# Patient Record
Sex: MALE | Race: WHITE | NOT HISPANIC OR LATINO | Employment: OTHER | URBAN - METROPOLITAN AREA
[De-identification: names, ages, dates, MRNs, and addresses within clinical notes are randomized per-mention and may not be internally consistent; named-entity substitution may affect disease eponyms.]

---

## 2017-01-03 ENCOUNTER — TELEPHONE (OUTPATIENT)
Dept: RHEUMATOLOGY | Facility: CLINIC | Age: 77
End: 2017-01-03

## 2017-01-03 RX ORDER — COLCHICINE 0.6 MG/1
0.6 TABLET ORAL DAILY
Qty: 90 TABLET | Refills: 1 | Status: SHIPPED | OUTPATIENT
Start: 2017-01-03 | End: 2017-12-26 | Stop reason: SDUPTHER

## 2017-01-03 RX ORDER — COLCHICINE 0.6 MG/1
TABLET ORAL
Qty: 180 TABLET | Refills: 1 | Status: SHIPPED | OUTPATIENT
Start: 2017-01-03 | End: 2017-01-03 | Stop reason: SDUPTHER

## 2017-01-03 NOTE — TELEPHONE ENCOUNTER
Please tell pt that since his uric acid is controlled, and no recent gout attacks, can reduce colchicine to 0.6mg once daily instead of twice. KAREN

## 2017-01-10 ENCOUNTER — LAB VISIT (OUTPATIENT)
Dept: LAB | Facility: HOSPITAL | Age: 77
End: 2017-01-10
Attending: INTERNAL MEDICINE
Payer: MEDICARE

## 2017-01-10 ENCOUNTER — OFFICE VISIT (OUTPATIENT)
Dept: RHEUMATOLOGY | Facility: CLINIC | Age: 77
End: 2017-01-10
Payer: MEDICARE

## 2017-01-10 ENCOUNTER — PATIENT MESSAGE (OUTPATIENT)
Dept: RHEUMATOLOGY | Facility: CLINIC | Age: 77
End: 2017-01-10

## 2017-01-10 VITALS
HEART RATE: 71 BPM | DIASTOLIC BLOOD PRESSURE: 62 MMHG | SYSTOLIC BLOOD PRESSURE: 128 MMHG | WEIGHT: 217 LBS | BODY MASS INDEX: 34.06 KG/M2 | HEIGHT: 67 IN

## 2017-01-10 DIAGNOSIS — G89.29 CHRONIC RIGHT SHOULDER PAIN: ICD-10-CM

## 2017-01-10 DIAGNOSIS — N18.30 CKD (CHRONIC KIDNEY DISEASE) STAGE 3, GFR 30-59 ML/MIN: Chronic | ICD-10-CM

## 2017-01-10 DIAGNOSIS — M54.9 BACK PAIN, UNSPECIFIED BACK LOCATION, UNSPECIFIED BACK PAIN LATERALITY, UNSPECIFIED CHRONICITY: Chronic | ICD-10-CM

## 2017-01-10 DIAGNOSIS — M75.41 IMPINGEMENT SYNDROME OF RIGHT SHOULDER: ICD-10-CM

## 2017-01-10 DIAGNOSIS — E78.5 HYPERLIPIDEMIA, UNSPECIFIED HYPERLIPIDEMIA TYPE: ICD-10-CM

## 2017-01-10 DIAGNOSIS — M25.511 CHRONIC RIGHT SHOULDER PAIN: ICD-10-CM

## 2017-01-10 DIAGNOSIS — M1A.9XX1 CHRONIC TOPHACEOUS GOUT: ICD-10-CM

## 2017-01-10 DIAGNOSIS — N18.30 CKD (CHRONIC KIDNEY DISEASE), STAGE III: ICD-10-CM

## 2017-01-10 DIAGNOSIS — K76.0 NAFLD (NONALCOHOLIC FATTY LIVER DISEASE): Primary | ICD-10-CM

## 2017-01-10 DIAGNOSIS — R74.01 HIGH TRANSAMINASE LEVELS: ICD-10-CM

## 2017-01-10 LAB
25(OH)D3+25(OH)D2 SERPL-MCNC: 35 NG/ML
ALBUMIN SERPL BCP-MCNC: 3.9 G/DL
ANION GAP SERPL CALC-SCNC: 7 MMOL/L
BASOPHILS # BLD AUTO: 0.06 K/UL
BASOPHILS NFR BLD: 1.1 %
BUN SERPL-MCNC: 24 MG/DL
CALCIUM SERPL-MCNC: 9.7 MG/DL
CHLORIDE SERPL-SCNC: 100 MMOL/L
CO2 SERPL-SCNC: 26 MMOL/L
CREAT SERPL-MCNC: 1.9 MG/DL
DIFFERENTIAL METHOD: ABNORMAL
EOSINOPHIL # BLD AUTO: 0.3 K/UL
EOSINOPHIL NFR BLD: 5.7 %
ERYTHROCYTE [DISTWIDTH] IN BLOOD BY AUTOMATED COUNT: 13.4 %
EST. GFR  (AFRICAN AMERICAN): 38.7 ML/MIN/1.73 M^2
EST. GFR  (NON AFRICAN AMERICAN): 33.5 ML/MIN/1.73 M^2
GLUCOSE SERPL-MCNC: 96 MG/DL
HCT VFR BLD AUTO: 37.8 %
HGB BLD-MCNC: 12.8 G/DL
LYMPHOCYTES # BLD AUTO: 1.8 K/UL
LYMPHOCYTES NFR BLD: 30.9 %
MCH RBC QN AUTO: 31.7 PG
MCHC RBC AUTO-ENTMCNC: 33.9 %
MCV RBC AUTO: 94 FL
MONOCYTES # BLD AUTO: 0.7 K/UL
MONOCYTES NFR BLD: 12 %
NEUTROPHILS # BLD AUTO: 2.8 K/UL
NEUTROPHILS NFR BLD: 49.9 %
PHOSPHATE SERPL-MCNC: 3.4 MG/DL
PLATELET # BLD AUTO: 230 K/UL
PMV BLD AUTO: 10.1 FL
POTASSIUM SERPL-SCNC: 4.5 MMOL/L
PTH-INTACT SERPL-MCNC: 92 PG/ML
RBC # BLD AUTO: 4.04 M/UL
SODIUM SERPL-SCNC: 133 MMOL/L
WBC # BLD AUTO: 5.66 K/UL

## 2017-01-10 PROCEDURE — 99999 PR PBB SHADOW E&M-EST. PATIENT-LVL III: CPT | Mod: PBBFAC,,, | Performed by: INTERNAL MEDICINE

## 2017-01-10 PROCEDURE — 82306 VITAMIN D 25 HYDROXY: CPT

## 2017-01-10 PROCEDURE — 83970 ASSAY OF PARATHORMONE: CPT

## 2017-01-10 PROCEDURE — 99213 OFFICE O/P EST LOW 20 MIN: CPT | Mod: S$PBB,,, | Performed by: INTERNAL MEDICINE

## 2017-01-10 PROCEDURE — 85025 COMPLETE CBC W/AUTO DIFF WBC: CPT

## 2017-01-10 PROCEDURE — 80069 RENAL FUNCTION PANEL: CPT

## 2017-01-10 PROCEDURE — 36415 COLL VENOUS BLD VENIPUNCTURE: CPT

## 2017-01-10 RX ORDER — FEBUXOSTAT 40 MG/1
40 TABLET, FILM COATED ORAL DAILY
Qty: 90 TABLET | Refills: 3 | Status: SHIPPED | OUTPATIENT
Start: 2017-01-10 | End: 2017-12-26 | Stop reason: SDUPTHER

## 2017-01-10 NOTE — MR AVS SNAPSHOT
[facility] - Rheumatology  [address]  [facility] [address]  Phone: [phone]  Fax: [phone]                  Adelfo Goldberg   1/10/2017 9:00 AM   Office Visit    Description:  Male : 1940   Provider:  Edis Nunez MD   Department:  [facility] - Rheumatology           Diagnoses this Visit        Comments    NAFLD (nonalcoholic fatty liver disease)    -  Primary     High transaminase levels         Chronic tophaceous gout         Hyperlipidemia, unspecified hyperlipidemia type         Chronic right shoulder pain         Back pain, unspecified back location, unspecified back pain laterality, unspecified chronicity         CKD (chronic kidney disease) stage 3, GFR 30-59 ml/min         Impingement syndrome of right shoulder                To Do List           Future Appointments        Provider Department Dept Phone    1/10/2017 9:45 AM SPECIMEN, [facility] [phone]    1/10/2017 10:00 AM LAB, APPOINTMENT [place] [facility] [phone]    2017 9:15 AM LAB, [facility] [phone]    2017 9:00 AM LAB, [facility] [phone]    2017 1:00 PM Lisa Graf MD [facility] Nephrology [phone]      Goals (5 Years of Data)     None      Follow-Up and Disposition     Return in about 3 months (around 4/10/2017).       These Medications        Disp Refills Start End    febuxostat (ULORIC) 40 mg Tab 90 tablet 3 1/10/2017     Take 1 tablet (40 mg total) by mouth once daily. - Oral    Pharmacy: [facility] - [place], [place] - [address] [place] [address] Ph #: [phone]         [facility]     [facility] Care [facility] -  Assistance  Registered nurses in the [facility] provide clinical advisement, health education, appointment booking, and other advisory services.  Call for this free service at  [phone].             Medications           Message regarding Medications     Verify the changes and/or additions to your medication regime listed below are the same as discussed with your clinician today.  If any of these changes or additions are incorrect, please notify your healthcare provider.        CHANGE how you are taking these medications     Start Taking Instead of    febuxostat (ULORIC) 40 mg Tab ULORIC 40 mg Tab    Dosage:  Take 1 tablet (40 mg total) by mouth once daily. Dosage:  take 1 tablet by mouth once daily    Reason for Change:  Reorder       STOP taking these medications     clindamycin (CLEOCIN) 300 MG capsule take 1 capsule by mouth three times a day for 7 days           Verify that the below list of medications is an accurate representation of the medications you are currently taking.  If none reported, the list may be blank. If incorrect, please contact your healthcare provider. Carry this list with you in case of emergency.           Current Medications     ADVAIR DISKUS 250-50 mcg/dose diskus inhaler inhale 1 dose by mouth twice a day Rinse mouth after use    albuterol (PROAIR HFA) 90 mcg/actuation inhaler Inhale 2 puffs into the lungs every 4 (four) hours as needed for Wheezing.    aspirin (ECOTRIN) 81 MG EC tablet Take 81 mg by mouth once daily.      atorvastatin (LIPITOR) 20 MG tablet Take 1 tablet (20 mg total) by mouth once daily. For cholesterol control.    colchicine 0.6 mg tablet Take 1 tablet (0.6 mg total) by mouth once daily.    febuxostat (ULORIC) 40 mg Tab Take 1 tablet (40 mg total) by mouth once daily.    felodipine (PLENDIL) 10 MG 24 hr tablet take 1 tablet by mouth once daily    fish oil-omega-3 fatty acids 300-1,000 mg capsule Take 2 g by mouth once daily.      fluticasone (FLONASE) 50 mcg/actuation nasal spray instill 1 spray into each nostril once daily    glucosamine & chondroit sul.Na 750-600 mg Tab Take 750 mg by mouth.    loratadine (CLARITIN) 10 mg tablet  "Take 10 mg by mouth daily as needed.      valsartan-hydrochlorothiazide (DIOVAN-HCT) 320-12.5 mg per tablet take 1 tablet by mouth once daily           Clinical Reference Information           Vital Signs - Last Recorded  Most recent update: 1/10/2017  8:59 AM by Oralia Navarro MA    BP Pulse Ht Wt BMI    128/62 71 5' 7.2" (1.707 m) 98.4 kg (217 lb) 33.79 kg/m2      Blood Pressure          Most Recent Value    BP  128/62      Allergies as of 1/10/2017     Amoxicillin    Allopurinol Analogues      Immunizations Administered on Date of Encounter - 1/10/2017     None      "

## 2017-01-10 NOTE — PROGRESS NOTES
"Subjective:       Patient ID: Adelfo Goldberg is a 76 y.o. male.    Chief Complaint: Chronic tophaceous gout  No acute attacks. Right shoulder impingement much improved after OT/PT Vets. Better rom and much less pain. Occ cough with "mucus plugs" related to asthma. Recently started back on statin, atorvastatin 20 mg daily  HPI  Review of Systems   Constitutional: Negative for appetite change, chills, fatigue, fever and unexpected weight change.   HENT: Negative for mouth sores.    Eyes: Negative for pain, redness and visual disturbance.   Respiratory: Positive for cough. Negative for shortness of breath and wheezing.    Cardiovascular: Negative for chest pain, palpitations and leg swelling.   Gastrointestinal: Negative for abdominal pain, blood in stool, constipation, diarrhea and nausea.   Genitourinary: Negative for difficulty urinating, dysuria and hematuria.   Musculoskeletal: Negative for arthralgias, back pain, gait problem, joint swelling, myalgias, neck pain and neck stiffness.   Skin: Negative for color change, pallor and rash.   Neurological: Negative for weakness and headaches.   Psychiatric/Behavioral: Negative for dysphoric mood and sleep disturbance.         Objective:     Visit Vitals    /62    Pulse 71    Ht 5' 7.2" (1.707 m)    Wt 98.4 kg (217 lb)    BMI 33.79 kg/m2        Physical Exam   Constitutional: He is oriented to person, place, and time and well-developed, well-nourished, and in no distress.   HENT:   Head: Normocephalic and atraumatic.   Mouth/Throat: Oropharynx is clear and moist.   Eyes: Conjunctivae and EOM are normal. Pupils are equal, round, and reactive to light.   Neck: Normal range of motion. Neck supple. No thyromegaly present.   No cervical bruits   Cardiovascular: Normal rate, regular rhythm, normal heart sounds and intact distal pulses.  Exam reveals no gallop and no friction rub.    No murmur heard.  Pulmonary/Chest: Breath sounds normal. He has no wheezes. He " has no rales. He exhibits no tenderness.   Abdominal: Soft. He exhibits no distension and no mass. There is no tenderness.       Right Side Rheumatological Exam     Examination finds the shoulder, elbow, wrist, knee, 1st PIP, 1st MCP, 2nd PIP, 2nd MCP, 3rd PIP, 3rd MCP, 4th PIP, 4th MCP, 5th PIP and 5th MCP normal.    Left Side Rheumatological Exam     Examination finds the shoulder, elbow, wrist, knee, 1st PIP, 1st MCP, 2nd PIP, 2nd MCP, 3rd PIP, 3rd MCP, 4th PIP, 4th MCP, 5th PIP and 5th MCP normal.      Lymphadenopathy:     He has no cervical adenopathy.   Neurological: He is alert and oriented to person, place, and time. He displays normal reflexes. He exhibits normal muscle tone. Gait normal.   Motor strength nl. UE and LE bilateral   Skin: Skin is warm and dry. No rash noted. No erythema. No pallor.     Psychiatric: Mood, memory, affect and judgment normal.       Results for MCKINLEY BISHOP (MRN [id number]) as of 1/10/2017 09:05   Ref. Range 12/27/2016 09:11 12/27/2016 09:11 12/27/2016 09:11   Sodium Latest Ref Range: 136 - 145 mmol/L  133 (L)    Potassium Latest Ref Range: 3.5 - 5.1 mmol/L  4.5    Chloride Latest Ref Range: 95 - 110 mmol/L  101    CO2 Latest Ref Range: 23 - 29 mmol/L  24    Anion Gap Latest Ref Range: 8 - 16 mmol/L  8    BUN, Bld Latest Ref Range: 8 - 23 mg/dL  29 (H)    Creatinine Latest Ref Range: 0.5 - 1.4 mg/dL  1.8 (H)    eGFR if non African American Latest Ref Range: >60 mL/min/1.73 m^2  35.8 (A)    eGFR if African American Latest Ref Range: >60 mL/min/1.73 m^2  41.3 (A)    Glucose Latest Ref Range: 70 - 110 mg/dL  90    Calcium Latest Ref Range: 8.7 - 10.5 mg/dL  9.2    Alkaline Phosphatase Latest Ref Range: 55 - 135 U/L 120 120 120   Total Protein Latest Ref Range: 6.0 - 8.4 g/dL 6.4 6.4 6.4   Albumin Latest Ref Range: 3.5 - 5.2 g/dL 3.5 3.5 3.5   Uric Acid Latest Ref Range: 3.4 - 7.0 mg/dL 5.0     Total Bilirubin Latest Ref Range: 0.1 - 1.0 mg/dL 0.4 0.4 0.4   Bilirubin, Direct  Latest Ref Range: 0.1 - 0.3 mg/dL 0.2  0.2   AST Latest Ref Range: 10 - 40 U/L 40 40 40   ALT Latest Ref Range: 10 - 44 U/L 68 (H) 68 (H) 68 (H)     Assessment:           1. Chronic tophaceous gout  Intercritical, SUA at goal   2. Essential hypertension controlled   3. NAFLD (nonalcoholic fatty liver disease) stable-improved   4. CKD (chronic kidney disease) stage 3, GFR 30-59 ml/min stable   5. Obesity (BMI 30-39.9)    6. Hyperlipidemia, now on atorvastatin 20mg daily without effect on ALT    Plan:     Cont colchicine 0.6mg daily  Cont febuxostat 40mg daily  CMP, uric acid q 3 months    Cont weight reduction  RTC 3 months

## 2017-01-16 ENCOUNTER — LAB VISIT (OUTPATIENT)
Dept: LAB | Facility: HOSPITAL | Age: 77
End: 2017-01-16
Attending: INTERNAL MEDICINE
Payer: MEDICARE

## 2017-01-16 DIAGNOSIS — E78.49 OTHER HYPERLIPIDEMIA: ICD-10-CM

## 2017-01-16 LAB
ALBUMIN SERPL BCP-MCNC: 3.6 G/DL
ALP SERPL-CCNC: 94 U/L
ALT SERPL W/O P-5'-P-CCNC: 45 U/L
AST SERPL-CCNC: 29 U/L
BILIRUB DIRECT SERPL-MCNC: 0.3 MG/DL
BILIRUB SERPL-MCNC: 0.7 MG/DL
PROT SERPL-MCNC: 6.5 G/DL

## 2017-01-16 PROCEDURE — 36415 COLL VENOUS BLD VENIPUNCTURE: CPT | Mod: PO

## 2017-01-16 PROCEDURE — 80076 HEPATIC FUNCTION PANEL: CPT

## 2017-01-19 RX ORDER — FLUTICASONE PROPIONATE AND SALMETEROL 50; 250 UG/1; UG/1
POWDER RESPIRATORY (INHALATION)
Qty: 60 EACH | Refills: 3 | Status: SHIPPED | OUTPATIENT
Start: 2017-01-19 | End: 2017-06-12 | Stop reason: SDUPTHER

## 2017-01-30 ENCOUNTER — LAB VISIT (OUTPATIENT)
Dept: LAB | Facility: HOSPITAL | Age: 77
End: 2017-01-30
Attending: INTERNAL MEDICINE
Payer: MEDICARE

## 2017-01-30 DIAGNOSIS — E78.49 OTHER HYPERLIPIDEMIA: ICD-10-CM

## 2017-01-30 LAB
ALBUMIN SERPL BCP-MCNC: 3.7 G/DL
ALP SERPL-CCNC: 87 U/L
ALT SERPL W/O P-5'-P-CCNC: 39 U/L
AST SERPL-CCNC: 28 U/L
BILIRUB DIRECT SERPL-MCNC: 0.3 MG/DL
BILIRUB SERPL-MCNC: 0.7 MG/DL
PROT SERPL-MCNC: 6.6 G/DL

## 2017-01-30 PROCEDURE — 36415 COLL VENOUS BLD VENIPUNCTURE: CPT | Mod: PO

## 2017-01-30 PROCEDURE — 80076 HEPATIC FUNCTION PANEL: CPT

## 2017-02-01 ENCOUNTER — OFFICE VISIT (OUTPATIENT)
Dept: NEPHROLOGY | Facility: CLINIC | Age: 77
End: 2017-02-01
Payer: MEDICARE

## 2017-02-01 VITALS
SYSTOLIC BLOOD PRESSURE: 120 MMHG | DIASTOLIC BLOOD PRESSURE: 70 MMHG | WEIGHT: 216.94 LBS | HEIGHT: 67 IN | OXYGEN SATURATION: 97 % | HEART RATE: 70 BPM | BODY MASS INDEX: 34.05 KG/M2

## 2017-02-01 DIAGNOSIS — N25.81 SECONDARY HYPERPARATHYROIDISM: ICD-10-CM

## 2017-02-01 DIAGNOSIS — D64.9 CHRONIC ANEMIA: ICD-10-CM

## 2017-02-01 DIAGNOSIS — I12.9 HYPERTENSIVE KIDNEY DISEASE, STAGE 1-4 OR UNSPECIFIED CHRONIC KIDNEY DISEASE: ICD-10-CM

## 2017-02-01 DIAGNOSIS — N18.30 CKD (CHRONIC KIDNEY DISEASE), STAGE III: Primary | ICD-10-CM

## 2017-02-01 PROCEDURE — 99214 OFFICE O/P EST MOD 30 MIN: CPT | Mod: S$PBB,,, | Performed by: INTERNAL MEDICINE

## 2017-02-01 PROCEDURE — 99999 PR PBB SHADOW E&M-EST. PATIENT-LVL III: CPT | Mod: PBBFAC,,, | Performed by: INTERNAL MEDICINE

## 2017-02-01 PROCEDURE — 99213 OFFICE O/P EST LOW 20 MIN: CPT | Mod: PBBFAC | Performed by: INTERNAL MEDICINE

## 2017-02-01 NOTE — PROGRESS NOTES
Subjective:       Patient ID: Adelfo Goldberg is a 76 y.o. White male who presents for follow up of Chronic Kidney Disease    HPI     Mr. Goldberg was seen in nephrology clinic for follow up of CKD. He was seen on 10/12/16 in new patient evaluation for CKD. Pt has longstanding hypertension, gout, hyperlipidemia, elevated liver enzymes, non alcoholic fatty liver, ANNABELLE, obesity, remote h/o kidney stone, CKD III and other medical problems.     Onset of his CKD is somewhere in [id number]. Pt reports he was diagnosed with hypertension prior to that but was not really taking any medicines for BP control. 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 He denies any ongoing use.     He is overall doing fine and has no recent acute illness. Pt does not have any dysuria/ decreased urine/ difficulty urinating/ flank pain/ leg swelling/ hematuria/ shortness of breath/ chest pain. Serial labs noted, creatinine appears to be at 1.7 to 1.9 at baseline. Labs from 9/20/16 noted for Na 135, K 4.6, bicarbonate 24, BUN 31, creatinine 1.9, eGFR 33.5, Ca 10.2, albumin 3.9, Hb 12.8. Prior labs noted for negative hep C/B serology. Urinalysis does not show proteinuria. Remote abdominal imaging shows preserved kidney size.     Most recent labs were reviewed from 1/10/17, Hb 12.8 which is his baseline, Na 133, of note he appears to have chronic mild hyponatremia for 2 years or more. He has been on HCTZ for a long time. He admits he drinks upto 90 oz of water every day. K 4.5, bicarbonate 26, BUN 24, creatinine 1.9, eGFR 33.5, Ca 9.7, phos 3.4, vit D 35, PTH 92, urinalysis was unremarkable and urine PC ratio was normal.     Pt has been on Valsartan based regimen. His gout is managed per Dr. Nunez and pt has been on Uloric. Previously Allopurinol was stopped due to rise in liver enzymes.     Review of Systems   Constitutional: Negative for activity change, appetite change, chills, fatigue and fever.   HENT:  Negative for hearing loss, nosebleeds, rhinorrhea, sneezing and sore throat.    Eyes: Negative for pain and discharge.   Respiratory: Negative for cough, shortness of breath and wheezing.    Cardiovascular: Negative for chest pain and leg swelling.   Gastrointestinal: Negative for abdominal pain, diarrhea, nausea and vomiting.   Endocrine: Negative for polydipsia and polyuria.   Genitourinary: Negative for decreased urine volume, dysuria, flank pain, frequency, hematuria and urgency.   Musculoskeletal: Positive for arthralgias. Negative for myalgias.   Skin: Negative for pallor and rash.   Allergic/Immunologic: Negative for environmental allergies.   Neurological: Negative for dizziness, light-headedness and headaches.   Psychiatric/Behavioral: Negative for behavioral problems. The patient is not nervous/anxious.        Objective:      Physical Exam   Constitutional: He is oriented to person, place, and time. He appears well-developed and well-nourished.   HENT:   Head: Normocephalic and atraumatic.   Mouth/Throat: Oropharynx is clear and moist.   Eyes: Right eye exhibits no discharge. Left eye exhibits no discharge.   Neck: Neck supple.   Large neck and fat pad around neck   Cardiovascular: Normal rate, regular rhythm and normal heart sounds.    Pulmonary/Chest: Effort normal and breath sounds normal. No respiratory distress. He has no wheezes. He has no rales.   Abdominal: Soft.   Abdominal obesity   Musculoskeletal: He exhibits no edema or tenderness.   Neurological: He is alert and oriented to person, place, and time.   Skin: Skin is warm and dry.   Psychiatric: He has a normal mood and affect. His behavior is normal.   Vitals reviewed.      Assessment:       1. CKD (chronic kidney disease), stage III    2. Hypertensive kidney disease, stage 1-4 or unspecified chronic kidney disease    3. Secondary hyperparathyroidism    4. Chronic anemia    5.      Hyponatremia    Plan:     Mr. Rodriguez has CKD III which is  likely due to longstanding hypertension, occasional NSAID use, obesity. He has very slow progression of CKD and does not seem to have any rapid decline. He is on Valsartan based regimen. He has gout and non alcoholic fatty liver. I discussed all the available serial labs with patient and explained CKD staging, potential risk of progression. I stressed importance of weight loss by calorie control/ portion size, low salt diet, keeping well hydrated, avoiding any NSAID use ever and continuing ARB based regimen to slow progression of CKD. He will need periodic lab monitoring of eGFR and electrolytes. I have advised him to stop vitamin D, multivitamin as it will have Ca supplement and as such he has borderline high Ca levels. Also I have advised him to stop vitamin C unless he has clinical signs of deficiency. He gives remote h/o kidney stone and vitamin C intake can precipitate stone formation. He remains at high risk for contrast induced nephropathy and also as such would avoid gadolinium use unless benefit outweighs risk. He appears to have chronic mild hyponatremia which could be from a combination of HCTZ, excessive intake of dilute fluids. He will limit his water intake to 64 oz from current around 90 oz.     All available labs and other renal related imaging d/w him in detail.    Plan  - periodically monitor renal panel for electrolytes, acid base status, eGFR  - risk of CKD progression d/w patient  - low salt diet  - follow PTH levels, vit D, Ca, phos levels  - periodically trend Hb, consider DAPHNIE when Hb is less than 10   - follow urine studies for proteinuria  - avoid NSAID/ bactrim/ IV contrast/ gadolinium/ aminoglycoside/ Fleet enema where possible  - continue ARB containing regimen for RAAS blockade  - management of gout per Dr. Nunez  - management of hyperlipidemia per Dr. Vogel  - weight loss by controlling calories and portion size is highly advisable especially with other comorbid conditions like ANNABELLE,  non alcoholic fatty liver     Plan, labs, recommendations were discussed with patient, his questions were answered to his satisfaction.   RTC 4 months

## 2017-02-06 ENCOUNTER — OFFICE VISIT (OUTPATIENT)
Dept: OPTOMETRY | Facility: CLINIC | Age: 77
End: 2017-02-06
Payer: MEDICARE

## 2017-02-06 DIAGNOSIS — H25.13 NUCLEAR SCLEROSIS, BILATERAL: Primary | ICD-10-CM

## 2017-02-06 DIAGNOSIS — I10 ESSENTIAL HYPERTENSION: ICD-10-CM

## 2017-02-06 DIAGNOSIS — H52.4 MYOPIA WITH ASTIGMATISM AND PRESBYOPIA, BILATERAL: ICD-10-CM

## 2017-02-06 DIAGNOSIS — H52.203 MYOPIA WITH ASTIGMATISM AND PRESBYOPIA, BILATERAL: ICD-10-CM

## 2017-02-06 DIAGNOSIS — H52.13 MYOPIA WITH ASTIGMATISM AND PRESBYOPIA, BILATERAL: ICD-10-CM

## 2017-02-06 PROCEDURE — 92015 DETERMINE REFRACTIVE STATE: CPT | Mod: ,,, | Performed by: OPTOMETRIST

## 2017-02-06 PROCEDURE — 99999 PR PBB SHADOW E&M-EST. PATIENT-LVL III: CPT | Mod: PBBFAC,,, | Performed by: OPTOMETRIST

## 2017-02-06 PROCEDURE — 99213 OFFICE O/P EST LOW 20 MIN: CPT | Mod: PBBFAC | Performed by: OPTOMETRIST

## 2017-02-06 PROCEDURE — 92004 COMPRE OPH EXAM NEW PT 1/>: CPT | Mod: S$PBB,,, | Performed by: OPTOMETRIST

## 2017-02-06 NOTE — PROGRESS NOTES
HPI     76 yr old here for a full eye exam 2/2 HTN.  Mr. Rodriguez states that   his HTN is medication controled.  Pt states that his last eye exam was   less than five years ago outside of [facility], distance vision is stable.    Going without correction for distance.  Depends on reading glasses and   feels that he is depending on the readers more now than before, vision   could be sharper.  Uses the same glasses for computer and reading vision.      No headache  No diplopia  No diplopia  No flashes or floaters   +itch tear and burn in the evening at time. Does not use AFT's washes his   face with water to help  No pain  No Injury or surgery.         Last edited by Violet Altamirano on 2/6/2017  1:39 PM.     ROS     Negative for: Constitutional, Gastrointestinal, Neurological, Skin,   Genitourinary, Musculoskeletal, HENT, Endocrine, Cardiovascular, Eyes,   Respiratory, Psychiatric, Allergic/Imm, Heme/Lymph    Last edited by Darleen Galan, OD on 2/6/2017  2:42 PM. (History)        Assessment /Plan     For exam results, see Encounter Report.    Nuclear sclerosis, bilateral    Essential hypertension    Myopia with astigmatism and presbyopia, bilateral            1.  Educated on cataracts and affects on vision.  Early-monitor.  2.  No retinopathy--monitor yearly.  BP control.    3.  Bifocal rx given          RTC 1 year for routine exam.

## 2017-02-13 ENCOUNTER — LAB VISIT (OUTPATIENT)
Dept: LAB | Facility: HOSPITAL | Age: 77
End: 2017-02-13
Attending: INTERNAL MEDICINE
Payer: MEDICARE

## 2017-02-13 DIAGNOSIS — E78.49 OTHER HYPERLIPIDEMIA: ICD-10-CM

## 2017-02-13 LAB
ALBUMIN SERPL BCP-MCNC: 3.6 G/DL
ALP SERPL-CCNC: 87 U/L
ALT SERPL W/O P-5'-P-CCNC: 34 U/L
AST SERPL-CCNC: 27 U/L
BILIRUB DIRECT SERPL-MCNC: 0.3 MG/DL
BILIRUB SERPL-MCNC: 0.7 MG/DL
PROT SERPL-MCNC: 6.5 G/DL

## 2017-02-13 PROCEDURE — 36415 COLL VENOUS BLD VENIPUNCTURE: CPT | Mod: PO

## 2017-02-13 PROCEDURE — 80076 HEPATIC FUNCTION PANEL: CPT

## 2017-03-09 RX ORDER — FELODIPINE 10 MG/1
TABLET, EXTENDED RELEASE ORAL
Qty: 30 TABLET | Refills: 5 | Status: SHIPPED | OUTPATIENT
Start: 2017-03-09 | End: 2017-08-23 | Stop reason: SDUPTHER

## 2017-03-21 ENCOUNTER — PATIENT MESSAGE (OUTPATIENT)
Dept: INTERNAL MEDICINE | Facility: CLINIC | Age: 77
End: 2017-03-21

## 2017-03-22 ENCOUNTER — OFFICE VISIT (OUTPATIENT)
Dept: INTERNAL MEDICINE | Facility: CLINIC | Age: 77
End: 2017-03-22
Payer: MEDICARE

## 2017-03-22 VITALS
HEART RATE: 65 BPM | SYSTOLIC BLOOD PRESSURE: 134 MMHG | RESPIRATION RATE: 20 BRPM | WEIGHT: 220 LBS | TEMPERATURE: 98 F | BODY MASS INDEX: 31.5 KG/M2 | HEIGHT: 70 IN | DIASTOLIC BLOOD PRESSURE: 63 MMHG

## 2017-03-22 DIAGNOSIS — J45.20 ASTHMA, INTERMITTENT, UNCOMPLICATED: ICD-10-CM

## 2017-03-22 DIAGNOSIS — J01.10 ACUTE FRONTAL SINUSITIS, RECURRENCE NOT SPECIFIED: Primary | ICD-10-CM

## 2017-03-22 PROCEDURE — 99213 OFFICE O/P EST LOW 20 MIN: CPT | Mod: PBBFAC,PO,25 | Performed by: INTERNAL MEDICINE

## 2017-03-22 PROCEDURE — 99999 PR PBB SHADOW E&M-EST. PATIENT-LVL III: CPT | Mod: PBBFAC,,, | Performed by: INTERNAL MEDICINE

## 2017-03-22 PROCEDURE — 96372 THER/PROPH/DIAG INJ SC/IM: CPT | Mod: PBBFAC,PO

## 2017-03-22 PROCEDURE — 99213 OFFICE O/P EST LOW 20 MIN: CPT | Mod: S$PBB,,, | Performed by: INTERNAL MEDICINE

## 2017-03-22 RX ORDER — TRIAMCINOLONE ACETONIDE 0.25 MG/G
CREAM TOPICAL
Refills: 0 | COMMUNITY
Start: 2017-01-31 | End: 2017-10-17

## 2017-03-22 RX ORDER — KETOCONAZOLE 20 MG/ML
SHAMPOO, SUSPENSION TOPICAL
Refills: 0 | COMMUNITY
Start: 2017-01-31 | End: 2017-12-26 | Stop reason: SDUPTHER

## 2017-03-22 RX ORDER — AZITHROMYCIN 250 MG/1
250 TABLET, FILM COATED ORAL DAILY
Qty: 6 TABLET | Refills: 0 | Status: SHIPPED | OUTPATIENT
Start: 2017-03-22 | End: 2017-03-27

## 2017-03-22 RX ORDER — TRIAMCINOLONE ACETONIDE 40 MG/ML
40 INJECTION, SUSPENSION INTRA-ARTICULAR; INTRAMUSCULAR
Status: COMPLETED | OUTPATIENT
Start: 2017-03-22 | End: 2017-03-22

## 2017-03-22 RX ORDER — KETOCONAZOLE 20 MG/G
CREAM TOPICAL
Refills: 0 | COMMUNITY
Start: 2017-01-31 | End: 2018-04-17

## 2017-03-22 RX ADMIN — TRIAMCINOLONE ACETONIDE 40 MG: 40 INJECTION, SUSPENSION INTRA-ARTICULAR; INTRAMUSCULAR at 01:03

## 2017-03-22 NOTE — PROGRESS NOTES
Subjective:       Patient ID: Adelfo Goldberg is a 76 y.o. male.    Chief Complaint: Cough; Nasal Congestion; Chest Congestion; and Fatigue    HPI Comments: Patient presents for urgent visit.  His present illness started 2 weeks ago with some head congestion, sneezing, clear nasal drainage.  He thought his allergies were acting up.  Started loratidine and mucinex.  Some improvement until the past few days the head congestion has gotten worse and has developed a bit of a cough with yellow tinged phlegm.  No fever/chills.    He has allergic asthma and has been monitoring his peak flows and they have been good.  He is not aware of any wheezing.    Of note, he is leaving on a business trip tomorrow involving a plane flight.  He is concerned.     Just feels fatigued and a bit headachy and off balance.    Cough   Associated symptoms include postnasal drip, rhinorrhea and a sore throat. Pertinent negatives include no chills, fever, headaches, shortness of breath or wheezing.   Fatigue   Associated symptoms include congestion, coughing, fatigue and a sore throat. Pertinent negatives include no chills, fever or headaches.     Review of Systems   Constitutional: Positive for fatigue. Negative for chills and fever.   HENT: Positive for congestion, postnasal drip, rhinorrhea, sinus pressure, sneezing and sore throat.    Respiratory: Positive for cough. Negative for chest tightness, shortness of breath and wheezing.    Neurological: Positive for dizziness. Negative for light-headedness and headaches.       Objective:      Physical Exam   Constitutional: He appears well-developed and well-nourished. No distress.   HENT:   Head: Normocephalic.   Right Ear: External ear normal.   Left Ear: External ear normal.   Mouth/Throat: Oropharynx is clear and moist. No oropharyngeal exudate.   Canals/TMs clear bilaterally.  Boggy turbinates.   Cardiovascular: Normal rate, regular rhythm and normal heart sounds.    Pulmonary/Chest: Effort  normal. No respiratory distress. He has no wheezes.   Few upper coarse sounds that clear with deep breath and cough.   Lymphadenopathy:     He has no cervical adenopathy.   Vitals reviewed.      Assessment:       1. Acute frontal sinusitis, recurrence not specified    2. Asthma, intermittent, uncomplicated        Plan:   1. Kenalog 40 mgm IM in the office.  2. Z-trang for the infection.  3. Continue the Mucinex and loratadine as needed.  4. High oral fluid intake.  5. Tomorrow morning if the ears feel blocked get Afrin nasal spray to use before boarding the plane.  6. Continue all regular meds.

## 2017-03-22 NOTE — MR AVS SNAPSHOT
[facility]   [facility]  [place] [address]  Phone: [phone]  Fax: [phone]                  Adelfo Goldberg   3/22/2017 1:20 PM   Office Visit    Description:  Male : 1940   Provider:  Niko Vega MD   Department:  [facility]           Reason for Visit     Cough     Nasal Congestion     Chest Congestion     Fatigue           Diagnoses this Visit        Comments    Acute frontal sinusitis, recurrence not specified    -  Primary     Asthma, intermittent, uncomplicated                To Do List           Future Appointments        Provider Department Dept Phone    2017 9:30 AM LAB, [facility] [phone]    2017 8:30 AM Edis Nunez MD [facility] - Rheumatology [phone]    2017 10:30 AM [facility] [phone]    2017 2:00 PM Romero Vogel MD [facility] Internal Medicine [phone]    2017 10:00 AM SPECIMEN, [place] [facility] [phone]      Goals (5 Years of Data)     None      Follow-Up and Disposition     Return if symptoms worsen or fail to improve.       These Medications        Disp Refills Start End    azithromycin (Z-NICOLASA) 250 MG tablet 6 tablet 0 3/22/2017 3/27/2017    Take 1 tablet (250 mg total) by mouth once daily. 2 tablets initially on first day, then 1 tablet daily until finished. - Oral    [facility] [address] [facility] [address] #: [phone]         [facility]     [facility] -  Assistance  Registered nurses in the [facility] provide clinical advisement, health education, appointment booking, and other advisory services.  Call for this free service at [phone].             Medications           Message regarding Medications     Verify the changes and/or additions to your medication regime listed  below are the same as discussed with your clinician today.  If any of these changes or additions are incorrect, please notify your healthcare provider.        START taking these NEW medications        Refills    azithromycin (Z-NICOLASA) 250 MG tablet 0    Sig: Take 1 tablet (250 mg total) by mouth once daily. 2 tablets initially on first day, then 1 tablet daily until finished.    Class: Normal    Route: Oral      These medications were administered today        Dose Freq    triamcinolone acetonide injection 40 mg 40 mg [facility] 1 time    Sig: Inject 1 mL (40 mg total) into the muscle one time.    Class: Normal    Route: Intramuscular           Verify that the below list of medications is an accurate representation of the medications you are currently taking.  If none reported, the list may be blank. If incorrect, please contact your healthcare provider. Carry this list with you in case of emergency.           Current Medications     ADVAIR DISKUS 250-50 mcg/dose diskus inhaler inhalation 1 dose by mouth twice a day Rinse mouth after use    albuterol (PROAIR HFA) 90 mcg/actuation inhaler Inhale 2 puffs into the lungs every 4 (four) hours as needed for Wheezing.    aspirin (ECOTRIN) 81 MG EC tablet Take 81 mg by mouth once daily.      atorvastatin (LIPITOR) 20 MG tablet Take 1 tablet (20 mg total) by mouth once daily. For cholesterol control.    azithromycin (Z-NICOLSAA) 250 MG tablet Take 1 tablet (250 mg total) by mouth once daily. 2 tablets initially on first day, then 1 tablet daily until finished.    colchicine 0.6 mg tablet Take 1 tablet (0.6 mg total) by mouth once daily.    febuxostat (ULORIC) 40 mg Tab Take 1 tablet (40 mg total) by mouth once daily.    felodipine (PLENDIL) 10 MG 24 hr tablet take 1 tablet by mouth once daily    fish oil-omega-3 fatty acids 300-1,000 mg capsule Take 2 g by mouth once daily.      fluticasone (FLONASE) 50 mcg/actuation nasal spray instill 1 spray into each nostril once daily     "glucosamine & chondroit sul.Na 750-600 mg Tab Take 750 mg by mouth.    ketoconazole (NIZORAL) 2 % cream apply to FACE twice a day as directed    ketoconazole (NIZORAL) 2 % shampoo WASH affected area every other day    loratadine (CLARITIN) 10 mg tablet Take 10 mg by mouth daily as needed.      triamcinolone acetonide 0.025% (KENALOG) 0.025 % cream apply to RASH ON FACE twice a day if needed    valsartan-hydrochlorothiazide (DIOVAN-HCT) 320-12.5 mg per tablet take 1 tablet by mouth once daily           Clinical Reference Information           Your Vitals Were     BP Pulse Temp Resp Height Weight    134/63 (BP Location: Left arm, Patient Position: Sitting, BP Method: Automatic) 65 98 °F (36.7 °C) (Oral) 20 5' 10" (1.778 m) 99.8 kg (220 lb 0.3 oz)    BMI                31.57 kg/m2          Blood Pressure          Most Recent Value    BP  134/63      Allergies as of 3/22/2017     Amoxicillin    Allopurinol Analogues      Immunizations Administered on Date of Encounter - 3/22/2017     None      Instructions    1. Z-trang for the infection.  2. Kenalog injection in the office.  3. High oral fluid intake.  4. Continue all regular meds.  5. Continue the mucinex and loratidine as needed.  6. If ears feel blocked at all tomorrow morning, then get a bottle of Afrin (generic) to use prior to boarding plane.       Language Assistance Services     ATTENTION: Language assistance services are available, free of charge. Please call [phone].      ATENCIÓN: Si rm tian, tiene a guzmán disposición servicios gratuitos de asistencia lingüística. Llame al [phone].     LINETTE Ý: N?u b?n nói Ti?ng Vi?t, có các d?ch v? h? tr? ngôn ng? mi?n phí dành cho b?n. G?i s? [phone].         [facility] complies with applicable Federal civil rights laws and does not discriminate on the basis of race, color, national origin, age, disability, or sex.        "

## 2017-03-22 NOTE — PATIENT INSTRUCTIONS
1. Z-trang for the infection.  2. Kenalog injection in the office.  3. High oral fluid intake.  4. Continue all regular meds.  5. Continue the mucinex and loratidine as needed.  6. If ears feel blocked at all tomorrow morning, then get a bottle of Afrin (generic) to use prior to boarding plane.

## 2017-03-29 ENCOUNTER — PATIENT MESSAGE (OUTPATIENT)
Dept: INTERNAL MEDICINE | Facility: CLINIC | Age: 77
End: 2017-03-29

## 2017-03-29 DIAGNOSIS — E78.5 HYPERLIPIDEMIA, UNSPECIFIED HYPERLIPIDEMIA TYPE: Primary | ICD-10-CM

## 2017-04-12 ENCOUNTER — LAB VISIT (OUTPATIENT)
Dept: LAB | Facility: HOSPITAL | Age: 77
End: 2017-04-12
Attending: INTERNAL MEDICINE
Payer: MEDICARE

## 2017-04-12 DIAGNOSIS — R74.01 HIGH TRANSAMINASE LEVELS: ICD-10-CM

## 2017-04-12 DIAGNOSIS — M1A.9XX0 CHRONIC GOUT WITHOUT TOPHUS, UNSPECIFIED CAUSE, UNSPECIFIED SITE: ICD-10-CM

## 2017-04-12 DIAGNOSIS — E78.5 HYPERLIPIDEMIA, UNSPECIFIED HYPERLIPIDEMIA TYPE: ICD-10-CM

## 2017-04-12 LAB
ALBUMIN SERPL BCP-MCNC: 3.8 G/DL
ALP SERPL-CCNC: 93 U/L
ALT SERPL W/O P-5'-P-CCNC: 27 U/L
ANION GAP SERPL CALC-SCNC: 6 MMOL/L
AST SERPL-CCNC: 23 U/L
BILIRUB SERPL-MCNC: 0.5 MG/DL
BUN SERPL-MCNC: 29 MG/DL
CALCIUM SERPL-MCNC: 9.6 MG/DL
CHLORIDE SERPL-SCNC: 105 MMOL/L
CHOLEST/HDLC SERPL: 2 {RATIO}
CO2 SERPL-SCNC: 26 MMOL/L
CREAT SERPL-MCNC: 1.8 MG/DL
EST. GFR  (AFRICAN AMERICAN): 41.3 ML/MIN/1.73 M^2
EST. GFR  (NON AFRICAN AMERICAN): 35.8 ML/MIN/1.73 M^2
GLUCOSE SERPL-MCNC: 93 MG/DL
HDL/CHOLESTEROL RATIO: 50 %
HDLC SERPL-MCNC: 182 MG/DL
HDLC SERPL-MCNC: 91 MG/DL
LDLC SERPL CALC-MCNC: 79.6 MG/DL
NONHDLC SERPL-MCNC: 91 MG/DL
POTASSIUM SERPL-SCNC: 4.4 MMOL/L
PROT SERPL-MCNC: 6.8 G/DL
SODIUM SERPL-SCNC: 137 MMOL/L
TRIGL SERPL-MCNC: 57 MG/DL
URATE SERPL-MCNC: 5.4 MG/DL

## 2017-04-12 PROCEDURE — 80053 COMPREHEN METABOLIC PANEL: CPT

## 2017-04-12 PROCEDURE — 80061 LIPID PANEL: CPT

## 2017-04-12 PROCEDURE — 84550 ASSAY OF BLOOD/URIC ACID: CPT

## 2017-04-12 PROCEDURE — 36415 COLL VENOUS BLD VENIPUNCTURE: CPT | Mod: PO

## 2017-04-17 ENCOUNTER — HOSPITAL ENCOUNTER (OUTPATIENT)
Dept: RADIOLOGY | Facility: HOSPITAL | Age: 77
Discharge: HOME | End: 2017-04-17
Attending: NURSE PRACTITIONER
Payer: MEDICARE

## 2017-04-17 ENCOUNTER — OFFICE VISIT (OUTPATIENT)
Dept: RHEUMATOLOGY | Facility: CLINIC | Age: 77
End: 2017-04-17
Payer: MEDICARE

## 2017-04-17 ENCOUNTER — PATIENT MESSAGE (OUTPATIENT)
Dept: INTERNAL MEDICINE | Facility: CLINIC | Age: 77
End: 2017-04-17

## 2017-04-17 VITALS
HEART RATE: 57 BPM | HEIGHT: 67 IN | BODY MASS INDEX: 34.09 KG/M2 | DIASTOLIC BLOOD PRESSURE: 64 MMHG | SYSTOLIC BLOOD PRESSURE: 133 MMHG | WEIGHT: 217.19 LBS

## 2017-04-17 DIAGNOSIS — D64.9 CHRONIC ANEMIA: ICD-10-CM

## 2017-04-17 DIAGNOSIS — M75.41 IMPINGEMENT SYNDROME OF RIGHT SHOULDER: ICD-10-CM

## 2017-04-17 DIAGNOSIS — M1A.9XX1 CHRONIC TOPHACEOUS GOUT: Primary | ICD-10-CM

## 2017-04-17 DIAGNOSIS — K62.89 RECTAL PAIN: Primary | ICD-10-CM

## 2017-04-17 DIAGNOSIS — K76.0 NAFLD (NONALCOHOLIC FATTY LIVER DISEASE): ICD-10-CM

## 2017-04-17 DIAGNOSIS — K76.0 FATTY LIVER: ICD-10-CM

## 2017-04-17 DIAGNOSIS — N18.30 CKD (CHRONIC KIDNEY DISEASE) STAGE 3, GFR 30-59 ML/MIN: Chronic | ICD-10-CM

## 2017-04-17 DIAGNOSIS — K86.2 PANCREATIC CYST: ICD-10-CM

## 2017-04-17 DIAGNOSIS — E78.5 HYPERLIPIDEMIA, UNSPECIFIED HYPERLIPIDEMIA TYPE: ICD-10-CM

## 2017-04-17 DIAGNOSIS — E66.9 OBESITY (BMI 30-39.9): ICD-10-CM

## 2017-04-17 PROCEDURE — 74181 MRI ABDOMEN W/O CONTRAST: CPT | Mod: TC

## 2017-04-17 PROCEDURE — 99213 OFFICE O/P EST LOW 20 MIN: CPT | Mod: S$PBB,,, | Performed by: INTERNAL MEDICINE

## 2017-04-17 PROCEDURE — 99999 PR PBB SHADOW E&M-EST. PATIENT-LVL III: CPT | Mod: PBBFAC,,, | Performed by: INTERNAL MEDICINE

## 2017-04-17 PROCEDURE — 74181 MRI ABDOMEN W/O CONTRAST: CPT | Mod: 26,GC,, | Performed by: RADIOLOGY

## 2017-04-17 PROCEDURE — 76376 3D RENDER W/INTRP POSTPROCES: CPT | Mod: 26,GC,, | Performed by: RADIOLOGY

## 2017-04-17 ASSESSMENT — ROUTINE ASSESSMENT OF PATIENT INDEX DATA (RAPID3)
FATIGUE SCORE: 0
PATIENT GLOBAL ASSESSMENT SCORE: 0
PSYCHOLOGICAL DISTRESS SCORE: 0
TOTAL RAPID3 SCORE: 0
PAIN SCORE: 0
AM STIFFNESS SCORE: 0, NO
MDHAQ FUNCTION SCORE: 0

## 2017-04-17 NOTE — PROGRESS NOTES
"Subjective:       Patient ID: Adelfo Goldberg is a 76 y.o. male.    Chief Complaint: Chronic tophaceous gout    HPI 76yM with chronic tophaceous gout last seen on 1/10/17, presents for follow-up. At the last visit, he was doing very well and was continued on colchicine and febuxostat for gout attack prevention.    Today, patient is doing very well. Reports no new gouty attacks in the interim. Taking Uloric and colchicine regularly with no issues. Only complaint today is hemorrhoids this is being monitored his PCP.    Last colonoscopy 6/14/2013. Immunizations up to date. Seen by nephrology for CKD. Seen by Dr. Vogel for hyperlipidemia.    Review of Systems   Constitutional: Negative for fever and unexpected weight change.   HENT: Negative for mouth sores and trouble swallowing.    Eyes: Negative for redness.   Respiratory: Negative for cough and shortness of breath.    Cardiovascular: Negative for chest pain.   Gastrointestinal: Negative for constipation and diarrhea.   Genitourinary: Negative for dysuria and genital sores.   Skin: Negative for color change and rash.   Neurological: Negative for headaches.   Hematological: Negative for adenopathy. Bruises/bleeds easily.   Psychiatric/Behavioral: Negative for agitation.         Objective:   /64  Pulse (!) 57  Ht 5' 7.2" (1.707 m)  Wt 98.5 kg (217 lb 3.2 oz)  BMI 33.82 kg/m2     Physical Exam   Constitutional: He is oriented to person, place, and time and well-developed, well-nourished, and in no distress.   HENT:   Head: Normocephalic and atraumatic.   Mouth/Throat: No oropharyngeal exudate.   Eyes: EOM are normal.   Neck: Normal range of motion.   Cardiovascular: Normal rate, regular rhythm, normal heart sounds and intact distal pulses.    Pulmonary/Chest: Effort normal and breath sounds normal.       Right Side Rheumatological Exam     Examination finds the shoulder, elbow, wrist, knee, 1st PIP, 1st MCP, 2nd PIP, 2nd MCP, 3rd PIP, 3rd MCP, 4th PIP, 4th " MCP, 5th PIP and 5th MCP normal.    Left Side Rheumatological Exam     Examination finds the shoulder, elbow, wrist, knee, 1st PIP, 1st MCP, 2nd PIP, 2nd MCP, 3rd PIP, 3rd MCP, 4th PIP, 4th MCP, 5th PIP and 5th MCP normal.      Neurological: He is alert and oriented to person, place, and time. No cranial nerve deficit.   Skin: Skin is warm and dry.     Psychiatric: Affect normal.   Musculoskeletal: Normal range of motion.           Results for MCKINLEY BISHOP (MRN [id number]) as of 4/17/2017 08:23   Ref. Range 4/12/2017 09:21   Sodium Latest Ref Range: 136 - 145 mmol/L 137   Potassium Latest Ref Range: 3.5 - 5.1 mmol/L 4.4   Chloride Latest Ref Range: 95 - 110 mmol/L 105   CO2 Latest Ref Range: 23 - 29 mmol/L 26   Anion Gap Latest Ref Range: 8 - 16 mmol/L 6 (L)   BUN, Bld Latest Ref Range: 8 - 23 mg/dL 29 (H)   Creatinine Latest Ref Range: 0.5 - 1.4 mg/dL 1.8 (H)   eGFR if non African American Latest Ref Range: >60 mL/min/1.73 m^2 35.8 (A)   eGFR if African American Latest Ref Range: >60 mL/min/1.73 m^2 41.3 (A)   Glucose Latest Ref Range: 70 - 110 mg/dL 93   Calcium Latest Ref Range: 8.7 - 10.5 mg/dL 9.6   Alkaline Phosphatase Latest Ref Range: 55 - 135 U/L 93   Total Protein Latest Ref Range: 6.0 - 8.4 g/dL 6.8   Albumin Latest Ref Range: 3.5 - 5.2 g/dL 3.8   Uric Acid Latest Ref Range: 3.4 - 7.0 mg/dL 5.4   Total Bilirubin Latest Ref Range: 0.1 - 1.0 mg/dL 0.5   AST Latest Ref Range: 10 - 40 U/L 23   ALT Latest Ref Range: 10 - 44 U/L 27   Triglycerides Latest Ref Range: 30 - 150 mg/dL 57   Cholesterol Latest Ref Range: 120 - 199 mg/dL 182   HDL Latest Ref Range: 40 - 75 mg/dL 91 (H)   LDL Cholesterol Latest Ref Range: 63.0 - 159.0 mg/dL 79.6   Total Cholesterol/HDL Ratio Latest Ref Range: 2.0 - 5.0  2.0   HDL/Chol Ratio Latest Ref Range: 20.0 - 50.0 % 50.0   Non-HDL Cholesterol Latest Units: mg/dL 91     Assessment:       1. Chronic tophaceous gout Intercritical, SUA at 5.4, Goal <5   2. Essential hypertension  controlled   3. NAFLD (nonalcoholic fatty liver disease) stable-improved   4. CKD (chronic kidney disease) stage 3, GFR 30-59 ml/min stable   5. Obesity (BMI 30-39.9)    6. Hyperlipidemia, now on atorvastatin 20mg daily without effect on ALT    Plan:       Cont colchicine 0.6mg daily  Cont febuxostat 40mg daily  CMP, uric acid q 3 months, will monitor for SUA < 5  Cont weight reduction, encouraged and gave a handout  RTC 3 months

## 2017-04-17 NOTE — PROGRESS NOTES
I have personally taken the history and examined the patient and agree with the resident,s note as stated above           Results for MCKINLEY BISHOP (MRN [id number]) as of 4/17/2017 08:32   Ref. Range 1/10/2017 09:35 1/16/2017 09:29 1/30/2017 09:17 2/13/2017 09:07 4/12/2017 09:21   WBC Latest Ref Range: 3.90 - 12.70 K/uL 5.66       RBC Latest Ref Range: 4.60 - 6.20 M/uL 4.04 (L)       Hemoglobin Latest Ref Range: 14.0 - 18.0 g/dL 12.8 (L)       Hematocrit Latest Ref Range: 40.0 - 54.0 % 37.8 (L)       MCV Latest Ref Range: 82 - 98 fL 94       MCH Latest Ref Range: 27.0 - 31.0 pg 31.7 (H)       MCHC Latest Ref Range: 32.0 - 36.0 % 33.9       RDW Latest Ref Range: 11.5 - 14.5 % 13.4       Platelets Latest Ref Range: 150 - 350 K/uL 230       MPV Latest Ref Range: 9.2 - 12.9 fL 10.1       Gran% Latest Ref Range: 38.0 - 73.0 % 49.9       Gran # Latest Ref Range: 1.8 - 7.7 K/uL 2.8       Lymph% Latest Ref Range: 18.0 - 48.0 % 30.9       Lymph # Latest Ref Range: 1.0 - 4.8 K/uL 1.8       Mono% Latest Ref Range: 4.0 - 15.0 % 12.0       Mono # Latest Ref Range: 0.3 - 1.0 K/uL 0.7       Eosinophil% Latest Ref Range: 0.0 - 8.0 % 5.7       Eos # Latest Ref Range: 0.0 - 0.5 K/uL 0.3       Basophil% Latest Ref Range: 0.0 - 1.9 % 1.1       Baso # Latest Ref Range: 0.00 - 0.20 K/uL 0.06       Sodium Latest Ref Range: 136 - 145 mmol/L 133 (L)    137   Potassium Latest Ref Range: 3.5 - 5.1 mmol/L 4.5    4.4   Chloride Latest Ref Range: 95 - 110 mmol/L 100    105   CO2 Latest Ref Range: 23 - 29 mmol/L 26    26   Anion Gap Latest Ref Range: 8 - 16 mmol/L 7 (L)    6 (L)   BUN, Bld Latest Ref Range: 8 - 23 mg/dL 24 (H)    29 (H)   Creatinine Latest Ref Range: 0.5 - 1.4 mg/dL 1.9 (H)    1.8 (H)   eGFR if non African American Latest Ref Range: >60 mL/min/1.73 m^2 33.5 (A)    35.8 (A)   eGFR if African American Latest Ref Range: >60 mL/min/1.73 m^2 38.7 (A)    41.3 (A)   Glucose Latest Ref Range: 70 - 110 mg/dL 96    93   Calcium Latest  Ref Range: 8.7 - 10.5 mg/dL 9.7    9.6   Phosphorus Latest Ref Range: 2.7 - 4.5 mg/dL 3.4       Alkaline Phosphatase Latest Ref Range: 55 - 135 U/L  94 87 87 93   Total Protein Latest Ref Range: 6.0 - 8.4 g/dL  6.5 6.6 6.5 6.8   Albumin Latest Ref Range: 3.5 - 5.2 g/dL 3.9 3.6 3.7 3.6 3.8   Uric Acid Latest Ref Range: 3.4 - 7.0 mg/dL     5.4   Total Bilirubin Latest Ref Range: 0.1 - 1.0 mg/dL  0.7 0.7 0.7 0.5   Bilirubin, Direct Latest Ref Range: 0.1 - 0.3 mg/dL  0.3 0.3 0.3    AST Latest Ref Range: 10 - 40 U/L  29 28 27 23   ALT Latest Ref Range: 10 - 44 U/L  45 (H) 39 34 27   Triglycerides Latest Ref Range: 30 - 150 mg/dL     57   Cholesterol Latest Ref Range: 120 - 199 mg/dL     182   HDL Latest Ref Range: 40 - 75 mg/dL     91 (H)   LDL Cholesterol Latest Ref Range: 63.0 - 159.0 mg/dL     79.6   Total Cholesterol/HDL Ratio Latest Ref Range: 2.0 - 5.0      2.0   Vit D, 25-Hydroxy Latest Ref Range: 30 - 96 ng/mL 35       PTH Latest Ref Range: 9.0 - 77.0 pg/mL 92.0 (H)       Differential Method Unknown Automated       HDL/Chol Ratio Latest Ref Range: 20.0 - 50.0 %     50.0   Non-HDL Cholesterol Latest Units: mg/dL     91     Results for MCKINLEY BISHOP (MRN [id number]) as of 4/17/2017 08:32   Ref. Range 7/1/2016 10:10 8/2/2016 10:00 9/2/2016 10:04 9/20/2016 09:23 11/11/2016 10:16 12/12/2016 10:04 12/27/2016 09:11 4/12/2017 09:21   Uric Acid Latest Ref Range: 3.4 - 7.0 mg/dL 8.8 (H) 5.7 5.3 5.2 5.9 5.2 5.0 5.4     Results for MCKINLEY BISHOP (MRN [id number]) as of 4/17/2017 08:32   Ref. Range 9/2/2016 10:04 9/20/2016 09:23 9/20/2016 09:23 11/11/2016 10:16 12/12/2016 10:04 12/27/2016 09:11 1/10/2017 09:35 4/12/2017 09:21   eGFR if non African American Latest Ref Range: >60 mL/min/1.73 m^2 33.7 (A) 33.5 (A) 33.5 (A) 38.3 (A) 33.5 (A) 35.8 (A) 33.5 (A) 35.8 (A)         1. Chronic tophaceous gout Intercritical, SUA was at goal now slightly higher   2. Essential hypertension controlled   3. NAFLD (nonalcoholic fatty liver  disease) stable-improved   4. CKD (chronic kidney disease) stage 3, GFR 30-59 ml/min stable   5. Obesity (BMI 30-39.9) weight unchanged   6. Hyperlipidemia, now on atorvastatin 20mg daily without effect on ALT and improved lipid panel LDL 79.6          Cont colchicine 0.6mg daily  Cont febuxostat 40mg daily  CMP, uric acid q 3 months if still uric acid > 5, will increase febuxostat to 80mg daily  Resume weight reduction  Increase aerobic exercise  RTC 6 months

## 2017-04-24 ENCOUNTER — OFFICE VISIT (OUTPATIENT)
Dept: SURGERY | Facility: CLINIC | Age: 77
End: 2017-04-24
Payer: MEDICARE

## 2017-04-24 VITALS
DIASTOLIC BLOOD PRESSURE: 72 MMHG | WEIGHT: 216.06 LBS | HEART RATE: 70 BPM | SYSTOLIC BLOOD PRESSURE: 158 MMHG | HEIGHT: 67 IN | BODY MASS INDEX: 33.91 KG/M2

## 2017-04-24 DIAGNOSIS — K64.8 HEMORRHOIDS, INTERNAL, WITH BLEEDING: Primary | ICD-10-CM

## 2017-04-24 DIAGNOSIS — K64.4 EXTERNAL HEMORRHOIDS: ICD-10-CM

## 2017-04-24 DIAGNOSIS — Z86.010 HISTORY OF COLON POLYPS: ICD-10-CM

## 2017-04-24 PROCEDURE — 99203 OFFICE O/P NEW LOW 30 MIN: CPT | Mod: 25,S$PBB,, | Performed by: COLON & RECTAL SURGERY

## 2017-04-24 PROCEDURE — 46600 DIAGNOSTIC ANOSCOPY SPX: CPT | Mod: PBBFAC | Performed by: COLON & RECTAL SURGERY

## 2017-04-24 PROCEDURE — 99213 OFFICE O/P EST LOW 20 MIN: CPT | Mod: PBBFAC,25 | Performed by: COLON & RECTAL SURGERY

## 2017-04-24 PROCEDURE — 99999 PR PBB SHADOW E&M-EST. PATIENT-LVL III: CPT | Mod: PBBFAC,,, | Performed by: COLON & RECTAL SURGERY

## 2017-04-24 PROCEDURE — 46600 DIAGNOSTIC ANOSCOPY SPX: CPT | Mod: S$PBB,,, | Performed by: COLON & RECTAL SURGERY

## 2017-04-24 RX ORDER — HYDROCORTISONE ACETATE PRAMOXINE HCL 2.5; 1 G/100G; G/100G
CREAM TOPICAL 2 TIMES DAILY
Qty: 30 G | Refills: 5 | Status: SHIPPED | OUTPATIENT
Start: 2017-04-24 | End: 2017-05-04

## 2017-04-24 NOTE — PROGRESS NOTES
Subjective:       Patient ID: Adelfo Goldberg is a 76 y.o. male.    Chief Complaint: Consult    HPI   77 yo M with 15-20 yr hx of intermittently symptomatic external hemorrhoids.  In late March he began to notice some painless BRB when wiping after BM's - tried Prep H without improvement.  The bleeding gradually subsided and he hasn't seen any for several weeks now.  Prior to onset of bleeding, he had taken a course of abx for sinusitis and says that his bowels were fairly irregular for some time.  No abdominal pain, unexplained weight loss, anorexia, or other constitutional symptoms.     Last colonoscopy - 6/2013, sigmoid diverticulosis, two 5-mm adenomas removed    No family hx of CRC or IBD.      Review of patient's allergies indicates:   Allergen Reactions    Amoxicillin Hives    Allopurinol analogues Other (See Comments)     Abnormal transaminases       Past Medical History:   Diagnosis Date    ALLERGIC RHINITIS     Asthma     Hyperlipidemia     Hypertension     NAFLD (nonalcoholic fatty liver disease)     ANNABELLE (obstructive sleep apnea)     on CPAP       Past Surgical History:   Procedure Laterality Date    APPENDECTOMY      FOOT SURGERY      testicular biopsy         Current Outpatient Prescriptions   Medication Sig Dispense Refill    ADVAIR DISKUS 250-50 mcg/dose diskus inhaler inhalation 1 dose by mouth twice a day Rinse mouth after use 60 each 3    albuterol (PROAIR HFA) 90 mcg/actuation inhaler Inhale 2 puffs into the lungs every 4 (four) hours as needed for Wheezing. 1 Inhaler 6    aspirin (ECOTRIN) 81 MG EC tablet Take 81 mg by mouth once daily.        atorvastatin (LIPITOR) 20 MG tablet Take 1 tablet (20 mg total) by mouth once daily. For cholesterol control. 90 tablet 3    colchicine 0.6 mg tablet Take 1 tablet (0.6 mg total) by mouth once daily. 90 tablet 1    febuxostat (ULORIC) 40 mg Tab Take 1 tablet (40 mg total) by mouth once daily. 90 tablet 3    felodipine (PLENDIL) 10 MG 24  hr tablet take 1 tablet by mouth once daily 30 tablet 5    fish oil-omega-3 fatty acids 300-1,000 mg capsule Take 2 g by mouth once daily.        fluticasone (FLONASE) 50 mcg/actuation nasal spray instill 1 spray into each nostril once daily 16 g 6    glucosamine & chondroit sul.Na 750-600 mg Tab Take 750 mg by mouth.      ketoconazole (NIZORAL) 2 % cream apply to FACE twice a day as directed  0    ketoconazole (NIZORAL) 2 % shampoo WASH affected area every other day  0    loratadine (CLARITIN) 10 mg tablet Take 10 mg by mouth daily as needed.        triamcinolone acetonide 0.025% (KENALOG) 0.025 % cream apply to RASH ON FACE twice a day if needed  0    valsartan-hydrochlorothiazide (DIOVAN-HCT) 320-12.5 mg per tablet take 1 tablet by mouth once daily 30 tablet 10     No current facility-administered medications for this visit.        Family History   Problem Relation Age of Onset    Cancer Mother      breast    Glaucoma Mother     Retinal detachment Mother     Heart disease Father      CHF    COPD Father     Retinal detachment Father     Heart disease Brother     Arthritis Brother     Blindness Maternal Grandmother     Cataracts Maternal Grandmother     Cirrhosis Neg Hx     Strabismus Neg Hx        Social History     Social History    Marital status:      Spouse name: N/A    Number of children: N/A    Years of education: N/A     Social History Main Topics    Smoking status: Former Smoker     Packs/day: 1.00     Years: 10.00    Smokeless tobacco: Former User      Comment: 1968 last smoke    Alcohol use Yes      Comment: 12 drinks/ very seldom     Drug use: No    Sexual activity: Yes     Other Topics Concern    None     Social History Narrative       Review of Systems   Constitutional: Negative for chills and fever.   HENT: Negative for congestion and sinus pressure.    Eyes: Negative for visual disturbance.   Respiratory: Negative for cough and shortness of breath.     Cardiovascular: Negative for chest pain and palpitations.   Gastrointestinal: Positive for anal bleeding. Negative for abdominal distention, abdominal pain, blood in stool, constipation, diarrhea, nausea, rectal pain and vomiting.   Endocrine: Negative for cold intolerance and heat intolerance.   Genitourinary: Negative for dysuria and frequency.   Musculoskeletal: Negative for arthralgias and back pain.   Skin: Negative for rash.   Allergic/Immunologic: Negative for immunocompromised state.   Neurological: Negative for dizziness, light-headedness and headaches.   Psychiatric/Behavioral: Negative for confusion. The patient is not nervous/anxious.        Objective:      Physical Exam   Constitutional: He is oriented to person, place, and time. He appears well-developed and well-nourished.   HENT:   Head: Normocephalic and atraumatic.   Eyes: Conjunctivae are normal.   Pulmonary/Chest: Effort normal. No respiratory distress.   Abdominal: Soft. Bowel sounds are normal. He exhibits no distension and no mass. There is no tenderness. There is no rebound and no guarding.   Genitourinary:   Genitourinary Comments: Perineum - normal perianal skin, no mass, no fissure, moderate-sized B/L external hemorrhoids without inflammation or thrombosis  JAIMEE - good tone, no mass  Anoscopy - Grade 2 internal hemorrhoids, moderately inflamed   Neurological: He is alert and oriented to person, place, and time.   Skin: Skin is warm and dry. No erythema.           Assessment:       1. Hemorrhoids, internal, with bleeding    2. External hemorrhoids    3. History of colon polyps        Plan:   Discussed pathophysiology of hemorrhoidal disease.  Will start with conservative measures:   -Increased fiber intake (20-25 grams/day) and fluid intake (8-10 glasses water/day)   -Daily fiber supplement   -Soaks/sitz baths   -Avoid excessive trauma/straining if possible   -Avoid sitting on toilet for long periods   -Analpram 2.5% bid.    -RTO 6 weeks -  if still bleeding, will discuss EBL.    Eb Ordonez MD, FACS, FASCRS

## 2017-04-24 NOTE — MR AVS SNAPSHOT
[facility]  [address]  [facility] [address]  Phone: [phone]                  Adelfo Goldberg   2017 3:00 PM   Office Visit    Description:  Male : 1940   Provider:  Eb Ordonez MD   Department:  [facility]           Reason for Visit     Consult           Diagnoses this Visit        Comments    Hemorrhoids, internal, with bleeding    -  Primary     External hemorrhoids         History of colon polyps                To Do List           Future Appointments        Provider Department Dept Phone    2017 1:40 PM Meli Castro, MARY ANN [facility] - Hepatology [phone]    2017 2:00 PM Romero Vogel MD [facility] Internal Medicine [phone]    2017 10:00 AM SPECIMEN, [place] [facility] [phone]    2017 10:15 AM LAB, [place] [facility] [phone]    2017 2:30 PM MD [facility] [phone]      Goals (5 Years of Data)     None       These Medications        Disp Refills Start End    hydrocortisone-pramoxine (ANALPRAM-HC) 2.5-1 % Crea 30 g 5 2017    Place rectally 2 (two) times daily. - Rectal    Pharmacy: [facility] - [address] #: [phone]         [facility]     [facility] Nurse Care Line -  Assistance  Unless otherwise directed by your provider, please contact [facility], our nurse care line that is available for  assistance.     Registered nurses in the [facility] provide: appointment scheduling, clinical advisement, health education, and other advisory services.  Call: [phone] (toll free)               Medications           Message regarding Medications     Verify the changes and/or additions to your medication regime listed below are the same as discussed with your clinician today.  If any of these changes or  additions are incorrect, please notify your healthcare provider.        START taking these NEW medications        Refills    hydrocortisone-pramoxine (ANALPRAM-HC) 2.5-1 % Crea 5    Sig: Place rectally 2 (two) times daily.    Class: Normal    Route: Rectal           Verify that the below list of medications is an accurate representation of the medications you are currently taking.  If none reported, the list may be blank. If incorrect, please contact your healthcare provider. Carry this list with you in case of emergency.           Current Medications     ADVAIR DISKUS 250-50 mcg/dose diskus inhaler inhalation 1 dose by mouth twice a day Rinse mouth after use    albuterol (PROAIR HFA) 90 mcg/actuation inhaler Inhale 2 puffs into the lungs every 4 (four) hours as needed for Wheezing.    aspirin (ECOTRIN) 81 MG EC tablet Take 81 mg by mouth once daily.      atorvastatin (LIPITOR) 20 MG tablet Take 1 tablet (20 mg total) by mouth once daily. For cholesterol control.    colchicine 0.6 mg tablet Take 1 tablet (0.6 mg total) by mouth once daily.    febuxostat (ULORIC) 40 mg Tab Take 1 tablet (40 mg total) by mouth once daily.    felodipine (PLENDIL) 10 MG 24 hr tablet take 1 tablet by mouth once daily    fish oil-omega-3 fatty acids 300-1,000 mg capsule Take 2 g by mouth once daily.      fluticasone (FLONASE) 50 mcg/actuation nasal spray instill 1 spray into each nostril once daily    glucosamine & chondroit sul.Na 750-600 mg Tab Take 750 mg by mouth.    ketoconazole (NIZORAL) 2 % cream apply to FACE twice a day as directed    ketoconazole (NIZORAL) 2 % shampoo WASH affected area every other day    loratadine (CLARITIN) 10 mg tablet Take 10 mg by mouth daily as needed.      triamcinolone acetonide 0.025% (KENALOG) 0.025 % cream apply to RASH ON FACE twice a day if needed    valsartan-hydrochlorothiazide (DIOVAN-HCT) 320-12.5 mg per tablet take 1 tablet by mouth once daily    hydrocortisone-pramoxine (ANALPRAM-HC) 2.5-1 %  "Crea Place rectally 2 (two) times daily.           Clinical Reference Information           Your Vitals Were     BP Pulse Height Weight BMI    158/72 70 5' 7.2" (1.707 m) 98 kg (216 lb 0.8 oz) 33.64 kg/m2      Blood Pressure          Most Recent Value    BP  (!)  158/72      Allergies as of 4/24/2017     Amoxicillin    Allopurinol Analogues      Immunizations Administered on Date of Encounter - 4/24/2017     None      Instructions     -Increased fiber intake (20-25 grams/day) and fluid intake (8-10 glasses water/day)   -Daily fiber supplement   -Soaks/sitz baths   -Avoid excessive trauma/straining if possible   -Avoid sitting on toilet for long periods   -Analpram 2.5% 2-3x/day           Language Assistance Services     ATTENTION: Language assistance services are available, free of charge. Please call [phone].      ATENCIÓN: Si habla tian, tiene a guzmán disposición servicios gratuitos de asistencia lingüística. Llame al [phone].     LINETTE Ý: N?u b?n nói Ti?ng Vi?t, có các d?ch v? h? tr? ngôn ng? mi?n phí dành cho b?n. G?i s? [phone].         [facility] complies with applicable Federal civil rights laws and does not discriminate on the basis of race, color, national origin, age, disability, or sex.        "

## 2017-04-24 NOTE — LETTER
April 27, 2017      Romero Vogel MD  [address] [facility] [address]  [place] [address]           [facility]  [address]  [facility] [address]  Phone: [phone]          Patient: Adelfo Goldberg   MR Number: [id number]   YOB: 1940   Date of Visit: 4/24/2017       Dear Dr. Romero Vogel:    Thank you for referring Adelfo Goldberg to me for evaluation. Attached you will find relevant portions of my assessment and plan of care.    If you have questions, please do not hesitate to call me. I look forward to following Adelfo Goldberg along with you.    Sincerely,    Eb Ordonez MD    Enclosure  CC:  No Recipients    If you would like to receive this communication electronically, please contact [email or web address] or [phone] to request more information on [employer] access.    For providers and/or their staff who would like to refer a patient to [facility], please contact us through our one-stop-shop provider referral line, [facility], at [phone].    If you feel you have received this communication in error or would no longer like to receive these types of communications, please e-mail [email or web address]

## 2017-04-24 NOTE — PATIENT INSTRUCTIONS
-Increased fiber intake (20-25 grams/day) and fluid intake (8-10 glasses water/day)   -Daily fiber supplement   -Soaks/sitz baths   -Avoid excessive trauma/straining if possible   -Avoid sitting on toilet for long periods   -Analpram 2.5% 2-3x/day

## 2017-04-25 ENCOUNTER — OFFICE VISIT (OUTPATIENT)
Dept: HEPATOLOGY | Facility: CLINIC | Age: 77
End: 2017-04-25
Payer: MEDICARE

## 2017-04-25 ENCOUNTER — PATIENT MESSAGE (OUTPATIENT)
Dept: SURGERY | Facility: CLINIC | Age: 77
End: 2017-04-25

## 2017-04-25 VITALS
RESPIRATION RATE: 18 BRPM | TEMPERATURE: 98 F | HEIGHT: 70 IN | SYSTOLIC BLOOD PRESSURE: 138 MMHG | WEIGHT: 218.25 LBS | BODY MASS INDEX: 31.25 KG/M2 | HEART RATE: 68 BPM | OXYGEN SATURATION: 99 % | DIASTOLIC BLOOD PRESSURE: 63 MMHG

## 2017-04-25 DIAGNOSIS — E78.5 HYPERLIPIDEMIA, UNSPECIFIED HYPERLIPIDEMIA TYPE: ICD-10-CM

## 2017-04-25 DIAGNOSIS — M1A.9XX1 CHRONIC TOPHACEOUS GOUT: ICD-10-CM

## 2017-04-25 DIAGNOSIS — E66.9 OBESITY (BMI 30-39.9): ICD-10-CM

## 2017-04-25 DIAGNOSIS — I10 ESSENTIAL HYPERTENSION: ICD-10-CM

## 2017-04-25 DIAGNOSIS — R74.8 ELEVATED LIVER ENZYMES: Primary | ICD-10-CM

## 2017-04-25 DIAGNOSIS — K76.0 NAFLD (NONALCOHOLIC FATTY LIVER DISEASE): ICD-10-CM

## 2017-04-25 DIAGNOSIS — K86.2 PANCREATIC CYST: ICD-10-CM

## 2017-04-25 PROCEDURE — 99214 OFFICE O/P EST MOD 30 MIN: CPT | Mod: PBBFAC | Performed by: NURSE PRACTITIONER

## 2017-04-25 PROCEDURE — 99999 PR PBB SHADOW E&M-EST. PATIENT-LVL IV: CPT | Mod: PBBFAC,,, | Performed by: NURSE PRACTITIONER

## 2017-04-25 PROCEDURE — 99213 OFFICE O/P EST LOW 20 MIN: CPT | Mod: S$PBB,,, | Performed by: NURSE PRACTITIONER

## 2017-04-25 NOTE — PROGRESS NOTES
[facility] Established Patient Visit    Reason for Visit:  F/u elevated liver enzymes, fatty liver    PCP: Romero Vogel    HPI:  This is a 76 y.o. male here for f/u of elevated liver enzymes due to fatty liver. He was started on allopurinol for his gout on 12/9/15 at his initial rheumatology appt but then labs on 1/8/16 showed elevated AST And ALT. This was d/c'd after enzymes increased. His serological workup was negative for Patrick's, alpha-1 antitrypsin deficiency, hemochromatosis, autoimmune etiology, and viral hepatitis. Abd u/s showed fatty liver. His enzymes have decreased since stopping the allopurinol. He was also drinking 2-3 alcoholic drinks a day prior to enzymes increasing. He has since minimized this and drinks rarely now. His transaminases have been mildly elevated and stable for the most part now and were back to normal on most recent labs.     He has no findings to suggest advanced liver disease on workup. Fibroscan = F0-F1, no fibrosis. He denies any symptoms of advanced chronic liver disease including jaundice, dark urine, hematemesis, melena, slowed mentation, abdominal distention.      His cholesterol increased more since stopping statin. He was restarted on Lipitor after last visit and his cholesterol is well-controlled again. Transaminases have been improving and were completely normal on last labs.     He also had an incidentally found pancreatic cyst seen on u/s. MRCP 4/2016 showed 9 mm panc cyst and yearly MRCP was advised by Dr. Gregory for surveillance. He had this recently and cyst was stable.        ROS:   GENERAL: Denies fever, chills, weight loss/gain, fatigue  HEENT: Denies headaches, dizziness, vision/hearing changes  CARDIOVASCULAR: Denies chest pain, palpitations, or edema  RESPIRATORY: Denies dyspnea, cough  GI: Denies abdominal pain, rectal bleeding, nausea, vomiting. No change in bowel pattern or color  : Denies dysuria, hematuria   SKIN: Denies rash, itching    NEURO: Denies confusion, memory loss, or mood changes  PSYCH: Denies depression or anxiety  HEME/LYMPH: Denies easy bruising or bleeding      PMHX:  has a past medical history of ALLERGIC RHINITIS; Asthma; Hyperlipidemia; Hypertension; NAFLD (nonalcoholic fatty liver disease); and ANNABELLE (obstructive sleep apnea).    PSHX:  has a past surgical history that includes Appendectomy; testicular biopsy; and Foot surgery.    The patient's social and family histories were reviewed by me and updated in the appropriate section of the electronic medical record.    Review of patient's allergies indicates:   Allergen Reactions    Amoxicillin Hives    Allopurinol analogues Other (See Comments)     Abnormal transaminases       Current Outpatient Rx   Name  Route  Sig  Dispense  Refill    albuterol (PROAIR HFA) 90 mcg/actuation inhaler    Inhalation    Inhale 2 puffs into the lungs every 4 (four) hours as needed for Wheezing.    1 Inhaler    6      amcinonide (CYCLOCORT) 0.1 % cream    Topical    Apply topically as needed.                ascorbic acid (VITAMIN C) 100 MG tablet    Oral    Take 100 mg by mouth once daily.                aspirin (ECOTRIN) 81 MG EC tablet    Oral    Take 81 mg by mouth once daily.                colchicine 0.6 mg tablet        take 1 tablet by mouth twice a day    60 tablet    1      felodipine (PLENDIL) 10 MG 24 hr tablet    Oral    Take 1 tablet (10 mg total) by mouth once daily.    30 tablet    11       New dosage as of 9/21/15      fish oil-omega-3 fatty acids 300-1,000 mg capsule    Oral    Take 2 g by mouth once daily.                fluticasone (FLONASE) 50 mcg/actuation nasal spray    Each Nare    2 sprays by Each Nare route once daily.    16 g    2      fluticasone-salmeterol 250-50 mcg/dose (ADVAIR DISKUS) 250-50 mcg/dose diskus inhaler        inhale 1 dose by mouth twice a day RINSE MOUTH AFTER USE    60 each    3      glucosamine & chondroit sul.Na 750-600 mg Tab    Oral    Take  "750 mg by mouth.              loratadine (CLARITIN) 10 mg tablet    Oral    Take 10 mg by mouth daily as needed.                multivitamin (ONE DAILY MULTIVITAMIN) per tablet    Oral    Take 1 tablet by mouth once daily.              valsartan-hydrochlorothiazide (DIOVAN-HCT) 320-12.5 mg per tablet        take 1 tablet by mouth once daily    30 tablet    10      vitamin D 185 MG Tab    Oral    Take 185 mg by mouth once daily.                febuxostat (ULORIC) 40 mg Tab    Oral    Take 1 tablet (40 mg total) by mouth once daily.    30 tablet    2      DISCONTD: ASENAPINE MALEATE (SAPHRIS, BLACK CHERRY, SL)    Sublingual    Place under the tongue continuous prn.              DISCONTD: colchicine 0.6 mg tablet    Oral    Take 1 tablet (0.6 mg total) by mouth 2 (two) times daily.    60 tablet    3           Objective Findings:    PHYSICAL EXAM:   Friendly White male, in no acute distress; alert and oriented to person, place and time  VITALS:   /63 (BP Location: Left arm, Patient Position: Sitting)  Pulse 68  Temp 97.8 °F (36.6 °C) (Oral)   Resp 18  Ht 5' 10" (1.778 m)  Wt 99 kg (218 lb 4.1 oz)  SpO2 99%  BMI 31.32 kg/m2  HENT: Normocephalic, without obvious abnormality.   EYES: Sclerae anicteric.   NECK: Supple.   RESPIRATORY: Normal respiratory effort.  GI: Non-distended.  EXTREMITIES:  No clubbing, cyanosis or edema.  SKIN: Warm and dry. No jaundice. No rashes noted to exposed skin. No telangectasias noted. No palmar erythema.  NEURO:  Normal gate.   PSYCH:  Memory intact. Thought and speech pattern appropriate. Behavior normal. No depression or anxiety noted.      Labs:  Lab Results   Component Value Date    WBC 5.66 01/10/2017    HGB 12.8 (L) 01/10/2017    HCT 37.8 (L) 01/10/2017     01/10/2017    CHOL 182 04/12/2017    TRIG 57 04/12/2017    HDL 91 (H) 04/12/2017     04/12/2017    K 4.4 04/12/2017    CREATININE 1.8 (H) 04/12/2017    ALT 27 04/12/2017    AST 23 04/12/2017    " ALKPHOS 93 04/12/2017    BILITOT 0.5 04/12/2017    ALBUMIN 3.8 04/12/2017    INR 0.9 02/22/2016       ASSESSMENT:  76 y.o. White male with:  1.  Elevated liver enzymes, due to fatty liver, now normalized  -- transaminases were elevated but improved with cessation of alcohol and allopurinol  -- serological workup was negative for Patrick's, alpha-1 antitrypsin deficiency, hemochromatosis, autoimmune etiology, and viral hepatitis.  -- fatty liver noted on u/s but enzymes were previously normal prior to starting allopurinol  2. Fatty liver  -- US showed fatty liver  -- risk factors for fatty liver include obesity, HTN, HLD, ANNABELLE, and his alcohol use has likely contributed  -- s/p hep A and B vaccines   -- Fibroscan = F0-F1, no fibrosis  3. Pancreatic cyst  -- continue to monitor with yearly MRCP, stable on recent scan  4. Gout, following with rheumatology   -- on uloric and colchicine  5. HLD, now controlled after Lipitor restarted        EDUCATION:   We discussed the manifestations of NAFLD. At this time there is no FDA approved therapy for NAFLD.   The patient and I discussed the importance of diet, exercise, and weight loss for management of NAFLD. We discussed a low fat, low carb/low sugar, high fiber diet, and a goal of 30 minutes of exercise 5 times per week.   Pt is aware that fatty liver can progress to steatohepatitis and possibly to cirrhosis, putting one at increased risk for liver cancer, liver failure, and death.       PLAN:  1. Reassuring liver enzymes back to normal. May have had improvement since lipids now better controlled and allopurinol drug reaction finally resolved  2. Ok to continue all same meds since enzymes have improved  3. Continue yearly MRCP for panc cyst surveillance, if stable over next 2-3 yrs, likely does not need to continue to have scans. Defer to PCP for management  4. Continue to minimize alcohol intake, ok to drink 1-2 drinks if he goes out to dinner  5. Continue efforts at weight  loss   6. I see no need for further f/u in hepatology clinic with me since his enzymes are now back to normal and we have found no evidence for any chronic liver disease. PCP and rheumatology can continue to monitor and send him back to me if felt necessary      Thank you for allowing me to participate in the care of Adelfo VICENTE Castro, NP-C

## 2017-04-27 ENCOUNTER — OFFICE VISIT (OUTPATIENT)
Dept: INTERNAL MEDICINE | Facility: CLINIC | Age: 77
End: 2017-04-27
Payer: MEDICARE

## 2017-04-27 VITALS
DIASTOLIC BLOOD PRESSURE: 62 MMHG | SYSTOLIC BLOOD PRESSURE: 114 MMHG | HEART RATE: 72 BPM | HEIGHT: 70 IN | BODY MASS INDEX: 30.9 KG/M2 | TEMPERATURE: 99 F | WEIGHT: 215.81 LBS

## 2017-04-27 DIAGNOSIS — N18.30 CKD (CHRONIC KIDNEY DISEASE) STAGE 3, GFR 30-59 ML/MIN: Chronic | ICD-10-CM

## 2017-04-27 DIAGNOSIS — J45.20 ASTHMA, INTERMITTENT, UNCOMPLICATED: ICD-10-CM

## 2017-04-27 DIAGNOSIS — K63.5 POLYP OF COLON, UNSPECIFIED PART OF COLON, UNSPECIFIED TYPE: Chronic | ICD-10-CM

## 2017-04-27 DIAGNOSIS — M54.9 BACK PAIN, UNSPECIFIED BACK LOCATION, UNSPECIFIED BACK PAIN LATERALITY, UNSPECIFIED CHRONICITY: Chronic | ICD-10-CM

## 2017-04-27 DIAGNOSIS — I10 HYPERTENSION, ESSENTIAL: Primary | ICD-10-CM

## 2017-04-27 DIAGNOSIS — R35.1 NOCTURIA: ICD-10-CM

## 2017-04-27 DIAGNOSIS — E78.5 HYPERLIPIDEMIA, UNSPECIFIED HYPERLIPIDEMIA TYPE: ICD-10-CM

## 2017-04-27 PROCEDURE — 99999 PR PBB SHADOW E&M-EST. PATIENT-LVL III: CPT | Mod: PBBFAC,,, | Performed by: INTERNAL MEDICINE

## 2017-04-27 PROCEDURE — 99213 OFFICE O/P EST LOW 20 MIN: CPT | Mod: PBBFAC,PO | Performed by: INTERNAL MEDICINE

## 2017-04-27 PROCEDURE — 99214 OFFICE O/P EST MOD 30 MIN: CPT | Mod: S$PBB,,, | Performed by: INTERNAL MEDICINE

## 2017-05-02 NOTE — PROGRESS NOTES
Subjective:       Patient ID: Adelfo Goldberg is a 76 y.o. male.    Chief Complaint: Follow-up    HPI   The patient presents for follow-up of hypertension and other medical conditions.  A recent episode of sinusitis resolved after antibiotic therapy with Zithromax.  The patient has hypertension and is tolerating his blood pressure medication well.  He has asthma and peak flows have been good ranging from 450-550.  It has remained stable on Advair.  Chronic lower back pain and chronic neck pain symptoms have been stable.  He receives massage therapy every 3 weeks and it appears to be  helping to control pain with minimal amount of medication.  His right shoulder symptoms have improved after physical therapy.    Abnormal liver function tests have normalized.  This was felt to be a reaction to allopurinol for treatment of hyperuricemia.  Review of Systems   Constitutional: Negative for activity change, appetite change, fatigue and unexpected weight change.   HENT: Negative for sinus pressure and sore throat.    Eyes: Negative for visual disturbance.   Respiratory: Negative for cough, chest tightness, shortness of breath and wheezing.    Cardiovascular: Negative for chest pain, palpitations and leg swelling.   Gastrointestinal: Negative for abdominal pain, blood in stool, nausea and vomiting.   Genitourinary: Negative for dysuria, hematuria and urgency.        1-2 episodes of nocturia noted daily.   Musculoskeletal: Positive for arthralgias, back pain and neck pain. Negative for gait problem, joint swelling, myalgias and neck stiffness.   Skin: Negative for color change and rash.   Neurological: Negative for dizziness, syncope, weakness, light-headedness, numbness and headaches.   Psychiatric/Behavioral: Negative for sleep disturbance.       Objective:      Physical Exam   Constitutional: He is oriented to person, place, and time. He appears well-developed and well-nourished. No distress.   HENT:   Head:  Normocephalic and atraumatic.   Eyes: Conjunctivae and EOM are normal. No scleral icterus.   Neck: Normal range of motion. Neck supple. No JVD present. No thyromegaly present.   Cardiovascular: Normal rate, regular rhythm, normal heart sounds and intact distal pulses.  Exam reveals no gallop and no friction rub.    No murmur heard.  Pulmonary/Chest: Effort normal and breath sounds normal. No respiratory distress. He has no wheezes. He has no rales.   Abdominal: Soft. Bowel sounds are normal. He exhibits no mass. There is no tenderness.   Musculoskeletal: Normal range of motion. He exhibits no tenderness.   Lymphadenopathy:     He has no cervical adenopathy.   Neurological: He is alert and oriented to person, place, and time.   Gait is normal.   Skin: Skin is warm and dry. No rash noted.   Nursing note and vitals reviewed.      Assessment:       1. Hypertension, essential    2. Asthma, intermittent, uncomplicated    3. Hyperlipidemia, unspecified hyperlipidemia type    4. Polyp of colon, unspecified part of colon, unspecified type    5. Nocturia    6. CKD (chronic kidney disease) stage 3, GFR 30-59 ml/min    7. Back pain, unspecified back location, unspecified back pain laterality, unspecified chronicity        Plan:     Adelfo was seen today for follow-up.  The patient will follow-up with the colonoscopy in 2018.  Current medications will be continued.  Blood tests will be obtained in 9/17 along with a follow-up for physical examination.    Diagnoses and all orders for this visit:    Hypertension, essential  -     CBC auto differential; Future  -     TSH; Future    Asthma, intermittent, uncomplicated    Hyperlipidemia, unspecified hyperlipidemia type  -     Comprehensive metabolic panel; Future  -     Lipid panel; Future    Polyp of colon, unspecified part of colon, unspecified type    Nocturia  -     Prostate Specific Antigen, Diagnostic; Future    CKD (chronic kidney disease) stage 3, GFR 30-59 ml/min    Back  pain, unspecified back location, unspecified back pain laterality, unspecified chronicity

## 2017-06-01 ENCOUNTER — PATIENT MESSAGE (OUTPATIENT)
Dept: INTERNAL MEDICINE | Facility: CLINIC | Age: 77
End: 2017-06-01

## 2017-06-02 ENCOUNTER — LAB VISIT (OUTPATIENT)
Dept: LAB | Facility: HOSPITAL | Age: 77
End: 2017-06-02
Attending: INTERNAL MEDICINE
Payer: MEDICARE

## 2017-06-02 DIAGNOSIS — N18.30 CKD (CHRONIC KIDNEY DISEASE), STAGE III: ICD-10-CM

## 2017-06-02 LAB
BILIRUB UR QL STRIP: NEGATIVE
CLARITY UR REFRACT.AUTO: CLEAR
COLOR UR AUTO: YELLOW
CREAT UR-MCNC: 62 MG/DL
GLUCOSE UR QL STRIP: NEGATIVE
HGB UR QL STRIP: NEGATIVE
KETONES UR QL STRIP: NEGATIVE
LEUKOCYTE ESTERASE UR QL STRIP: NEGATIVE
NITRITE UR QL STRIP: NEGATIVE
PH UR STRIP: 5 [PH] (ref 5–8)
PROT UR QL STRIP: NEGATIVE
PROT UR-MCNC: <7 MG/DL
PROT/CREAT RATIO, UR: NORMAL
SP GR UR STRIP: 1.01 (ref 1–1.03)
URN SPEC COLLECT METH UR: NORMAL
UROBILINOGEN UR STRIP-ACNC: NEGATIVE EU/DL

## 2017-06-02 PROCEDURE — 82570 ASSAY OF URINE CREATININE: CPT

## 2017-06-02 PROCEDURE — 81003 URINALYSIS AUTO W/O SCOPE: CPT

## 2017-06-08 RX ORDER — FLUTICASONE PROPIONATE AND SALMETEROL 50; 250 UG/1; UG/1
POWDER RESPIRATORY (INHALATION)
Refills: 3 | Status: CANCELLED | OUTPATIENT
Start: 2017-06-08

## 2017-06-08 RX ORDER — FLUTICASONE PROPIONATE 50 MCG
SPRAY, SUSPENSION (ML) NASAL
Refills: 6 | Status: CANCELLED | OUTPATIENT
Start: 2017-06-08

## 2017-06-12 ENCOUNTER — OFFICE VISIT (OUTPATIENT)
Dept: NEPHROLOGY | Facility: CLINIC | Age: 77
End: 2017-06-12
Payer: MEDICARE

## 2017-06-12 VITALS
HEIGHT: 70 IN | SYSTOLIC BLOOD PRESSURE: 136 MMHG | HEART RATE: 69 BPM | OXYGEN SATURATION: 98 % | DIASTOLIC BLOOD PRESSURE: 60 MMHG | WEIGHT: 215.38 LBS | BODY MASS INDEX: 30.83 KG/M2

## 2017-06-12 DIAGNOSIS — D64.9 CHRONIC ANEMIA: ICD-10-CM

## 2017-06-12 DIAGNOSIS — N18.30 CKD (CHRONIC KIDNEY DISEASE) STAGE 3, GFR 30-59 ML/MIN: Primary | Chronic | ICD-10-CM

## 2017-06-12 DIAGNOSIS — I12.9 HYPERTENSIVE KIDNEY DISEASE, STAGE 1-4 OR UNSPECIFIED CHRONIC KIDNEY DISEASE: ICD-10-CM

## 2017-06-12 DIAGNOSIS — N25.81 SECONDARY HYPERPARATHYROIDISM: ICD-10-CM

## 2017-06-12 PROCEDURE — 99213 OFFICE O/P EST LOW 20 MIN: CPT | Mod: PBBFAC | Performed by: INTERNAL MEDICINE

## 2017-06-12 PROCEDURE — 99214 OFFICE O/P EST MOD 30 MIN: CPT | Mod: S$PBB,,, | Performed by: INTERNAL MEDICINE

## 2017-06-12 PROCEDURE — 1159F MED LIST DOCD IN RCRD: CPT | Mod: ,,, | Performed by: INTERNAL MEDICINE

## 2017-06-12 PROCEDURE — 1126F AMNT PAIN NOTED NONE PRSNT: CPT | Mod: ,,, | Performed by: INTERNAL MEDICINE

## 2017-06-12 PROCEDURE — 99999 PR PBB SHADOW E&M-EST. PATIENT-LVL III: CPT | Mod: PBBFAC,,, | Performed by: INTERNAL MEDICINE

## 2017-06-12 RX ORDER — FLUTICASONE PROPIONATE 50 MCG
1 SPRAY, SUSPENSION (ML) NASAL DAILY
Qty: 16 G | Refills: 6 | Status: SHIPPED | OUTPATIENT
Start: 2017-06-12 | End: 2018-03-28 | Stop reason: SDUPTHER

## 2017-06-12 RX ORDER — FLUTICASONE PROPIONATE AND SALMETEROL 250; 50 UG/1; UG/1
POWDER RESPIRATORY (INHALATION)
Qty: 60 EACH | Refills: 3 | Status: SHIPPED | OUTPATIENT
Start: 2017-06-12 | End: 2017-08-23 | Stop reason: SDUPTHER

## 2017-06-12 NOTE — PROGRESS NOTES
Subjective:       Patient ID: Adelfo Goldberg is a 76 y.o. White male who presents for follow up of Chronic Kidney Disease    HPI     Mr. Goldberg was seen in nephrology clinic for follow up of CKD. He was seen on 10/12/16 in new patient evaluation for CKD and then followed on 2/1/17. Pt has longstanding hypertension, gout, hyperlipidemia, elevated liver enzymes, non alcoholic fatty liver, ANNABELLE, obesity, remote h/o kidney stone, CKD III and other medical problems.     Onset of his CKD is somewhere in [id number]. Pt reports he was diagnosed with hypertension prior to that but was not really taking any medicines for BP control. 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 He denies any ongoing use.     He is overall doing fine and has no recent acute illness. Pt does not have any dysuria/ decreased urine/ difficulty urinating/ flank pain/ leg swelling/ hematuria/ shortness of breath/ chest pain. Serial labs noted, creatinine appears to be at 1.7 to 1.9 at baseline. Prior labs noted for negative hep C/B serology. Urinalysis does not show proteinuria. Remote abdominal imaging shows preserved kidney size.     Most recent labs were reviewed from 6/2/17 Na 135, of note he appears to have chronic mild hyponatremia for 2 years or more. He has been on HCTZ for a long time. He admits he drinks upto 90 oz of water every day, he says he is trying to reduce the water intake. K 5.0, bicarbonate 25, BUN 27, creatinine 1.7, eGFR 38.3, Ca 9.6, phos 3.1, albumin 3.7, vit D 35, PTH 84, urinalysis was unremarkable and urine PC ratio was normal.     Pt has been on Valsartan based regimen. His gout is managed per Dr. Nunez and pt has been on Uloric. Previously Allopurinol was stopped due to rise in liver enzymes.     Review of Systems   Constitutional: Negative for activity change, appetite change, fatigue and fever.   HENT: Negative for sneezing and sore throat.    Respiratory: Negative for cough,  shortness of breath and wheezing.    Cardiovascular: Negative for chest pain and leg swelling.   Gastrointestinal: Negative for abdominal pain, diarrhea, nausea and vomiting.   Endocrine: Negative for polyuria.   Genitourinary: Negative for decreased urine volume, dysuria, flank pain, frequency, hematuria and urgency.   Musculoskeletal: Negative for arthralgias and myalgias.   Skin: Negative for pallor and rash.   Neurological: Negative for light-headedness and headaches.       Objective:      Physical Exam   Constitutional: He is oriented to person, place, and time. He appears well-developed and well-nourished.   HENT:   Mouth/Throat: Oropharynx is clear and moist.   Eyes: Right eye exhibits no discharge. Left eye exhibits no discharge.   Cardiovascular: Normal rate.    Pulmonary/Chest: Breath sounds normal.   Abdominal: Soft.   Abdominal obesity   Musculoskeletal: He exhibits no edema.   Neurological: He is alert and oriented to person, place, and time.   Skin: Skin is warm and dry.   Psychiatric: He has a normal mood and affect. His behavior is normal.   Vitals reviewed.      Assessment:       1. CKD (chronic kidney disease) stage 3, GFR 30-59 ml/min    2. Hypertensive kidney disease, stage 1-4 or unspecified chronic kidney disease    3. Secondary hyperparathyroidism    4. Chronic anemia    5.      Hyponatremia    Plan:     Mr. Rodriguez has CKD III which is likely due to longstanding hypertension, occasional NSAID use, obesity. He has very slow progression of CKD and does not seem to have any rapid decline. He is on Valsartan based regimen. He has gout and non alcoholic fatty liver. I discussed all the available serial labs with patient and explained CKD staging, potential risk of progression. I stressed importance of weight loss by calorie control/ portion size, low salt diet, keeping well hydrated, avoiding any NSAID use ever and continuing ARB based regimen to slow progression of CKD. He will need periodic  lab monitoring of eGFR and electrolytes. I have advised him to stop vitamin D, multivitamin as it will have Ca supplement and as such he has borderline high Ca levels. Also I have advised him to stop vitamin C unless he has clinical signs of deficiency. He gives remote h/o kidney stone and vitamin C intake can precipitate stone formation. He remains at high risk for contrast induced nephropathy and also as such would avoid gadolinium use unless benefit outweighs risk. He appears to have chronic mild hyponatremia which could be from a combination of HCTZ, excessive intake of dilute fluids. He will limit his water intake to 64 oz.    All available labs and other renal related imaging d/w him in detail. He expressed given stability of his eGFR he would like to keep more frequent follow up with his PCP than our clinic. I offered him to have periodic lab surveillance and also cautioned about his K being at 5.0. Also discussed to avoid Valsartan based regimen in the setting of any acute dehydration from nausea/ vomiting/ diarrhea/ high fever and to have labs checked on such occasions. He expressed understanding and is willing to have lab surveillance periodically and may be keep follow up with our clinic once in a year unless rapid progression of eGFR/ CKD. He expressed understanding of natural progression of CKD despite all the above detailed efforts.     Plan  - periodically monitor renal panel for electrolytes, acid base status, eGFR  - risk of CKD progression d/w patient  - low salt diet  - follow PTH levels, vit D, Ca, phos levels  - periodically trend Hb, consider DAPHNIE when Hb is less than 10   - follow urine studies for proteinuria  - avoid NSAID/ bactrim/ IV contrast/ gadolinium/ aminoglycoside/ Fleet enema where possible  - continue ARB containing regimen for RAAS blockade  - management of gout per Dr. Nunez  - management of hyperlipidemia per Dr. Vogel  - weight loss by controlling calories and portion size is  highly advisable especially with other comorbid conditions like ANNABELLE, non alcoholic fatty liver     Plan, labs, recommendations were discussed with patient, his questions were answered to his satisfaction.

## 2017-06-16 DIAGNOSIS — M1A.9XX1 CHRONIC TOPHACEOUS GOUT: Primary | ICD-10-CM

## 2017-06-19 ENCOUNTER — PATIENT MESSAGE (OUTPATIENT)
Dept: RHEUMATOLOGY | Facility: CLINIC | Age: 77
End: 2017-06-19

## 2017-07-18 ENCOUNTER — PATIENT MESSAGE (OUTPATIENT)
Dept: RHEUMATOLOGY | Facility: CLINIC | Age: 77
End: 2017-07-18

## 2017-08-23 NOTE — TELEPHONE ENCOUNTER
----- Message from Kathryn Erickson sent at 8/23/2017 12:40 PM CDT -----  Contact: pharmacy/suzanna/[phone]  pharmacy called in regards to pt is qualified for a 90 day Rx for felodipine (PLENDIL) 10 MG 24 hr tablet/ADVAIR DISKUS 250-50 mcg/dose diskus inhaler/atorvastatin (LIPITOR) 20 MG tablet/valsartan-hydrochlorothiazide (DIOVAN-HCT) 320-12.5 mg per tablet      [facility]  Please advise

## 2017-08-23 NOTE — TELEPHONE ENCOUNTER
Pharmacy calling for refills.  Has appt in September for annual.    Please approve.  Thanks madhav

## 2017-08-24 ENCOUNTER — PATIENT MESSAGE (OUTPATIENT)
Dept: INTERNAL MEDICINE | Facility: CLINIC | Age: 77
End: 2017-08-24

## 2017-08-24 RX ORDER — FLUTICASONE PROPIONATE AND SALMETEROL 250; 50 UG/1; UG/1
POWDER RESPIRATORY (INHALATION)
Qty: 60 EACH | Refills: 5 | Status: SHIPPED | OUTPATIENT
Start: 2017-08-24 | End: 2018-05-16 | Stop reason: SDUPTHER

## 2017-08-24 RX ORDER — ATORVASTATIN CALCIUM 20 MG/1
20 TABLET, FILM COATED ORAL DAILY
Qty: 90 TABLET | Refills: 3 | Status: SHIPPED | OUTPATIENT
Start: 2017-08-24 | End: 2018-10-22

## 2017-08-24 RX ORDER — VALSARTAN AND HYDROCHLOROTHIAZIDE 320; 12.5 MG/1; MG/1
1 TABLET, FILM COATED ORAL DAILY
Qty: 90 TABLET | Refills: 0 | Status: SHIPPED | OUTPATIENT
Start: 2017-08-24 | End: 2017-12-27 | Stop reason: SDUPTHER

## 2017-08-24 RX ORDER — FELODIPINE 10 MG/1
10 TABLET, EXTENDED RELEASE ORAL DAILY
Qty: 90 TABLET | Refills: 3 | Status: SHIPPED | OUTPATIENT
Start: 2017-08-24 | End: 2018-10-01

## 2017-08-25 ENCOUNTER — PATIENT MESSAGE (OUTPATIENT)
Dept: RHEUMATOLOGY | Facility: CLINIC | Age: 77
End: 2017-08-25

## 2017-08-25 RX ORDER — AZITHROMYCIN 250 MG/1
TABLET, FILM COATED ORAL
Qty: 6 TABLET | Refills: 0 | Status: SHIPPED | OUTPATIENT
Start: 2017-08-25 | End: 2017-08-30 | Stop reason: ALTCHOICE

## 2017-08-25 NOTE — TELEPHONE ENCOUNTER
Spoke with patient , he needs his appt. Changed because he will be out of town , so, his appt. Was changed . Patient also c/o having some whitish mucus ,he requested a z-trang be called in.

## 2017-08-25 NOTE — TELEPHONE ENCOUNTER
----- Message from Eleno Magaña sent at 8/25/2017 10:54 AM CDT -----  Contact: Self [phone]  Pt would like to speak with Dereje regarding his appt on 10/02 he will not be able to come in on the next available on 10/19 and does not want to wait for his physical and asked to speak with dereje,Please call

## 2017-08-30 ENCOUNTER — PATIENT MESSAGE (OUTPATIENT)
Dept: INTERNAL MEDICINE | Facility: CLINIC | Age: 77
End: 2017-08-30

## 2017-08-30 ENCOUNTER — HOSPITAL ENCOUNTER (OUTPATIENT)
Dept: RADIOLOGY | Facility: HOSPITAL | Age: 77
Discharge: HOME | End: 2017-08-30
Attending: INTERNAL MEDICINE
Payer: MEDICARE

## 2017-08-30 ENCOUNTER — OFFICE VISIT (OUTPATIENT)
Dept: INTERNAL MEDICINE | Facility: CLINIC | Age: 77
End: 2017-08-30
Payer: MEDICARE

## 2017-08-30 VITALS
TEMPERATURE: 98 F | DIASTOLIC BLOOD PRESSURE: 60 MMHG | HEART RATE: 71 BPM | HEIGHT: 70 IN | SYSTOLIC BLOOD PRESSURE: 117 MMHG | RESPIRATION RATE: 16 BRPM | BODY MASS INDEX: 31.34 KG/M2 | WEIGHT: 218.94 LBS

## 2017-08-30 DIAGNOSIS — J45.20 ASTHMA, INTERMITTENT, UNCOMPLICATED: ICD-10-CM

## 2017-08-30 DIAGNOSIS — R05.3 PERSISTENT COUGH: ICD-10-CM

## 2017-08-30 DIAGNOSIS — J20.9 BRONCHITIS, ACUTE, WITH BRONCHOSPASM: Primary | ICD-10-CM

## 2017-08-30 DIAGNOSIS — J20.9 BRONCHITIS, ACUTE, WITH BRONCHOSPASM: ICD-10-CM

## 2017-08-30 DIAGNOSIS — J01.40 ACUTE PANSINUSITIS, RECURRENCE NOT SPECIFIED: ICD-10-CM

## 2017-08-30 PROCEDURE — 99999 PR PBB SHADOW E&M-EST. PATIENT-LVL V: CPT | Mod: PBBFAC,,, | Performed by: INTERNAL MEDICINE

## 2017-08-30 PROCEDURE — 3074F SYST BP LT 130 MM HG: CPT | Mod: S$GLB,,, | Performed by: INTERNAL MEDICINE

## 2017-08-30 PROCEDURE — 3078F DIAST BP <80 MM HG: CPT | Mod: S$GLB,,, | Performed by: INTERNAL MEDICINE

## 2017-08-30 PROCEDURE — 1159F MED LIST DOCD IN RCRD: CPT | Mod: S$GLB,,, | Performed by: INTERNAL MEDICINE

## 2017-08-30 PROCEDURE — 99215 OFFICE O/P EST HI 40 MIN: CPT | Mod: PBBFAC,25,PO | Performed by: INTERNAL MEDICINE

## 2017-08-30 PROCEDURE — 71020 XR CHEST PA AND LATERAL: CPT | Mod: 26,GC,, | Performed by: RADIOLOGY

## 2017-08-30 PROCEDURE — 71020 XR CHEST PA AND LATERAL: CPT | Mod: TC,PO

## 2017-08-30 PROCEDURE — 1125F AMNT PAIN NOTED PAIN PRSNT: CPT | Mod: S$GLB,,, | Performed by: INTERNAL MEDICINE

## 2017-08-30 PROCEDURE — 96372 THER/PROPH/DIAG INJ SC/IM: CPT | Mod: PBBFAC,PO

## 2017-08-30 PROCEDURE — 99213 OFFICE O/P EST LOW 20 MIN: CPT | Mod: S$GLB,,, | Performed by: INTERNAL MEDICINE

## 2017-08-30 RX ORDER — DOXYCYCLINE 100 MG/1
100 CAPSULE ORAL 2 TIMES DAILY
Qty: 20 CAPSULE | Refills: 0 | Status: SHIPPED | OUTPATIENT
Start: 2017-08-30 | End: 2017-09-09

## 2017-08-30 RX ORDER — TRIAMCINOLONE ACETONIDE 40 MG/ML
40 INJECTION, SUSPENSION INTRA-ARTICULAR; INTRAMUSCULAR
Status: COMPLETED | OUTPATIENT
Start: 2017-08-30 | End: 2017-08-30

## 2017-08-30 RX ADMIN — TRIAMCINOLONE ACETONIDE 40 MG: 40 INJECTION, SUSPENSION INTRA-ARTICULAR; INTRAMUSCULAR at 03:08

## 2017-08-30 NOTE — PATIENT INSTRUCTIONS
1. Doxycycline 100 mgm twice a day for 7-10 days.  2. High oral fluid intake.  3. Inhaler as needed.  4. Kenalog 40 mgm injection in the office.  5. Continue all regular meds.    After phone call to patient, will plan recheck in 2-3 weeks.  May need repeat CXR.

## 2017-08-30 NOTE — PROGRESS NOTES
Subjective:       Patient ID: Adelfo Rodriguez is a 76 y.o. male.    Chief Complaint: Cough; Nasal Congestion; Headache; and Sore Throat    Patient presents for urgent visit complaining of persistent cough which has been productive of yellow sputum.  Just returned from a trip to [place].  He was placed on a Z-trang which he finished yesterday.  Still with some yellow sinus drainage, but the sputum is clear.  Has been using his inhaler and checking his peak flow which has been normal.    No fever/chills.  Some post nasal drip.  The present illness started on or about August 14.  Cough is not harsh.      Cough   This is a recurrent problem. The current episode started 1 to 4 weeks ago. The problem has been waxing and waning. The problem occurs every few minutes. The cough is non-productive. Associated symptoms include headaches, heartburn, myalgias, nasal congestion, postnasal drip, rhinorrhea, a sore throat, shortness of breath and wheezing. Pertinent negatives include no chest pain, chills, ear congestion, ear pain, fever, hemoptysis, rash, sweats or weight loss. The symptoms are aggravated by lying down and other. Risk factors for lung disease include animal exposure. He has tried a beta-agonist inhaler, body position changes, rest and steroid inhaler for the symptoms. The treatment provided moderate relief. His past medical history is significant for asthma, bronchitis, environmental allergies and pneumonia. There is no history of bronchiectasis, COPD or emphysema.   Headache    Associated symptoms include coughing, rhinorrhea and a sore throat. Pertinent negatives include no dizziness, ear pain, fever or weight loss.   Sore Throat    Associated symptoms include coughing, headaches and shortness of breath. Pertinent negatives include no ear pain.     Review of Systems   Constitutional: Negative for chills, fever and weight loss.   HENT: Positive for postnasal drip, rhinorrhea and sore throat. Negative for ear  pain.    Respiratory: Positive for cough, shortness of breath and wheezing. Negative for hemoptysis and chest tightness.    Cardiovascular: Negative for chest pain.   Gastrointestinal: Positive for heartburn.   Musculoskeletal: Positive for myalgias.   Skin: Negative for rash.   Allergic/Immunologic: Positive for environmental allergies.   Neurological: Positive for headaches. Negative for dizziness and light-headedness.       Objective:      Physical Exam   Constitutional: He appears well-developed and well-nourished. No distress.   HENT:   Head: Normocephalic.   Right Ear: External ear normal.   Left Ear: External ear normal.   Mouth/Throat: Oropharynx is clear and moist. No oropharyngeal exudate.   Canals/TMs clear.  Normal turbinates.   Cardiovascular: Normal rate, regular rhythm and normal heart sounds.    Pulmonary/Chest: Effort normal. No respiratory distress.   Scattered rhonchi, right greater than left.    No wheezing heard.   Lymphadenopathy:     He has no cervical adenopathy.   Vitals reviewed.      Assessment:       1. Bronchitis, acute, with bronchospasm    2. Acute pansinusitis, recurrence not specified    3. Persistent cough    4. Asthma, intermittent, uncomplicated        Plan:   CXR done and reviewed in office with patient.  Increased markings on right and around hilar area on lateral.  Radiologist reads this as bronchitis or pneumonitis.  Discussed with patient.  Will treat with doxycycline 100 mgm BID for 10 days.  High oral fluid intake.  Use inhaler as needed.  Mucinex BID for the thick phlegm.  Followup with me in 2-3 weeks.  May need to repeat the CXR.

## 2017-08-31 ENCOUNTER — PATIENT MESSAGE (OUTPATIENT)
Dept: INTERNAL MEDICINE | Facility: CLINIC | Age: 77
End: 2017-08-31

## 2017-09-07 ENCOUNTER — PATIENT MESSAGE (OUTPATIENT)
Dept: INTERNAL MEDICINE | Facility: CLINIC | Age: 77
End: 2017-09-07

## 2017-09-13 ENCOUNTER — OFFICE VISIT (OUTPATIENT)
Dept: INTERNAL MEDICINE | Facility: CLINIC | Age: 77
End: 2017-09-13
Payer: MEDICARE

## 2017-09-13 VITALS
BODY MASS INDEX: 30.83 KG/M2 | DIASTOLIC BLOOD PRESSURE: 64 MMHG | SYSTOLIC BLOOD PRESSURE: 129 MMHG | TEMPERATURE: 98 F | WEIGHT: 215.38 LBS | RESPIRATION RATE: 18 BRPM | HEIGHT: 70 IN | HEART RATE: 67 BPM

## 2017-09-13 DIAGNOSIS — R93.89 ABNORMAL CXR: ICD-10-CM

## 2017-09-13 DIAGNOSIS — J20.9 ACUTE BRONCHITIS, UNSPECIFIED ORGANISM: Primary | ICD-10-CM

## 2017-09-13 PROCEDURE — 99214 OFFICE O/P EST MOD 30 MIN: CPT | Mod: PBBFAC,PO | Performed by: INTERNAL MEDICINE

## 2017-09-13 PROCEDURE — 1126F AMNT PAIN NOTED NONE PRSNT: CPT | Mod: S$GLB,,, | Performed by: INTERNAL MEDICINE

## 2017-09-13 PROCEDURE — 1159F MED LIST DOCD IN RCRD: CPT | Mod: S$GLB,,, | Performed by: INTERNAL MEDICINE

## 2017-09-13 PROCEDURE — 99212 OFFICE O/P EST SF 10 MIN: CPT | Mod: S$GLB,,, | Performed by: INTERNAL MEDICINE

## 2017-09-13 PROCEDURE — 99999 PR PBB SHADOW E&M-EST. PATIENT-LVL IV: CPT | Mod: PBBFAC,,, | Performed by: INTERNAL MEDICINE

## 2017-09-13 PROCEDURE — 3074F SYST BP LT 130 MM HG: CPT | Mod: S$GLB,,, | Performed by: INTERNAL MEDICINE

## 2017-09-13 PROCEDURE — 3078F DIAST BP <80 MM HG: CPT | Mod: S$GLB,,, | Performed by: INTERNAL MEDICINE

## 2017-09-13 RX ORDER — GUAIFENESIN 600 MG/1
1200 TABLET, EXTENDED RELEASE ORAL 2 TIMES DAILY
COMMUNITY
End: 2018-10-24

## 2017-09-13 NOTE — PROGRESS NOTES
Subjective:       Patient ID: Adelfo Rodriguez is a 77 y.o. male.    Chief Complaint: Follow-up (sinus and lungs)    Patient presents for 2 week followup on respiratory illness diagnosed as bronchitis with bronchospasm.  See notes of 8/30/17.  Also has abnormal CXR that needs followup.  He reports that the respiratory symptoms are much improved.  Still has a bit of a dry cough.  But no productive cough, no fever/chills.  He finished 10 days of doxycycline on 9/9/17.    He uses a CPAP machine but states that he cleans the apparatus regularly.  Currently has new mask and tubing.    Discussed the abnormality on the CXR at length.  He has a copy and understand the need for followup CXR in 2 more weeks which would be 4 weeks out from the recent xrays.  Will schedule this out in time for him.      Cough   This is a recurrent problem. The current episode started 1 to 4 weeks ago. The problem has been gradually improving. The problem occurs every few minutes. The cough is productive of sputum. Associated symptoms include headaches, heartburn, myalgias, postnasal drip and a sore throat. Pertinent negatives include no chest pain, chills, ear congestion, ear pain, fever, hemoptysis, nasal congestion, rash, rhinorrhea, shortness of breath, sweats, weight loss or wheezing. The symptoms are aggravated by lying down. Risk factors for lung disease include animal exposure. He has tried a beta-agonist inhaler, body position changes, oral steroids and steroid inhaler for the symptoms. The treatment provided mild relief. His past medical history is significant for asthma, bronchitis, environmental allergies and pneumonia.     Review of Systems   Constitutional: Negative for chills, fever and weight loss.   HENT: Positive for postnasal drip and sore throat. Negative for ear pain and rhinorrhea.    Respiratory: Positive for cough. Negative for hemoptysis, shortness of breath and wheezing.         Dry cough.   Cardiovascular: Negative  for chest pain.   Gastrointestinal: Positive for heartburn.   Musculoskeletal: Positive for myalgias.   Skin: Negative for rash.   Allergic/Immunologic: Positive for environmental allergies.   Neurological: Positive for headaches.       Objective:      Physical Exam   Constitutional: He appears well-developed and well-nourished. No distress.   HENT:   Head: Normocephalic.   Right Ear: External ear normal.   Left Ear: External ear normal.   Mouth/Throat: Oropharynx is clear and moist.   Cardiovascular: Normal rate, regular rhythm and normal heart sounds.    Pulmonary/Chest: Effort normal and breath sounds normal. No respiratory distress. He has no wheezes.   Lymphadenopathy:     He has no cervical adenopathy.   Vitals reviewed.      Assessment:       1. Acute bronchitis, unspecified organism    2. Abnormal CXR        Plan:   1. Continue all regular meds.  2. Schedule repeat CXR in 2 weeks and will notify you of the results via email.  3. See regular PCP as planned.

## 2017-09-22 ENCOUNTER — TELEPHONE (OUTPATIENT)
Dept: RHEUMATOLOGY | Facility: HOSPITAL | Age: 77
End: 2017-09-22

## 2017-09-22 ENCOUNTER — LAB VISIT (OUTPATIENT)
Dept: LAB | Facility: HOSPITAL | Age: 77
End: 2017-09-22
Attending: INTERNAL MEDICINE
Payer: MEDICARE

## 2017-09-22 ENCOUNTER — PATIENT MESSAGE (OUTPATIENT)
Dept: NEPHROLOGY | Facility: CLINIC | Age: 77
End: 2017-09-22

## 2017-09-22 DIAGNOSIS — E78.5 HYPERLIPIDEMIA, UNSPECIFIED HYPERLIPIDEMIA TYPE: ICD-10-CM

## 2017-09-22 DIAGNOSIS — I10 HYPERTENSION, ESSENTIAL: ICD-10-CM

## 2017-09-22 DIAGNOSIS — N18.30 CKD (CHRONIC KIDNEY DISEASE) STAGE 3, GFR 30-59 ML/MIN: Chronic | ICD-10-CM

## 2017-09-22 DIAGNOSIS — R35.1 NOCTURIA: ICD-10-CM

## 2017-09-22 DIAGNOSIS — D64.9 ANEMIA, UNSPECIFIED TYPE: Primary | ICD-10-CM

## 2017-09-22 DIAGNOSIS — M1A.9XX1 CHRONIC TOPHACEOUS GOUT: ICD-10-CM

## 2017-09-22 DIAGNOSIS — Z79.899 OTHER LONG TERM (CURRENT) DRUG THERAPY: ICD-10-CM

## 2017-09-22 LAB
ALBUMIN SERPL BCP-MCNC: 3.7 G/DL
ALP SERPL-CCNC: 88 U/L
ALP SERPL-CCNC: 88 U/L
ALT SERPL W/O P-5'-P-CCNC: 27 U/L
ALT SERPL W/O P-5'-P-CCNC: 27 U/L
ANION GAP SERPL CALC-SCNC: 7 MMOL/L
AST SERPL-CCNC: 24 U/L
AST SERPL-CCNC: 24 U/L
BASOPHILS # BLD AUTO: 0.04 K/UL
BASOPHILS NFR BLD: 0.7 %
BILIRUB SERPL-MCNC: 0.7 MG/DL
BILIRUB SERPL-MCNC: 0.7 MG/DL
BUN SERPL-MCNC: 31 MG/DL
CALCIUM SERPL-MCNC: 9.5 MG/DL
CHLORIDE SERPL-SCNC: 103 MMOL/L
CHOLEST SERPL-MCNC: 178 MG/DL
CHOLEST/HDLC SERPL: 2 {RATIO}
CO2 SERPL-SCNC: 24 MMOL/L
COMPLEXED PSA SERPL-MCNC: 2.4 NG/ML
CREAT SERPL-MCNC: 2 MG/DL
DIFFERENTIAL METHOD: ABNORMAL
EOSINOPHIL # BLD AUTO: 0.3 K/UL
EOSINOPHIL NFR BLD: 4.7 %
ERYTHROCYTE [DISTWIDTH] IN BLOOD BY AUTOMATED COUNT: 12.5 %
EST. GFR  (AFRICAN AMERICAN): 36.1 ML/MIN/1.73 M^2
EST. GFR  (NON AFRICAN AMERICAN): 31.3 ML/MIN/1.73 M^2
GLUCOSE SERPL-MCNC: 96 MG/DL
HCT VFR BLD AUTO: 33.7 %
HDLC SERPL-MCNC: 90 MG/DL
HDLC SERPL: 50.6 %
HGB BLD-MCNC: 11.8 G/DL
LDLC SERPL CALC-MCNC: 78 MG/DL
LYMPHOCYTES # BLD AUTO: 1.6 K/UL
LYMPHOCYTES NFR BLD: 26.6 %
MCH RBC QN AUTO: 31.6 PG
MCHC RBC AUTO-ENTMCNC: 35 G/DL
MCV RBC AUTO: 90 FL
MONOCYTES # BLD AUTO: 0.5 K/UL
MONOCYTES NFR BLD: 9.1 %
NEUTROPHILS # BLD AUTO: 3.5 K/UL
NEUTROPHILS NFR BLD: 58.6 %
NONHDLC SERPL-MCNC: 88 MG/DL
PHOSPHATE SERPL-MCNC: 3.9 MG/DL
PLATELET # BLD AUTO: 217 K/UL
PMV BLD AUTO: 10.5 FL
POTASSIUM SERPL-SCNC: 4.7 MMOL/L
PROT SERPL-MCNC: 6.7 G/DL
PROT SERPL-MCNC: 6.7 G/DL
PTH-INTACT SERPL-MCNC: 83 PG/ML
RBC # BLD AUTO: 3.73 M/UL
SODIUM SERPL-SCNC: 134 MMOL/L
TRIGL SERPL-MCNC: 50 MG/DL
TSH SERPL DL<=0.005 MIU/L-ACNC: 1.78 UIU/ML
URATE SERPL-MCNC: 4.5 MG/DL
WBC # BLD AUTO: 5.95 K/UL

## 2017-09-22 PROCEDURE — 80053 COMPREHEN METABOLIC PANEL: CPT

## 2017-09-22 PROCEDURE — 83970 ASSAY OF PARATHORMONE: CPT

## 2017-09-22 PROCEDURE — 84550 ASSAY OF BLOOD/URIC ACID: CPT

## 2017-09-22 PROCEDURE — 80061 LIPID PANEL: CPT

## 2017-09-22 PROCEDURE — 84100 ASSAY OF PHOSPHORUS: CPT

## 2017-09-22 PROCEDURE — 84443 ASSAY THYROID STIM HORMONE: CPT

## 2017-09-22 PROCEDURE — 84153 ASSAY OF PSA TOTAL: CPT

## 2017-09-22 PROCEDURE — 36415 COLL VENOUS BLD VENIPUNCTURE: CPT | Mod: PO

## 2017-09-22 PROCEDURE — 85025 COMPLETE CBC W/AUTO DIFF WBC: CPT

## 2017-09-22 NOTE — TELEPHONE ENCOUNTER
Luciana Mr. Rodriguez,    I received your lab results today. I just released it to your patient portal. There is slight progression of kidney problem, it could be transient especially if you had any recent infection/ inflammation/ new medicine/ dehydration etc. Only way to be certain is to repeat labs in about a month.     Thanks,  Lisa Graf

## 2017-09-23 DIAGNOSIS — N18.30 CKD (CHRONIC KIDNEY DISEASE) STAGE 3, GFR 30-59 ML/MIN: Primary | Chronic | ICD-10-CM

## 2017-09-25 ENCOUNTER — OFFICE VISIT (OUTPATIENT)
Dept: INTERNAL MEDICINE | Facility: CLINIC | Age: 77
End: 2017-09-25
Payer: MEDICARE

## 2017-09-25 ENCOUNTER — TELEPHONE (OUTPATIENT)
Dept: RHEUMATOLOGY | Facility: CLINIC | Age: 77
End: 2017-09-25

## 2017-09-25 ENCOUNTER — IMMUNIZATION (OUTPATIENT)
Dept: INTERNAL MEDICINE | Facility: CLINIC | Age: 77
End: 2017-09-25
Payer: MEDICARE

## 2017-09-25 VITALS
HEART RATE: 76 BPM | DIASTOLIC BLOOD PRESSURE: 62 MMHG | SYSTOLIC BLOOD PRESSURE: 110 MMHG | WEIGHT: 215.81 LBS | HEIGHT: 70 IN | BODY MASS INDEX: 30.9 KG/M2 | TEMPERATURE: 98 F

## 2017-09-25 DIAGNOSIS — G47.33 OSA (OBSTRUCTIVE SLEEP APNEA): ICD-10-CM

## 2017-09-25 DIAGNOSIS — R05.9 COUGH: ICD-10-CM

## 2017-09-25 DIAGNOSIS — I10 ESSENTIAL HYPERTENSION: Chronic | ICD-10-CM

## 2017-09-25 DIAGNOSIS — J45.20 ASTHMA, INTERMITTENT, UNCOMPLICATED: Primary | ICD-10-CM

## 2017-09-25 DIAGNOSIS — E78.5 HYPERLIPIDEMIA, UNSPECIFIED HYPERLIPIDEMIA TYPE: ICD-10-CM

## 2017-09-25 PROCEDURE — G0008 ADMIN INFLUENZA VIRUS VAC: HCPCS | Mod: PBBFAC,PO

## 2017-09-25 PROCEDURE — 99214 OFFICE O/P EST MOD 30 MIN: CPT | Mod: S$GLB,,, | Performed by: INTERNAL MEDICINE

## 2017-09-25 PROCEDURE — 99999 PR PBB SHADOW E&M-EST. PATIENT-LVL III: CPT | Mod: PBBFAC,,, | Performed by: INTERNAL MEDICINE

## 2017-09-25 RX ORDER — AZELASTINE 1 MG/ML
2 SPRAY, METERED NASAL 2 TIMES DAILY
Qty: 30 ML | Refills: 3 | Status: SHIPPED | OUTPATIENT
Start: 2017-09-25 | End: 2018-10-24

## 2017-09-26 ENCOUNTER — PATIENT MESSAGE (OUTPATIENT)
Dept: RHEUMATOLOGY | Facility: CLINIC | Age: 77
End: 2017-09-26

## 2017-09-27 ENCOUNTER — HOSPITAL ENCOUNTER (OUTPATIENT)
Dept: RADIOLOGY | Facility: HOSPITAL | Age: 77
Discharge: HOME | End: 2017-09-27
Attending: INTERNAL MEDICINE
Payer: MEDICARE

## 2017-09-27 ENCOUNTER — PATIENT MESSAGE (OUTPATIENT)
Dept: INTERNAL MEDICINE | Facility: CLINIC | Age: 77
End: 2017-09-27

## 2017-09-27 DIAGNOSIS — R93.89 ABNORMAL CXR: ICD-10-CM

## 2017-09-27 DIAGNOSIS — J20.9 ACUTE BRONCHITIS, UNSPECIFIED ORGANISM: ICD-10-CM

## 2017-09-27 PROCEDURE — 71020 XR CHEST PA AND LATERAL: CPT | Mod: TC,PO

## 2017-09-27 PROCEDURE — 71020 XR CHEST PA AND LATERAL: CPT | Mod: 26,,, | Performed by: RADIOLOGY

## 2017-10-02 ENCOUNTER — PATIENT MESSAGE (OUTPATIENT)
Dept: RHEUMATOLOGY | Facility: CLINIC | Age: 77
End: 2017-10-02

## 2017-10-03 ENCOUNTER — LAB VISIT (OUTPATIENT)
Dept: LAB | Facility: HOSPITAL | Age: 77
End: 2017-10-03
Attending: INTERNAL MEDICINE
Payer: MEDICARE

## 2017-10-03 DIAGNOSIS — D64.9 ANEMIA, UNSPECIFIED TYPE: ICD-10-CM

## 2017-10-03 DIAGNOSIS — Z79.899 OTHER LONG TERM (CURRENT) DRUG THERAPY: ICD-10-CM

## 2017-10-03 LAB
FERRITIN SERPL-MCNC: 89 NG/ML
FOLATE SERPL-MCNC: 7.7 NG/ML
IRON SERPL-MCNC: 70 UG/DL
SATURATED IRON: 17 %
TOTAL IRON BINDING CAPACITY: 417 UG/DL
TRANSFERRIN SERPL-MCNC: 282 MG/DL
VIT B12 SERPL-MCNC: 502 PG/ML

## 2017-10-03 PROCEDURE — 36415 COLL VENOUS BLD VENIPUNCTURE: CPT | Mod: PO

## 2017-10-03 PROCEDURE — 82607 VITAMIN B-12: CPT

## 2017-10-03 PROCEDURE — 82728 ASSAY OF FERRITIN: CPT

## 2017-10-03 PROCEDURE — 83540 ASSAY OF IRON: CPT

## 2017-10-03 PROCEDURE — 82746 ASSAY OF FOLIC ACID SERUM: CPT

## 2017-10-05 ENCOUNTER — PATIENT MESSAGE (OUTPATIENT)
Dept: INTERNAL MEDICINE | Facility: CLINIC | Age: 77
End: 2017-10-05

## 2017-10-06 ENCOUNTER — PATIENT MESSAGE (OUTPATIENT)
Dept: INTERNAL MEDICINE | Facility: CLINIC | Age: 77
End: 2017-10-06

## 2017-10-07 NOTE — PROGRESS NOTES
Subjective:       Patient ID: Adelfo Rodriguez is a 77 y.o. male.    Chief Complaint: Annual Exam    HPI   The patient presents for follow-up of asthma and other conditions.  He continues to report congestion involving the head and chest.  He has not experienced very much wheezing.  He has noted bandlike headaches with pain noted around the head and eyes.  He has been noting postnasal drainage.  Nasal secretions are clear.  He has been experiencing fatigue but no chills or fever.  No gastrointestinal symptoms of diarrhea or nausea noted.  He is scheduled for a follow-up chest x-ray later this week regarding a right lung infiltrate.  He has completed antibiotic therapy.  Peak expiratory flow readings have been good.    The patient has chronic back and neck pain.  He receives massage therapy 2-3 times a month with good results noted.  He has hypertension and blood pressure readings have been good on self monitoring.    Review of Systems   Constitutional: Positive for activity change and fatigue. Negative for chills, fever and unexpected weight change.   HENT: Positive for congestion, postnasal drip and rhinorrhea. Negative for hearing loss and trouble swallowing.    Eyes: Negative for discharge and visual disturbance.   Respiratory: Positive for cough, chest tightness, shortness of breath and wheezing.    Cardiovascular: Negative for chest pain and palpitations.   Gastrointestinal: Negative for blood in stool, constipation, diarrhea and vomiting.   Endocrine: Negative for polydipsia and polyuria.   Genitourinary: Negative for difficulty urinating, hematuria and urgency.   Musculoskeletal: Negative for arthralgias, joint swelling and neck pain.   Neurological: Positive for headaches. Negative for weakness.   Psychiatric/Behavioral: Negative for confusion and dysphoric mood.       Objective:      Physical Exam   Constitutional: He is oriented to person, place, and time. He appears well-developed and well-nourished. No  distress.   HENT:   Head: Normocephalic and atraumatic.   Mouth/Throat: Oropharynx is clear and moist.   Sinuses are nontender to palpation.   Eyes: Conjunctivae and EOM are normal. No scleral icterus.   Neck: Normal range of motion. Neck supple. No JVD present. No thyromegaly present.   Cardiovascular: Normal rate, regular rhythm, normal heart sounds and intact distal pulses.  Exam reveals no gallop and no friction rub.    No murmur heard.  Pulmonary/Chest: Effort normal and breath sounds normal. No respiratory distress. He has no wheezes. He has no rales.   Abdominal: Soft. Bowel sounds are normal. He exhibits no mass. There is no tenderness.   Musculoskeletal: Normal range of motion. He exhibits no tenderness.   Lymphadenopathy:     He has no cervical adenopathy.   Neurological: He is alert and oriented to person, place, and time.   Gait is normal.   Skin: Skin is warm and dry. No rash noted.   Nursing note and vitals reviewed.      Results for orders placed or performed in visit on 09/22/17   CBC auto differential   Result Value Ref Range    WBC 5.95 3.90 - 12.70 K/uL    RBC 3.73 (L) 4.60 - 6.20 M/uL    Hemoglobin 11.8 (L) 14.0 - 18.0 g/dL    Hematocrit 33.7 (L) 40.0 - 54.0 %    MCV 90 82 - 98 fL    MCH 31.6 (H) 27.0 - 31.0 pg    MCHC 35.0 32.0 - 36.0 g/dL    RDW 12.5 11.5 - 14.5 %    Platelets 217 150 - 350 K/uL    MPV 10.5 9.2 - 12.9 fL    Gran # 3.5 1.8 - 7.7 K/uL    Lymph # 1.6 1.0 - 4.8 K/uL    Mono # 0.5 0.3 - 1.0 K/uL    Eos # 0.3 0.0 - 0.5 K/uL    Baso # 0.04 0.00 - 0.20 K/uL    Gran% 58.6 38.0 - 73.0 %    Lymph% 26.6 18.0 - 48.0 %    Mono% 9.1 4.0 - 15.0 %    Eosinophil% 4.7 0.0 - 8.0 %    Basophil% 0.7 0.0 - 1.9 %    Differential Method Automated    Comprehensive metabolic panel   Result Value Ref Range    Sodium 134 (L) 136 - 145 mmol/L    Potassium 4.7 3.5 - 5.1 mmol/L    Chloride 103 95 - 110 mmol/L    CO2 24 23 - 29 mmol/L    Glucose 96 70 - 110 mg/dL    BUN, Bld 31 (H) 8 - 23 mg/dL    Creatinine  2.0 (H) 0.5 - 1.4 mg/dL    Calcium 9.5 8.7 - 10.5 mg/dL    Total Protein 6.7 6.0 - 8.4 g/dL    Albumin 3.7 3.5 - 5.2 g/dL    Total Bilirubin 0.7 0.1 - 1.0 mg/dL    Alkaline Phosphatase 88 55 - 135 U/L    AST 24 10 - 40 U/L    ALT 27 10 - 44 U/L    Anion Gap 7 (L) 8 - 16 mmol/L    eGFR if African American 36.1 (A) >60 mL/min/1.73 m^2    eGFR if non  31.3 (A) >60 mL/min/1.73 m^2   Lipid panel   Result Value Ref Range    Cholesterol 178 120 - 199 mg/dL    Triglycerides 50 30 - 150 mg/dL    HDL 90 (H) 40 - 75 mg/dL    LDL Cholesterol 78.0 63.0 - 159.0 mg/dL    HDL/Chol Ratio 50.6 (H) 20.0 - 50.0 %    Total Cholesterol/HDL Ratio 2.0 2.0 - 5.0    Non-HDL Cholesterol 88 mg/dL   TSH   Result Value Ref Range    TSH 1.778 0.400 - 4.000 uIU/mL   Prostate Specific Antigen, Diagnostic   Result Value Ref Range    PSA DIAGNOSTIC 2.4 0.00 - 4.00 ng/mL       Assessment:       1. Asthma, intermittent, uncomplicated    2. Hyperlipidemia, unspecified hyperlipidemia type    3. Essential hypertension    4. ANNABELLE (obstructive sleep apnea)    5. Cough        Plan:       Adelfo was seen today for annual exam.  Astelin and Flonase will be utilized.  A follow-up chest x-ray is scheduled for later this week.  Influenza vaccine will be administered today.    Diagnoses and all orders for this visit:    Asthma, intermittent, uncomplicated    Hyperlipidemia, unspecified hyperlipidemia type    Essential hypertension    ANNABELLE (obstructive sleep apnea)    Cough    Other orders  -     azelastine (ASTELIN) 137 mcg (0.1 %) nasal spray; 2 sprays (274 mcg total) by Nasal route 2 (two) times daily.

## 2017-10-17 ENCOUNTER — OFFICE VISIT (OUTPATIENT)
Dept: RHEUMATOLOGY | Facility: CLINIC | Age: 77
End: 2017-10-17
Payer: MEDICARE

## 2017-10-17 VITALS
SYSTOLIC BLOOD PRESSURE: 135 MMHG | WEIGHT: 218.81 LBS | DIASTOLIC BLOOD PRESSURE: 66 MMHG | BODY MASS INDEX: 34.34 KG/M2 | HEART RATE: 60 BPM | HEIGHT: 67 IN

## 2017-10-17 DIAGNOSIS — M1A.9XX1 CHRONIC TOPHACEOUS GOUT: Primary | ICD-10-CM

## 2017-10-17 DIAGNOSIS — K76.0 NAFLD (NONALCOHOLIC FATTY LIVER DISEASE): ICD-10-CM

## 2017-10-17 DIAGNOSIS — I10 ESSENTIAL HYPERTENSION: Chronic | ICD-10-CM

## 2017-10-17 DIAGNOSIS — E78.5 HYPERLIPIDEMIA, UNSPECIFIED HYPERLIPIDEMIA TYPE: ICD-10-CM

## 2017-10-17 DIAGNOSIS — N18.30 CKD (CHRONIC KIDNEY DISEASE) STAGE 3, GFR 30-59 ML/MIN: Chronic | ICD-10-CM

## 2017-10-17 PROCEDURE — 99999 PR PBB SHADOW E&M-EST. PATIENT-LVL IV: CPT | Mod: PBBFAC,,, | Performed by: INTERNAL MEDICINE

## 2017-10-17 PROCEDURE — 99213 OFFICE O/P EST LOW 20 MIN: CPT | Mod: S$GLB,,, | Performed by: INTERNAL MEDICINE

## 2017-10-17 NOTE — PROGRESS NOTES
I have personally taken the history and examined the patient and agree with the resident,s note as stated above

## 2017-10-17 NOTE — PROGRESS NOTES
[facility]  CLINIC NOTE    Patient Name: Adelfo Rodriguez  YOB: 1940    PRESENTING HISTORY       History of Present Illness:  Mr. Adelfo Rodriguez is a 77 y.o. 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 At the last visit, he was doing very well and was continued on colchicine and febuxostat for gout attack prevention.    Today he is doing well, no gout flares, tolerating medicine well, HE is on febuxostat 40 mg and colchicein daily.   he had acute bronchitis s/p Abx treatment but still c/o cough. He is following with his PCP regarding the cough.    Review of Systems:  Constitutional: no fever or chills  Eyes: no visual changes  ENT: no nasal congestion or sore throat  Respiratory: no cough or shortness of breath  Cardiovascular: no chest pain or palpitations  Gastrointestinal: no nausea or vomiting, no abdominal pain or change in bowel habits  Genitourinary: no hematuria or dysuria  Musculoskeletal: no arthralgias or myalgias  Neurological: no seizures or tremors    PAST HISTORY:     Past Medical History:   Diagnosis Date    ALLERGIC RHINITIS     Asthma     Hyperlipidemia     Hypertension     NAFLD (nonalcoholic fatty liver disease)     ANNABELLE (obstructive sleep apnea)     on CPAP       Past Surgical History:   Procedure Laterality Date    APPENDECTOMY      FOOT SURGERY      testicular biopsy         Family History   Problem Relation Age of Onset    Cancer Mother      breast    Glaucoma Mother     Retinal detachment Mother     Heart disease Father      CHF    COPD Father     Retinal detachment Father     Heart disease Brother     Arthritis Brother     Blindness Maternal Grandmother     Cataracts Maternal Grandmother     Cirrhosis Neg Hx     Strabismus Neg Hx        Social History     Social History    Marital status:      Spouse name: N/A    Number of children: N/A    Years of education:  N/A     Social History Main Topics    Smoking status: Former Smoker     Packs/day: 1.00     Years: 10.00    Smokeless tobacco: Former User      Comment: 1968 last smoke    Alcohol use Yes      Comment: 12 drinks/ very seldom     Drug use: No    Sexual activity: Yes     Other Topics Concern    None     Social History Narrative    None       MEDICATIONS & ALLERGIES:     Current Outpatient Prescriptions on File Prior to Visit   Medication Sig    albuterol (PROAIR HFA) 90 mcg/actuation inhaler Inhale 2 puffs into the lungs every 4 (four) hours as needed for Wheezing.    aspirin (ECOTRIN) 81 MG EC tablet Take 81 mg by mouth once daily.      atorvastatin (LIPITOR) 20 MG tablet Take 1 tablet (20 mg total) by mouth once daily. For cholesterol control.    azelastine (ASTELIN) 137 mcg (0.1 %) nasal spray 2 sprays (274 mcg total) by Nasal route 2 (two) times daily.    colchicine 0.6 mg tablet Take 1 tablet (0.6 mg total) by mouth once daily.    febuxostat (ULORIC) 40 mg Tab Take 1 tablet (40 mg total) by mouth once daily.    felodipine (PLENDIL) 10 MG 24 hr tablet Take 1 tablet (10 mg total) by mouth once daily.    fish oil-omega-3 fatty acids 300-1,000 mg capsule Take 2 g by mouth once daily.      fluticasone (FLONASE) 50 mcg/actuation nasal spray 1 spray by Each Nare route once daily.    fluticasone-salmeterol 250-50 mcg/dose (ADVAIR DISKUS) 250-50 mcg/dose diskus inhaler inhalation 1 dose by mouth twice a day Rinse mouth after use    glucosamine & chondroit sul.Na 750-600 mg Tab Take 750 mg by mouth.    guaifenesin (MUCINEX) 600 mg 12 hr tablet Take 1,200 mg by mouth 2 (two) times daily.    ketoconazole (NIZORAL) 2 % cream apply to FACE twice a day as directed    ketoconazole (NIZORAL) 2 % shampoo WASH affected area every other day    loratadine (CLARITIN) 10 mg tablet Take 10 mg by mouth daily as needed.      valsartan-hydrochlorothiazide (DIOVAN-HCT) 320-12.5 mg per tablet Take 1 tablet by mouth  "once daily.    [DISCONTINUED] triamcinolone acetonide 0.025% (KENALOG) 0.025 % cream apply to RASH ON FACE twice a day if needed     No current facility-administered medications on file prior to visit.        Review of patient's allergies indicates:   Allergen Reactions    Amoxicillin Hives    Allopurinol analogues Other (See Comments)     Abnormal transaminases       OBJECTIVE:   Vital Signs:  Vitals:    10/17/17 0839   BP: 135/66   Pulse: 60   Weight: 99.2 kg (218 lb 12.8 oz)   Height: 5' 7.2" (1.707 m)       No results found for this or any previous visit (from the past 24 hour(s)).      Physical Exam:   General:  Well developed, well nourished, no acute distress   HEENT:  Both ears has hard tophi on the pina, Normocephalic, atraumatic, PERRL, EOMI, clear sclera, ears normal, throat clear without erythema or exudates  CVS:  RRR, S1 and S2 normal, no murmurs, rubs, gallops  Resp:  Lungs clear to auscultation, no wheezes, rales, rhonchi, cough  GI:  Abdomen soft, non-tender, non-distended, normoactive bowel sounds, no masses  MSK:  No muscle atrophy, cyanosis, peripheral edema, full range of motion  Skin:  No rashes, ulcers, erythema  Neuro:  CNII-XII grossly intact  Psych:  Alert and oriented to person, place, and time    Results for MCKINLEY BISHOP (MRN [id number]) as of 10/17/2017 08:40   Ref. Range 10/3/2017 10:00   Iron Latest Ref Range: 45 - 160 ug/dL 70   TIBC Latest Ref Range: 250 - 450 ug/dL 417   Saturated Iron Latest Ref Range: 20 - 50 % 17 (L)   Transferrin Latest Ref Range: 200 - 375 mg/dL 282   Ferritin Latest Ref Range: 20.0 - 300.0 ng/mL 89   Folate Latest Ref Range: 4.0 - 24.0 ng/mL 7.7   Vitamin B-12 Latest Ref Range: 210 - 950 pg/mL 502   Results for MCKINLEY BISHOP (MRN [id number]) as of 10/17/2017 08:40   Ref. Range 9/27/2017 09:33   Sodium Latest Ref Range: 136 - 145 mmol/L 131 (L)   Potassium Latest Ref Range: 3.5 - 5.1 mmol/L 4.8   Chloride Latest Ref Range: 95 - 110 mmol/L 99   CO2 " Latest Ref Range: 23 - 29 mmol/L 24   Anion Gap Latest Ref Range: 8 - 16 mmol/L 8   BUN, Bld Latest Ref Range: 8 - 23 mg/dL 24 (H)   Creatinine Latest Ref Range: 0.5 - 1.4 mg/dL 1.8 (H)   eGFR if non African American Latest Ref Range: >60 mL/min/1.73 m^2 35.5 (A)   eGFR if African American Latest Ref Range: >60 mL/min/1.73 m^2 41.1 (A)   Glucose Latest Ref Range: 70 - 110 mg/dL 97   Calcium Latest Ref Range: 8.7 - 10.5 mg/dL 9.3   Alkaline Phosphatase Latest Ref Range: 55 - 135 U/L 97   Total Protein Latest Ref Range: 6.0 - 8.4 g/dL 6.7   Albumin Latest Ref Range: 3.5 - 5.2 g/dL 3.6   Uric Acid Latest Ref Range: 3.4 - 7.0 mg/dL 4.5   Total Bilirubin Latest Ref Range: 0.1 - 1.0 mg/dL 0.5   AST Latest Ref Range: 10 - 40 U/L 20   ALT Latest Ref Range: 10 - 44 U/L 27       ASSESSMENT & PLAN:     Diagnoses and all orders for this visit:    Chronic tophaceous gout    - Continue same medication    - Goal UA <5    RTC 6 months with labs    Sharyn Dias  PGY-1      Answers for HPI/ROS submitted by the patient on 10/10/2017   fever: No  eye redness: No  swollen glands: No  headaches: Yes  shortness of breath: Yes  chest pain: No  trouble swallowing: No  heartburn: No  diarrhea: No  constipation: No  unexpected weight change: No  genital sore: No  During the last 3 days, have you had a skin rash?: No  tingling: No  Bruises or bleeds easily: Yes  cough: Yes

## 2017-11-03 ENCOUNTER — PATIENT MESSAGE (OUTPATIENT)
Dept: RHEUMATOLOGY | Facility: CLINIC | Age: 77
End: 2017-11-03

## 2017-11-06 ENCOUNTER — HOSPITAL ENCOUNTER (OUTPATIENT)
Dept: RADIOLOGY | Facility: HOSPITAL | Age: 77
Discharge: HOME | End: 2017-11-06
Attending: ORTHOPAEDIC SURGERY
Payer: MEDICARE

## 2017-11-06 ENCOUNTER — OFFICE VISIT (OUTPATIENT)
Dept: ORTHOPEDICS | Facility: CLINIC | Age: 77
End: 2017-11-06
Payer: MEDICARE

## 2017-11-06 ENCOUNTER — TELEPHONE (OUTPATIENT)
Dept: ORTHOPEDICS | Facility: CLINIC | Age: 77
End: 2017-11-06

## 2017-11-06 DIAGNOSIS — S92.351A CLOSED DISPLACED FRACTURE OF FIFTH METATARSAL BONE OF RIGHT FOOT, INITIAL ENCOUNTER: ICD-10-CM

## 2017-11-06 DIAGNOSIS — M79.671 RIGHT FOOT PAIN: ICD-10-CM

## 2017-11-06 DIAGNOSIS — M79.671 RIGHT FOOT PAIN: Primary | ICD-10-CM

## 2017-11-06 PROCEDURE — 28470 CLTX METATARSAL FX WO MNP EA: CPT | Mod: S$GLB,,, | Performed by: ORTHOPAEDIC SURGERY

## 2017-11-06 PROCEDURE — 99999 PR PBB SHADOW E&M-EST. PATIENT-LVL I: CPT | Mod: PBBFAC,,, | Performed by: ORTHOPAEDIC SURGERY

## 2017-11-06 PROCEDURE — 99202 OFFICE O/P NEW SF 15 MIN: CPT | Mod: 57,S$GLB,, | Performed by: ORTHOPAEDIC SURGERY

## 2017-11-06 PROCEDURE — 73630 X-RAY EXAM OF FOOT: CPT | Mod: 26,RT,, | Performed by: RADIOLOGY

## 2017-11-06 PROCEDURE — 28470 CLTX METATARSAL FX WO MNP EA: CPT | Mod: PBBFAC | Performed by: ORTHOPAEDIC SURGERY

## 2017-11-06 PROCEDURE — 99211 OFF/OP EST MAY X REQ PHY/QHP: CPT | Mod: PBBFAC,25 | Performed by: ORTHOPAEDIC SURGERY

## 2017-11-06 PROCEDURE — 73630 X-RAY EXAM OF FOOT: CPT | Mod: TC,RT

## 2017-11-06 NOTE — PROGRESS NOTES
Subjective:      Patient ID: Adelfo Rodriguez is a 77 y.o. male.    Chief Complaint: No chief complaint on file.    HPI 4 days ago had a misstep off of a palate injuring his right foot.  X-rays done by a podiatrist on Friday revealed a fracture of his right fifth metatarsal.  He comes into see me for evaluation and treatment.    Past Medical History:   Diagnosis Date    ALLERGIC RHINITIS     Asthma     Hyperlipidemia     Hypertension     NAFLD (nonalcoholic fatty liver disease)     ANNABELLE (obstructive sleep apnea)     on CPAP       Review of Systems   Constitution: Negative for fever and weight loss.   Cardiovascular: Negative for chest pain.   Respiratory: Negative for shortness of breath.          Objective:                General Musculoskeletal Exam   Gait: abnormal     Right Ankle/Foot Exam     Inspection   Bruising: Foot - present    Pain   The patient exhibits pain of the metatarsals.    Range of Motion   The patient has normal right ankle ROM.    Alignment   Forefoot Alignment: normal    Muscle Strength   The patient has normal right ankle strength.    Other   Sensation: normal    Comments:  Tender over right distal fifth metatarsal    Left Ankle/Foot Exam   Left ankle exam is normal.    Inspection  Scars: present      Vascular Exam     Right Pulses  Dorsalis Pedis:      2+          Edema  Right Lower Leg: present      X-ray right foot reveals displaced fracture of the right fifth metatarsal neck and shaft.        Assessment:       Encounter Diagnoses   Name Primary?    Right foot pain Yes    Closed displaced fracture of fifth metatarsal bone of right foot, initial encounter           Plan:       Diagnoses and all orders for this visit:    Right foot pain  -     X-Ray Foot Complete Right; Future    Closed displaced fracture of fifth metatarsal bone of right foot, initial encounter     I recommend close treatment of this right fifth metatarsal fracture.  Return to clinic in 4 weeks for repeat x-ray  right foot.

## 2017-12-04 ENCOUNTER — HOSPITAL ENCOUNTER (OUTPATIENT)
Dept: RADIOLOGY | Facility: HOSPITAL | Age: 77
Discharge: HOME | End: 2017-12-04
Attending: ORTHOPAEDIC SURGERY
Payer: MEDICARE

## 2017-12-04 ENCOUNTER — OFFICE VISIT (OUTPATIENT)
Dept: ORTHOPEDICS | Facility: CLINIC | Age: 77
End: 2017-12-04
Payer: MEDICARE

## 2017-12-04 DIAGNOSIS — S92.351A CLOSED DISPLACED FRACTURE OF FIFTH METATARSAL BONE OF RIGHT FOOT, INITIAL ENCOUNTER: Primary | ICD-10-CM

## 2017-12-04 DIAGNOSIS — S92.351A CLOSED DISPLACED FRACTURE OF FIFTH METATARSAL BONE OF RIGHT FOOT, INITIAL ENCOUNTER: ICD-10-CM

## 2017-12-04 PROCEDURE — 99024 POSTOP FOLLOW-UP VISIT: CPT | Mod: S$GLB,,, | Performed by: ORTHOPAEDIC SURGERY

## 2017-12-04 PROCEDURE — 99999 PR PBB SHADOW E&M-EST. PATIENT-LVL I: CPT | Mod: PBBFAC,,, | Performed by: ORTHOPAEDIC SURGERY

## 2017-12-04 PROCEDURE — 73630 X-RAY EXAM OF FOOT: CPT | Mod: TC,RT

## 2017-12-04 PROCEDURE — 73630 X-RAY EXAM OF FOOT: CPT | Mod: 26,RT,, | Performed by: RADIOLOGY

## 2017-12-04 NOTE — PROGRESS NOTES
Mr. Rodriguez returns today for followup of his right fifth metatarsal distal   shaft and neck fracture.  I recommended close treatment of this fracture despite   some moderate displacement.  He reports no pain at this time.  Examination   reveals continued mild swelling, but still some tenderness at the fracture site.    I ordered and reviewed an x-ray today and there has been no change in the   alignment of the fracture fragments and there is some new bone formation.  At   this point, he can continue with his current level of activity.  I am going to   have him return to see me in six weeks for followup.  He should have repeat   x-ray of his right foot at that time.      KASSANDRA/RAPHAEL  dd: 12/04/2017 12:49:22 (CST)  td: 12/05/2017 09:43:19 (CST)  Doc ID   #[id number]  Job ID #[id number]    CC:     This office note has been dictated.

## 2017-12-26 ENCOUNTER — TELEPHONE (OUTPATIENT)
Dept: INTERNAL MEDICINE | Facility: CLINIC | Age: 77
End: 2017-12-26

## 2017-12-26 ENCOUNTER — PATIENT MESSAGE (OUTPATIENT)
Dept: INTERNAL MEDICINE | Facility: CLINIC | Age: 77
End: 2017-12-26

## 2017-12-26 ENCOUNTER — PATIENT MESSAGE (OUTPATIENT)
Dept: RHEUMATOLOGY | Facility: CLINIC | Age: 77
End: 2017-12-26

## 2017-12-26 DIAGNOSIS — M1A.9XX1 CHRONIC TOPHACEOUS GOUT: ICD-10-CM

## 2017-12-26 RX ORDER — COLCHICINE 0.6 MG/1
0.6 TABLET ORAL DAILY
Qty: 90 TABLET | Refills: 1 | Status: SHIPPED | OUTPATIENT
Start: 2017-12-26 | End: 2018-08-07 | Stop reason: SDUPTHER

## 2017-12-26 RX ORDER — FEBUXOSTAT 40 MG/1
40 TABLET, FILM COATED ORAL DAILY
Qty: 90 TABLET | Refills: 3 | Status: SHIPPED | OUTPATIENT
Start: 2017-12-26 | End: 2018-05-15

## 2017-12-26 RX ORDER — KETOCONAZOLE 20 MG/ML
SHAMPOO, SUSPENSION TOPICAL
Qty: 120 ML | Refills: 0 | Status: SHIPPED | OUTPATIENT
Start: 2017-12-26 | End: 2020-10-22 | Stop reason: SDUPTHER

## 2017-12-28 RX ORDER — VALSARTAN AND HYDROCHLOROTHIAZIDE 320; 12.5 MG/1; MG/1
1 TABLET, FILM COATED ORAL DAILY
Qty: 90 TABLET | Refills: 3 | Status: SHIPPED | OUTPATIENT
Start: 2017-12-28 | End: 2018-06-15

## 2018-01-11 ENCOUNTER — OFFICE VISIT (OUTPATIENT)
Dept: ORTHOPEDICS | Facility: CLINIC | Age: 78
End: 2018-01-11
Payer: MEDICARE

## 2018-01-11 ENCOUNTER — HOSPITAL ENCOUNTER (OUTPATIENT)
Dept: RADIOLOGY | Facility: HOSPITAL | Age: 78
Discharge: HOME | End: 2018-01-11
Attending: ORTHOPAEDIC SURGERY
Payer: MEDICARE

## 2018-01-11 VITALS — WEIGHT: 220 LBS | BODY MASS INDEX: 34.53 KG/M2 | HEIGHT: 67 IN

## 2018-01-11 DIAGNOSIS — S92.351D CLOSED DISPLACED FRACTURE OF FIFTH METATARSAL BONE OF RIGHT FOOT WITH ROUTINE HEALING, SUBSEQUENT ENCOUNTER: ICD-10-CM

## 2018-01-11 DIAGNOSIS — S92.351D CLOSED DISPLACED FRACTURE OF FIFTH METATARSAL BONE OF RIGHT FOOT WITH ROUTINE HEALING, SUBSEQUENT ENCOUNTER: Primary | ICD-10-CM

## 2018-01-11 PROCEDURE — 99024 POSTOP FOLLOW-UP VISIT: CPT | Mod: S$GLB,,, | Performed by: ORTHOPAEDIC SURGERY

## 2018-01-11 PROCEDURE — 73630 X-RAY EXAM OF FOOT: CPT | Mod: TC,RT

## 2018-01-11 PROCEDURE — 73630 X-RAY EXAM OF FOOT: CPT | Mod: 26,RT,, | Performed by: RADIOLOGY

## 2018-01-11 PROCEDURE — 99999 PR PBB SHADOW E&M-EST. PATIENT-LVL III: CPT | Mod: PBBFAC,,, | Performed by: ORTHOPAEDIC SURGERY

## 2018-01-12 NOTE — PROGRESS NOTES
Mr. Rodriguez returns today for followup of his right fifth metatarsal shaft and   neck fracture.  We have been treating it closed.  He reports over the last   month or so he has had significant improvement in pain.  He is walking at this   time in a shoe.  He still has some soreness at the fracture site.  The x-ray   reveals further evidence of healing, but continued radiolucency.  He should   continue to avoid any high-impact activity and let him progress his low-impact   activity within limits of pain.  I am going to have him make a return   appointment in six weeks if necessary.      KASSANDRA/RAPHAEL  dd: 01/11/2018 14:14:39 (CST)  td: 01/12/2018 05:40:03 (CST)  Doc ID   #[id number]  Job ID #[id number]    CC:

## 2018-02-22 ENCOUNTER — HOSPITAL ENCOUNTER (OUTPATIENT)
Dept: RADIOLOGY | Facility: HOSPITAL | Age: 78
Discharge: HOME | End: 2018-02-22
Attending: ORTHOPAEDIC SURGERY
Payer: MEDICARE

## 2018-02-22 ENCOUNTER — OFFICE VISIT (OUTPATIENT)
Dept: ORTHOPEDICS | Facility: CLINIC | Age: 78
End: 2018-02-22
Payer: MEDICARE

## 2018-02-22 VITALS — HEIGHT: 70 IN | BODY MASS INDEX: 31.64 KG/M2 | WEIGHT: 221 LBS

## 2018-02-22 DIAGNOSIS — M79.671 RIGHT FOOT PAIN: Primary | ICD-10-CM

## 2018-02-22 DIAGNOSIS — M79.671 RIGHT FOOT PAIN: ICD-10-CM

## 2018-02-22 DIAGNOSIS — S92.351D CLOSED DISPLACED FRACTURE OF FIFTH METATARSAL BONE OF RIGHT FOOT WITH ROUTINE HEALING, SUBSEQUENT ENCOUNTER: ICD-10-CM

## 2018-02-22 PROCEDURE — 73630 X-RAY EXAM OF FOOT: CPT | Mod: TC,RT

## 2018-02-22 PROCEDURE — 99024 POSTOP FOLLOW-UP VISIT: CPT | Mod: S$GLB,,, | Performed by: ORTHOPAEDIC SURGERY

## 2018-02-22 PROCEDURE — 73630 X-RAY EXAM OF FOOT: CPT | Mod: 26,RT,, | Performed by: RADIOLOGY

## 2018-02-22 PROCEDURE — 99999 PR PBB SHADOW E&M-EST. PATIENT-LVL II: CPT | Mod: PBBFAC,,, | Performed by: ORTHOPAEDIC SURGERY

## 2018-02-23 NOTE — PROGRESS NOTES
Mr. Rodriguez returns today for followup.  He is now three months post-injury.    He sustained a displaced right fifth metatarsal shaft and neck fracture, which   we have treated closed.  Since his last visit, he reports no pain.    On examination, there is no tenderness over the fifth metatarsal.    I ordered and reviewed a new x-ray today, and there is further bony healing that   has occurred.  At this point, I would let him continue to progress his   low-impact activity as tolerated.  I am going to have him return to see me as   needed.      KASSANDRA/RAPHAEL  dd: 02/22/2018 14:21:31 (CST)  td: 02/23/2018 02:47:08 (CST)  Doc ID   #[id number]  Job ID #[id number]    CC:

## 2018-03-12 ENCOUNTER — PATIENT MESSAGE (OUTPATIENT)
Dept: INTERNAL MEDICINE | Facility: CLINIC | Age: 78
End: 2018-03-12

## 2018-03-16 ENCOUNTER — OFFICE VISIT (OUTPATIENT)
Dept: INTERNAL MEDICINE | Facility: CLINIC | Age: 78
End: 2018-03-16
Payer: MEDICARE

## 2018-03-16 ENCOUNTER — LAB VISIT (OUTPATIENT)
Dept: LAB | Facility: HOSPITAL | Age: 78
End: 2018-03-16
Attending: INTERNAL MEDICINE
Payer: MEDICARE

## 2018-03-16 VITALS
HEART RATE: 65 BPM | WEIGHT: 227.94 LBS | SYSTOLIC BLOOD PRESSURE: 137 MMHG | BODY MASS INDEX: 32.63 KG/M2 | TEMPERATURE: 98 F | RESPIRATION RATE: 16 BRPM | DIASTOLIC BLOOD PRESSURE: 59 MMHG | HEIGHT: 70 IN

## 2018-03-16 DIAGNOSIS — I87.2 STASIS DERMATITIS OF BOTH LEGS: ICD-10-CM

## 2018-03-16 DIAGNOSIS — J31.0 CHRONIC RHINITIS, UNSPECIFIED TYPE: Chronic | ICD-10-CM

## 2018-03-16 DIAGNOSIS — N18.30 CKD (CHRONIC KIDNEY DISEASE) STAGE 3, GFR 30-59 ML/MIN: Chronic | ICD-10-CM

## 2018-03-16 DIAGNOSIS — J45.20 INTERMITTENT ASTHMA WITHOUT COMPLICATION, UNSPECIFIED ASTHMA SEVERITY: ICD-10-CM

## 2018-03-16 DIAGNOSIS — R60.9 EDEMA, UNSPECIFIED TYPE: ICD-10-CM

## 2018-03-16 DIAGNOSIS — R60.9 EDEMA, UNSPECIFIED TYPE: Primary | ICD-10-CM

## 2018-03-16 DIAGNOSIS — I10 ESSENTIAL HYPERTENSION: Chronic | ICD-10-CM

## 2018-03-16 DIAGNOSIS — E78.5 HYPERLIPIDEMIA, UNSPECIFIED HYPERLIPIDEMIA TYPE: ICD-10-CM

## 2018-03-16 LAB
ALBUMIN SERPL BCP-MCNC: 4 G/DL
ALP SERPL-CCNC: 89 U/L
ALT SERPL W/O P-5'-P-CCNC: 21 U/L
ANION GAP SERPL CALC-SCNC: 10 MMOL/L
AST SERPL-CCNC: 21 U/L
BASOPHILS # BLD AUTO: 0.07 K/UL
BASOPHILS NFR BLD: 1.2 %
BILIRUB SERPL-MCNC: 0.6 MG/DL
BNP SERPL-MCNC: 14 PG/ML
BUN SERPL-MCNC: 28 MG/DL
CALCIUM SERPL-MCNC: 9.6 MG/DL
CHLORIDE SERPL-SCNC: 103 MMOL/L
CO2 SERPL-SCNC: 22 MMOL/L
CREAT SERPL-MCNC: 1.8 MG/DL
DIFFERENTIAL METHOD: ABNORMAL
EOSINOPHIL # BLD AUTO: 0.3 K/UL
EOSINOPHIL NFR BLD: 5.4 %
ERYTHROCYTE [DISTWIDTH] IN BLOOD BY AUTOMATED COUNT: 12.4 %
ERYTHROCYTE [SEDIMENTATION RATE] IN BLOOD BY WESTERGREN METHOD: 16 MM/HR
EST. GFR  (AFRICAN AMERICAN): 41.1 ML/MIN/1.73 M^2
EST. GFR  (NON AFRICAN AMERICAN): 35.5 ML/MIN/1.73 M^2
GLUCOSE SERPL-MCNC: 96 MG/DL
HCT VFR BLD AUTO: 36.3 %
HGB BLD-MCNC: 11.9 G/DL
IMM GRANULOCYTES # BLD AUTO: 0.02 K/UL
IMM GRANULOCYTES NFR BLD AUTO: 0.3 %
LYMPHOCYTES # BLD AUTO: 1.4 K/UL
LYMPHOCYTES NFR BLD: 23.1 %
MCH RBC QN AUTO: 31.8 PG
MCHC RBC AUTO-ENTMCNC: 32.8 G/DL
MCV RBC AUTO: 97 FL
MONOCYTES # BLD AUTO: 0.9 K/UL
MONOCYTES NFR BLD: 15.2 %
NEUTROPHILS # BLD AUTO: 3.3 K/UL
NEUTROPHILS NFR BLD: 54.8 %
NRBC BLD-RTO: 0 /100 WBC
PLATELET # BLD AUTO: 205 K/UL
PMV BLD AUTO: 12.1 FL
POTASSIUM SERPL-SCNC: 4.9 MMOL/L
PROT SERPL-MCNC: 6.9 G/DL
RBC # BLD AUTO: 3.74 M/UL
SODIUM SERPL-SCNC: 135 MMOL/L
TSH SERPL DL<=0.005 MIU/L-ACNC: 2.24 UIU/ML
WBC # BLD AUTO: 5.97 K/UL

## 2018-03-16 PROCEDURE — 84443 ASSAY THYROID STIM HORMONE: CPT

## 2018-03-16 PROCEDURE — 3078F DIAST BP <80 MM HG: CPT | Mod: CPTII,S$GLB,, | Performed by: INTERNAL MEDICINE

## 2018-03-16 PROCEDURE — 3075F SYST BP GE 130 - 139MM HG: CPT | Mod: CPTII,S$GLB,, | Performed by: INTERNAL MEDICINE

## 2018-03-16 PROCEDURE — 36415 COLL VENOUS BLD VENIPUNCTURE: CPT | Mod: PO

## 2018-03-16 PROCEDURE — 99214 OFFICE O/P EST MOD 30 MIN: CPT | Mod: S$GLB,,, | Performed by: INTERNAL MEDICINE

## 2018-03-16 PROCEDURE — 83880 ASSAY OF NATRIURETIC PEPTIDE: CPT

## 2018-03-16 PROCEDURE — 99999 PR PBB SHADOW E&M-EST. PATIENT-LVL III: CPT | Mod: PBBFAC,,, | Performed by: INTERNAL MEDICINE

## 2018-03-16 PROCEDURE — 85025 COMPLETE CBC W/AUTO DIFF WBC: CPT

## 2018-03-16 PROCEDURE — 85651 RBC SED RATE NONAUTOMATED: CPT

## 2018-03-16 PROCEDURE — 80053 COMPREHEN METABOLIC PANEL: CPT

## 2018-03-16 RX ORDER — FUROSEMIDE 40 MG/1
40 TABLET ORAL 2 TIMES DAILY
Qty: 60 TABLET | Refills: 0 | Status: SHIPPED | OUTPATIENT
Start: 2018-03-16 | End: 2018-04-23 | Stop reason: SDUPTHER

## 2018-03-16 RX ORDER — TRIAMCINOLONE ACETONIDE 1 MG/G
CREAM TOPICAL 2 TIMES DAILY
Qty: 80 G | Refills: 2 | Status: SHIPPED | OUTPATIENT
Start: 2018-03-16 | End: 2024-03-23

## 2018-03-16 RX ORDER — AMMONIUM LACTATE 12 G/100G
CREAM TOPICAL
Qty: 385 G | Refills: 0 | Status: SHIPPED | OUTPATIENT
Start: 2018-03-16 | End: 2018-04-17

## 2018-03-20 ENCOUNTER — CLINICAL SUPPORT (OUTPATIENT)
Dept: CARDIOLOGY | Facility: CLINIC | Age: 78
End: 2018-03-20
Attending: INTERNAL MEDICINE
Payer: MEDICARE

## 2018-03-20 DIAGNOSIS — N18.30 CKD (CHRONIC KIDNEY DISEASE) STAGE 3, GFR 30-59 ML/MIN: Chronic | ICD-10-CM

## 2018-03-20 DIAGNOSIS — R60.9 EDEMA, UNSPECIFIED TYPE: ICD-10-CM

## 2018-03-20 PROCEDURE — 93970 EXTREMITY STUDY: CPT | Mod: S$GLB,,, | Performed by: INTERNAL MEDICINE

## 2018-03-22 NOTE — PROGRESS NOTES
Subjective:       Patient ID: Adelfo Rodriguez is a 77 y.o. male.    Chief Complaint: leg swelling    HPI   The patient has been noting lower extremity swelling for the past 6 weeks.  He also complains of itching and dryness of the skin on both legs.  Swelling improves with elevation of the lower extremities.  No skin ulcerations are present.  No foot swelling is noted.  He denies having any chills or fever.    He is status post right foot fracture involving the fifth metatarsal in 9/17.  His foot injury has limited his level of physical exercise.  He has been discharged from orthopedic care.  His asthma has been stable.  Peak expiratory flows are within normal limits on self monitoring.    He averages 6-8 hours of sleep at night.  He has obstructive sleep apnea and is using his CPAP machine as prescribed.    Review of Systems   Constitutional: Negative for activity change, appetite change, fatigue and unexpected weight change.   HENT: Negative for sinus pressure and sore throat.    Eyes: Negative for visual disturbance.   Respiratory: Negative for cough, chest tightness, shortness of breath and wheezing.    Cardiovascular: Positive for leg swelling. Negative for chest pain and palpitations.   Gastrointestinal: Negative for abdominal pain, blood in stool, nausea and vomiting.   Genitourinary: Negative for dysuria, hematuria and urgency.   Musculoskeletal: Negative for arthralgias, back pain, gait problem, joint swelling, myalgias and neck stiffness.   Skin: Positive for color change (Involving both legs). Negative for rash and wound.   Neurological: Negative for dizziness, syncope, weakness, light-headedness, numbness and headaches.   Psychiatric/Behavioral: Negative for sleep disturbance.       Objective:      Physical Exam   Constitutional: He is oriented to person, place, and time. He appears well-developed and well-nourished. No distress.   The patient has gained 12 pounds since 9/25/17.   HENT:   Head:  Normocephalic and atraumatic.   Eyes: Conjunctivae and EOM are normal. No scleral icterus.   Neck: Normal range of motion. Neck supple. No JVD present. No thyromegaly present.   Cardiovascular: Normal rate, regular rhythm, normal heart sounds and intact distal pulses.  Exam reveals no gallop and no friction rub.    No murmur heard.  2+ pitting edema is noted of both pretibial areas.  No pedal edema is noted.  No skin ulcerations are present.   Pulmonary/Chest: Effort normal and breath sounds normal. No respiratory distress. He has no wheezes. He has no rales.   Abdominal: Soft. Bowel sounds are normal. He exhibits no mass. There is no tenderness.   Musculoskeletal: Normal range of motion. He exhibits no tenderness.   Lymphadenopathy:     He has no cervical adenopathy.   Neurological: He is alert and oriented to person, place, and time.   Gait is normal.   Skin: Skin is warm and dry. No rash noted.   Scattered ecchymoses noted on the dorsal aspect of both forearms.  Dryness is present involving both legs and forearms.   Nursing note and vitals reviewed.      Assessment:       1. Edema, unspecified type    2. Essential hypertension    3. Stasis dermatitis of both legs    4. CKD (chronic kidney disease) stage 3, GFR 30-59 ml/min    5. Chronic rhinitis, unspecified type    6. Intermittent asthma without complication, unspecified asthma severity    7. Hyperlipidemia, unspecified hyperlipidemia type        Plan:     Adelfo was seen today for leg swelling.  The patient has evidence of stasis dermatitis of both legs.  A venous ultrasound will be obtained.  Laboratory studies will be obtained.  Venous compression stockings are recommended.  Topical Lac-Hydrin cream and triamcinolone cream will be used for leg symptoms.  The patient should take Lasix 40 mg twice a day for 3 days then decrease the dose to once daily.  A follow-up visit in one month is recommended.    Diagnoses and all orders for this visit:    Edema,  unspecified type  -     Cardiology Lab US Lower Extremity Veins Bilateral; Future  -     Brain natriuretic peptide; Future  -     Comprehensive metabolic panel; Future  -     CBC auto differential; Future  -     Sedimentation rate, manual; Future  -     TSH; Future  -     COMPRESSION STOCKINGS    Essential hypertension    Stasis dermatitis of both legs    CKD (chronic kidney disease) stage 3, GFR 30-59 ml/min  -     Cardiology Lab US Lower Extremity Veins Bilateral; Future  -     Brain natriuretic peptide; Future  -     Comprehensive metabolic panel; Future  -     CBC auto differential; Future  -     Sedimentation rate, manual; Future  -     TSH; Future    Chronic rhinitis, unspecified type    Intermittent asthma without complication, unspecified asthma severity    Hyperlipidemia, unspecified hyperlipidemia type    Other orders  -     triamcinolone acetonide 0.1% (KENALOG) 0.1 % cream; Apply topically 2 (two) times daily.  -     ammonium lactate 12 % Crea; Apply tid to dry skin areas (legs)  -     furosemide (LASIX) 40 MG tablet; Take 1 tablet (40 mg total) by mouth 2 (two) times daily.

## 2018-03-25 ENCOUNTER — PATIENT MESSAGE (OUTPATIENT)
Dept: INTERNAL MEDICINE | Facility: CLINIC | Age: 78
End: 2018-03-25

## 2018-03-28 RX ORDER — FLUTICASONE PROPIONATE 50 MCG
SPRAY, SUSPENSION (ML) NASAL
Qty: 16 G | Refills: 6 | Status: SHIPPED | OUTPATIENT
Start: 2018-03-28 | End: 2018-10-01

## 2018-03-31 ENCOUNTER — OFFICE VISIT (OUTPATIENT)
Dept: URGENT CARE | Facility: CLINIC | Age: 78
End: 2018-03-31
Payer: MEDICARE

## 2018-03-31 VITALS
TEMPERATURE: 99 F | HEART RATE: 73 BPM | WEIGHT: 227 LBS | OXYGEN SATURATION: 98 % | BODY MASS INDEX: 32.5 KG/M2 | DIASTOLIC BLOOD PRESSURE: 76 MMHG | SYSTOLIC BLOOD PRESSURE: 158 MMHG | HEIGHT: 70 IN | RESPIRATION RATE: 18 BRPM

## 2018-03-31 DIAGNOSIS — R60.0 BILATERAL LOWER EXTREMITY EDEMA: ICD-10-CM

## 2018-03-31 DIAGNOSIS — T78.40XA ALLERGIC REACTION, INITIAL ENCOUNTER: Primary | ICD-10-CM

## 2018-03-31 DIAGNOSIS — L25.9 CONTACT DERMATITIS, UNSPECIFIED CONTACT DERMATITIS TYPE, UNSPECIFIED TRIGGER: ICD-10-CM

## 2018-03-31 PROCEDURE — 3078F DIAST BP <80 MM HG: CPT | Mod: CPTII,S$GLB,, | Performed by: EMERGENCY MEDICINE

## 2018-03-31 PROCEDURE — 99214 OFFICE O/P EST MOD 30 MIN: CPT | Mod: 25,S$GLB,, | Performed by: EMERGENCY MEDICINE

## 2018-03-31 PROCEDURE — 96372 THER/PROPH/DIAG INJ SC/IM: CPT | Mod: S$GLB,,, | Performed by: EMERGENCY MEDICINE

## 2018-03-31 PROCEDURE — 3077F SYST BP >= 140 MM HG: CPT | Mod: CPTII,S$GLB,, | Performed by: EMERGENCY MEDICINE

## 2018-03-31 RX ORDER — PREDNISONE 20 MG/1
TABLET ORAL
Qty: 6 TABLET | Refills: 0 | Status: SHIPPED | OUTPATIENT
Start: 2018-03-31 | End: 2018-04-17 | Stop reason: ALTCHOICE

## 2018-03-31 RX ORDER — CLINDAMYCIN HYDROCHLORIDE 300 MG/1
300 CAPSULE ORAL EVERY 8 HOURS
Qty: 30 CAPSULE | Refills: 0 | Status: SHIPPED | OUTPATIENT
Start: 2018-03-31 | End: 2018-04-10

## 2018-03-31 RX ORDER — BETAMETHASONE SODIUM PHOSPHATE AND BETAMETHASONE ACETATE 3; 3 MG/ML; MG/ML
6 INJECTION, SUSPENSION INTRA-ARTICULAR; INTRALESIONAL; INTRAMUSCULAR; SOFT TISSUE
Status: COMPLETED | OUTPATIENT
Start: 2018-03-31 | End: 2018-03-31

## 2018-03-31 RX ADMIN — BETAMETHASONE SODIUM PHOSPHATE AND BETAMETHASONE ACETATE 6 MG: 3; 3 INJECTION, SUSPENSION INTRA-ARTICULAR; INTRALESIONAL; INTRAMUSCULAR; SOFT TISSUE at 11:03

## 2018-03-31 NOTE — PATIENT INSTRUCTIONS
"UNTIL THIS CLEARS AND FIGURE OUT EXACT CAUSE BY OTHER MEANS OF TESTING, PLEASE HOLD AMMONIUM LACTATE AND COMPRESSION STOCKINGS FOR NOW.    ELEVATE LEGS WHENEVER POSSIBLE    1 CC CELESTONE GIVEN IN CLINIC  PREDNISONE RX-START TOMORROW  OK TO CONTINUE TRIAMCINOLONE CREAM RX  LORATADINE DURING THE DAY AND BENADRYL AT NIGHT    CLINDAMYCIN RX-AS DISCUSSED, ONLY FILL THIS RX IF NOT MUCH IMPROVED IN 4-5 DAYS, IF HAVING FEVERS, SPREADING REDNESS, WARMTH, ANY OTHER SIGNS OF INFECTION AS DISCUSSED.      Contact Dermatitis  Contact dermatitis is a skin rash caused by something that touches the skin and makes it irritated and inflamed. Your skin may be red, swollen, dry, and may be cracked. Blisters may form and ooze. The rash will itch.  Contact dermatitis can form on the face and neck, backs of hands, forearms, genitals, and lower legs.  People can get contact dermatitis from lots of sources. These include:  · Plants such as poison ivy, oak, or sumac  · Chemicals in hair dyes and rinses, soaps, solvents, waxes, fingernail polish, and deodorants   · Jewelry or watchbands made of nickel  Contact dermatitis is not passed from person to person.  Talk with your healthcare provider about what may have caused the rash. A type of allergy testing called "patch testing" may be used to discover what you are allergic to. You will need to avoid the source of your rash in the future to prevent it from coming back.  Treatment is done to relieve itching and prevent the rash from coming back. The rash should go away in a few days to a few weeks.  Home care  Your healthcare provider may prescribe medicine to relieve swelling and itching. Follow all instructions when using these medicines.  General care:  · Avoid anything that heats up your skin, such as hot showers or baths, or direct sunlight. This can make itching worse.  · Apply cold compresses to soothe your sores to help relieve your symptoms. Do this for 30 minutes 3 to 4 times a day. " You can make a cold compress by soaking a cloth in cold water. Squeeze out excess water. You can add colloidal oatmeal to the water to help reduce itching. For severe itching in a small area, apply an ice pack wrapped in a thin towel. Do this for 20 minutes 3 to 4 times a day.  · You can also try wet dressings. One way to do this is to wear a wet piece of clothing under a dry one. Wear a damp shirt under a dry shirt if your upper body is affected. This can relieve itching and prevent you from scratching the affected area.  · You can also help relieve large areas of itching by taking a lukewarm bath with colloidal oatmeal added to the water.  · Use hydrocortisone cream for redness and irritation, unless another medicine was prescribed. You can also use benzocaine anesthetic cream or spray. Calamine lotion can also relieve mild symptoms.  · Use oral diphenhydramine to help reduce itching. You can buy this antihistamine at drug and grocery stores. It can make you sleepy, so use lower doses during the daytime. Or you can use loratadine. This is an antihistamine that will not make you sleepy. Do not use diphenhydramine if you have glaucoma or have trouble urinating due to an enlarged prostate.  · If a plant causes your rash, make sure to wash your skin and the clothes you were wearing when you came into contact with the plant. This is to wash away the plant oils that gave you the rash and prevent more or worse symptoms.  · Stay away from the substance or object that causes your symptoms. If you cant avoid it, wear gloves or some other type of protection.  Follow-up care  Follow up with your healthcare provider, or as advised.  When to seek medical advice  Call your healthcare provider right away if any of these occur:  · Spreading of the rash to other parts of your body  · Severe swelling of your face, eyelids, mouth, throat or tongue  · Trouble urinating due to swelling in the genital area  · Fever of 100.4°F (38°C) or  higher  · Redness or swelling that gets worse  · Pain that gets worse  · Foul-smelling fluid leaking from the skin  · Yellow-brown crusts on the open blisters  Date Last Reviewed: 9/1/2016 © 2000-2017 [employer]. [address] [place] [address]. All rights reserved. This information is not intended as a substitute for professional medical care. Always follow your healthcare professional's instructions.        Leg Swelling in Both Legs    Swelling of the feet, ankles, and legs is called edema. It is caused by excess fluid that has collected in the tissues. Extra fluid in the body settles in the lowest part because of gravity. This is why the legs and feet are most affected.  Some of the causes for edema include:  · Disease of the heart like congestive heart failure  · Standing or sitting for long periods of time  · Infection of the feet or legs  · Blood pooling in the veins of your legs (venous insufficiency)  · Dilated veins in your lower leg (varicose veins)  · Garters or other clothing that is tight on your legs. This will cause blood to pool in your legs because the clothing limits blood flow.  · Some medicines such as hormones like birth control pills, some blood pressure medicines like calcium channel blockers (amlodipine) and steroids, some antidepressants like MAO inhibitors and tricyclics  · Menstrual periods that cause you to retain fluids  · Many types of renal disease  · Liver failure or cirrhosis  · Pregnancy, some swelling is normal, but a sudden increase in leg swelling or weight gain can be a sign of a dangerous complication of pregnancy  · Poor nutrition  · Thyroid disease  Medical treatment will depend on what is causing the swelling in your legs. Your healthcare provider may prescribe water pills (diuretics) to get rid of the extra fluid.  Home care  Follow these guidelines when caring for yourself at home:  · Don't wear clothing like garters that is tight on your  legs.  · Keep your legs up while lying or sitting.  · If infection, injury, or recent surgery is causing the swelling, stay off your legs as much as possible until symptoms get better.  · If your healthcare provider says that your leg swelling is caused by venous insufficiency or varicose veins, don't sit or  one place for long periods of time. Take breaks and walk about every few hours. Brisk walking is a good exercise. It helps circulate the blood that has collected in your leg. Talk with your provider about using support stockings to stop daytime leg swelling.  · If your provider says that heart disease is causing your leg swelling, follow a low-salt diet to stop extra fluid from staying in your body. You may also need medicine.  Follow-up care  Follow up with your healthcare provider, or as advised.  When to seek medical advice  Call your healthcare provider right away if any of these occur:  · New shortness of breath or chest pain  · Shortness of breath or chest pain that gets worse  · Swelling in both legs or ankles that gets worse  · Swelling of the abdomen  · Redness, warmth, or swelling in one leg  · Fever of 100.4ºF (38ºC) or higher, or as directed by your healthcare provider  · Yellow color to your skin or eyes  · Rapid, unexplained weight gain  · Having to sleep upright or use an increased number of pillows  Date Last Reviewed: 3/31/2016  © 2509-7155 [employer]. [address] [place] [address]. All rights reserved. This information is not intended as a substitute for professional medical care. Always follow your healthcare professional's instructions.

## 2018-03-31 NOTE — PROGRESS NOTES
Subjective:       Patient ID: Adelfo Rodriguez is a 77 y.o. male.    Vitals:    03/31/18 1059   BP: (!) 158/76   Pulse: 73   Resp: 18   Temp: 99 °F (37.2 °C)       Chief Complaint: Rash (both LOWER LEGS 2 DAYS NOW )    PATIENT REPORTS HAVE BEEN TREATED FOR LOWER EXTREMITY EDEMA WITH LASIX AND THEN CERTAIN CREAM SUCH AS TRIAMCINOLONE AND THEN THE 2 NEW RECENT ADDITIONS OVER THE LAST FEW DAYS WERE AMMONIUM LACTATE CREAM AND LOWER EXTREMITY COMPRESSION STOCKINGS. HE REPORTS THAT EVER SINCE WEARING THE STOCKING AS WELL AS THE OTHER CREAM HE HAS HAD INTENSE ITCHING, REDNESS, AND TOUGHENING OF THE SKIN. HE IS CONCERNED FOR HIVES HOWEVER IT DOESN'T LOOK HIVELIKE, BUT DOES LOOK LIKE A CONTACT DERMATITIS. TO NOTE THE ACTUAL EDEMA OF THE LEGS PER PATIENT AND OBJECTIVELY IS MUCH IMPROVED AND NO APPRECIABLE EDEMA IS SEEN ON TODAY'S EXAM. IT IS CLEAR HE HAS BEEN SCRATCHING. NO FEVER, NO STREAKING ERYTHEMA, NO BREAKS IN THE SKKIN, NO WEAPING. ALSO IMPORTANT TO NOTE IS THAT LABS AND WORKUP INCLUDING BILATERAL DOPPLER ULTRASOUND OF THE LOWER EXTREMITIES WHICH WAS NEGATIVE WITHIN THE LAST 2 WEEKS WHEN HE WAS HAVING SIMILAR SYMPTOMS.      Rash   This is a new problem. The current episode started yesterday. The affected locations include the left lower leg and right lower leg. The rash is characterized by itchiness, redness, swelling and pain. Pertinent negatives include no fever, joint pain, shortness of breath or sore throat. Past treatments include topical steroids. The treatment provided no relief. There is no history of asthma or eczema.     Review of Systems   Constitution: Negative for chills and fever.   HENT: Negative for sore throat.    Respiratory: Negative for shortness of breath.    Skin: Positive for dry skin, itching and rash.   Musculoskeletal: Negative for joint pain.       Objective:      Physical Exam   Constitutional: He is oriented to person, place, and time. He appears well-developed and well-nourished. He is  cooperative.  Non-toxic appearance. He does not appear ill. No distress.   HENT:   Head: Normocephalic and atraumatic.   Right Ear: Hearing, tympanic membrane, external ear and ear canal normal.   Left Ear: Hearing, tympanic membrane, external ear and ear canal normal.   Nose: Nose normal. No mucosal edema, rhinorrhea or nasal deformity. No epistaxis. Right sinus exhibits no maxillary sinus tenderness and no frontal sinus tenderness. Left sinus exhibits no maxillary sinus tenderness and no frontal sinus tenderness.   Mouth/Throat: Uvula is midline, oropharynx is clear and moist and mucous membranes are normal. No trismus in the jaw. Normal dentition. No uvula swelling. No posterior oropharyngeal erythema.   Eyes: Conjunctivae and lids are normal. No scleral icterus.   Sclera clear bilat   Neck: Trachea normal, full passive range of motion without pain and phonation normal. Neck supple.   Cardiovascular: Normal rate, regular rhythm, normal heart sounds, intact distal pulses and normal pulses.    Pulmonary/Chest: Effort normal and breath sounds normal. No respiratory distress.   Abdominal: Soft. Normal appearance and bowel sounds are normal. There is no tenderness.   Musculoskeletal: Normal range of motion. He exhibits no edema or deformity.   Neurological: He is alert and oriented to person, place, and time. He exhibits normal muscle tone.   Skin: Skin is warm, dry and intact. Rash noted. He is not diaphoretic. There is erythema. No pallor.   ERYTHEMATOUS RASH, RIGHT WORSE THAN LEFT, SLIGHT RAISED ON THE RIGHT, SPLOITCHY ERYTHEMA WITHOUT TTP, NO STREAKING CELLULITIS, NO EXUDATE, NO ABRASIONS, NO VESICLES. SMALL TINY AREAS OF SCAB FORMATION VERY TINY AND SUPERFICIAL WHERE HE HAS SCRATCHED AGGRESSIVELY. NO CALF PAIN, NO POPLITEAL PAIN.   Psychiatric: He has a normal mood and affect. His speech is normal and behavior is normal. Cognition and memory are normal.   Nursing note and vitals reviewed.      Assessment:        1. Allergic reaction, initial encounter    2. Contact dermatitis, unspecified contact dermatitis type, unspecified trigger    3. Bilateral lower extremity edema        Plan:       Adelfo was seen today for rash.    Diagnoses and all orders for this visit:    Allergic reaction, initial encounter    Contact dermatitis, unspecified contact dermatitis type, unspecified trigger  Comments:  MEDS VS STOCKINGS    Bilateral lower extremity edema    Other orders  -     betamethasone acetate-betamethasone sodium phosphate injection 6 mg; Inject 1 mL (6 mg total) into the muscle one time.  -     predniSONE (DELTASONE) 20 MG tablet; Take 2 tablets daily for 3 days  -     clindamycin (CLEOCIN) 300 MG capsule; Take 1 capsule (300 mg total) by mouth every 8 (eight) hours.          Patient Instructions   UNTIL THIS CLEARS AND FIGURE OUT EXACT CAUSE BY OTHER MEANS OF TESTING, PLEASE HOLD AMMONIUM LACTATE AND COMPRESSION STOCKINGS FOR NOW.    ELEVATE LEGS WHENEVER POSSIBLE    1 CC CELESTONE GIVEN IN CLINIC  PREDNISONE RX-START TOMORROW  OK TO CONTINUE TRIAMCINOLONE CREAM RX  LORATADINE DURING THE DAY AND BENADRYL AT NIGHT    CLINDAMYCIN RX-AS DISCUSSED, ONLY FILL THIS RX IF NOT MUCH IMPROVED IN 4-5 DAYS, IF HAVING FEVERS, SPREADING REDNESS, WARMTH, ANY OTHER SIGNS OF INFECTION AS DISCUSSED.      Contact Dermatitis  Contact dermatitis is a skin rash caused by something that touches the skin and makes it irritated and inflamed. Your skin may be red, swollen, dry, and may be cracked. Blisters may form and ooze. The rash will itch.  Contact dermatitis can form on the face and neck, backs of hands, forearms, genitals, and lower legs.  People can get contact dermatitis from lots of sources. These include:  · Plants such as poison ivy, oak, or sumac  · Chemicals in hair dyes and rinses, soaps, solvents, waxes, fingernail polish, and deodorants   · Jewelry or watchbands made of nickel  Contact dermatitis is not passed from person to  "person.  Talk with your healthcare provider about what may have caused the rash. A type of allergy testing called "patch testing" may be used to discover what you are allergic to. You will need to avoid the source of your rash in the future to prevent it from coming back.  Treatment is done to relieve itching and prevent the rash from coming back. The rash should go away in a few days to a few weeks.  Home care  Your healthcare provider may prescribe medicine to relieve swelling and itching. Follow all instructions when using these medicines.  General care:  · Avoid anything that heats up your skin, such as hot showers or baths, or direct sunlight. This can make itching worse.  · Apply cold compresses to soothe your sores to help relieve your symptoms. Do this for 30 minutes 3 to 4 times a day. You can make a cold compress by soaking a cloth in cold water. Squeeze out excess water. You can add colloidal oatmeal to the water to help reduce itching. For severe itching in a small area, apply an ice pack wrapped in a thin towel. Do this for 20 minutes 3 to 4 times a day.  · You can also try wet dressings. One way to do this is to wear a wet piece of clothing under a dry one. Wear a damp shirt under a dry shirt if your upper body is affected. This can relieve itching and prevent you from scratching the affected area.  · You can also help relieve large areas of itching by taking a lukewarm bath with colloidal oatmeal added to the water.  · Use hydrocortisone cream for redness and irritation, unless another medicine was prescribed. You can also use benzocaine anesthetic cream or spray. Calamine lotion can also relieve mild symptoms.  · Use oral diphenhydramine to help reduce itching. You can buy this antihistamine at drug and grocery stores. It can make you sleepy, so use lower doses during the daytime. Or you can use loratadine. This is an antihistamine that will not make you sleepy. Do not use diphenhydramine if you have " glaucoma or have trouble urinating due to an enlarged prostate.  · If a plant causes your rash, make sure to wash your skin and the clothes you were wearing when you came into contact with the plant. This is to wash away the plant oils that gave you the rash and prevent more or worse symptoms.  · Stay away from the substance or object that causes your symptoms. If you cant avoid it, wear gloves or some other type of protection.  Follow-up care  Follow up with your healthcare provider, or as advised.  When to seek medical advice  Call your healthcare provider right away if any of these occur:  · Spreading of the rash to other parts of your body  · Severe swelling of your face, eyelids, mouth, throat or tongue  · Trouble urinating due to swelling in the genital area  · Fever of 100.4°F (38°C) or higher  · Redness or swelling that gets worse  · Pain that gets worse  · Foul-smelling fluid leaking from the skin  · Yellow-brown crusts on the open blisters  Date Last Reviewed: 9/1/2016 © 2000-2017 [employer]. [address] [place] [address]. All rights reserved. This information is not intended as a substitute for professional medical care. Always follow your healthcare professional's instructions.        Leg Swelling in Both Legs    Swelling of the feet, ankles, and legs is called edema. It is caused by excess fluid that has collected in the tissues. Extra fluid in the body settles in the lowest part because of gravity. This is why the legs and feet are most affected.  Some of the causes for edema include:  · Disease of the heart like congestive heart failure  · Standing or sitting for long periods of time  · Infection of the feet or legs  · Blood pooling in the veins of your legs (venous insufficiency)  · Dilated veins in your lower leg (varicose veins)  · Garters or other clothing that is tight on your legs. This will cause blood to pool in your legs because the clothing limits blood  flow.  · Some medicines such as hormones like birth control pills, some blood pressure medicines like calcium channel blockers (amlodipine) and steroids, some antidepressants like MAO inhibitors and tricyclics  · Menstrual periods that cause you to retain fluids  · Many types of renal disease  · Liver failure or cirrhosis  · Pregnancy, some swelling is normal, but a sudden increase in leg swelling or weight gain can be a sign of a dangerous complication of pregnancy  · Poor nutrition  · Thyroid disease  Medical treatment will depend on what is causing the swelling in your legs. Your healthcare provider may prescribe water pills (diuretics) to get rid of the extra fluid.  Home care  Follow these guidelines when caring for yourself at home:  · Don't wear clothing like garters that is tight on your legs.  · Keep your legs up while lying or sitting.  · If infection, injury, or recent surgery is causing the swelling, stay off your legs as much as possible until symptoms get better.  · If your healthcare provider says that your leg swelling is caused by venous insufficiency or varicose veins, don't sit or  one place for long periods of time. Take breaks and walk about every few hours. Brisk walking is a good exercise. It helps circulate the blood that has collected in your leg. Talk with your provider about using support stockings to stop daytime leg swelling.  · If your provider says that heart disease is causing your leg swelling, follow a low-salt diet to stop extra fluid from staying in your body. You may also need medicine.  Follow-up care  Follow up with your healthcare provider, or as advised.  When to seek medical advice  Call your healthcare provider right away if any of these occur:  · New shortness of breath or chest pain  · Shortness of breath or chest pain that gets worse  · Swelling in both legs or ankles that gets worse  · Swelling of the abdomen  · Redness, warmth, or swelling in one leg  · Fever  of 100.4ºF (38ºC) or higher, or as directed by your healthcare provider  · Yellow color to your skin or eyes  · Rapid, unexplained weight gain  · Having to sleep upright or use an increased number of pillows  Date Last Reviewed: 3/31/2016  © 9592-2025 [email or web address]. [address] [place] [address], [place] [address]. All rights reserved. This information is not intended as a substitute for professional medical care. Always follow your healthcare professional's instructions.

## 2018-04-04 ENCOUNTER — TELEPHONE (OUTPATIENT)
Dept: URGENT CARE | Facility: CLINIC | Age: 78
End: 2018-04-04

## 2018-04-13 ENCOUNTER — LAB VISIT (OUTPATIENT)
Dept: LAB | Facility: HOSPITAL | Age: 78
End: 2018-04-13
Attending: INTERNAL MEDICINE
Payer: MEDICARE

## 2018-04-13 DIAGNOSIS — M1A.9XX1 CHRONIC TOPHACEOUS GOUT: ICD-10-CM

## 2018-04-13 LAB
ALBUMIN SERPL BCP-MCNC: 3.8 G/DL
ALP SERPL-CCNC: 93 U/L
ALT SERPL W/O P-5'-P-CCNC: 30 U/L
ANION GAP SERPL CALC-SCNC: 8 MMOL/L
AST SERPL-CCNC: 22 U/L
BASOPHILS # BLD AUTO: 0.08 K/UL
BASOPHILS NFR BLD: 1.2 %
BILIRUB SERPL-MCNC: 0.5 MG/DL
BUN SERPL-MCNC: 44 MG/DL
CALCIUM SERPL-MCNC: 9.6 MG/DL
CHLORIDE SERPL-SCNC: 102 MMOL/L
CO2 SERPL-SCNC: 24 MMOL/L
CREAT SERPL-MCNC: 2.1 MG/DL
DIFFERENTIAL METHOD: ABNORMAL
EOSINOPHIL # BLD AUTO: 0.4 K/UL
EOSINOPHIL NFR BLD: 6.7 %
ERYTHROCYTE [DISTWIDTH] IN BLOOD BY AUTOMATED COUNT: 12.5 %
EST. GFR  (AFRICAN AMERICAN): 34.1 ML/MIN/1.73 M^2
EST. GFR  (NON AFRICAN AMERICAN): 29.5 ML/MIN/1.73 M^2
GLUCOSE SERPL-MCNC: 100 MG/DL
HCT VFR BLD AUTO: 36.1 %
HGB BLD-MCNC: 11.6 G/DL
IMM GRANULOCYTES # BLD AUTO: 0.04 K/UL
IMM GRANULOCYTES NFR BLD AUTO: 0.6 %
LYMPHOCYTES # BLD AUTO: 1.9 K/UL
LYMPHOCYTES NFR BLD: 29.6 %
MCH RBC QN AUTO: 31 PG
MCHC RBC AUTO-ENTMCNC: 32.1 G/DL
MCV RBC AUTO: 97 FL
MONOCYTES # BLD AUTO: 0.9 K/UL
MONOCYTES NFR BLD: 13.6 %
NEUTROPHILS # BLD AUTO: 3.1 K/UL
NEUTROPHILS NFR BLD: 48.3 %
NRBC BLD-RTO: 0 /100 WBC
PLATELET # BLD AUTO: 214 K/UL
PMV BLD AUTO: 10.7 FL
POTASSIUM SERPL-SCNC: 4.5 MMOL/L
PROT SERPL-MCNC: 6.7 G/DL
RBC # BLD AUTO: 3.74 M/UL
SODIUM SERPL-SCNC: 134 MMOL/L
URATE SERPL-MCNC: 5.6 MG/DL
WBC # BLD AUTO: 6.42 K/UL

## 2018-04-13 PROCEDURE — 80053 COMPREHEN METABOLIC PANEL: CPT

## 2018-04-13 PROCEDURE — 85025 COMPLETE CBC W/AUTO DIFF WBC: CPT

## 2018-04-13 PROCEDURE — 84550 ASSAY OF BLOOD/URIC ACID: CPT

## 2018-04-13 PROCEDURE — 36415 COLL VENOUS BLD VENIPUNCTURE: CPT | Mod: PO

## 2018-04-16 ENCOUNTER — PATIENT MESSAGE (OUTPATIENT)
Dept: INTERNAL MEDICINE | Facility: CLINIC | Age: 78
End: 2018-04-16

## 2018-04-17 ENCOUNTER — OFFICE VISIT (OUTPATIENT)
Dept: RHEUMATOLOGY | Facility: CLINIC | Age: 78
End: 2018-04-17
Payer: MEDICARE

## 2018-04-17 ENCOUNTER — OFFICE VISIT (OUTPATIENT)
Dept: OPTOMETRY | Facility: CLINIC | Age: 78
End: 2018-04-17
Payer: MEDICARE

## 2018-04-17 VITALS
HEART RATE: 64 BPM | HEIGHT: 67 IN | SYSTOLIC BLOOD PRESSURE: 130 MMHG | DIASTOLIC BLOOD PRESSURE: 58 MMHG | BODY MASS INDEX: 35 KG/M2 | WEIGHT: 223 LBS

## 2018-04-17 DIAGNOSIS — H25.13 NUCLEAR SCLEROSIS, BILATERAL: Primary | ICD-10-CM

## 2018-04-17 DIAGNOSIS — I10 ESSENTIAL HYPERTENSION: ICD-10-CM

## 2018-04-17 DIAGNOSIS — M1A.9XX1 CHRONIC TOPHACEOUS GOUT: Primary | ICD-10-CM

## 2018-04-17 DIAGNOSIS — H52.13 MYOPIA WITH ASTIGMATISM AND PRESBYOPIA, BILATERAL: ICD-10-CM

## 2018-04-17 DIAGNOSIS — I10 ESSENTIAL HYPERTENSION: Chronic | ICD-10-CM

## 2018-04-17 DIAGNOSIS — N18.30 CKD (CHRONIC KIDNEY DISEASE) STAGE 3, GFR 30-59 ML/MIN: Chronic | ICD-10-CM

## 2018-04-17 DIAGNOSIS — H52.4 MYOPIA WITH ASTIGMATISM AND PRESBYOPIA, BILATERAL: ICD-10-CM

## 2018-04-17 DIAGNOSIS — H52.203 MYOPIA WITH ASTIGMATISM AND PRESBYOPIA, BILATERAL: ICD-10-CM

## 2018-04-17 DIAGNOSIS — E66.9 OBESITY (BMI 30-39.9): ICD-10-CM

## 2018-04-17 PROCEDURE — 99999 PR PBB SHADOW E&M-EST. PATIENT-LVL II: CPT | Mod: PBBFAC,,, | Performed by: OPTOMETRIST

## 2018-04-17 PROCEDURE — 3075F SYST BP GE 130 - 139MM HG: CPT | Mod: CPTII,S$GLB,, | Performed by: INTERNAL MEDICINE

## 2018-04-17 PROCEDURE — 99999 PR PBB SHADOW E&M-EST. PATIENT-LVL III: CPT | Mod: PBBFAC,,, | Performed by: INTERNAL MEDICINE

## 2018-04-17 PROCEDURE — 99213 OFFICE O/P EST LOW 20 MIN: CPT | Mod: S$GLB,,, | Performed by: INTERNAL MEDICINE

## 2018-04-17 PROCEDURE — 3078F DIAST BP <80 MM HG: CPT | Mod: CPTII,S$GLB,, | Performed by: INTERNAL MEDICINE

## 2018-04-17 PROCEDURE — 92014 COMPRE OPH EXAM EST PT 1/>: CPT | Mod: S$GLB,,, | Performed by: OPTOMETRIST

## 2018-04-17 RX ORDER — DESOXIMETASONE 2.5 MG/G
CREAM TOPICAL 2 TIMES DAILY
COMMUNITY
End: 2018-10-24

## 2018-04-17 ASSESSMENT — ROUTINE ASSESSMENT OF PATIENT INDEX DATA (RAPID3)
MDHAQ FUNCTION SCORE: 0
TOTAL RAPID3 SCORE: .67
AM STIFFNESS SCORE: 0, NO
PSYCHOLOGICAL DISTRESS SCORE: 0
PATIENT GLOBAL ASSESSMENT SCORE: 2
PAIN SCORE: 0
FATIGUE SCORE: 0

## 2018-04-17 NOTE — PROGRESS NOTES
HPI     Last eye exam was 2/6/17 with Dr. Galan.  Patient states tries not wearing glasses as much as possible. Unsure if rx   has changed-didn't update after last exam.  Patient denies diplopia, headaches, flashes/floaters, and pain.      Last edited by Linda Potter on 4/17/2018 10:28 AM. (History)            Assessment /Plan     For exam results, see Encounter Report.    Nuclear sclerosis, bilateral    Essential hypertension    Myopia with astigmatism and presbyopia, bilateral            1.  Educated on cataracts and affects on vision.  Monitor.  2.  No retinopathy--monitor yearly.  BP control.  3.  Continue w/ current rx            RTC 1 year for routine exam.

## 2018-04-17 NOTE — PROGRESS NOTES
"Subjective:       Patient ID: Adelfo Rodriguez is a 77 y.o. male.    Chief Complaint: No chief complaint on file.    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  Patient on colchicine 0.6 mg and febuxostat 40 mg   Lat uric acid 5.6   Stable, no flares, no joint swelling/pain  Exercises 4-6/week for 20 min at least  Blood pressure controled with valsartan-HCTZ  Started on Lasix for lower extremities edema   Cr mildly elevated from baseline.  Developed contact dermatitis from compression socks.      Review of Systems   Constitutional: Negative for activity change, fatigue and fever.   HENT: Negative for congestion and sore throat.    Eyes: Negative for pain, discharge and itching.   Respiratory: Negative for chest tightness and shortness of breath.    Cardiovascular: Positive for leg swelling. Negative for chest pain and palpitations.   Gastrointestinal: Negative for abdominal pain, diarrhea and nausea.   Endocrine: Negative for cold intolerance and heat intolerance.   Genitourinary: Negative for dysuria and flank pain.   Musculoskeletal: Negative for arthralgias and gait problem.   Skin: Positive for rash (contact dermatitis, LE improving ). Negative for color change.   Neurological: Negative for dizziness and headaches.   Hematological: Negative for adenopathy. Does not bruise/bleed easily.   Psychiatric/Behavioral: Negative for agitation and confusion.         Objective:   BP (!) 130/58   Pulse 64   Ht 5' 7.2" (1.707 m)   Wt 101.2 kg (223 lb)   BMI 34.72 kg/m²      Physical Exam   Vitals reviewed.  Constitutional: He is oriented to person, place, and time. No distress.   obese    HENT:   Head: Normocephalic and atraumatic.   Eyes: Conjunctivae are normal. No scleral icterus.   Cardiovascular: Normal rate and regular rhythm.    No murmur heard.  Pulmonary/Chest: Effort normal and breath sounds normal. No respiratory distress.   Abdominal: Soft. Bowel sounds are " normal. He exhibits distension. There is no tenderness.       Right Side Rheumatological Exam     Examination finds the shoulder, elbow, wrist, knee, 1st PIP, 1st MCP, 2nd PIP, 2nd MCP, 3rd PIP, 3rd MCP, 4th PIP, 4th MCP, 5th PIP and 5th MCP normal.    Muscle Strength (0-5 scale):  Deltoid:  5  Biceps: 5/5   Triceps:  5  : 5/5     Left Side Rheumatological Exam     Examination finds the shoulder, elbow, wrist, knee, 1st PIP, 1st MCP, 2nd PIP, 2nd MCP, 3rd PIP, 3rd MCP, 4th PIP, 4th MCP, 5th PIP and 5th MCP normal.    Muscle Strength (0-5 scale):  Deltoid:  5  Biceps: 5/5   Triceps:  5  :  5/5       Neurological: He is alert and oriented to person, place, and time.   Skin: Rash (contact dermatitis) noted. He is not diaphoretic.     Psychiatric: Mood, memory, affect and judgment normal.   Musculoskeletal: He exhibits edema (LE +2). He exhibits no tenderness or deformity.           Assessment:       1. Chronic tophaceous gout    2. CKD (chronic kidney disease) stage 3, GFR 30-59 ml/min    3. Obesity (BMI 30-39.9)    4. Essential hypertension            Plan:     - Continue current treatment for gout.    - Continue exercising and weight loss.    - Labs : Uric acid and CMP in 4 weeks.    - RTC 3 months.

## 2018-04-23 ENCOUNTER — OFFICE VISIT (OUTPATIENT)
Dept: INTERNAL MEDICINE | Facility: CLINIC | Age: 78
End: 2018-04-23
Payer: MEDICARE

## 2018-04-23 VITALS
BODY MASS INDEX: 35.22 KG/M2 | WEIGHT: 224.44 LBS | SYSTOLIC BLOOD PRESSURE: 135 MMHG | HEART RATE: 78 BPM | DIASTOLIC BLOOD PRESSURE: 53 MMHG | TEMPERATURE: 99 F | OXYGEN SATURATION: 98 % | HEIGHT: 67 IN

## 2018-04-23 DIAGNOSIS — E78.5 HYPERLIPIDEMIA, UNSPECIFIED HYPERLIPIDEMIA TYPE: ICD-10-CM

## 2018-04-23 DIAGNOSIS — N18.30 CKD (CHRONIC KIDNEY DISEASE) STAGE 3, GFR 30-59 ML/MIN: Chronic | ICD-10-CM

## 2018-04-23 DIAGNOSIS — K63.5 POLYP OF COLON, UNSPECIFIED PART OF COLON, UNSPECIFIED TYPE: ICD-10-CM

## 2018-04-23 DIAGNOSIS — R35.1 NOCTURIA: ICD-10-CM

## 2018-04-23 DIAGNOSIS — R60.9 EDEMA, UNSPECIFIED TYPE: Primary | ICD-10-CM

## 2018-04-23 DIAGNOSIS — Z12.11 ENCOUNTER FOR SCREENING COLONOSCOPY: ICD-10-CM

## 2018-04-23 DIAGNOSIS — J45.20 INTERMITTENT ASTHMA WITHOUT COMPLICATION, UNSPECIFIED ASTHMA SEVERITY: ICD-10-CM

## 2018-04-23 DIAGNOSIS — G47.33 OSA (OBSTRUCTIVE SLEEP APNEA): ICD-10-CM

## 2018-04-23 DIAGNOSIS — I10 ESSENTIAL HYPERTENSION: Chronic | ICD-10-CM

## 2018-04-23 PROCEDURE — 99999 PR PBB SHADOW E&M-EST. PATIENT-LVL III: CPT | Mod: PBBFAC,,, | Performed by: INTERNAL MEDICINE

## 2018-04-23 PROCEDURE — 3078F DIAST BP <80 MM HG: CPT | Mod: CPTII,S$GLB,, | Performed by: INTERNAL MEDICINE

## 2018-04-23 PROCEDURE — 99213 OFFICE O/P EST LOW 20 MIN: CPT | Mod: S$GLB,,, | Performed by: INTERNAL MEDICINE

## 2018-04-23 PROCEDURE — 3075F SYST BP GE 130 - 139MM HG: CPT | Mod: CPTII,S$GLB,, | Performed by: INTERNAL MEDICINE

## 2018-04-23 RX ORDER — FUROSEMIDE 40 MG/1
40 TABLET ORAL DAILY
Qty: 90 TABLET | Refills: 3 | Status: SHIPPED | OUTPATIENT
Start: 2018-04-23 | End: 2018-06-15 | Stop reason: SDUPTHER

## 2018-04-25 DIAGNOSIS — Z12.11 SPECIAL SCREENING FOR MALIGNANT NEOPLASMS, COLON: Primary | ICD-10-CM

## 2018-04-25 RX ORDER — POLYETHYLENE GLYCOL 3350, SODIUM SULFATE ANHYDROUS, SODIUM BICARBONATE, SODIUM CHLORIDE, POTASSIUM CHLORIDE 236; 22.74; 6.74; 5.86; 2.97 G/4L; G/4L; G/4L; G/4L; G/4L
4 POWDER, FOR SOLUTION ORAL ONCE
Qty: 4000 ML | Refills: 0 | Status: SHIPPED | OUTPATIENT
Start: 2018-04-25 | End: 2018-04-25

## 2018-04-30 NOTE — PROGRESS NOTES
Subjective:       Patient ID: Adelfo Rodriguez is a 77 y.o. male.    Chief Complaint: Follow-up (1 month)    HPI   The patient presents for follow-up of lower extremity edema and recent cellulitis treatment.  The patient complaining intermittent antibiotic therapy and has noted improvement in the appearance of both legs.  Leg edema has also diminished.  He is currently using his desoximetasone in place of triamcinolone for skin irritation.  The patient decreased his use of Lasix due to complaints of vertigo.  He is now taking one 40 mg tablet daily since 4/11/18.  He has been walking regularly for exercise.  He has not expressed any wheezing or severe cough.  A recent blood pressure reading was 105/51.  He has been intolerant of compression stockings as these appear to produce a contact dermatitis.  He was evaluated by another dermatologist who prescribed a different steroid cream as noted above.    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    The patient needs a follow-up colonoscopy for surveillance of colon polyps.  Blood testing for follow-up of renal function is needed for follow-up of chronic kidney disease.    Review of Systems   Constitutional: Negative for activity change, appetite change, fatigue and unexpected weight change.   HENT: Negative for sinus pressure and sore throat.    Eyes: Negative for visual disturbance.   Respiratory: Negative for cough, chest tightness, shortness of breath and wheezing.    Cardiovascular: Positive for leg swelling. Negative for chest pain and palpitations.   Gastrointestinal: Negative for abdominal pain, blood in stool, nausea and vomiting.   Genitourinary: Negative for dysuria, hematuria and urgency.   Musculoskeletal: Negative for arthralgias, back pain, gait problem, joint swelling, myalgias and neck stiffness.   Skin: Positive for rash. Negative for color change.   Neurological: Negative for dizziness, syncope, weakness,  light-headedness, numbness and headaches.   Psychiatric/Behavioral: Negative for sleep disturbance.       Objective:      Physical Exam   Constitutional: He is oriented to person, place, and time. He appears well-developed and well-nourished. No distress.   HENT:   Head: Normocephalic and atraumatic.   Eyes: Conjunctivae and EOM are normal. No scleral icterus.   Neck: Normal range of motion. Neck supple. No JVD present. No thyromegaly present.   Cardiovascular: Normal rate, regular rhythm, normal heart sounds and intact distal pulses.  Exam reveals no gallop and no friction rub.    No murmur heard.  No pitting edema is present.   Pulmonary/Chest: Effort normal and breath sounds normal. No respiratory distress. He has no wheezes. He has no rales.   Abdominal: Soft. Bowel sounds are normal. He exhibits no mass. There is no tenderness.   Musculoskeletal: Normal range of motion. He exhibits no tenderness.   Lymphadenopathy:     He has no cervical adenopathy.   Neurological: He is alert and oriented to person, place, and time.   Gait is normal.   Skin: Skin is warm and dry. No rash noted.   No severe erythema or scaling of the pretibial areas is noted at this time.   Nursing note and vitals reviewed.      Assessment:       1. Edema, unspecified type    2. Essential hypertension    3. Intermittent asthma without complication, unspecified asthma severity    4. CKD (chronic kidney disease) stage 3, GFR 30-59 ml/min    5. ANNABELLE (obstructive sleep apnea)    6. Polyp of colon, unspecified part of colon, unspecified type    7. Encounter for screening colonoscopy    8. Hyperlipidemia, unspecified hyperlipidemia type    9. Nocturia        Plan:       Adelfo was seen today for follow-up.  The patient will continue cerebellitis lotion.  Screening colonoscopy will be ordered.  Lasix prescription will be renewed.  The patient will follow-up with his nephrologist as advised.  Fasting blood tests and a follow-up visit in 4 months will be  obtained.    Diagnoses and all orders for this visit:    Edema, unspecified type    Essential hypertension  -     CBC auto differential; Future  -     TSH; Future  -     Urinalysis; Future    Intermittent asthma without complication, unspecified asthma severity    CKD (chronic kidney disease) stage 3, GFR 30-59 ml/min    ANNABELLE (obstructive sleep apnea)    Polyp of colon, unspecified part of colon, unspecified type  -     Case request GI: COLONOSCOPY    Encounter for screening colonoscopy  -     Case request GI: COLONOSCOPY    Hyperlipidemia, unspecified hyperlipidemia type  -     Comprehensive metabolic panel; Future  -     Lipid panel; Future    Nocturia  -     Prostate Specific Antigen, Diagnostic; Future    Other orders  -     furosemide (LASIX) 40 MG tablet; Take 1 tablet (40 mg total) by mouth once daily.

## 2018-05-15 ENCOUNTER — LAB VISIT (OUTPATIENT)
Dept: LAB | Facility: HOSPITAL | Age: 78
End: 2018-05-15
Attending: INTERNAL MEDICINE
Payer: MEDICARE

## 2018-05-15 ENCOUNTER — TELEPHONE (OUTPATIENT)
Dept: RHEUMATOLOGY | Facility: CLINIC | Age: 78
End: 2018-05-15

## 2018-05-15 DIAGNOSIS — M1A.9XX1 CHRONIC TOPHACEOUS GOUT: ICD-10-CM

## 2018-05-15 LAB
ALBUMIN SERPL BCP-MCNC: 3.9 G/DL
ALP SERPL-CCNC: 83 U/L
ALT SERPL W/O P-5'-P-CCNC: 22 U/L
ANION GAP SERPL CALC-SCNC: 9 MMOL/L
AST SERPL-CCNC: 22 U/L
BILIRUB SERPL-MCNC: 0.6 MG/DL
BUN SERPL-MCNC: 47 MG/DL
CALCIUM SERPL-MCNC: 9.6 MG/DL
CHLORIDE SERPL-SCNC: 96 MMOL/L
CO2 SERPL-SCNC: 26 MMOL/L
CREAT SERPL-MCNC: 2.2 MG/DL
EST. GFR  (AFRICAN AMERICAN): 32.2 ML/MIN/1.73 M^2
EST. GFR  (NON AFRICAN AMERICAN): 27.9 ML/MIN/1.73 M^2
GLUCOSE SERPL-MCNC: 101 MG/DL
POTASSIUM SERPL-SCNC: 4.2 MMOL/L
PROT SERPL-MCNC: 6.9 G/DL
SODIUM SERPL-SCNC: 131 MMOL/L
URATE SERPL-MCNC: 6.5 MG/DL

## 2018-05-15 PROCEDURE — 80053 COMPREHEN METABOLIC PANEL: CPT

## 2018-05-15 PROCEDURE — 36415 COLL VENOUS BLD VENIPUNCTURE: CPT | Mod: PO

## 2018-05-15 PROCEDURE — 84550 ASSAY OF BLOOD/URIC ACID: CPT

## 2018-05-15 RX ORDER — FEBUXOSTAT 80 MG/1
80 TABLET, FILM COATED ORAL DAILY
Qty: 90 TABLET | Refills: 1 | Status: SHIPPED | OUTPATIENT
Start: 2018-05-15 | End: 2018-10-01 | Stop reason: SDUPTHER

## 2018-05-15 NOTE — TELEPHONE ENCOUNTER
Please tell pt the uric acid is 6.5 which is still above goal. Suggest increasing febuxostat to 80mg daily(he can take two 40mg tabs until gone, and I will sent new Rx to [facility] for the 80mg tab to be taken once daily. Please schedule repeat uric acid and cmp in 4 wks. Thanks KAREN

## 2018-05-16 RX ORDER — FLUTICASONE PROPIONATE AND SALMETEROL 50; 250 UG/1; UG/1
POWDER RESPIRATORY (INHALATION)
Qty: 60 EACH | Refills: 5 | Status: SHIPPED | OUTPATIENT
Start: 2018-05-16 | End: 2018-11-03 | Stop reason: SDUPTHER

## 2018-05-17 ENCOUNTER — PATIENT MESSAGE (OUTPATIENT)
Dept: RHEUMATOLOGY | Facility: CLINIC | Age: 78
End: 2018-05-17

## 2018-05-17 ENCOUNTER — TELEPHONE (OUTPATIENT)
Dept: RHEUMATOLOGY | Facility: CLINIC | Age: 78
End: 2018-05-17

## 2018-06-12 ENCOUNTER — PATIENT MESSAGE (OUTPATIENT)
Dept: RHEUMATOLOGY | Facility: CLINIC | Age: 78
End: 2018-06-12

## 2018-06-12 ENCOUNTER — TELEPHONE (OUTPATIENT)
Dept: RHEUMATOLOGY | Facility: CLINIC | Age: 78
End: 2018-06-12

## 2018-06-12 ENCOUNTER — LAB VISIT (OUTPATIENT)
Dept: LAB | Facility: HOSPITAL | Age: 78
End: 2018-06-12
Attending: INTERNAL MEDICINE
Payer: MEDICARE

## 2018-06-12 DIAGNOSIS — N18.30 CKD (CHRONIC KIDNEY DISEASE) STAGE 3, GFR 30-59 ML/MIN: Chronic | ICD-10-CM

## 2018-06-12 DIAGNOSIS — D50.9 IRON DEFICIENCY ANEMIA, UNSPECIFIED IRON DEFICIENCY ANEMIA TYPE: Primary | ICD-10-CM

## 2018-06-12 DIAGNOSIS — M1A.9XX1 CHRONIC TOPHACEOUS GOUT: ICD-10-CM

## 2018-06-12 LAB
ALBUMIN SERPL BCP-MCNC: 3.9 G/DL
ANION GAP SERPL CALC-SCNC: 9 MMOL/L
BASOPHILS # BLD AUTO: 0.06 K/UL
BASOPHILS NFR BLD: 1 %
BUN SERPL-MCNC: 50 MG/DL
CALCIUM SERPL-MCNC: 9.6 MG/DL
CHLORIDE SERPL-SCNC: 94 MMOL/L
CO2 SERPL-SCNC: 26 MMOL/L
CREAT SERPL-MCNC: 2.3 MG/DL
DIFFERENTIAL METHOD: ABNORMAL
EOSINOPHIL # BLD AUTO: 0.4 K/UL
EOSINOPHIL NFR BLD: 6.5 %
ERYTHROCYTE [DISTWIDTH] IN BLOOD BY AUTOMATED COUNT: 12.4 %
EST. GFR  (AFRICAN AMERICAN): 30.5 ML/MIN/1.73 M^2
EST. GFR  (NON AFRICAN AMERICAN): 26.4 ML/MIN/1.73 M^2
GLUCOSE SERPL-MCNC: 92 MG/DL
HCT VFR BLD AUTO: 33.1 %
HGB BLD-MCNC: 11.2 G/DL
IMM GRANULOCYTES # BLD AUTO: 0.03 K/UL
IMM GRANULOCYTES NFR BLD AUTO: 0.5 %
LYMPHOCYTES # BLD AUTO: 1 K/UL
LYMPHOCYTES NFR BLD: 16.9 %
MCH RBC QN AUTO: 32.1 PG
MCHC RBC AUTO-ENTMCNC: 33.8 G/DL
MCV RBC AUTO: 95 FL
MONOCYTES # BLD AUTO: 1 K/UL
MONOCYTES NFR BLD: 16.4 %
NEUTROPHILS # BLD AUTO: 3.5 K/UL
NEUTROPHILS NFR BLD: 58.7 %
NRBC BLD-RTO: 0 /100 WBC
PHOSPHATE SERPL-MCNC: 3.9 MG/DL
PLATELET # BLD AUTO: 210 K/UL
PMV BLD AUTO: 11.6 FL
POTASSIUM SERPL-SCNC: 4.6 MMOL/L
RBC # BLD AUTO: 3.49 M/UL
SODIUM SERPL-SCNC: 129 MMOL/L
URATE SERPL-MCNC: 4.5 MG/DL
WBC # BLD AUTO: 6.02 K/UL

## 2018-06-12 PROCEDURE — 80069 RENAL FUNCTION PANEL: CPT

## 2018-06-12 PROCEDURE — 84550 ASSAY OF BLOOD/URIC ACID: CPT

## 2018-06-12 PROCEDURE — 85025 COMPLETE CBC W/AUTO DIFF WBC: CPT

## 2018-06-12 PROCEDURE — 36415 COLL VENOUS BLD VENIPUNCTURE: CPT | Mod: PO

## 2018-06-13 ENCOUNTER — TELEPHONE (OUTPATIENT)
Dept: RHEUMATOLOGY | Facility: CLINIC | Age: 78
End: 2018-06-13

## 2018-06-13 ENCOUNTER — TELEPHONE (OUTPATIENT)
Dept: NEPHROLOGY | Facility: CLINIC | Age: 78
End: 2018-06-13

## 2018-06-13 DIAGNOSIS — N18.30 CKD (CHRONIC KIDNEY DISEASE) STAGE 3, GFR 30-59 ML/MIN: Primary | Chronic | ICD-10-CM

## 2018-06-15 ENCOUNTER — TELEPHONE (OUTPATIENT)
Dept: INTERNAL MEDICINE | Facility: CLINIC | Age: 78
End: 2018-06-15

## 2018-06-15 RX ORDER — FUROSEMIDE 40 MG/1
TABLET ORAL
Qty: 90 TABLET | Refills: 3
Start: 2018-06-15 | End: 2019-05-28 | Stop reason: SDUPTHER

## 2018-06-15 NOTE — TELEPHONE ENCOUNTER
The patient is also using furosemide 40 mg once daily.  He has been using this medication for management of leg edema.  However, he has been exercising vigorously 4 days a week.  His blood pressures have ranged from 102-120 systolic.  At this juncture, we will discontinue valsartan/ HCTZ.  He will continue to monitor his blood pressures.  Valsartan 320 mg can be ordered if blood pressures increase.  The patient was also encouraged to limit use of furosemide to twice a week.   He has already been advised to increase his oral fluid intake.

## 2018-06-15 NOTE — TELEPHONE ENCOUNTER
----- Message from Lisa Graf MD sent at 6/12/2018  7:05 PM CDT -----  Dr. Jasso, several months ago when I saw him, he had expressed he wanted to keep following with you given CKD level staying around the same. I was not aware he was coming back for follow up with me again. Labs obtained recently do show worsening renal function along with hyponatremia, hypochloremia and pre renal azotemia. My suggestion would be to hold HCTZ and increase fluid intake and repeat labs in 2 weeks. I just wanted to discuss this with you first. Thanks, Lisa Graf

## 2018-06-18 ENCOUNTER — ANESTHESIA EVENT (OUTPATIENT)
Dept: ENDOSCOPY | Facility: HOSPITAL | Age: 78
End: 2018-06-18
Payer: MEDICARE

## 2018-06-18 NOTE — ANESTHESIA PREPROCEDURE EVALUATION
Past Medical History:   Diagnosis Date    ALLERGIC RHINITIS     Asthma     Hyperlipidemia     Hypertension     NAFLD (nonalcoholic fatty liver disease)     ANNABELLE (obstructive sleep apnea)     on CPAP     Past Surgical History:   Procedure Laterality Date    APPENDECTOMY      FOOT SURGERY      testicular biopsy       Patient Active Problem List   Diagnosis    Asthma, intermittent    Essential hypertension    Hyperlipidemia    ANNABELLE (obstructive sleep apnea)    Chronic rhinitis    Back pain    Chronic tophaceous gout    Obesity (BMI 30-39.9)    NAFLD (nonalcoholic fatty liver disease)    Pancreatic cyst    Impingement syndrome of right shoulder    CKD (chronic kidney disease) stage 3, GFR 30-59 ml/min    Hypertensive kidney disease    Chronic right shoulder pain    Secondary hyperparathyroidism    Chronic anemia    Colon polyps     2D Echo:  No results found for this or any previous visit.    Please See ROS/PMH and Active Problem List above                                                                                                               06/18/2018  Adelfo Rodriguez is a 77 y.o., male.    Anesthesia Evaluation         Review of Systems  Cardiovascular:   Hypertension   Pulmonary:   Asthma moderate Sleep Apnea    Renal/:   Chronic Renal Disease, CRI    Hepatic/GI:   Liver Disease,           Anesthesia Plan  Type of Anesthesia, risks & benefits discussed:  Anesthesia Type:  general  Patient's Preference: GENERAL  Intra-op Monitoring Plan: standard ASA monitors  Intra-op Monitoring Plan Comments:   Post Op Pain Control Plan:   Post Op Pain Control Plan Comments:   Induction:   IV  Beta Blocker:         Informed Consent: Patient understands risks and agrees with Anesthesia plan.  Questions answered. Anesthesia consent signed with patient.  ASA Score: 3     Day of Surgery Review of History & Physical: I have interviewed and examined the patient. I have reviewed the patient's H&P dated:   There are no significant changes.  H&P update referred to the surgeon.         Ready For Surgery From Anesthesia Perspective.

## 2018-06-19 ENCOUNTER — ANESTHESIA (OUTPATIENT)
Dept: ENDOSCOPY | Facility: HOSPITAL | Age: 78
End: 2018-06-19
Payer: MEDICARE

## 2018-06-19 ENCOUNTER — HOSPITAL ENCOUNTER (OUTPATIENT)
Facility: HOSPITAL | Age: 78
Discharge: HOME | End: 2018-06-19
Attending: COLON & RECTAL SURGERY | Admitting: COLON & RECTAL SURGERY
Payer: MEDICARE

## 2018-06-19 ENCOUNTER — SURGERY (OUTPATIENT)
Age: 78
End: 2018-06-19

## 2018-06-19 VITALS
HEIGHT: 70 IN | TEMPERATURE: 98 F | SYSTOLIC BLOOD PRESSURE: 148 MMHG | BODY MASS INDEX: 31.5 KG/M2 | OXYGEN SATURATION: 98 % | WEIGHT: 220 LBS | RESPIRATION RATE: 12 BRPM | HEART RATE: 76 BPM | DIASTOLIC BLOOD PRESSURE: 70 MMHG

## 2018-06-19 DIAGNOSIS — Z12.11 SCREENING FOR COLON CANCER: ICD-10-CM

## 2018-06-19 DIAGNOSIS — Z86.010 HISTORY OF COLON POLYPS: Primary | ICD-10-CM

## 2018-06-19 PROCEDURE — 37000009 HC ANESTHESIA EA ADD 15 MINS: Performed by: COLON & RECTAL SURGERY

## 2018-06-19 PROCEDURE — 63600175 PHARM REV CODE 636 W HCPCS: Performed by: NURSE ANESTHETIST, CERTIFIED REGISTERED

## 2018-06-19 PROCEDURE — 25000003 PHARM REV CODE 250: Performed by: NURSE PRACTITIONER

## 2018-06-19 PROCEDURE — 37000008 HC ANESTHESIA 1ST 15 MINUTES: Performed by: COLON & RECTAL SURGERY

## 2018-06-19 PROCEDURE — G0105 COLORECTAL SCRN; HI RISK IND: HCPCS | Mod: ,,, | Performed by: COLON & RECTAL SURGERY

## 2018-06-19 PROCEDURE — G0105 COLORECTAL SCRN; HI RISK IND: HCPCS | Performed by: COLON & RECTAL SURGERY

## 2018-06-19 PROCEDURE — E9220 PRA ENDO ANESTHESIA: HCPCS | Mod: ,,, | Performed by: NURSE ANESTHETIST, CERTIFIED REGISTERED

## 2018-06-19 RX ORDER — PROPOFOL 10 MG/ML
VIAL (ML) INTRAVENOUS
Status: DISCONTINUED | OUTPATIENT
Start: 2018-06-19 | End: 2018-06-19

## 2018-06-19 RX ORDER — LIDOCAINE HCL/PF 100 MG/5ML
SYRINGE (ML) INTRAVENOUS
Status: DISCONTINUED | OUTPATIENT
Start: 2018-06-19 | End: 2018-06-19

## 2018-06-19 RX ORDER — PROPOFOL 10 MG/ML
VIAL (ML) INTRAVENOUS CONTINUOUS PRN
Status: DISCONTINUED | OUTPATIENT
Start: 2018-06-19 | End: 2018-06-19

## 2018-06-19 RX ORDER — SODIUM CHLORIDE 9 MG/ML
INJECTION, SOLUTION INTRAVENOUS CONTINUOUS
Status: DISCONTINUED | OUTPATIENT
Start: 2018-06-19 | End: 2018-06-19 | Stop reason: HOSPADM

## 2018-06-19 RX ADMIN — LIDOCAINE HYDROCHLORIDE 50 MG: 20 INJECTION, SOLUTION INTRAVENOUS at 10:06

## 2018-06-19 RX ADMIN — SODIUM CHLORIDE: 0.9 INJECTION, SOLUTION INTRAVENOUS at 10:06

## 2018-06-19 RX ADMIN — PROPOFOL 100 MG: 10 INJECTION, EMULSION INTRAVENOUS at 10:06

## 2018-06-19 RX ADMIN — PROPOFOL 150 MCG/KG/MIN: 10 INJECTION, EMULSION INTRAVENOUS at 10:06

## 2018-06-19 NOTE — TRANSFER OF CARE
"Anesthesia Transfer of Care Note    Patient: Adelfo Rodriguez    Procedure(s) Performed: Procedure(s) (LRB):  COLONOSCOPY (N/A)    Patient location: PACU    Anesthesia Type: general    Transport from OR: Transported from OR on 100% O2 by closed face mask with adequate spontaneous ventilation    Post pain: adequate analgesia    Post assessment: no apparent anesthetic complications    Post vital signs: stable    Level of consciousness: awake    Nausea/Vomiting: no nausea/vomiting    Complications: none    Transfer of care protocol was followed      Last vitals:   Visit Vitals  BP (!) 156/69 (BP Location: Left arm, Patient Position: Lying)   Pulse 77   Temp 36.5 °C (97.7 °F) (Oral)   Resp 16   Ht 5' 10" (1.778 m)   Wt 99.8 kg (220 lb)   SpO2 99%   BMI 31.57 kg/m²     "

## 2018-06-19 NOTE — [employer] PATIENT INSTRUCTIONS
Discharge Summary/Instructions after an Endoscopic Procedure  Patient Name: Adelfo Goldberg  Patient MRN: [id number]  Patient YOB: 1940 Tuesday, June 19, 2018  Wade De La Garza MD  RESTRICTIONS:  During your procedure today, you received medications for sedation.  These   medications may affect your judgment, balance and coordination.  Therefore,   for 24 hours, you have the following restrictions:   - DO NOT drive a car, operate machinery, make legal/financial decisions,   sign important papers or drink alcohol.    ACTIVITY:  Today: no heavy lifting, straining or running due to procedural   sedation/anesthesia.  The following day: return to full activity including work.  DIET:  Eat and drink normally unless instructed otherwise.     TREATMENT FOR COMMON SIDE EFFECTS:  - Mild abdominal pain, nausea, belching, bloating or excessive gas:  rest,   eat lightly and use a heating pad.  - Sore Throat: treat with throat lozenges and/or gargle with warm salt   water.  - Because air was used during the procedure, expelling large amounts of air   from your rectum or belching is normal.  - If a bowel prep was taken, you may not have a bowel movement for 1-3 days.    This is normal.  SYMPTOMS TO WATCH FOR AND REPORT TO YOUR PHYSICIAN:  1. Abdominal pain or bloating, other than gas cramps.  2. Chest pain.  3. Back pain.  4. Signs of infection such as: chills or fever occurring within 24 hours   after the procedure.  5. Rectal bleeding, which would show as bright red, maroon, or black stools.   (A tablespoon of blood from the rectum is not serious, especially if   hemorrhoids are present.)  6. Vomiting.  7. Weakness or dizziness.  GO DIRECTLY TO THE NEAREST EMERGENCY ROOM IF YOU HAVE ANY OF THE FOLLOWING:      Difficulty breathing              Chills and/or fever over 101 F   Persistent vomiting and/or vomiting blood   Severe abdominal pain   Severe chest pain   Black, tarry stools   Bleeding- more than one tablespoon   Any  other symptom or condition that you feel may need urgent attention  Your doctor recommends these additional instructions:  If any biopsies were taken, your doctors clinic will contact you in 1 to 2   weeks with any results.  - Discharge patient to home (ambulatory).   - High fiber diet daily.   - Repeat colonoscopy in 5 years for surveillance.  For questions, problems or results please call your physician - Wade De La Garza MD at Work:  [phone].  [facility] [place] [facility], EMERGENCY ROOM PHONE NUMBER: [phone]  IF A COMPLICATION OR EMERGENCY SITUATION ARISES AND YOU ARE UNABLE TO REACH   YOUR PHYSICIAN - GO DIRECTLY TO THE EMERGENCY ROOM.  Wade De La Garza MD  6/19/2018 10:50:33 AM  This report has been verified and signed electronically.  [employer]

## 2018-06-19 NOTE — H&P
Endoscopy H&P    Procedure : Colonoscopy      personal history of colon polyps      Past Medical History:   Diagnosis Date    ALLERGIC RHINITIS     Asthma     Hyperlipidemia     Hypertension     NAFLD (nonalcoholic fatty liver disease)     ANNABELLE (obstructive sleep apnea)     on CPAP             Review of Systems -ROS:  GENERAL: No fever, chills, fatigability or weight loss.  CHEST: Denies DIEGO, cyanosis, wheezing, cough and sputum production.  CARDIOVASCULAR: Denies chest pain, PND, orthopnea or reduced exercise tolerance.   Musculoskeletal ROS: negative for - gait disturbance or joint pain  Neurological ROS: negative for - confusion or memory loss        Physical Exam:  General: well developed, well nourished, no distress  Head: normocephalic  Neck: supple, symmetrical, trachea midline  Lungs:  clear to auscultation bilaterally and normal respiratory effort  Heart: regular rate and rhythm, S1, S2 normal, no murmur, rub or gallop and regular rate and rhythm  Abdomen: soft, non-tender non-distented; bowel sounds normal; no masses,  no organomegaly  Extremities: no cyanosis or edema, or clubbing       Moderate Sedation (choice): Mallampati Score 1    ASA : II    IMP: personal history of colon polyps    Plan: Colonoscopy with Moderate sedation.  I have explained the procedure including indications, alternatives, expected outcomes and potential complications. The patient appears to understand and gives informed consent. The patient is medically ready for surgery.

## 2018-06-19 NOTE — ANESTHESIA POSTPROCEDURE EVALUATION
"Anesthesia Post Evaluation    Patient: Adelfo Rodriguez    Procedure(s) Performed: Procedure(s) (LRB):  COLONOSCOPY (N/A)    Final Anesthesia Type: general  Patient location during evaluation: PACU  Patient participation: Yes- Able to Participate  Level of consciousness: awake and alert  Post-procedure vital signs: reviewed and stable  Pain management: adequate  Airway patency: patent  PONV status at discharge: No PONV  Anesthetic complications: no      Cardiovascular status: blood pressure returned to baseline  Respiratory status: unassisted  Hydration status: euvolemic  Follow-up not needed.        Visit Vitals  BP (!) 148/70 (BP Location: Left arm, Patient Position: Lying)   Pulse 76   Temp 36.5 °C (97.7 °F) (Oral)   Resp 12   Ht 5' 10" (1.778 m)   Wt 99.8 kg (220 lb)   SpO2 98%   BMI 31.57 kg/m²       Pain/Ana Laura Score: Pain Assessment Performed: Yes (6/19/2018 11:25 AM)  Presence of Pain: denies (6/19/2018 11:25 AM)  Pain Rating Prior to Med Admin: 0 (6/19/2018 11:25 AM)  Ana Laura Score: 10 (6/19/2018 11:25 AM)      "

## 2018-06-21 ENCOUNTER — LAB VISIT (OUTPATIENT)
Dept: LAB | Facility: HOSPITAL | Age: 78
End: 2018-06-21
Attending: INTERNAL MEDICINE
Payer: MEDICARE

## 2018-06-21 ENCOUNTER — PATIENT MESSAGE (OUTPATIENT)
Dept: NEPHROLOGY | Facility: CLINIC | Age: 78
End: 2018-06-21

## 2018-06-21 DIAGNOSIS — N18.30 CKD (CHRONIC KIDNEY DISEASE) STAGE 3, GFR 30-59 ML/MIN: Chronic | ICD-10-CM

## 2018-06-21 DIAGNOSIS — E87.1 HYPONATREMIA: Primary | ICD-10-CM

## 2018-06-21 LAB
ALBUMIN SERPL BCP-MCNC: 3.4 G/DL
ANION GAP SERPL CALC-SCNC: 6 MMOL/L
BUN SERPL-MCNC: 21 MG/DL
CALCIUM SERPL-MCNC: 9.6 MG/DL
CHLORIDE SERPL-SCNC: 95 MMOL/L
CO2 SERPL-SCNC: 27 MMOL/L
CREAT SERPL-MCNC: 1.5 MG/DL
EST. GFR  (AFRICAN AMERICAN): 51.2 ML/MIN/1.73 M^2
EST. GFR  (NON AFRICAN AMERICAN): 44.3 ML/MIN/1.73 M^2
GLUCOSE SERPL-MCNC: 101 MG/DL
PHOSPHATE SERPL-MCNC: 2.4 MG/DL
POTASSIUM SERPL-SCNC: 4.2 MMOL/L
SODIUM SERPL-SCNC: 128 MMOL/L

## 2018-06-21 PROCEDURE — 36415 COLL VENOUS BLD VENIPUNCTURE: CPT | Mod: PO

## 2018-06-21 PROCEDURE — 80069 RENAL FUNCTION PANEL: CPT

## 2018-06-22 ENCOUNTER — TELEPHONE (OUTPATIENT)
Dept: SURGERY | Facility: CLINIC | Age: 78
End: 2018-06-22

## 2018-06-22 NOTE — TELEPHONE ENCOUNTER
P says he is still feeling like he has gas, bloated, overall not feeling good. He had the colonoscopy 2 days ago. The exam was normal. No fever or nausea. Told pt he could lay in a tub of warm water then walk. That may help push the gas out. He will go to the after hour clinic if he doesn't feel better.

## 2018-06-22 NOTE — TELEPHONE ENCOUNTER
----- Message from Deanna Boo sent at 6/22/2018  3:03 PM CDT -----  Contact: [phone]  Needs Advice    Reason for call:   Pt has questions about side effects from colonoscopy   Communication Preference:callback  Additional Information:

## 2018-06-25 ENCOUNTER — PATIENT MESSAGE (OUTPATIENT)
Dept: INTERNAL MEDICINE | Facility: CLINIC | Age: 78
End: 2018-06-25

## 2018-06-26 ENCOUNTER — TELEPHONE (OUTPATIENT)
Dept: ENDOSCOPY | Facility: HOSPITAL | Age: 78
End: 2018-06-26

## 2018-06-26 ENCOUNTER — PATIENT MESSAGE (OUTPATIENT)
Dept: INTERNAL MEDICINE | Facility: CLINIC | Age: 78
End: 2018-06-26

## 2018-06-26 NOTE — TELEPHONE ENCOUNTER
Furosemide 40 mg every other day is recommended to help control edema.   Valsartan in a dose of 160 mg daily (1/2 tablet)  can be started if the systolic blood pressures are staying above 130.

## 2018-07-05 ENCOUNTER — OFFICE VISIT (OUTPATIENT)
Dept: NEPHROLOGY | Facility: CLINIC | Age: 78
End: 2018-07-05
Payer: MEDICARE

## 2018-07-05 ENCOUNTER — LAB VISIT (OUTPATIENT)
Dept: LAB | Facility: HOSPITAL | Age: 78
End: 2018-07-05
Attending: INTERNAL MEDICINE
Payer: MEDICARE

## 2018-07-05 VITALS
DIASTOLIC BLOOD PRESSURE: 60 MMHG | WEIGHT: 224.19 LBS | SYSTOLIC BLOOD PRESSURE: 132 MMHG | HEIGHT: 67 IN | BODY MASS INDEX: 35.19 KG/M2 | OXYGEN SATURATION: 99 % | HEART RATE: 72 BPM

## 2018-07-05 DIAGNOSIS — N18.30 CKD (CHRONIC KIDNEY DISEASE) STAGE 3, GFR 30-59 ML/MIN: Chronic | ICD-10-CM

## 2018-07-05 DIAGNOSIS — I12.9 HYPERTENSIVE KIDNEY DISEASE: ICD-10-CM

## 2018-07-05 DIAGNOSIS — E87.1 HYPONATREMIA: ICD-10-CM

## 2018-07-05 DIAGNOSIS — N18.30 CKD (CHRONIC KIDNEY DISEASE) STAGE 3, GFR 30-59 ML/MIN: Primary | Chronic | ICD-10-CM

## 2018-07-05 PROBLEM — N25.81 SECONDARY HYPERPARATHYROIDISM: Status: RESOLVED | Noted: 2017-02-01 | Resolved: 2018-07-05

## 2018-07-05 LAB
ALBUMIN SERPL BCP-MCNC: 3.6 G/DL
ANION GAP SERPL CALC-SCNC: 9 MMOL/L
BUN SERPL-MCNC: 30 MG/DL
CALCIUM SERPL-MCNC: 9.4 MG/DL
CHLORIDE SERPL-SCNC: 108 MMOL/L
CO2 SERPL-SCNC: 26 MMOL/L
CREAT SERPL-MCNC: 1.9 MG/DL
EST. GFR  (AFRICAN AMERICAN): 38.5 ML/MIN/1.73 M^2
EST. GFR  (NON AFRICAN AMERICAN): 33.3 ML/MIN/1.73 M^2
GLUCOSE SERPL-MCNC: 96 MG/DL
PHOSPHATE SERPL-MCNC: 2.9 MG/DL
POTASSIUM SERPL-SCNC: 3.8 MMOL/L
SODIUM SERPL-SCNC: 143 MMOL/L

## 2018-07-05 PROCEDURE — 3075F SYST BP GE 130 - 139MM HG: CPT | Mod: CPTII,S$GLB,, | Performed by: INTERNAL MEDICINE

## 2018-07-05 PROCEDURE — 99999 PR PBB SHADOW E&M-EST. PATIENT-LVL III: CPT | Mod: PBBFAC,,, | Performed by: INTERNAL MEDICINE

## 2018-07-05 PROCEDURE — 36415 COLL VENOUS BLD VENIPUNCTURE: CPT

## 2018-07-05 PROCEDURE — 80069 RENAL FUNCTION PANEL: CPT

## 2018-07-05 PROCEDURE — 99215 OFFICE O/P EST HI 40 MIN: CPT | Mod: S$GLB,,, | Performed by: INTERNAL MEDICINE

## 2018-07-05 PROCEDURE — 3078F DIAST BP <80 MM HG: CPT | Mod: CPTII,S$GLB,, | Performed by: INTERNAL MEDICINE

## 2018-07-05 NOTE — PROGRESS NOTES
Subjective:       Patient ID: Adelfo Goldberg is a 77 y.o. White male who presents for follow up of Chronic Kidney Disease    HPI     Mr. Goldberg was seen in nephrology clinic for follow up of CKD. He was seen on 10/12/16 in new patient evaluation for CKD and then last followed on 6/12/17. Pt has longstanding hypertension, gout, hyperlipidemia, elevated liver enzymes, non alcoholic fatty liver, ANNABELLE, obesity, remote h/o kidney stone, CKD III and other medical problems.     Interim, he was noted to have NEFTALI on CKD III with prerenal azotemia. He was advised to increase fluid intake. In discussion with his PCP he was advised to stop HCTZ.     Today, pt reports in march this year he was started on lasix for leg edema. This was in addition to his valsartan and HCTZ use along with Felodipine. He states he continues taking it for a few days but started to develop dizziness and headaches. He went to an urgent care for these symptoms. He self discontinued lasix in late march but was placed back again on it in April. His BUN was 44 and creatinine 1.8 in mid march, repeat labs in April did show worsening of renal function with BUN rising to 44 and creatinine to 2.1 subsequent labs did show worsening creatinine to 2.3 and BUN 50 per labs on 6/12/18. After he got off HCTZ and improved fluid intake repeat labs from 6/21/18 showed BUN 21, creatinine 1.5 but overall he has progressively worsening hyponatremia per serial labs. In march 2018 serum Na was 135 which has gradually decreased to 128 in late June 2018. He did get Golytely preparation for colonoscopy in mid June. As such he has chronic hyponatremia since around 2015 which has worsened recently.    He is going for repeat labs today. He denies any dizziness for now but he still has lot of edema of LE. He has only mild hypoalbuminemia on most recent labs. He has no proteinuria. In 2016 he had an US abdomen which did show fatty liver. He does not have any recent cardiac  work up. He does exercise 20 minutes twice per day x 4 days but occasionally has DIEGO per him.    At present he is off Diovan since around a months of so. He brought all his BP readings which show actually stable BP with most SBP in 120 range, occasionally up to 130's. He states he is having to take more lasix as his edema of legs is not coming under control. He used compression socks which gave him skin rash.     Onset of his CKD is somewhere in [id number]. Pt reports he was diagnosed with hypertension prior to that but was not really taking any medicines for BP control. 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 He denies any ongoing use.     He is overall doing fine and has no recent acute illness. Pt does not have any dysuria/ decreased urine/ difficulty urinating/ flank pain/ hematuria/ shortness of breath/ chest pain. Prior serial labs noted, creatinine appears to be at 1.7 to 1.9 at baseline. Prior labs noted for negative hep C/B serology. Urinalysis does not show proteinuria. Remote abdominal imaging shows preserved kidney size.     His gout is managed per Dr. Nunez and pt has been on Uloric. Previously Allopurinol was stopped due to rise in liver enzymes.     Renal Function:  Lab Results   Component Value Date     06/21/2018    GLU 92 06/12/2018     (L) 06/21/2018     (L) 06/12/2018    K 4.2 06/21/2018    K 4.6 06/12/2018    CL 95 06/21/2018    CL 94 (L) 06/12/2018    CO2 27 06/21/2018    CO2 26 06/12/2018    BUN 21 06/21/2018    BUN 50 (H) 06/12/2018    CALCIUM 9.6 06/21/2018    CALCIUM 9.6 06/12/2018    CREATININE 1.5 (H) 06/21/2018    CREATININE 2.3 (H) 06/12/2018    ALBUMIN 3.4 (L) 06/21/2018    ALBUMIN 3.9 06/12/2018    PHOS 2.4 (L) 06/21/2018    PHOS 3.9 06/12/2018    ESTGFRAFRICA 51.2 (A) 06/21/2018    ESTGFRAFRICA 30.5 (A) 06/12/2018    EGFRNONAA 44.3 (A) 06/21/2018    EGFRNONAA 26.4 (A) 06/12/2018       Urinalysis:  Lab Results   Component Value Date     APPEARANCEUA Clear 06/12/2018    PHUR 6.0 06/12/2018    SPECGRAV 1.010 06/12/2018    PROTEINUA Negative 06/12/2018    GLUCUA Negative 06/12/2018    OCCULTUA Negative 06/12/2018    NITRITE Negative 06/12/2018    LEUKOCYTESUR Negative 06/12/2018       Protein/Creatinine Ratio:  Lab Results   Component Value Date    PROTEINURINE <7 06/12/2018    CREATRANDUR 85.0 06/12/2018    UTPCR Unable to calculate 06/12/2018       CBC:  Lab Results   Component Value Date    WBC 6.02 06/12/2018    HGB 11.2 (L) 06/12/2018    HCT 33.1 (L) 06/12/2018     Last PTH 83, vit D 35      Review of Systems   Constitutional: Negative for activity change, appetite change, fatigue and fever.   HENT: Negative for sneezing and sore throat.    Respiratory: Negative for cough, shortness of breath and wheezing.    Cardiovascular: Positive for leg swelling. Negative for chest pain.   Gastrointestinal: Negative for abdominal pain, diarrhea, nausea and vomiting.   Endocrine: Negative for polyuria.   Genitourinary: Negative for decreased urine volume, dysuria, flank pain, frequency, hematuria and urgency.   Musculoskeletal: Negative for arthralgias and myalgias.   Skin: Negative for pallor and rash.   Neurological: Negative for dizziness, light-headedness and headaches.   Does not have any balance issues/ lethargy/ nausea/ diarrhea/ somnolence/ headache    Objective:      Physical Exam   Constitutional: He is oriented to person, place, and time. He appears well-developed and well-nourished.   HENT:   Mouth/Throat: Oropharynx is clear and moist.   Eyes: Right eye exhibits no discharge. Left eye exhibits no discharge.   Cardiovascular: Normal rate.    Pulmonary/Chest: Breath sounds normal.   Abdominal: Soft.   Abdominal obesity   Musculoskeletal: He exhibits edema.   Neurological: He is alert and oriented to person, place, and time.   Skin: Skin is warm and dry.   Psychiatric: He has a normal mood and affect. His behavior is normal.   Vitals reviewed.       Assessment:       1. CKD (chronic kidney disease) stage 3, GFR 30-59 ml/min    2. Hypertensive kidney disease    3. Hyponatremia        Plan:     Mr. Rodriguez has CKD III which is likely due to longstanding hypertension, occasional NSAID use, obesity. He has very slow progression of CKD but had NEFTALI starting in April and then worsening which was due to over diuresis. Worsening of chronic hyponatremia was due to diuretic use as well. He has been asymptomatic. Rarely preparations used for colonoscopy can cause or contribute to the development of hyponatremia.     Due to NEFTALI, he was taken off Valsartan. We discussed about long term benefits of low dose ARB as per his tighter BP control depending on post clinic labs.     He has gout and non alcoholic fatty liver. I discussed all the available serial labs with patient and explained CKD staging, potential risk of progression. I stressed importance of weight loss by calorie control/ portion size, low salt diet, keeping well hydrated, avoiding any NSAID use ever and continuing ARB based regimen to slow progression of CKD. He will need periodic lab monitoring of eGFR and electrolytes. I have advised him to stop vitamin D, multivitamin as it will have Ca supplement and as such he has borderline high Ca levels. Also I have advised him to stop vitamin C unless he has clinical signs of deficiency. He gives remote h/o kidney stone and vitamin C intake can precipitate stone formation. He remains at high risk for contrast induced nephropathy and also as such would avoid gadolinium use unless benefit outweighs risk. He appears to have chronic mild hyponatremia which could be from a combination of HCTZ, excessive intake of dilute fluids. He will limit his water intake to 64 oz.    All available labs and other renal related imaging d/w him in detail. In the past he had expressed given stability of his eGFR he would like to keep more frequent follow up with his PCP than our clinic.  Also discussed to avoid Valsartan based regimen in the setting of any acute dehydration from nausea/ vomiting/ diarrhea/ high fever and to have labs checked on such occasions. He expressed understanding of natural progression of CKD despite all the above detailed efforts.     Plan  - periodically monitor renal panel for electrolytes, acid base status, eGFR  - risk of CKD progression d/w patient  - low salt diet  - follow PTH levels, vit D, Ca, phos levels  - periodically trend Hb, consider DAPHNIE when Hb is less than 10   - follow urine studies for proteinuria  - avoid NSAID/ bactrim/ IV contrast/ gadolinium/ aminoglycoside/ Fleet enema where possible  - restart ARB containing regimen for RAAS blockade once NEFTALI improves   - management of gout per Dr. Nunez  - management of hyperlipidemia per Dr. Vogel  - weight loss by controlling calories and portion size is highly advisable especially with other comorbid conditions like ANNABELLE, non alcoholic fatty liver   - renal labs today to follow on serum sodium levels closely. He brought his fluid intake diary, noted he consumes around 60 to 70 ounces of water daily in addition to other fluids like a cup of juice or so. D/w him to reduce the water intake to around 50 ounces and keep total fluid intake to around 60 ounces per day  - restart Valsartan at a lower dose of 80 mg per day after review of labs from today. This is for RAAS blockade effect on kidneys. During the episode of NEFTALI superimposed on CKD III it was reasonable to hold ARB. Discussed in detail with him that will finalize the plan after review of labs from today.   - defer to PCP about ruling out any hepatic or cardiac causes of leg edema, as such poor venous circulation or lymphedema may be other conditions causing such edematous legs. He has severe obesity and is known to have fatty liver per prior work up. He has no abnormal proteinuria so leg edema is unlikely due to renal causes, will copy note to PCP, Dr. Vogel.      Plan, labs, recommendations were discussed with patient, his questions were answered to his satisfaction.   RTC 3-4 months

## 2018-07-05 NOTE — Clinical Note
His leg edema does not appear to be due to CKD, he does not have proteinuria. He does have fatty liver seen on prior imaging. I was wondering if he should have any further work up on that, other possibility could be lymphedema. I was planning on starting him back on lower dose of valsartan like 80 mg per day for RAAS blockade for CKD. But I was waiting on post clinic labs. Thanks.

## 2018-07-06 ENCOUNTER — TELEPHONE (OUTPATIENT)
Dept: NEPHROLOGY | Facility: CLINIC | Age: 78
End: 2018-07-06

## 2018-07-06 ENCOUNTER — LAB VISIT (OUTPATIENT)
Dept: LAB | Facility: HOSPITAL | Age: 78
End: 2018-07-06
Attending: INTERNAL MEDICINE
Payer: MEDICARE

## 2018-07-06 DIAGNOSIS — N18.30 CKD (CHRONIC KIDNEY DISEASE), STAGE III: ICD-10-CM

## 2018-07-06 LAB
ALBUMIN SERPL BCP-MCNC: 3.6 G/DL
ANION GAP SERPL CALC-SCNC: 9 MMOL/L
BUN SERPL-MCNC: 30 MG/DL
CALCIUM SERPL-MCNC: 9.2 MG/DL
CHLORIDE SERPL-SCNC: 107 MMOL/L
CO2 SERPL-SCNC: 26 MMOL/L
CREAT SERPL-MCNC: 1.8 MG/DL
EST. GFR  (AFRICAN AMERICAN): 41.1 ML/MIN/1.73 M^2
EST. GFR  (NON AFRICAN AMERICAN): 35.5 ML/MIN/1.73 M^2
GLUCOSE SERPL-MCNC: 109 MG/DL
PHOSPHATE SERPL-MCNC: 2.9 MG/DL
POTASSIUM SERPL-SCNC: 3.9 MMOL/L
SODIUM SERPL-SCNC: 142 MMOL/L

## 2018-07-06 PROCEDURE — 36415 COLL VENOUS BLD VENIPUNCTURE: CPT | Mod: PO

## 2018-07-06 PROCEDURE — 80069 RENAL FUNCTION PANEL: CPT

## 2018-07-09 ENCOUNTER — PATIENT MESSAGE (OUTPATIENT)
Dept: INTERNAL MEDICINE | Facility: CLINIC | Age: 78
End: 2018-07-09

## 2018-07-09 ENCOUNTER — TELEPHONE (OUTPATIENT)
Dept: NEPHROLOGY | Facility: CLINIC | Age: 78
End: 2018-07-09

## 2018-07-10 ENCOUNTER — PATIENT MESSAGE (OUTPATIENT)
Dept: INTERNAL MEDICINE | Facility: CLINIC | Age: 78
End: 2018-07-10

## 2018-07-10 DIAGNOSIS — N18.30 CKD (CHRONIC KIDNEY DISEASE), STAGE III: Primary | ICD-10-CM

## 2018-07-10 RX ORDER — VALSARTAN 80 MG/1
80 TABLET ORAL DAILY
Qty: 90 TABLET | Refills: 3 | Status: SHIPPED | OUTPATIENT
Start: 2018-07-10 | End: 2018-07-24 | Stop reason: RX

## 2018-07-10 RX ORDER — VALSARTAN 80 MG/1
80 TABLET ORAL DAILY
Qty: 90 TABLET | Refills: 3 | Status: SHIPPED | OUTPATIENT
Start: 2018-07-10 | End: 2018-07-10 | Stop reason: SDUPTHER

## 2018-07-24 ENCOUNTER — TELEPHONE (OUTPATIENT)
Dept: INTERNAL MEDICINE | Facility: CLINIC | Age: 78
End: 2018-07-24

## 2018-07-24 RX ORDER — IRBESARTAN 150 MG/1
150 TABLET ORAL DAILY
Qty: 30 TABLET | Refills: 5 | Status: SHIPPED | OUTPATIENT
Start: 2018-07-24 | End: 2018-12-29 | Stop reason: SDUPTHER

## 2018-07-24 NOTE — TELEPHONE ENCOUNTER
----- Message from Johanna S Nathaniel sent at 7/24/2018 11:33 AM CDT -----  Contact: Erin @ [facility] [phone]  She's calling in regards to wanting to have a different script written for the patient because he was taking valsartan 80 mg and it has a recall it. She would like for you to write a different script for the patient. Patient's pharmacy [facility] [address] - [place] [address] [address], [facility] phone number [phone], Fax # [phone].

## 2018-08-02 ENCOUNTER — LAB VISIT (OUTPATIENT)
Dept: LAB | Facility: HOSPITAL | Age: 78
End: 2018-08-02
Attending: INTERNAL MEDICINE
Payer: MEDICARE

## 2018-08-02 DIAGNOSIS — N18.30 CKD (CHRONIC KIDNEY DISEASE), STAGE III: ICD-10-CM

## 2018-08-02 LAB
ALBUMIN SERPL BCP-MCNC: 3.8 G/DL
ANION GAP SERPL CALC-SCNC: 9 MMOL/L
BUN SERPL-MCNC: 34 MG/DL
CALCIUM SERPL-MCNC: 9.2 MG/DL
CHLORIDE SERPL-SCNC: 108 MMOL/L
CO2 SERPL-SCNC: 25 MMOL/L
CREAT SERPL-MCNC: 2 MG/DL
EST. GFR  (AFRICAN AMERICAN): 36.1 ML/MIN/1.73 M^2
EST. GFR  (NON AFRICAN AMERICAN): 31.3 ML/MIN/1.73 M^2
GLUCOSE SERPL-MCNC: 100 MG/DL
PHOSPHATE SERPL-MCNC: 3.2 MG/DL
POTASSIUM SERPL-SCNC: 4.1 MMOL/L
SODIUM SERPL-SCNC: 142 MMOL/L

## 2018-08-02 PROCEDURE — 80069 RENAL FUNCTION PANEL: CPT

## 2018-08-02 PROCEDURE — 36415 COLL VENOUS BLD VENIPUNCTURE: CPT | Mod: PO

## 2018-08-08 RX ORDER — COLCHICINE 0.6 MG/1
0.6 TABLET ORAL DAILY
Qty: 90 TABLET | Refills: 1 | Status: SHIPPED | OUTPATIENT
Start: 2018-08-08 | End: 2018-10-01

## 2018-09-21 ENCOUNTER — LAB VISIT (OUTPATIENT)
Dept: LAB | Facility: HOSPITAL | Age: 78
End: 2018-09-21
Attending: INTERNAL MEDICINE
Payer: MEDICARE

## 2018-09-21 DIAGNOSIS — I10 ESSENTIAL HYPERTENSION: Chronic | ICD-10-CM

## 2018-09-21 DIAGNOSIS — E78.5 HYPERLIPIDEMIA, UNSPECIFIED HYPERLIPIDEMIA TYPE: ICD-10-CM

## 2018-09-21 DIAGNOSIS — M1A.9XX1 CHRONIC TOPHACEOUS GOUT: ICD-10-CM

## 2018-09-21 DIAGNOSIS — R35.1 NOCTURIA: ICD-10-CM

## 2018-09-21 LAB
ALBUMIN SERPL BCP-MCNC: 3.9 G/DL
ALBUMIN SERPL BCP-MCNC: 3.9 G/DL
ALP SERPL-CCNC: 80 U/L
ALP SERPL-CCNC: 80 U/L
ALT SERPL W/O P-5'-P-CCNC: 23 U/L
ALT SERPL W/O P-5'-P-CCNC: 23 U/L
ANION GAP SERPL CALC-SCNC: 8 MMOL/L
ANION GAP SERPL CALC-SCNC: 8 MMOL/L
AST SERPL-CCNC: 25 U/L
AST SERPL-CCNC: 25 U/L
BASOPHILS # BLD AUTO: 0.07 K/UL
BASOPHILS # BLD AUTO: 0.07 K/UL
BASOPHILS NFR BLD: 1.3 %
BASOPHILS NFR BLD: 1.3 %
BILIRUB SERPL-MCNC: 0.6 MG/DL
BILIRUB SERPL-MCNC: 0.6 MG/DL
BUN SERPL-MCNC: 32 MG/DL
BUN SERPL-MCNC: 32 MG/DL
CALCIUM SERPL-MCNC: 9.6 MG/DL
CALCIUM SERPL-MCNC: 9.6 MG/DL
CHLORIDE SERPL-SCNC: 108 MMOL/L
CHLORIDE SERPL-SCNC: 108 MMOL/L
CHOLEST SERPL-MCNC: 170 MG/DL
CHOLEST/HDLC SERPL: 2.3 {RATIO}
CO2 SERPL-SCNC: 24 MMOL/L
CO2 SERPL-SCNC: 24 MMOL/L
COMPLEXED PSA SERPL-MCNC: 2.9 NG/ML
CREAT SERPL-MCNC: 2 MG/DL
CREAT SERPL-MCNC: 2 MG/DL
DIFFERENTIAL METHOD: ABNORMAL
DIFFERENTIAL METHOD: ABNORMAL
EOSINOPHIL # BLD AUTO: 0.4 K/UL
EOSINOPHIL # BLD AUTO: 0.4 K/UL
EOSINOPHIL NFR BLD: 6.5 %
EOSINOPHIL NFR BLD: 6.5 %
ERYTHROCYTE [DISTWIDTH] IN BLOOD BY AUTOMATED COUNT: 13.2 %
ERYTHROCYTE [DISTWIDTH] IN BLOOD BY AUTOMATED COUNT: 13.2 %
EST. GFR  (AFRICAN AMERICAN): 35.9 ML/MIN/1.73 M^2
EST. GFR  (AFRICAN AMERICAN): 35.9 ML/MIN/1.73 M^2
EST. GFR  (NON AFRICAN AMERICAN): 31 ML/MIN/1.73 M^2
EST. GFR  (NON AFRICAN AMERICAN): 31 ML/MIN/1.73 M^2
GLUCOSE SERPL-MCNC: 87 MG/DL
GLUCOSE SERPL-MCNC: 87 MG/DL
HCT VFR BLD AUTO: 36.4 %
HCT VFR BLD AUTO: 36.4 %
HDLC SERPL-MCNC: 73 MG/DL
HDLC SERPL: 42.9 %
HGB BLD-MCNC: 11.5 G/DL
HGB BLD-MCNC: 11.5 G/DL
IMM GRANULOCYTES # BLD AUTO: 0.01 K/UL
IMM GRANULOCYTES # BLD AUTO: 0.01 K/UL
IMM GRANULOCYTES NFR BLD AUTO: 0.2 %
IMM GRANULOCYTES NFR BLD AUTO: 0.2 %
LDLC SERPL CALC-MCNC: 83.6 MG/DL
LYMPHOCYTES # BLD AUTO: 1.5 K/UL
LYMPHOCYTES # BLD AUTO: 1.5 K/UL
LYMPHOCYTES NFR BLD: 27.5 %
LYMPHOCYTES NFR BLD: 27.5 %
MCH RBC QN AUTO: 31.4 PG
MCH RBC QN AUTO: 31.4 PG
MCHC RBC AUTO-ENTMCNC: 31.6 G/DL
MCHC RBC AUTO-ENTMCNC: 31.6 G/DL
MCV RBC AUTO: 100 FL
MCV RBC AUTO: 100 FL
MONOCYTES # BLD AUTO: 0.8 K/UL
MONOCYTES # BLD AUTO: 0.8 K/UL
MONOCYTES NFR BLD: 15 %
MONOCYTES NFR BLD: 15 %
NEUTROPHILS # BLD AUTO: 2.7 K/UL
NEUTROPHILS # BLD AUTO: 2.7 K/UL
NEUTROPHILS NFR BLD: 49.5 %
NEUTROPHILS NFR BLD: 49.5 %
NONHDLC SERPL-MCNC: 97 MG/DL
NRBC BLD-RTO: 0 /100 WBC
NRBC BLD-RTO: 0 /100 WBC
PLATELET # BLD AUTO: 191 K/UL
PLATELET # BLD AUTO: 191 K/UL
PMV BLD AUTO: 12.7 FL
PMV BLD AUTO: 12.7 FL
POTASSIUM SERPL-SCNC: 4.4 MMOL/L
POTASSIUM SERPL-SCNC: 4.4 MMOL/L
PROT SERPL-MCNC: 6.8 G/DL
PROT SERPL-MCNC: 6.8 G/DL
RBC # BLD AUTO: 3.66 M/UL
RBC # BLD AUTO: 3.66 M/UL
SODIUM SERPL-SCNC: 140 MMOL/L
SODIUM SERPL-SCNC: 140 MMOL/L
TRIGL SERPL-MCNC: 67 MG/DL
TSH SERPL DL<=0.005 MIU/L-ACNC: 3.21 UIU/ML
URATE SERPL-MCNC: 4 MG/DL
WBC # BLD AUTO: 5.53 K/UL
WBC # BLD AUTO: 5.53 K/UL

## 2018-09-21 PROCEDURE — 84443 ASSAY THYROID STIM HORMONE: CPT

## 2018-09-21 PROCEDURE — 80053 COMPREHEN METABOLIC PANEL: CPT

## 2018-09-21 PROCEDURE — 85025 COMPLETE CBC W/AUTO DIFF WBC: CPT

## 2018-09-21 PROCEDURE — 36415 COLL VENOUS BLD VENIPUNCTURE: CPT | Mod: PO

## 2018-09-21 PROCEDURE — 80061 LIPID PANEL: CPT

## 2018-09-21 PROCEDURE — 84550 ASSAY OF BLOOD/URIC ACID: CPT

## 2018-09-21 PROCEDURE — 84153 ASSAY OF PSA TOTAL: CPT

## 2018-09-24 ENCOUNTER — OFFICE VISIT (OUTPATIENT)
Dept: GASTROENTEROLOGY | Facility: CLINIC | Age: 78
End: 2018-09-24
Payer: MEDICARE

## 2018-09-24 ENCOUNTER — TELEPHONE (OUTPATIENT)
Dept: ENDOSCOPY | Facility: HOSPITAL | Age: 78
End: 2018-09-24

## 2018-09-24 ENCOUNTER — LAB VISIT (OUTPATIENT)
Dept: LAB | Facility: HOSPITAL | Age: 78
End: 2018-09-24
Payer: MEDICARE

## 2018-09-24 VITALS
HEART RATE: 70 BPM | WEIGHT: 220.44 LBS | BODY MASS INDEX: 31.56 KG/M2 | SYSTOLIC BLOOD PRESSURE: 129 MMHG | HEIGHT: 70 IN | DIASTOLIC BLOOD PRESSURE: 63 MMHG

## 2018-09-24 DIAGNOSIS — D50.9 IRON DEFICIENCY ANEMIA, UNSPECIFIED IRON DEFICIENCY ANEMIA TYPE: Primary | ICD-10-CM

## 2018-09-24 DIAGNOSIS — I10 ESSENTIAL HYPERTENSION: Chronic | ICD-10-CM

## 2018-09-24 DIAGNOSIS — N18.30 CKD (CHRONIC KIDNEY DISEASE), STAGE III: ICD-10-CM

## 2018-09-24 DIAGNOSIS — D50.9 IRON DEFICIENCY ANEMIA, UNSPECIFIED IRON DEFICIENCY ANEMIA TYPE: ICD-10-CM

## 2018-09-24 DIAGNOSIS — G47.33 OSA (OBSTRUCTIVE SLEEP APNEA): ICD-10-CM

## 2018-09-24 LAB
FERRITIN SERPL-MCNC: 45 NG/ML
IRON SERPL-MCNC: 104 UG/DL
SATURATED IRON: 23 %
TOTAL IRON BINDING CAPACITY: 444 UG/DL
TRANSFERRIN SERPL-MCNC: 300 MG/DL

## 2018-09-24 PROCEDURE — 83540 ASSAY OF IRON: CPT

## 2018-09-24 PROCEDURE — 1101F PT FALLS ASSESS-DOCD LE1/YR: CPT | Mod: CPTII,,, | Performed by: PHYSICIAN ASSISTANT

## 2018-09-24 PROCEDURE — 99999 PR PBB SHADOW E&M-EST. PATIENT-LVL IV: CPT | Mod: PBBFAC,,, | Performed by: PHYSICIAN ASSISTANT

## 2018-09-24 PROCEDURE — 36415 COLL VENOUS BLD VENIPUNCTURE: CPT

## 2018-09-24 PROCEDURE — 3074F SYST BP LT 130 MM HG: CPT | Mod: CPTII,,, | Performed by: PHYSICIAN ASSISTANT

## 2018-09-24 PROCEDURE — 3078F DIAST BP <80 MM HG: CPT | Mod: CPTII,,, | Performed by: PHYSICIAN ASSISTANT

## 2018-09-24 PROCEDURE — 82728 ASSAY OF FERRITIN: CPT

## 2018-09-24 PROCEDURE — 99204 OFFICE O/P NEW MOD 45 MIN: CPT | Mod: S$PBB,,, | Performed by: PHYSICIAN ASSISTANT

## 2018-09-24 PROCEDURE — 99214 OFFICE O/P EST MOD 30 MIN: CPT | Mod: PBBFAC | Performed by: PHYSICIAN ASSISTANT

## 2018-09-24 NOTE — PROGRESS NOTES
[facility] Consultation Note    Reason for Consult:  The primary encounter diagnosis was Iron deficiency anemia, unspecified iron deficiency anemia type. Diagnoses of ANNABELLE (obstructive sleep apnea), CKD (chronic kidney disease), stage III, and Essential hypertension were also pertinent to this visit.    PCP:   Romero Vogel   [address] [facility] [place] [address]    Referring MD:  Edis Nunez Md  [address] [place] [address]    HPI:  This is a 78 y.o. male referred by Dr Nunez for iron deficiency anemia.   9/2018 Pertinent labs:   Hgb- 11.5  MCV- 100 elevated  10/2017 Pertinent labs:   Iron- 70  Iron Sat-17  TIBC- wnl  Ferritin- 89     Blood in the stool-no  Abdominal pain-no  N/V- no  GERD-no  Iron Supplement- no     NSAIDs/ ASA - late evening, on empty  Anticoagulants - no  Antacids - non     EGD - none  Colonoscopy- 6/2018 - tics, fair prep, f/u in 5yrs    Wolf screening- Family Hx of:  Colon cancer-no    ROS:  Constitutional: No fevers, chills, No weight loss  ENT: No allergies  CV: No chest pain  Pulm: No cough, No shortness of breath  Ophtho: No vision changes  GI: see HPI  Derm: No rash  Heme: No lymphadenopathy, No bruising  MSK: No arthritis  : No dysuria, No hematuria  Endo: No hot or cold intolerance  Neuro: No syncope, No seizure  Psych: No anxiety, No depression    Medical History:  has a past medical history of ALLERGIC RHINITIS, Anemia, Asthma, Hyperlipidemia, Hypertension, NAFLD (nonalcoholic fatty liver disease), and ANNABELLE (obstructive sleep apnea).    Surgical History:  has a past surgical history that includes Appendectomy; testicular biopsy; Foot surgery; COLONOSCOPY (N/A, 6/19/2018); and COLONOSCOPY (N/A, 6/14/2013).    Family History: family history includes Arthritis in his brother; Blindness in his maternal grandmother; COPD in his father; Cancer in his mother; Cataracts in his maternal grandmother; Glaucoma in his mother; Heart disease in his  brother and father; Retinal detachment in his father and mother..     Social History:  reports that he has quit smoking. He has a 10.00 pack-year smoking history. He has quit using smokeless tobacco. He reports that he drinks alcohol. He reports that he does not use drugs.    Review of patient's allergies indicates:   Allergen Reactions    Amoxicillin Hives    Allopurinol analogues Other (See Comments)     Abnormal transaminases       Current Outpatient Medications on File Prior to Visit   Medication Sig Dispense Refill    ADVAIR DISKUS 250-50 mcg/dose diskus inhaler inhale 1 dose by mouth twice a day Rinse mouth after use 60 each 5    albuterol (PROAIR HFA) 90 mcg/actuation inhaler Inhale 2 puffs into the lungs every 4 (four) hours as needed for Wheezing. 1 Inhaler 6    aspirin (ECOTRIN) 81 MG EC tablet Take 81 mg by mouth once daily.        atorvastatin (LIPITOR) 20 MG tablet Take 1 tablet (20 mg total) by mouth once daily. For cholesterol control. 90 tablet 3    colchicine 0.6 mg tablet Take 1 tablet (0.6 mg total) by mouth once daily. 90 tablet 1    febuxostat 80 mg Tab Take 1 tablet (80 mg total) by mouth once daily. 90 tablet 1    felodipine (PLENDIL) 10 MG 24 hr tablet Take 1 tablet (10 mg total) by mouth once daily. 90 tablet 3    fish oil-omega-3 fatty acids 300-1,000 mg capsule Take 2 g by mouth once daily.        fluticasone (FLONASE) 50 mcg/actuation nasal spray instill 1 spray into each nostril once daily 16 g 6    furosemide (LASIX) 40 MG tablet Take 1 tablet p.o. twice a week as needed for fluid retention. 90 tablet 3    glucosamine & chondroit sul.Na 750-600 mg Tab Take 750 mg by mouth.      guaifenesin (MUCINEX) 600 mg 12 hr tablet Take 1,200 mg by mouth 2 (two) times daily.      irbesartan (AVAPRO) 150 MG tablet Take 1 tablet (150 mg total) by mouth once daily. For blood pressure control. 30 tablet 5    ketoconazole (NIZORAL) 2 % shampoo WASH affected area every other day 120 mL 0  "   loratadine (CLARITIN) 10 mg tablet Take 10 mg by mouth daily as needed.        triamcinolone acetonide 0.1% (KENALOG) 0.1 % cream Apply topically 2 (two) times daily. 80 g 2    azelastine (ASTELIN) 137 mcg (0.1 %) nasal spray 2 sprays (274 mcg total) by Nasal route 2 (two) times daily. 30 mL 3    desoximetasone (TOPICORT) 0.25 % cream Apply topically 2 (two) times daily.       No current facility-administered medications on file prior to visit.          Objective Findings:    Vital Signs:  /63   Pulse 70   Ht 5' 10" (1.778 m)   Wt 100 kg (220 lb 7.4 oz)   BMI 31.63 kg/m²   Body mass index is 31.63 kg/m².    Physical Exam:  General Appearance: Well appearing in no acute distress  Head:   Normocephalic, without obvious abnormality  Eyes:    No scleral icterus  ENT: Neck supple, Lips, mucosa, and tongue normal  Lungs: CTA bilaterally in anterior and posterior fields, no wheezes, no crackles.  Heart:  Regular rate and rhythm, S1, S2 normal, no murmurs heard  Abdomen: Soft, non tender, non distended with positive bowel sounds in all four quadrants. No hepatosplenomegaly, ascites, or mass  Extremities: 2+ pulses, no clubbing, cyanosis or edema  Skin: No rash  Neurologic: AAO x 3      Labs:  Lab Results   Component Value Date    WBC 5.53 09/21/2018    WBC 5.53 09/21/2018    HGB 11.5 (L) 09/21/2018    HGB 11.5 (L) 09/21/2018    HCT 36.4 (L) 09/21/2018    HCT 36.4 (L) 09/21/2018     09/21/2018     09/21/2018    CHOL 170 09/21/2018    TRIG 67 09/21/2018    HDL 73 09/21/2018    ALT 23 09/21/2018    ALT 23 09/21/2018    AST 25 09/21/2018    AST 25 09/21/2018     09/21/2018     09/21/2018    K 4.4 09/21/2018    K 4.4 09/21/2018     09/21/2018     09/21/2018    CREATININE 2.0 (H) 09/21/2018    CREATININE 2.0 (H) 09/21/2018    BUN 32 (H) 09/21/2018    BUN 32 (H) 09/21/2018    CO2 24 09/21/2018    CO2 24 09/21/2018    TSH 3.207 09/21/2018    PSA 2.63 08/15/2013    INR 0.9 " 02/22/2016       Imaging:    Endoscopy:    EGD - none  Colonoscopy- 6/2018 - tics, fair prep, f/u in 5yrs    Assessment:  1. Iron deficiency anemia, unspecified iron deficiency anemia type    2. ANNABELLE (obstructive sleep apnea)    3. CKD (chronic kidney disease), stage III    4. Essential hypertension       77 yo M referred for low iron sat seen on lab 1 yr ago. He is also has anemia that has been stable for years. Anemia is likely related to chronic disease. He does take ASA 81mg on empty stomach so is at risk for stomach ulcers. He also drinks alcohol frequently    Recommendations:  1. Labs to rule out iron deficiency  2. Schedule EGD to rule out sources of bleeding    Consider VCE for LUIS.    No Follow-up on file.      Order summary:  Orders Placed This Encounter    Iron and TIBC    Ferritin    Case request GI: EGD (ESOPHAGOGASTRODUODENOSCOPY)         Thank you so much for allowing me to participate in the care of MD Elisa Spence PA-C

## 2018-09-24 NOTE — LETTER
September 24, 2018      Edis Nunez MD  [address]  [facility] [address]           [facility]  [address]  [facility] [address]  Phone: [phone]  Fax: [phone]          Patient: Adelfo ZHANG MD Jennifer   MR Number: [id number]   YOB: 1940   Date of Visit: 9/24/2018       Dear Dr. Edis Nunez:    Thank you for referring Adelfo Rodriguez to me for evaluation. Attached you will find relevant portions of my assessment and plan of care.    If you have questions, please do not hesitate to call me. I look forward to following Adelfo Rodriguez along with you.    Sincerely,    Elisa Almeida PA-C    Enclosure  CC:  No Recipients    If you would like to receive this communication electronically, please contact [email or web address] or [phone] to request more information on [employer] Link access.    For providers and/or their staff who would like to refer a patient to [facility], please contact us through our one-stop-shop provider referral line, [facility], at [phone].    If you feel you have received this communication in error or would no longer like to receive these types of communications, please e-mail [email or web address]

## 2018-09-24 NOTE — H&P (VIEW-ONLY)
[facility] Consultation Note    Reason for Consult:  The primary encounter diagnosis was Iron deficiency anemia, unspecified iron deficiency anemia type. Diagnoses of ANNABELLE (obstructive sleep apnea), CKD (chronic kidney disease), stage III, and Essential hypertension were also pertinent to this visit.    PCP:   Romero Vogel   [address] [facility] / [place] [address]    Referring MD:  Edis Nunez Md  [address] [place] [address]    HPI:  This is a 78 y.o. male referred by Dr Nunez for iron deficiency anemia.   9/2018 Pertinent labs:   Hgb- 11.5  MCV- 100 elevated  10/2017 Pertinent labs:   Iron- 70  Iron Sat-17  TIBC- wnl  Ferritin- 89     Blood in the stool-no  Abdominal pain-no  N/V- no  GERD-no  Iron Supplement- no     NSAIDs/ ASA - late evening, on empty  Anticoagulants - no  Antacids - non     EGD - none  Colonoscopy- 6/2018 - tics, fair prep, f/u in 5yrs    Wolf screening- Family Hx of:  Colon cancer-no    ROS:  Constitutional: No fevers, chills, No weight loss  ENT: No allergies  CV: No chest pain  Pulm: No cough, No shortness of breath  Ophtho: No vision changes  GI: see HPI  Derm: No rash  Heme: No lymphadenopathy, No bruising  MSK: No arthritis  : No dysuria, No hematuria  Endo: No hot or cold intolerance  Neuro: No syncope, No seizure  Psych: No anxiety, No depression    Medical History:  has a past medical history of ALLERGIC RHINITIS, Anemia, Asthma, Hyperlipidemia, Hypertension, NAFLD (nonalcoholic fatty liver disease), and ANNABELLE (obstructive sleep apnea).    Surgical History:  has a past surgical history that includes Appendectomy; testicular biopsy; Foot surgery; COLONOSCOPY (N/A, 6/19/2018); and COLONOSCOPY (N/A, 6/14/2013).    Family History: family history includes Arthritis in his brother; Blindness in his maternal grandmother; COPD in his father; Cancer in his mother; Cataracts in his maternal grandmother; Glaucoma in his mother; Heart disease in his  brother and father; Retinal detachment in his father and mother..     Social History:  reports that he has quit smoking. He has a 10.00 pack-year smoking history. He has quit using smokeless tobacco. He reports that he drinks alcohol. He reports that he does not use drugs.    Review of patient's allergies indicates:   Allergen Reactions    Amoxicillin Hives    Allopurinol analogues Other (See Comments)     Abnormal transaminases       Current Outpatient Medications on File Prior to Visit   Medication Sig Dispense Refill    ADVAIR DISKUS 250-50 mcg/dose diskus inhaler inhale 1 dose by mouth twice a day Rinse mouth after use 60 each 5    albuterol (PROAIR HFA) 90 mcg/actuation inhaler Inhale 2 puffs into the lungs every 4 (four) hours as needed for Wheezing. 1 Inhaler 6    aspirin (ECOTRIN) 81 MG EC tablet Take 81 mg by mouth once daily.        atorvastatin (LIPITOR) 20 MG tablet Take 1 tablet (20 mg total) by mouth once daily. For cholesterol control. 90 tablet 3    colchicine 0.6 mg tablet Take 1 tablet (0.6 mg total) by mouth once daily. 90 tablet 1    febuxostat 80 mg Tab Take 1 tablet (80 mg total) by mouth once daily. 90 tablet 1    felodipine (PLENDIL) 10 MG 24 hr tablet Take 1 tablet (10 mg total) by mouth once daily. 90 tablet 3    fish oil-omega-3 fatty acids 300-1,000 mg capsule Take 2 g by mouth once daily.        fluticasone (FLONASE) 50 mcg/actuation nasal spray instill 1 spray into each nostril once daily 16 g 6    furosemide (LASIX) 40 MG tablet Take 1 tablet p.o. twice a week as needed for fluid retention. 90 tablet 3    glucosamine & chondroit sul.Na 750-600 mg Tab Take 750 mg by mouth.      guaifenesin (MUCINEX) 600 mg 12 hr tablet Take 1,200 mg by mouth 2 (two) times daily.      irbesartan (AVAPRO) 150 MG tablet Take 1 tablet (150 mg total) by mouth once daily. For blood pressure control. 30 tablet 5    ketoconazole (NIZORAL) 2 % shampoo WASH affected area every other day 120 mL 0  "   loratadine (CLARITIN) 10 mg tablet Take 10 mg by mouth daily as needed.        triamcinolone acetonide 0.1% (KENALOG) 0.1 % cream Apply topically 2 (two) times daily. 80 g 2    azelastine (ASTELIN) 137 mcg (0.1 %) nasal spray 2 sprays (274 mcg total) by Nasal route 2 (two) times daily. 30 mL 3    desoximetasone (TOPICORT) 0.25 % cream Apply topically 2 (two) times daily.       No current facility-administered medications on file prior to visit.          Objective Findings:    Vital Signs:  /63   Pulse 70   Ht 5' 10" (1.778 m)   Wt 100 kg (220 lb 7.4 oz)   BMI 31.63 kg/m²   Body mass index is 31.63 kg/m².    Physical Exam:  General Appearance: Well appearing in no acute distress  Head:   Normocephalic, without obvious abnormality  Eyes:    No scleral icterus  ENT: Neck supple, Lips, mucosa, and tongue normal  Lungs: CTA bilaterally in anterior and posterior fields, no wheezes, no crackles.  Heart:  Regular rate and rhythm, S1, S2 normal, no murmurs heard  Abdomen: Soft, non tender, non distended with positive bowel sounds in all four quadrants. No hepatosplenomegaly, ascites, or mass  Extremities: 2+ pulses, no clubbing, cyanosis or edema  Skin: No rash  Neurologic: AAO x 3      Labs:  Lab Results   Component Value Date    WBC 5.53 09/21/2018    WBC 5.53 09/21/2018    HGB 11.5 (L) 09/21/2018    HGB 11.5 (L) 09/21/2018    HCT 36.4 (L) 09/21/2018    HCT 36.4 (L) 09/21/2018     09/21/2018     09/21/2018    CHOL 170 09/21/2018    TRIG 67 09/21/2018    HDL 73 09/21/2018    ALT 23 09/21/2018    ALT 23 09/21/2018    AST 25 09/21/2018    AST 25 09/21/2018     09/21/2018     09/21/2018    K 4.4 09/21/2018    K 4.4 09/21/2018     09/21/2018     09/21/2018    CREATININE 2.0 (H) 09/21/2018    CREATININE 2.0 (H) 09/21/2018    BUN 32 (H) 09/21/2018    BUN 32 (H) 09/21/2018    CO2 24 09/21/2018    CO2 24 09/21/2018    TSH 3.207 09/21/2018    PSA 2.63 08/15/2013    INR 0.9 " 02/22/2016       Imaging:    Endoscopy:    EGD - none  Colonoscopy- 6/2018 - tics, fair prep, f/u in 5yrs    Assessment:  1. Iron deficiency anemia, unspecified iron deficiency anemia type    2. ANNABELLE (obstructive sleep apnea)    3. CKD (chronic kidney disease), stage III    4. Essential hypertension       79 yo M referred for low iron sat seen on lab 1 yr ago. He is also has anemia that has been stable for years. Anemia is likely related to chronic disease. He does take ASA 81mg on empty stomach so is at risk for stomach ulcers. He also drinks alcohol frequently    Recommendations:  1. Labs to rule out iron deficiency  2. Schedule EGD to rule out sources of bleeding    Consider VCE for LUIS.    No Follow-up on file.      Order summary:  Orders Placed This Encounter    Iron and TIBC    Ferritin    Case request GI: EGD (ESOPHAGOGASTRODUODENOSCOPY)         Thank you so much for allowing me to participate in the care of MD Elisa Spence PA-C

## 2018-10-01 ENCOUNTER — OFFICE VISIT (OUTPATIENT)
Dept: RHEUMATOLOGY | Facility: CLINIC | Age: 78
End: 2018-10-01
Payer: MEDICARE

## 2018-10-01 ENCOUNTER — OFFICE VISIT (OUTPATIENT)
Dept: INTERNAL MEDICINE | Facility: CLINIC | Age: 78
End: 2018-10-01
Payer: MEDICARE

## 2018-10-01 VITALS
HEART RATE: 70 BPM | DIASTOLIC BLOOD PRESSURE: 64 MMHG | WEIGHT: 220.19 LBS | SYSTOLIC BLOOD PRESSURE: 129 MMHG | BODY MASS INDEX: 34.56 KG/M2 | HEIGHT: 67 IN

## 2018-10-01 VITALS
DIASTOLIC BLOOD PRESSURE: 54 MMHG | RESPIRATION RATE: 16 BRPM | TEMPERATURE: 98 F | HEIGHT: 67 IN | BODY MASS INDEX: 34.33 KG/M2 | WEIGHT: 218.69 LBS | HEART RATE: 60 BPM | SYSTOLIC BLOOD PRESSURE: 122 MMHG

## 2018-10-01 DIAGNOSIS — G47.33 OSA (OBSTRUCTIVE SLEEP APNEA): ICD-10-CM

## 2018-10-01 DIAGNOSIS — K76.0 NAFLD (NONALCOHOLIC FATTY LIVER DISEASE): ICD-10-CM

## 2018-10-01 DIAGNOSIS — E78.5 HYPERLIPIDEMIA, UNSPECIFIED HYPERLIPIDEMIA TYPE: ICD-10-CM

## 2018-10-01 DIAGNOSIS — I10 ESSENTIAL HYPERTENSION: Chronic | ICD-10-CM

## 2018-10-01 DIAGNOSIS — M1A.9XX1 CHRONIC TOPHACEOUS GOUT: ICD-10-CM

## 2018-10-01 DIAGNOSIS — J45.20 INTERMITTENT ASTHMA WITHOUT COMPLICATION, UNSPECIFIED ASTHMA SEVERITY: ICD-10-CM

## 2018-10-01 DIAGNOSIS — N18.30 CKD (CHRONIC KIDNEY DISEASE), STAGE III: ICD-10-CM

## 2018-10-01 DIAGNOSIS — R00.2 PALPITATIONS: ICD-10-CM

## 2018-10-01 DIAGNOSIS — J31.0 CHRONIC RHINITIS: Chronic | ICD-10-CM

## 2018-10-01 DIAGNOSIS — D64.9 CHRONIC ANEMIA: ICD-10-CM

## 2018-10-01 DIAGNOSIS — Z00.00 WELL ADULT EXAM: Primary | ICD-10-CM

## 2018-10-01 PROCEDURE — 3074F SYST BP LT 130 MM HG: CPT | Mod: CPTII,S$GLB,, | Performed by: INTERNAL MEDICINE

## 2018-10-01 PROCEDURE — 99397 PER PM REEVAL EST PAT 65+ YR: CPT | Mod: 25,S$GLB,, | Performed by: INTERNAL MEDICINE

## 2018-10-01 PROCEDURE — 99999 PR PBB SHADOW E&M-EST. PATIENT-LVL III: CPT | Mod: PBBFAC,,, | Performed by: INTERNAL MEDICINE

## 2018-10-01 PROCEDURE — 90662 IIV NO PRSV INCREASED AG IM: CPT | Mod: S$GLB,,, | Performed by: INTERNAL MEDICINE

## 2018-10-01 PROCEDURE — 93010 ELECTROCARDIOGRAM REPORT: CPT | Mod: ,,, | Performed by: INTERNAL MEDICINE

## 2018-10-01 PROCEDURE — 93005 ELECTROCARDIOGRAM TRACING: CPT | Mod: PBBFAC,PO | Performed by: INTERNAL MEDICINE

## 2018-10-01 PROCEDURE — G0009 ADMIN PNEUMOCOCCAL VACCINE: HCPCS | Mod: S$GLB,,, | Performed by: INTERNAL MEDICINE

## 2018-10-01 PROCEDURE — 99999 PR PBB SHADOW E&M-EST. PATIENT-LVL IV: CPT | Mod: PBBFAC,,, | Performed by: INTERNAL MEDICINE

## 2018-10-01 PROCEDURE — 3078F DIAST BP <80 MM HG: CPT | Mod: CPTII,S$GLB,, | Performed by: INTERNAL MEDICINE

## 2018-10-01 PROCEDURE — 99213 OFFICE O/P EST LOW 20 MIN: CPT | Mod: S$PBB,,, | Performed by: INTERNAL MEDICINE

## 2018-10-01 PROCEDURE — 3074F SYST BP LT 130 MM HG: CPT | Mod: CPTII,,, | Performed by: INTERNAL MEDICINE

## 2018-10-01 PROCEDURE — 3078F DIAST BP <80 MM HG: CPT | Mod: CPTII,,, | Performed by: INTERNAL MEDICINE

## 2018-10-01 PROCEDURE — 99214 OFFICE O/P EST MOD 30 MIN: CPT | Mod: PBBFAC,25 | Performed by: INTERNAL MEDICINE

## 2018-10-01 PROCEDURE — G0008 ADMIN INFLUENZA VIRUS VAC: HCPCS | Mod: S$GLB,,, | Performed by: INTERNAL MEDICINE

## 2018-10-01 PROCEDURE — 90732 PPSV23 VACC 2 YRS+ SUBQ/IM: CPT | Mod: S$GLB,,, | Performed by: INTERNAL MEDICINE

## 2018-10-01 PROCEDURE — 1101F PT FALLS ASSESS-DOCD LE1/YR: CPT | Mod: CPTII,,, | Performed by: INTERNAL MEDICINE

## 2018-10-01 RX ORDER — FELODIPINE 5 MG/1
5 TABLET, EXTENDED RELEASE ORAL DAILY
Qty: 90 TABLET | Refills: 3 | Status: SHIPPED | OUTPATIENT
Start: 2018-10-01 | End: 2019-11-11 | Stop reason: SDUPTHER

## 2018-10-01 RX ORDER — FEBUXOSTAT 80 MG/1
80 TABLET, FILM COATED ORAL DAILY
Qty: 90 TABLET | Refills: 1 | Status: SHIPPED | OUTPATIENT
Start: 2018-10-01 | End: 2019-04-30

## 2018-10-01 RX ORDER — COLCHICINE 0.6 MG/1
TABLET ORAL
Qty: 45 TABLET | Refills: 1 | Status: SHIPPED | OUTPATIENT
Start: 2018-10-01 | End: 2019-10-16 | Stop reason: SDUPTHER

## 2018-10-01 RX ORDER — FLUTICASONE PROPIONATE 50 MCG
2 SPRAY, SUSPENSION (ML) NASAL DAILY
Qty: 48 G | Refills: 3 | Status: SHIPPED | OUTPATIENT
Start: 2018-10-01 | End: 2019-10-16 | Stop reason: SDUPTHER

## 2018-10-01 ASSESSMENT — ROUTINE ASSESSMENT OF PATIENT INDEX DATA (RAPID3)
FATIGUE SCORE: 0
PSYCHOLOGICAL DISTRESS SCORE: 0
PAIN SCORE: 0
MDHAQ FUNCTION SCORE: 0
WHEN YOU AWAKENED IN THE MORNING OVER THE LAST WEEK, PLEASE INDICATE THE AMOUNT OF TIME IT TAKES UNTIL YOU ARE AS LIMBER AS YOU WILL BE FOR THE DAY: 5 MINS
PATIENT GLOBAL ASSESSMENT SCORE: 0
TOTAL RAPID3 SCORE: 0
AM STIFFNESS SCORE: 1, YES

## 2018-10-01 NOTE — ASSESSMENT & PLAN NOTE
SUA 4    Cont febuxostat 80mg daily  Decrease colchicine to 0.3mg daily(1/2 0.6mg daily) if eGFR continues to decline < 30 will plan to stop colchicine  Standing cmp and uric acid in 3 and 6 months  RTC 6 months

## 2018-10-01 NOTE — PROGRESS NOTES
Subjective:       Patient ID: Adelfo VICENTE Rodriguez MD is a 78 y.o. male.    Chief Complaint: Annual Exam    HPI   The patient presents for annual physical examination.  Active medical conditions include hypertension, asthma, chronic kidney disease, hyperlipidemia, obstructive sleep apnea on CPAP therapy, and hyperuricemia with gout.  He has not experienced any recent exacerbations of gout.  He is tolerating his blood pressure medication well without side effects.  His asthma has been stable with good peak flow rates noted at home.  He monitors himself regularly.  He exercises several days a week.  For 27 weeks he has been exercising more regularly and has been changes in his diet.  However, recently he has been noting spikes in his pulse while exercising along with chest tightness.  Symptoms resolve with rest.  He has not had a recent stress test.    Ankle edema is managed with use of Lasix every other day.  He does note mild aching in his leg muscles at night for several months.  No severe muscle spasms are noted.  He is averaging 7-8 hours of sleep at night.  Review of Systems   Constitutional: Negative for activity change, appetite change, chills, fatigue, fever and unexpected weight change.   HENT: Negative for congestion, ear pain, nosebleeds and postnasal drip.    Eyes: Negative for pain, redness, itching and visual disturbance.   Respiratory: Positive for chest tightness. Negative for cough, shortness of breath and wheezing.    Cardiovascular: Positive for palpitations. Negative for chest pain and leg swelling.   Gastrointestinal: Negative for abdominal pain, blood in stool, constipation, nausea and vomiting.   Genitourinary: Negative for difficulty urinating, dysuria, frequency, hematuria and urgency.   Musculoskeletal: Positive for arthralgias and back pain. Negative for gait problem, joint swelling, myalgias, neck pain and neck stiffness.   Skin: Negative for color change and rash.   Neurological: Negative  for dizziness, seizures, syncope, weakness, light-headedness, numbness and headaches.   Hematological: Does not bruise/bleed easily.   Psychiatric/Behavioral: Negative for agitation, confusion, hallucinations and sleep disturbance. The patient is not nervous/anxious.        Objective:      Physical Exam   Constitutional: He is oriented to person, place, and time. He appears well-developed and well-nourished. No distress.   HENT:   Head: Normocephalic and atraumatic.   Right Ear: External ear normal.   Left Ear: External ear normal.   Mouth/Throat: Oropharynx is clear and moist. No oropharyngeal exudate.   Eyes: Conjunctivae and EOM are normal. Pupils are equal, round, and reactive to light. No scleral icterus.   Neck: Normal range of motion. Neck supple. No JVD present. No thyromegaly present.   Cardiovascular: Normal rate, regular rhythm, normal heart sounds and intact distal pulses. Exam reveals no gallop and no friction rub.   No murmur heard.  Pulmonary/Chest: Effort normal and breath sounds normal. No respiratory distress. He has no wheezes. He has no rales.   Abdominal: Soft. Bowel sounds are normal. He exhibits no mass. There is no tenderness.   Genitourinary: Penis normal.   Genitourinary Comments:   No inguinal hernia.  No testicular masses.   Musculoskeletal: Normal range of motion. He exhibits no edema or tenderness.   Lymphadenopathy:     He has no cervical adenopathy.   Neurological: He is alert and oriented to person, place, and time. No cranial nerve deficit. He exhibits normal muscle tone.   Skin: Skin is warm and dry. No rash noted.   Psychiatric: He has a normal mood and affect. His behavior is normal.       EKG today shows a normal sinus rhythm with ventricular rate of 61. No acute ST-T wave changes.  Normal EKG.    Results for orders placed or performed in visit on 09/24/18   Iron and TIBC   Result Value Ref Range    Iron 104 45 - 160 ug/dL    Transferrin 300 200 - 375 mg/dL    TIBC 444 250 - 450  ug/dL    Saturated Iron 23 20 - 50 %   Ferritin   Result Value Ref Range    Ferritin 45 20.0 - 300.0 ng/mL       Other labs were reviewed with the patient.   Assessment:       1. Well adult exam    2. Essential hypertension    3. Chronic rhinitis    4. Intermittent asthma without complication, unspecified asthma severity    5. CKD (chronic kidney disease), stage III    6. Chronic anemia    7. NAFLD (nonalcoholic fatty liver disease)    8. Chronic tophaceous gout    9. ANNABELLE (obstructive sleep apnea)    10. Hyperlipidemia, unspecified hyperlipidemia type    11. Palpitations        Plan:       Adelfo was seen today for annual exam. A stress echocardiogram will be obtained in view recent cardiac symptoms.   He continues on low-dose aspirin. The influenza vaccine and Pneumovax will be administered today.  A prescription for the shingles vaccine was given to the patient to take to his pharmacist.  Flonase will be renewed for rhinitis symptoms.  Routine medications will be continued.  The patient plans to see his dermatologist later this week for general skin check.  He recently had a skin cancer removed from his left hand.    For follow-up visit in 6 months is recommended.    Diagnoses and all orders for this visit:    Well adult exam    Essential hypertension    Chronic rhinitis    Intermittent asthma without complication, unspecified asthma severity    CKD (chronic kidney disease), stage III    Chronic anemia    NAFLD (nonalcoholic fatty liver disease)    Chronic tophaceous gout    ANNABELLE (obstructive sleep apnea)    Hyperlipidemia, unspecified hyperlipidemia type    Palpitations  -     EKG 12-lead; Future  -     Exercise stress echo; Future    Other orders  -     fluticasone (FLONASE) 50 mcg/actuation nasal spray; 2 sprays (100 mcg total) by Each Nare route once daily.  -     felodipine (PLENDIL) 5 MG 24 hr tablet; Take 1 tablet (5 mg total) by mouth once daily.  -     varicella-zoster gE-AS01B, PF, (SHINGRIX, PF,) 50  mcg/0.5 mL injection; Inject 0.5 mLs into the muscle once. for 1 dose  -     (In Office Administered) Pneumococcal Polysaccharide Vaccine (23 Valent) (SQ/IM)  -     Influenza - High Dose (65+) (PF) (IM)

## 2018-10-01 NOTE — PROGRESS NOTES
"Subjective:       Patient ID: Adelfo ZHANG MD Jennifer is a 78 y.o. male.    Chief Complaint: Tophaceous gout  HPI no acute gout. Some numbness left great toe dates to surgery by Dr. Trent with removal of bone spur left dorsal foot. Some low back right>left, no current left lumbar radiculopathy. Started exercise 4-5 times/wk 30-45 min. Since last visit 3# decrease in weight.   Review of Systems   Constitutional: Negative for appetite change, chills, fatigue, fever and unexpected weight change.   HENT: Negative for mouth sores and trouble swallowing.    Eyes: Negative for pain, redness and visual disturbance.   Respiratory: Negative for cough, shortness of breath and wheezing.    Cardiovascular: Negative for chest pain, palpitations and leg swelling.   Gastrointestinal: Negative for abdominal pain, blood in stool, constipation, diarrhea and nausea.   Genitourinary: Negative for difficulty urinating, dysuria, genital sores and hematuria.   Musculoskeletal: Negative for arthralgias, back pain, gait problem, joint swelling, myalgias, neck pain and neck stiffness.   Skin: Negative for color change, pallor and rash.   Neurological: Negative for weakness and headaches.   Hematological: Bruises/bleeds easily.   Psychiatric/Behavioral: Negative for dysphoric mood and sleep disturbance.         Objective:   /64   Pulse 70   Ht 5' 7.2" (1.707 m)   Wt 99.9 kg (220 lb 3.2 oz)   BMI 34.28 kg/m²      Physical Exam   Constitutional: He is oriented to person, place, and time and well-developed, well-nourished, and in no distress.   HENT:   Head: Normocephalic and atraumatic.   Mouth/Throat: Oropharynx is clear and moist.   Eyes: Conjunctivae and EOM are normal. Pupils are equal, round, and reactive to light.   Neck: Normal range of motion. Neck supple. No thyromegaly present.   No cervical bruits   Cardiovascular: Normal rate, regular rhythm, normal heart sounds and intact distal pulses.  Exam reveals no gallop and no " friction rub.    No murmur heard.  Pulmonary/Chest: Breath sounds normal. He has no wheezes. He has no rales. He exhibits no tenderness.   Abdominal: Soft. He exhibits no distension and no mass. There is no tenderness.       Right Side Rheumatological Exam     Examination finds the shoulder, elbow, wrist, knee, 1st PIP, 1st MCP, 2nd PIP, 2nd MCP, 3rd PIP, 3rd MCP, 4th PIP, 4th MCP, 5th PIP and 5th MCP normal.    The patient has an enlarged 1st MTP    Left Side Rheumatological Exam     Examination finds the shoulder, elbow, wrist, knee, 1st PIP, 1st MCP, 2nd PIP, 2nd MCP, 3rd PIP, 3rd MCP, 4th PIP, 4th MCP, 5th PIP and 5th MCP normal.    The patient has an enlarged 1st MTP.      Lymphadenopathy:     He has no cervical adenopathy.   Neurological: He is alert and oriented to person, place, and time. He displays normal reflexes. He exhibits normal muscle tone. Gait normal.   Motor strength nl. UE and LE bilateral   Skin: Skin is warm and dry. No rash noted. No erythema. No pallor.     Psychiatric: Mood, memory, affect and judgment normal.       Results for MCKINLEY BISHOP MD (MRN [id number]) as of 10/1/2018 11:11   Ref. Range 9/21/2018 08:21 9/21/2018 09:08 9/24/2018 14:25   WBC Latest Ref Range: 3.90 - 12.70 K/uL 5.53     RBC Latest Ref Range: 4.60 - 6.20 M/uL 3.66 (L)     Hemoglobin Latest Ref Range: 14.0 - 18.0 g/dL 11.5 (L)     Hematocrit Latest Ref Range: 40.0 - 54.0 % 36.4 (L)     MCV Latest Ref Range: 82 - 98 fL 100 (H)     MCH Latest Ref Range: 27.0 - 31.0 pg 31.4 (H)     MCHC Latest Ref Range: 32.0 - 36.0 g/dL 31.6 (L)     RDW Latest Ref Range: 11.5 - 14.5 % 13.2     Platelets Latest Ref Range: 150 - 350 K/uL 191     MPV Latest Ref Range: 9.2 - 12.9 fL 12.7     Gran% Latest Ref Range: 38.0 - 73.0 % 49.5     Gran # (ANC) Latest Ref Range: 1.8 - 7.7 K/uL 2.7     Immature Granulocytes Latest Ref Range: 0.0 - 0.5 % 0.2     Immature Grans (Abs) Latest Ref Range: 0.00 - 0.04 K/uL 0.01     Lymph% Latest Ref Range:  18.0 - 48.0 % 27.5     Lymph # Latest Ref Range: 1.0 - 4.8 K/uL 1.5     Mono% Latest Ref Range: 4.0 - 15.0 % 15.0     Mono # Latest Ref Range: 0.3 - 1.0 K/uL 0.8     Eosinophil% Latest Ref Range: 0.0 - 8.0 % 6.5     Eos # Latest Ref Range: 0.0 - 0.5 K/uL 0.4     Basophil% Latest Ref Range: 0.0 - 1.9 % 1.3     Baso # Latest Ref Range: 0.00 - 0.20 K/uL 0.07     nRBC Latest Ref Range: 0 /100 WBC 0     Iron Latest Ref Range: 45 - 160 ug/dL   104   TIBC Latest Ref Range: 250 - 450 ug/dL   444   Saturated Iron Latest Ref Range: 20 - 50 %   23   Transferrin Latest Ref Range: 200 - 375 mg/dL   300   Ferritin Latest Ref Range: 20.0 - 300.0 ng/mL   45   Sodium Latest Ref Range: 136 - 145 mmol/L 140     Potassium Latest Ref Range: 3.5 - 5.1 mmol/L 4.4     Chloride Latest Ref Range: 95 - 110 mmol/L 108     CO2 Latest Ref Range: 23 - 29 mmol/L 24     Anion Gap Latest Ref Range: 8 - 16 mmol/L 8     BUN, Bld Latest Ref Range: 8 - 23 mg/dL 32 (H)     Creatinine Latest Ref Range: 0.5 - 1.4 mg/dL 2.0 (H)     eGFR if non African American Latest Ref Range: >60 mL/min/1.73 m^2 31.0 (A)     eGFR if African American Latest Ref Range: >60 mL/min/1.73 m^2 35.9 (A)     Glucose Latest Ref Range: 70 - 110 mg/dL 87     Calcium Latest Ref Range: 8.7 - 10.5 mg/dL 9.6     Alkaline Phosphatase Latest Ref Range: 55 - 135 U/L 80     Total Protein Latest Ref Range: 6.0 - 8.4 g/dL 6.8     Albumin Latest Ref Range: 3.5 - 5.2 g/dL 3.9     Total Bilirubin Latest Ref Range: 0.1 - 1.0 mg/dL 0.6     AST Latest Ref Range: 10 - 40 U/L 25     ALT Latest Ref Range: 10 - 44 U/L 23     Specimen UA Unknown  Urine, Clean Catch    Color, UA Latest Ref Range: Yellow, Straw, Madelaine   Yellow    pH, UA Latest Ref Range: 5.0 - 8.0   5.0    Specific Gravity, UA Latest Ref Range: 1.005 - 1.030   1.010    Appearance, UA Latest Ref Range: Clear   Clear    Protein, UA Latest Ref Range: Negative   Negative    Glucose, UA Latest Ref Range: Negative   Negative    Ketones, UA  Latest Ref Range: Negative   Negative    Occult Blood UA Latest Ref Range: Negative   Negative    Nitrite, UA Latest Ref Range: Negative   Negative    Urobilinogen, UA Latest Ref Range: <2.0 EU/dL  Negative    Bilirubin (UA) Latest Ref Range: Negative   Negative    Leukocytes, UA Latest Ref Range: Negative   Negative    Differential Method Unknown Automated       Assessment/Plan         Problem List Items Addressed This Visit     Chronic tophaceous gout    Overview     Results for MCKINLEY BISHOP MD (MRN [id number]) as of 10/1/2018 11:11   Ref. Range 8/2/2018 09:46 9/21/2018 08:21 9/21/2018 08:21 9/21/2018 09:08   eGFR if non African American Latest Ref Range: >60 mL/min/1.73 m^2 31.3 (A) 31.0 (A) 31.0 (A)             Current Assessment & Plan     SUA 4    Cont febuxostat 80mg daily  Decrease colchicine to 0.3mg daily(1/2 0.6mg daily) if eGFR continues to decline < 30 will plan to stop colchicine  Standing cmp and uric acid in 3 and 6 months  RTC 6 months         Essential hypertension (Chronic)    Current Assessment & Plan     129/64    On felodipine 10mg daily  Furosemide 40mg daily  Irbesartan 150mg daily  F/u Dr. Vogel as scheduled this pm           Hyperlipidemia    Overview     Results for MCKINLEY BISHOP MD (MRN [id number]) as of 10/1/2018 11:29   Ref. Range 9/21/2018 08:21   Cholesterol Latest Ref Range: 120 - 199 mg/dL 170   HDL Latest Ref Range: 40 - 75 mg/dL 73   LDL Cholesterol Latest Ref Range: 63.0 - 159.0 mg/dL 83.6   Total Cholesterol/HDL Ratio Latest Ref Range: 2.0 - 5.0  2.3   Triglycerides Latest Ref Range: 30 - 150 mg/dL 67            Current Assessment & Plan     Cont atorvastatin 20mg daily         NAFLD (nonalcoholic fatty liver disease)    Current Assessment & Plan     Results for MCKINLEY BISHOP MD (MRN [id number]) as of 10/1/2018 11:29   Ref. Range 9/21/2018 08:21   AST Latest Ref Range: 10 - 40 U/L 25   ALT Latest Ref Range: 10 - 44 U/L 23       Cont exercise program and weight  reduction

## 2018-10-01 NOTE — ASSESSMENT & PLAN NOTE
129/64    On felodipine 10mg daily  Furosemide 40mg daily  Irbesartan 150mg daily  F/u Dr. Vogel as scheduled this pm

## 2018-10-01 NOTE — ASSESSMENT & PLAN NOTE
Results for MCKINLEY BISHOP MD (MRN [id number]) as of 10/1/2018 11:29   Ref. Range 9/21/2018 08:21   AST Latest Ref Range: 10 - 40 U/L 25   ALT Latest Ref Range: 10 - 44 U/L 23       Cont exercise program and weight reduction

## 2018-10-02 ENCOUNTER — TELEPHONE (OUTPATIENT)
Dept: INTERNAL MEDICINE | Facility: CLINIC | Age: 78
End: 2018-10-02

## 2018-10-02 ENCOUNTER — PATIENT MESSAGE (OUTPATIENT)
Dept: INTERNAL MEDICINE | Facility: CLINIC | Age: 78
End: 2018-10-02

## 2018-10-02 NOTE — TELEPHONE ENCOUNTER
----- Message from Liset Espinal sent at 10/2/2018 12:23 PM CDT -----  Contact: Dr.Melvin Cartagena [phone] Option 4  Doctor states that he is calling for a scheduled pier to pier call with . He states that this is in regards to a stress echo on the pt. Please call back and reschedule.      Thanks

## 2018-10-02 NOTE — TELEPHONE ENCOUNTER
Authorization for the patient's exercise stress echocardiogram was obtained and is valid from 10/02/2018 through 11/16/2018.      Authorization number:     [id number]

## 2018-10-03 ENCOUNTER — CLINICAL SUPPORT (OUTPATIENT)
Dept: CARDIOLOGY | Facility: CLINIC | Age: 78
End: 2018-10-03
Attending: INTERNAL MEDICINE
Payer: MEDICARE

## 2018-10-03 DIAGNOSIS — R00.2 PALPITATIONS: ICD-10-CM

## 2018-10-03 LAB
DIASTOLIC DYSFUNCTION: NO
RETIRED EF AND QEF - SEE NOTES: 65 (ref 55–65)

## 2018-10-03 PROCEDURE — 93321 DOPPLER ECHO F-UP/LMTD STD: CPT | Mod: 26,S$PBB,, | Performed by: INTERNAL MEDICINE

## 2018-10-03 PROCEDURE — 93351 STRESS TTE COMPLETE: CPT | Mod: PBBFAC,PO | Performed by: INTERNAL MEDICINE

## 2018-10-04 ENCOUNTER — TELEPHONE (OUTPATIENT)
Dept: INTERNAL MEDICINE | Facility: CLINIC | Age: 78
End: 2018-10-04

## 2018-10-04 ENCOUNTER — PATIENT MESSAGE (OUTPATIENT)
Dept: INTERNAL MEDICINE | Facility: CLINIC | Age: 78
End: 2018-10-04

## 2018-10-04 DIAGNOSIS — I47.10 SVT (SUPRAVENTRICULAR TACHYCARDIA): ICD-10-CM

## 2018-10-04 DIAGNOSIS — R94.39 ABNORMAL STRESS ECHO: Primary | ICD-10-CM

## 2018-10-04 NOTE — TELEPHONE ENCOUNTER
I did leave a voicemail message for the patient regarding the abnormal stress echocardiogram results.  Follow-up consultation with Cardiology will be ordered.

## 2018-10-22 ENCOUNTER — OFFICE VISIT (OUTPATIENT)
Dept: CARDIOLOGY | Facility: CLINIC | Age: 78
End: 2018-10-22
Payer: MEDICARE

## 2018-10-22 VITALS
HEIGHT: 70 IN | BODY MASS INDEX: 31.72 KG/M2 | DIASTOLIC BLOOD PRESSURE: 64 MMHG | WEIGHT: 221.56 LBS | SYSTOLIC BLOOD PRESSURE: 138 MMHG | HEART RATE: 80 BPM

## 2018-10-22 DIAGNOSIS — I25.118 CORONARY ARTERY DISEASE OF NATIVE ARTERY OF NATIVE HEART WITH STABLE ANGINA PECTORIS: Primary | ICD-10-CM

## 2018-10-22 DIAGNOSIS — E66.9 OBESITY (BMI 30-39.9): ICD-10-CM

## 2018-10-22 DIAGNOSIS — I10 ESSENTIAL HYPERTENSION: Chronic | ICD-10-CM

## 2018-10-22 DIAGNOSIS — E78.5 HYPERLIPIDEMIA, UNSPECIFIED HYPERLIPIDEMIA TYPE: ICD-10-CM

## 2018-10-22 DIAGNOSIS — N18.30 CKD (CHRONIC KIDNEY DISEASE), STAGE III: ICD-10-CM

## 2018-10-22 DIAGNOSIS — I12.9 HYPERTENSIVE KIDNEY DISEASE: ICD-10-CM

## 2018-10-22 PROCEDURE — 3078F DIAST BP <80 MM HG: CPT | Mod: CPTII,,, | Performed by: INTERNAL MEDICINE

## 2018-10-22 PROCEDURE — 99215 OFFICE O/P EST HI 40 MIN: CPT | Mod: PBBFAC | Performed by: INTERNAL MEDICINE

## 2018-10-22 PROCEDURE — 99214 OFFICE O/P EST MOD 30 MIN: CPT | Mod: S$PBB,,, | Performed by: INTERNAL MEDICINE

## 2018-10-22 PROCEDURE — 3075F SYST BP GE 130 - 139MM HG: CPT | Mod: CPTII,,, | Performed by: INTERNAL MEDICINE

## 2018-10-22 PROCEDURE — 1101F PT FALLS ASSESS-DOCD LE1/YR: CPT | Mod: CPTII,,, | Performed by: INTERNAL MEDICINE

## 2018-10-22 PROCEDURE — 99999 PR PBB SHADOW E&M-EST. PATIENT-LVL V: CPT | Mod: PBBFAC,,, | Performed by: INTERNAL MEDICINE

## 2018-10-22 RX ORDER — ATORVASTATIN CALCIUM 40 MG/1
40 TABLET, FILM COATED ORAL DAILY
Qty: 90 TABLET | Refills: 3 | Status: SHIPPED | OUTPATIENT
Start: 2018-10-22 | End: 2019-02-20

## 2018-10-22 RX ORDER — NITROGLYCERIN 0.4 MG/1
0.4 TABLET SUBLINGUAL EVERY 5 MIN PRN
Qty: 60 TABLET | Refills: 3 | Status: SHIPPED | OUTPATIENT
Start: 2018-10-22 | End: 2019-02-20

## 2018-10-22 NOTE — PROGRESS NOTES
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 On an annual physical 10/1/18 he reported chest tightness with exercise. A stress echo was ordered and was reported as abnormal [see below]. He has noted this tightness assoicated with an increase in his heart rate since  July. He is exercising 4 days a week but his that discomfort  about 4 times a month. He is retired [employer] at [employer]. He has a LHC at  [facility] [ [facility]]mid to late 90s that was negative. He has been a non smoker of decades and no family history of CAD.            CONCLUSIONS     1 - Normal left ventricular systolic function (EF 60-65%).     2 - No wall motion abnormalities.     3 - Biatrial enlargement.     4 - Mildly enlarged ascending aorta.     5 - Normal left ventricular diastolic function.     6 - Normal right ventricular systolic function .     Positive stress echocardiographic study demonstrating an ischemic response involving the apical inferior wall.     2mm ST depression with exercise    This document has been electronically    SIGNED BY: Elda Leyva MD On: 10/03/2018 16:41]  Lab Results   Component Value Date     09/21/2018     09/21/2018    K 4.4 09/21/2018    K 4.4 09/21/2018     09/21/2018     09/21/2018    CO2 24 09/21/2018    CO2 24 09/21/2018    BUN 32 (H) 09/21/2018    BUN 32 (H) 09/21/2018    CREATININE 2.0 (H) 09/21/2018    CREATININE 2.0 (H) 09/21/2018    GLU 87 09/21/2018    GLU 87 09/21/2018    AST 25 09/21/2018    AST 25 09/21/2018    ALT 23 09/21/2018    ALT 23 09/21/2018    ALBUMIN 3.9 09/21/2018    ALBUMIN 3.9 09/21/2018    PROT 6.8 09/21/2018    PROT 6.8 09/21/2018    BILITOT 0.6 09/21/2018    BILITOT 0.6 09/21/2018    WBC 5.53 09/21/2018    WBC 5.53 09/21/2018     HGB 11.5 (L) 09/21/2018    HGB 11.5 (L) 09/21/2018    HCT 36.4 (L) 09/21/2018    HCT 36.4 (L) 09/21/2018     (H) 09/21/2018     (H) 09/21/2018     09/21/2018     09/21/2018    INR 0.9 02/22/2016    PSA 2.63 08/15/2013    TSH 3.207 09/21/2018         Lab Results   Component Value Date    CHOL 170 09/21/2018    HDL 73 09/21/2018    TRIG 67 09/21/2018       Lab Results   Component Value Date    LDLCALC 83.6 09/21/2018       Past Medical History:   Diagnosis Date    ALLERGIC RHINITIS     Anemia     Asthma     Hyperlipidemia     Hypertension     NAFLD (nonalcoholic fatty liver disease)     ANNABELLE (obstructive sleep apnea)     on CPAP       Current Outpatient Medications:     ADVAIR DISKUS 250-50 mcg/dose diskus inhaler, inhale 1 dose by mouth twice a day Rinse mouth after use, Disp: 60 each, Rfl: 5    albuterol (PROAIR HFA) 90 mcg/actuation inhaler, Inhale 2 puffs into the lungs every 4 (four) hours as needed for Wheezing., Disp: 1 Inhaler, Rfl: 6    aspirin (ECOTRIN) 81 MG EC tablet, Take 81 mg by mouth once daily.  , Disp: , Rfl:     colchicine (COLCRYS) 0.6 mg tablet, 0.3mg( 1/2 0.6mg tablet) daily, Disp: 45 tablet, Rfl: 1    febuxostat (ULORIC) 80 mg Tab, Take 1 tablet (80 mg total) by mouth once daily., Disp: 90 tablet, Rfl: 1    felodipine (PLENDIL) 5 MG 24 hr tablet, Take 1 tablet (5 mg total) by mouth once daily., Disp: 90 tablet, Rfl: 3    fish oil-omega-3 fatty acids 300-1,000 mg capsule, Take 2 g by mouth once daily.  , Disp: , Rfl:     fluticasone (FLONASE) 50 mcg/actuation nasal spray, 2 sprays (100 mcg total) by Each Nare route once daily., Disp: 48 g, Rfl: 3    furosemide (LASIX) 40 MG tablet, Take 1 tablet p.o. twice a week as needed for fluid retention., Disp: 90 tablet, Rfl: 3    glucosamine & chondroit sul.Na 750-600 mg Tab, Take 750 mg by mouth., Disp: , Rfl:     irbesartan (AVAPRO) 150 MG tablet, Take 1 tablet (150 mg total) by mouth once daily. For blood  pressure control., Disp: 30 tablet, Rfl: 5    loratadine (CLARITIN) 10 mg tablet, Take 10 mg by mouth daily as needed.  , Disp: , Rfl:     atorvastatin (LIPITOR) 40 MG tablet, Take 1 tablet (40 mg total) by mouth once daily. For cholesterol control., Disp: 90 tablet, Rfl: 3    azelastine (ASTELIN) 137 mcg (0.1 %) nasal spray, 2 sprays (274 mcg total) by Nasal route 2 (two) times daily., Disp: 30 mL, Rfl: 3    desoximetasone (TOPICORT) 0.25 % cream, Apply topically 2 (two) times daily., Disp: , Rfl:     guaifenesin (MUCINEX) 600 mg 12 hr tablet, Take 1,200 mg by mouth 2 (two) times daily., Disp: , Rfl:     ketoconazole (NIZORAL) 2 % shampoo, WASH affected area every other day, Disp: 120 mL, Rfl: 0    nitroGLYCERIN (NITROSTAT) 0.4 MG SL tablet, Place 1 tablet (0.4 mg total) under the tongue every 5 (five) minutes as needed for Chest pain., Disp: 60 tablet, Rfl: 3    triamcinolone acetonide 0.1% (KENALOG) 0.1 % cream, Apply topically 2 (two) times daily., Disp: 80 g, Rfl: 2          Review of Systems   Constitution: Negative for decreased appetite, diaphoresis, fever, weakness, malaise/fatigue, weight gain and weight loss.   HENT: Negative for congestion, ear discharge, ear pain and nosebleeds.    Eyes: Negative for blurred vision, double vision and visual disturbance.   Cardiovascular: Positive for chest pain and palpitations. Negative for claudication, cyanosis, dyspnea on exertion, irregular heartbeat, leg swelling, near-syncope, orthopnea, paroxysmal nocturnal dyspnea and syncope.   Respiratory: Negative for cough, hemoptysis, shortness of breath, sleep disturbances due to breathing, snoring, sputum production and wheezing.    Endocrine: Negative for polydipsia, polyphagia and polyuria.   Hematologic/Lymphatic: Negative for adenopathy and bleeding problem. Does not bruise/bleed easily.   Skin: Negative for color change, nail changes, poor wound healing and rash.   Musculoskeletal: Negative for muscle cramps  "and muscle weakness.   Gastrointestinal: Positive for change in bowel habit. Negative for abdominal pain, anorexia, hematochezia, nausea and vomiting.   Genitourinary: Negative for dysuria, frequency and hematuria.   Neurological: Negative for brief paralysis, difficulty with concentration, excessive daytime sleepiness, dizziness, focal weakness, headaches, light-headedness, seizures and vertigo.   Psychiatric/Behavioral: Negative for altered mental status and depression.   Allergic/Immunologic: Negative for persistent infections.        Objective:/64   Pulse 80   Ht 5' 10" (1.778 m)   Wt 100.5 kg (221 lb 9 oz)   BMI 31.79 kg/m²             Physical Exam   Constitutional: He is oriented to person, place, and time. He appears well-developed and well-nourished.   obese   HENT:   Head: Normocephalic.   Right Ear: External ear normal.   Left Ear: External ear normal.   Nose: Nose normal.   Inspection of lips, teeth and gums normal   Eyes: EOM are normal. Pupils are equal, round, and reactive to light. No scleral icterus.   Neck: Normal range of motion. Neck supple. No JVD present. No tracheal deviation present. No thyromegaly present.   Cardiovascular: Normal rate, regular rhythm and intact distal pulses. Exam reveals no gallop and no friction rub.   No murmur heard.  Pulses:       Carotid pulses are 2+ on the right side, and 2+ on the left side.       Dorsalis pedis pulses are 2+ on the right side, and 2+ on the left side.        Posterior tibial pulses are 2+ on the right side, and 2+ on the left side.   Pulmonary/Chest: Effort normal and breath sounds normal.   Abdominal: Bowel sounds are normal. He exhibits no distension. There is no hepatosplenomegaly. There is no tenderness. There is no guarding.   Musculoskeletal: Normal range of motion. He exhibits no edema or tenderness.   Lymphadenopathy:   Palpation of neck and groin lymph nodes normal   Neurological: He is alert and oriented to person, place, and " time. No cranial nerve deficit. He exhibits normal muscle tone. Coordination normal.   Skin: Skin is dry.   Palpation of skin normal   Psychiatric: His behavior is normal. Judgment and thought content normal.         Assessment:       1. Coronary artery disease of native artery of native heart with stable angina pectoris    2. Essential hypertension    3. Hyperlipidemia, unspecified hyperlipidemia type    4. Hypertensive kidney disease    5. CKD (chronic kidney disease), stage III    6. Obesity (BMI 30-39.9)         Plan:       Adelfo was seen today for hypertension and hyperlipidemia.    Diagnoses and all orders for this visit:    Coronary artery disease of native artery of native heart with stable angina pectoris  -     Ambulatory Referral to Interventional Cardiology  -     atorvastatin (LIPITOR) 40 MG tablet; Take 1 tablet (40 mg total) by mouth once daily. For cholesterol control.    Essential hypertension    Hyperlipidemia, unspecified hyperlipidemia type  -     atorvastatin (LIPITOR) 40 MG tablet; Take 1 tablet (40 mg total) by mouth once daily. For cholesterol control.    Hypertensive kidney disease    CKD (chronic kidney disease), stage III    Obesity (BMI 30-39.9)    Other orders  -     nitroGLYCERIN (NITROSTAT) 0.4 MG SL tablet; Place 1 tablet (0.4 mg total) under the tongue every 5 (five) minutes as needed for Chest pain.

## 2018-10-22 NOTE — LETTER
October 22, 2018      Romero Vogel MD  [address] [place] [address]           [facility]  [address]  [place] [address]  Phone: [phone]          Patient: Adelfo Rodriguez MD   MR Number: [id number]   YOB: 1940   Date of Visit: 10/22/2018       Dear Dr. Romero Vogel:    Thank you for referring Adelfo Rodriguez to me for evaluation. Attached you will find relevant portions of my assessment and plan of care.    If you have questions, please do not hesitate to call me. I look forward to following Adelfo Roxidomingo along with you.    Sincerely,    Isac Morales MD    Enclosure  CC:  No Recipients    If you would like to receive this communication electronically, please contact [email or web address] or [phone] to request more information on [employer] Link access.    For providers and/or their staff who would like to refer a patient to [facility], please contact us through our one-stop-shop provider referral line, [facility], at [phone].    If you feel you have received this communication in error or would no longer like to receive these types of communications, please e-mail [email or web address]

## 2018-10-23 ENCOUNTER — HOSPITAL ENCOUNTER (OUTPATIENT)
Facility: HOSPITAL | Age: 78
Discharge: HOME | End: 2018-10-23
Attending: INTERNAL MEDICINE | Admitting: INTERNAL MEDICINE
Payer: MEDICARE

## 2018-10-23 ENCOUNTER — ANESTHESIA (OUTPATIENT)
Dept: ENDOSCOPY | Facility: HOSPITAL | Age: 78
End: 2018-10-23
Payer: MEDICARE

## 2018-10-23 ENCOUNTER — ANESTHESIA EVENT (OUTPATIENT)
Dept: ENDOSCOPY | Facility: HOSPITAL | Age: 78
End: 2018-10-23
Payer: MEDICARE

## 2018-10-23 VITALS
HEART RATE: 60 BPM | WEIGHT: 217 LBS | BODY MASS INDEX: 31.07 KG/M2 | SYSTOLIC BLOOD PRESSURE: 130 MMHG | OXYGEN SATURATION: 99 % | DIASTOLIC BLOOD PRESSURE: 70 MMHG | HEIGHT: 70 IN | TEMPERATURE: 98 F | RESPIRATION RATE: 20 BRPM

## 2018-10-23 DIAGNOSIS — E66.9 OBESITY (BMI 30-39.9): Primary | ICD-10-CM

## 2018-10-23 DIAGNOSIS — K21.9 GERD (GASTROESOPHAGEAL REFLUX DISEASE): ICD-10-CM

## 2018-10-23 PROCEDURE — 63600175 PHARM REV CODE 636 W HCPCS: Performed by: NURSE ANESTHETIST, CERTIFIED REGISTERED

## 2018-10-23 PROCEDURE — 37000008 HC ANESTHESIA 1ST 15 MINUTES: Performed by: INTERNAL MEDICINE

## 2018-10-23 PROCEDURE — 88305 TISSUE EXAM BY PATHOLOGIST: CPT | Mod: 26,,, | Performed by: PATHOLOGY

## 2018-10-23 PROCEDURE — 43239 EGD BIOPSY SINGLE/MULTIPLE: CPT | Mod: ,,, | Performed by: INTERNAL MEDICINE

## 2018-10-23 PROCEDURE — 37000009 HC ANESTHESIA EA ADD 15 MINS: Performed by: INTERNAL MEDICINE

## 2018-10-23 PROCEDURE — 27201012 HC FORCEPS, HOT/COLD, DISP: Performed by: INTERNAL MEDICINE

## 2018-10-23 PROCEDURE — 25000003 PHARM REV CODE 250: Performed by: INTERNAL MEDICINE

## 2018-10-23 PROCEDURE — E9220 PRA ENDO ANESTHESIA: HCPCS | Mod: ,,, | Performed by: NURSE ANESTHETIST, CERTIFIED REGISTERED

## 2018-10-23 PROCEDURE — 43239 EGD BIOPSY SINGLE/MULTIPLE: CPT | Performed by: INTERNAL MEDICINE

## 2018-10-23 PROCEDURE — 88305 TISSUE EXAM BY PATHOLOGIST: CPT | Performed by: PATHOLOGY

## 2018-10-23 RX ORDER — OMEPRAZOLE 40 MG/1
40 CAPSULE, DELAYED RELEASE ORAL EVERY MORNING
Qty: 30 CAPSULE | Refills: 3 | Status: SHIPPED | OUTPATIENT
Start: 2018-10-23 | End: 2019-02-11 | Stop reason: SDUPTHER

## 2018-10-23 RX ORDER — PROPOFOL 10 MG/ML
VIAL (ML) INTRAVENOUS
Status: DISCONTINUED | OUTPATIENT
Start: 2018-10-23 | End: 2018-10-23

## 2018-10-23 RX ORDER — SODIUM CHLORIDE 9 MG/ML
INJECTION, SOLUTION INTRAVENOUS CONTINUOUS
Status: DISCONTINUED | OUTPATIENT
Start: 2018-10-23 | End: 2018-10-23 | Stop reason: HOSPADM

## 2018-10-23 RX ORDER — LIDOCAINE HCL/PF 100 MG/5ML
SYRINGE (ML) INTRAVENOUS
Status: DISCONTINUED | OUTPATIENT
Start: 2018-10-23 | End: 2018-10-23

## 2018-10-23 RX ORDER — SODIUM CHLORIDE 0.9 % (FLUSH) 0.9 %
3 SYRINGE (ML) INJECTION
Status: DISCONTINUED | OUTPATIENT
Start: 2018-10-23 | End: 2018-10-23 | Stop reason: HOSPADM

## 2018-10-23 RX ADMIN — PROPOFOL 80 MG: 10 INJECTION, EMULSION INTRAVENOUS at 01:10

## 2018-10-23 RX ADMIN — PROPOFOL 20 MG: 10 INJECTION, EMULSION INTRAVENOUS at 01:10

## 2018-10-23 RX ADMIN — LIDOCAINE HYDROCHLORIDE 100 MG: 20 INJECTION, SOLUTION INTRAVENOUS at 01:10

## 2018-10-23 RX ADMIN — SODIUM CHLORIDE: 0.9 INJECTION, SOLUTION INTRAVENOUS at 01:10

## 2018-10-23 NOTE — INTERVAL H&P NOTE
The patient has been examined and the H&P has been reviewed:    I concur with the findings and no changes have occurred since H&P was written.   History and Exam unchanged from visit.    Procedure - EGD  Neck - supple  Plan of anesthesia - General  ASA - per anesthesia  Mallampati - per anesthesia  Anesthesia problems - no  Family history of anesthesia problems - no      Anesthesia/Surgery risks, benefits and alternative options discussed and understood by patient/family.          Active Hospital Problems    Diagnosis  POA    GERD (gastroesophageal reflux disease) [K21.9]  Yes      Resolved Hospital Problems   No resolved problems to display.

## 2018-10-23 NOTE — TRANSFER OF CARE
"Anesthesia Transfer of Care Note    Patient: Adelfo Rodriguez MD    Procedure(s) Performed: Procedure(s) (LRB):  EGD (ESOPHAGOGASTRODUODENOSCOPY) (N/A)    Patient location: PACU    Anesthesia Type: general    Transport from OR: Transported from OR on room air with adequate spontaneous ventilation    Post pain: adequate analgesia    Post assessment: no apparent anesthetic complications and tolerated procedure well    Post vital signs: stable    Level of consciousness: awake, alert and oriented    Nausea/Vomiting: no nausea/vomiting    Complications: none    Transfer of care protocol was followed      Last vitals:   Visit Vitals  /63 (BP Location: Left arm, Patient Position: Lying)   Pulse 63   Temp 36.6 °C (97.9 °F) (Oral)   Resp 18   Ht 5' 10" (1.778 m)   Wt 98.4 kg (217 lb)   SpO2 98%   BMI 31.14 kg/m²     "

## 2018-10-23 NOTE — ANESTHESIA POSTPROCEDURE EVALUATION
"Anesthesia Post Evaluation    Patient: Adelfo Rodriguez MD    Procedure(s) Performed: Procedure(s) (LRB):  EGD (ESOPHAGOGASTRODUODENOSCOPY) (N/A)    Final Anesthesia Type: general  Patient location during evaluation: PACU  Patient participation: Yes- Able to Participate  Level of consciousness: awake and alert  Post-procedure vital signs: reviewed and stable  Pain management: adequate  Airway patency: patent  PONV status at discharge: No PONV  Anesthetic complications: no      Cardiovascular status: blood pressure returned to baseline  Respiratory status: spontaneous ventilation and room air  Hydration status: euvolemic  Follow-up not needed.        Visit Vitals  /70 (BP Location: Left arm, Patient Position: Lying)   Pulse 60   Temp 36.6 °C (97.9 °F) (Oral)   Resp 20   Ht 5' 10" (1.778 m)   Wt 98.4 kg (217 lb)   SpO2 99%   BMI 31.14 kg/m²       Pain/Ana Laura Score: Pain Assessment Performed: Yes (10/23/2018  2:05 PM)  Presence of Pain: non-verbal indicators absent (10/23/2018  2:05 PM)  Pain Rating Prior to Med Admin: 0 (10/23/2018  1:32 PM)  Ana Laura Score: 10 (10/23/2018  2:05 PM)        "

## 2018-10-23 NOTE — PROVATION PATIENT INSTRUCTIONS
Discharge Summary/Instructions after an Endoscopic Procedure  Patient Name: Adelfo Goldberg  Patient MRN: [id number]  Patient YOB: 1940 Tuesday, October 23, 2018  Bart Hernandez MD  RESTRICTIONS:  During your procedure today, you received medications for sedation.  These   medications may affect your judgment, balance and coordination.  Therefore,   for 24 hours, you have the following restrictions:   - DO NOT drive a car, operate machinery, make legal/financial decisions,   sign important papers or drink alcohol.    ACTIVITY:  Today: no heavy lifting, straining or running due to procedural   sedation/anesthesia.  The following day: return to full activity including work.  DIET:  Eat and drink normally unless instructed otherwise.     TREATMENT FOR COMMON SIDE EFFECTS:  - Mild abdominal pain, nausea, belching, bloating or excessive gas:  rest,   eat lightly and use a heating pad.  - Sore Throat: treat with throat lozenges and/or gargle with warm salt   water.  - Because air was used during the procedure, expelling large amounts of air   from your rectum or belching is normal.  - If a bowel prep was taken, you may not have a bowel movement for 1-3 days.    This is normal.  SYMPTOMS TO WATCH FOR AND REPORT TO YOUR PHYSICIAN:  1. Abdominal pain or bloating, other than gas cramps.  2. Chest pain.  3. Back pain.  4. Signs of infection such as: chills or fever occurring within 24 hours   after the procedure.  5. Rectal bleeding, which would show as bright red, maroon, or black stools.   (A tablespoon of blood from the rectum is not serious, especially if   hemorrhoids are present.)  6. Vomiting.  7. Weakness or dizziness.  GO DIRECTLY TO THE NEAREST EMERGENCY ROOM IF YOU HAVE ANY OF THE FOLLOWING:      Difficulty breathing              Chills and/or fever over 101 F   Persistent vomiting and/or vomiting blood   Severe abdominal pain   Severe chest pain   Black, tarry stools   Bleeding- more than one  tablespoon   Any other symptom or condition that you feel may need urgent attention  Your doctor recommends these additional instructions:  If any biopsies were taken, your doctors clinic will contact you in 1 to 2   weeks with any results.  - Patient has a contact number available for emergencies.  The signs and   symptoms of potential delayed complications were discussed with the   patient.  Return to normal activities tomorrow.  Written discharge   instructions were provided to the patient.   - Discharge patient to home.   - Await pathology results.   - Return to physician assistant as previously scheduled.   - The findings and recommendations were discussed with the designated   responsible adult.   - Use a proton pump inhibitor PO daily for 8 weeks; longer if   anticoagulation is added for cardiac reasons.   For questions, problems or results please call your physician - Bart Hernandez MD at Work:  [phone].  [facility], EMERGENCY ROOM PHONE NUMBER: [phone]  IF A COMPLICATION OR EMERGENCY SITUATION ARISES AND YOU ARE UNABLE TO REACH   YOUR PHYSICIAN - GO DIRECTLY TO THE EMERGENCY ROOM.  Bart Hernandez MD  10/23/2018 1:59:11 PM  This report has been verified and signed electronically.  [employer]OVATION

## 2018-10-23 NOTE — ANESTHESIA PREPROCEDURE EVALUATION
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., male     Patient Active Problem List   Diagnosis    Asthma, intermittent    Essential hypertension    Hyperlipidemia    ANNABELLE (obstructive sleep apnea)    Chronic rhinitis    Back pain    Chronic tophaceous gout    Obesity (BMI 30-39.9)    NAFLD (nonalcoholic fatty liver disease)    Pancreatic cyst    Impingement syndrome of right shoulder    Hypertensive kidney disease    Chronic right shoulder pain    Chronic anemia    Colon polyps    Screening for colon cancer    Hyponatremia    CKD (chronic kidney disease), stage III    Coronary artery disease of native artery of native heart with stable angina pectoris     Past Surgical History:   Procedure Laterality Date    APPENDECTOMY      COLONOSCOPY N/A 6/19/2018    Procedure: COLONOSCOPY;  Surgeon: Wade De La Garza MD;  Location: [facility] ([address] [place] [address]);  Service: Endoscopy;  Laterality: N/A;    COLONOSCOPY N/A 6/19/2018    Performed by Wade De La Garza MD at [facility] ([facility])    COLONOSCOPY N/A 6/14/2013    [facility] De La Gar[facility]    FOOT SURGERY      testicular biopsy           Anesthesia Evaluation    I have reviewed the Patient Summary Reports.    I have reviewed the Nursing Notes.   I have reviewed the Medications.     Review of Systems  Hematology/Oncology:  Hematology Normal   Oncology Normal     EENT/Dental:EENT/Dental Normal   Cardiovascular:   Hypertension CAD   Angina Stable angina pectoris   Pulmonary:   Asthma Sleep Apnea    Renal/:   Chronic Renal Disease    Hepatic/GI:   GERD Liver Disease,    Musculoskeletal:  Musculoskeletal Normal    Neurological:  Neurology Normal    Endocrine:  Endocrine Normal    Dermatological:  Skin Normal    Psych:  Psychiatric Normal           Physical Exam  General:  Morbid Obesity    Airway/Jaw/Neck:  Airway Findings: Mouth  Opening: Normal Tongue: Normal  General Airway Assessment: Adult  Mallampati: III  Improves to II with phonation.  TM Distance: Normal, at least 6 cm        Eyes/Ears/Nose:  EYES/EARS/NOSE FINDINGS: Normal   Dental:  Dental Findings: In tact   Chest/Lungs:  Chest/Lungs Findings: Clear to auscultation, Normal Respiratory Rate     Heart/Vascular:  Heart Findings: Rate: Normal  Rhythm: Regular Rhythm  Sounds: Normal  Heart murmur: negative Vascular Findings: Normal    Abdomen:  Abdomen Findings: Normal    Musculoskeletal:  Musculoskeletal Findings: Normal   Skin:  Skin Findings: Normal    Mental Status:  Mental Status Findings: Normal        Anesthesia Plan  Type of Anesthesia, risks & benefits discussed:  Anesthesia Type:  general  Patient's Preference:   Intra-op Monitoring Plan: standard ASA monitors  Intra-op Monitoring Plan Comments:   Post Op Pain Control Plan:   Post Op Pain Control Plan Comments:   Induction:   IV  Beta Blocker:  Patient is not currently on a Beta-Blocker (No further documentation required).       Informed Consent: Patient understands risks and agrees with Anesthesia plan.  Questions answered. Anesthesia consent signed with patient.  ASA Score: 3     Day of Surgery Review of History & Physical:    H&P update referred to the provider.     Anesthesia Plan Notes: NL EF  CAD  Positive stress test with no wall motion abnormalities  Stable angina pectoris  Patient indicates that cardiology says ok to proceed since stable. Patient has an appointment with interventional in near future  Anesthetic will include Beta Blockers and nitrates as indicated        Ready For Surgery From Anesthesia Perspective.

## 2018-10-24 ENCOUNTER — INITIAL CONSULT (OUTPATIENT)
Dept: CARDIOLOGY | Facility: CLINIC | Age: 78
End: 2018-10-24
Payer: MEDICARE

## 2018-10-24 ENCOUNTER — EDUCATION (OUTPATIENT)
Dept: CARDIOLOGY | Facility: CLINIC | Age: 78
End: 2018-10-24

## 2018-10-24 VITALS
WEIGHT: 220.88 LBS | OXYGEN SATURATION: 99 % | DIASTOLIC BLOOD PRESSURE: 66 MMHG | BODY MASS INDEX: 34.67 KG/M2 | SYSTOLIC BLOOD PRESSURE: 147 MMHG | HEIGHT: 67 IN | HEART RATE: 83 BPM

## 2018-10-24 DIAGNOSIS — I25.118 CORONARY ARTERY DISEASE OF NATIVE ARTERY OF NATIVE HEART WITH STABLE ANGINA PECTORIS: ICD-10-CM

## 2018-10-24 DIAGNOSIS — R07.9 CHEST PAIN, UNSPECIFIED TYPE: ICD-10-CM

## 2018-10-24 DIAGNOSIS — I10 ESSENTIAL HYPERTENSION: Chronic | ICD-10-CM

## 2018-10-24 DIAGNOSIS — R94.39 ABNORMAL CARDIOVASCULAR STRESS TEST: Primary | ICD-10-CM

## 2018-10-24 DIAGNOSIS — N18.30 CKD (CHRONIC KIDNEY DISEASE), STAGE III: ICD-10-CM

## 2018-10-24 DIAGNOSIS — R94.39 ABNORMAL CARDIOVASCULAR STRESS TEST: ICD-10-CM

## 2018-10-24 DIAGNOSIS — E78.5 HYPERLIPIDEMIA, UNSPECIFIED HYPERLIPIDEMIA TYPE: Primary | ICD-10-CM

## 2018-10-24 PROCEDURE — 99205 OFFICE O/P NEW HI 60 MIN: CPT | Mod: S$PBB,,, | Performed by: INTERNAL MEDICINE

## 2018-10-24 PROCEDURE — 99214 OFFICE O/P EST MOD 30 MIN: CPT | Mod: PBBFAC | Performed by: INTERNAL MEDICINE

## 2018-10-24 PROCEDURE — 3078F DIAST BP <80 MM HG: CPT | Mod: CPTII,,, | Performed by: INTERNAL MEDICINE

## 2018-10-24 PROCEDURE — 1101F PT FALLS ASSESS-DOCD LE1/YR: CPT | Mod: CPTII,,, | Performed by: INTERNAL MEDICINE

## 2018-10-24 PROCEDURE — 99999 PR PBB SHADOW E&M-EST. PATIENT-LVL IV: CPT | Mod: PBBFAC,,, | Performed by: INTERNAL MEDICINE

## 2018-10-24 PROCEDURE — 3077F SYST BP >= 140 MM HG: CPT | Mod: CPTII,,, | Performed by: INTERNAL MEDICINE

## 2018-10-24 RX ORDER — SODIUM CHLORIDE 9 MG/ML
3 INJECTION, SOLUTION INTRAVENOUS CONTINUOUS
Status: CANCELLED | OUTPATIENT
Start: 2018-10-24 | End: 2018-10-24

## 2018-10-24 RX ORDER — CLOPIDOGREL BISULFATE 75 MG/1
75 TABLET ORAL DAILY
Qty: 30 TABLET | Refills: 11 | Status: ON HOLD | OUTPATIENT
Start: 2018-10-24 | End: 2018-10-31 | Stop reason: HOSPADM

## 2018-10-24 RX ORDER — DIPHENHYDRAMINE HCL 25 MG
50 CAPSULE ORAL ONCE
Status: CANCELLED | OUTPATIENT
Start: 2018-10-24 | End: 2018-10-24

## 2018-10-24 RX ORDER — ALBUTEROL SULFATE 90 UG/1
AEROSOL, METERED RESPIRATORY (INHALATION)
COMMUNITY
End: 2018-10-24

## 2018-10-24 NOTE — ASSESSMENT & PLAN NOTE
-Instructed to drink plenty of fluid before the procedure  -Continue to monitor Cr before the procedure   -Optimal blood pressure control  -Low contrast technique

## 2018-10-24 NOTE — PROGRESS NOTES
Subjective:    Patient ID:  Adelfo ZHANG MD Jennifer is a 78 y.o. male who presents for evaluation of No chief complaint on file.    Referring Physician: Dr Morales    HPI    I had the pleasure to meet Mr Rodriguez today in the clinic who came in for evaluation of abnormal stress test.     He is a pleasant 77 yo followed by Dr Vogel with hypertension, hyperlipidemia, gout, CKD, ANNABELLE on CPAP.  He recently started to develop angina described as chest tightness with exercise. Symptoms improve upon rest. Since symptoms started in June it has progressively worsen. A stress echo was ordered and was reported as abnormal in the apical inferior wall. He is retired [employer] at [employer].   Previous cardiac work up includes LHC at  [facility] in the mid to late 90s and it was negative. He has been a non smoker of decades and no family history of CAD.  BP today in the office is well controlled.   Last lipid panel showed LDL 83.         Review of Systems   Constitution: Negative.   HENT: Negative.    Eyes: Negative.    Cardiovascular: Positive for chest pain and dyspnea on exertion. Negative for claudication, cyanosis, irregular heartbeat, leg swelling, near-syncope, orthopnea, palpitations, paroxysmal nocturnal dyspnea and syncope.   Respiratory: Positive for shortness of breath. Negative for cough, hemoptysis, sleep disturbances due to breathing, snoring, sputum production and wheezing.    Endocrine: Negative.    Genitourinary: Negative.         Objective:    Physical Exam   Constitutional: He is oriented to person, place, and time.   HENT:   Head: Normocephalic and atraumatic.   Eyes: Conjunctivae are normal.   Neck: Neck supple. No JVD present. No thyromegaly present.   Cardiovascular: Normal rate, regular rhythm, normal heart sounds and intact distal pulses.   Pulmonary/Chest: Effort normal and breath sounds normal. No respiratory distress.   Abdominal: Soft. Bowel sounds are normal. He exhibits no  distension. There is no tenderness. There is no rebound.   Musculoskeletal: Normal range of motion.   Neurological: He is alert and oriented to person, place, and time.   Skin: Skin is warm.   Nursing note and vitals reviewed.        Assessment:       1. Hyperlipidemia, unspecified hyperlipidemia type    2. Essential hypertension    3. Coronary artery disease of native artery of native heart with stable angina pectoris    4. Abnormal cardiovascular stress test    5. CKD (chronic kidney disease), stage III         Plan:       Hyperlipidemia  -Lipid panel reviewed   -Continue high potency statin      Essential hypertension  -Controlled today in the office  -Should continue current medical therapy  -Instructed to keep BP log at home and bring it for the next appointment  -Discussed about healthy life style modification including daily exercise 30 min/5 x week, diet (MESH and Mediterranean)improvement according to AHA guidelines. Questions and concerns were properly answered.         Coronary artery disease of native artery of native heart with stable angina pectoris  -continue optimal blood pressure control  -continue with high potency statin     Abnormal cardiovascular stress test  -LHC and coronary angiogram via R radial access  -IVF at 3 ml/hour  -Instructed to drink plenty of water before the procedure  -Low contrast technique  -Plavix load   -Patient is a VITALY candidate  -The risks, benefits & alternatives of the procedure were explained to the patient.    -The risks of coronary angiography include but are not limited to:  Bleeding, infection, heart rhythm abnormalities, allergic reactions, kidney injury, stroke and death.    -Should stenting be indicated, the patient has agreed to dual anti-platelet therapy for 1-consecutive year with a drug-eluting stent and a minimum of 1-month with the use of a bare metal stent.    -The risks of moderate sedation include hypotension, respiratory depression, arrhythmias,  bronchospasm, & death.    -Informed consent was obtained & the patient is agreeable to proceed with the procedure.          CKD (chronic kidney disease), stage III  -Instructed to drink plenty of fluid before the procedure  -Continue to monitor Cr before the procedure   -Optimal blood pressure control  -Low contrast technique               Artemio Waldrop MD [facility]  Interventional Cardiology  Structural/Valvular heart disease  [phone]

## 2018-10-24 NOTE — ASSESSMENT & PLAN NOTE
-LHC and coronary angiogram via R radial access  -IVF at 3 ml/hour  -Instructed to drink plenty of water before the procedure  -Low contrast technique  -Plavix load   -Patient is a VITALY candidate  -The risks, benefits & alternatives of the procedure were explained to the patient.    -The risks of coronary angiography include but are not limited to:  Bleeding, infection, heart rhythm abnormalities, allergic reactions, kidney injury, stroke and death.    -Should stenting be indicated, the patient has agreed to dual anti-platelet therapy for 1-consecutive year with a drug-eluting stent and a minimum of 1-month with the use of a bare metal stent.    -The risks of moderate sedation include hypotension, respiratory depression, arrhythmias, bronchospasm, & death.    -Informed consent was obtained & the patient is agreeable to proceed with the procedure.

## 2018-10-24 NOTE — PROGRESS NOTES
OUTPATIENT CATHETERIZATION INSTRUCTIONS    You have been scheduled for a procedure in the catheterization lab on Wednesday, October 31, 2018.     Please report to the Cardiology Waiting Area on the Third floor of the hospital and check in at 9:30 AM.   You will then be taken to the SSCU (Short Stay Cardiac Unit) and prepared for your procedure. Please be aware that this is not the time of your procedure but the time you are to arrive. The procedures are scheduled on an hourly basis; however, emergency cases take precedence over all other cases.       You may not have anything to eat or drink after midnight the night before your test. You may take your regular morning medications with water. If there are any medications that you should not take you will be instructed to hold them that morning. If you are diabetic and on Metformin (Glucophage) do not take it the day before, the day of, and for 2 days after your procedure.      The procedure will take 1-2 hours to perform. After the procedure, you will return to SSCU on the third floor of the hospital. You will need to lie still (or keep your arm still) for the next 4 to 6 hours to minimize bleeding from the puncture site. Your family may remain in the room with you during this time.       You may be able to be discharged home that same afternoon if there is someone to drive you home and there were no complications. If you have one of the balloon, stent, or device procedures you may spend the night in the hospital. Your doctor will determine, based on your progress, the date and time of your discharge. The results of your procedure will be discussed with you before you are discharged. Any further testing or procedures will be scheduled for you either before you leave or you will be called with these appointments.       If you should have any questions, concerns, or need to change the date of your procedure, please call TERRI Ogden @ [phone]    Special  Instructions:  Plavix 4 tablets night before then 1 tablet morning of procedure  Drink 16 ounces of water before checking in         THE ABOVE INSTRUCTIONS WERE GIVEN TO THE PATIENT VERBALLY AND THEY VERBALIZED UNDERSTANDING.  THEY DO NOT REQUIRE ANY SPECIAL NEEDS AND DO NOT HAVE ANY LEARNING BARRIERS.          Directions for Reporting to Cardiology Waiting Area in the Hospital  If you park in the Parking Garage:  Take elevators to the1st floor of the parking garage.  Continue past the gift shop, coffee shop, and piano.  Take a right and go to the gold elevators. (Elevator B)  Take the elevator to the 3rd floor.  Follow the arrow on the sign on the wall that says Cath Lab Registration/EP Lab Registration.  Follow the long hallway all the way around until you come to a big open area.  This is the registration area.  Check in at Reception Desk.    OR    If family is dropping you off:  Have them drop you off at the front of the Hospital under the green overhang.  Enter through the doors and take a right.  Take the E elevators to the 3rd floor Cardiology Waiting Area.  Check in at the Reception Desk in the waiting room.

## 2018-10-24 NOTE — H&P (VIEW-ONLY)
Subjective:    Patient ID:  Adelfo ZHANG MD Jennifer is a 78 y.o. male who presents for evaluation of No chief complaint on file.    Referring Physician: Dr Morales    HPI    I had the pleasure to meet Mr Rodriguez today in the clinic who came in for evaluation of abnormal stress test.     He is a pleasant 79 yo followed by Dr Vogel with hypertension, hyperlipidemia, gout, CKD, ANNABELLE on CPAP.  He recently started to develop angina described as chest tightness with exercise. Symptoms improve upon rest. Since symptoms started in June it has progressively worsen. A stress echo was ordered and was reported as abnormal in the apical inferior wall. He is retired [employer] at [employer].   Previous cardiac work up includes LHC at  [facility] in the mid to late 90s and it was negative. He has been a non smoker of decades and no family history of CAD.  BP today in the office is well controlled.   Last lipid panel showed LDL 83.         Review of Systems   Constitution: Negative.   HENT: Negative.    Eyes: Negative.    Cardiovascular: Positive for chest pain and dyspnea on exertion. Negative for claudication, cyanosis, irregular heartbeat, leg swelling, near-syncope, orthopnea, palpitations, paroxysmal nocturnal dyspnea and syncope.   Respiratory: Positive for shortness of breath. Negative for cough, hemoptysis, sleep disturbances due to breathing, snoring, sputum production and wheezing.    Endocrine: Negative.    Genitourinary: Negative.         Objective:    Physical Exam   Constitutional: He is oriented to person, place, and time.   HENT:   Head: Normocephalic and atraumatic.   Eyes: Conjunctivae are normal.   Neck: Neck supple. No JVD present. No thyromegaly present.   Cardiovascular: Normal rate, regular rhythm, normal heart sounds and intact distal pulses.   Pulmonary/Chest: Effort normal and breath sounds normal. No respiratory distress.   Abdominal: Soft. Bowel sounds are normal. He exhibits no  distension. There is no tenderness. There is no rebound.   Musculoskeletal: Normal range of motion.   Neurological: He is alert and oriented to person, place, and time.   Skin: Skin is warm.   Nursing note and vitals reviewed.        Assessment:       1. Hyperlipidemia, unspecified hyperlipidemia type    2. Essential hypertension    3. Coronary artery disease of native artery of native heart with stable angina pectoris    4. Abnormal cardiovascular stress test    5. CKD (chronic kidney disease), stage III         Plan:       Hyperlipidemia  -Lipid panel reviewed   -Continue high potency statin      Essential hypertension  -Controlled today in the office  -Should continue current medical therapy  -Instructed to keep BP log at home and bring it for the next appointment  -Discussed about healthy life style modification including daily exercise 30 min/5 x week, diet (MESH and Mediterranean)improvement according to AHA guidelines. Questions and concerns were properly answered.         Coronary artery disease of native artery of native heart with stable angina pectoris  -continue optimal blood pressure control  -continue with high potency statin     Abnormal cardiovascular stress test  -LHC and coronary angiogram via R radial access  -IVF at 3 ml/hour  -Instructed to drink plenty of water before the procedure  -Low contrast technique  -Plavix load   -Patient is a VITALY candidate  -The risks, benefits & alternatives of the procedure were explained to the patient.    -The risks of coronary angiography include but are not limited to:  Bleeding, infection, heart rhythm abnormalities, allergic reactions, kidney injury, stroke and death.    -Should stenting be indicated, the patient has agreed to dual anti-platelet therapy for 1-consecutive year with a drug-eluting stent and a minimum of 1-month with the use of a bare metal stent.    -The risks of moderate sedation include hypotension, respiratory depression, arrhythmias,  bronchospasm, & death.    -Informed consent was obtained & the patient is agreeable to proceed with the procedure.          CKD (chronic kidney disease), stage III  -Instructed to drink plenty of fluid before the procedure  -Continue to monitor Cr before the procedure   -Optimal blood pressure control  -Low contrast technique               Artemio Waldrop MD [facility]  Interventional Cardiology  Structural/Valvular heart disease  [phone]

## 2018-10-24 NOTE — LETTER
October 24, 2018      Isac Morales MD  [address]  [facility] [address]           [facility]  [address]  [facility] [address]  Phone: [phone]          Patient: Adelfo Rodriguez MD   MR Number: [id number]   YOB: 1940   Date of Visit: 10/24/2018       Dear Dr. Isac Morales:    Thank you for referring Adelfo Rodriguez to me for evaluation. Attached you will find relevant portions of my assessment and plan of care.    If you have questions, please do not hesitate to call me. I look forward to following Adelfo Roxidomingo along with you.    Sincerely,    Artemio Baron MD    Enclosure  CC:  No Recipients    If you would like to receive this communication electronically, please contact [email or web address] or [phone] to request more information on Track Link access.    For providers and/or their staff who would like to refer a patient to [facility], please contact us through our one-stop-shop provider referral line, [facility], at [phone].    If you feel you have received this communication in error or would no longer like to receive these types of communications, please e-mail [email or web address]

## 2018-10-25 ENCOUNTER — PATIENT MESSAGE (OUTPATIENT)
Dept: GASTROENTEROLOGY | Facility: CLINIC | Age: 78
End: 2018-10-25

## 2018-10-25 NOTE — TELEPHONE ENCOUNTER
[facility]

## 2018-10-28 ENCOUNTER — PATIENT MESSAGE (OUTPATIENT)
Dept: GASTROENTEROLOGY | Facility: CLINIC | Age: 78
End: 2018-10-28

## 2018-10-30 ENCOUNTER — TELEPHONE (OUTPATIENT)
Dept: ENDOSCOPY | Facility: HOSPITAL | Age: 78
End: 2018-10-30

## 2018-10-30 NOTE — TELEPHONE ENCOUNTER
Spoke with patient in regards to follow up appointment with LAURA Molina. He is having a Cardiac procedure and will call back to schedule the office follow up at a later time.

## 2018-10-31 ENCOUNTER — HOSPITAL ENCOUNTER (OUTPATIENT)
Facility: HOSPITAL | Age: 78
Discharge: HOME | End: 2018-10-31
Attending: INTERNAL MEDICINE | Admitting: INTERNAL MEDICINE
Payer: MEDICARE

## 2018-10-31 VITALS
DIASTOLIC BLOOD PRESSURE: 68 MMHG | WEIGHT: 219 LBS | OXYGEN SATURATION: 98 % | BODY MASS INDEX: 31.35 KG/M2 | HEIGHT: 70 IN | SYSTOLIC BLOOD PRESSURE: 132 MMHG | HEART RATE: 55 BPM | RESPIRATION RATE: 18 BRPM | TEMPERATURE: 98 F

## 2018-10-31 DIAGNOSIS — R07.9 CHEST PAIN, UNSPECIFIED TYPE: ICD-10-CM

## 2018-10-31 DIAGNOSIS — R94.39 ABNORMAL CARDIOVASCULAR STRESS TEST: ICD-10-CM

## 2018-10-31 DIAGNOSIS — J45.20 MILD INTERMITTENT ASTHMA, UNCOMPLICATED: ICD-10-CM

## 2018-10-31 LAB
ABO + RH BLD: NORMAL
ANION GAP SERPL CALC-SCNC: 7 MMOL/L
APTT BLDCRRT: 21.6 SEC
BLD GP AB SCN CELLS X3 SERPL QL: NORMAL
BUN SERPL-MCNC: 38 MG/DL
CALCIUM SERPL-MCNC: 9.6 MG/DL
CHLORIDE SERPL-SCNC: 106 MMOL/L
CO2 SERPL-SCNC: 25 MMOL/L
CREAT SERPL-MCNC: 1.8 MG/DL
ERYTHROCYTE [DISTWIDTH] IN BLOOD BY AUTOMATED COUNT: 12.8 %
EST. GFR  (AFRICAN AMERICAN): 40.8 ML/MIN/1.73 M^2
EST. GFR  (NON AFRICAN AMERICAN): 35.3 ML/MIN/1.73 M^2
GLUCOSE SERPL-MCNC: 99 MG/DL
HCT VFR BLD AUTO: 34.2 %
HGB BLD-MCNC: 11.2 G/DL
INR PPP: 0.9
MCH RBC QN AUTO: 31.3 PG
MCHC RBC AUTO-ENTMCNC: 32.7 G/DL
MCV RBC AUTO: 96 FL
PLATELET # BLD AUTO: 157 K/UL
PLATELET RESPONSE PLAVIX: 326 PRU
PMV BLD AUTO: 11.2 FL
POTASSIUM SERPL-SCNC: 4.1 MMOL/L
PROTHROMBIN TIME: 9.6 SEC
RBC # BLD AUTO: 3.58 M/UL
SODIUM SERPL-SCNC: 138 MMOL/L
WBC # BLD AUTO: 4.51 K/UL

## 2018-10-31 PROCEDURE — 85027 COMPLETE CBC AUTOMATED: CPT

## 2018-10-31 PROCEDURE — 99152 MOD SED SAME PHYS/QHP 5/>YRS: CPT

## 2018-10-31 PROCEDURE — 80048 BASIC METABOLIC PNL TOTAL CA: CPT

## 2018-10-31 PROCEDURE — 93010 ELECTROCARDIOGRAM REPORT: CPT | Mod: ,,, | Performed by: INTERNAL MEDICINE

## 2018-10-31 PROCEDURE — 85730 THROMBOPLASTIN TIME PARTIAL: CPT

## 2018-10-31 PROCEDURE — 25000003 PHARM REV CODE 250

## 2018-10-31 PROCEDURE — 85610 PROTHROMBIN TIME: CPT

## 2018-10-31 PROCEDURE — 63600175 PHARM REV CODE 636 W HCPCS

## 2018-10-31 PROCEDURE — 93458 L HRT ARTERY/VENTRICLE ANGIO: CPT | Mod: 26,,, | Performed by: INTERNAL MEDICINE

## 2018-10-31 PROCEDURE — 99152 MOD SED SAME PHYS/QHP 5/>YRS: CPT | Mod: ,,, | Performed by: INTERNAL MEDICINE

## 2018-10-31 PROCEDURE — 85576 BLOOD PLATELET AGGREGATION: CPT

## 2018-10-31 PROCEDURE — 86901 BLOOD TYPING SEROLOGIC RH(D): CPT

## 2018-10-31 PROCEDURE — 25000003 PHARM REV CODE 250: Performed by: INTERNAL MEDICINE

## 2018-10-31 RX ORDER — SODIUM CHLORIDE 9 MG/ML
3 INJECTION, SOLUTION INTRAVENOUS CONTINUOUS
Status: ACTIVE | OUTPATIENT
Start: 2018-10-31 | End: 2018-10-31

## 2018-10-31 RX ORDER — DIPHENHYDRAMINE HCL 50 MG
50 CAPSULE ORAL ONCE
Status: COMPLETED | OUTPATIENT
Start: 2018-10-31 | End: 2018-10-31

## 2018-10-31 RX ORDER — ACETAMINOPHEN 325 MG/1
650 TABLET ORAL EVERY 4 HOURS PRN
Status: DISCONTINUED | OUTPATIENT
Start: 2018-10-31 | End: 2018-11-01 | Stop reason: HOSPADM

## 2018-10-31 RX ADMIN — DIPHENHYDRAMINE HYDROCHLORIDE 50 MG: 50 CAPSULE ORAL at 09:10

## 2018-10-31 RX ADMIN — SODIUM CHLORIDE 3 ML/KG/HR: 0.9 INJECTION, SOLUTION INTRAVENOUS at 09:10

## 2018-10-31 NOTE — DISCHARGE SUMMARY
Discharge Summary  Interventional Cardiology      Admit Date: 10/31/2018    Discharge Date:  10/31/2018    Attending Physician: Artemio Baron MD    Discharge Physician: Antoine Pace MD    Principal Diagnoses: Abnormal cardiovascular stress test  Indication for Admission: Left heart cath (N/A)    Discharged Condition: Good    Hospital Course:   Patient presented for outpatient LHC which went without complication. LHC revealed normal coronaries. See full cath report in Epic for details. Hemostasis of patient's R radial access site was achieved with VascBand. Patient was monitored per post-cath protocol, and his access sites was c/d/i with no hematoma. He was feeling well and was anticipating discharge home today.    Outpatient Plan:  - Patient may stop Plavix at this time  - Follow up with outpatient cardiologist (Dr. Morales)    Diet: Cardiac diet    Activity: Ad humera, wound care instructions provided    Disposition: Home or Self Care    Discharge Medications:      Medication List      CHANGE how you take these medications    atorvastatin 40 MG tablet  Commonly known as:  LIPITOR  Take 1 tablet (40 mg total) by mouth once daily. For cholesterol control.  What changed:    · how much to take  · when to take this  · additional instructions        CONTINUE taking these medications    ADVAIR DISKUS 250-50 mcg/dose diskus inhaler  Generic drug:  fluticasone-salmeterol 250-50 mcg/dose  inhale 1 dose by mouth twice a day Rinse mouth after use     albuterol 90 mcg/actuation inhaler  Commonly known as:  PROAIR HFA  Inhale 2 puffs into the lungs every 4 (four) hours as needed for Wheezing.     aspirin 81 MG EC tablet  Commonly known as:  ECOTRIN     CLARITIN 10 mg tablet  Generic drug:  loratadine     colchicine 0.6 mg tablet  Commonly known as:  COLCRYS  0.3mg( 1/2 0.6mg tablet) daily     febuxostat 80 mg Tab  Commonly known as:  ULORIC  Take 1 tablet (80 mg total) by mouth once daily.     felodipine 5 MG 24 hr  tablet  Commonly known as:  PLENDIL  Take 1 tablet (5 mg total) by mouth once daily.     fish oil-omega-3 fatty acids 300-1,000 mg capsule     fluticasone 50 mcg/actuation nasal spray  Commonly known as:  FLONASE  2 sprays (100 mcg total) by Each Nare route once daily.     furosemide 40 MG tablet  Commonly known as:  LASIX  Take 1 tablet p.o. twice a week as needed for fluid retention.     glucosamine-chondroitn sulf.Na 750-600 mg Tab     irbesartan 150 MG tablet  Commonly known as:  AVAPRO  Take 1 tablet (150 mg total) by mouth once daily. For blood pressure control.     ketoconazole 2 % shampoo  Commonly known as:  NIZORAL  WASH affected area every other day     nitroGLYCERIN 0.4 MG SL tablet  Commonly known as:  NITROSTAT  Place 1 tablet (0.4 mg total) under the tongue every 5 (five) minutes as needed for Chest pain.     omeprazole 40 MG capsule  Commonly known as:  PRILOSEC  Take 1 capsule (40 mg total) by mouth every morning.     triamcinolone acetonide 0.1% 0.1 % cream  Commonly known as:  KENALOG  Apply topically 2 (two) times daily.        STOP taking these medications    clopidogrel 75 mg tablet  Commonly known as:  PLAVIX          Follow Up:  Follow-up Information     Isac Morales MD.    Specialty:  Cardiology  Contact information:  [address] [facility] [address] [phone]

## 2018-10-31 NOTE — PLAN OF CARE
Problem: Patient Care Overview  Goal: Plan of Care Review  Outcome: Ongoing (interventions implemented as appropriate)  Pt transferred from cath lab via stretcher.  Vss.  Oriented pt and spouse to  and unit.  r radial site remains meagan.  vascband intact to site.  Post op orders and assessment initiated.  Pt aao, tolerating po.  Pt in no acute distress and verbalizes no complaints. Will continue to monitor.

## 2018-10-31 NOTE — BRIEF OP NOTE
"    Post Cath Note  Referring Physician: Artemio Baron MD  Procedure: Left heart cath (N/A)       Access: Right radial    Normal coronaries.    LVEDP 15 mmHg    See full report for further details    Intervention:     Closure device: Radial band    Post Cath Exam:   BP (!) 143/74 (BP Location: Right arm, Patient Position: Lying)   Pulse 64   Temp 98 °F (36.7 °C) (Oral)   Resp 16   Ht 5' 10" (1.778 m)   Wt 99.3 kg (219 lb)   SpO2 98%   BMI 31.42 kg/m²   No unusual pain, hematoma, thrill or bruit at vascular access site.  Distal pulse present without signs of ischemia.    Recommendations:   - Routine post-cath care  - IVF at 3 cc/kg/hr for 4 hrs  - Continue medical management  - Risk factor reduction  - Follow-up with outpatient cardiologist (Dr. Morales)    Signed:  Antoine Pace MD  Cardiology Fellow, PGY-6  10/31/2018 1:47 PM  "

## 2018-10-31 NOTE — INTERVAL H&P NOTE
The patient has been examined and the H&P has been reviewed:    I concur with the findings and no changes have occurred since H&P was written.   Patient here for LHC +/- PCI, patient is a VITALY candidate.  R radial access.  NPO since midnight.  Took Plavix 300 mg last night, and Plavix 75mg + ASA 81 mg this AM.  Cr 1.7 on 10/22/2018, pending this AM.  Will check PRU, other labs already ordered.  Consents in chart, signed in clinic.    Anesthesia/Surgery risks, benefits and alternative options discussed and understood by patient/family.    Active Hospital Problems    Diagnosis  POA    Abnormal cardiovascular stress test [R94.39]  Yes      Resolved Hospital Problems   No resolved problems to display.

## 2018-11-06 RX ORDER — FLUTICASONE PROPIONATE AND SALMETEROL 50; 250 UG/1; UG/1
POWDER RESPIRATORY (INHALATION)
Qty: 60 EACH | Refills: 5 | Status: SHIPPED | OUTPATIENT
Start: 2018-11-06 | End: 2019-04-15 | Stop reason: SDUPTHER

## 2018-11-07 RX ORDER — ATORVASTATIN CALCIUM 20 MG/1
TABLET, FILM COATED ORAL
Qty: 90 TABLET | Refills: 0 | Status: SHIPPED | OUTPATIENT
Start: 2018-11-07 | End: 2018-11-19

## 2018-11-19 ENCOUNTER — TELEPHONE (OUTPATIENT)
Dept: CARDIOLOGY | Facility: CLINIC | Age: 78
End: 2018-11-19

## 2018-11-19 ENCOUNTER — OFFICE VISIT (OUTPATIENT)
Dept: CARDIOLOGY | Facility: CLINIC | Age: 78
End: 2018-11-19
Payer: MEDICARE

## 2018-11-19 VITALS
HEART RATE: 68 BPM | DIASTOLIC BLOOD PRESSURE: 62 MMHG | WEIGHT: 223.75 LBS | SYSTOLIC BLOOD PRESSURE: 131 MMHG | HEIGHT: 70 IN | BODY MASS INDEX: 32.03 KG/M2

## 2018-11-19 DIAGNOSIS — N18.30 CKD (CHRONIC KIDNEY DISEASE), STAGE III: ICD-10-CM

## 2018-11-19 DIAGNOSIS — R00.2 PALPITATIONS: Primary | ICD-10-CM

## 2018-11-19 DIAGNOSIS — E66.9 OBESITY (BMI 30-39.9): ICD-10-CM

## 2018-11-19 DIAGNOSIS — I47.19 PAT (PAROXYSMAL ATRIAL TACHYCARDIA): ICD-10-CM

## 2018-11-19 DIAGNOSIS — E78.01 FAMILIAL HYPERCHOLESTEROLEMIA: ICD-10-CM

## 2018-11-19 DIAGNOSIS — I10 ESSENTIAL HYPERTENSION: Primary | Chronic | ICD-10-CM

## 2018-11-19 PROCEDURE — 99999 PR PBB SHADOW E&M-EST. PATIENT-LVL IV: CPT | Mod: PBBFAC,,, | Performed by: INTERNAL MEDICINE

## 2018-11-19 PROCEDURE — 3078F DIAST BP <80 MM HG: CPT | Mod: CPTII,S$GLB,, | Performed by: INTERNAL MEDICINE

## 2018-11-19 PROCEDURE — 99214 OFFICE O/P EST MOD 30 MIN: CPT | Mod: S$GLB,,, | Performed by: INTERNAL MEDICINE

## 2018-11-19 PROCEDURE — 1101F PT FALLS ASSESS-DOCD LE1/YR: CPT | Mod: CPTII,S$GLB,, | Performed by: INTERNAL MEDICINE

## 2018-11-19 PROCEDURE — 3075F SYST BP GE 130 - 139MM HG: CPT | Mod: CPTII,S$GLB,, | Performed by: INTERNAL MEDICINE

## 2018-11-19 NOTE — PROGRESS NOTES
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 male who presents for follow-up of Left heart cath (hospital discharge 10/31/18) and Abnormal cardiovascular stress test      HPI   The patient is a 78 year old male followed by Dr Vogel with hypertension, hyperlipidemia, gout, CKD, ANNABELLE on CPAP. On an annual physical 10/1/18 he reported chest tightness with exercise. A stress echo was ordered and was reported as abnormal [. He has noted this tightness assoicated with an increase in his heart rate since  July. He was referred to Interventional  And a LHC was normal. He states that when he has the episodes chest discomfort he has noted his heart rate in the 150s for a few minutes. His sister has PAT and brother AF. He is noting leg cramps since on atorvastatin.      E. Angiographic Results 10/31/17     Diagnostic:          Patient has a right dominant coronary artery.        The coronary vessels are all normal.        - Left Main Coronary Artery:             The LM is normal. There is ROBERT 3 flow.     - Left Anterior Descending Artery:             The LAD is normal. There is ROBERT 3 flow.     - Left Circumflex Artery:             The LCX is normal. There is ROBERT 3 flow.     - Right Coronary Artery:             The RCA is normal. There is ROBERT 3 flow.    Lab Results   Component Value Date     05/10/2019     05/10/2019    K 4.5 05/10/2019    K 4.5 05/10/2019     05/10/2019     05/10/2019    CO2 25 05/10/2019    CO2 25 05/10/2019    BUN 30 (H) 05/10/2019    BUN 30 (H) 05/10/2019    CREATININE 1.9 (H) 05/10/2019    CREATININE 1.9 (H) 05/10/2019    GLU 96 05/10/2019    GLU 96 05/10/2019    AST 20 05/10/2019    AST 20 05/10/2019    ALT 20 05/10/2019    ALT 20 05/10/2019    ALBUMIN 3.6 05/10/2019    ALBUMIN 3.6 05/10/2019    PROT 6.6 05/10/2019    PROT 6.6 05/10/2019    BILITOT 0.4 05/10/2019    BILITOT 0.4 05/10/2019    WBC 5.05 05/10/2019    HGB 11.3 (L) 05/10/2019    HCT 35.6 (L)  05/10/2019     (H) 05/10/2019     05/10/2019    INR 0.9 10/31/2018    PSA 2.63 08/15/2013    TSH 3.207 09/21/2018         Lab Results   Component Value Date    CHOL 230 (H) 05/10/2019    HDL 70 05/10/2019    TRIG 59 05/10/2019       Lab Results   Component Value Date    LDLCALC 148.2 05/10/2019       Past Medical History:   Diagnosis Date    ALLERGIC RHINITIS     Anemia     Asthma     GERD (gastroesophageal reflux disease)     Hyperlipidemia     Hypertension     NAFLD (nonalcoholic fatty liver disease)     ANNABELLE (obstructive sleep apnea)     on CPAP    Squamous cell cancer of skin of hand        Current Outpatient Medications:     albuterol (PROAIR HFA) 90 mcg/actuation inhaler, Inhale 2 puffs into the lungs every 4 (four) hours as needed for Wheezing., Disp: 1 Inhaler, Rfl: 6    aspirin (ECOTRIN) 81 MG EC tablet, Take 81 mg by mouth every evening. , Disp: , Rfl:     colchicine (COLCRYS) 0.6 mg tablet, 0.3mg( 1/2 0.6mg tablet) daily, Disp: 45 tablet, Rfl: 1    felodipine (PLENDIL) 5 MG 24 hr tablet, Take 1 tablet (5 mg total) by mouth once daily., Disp: 90 tablet, Rfl: 3    fish oil-omega-3 fatty acids 300-1,000 mg capsule, Take 2 g by mouth once daily.  , Disp: , Rfl:     fluticasone (FLONASE) 50 mcg/actuation nasal spray, 2 sprays (100 mcg total) by Each Nare route once daily., Disp: 48 g, Rfl: 3    glucosamine & chondroit sul.Na 750-600 mg Tab, Take 750 mg by mouth., Disp: , Rfl:     ketoconazole (NIZORAL) 2 % shampoo, WASH affected area every other day, Disp: 120 mL, Rfl: 0    loratadine (CLARITIN) 10 mg tablet, Take 10 mg by mouth daily as needed.  , Disp: , Rfl:     ADVAIR DISKUS 250-50 mcg/dose diskus inhaler, INHALE 1 PUFF BY MOUTH TWICE DAILY. RINSE MOUTH AFTER USE, Disp: 60 each, Rfl: 5    apixaban (ELIQUIS) 5 mg Tab, Take 1 tablet (5 mg total) by mouth 2 (two) times daily., Disp: 180 tablet, Rfl: 3    azelastine (ASTELIN) 137 mcg (0.1 %) nasal spray, 2 sprays (274 mcg  total) by Nasal route 2 (two) times daily., Disp: 30 mL, Rfl: 5    febuxostat (ULORIC) 40 mg Tab, 1/2 tab(20mg) daily, Disp: 45 tablet, Rfl: 1    furosemide (LASIX) 40 MG tablet, TAKE 1 TABLET BY MOUTH EVERY DAY, Disp: 90 tablet, Rfl: 0    irbesartan (AVAPRO) 150 MG tablet, TAKE 1 TABLET BY MOUTH ONCE DAILY FOR BLOOD PRESSURE, Disp: 30 tablet, Rfl: 10    omeprazole (PRILOSEC) 40 MG capsule, Take 1 capsule (40 mg total) by mouth every morning., Disp: 30 capsule, Rfl: 3    triamcinolone acetonide 0.1% (KENALOG) 0.1 % cream, Apply topically 2 (two) times daily., Disp: 80 g, Rfl: 2          Review of Systems   Constitution: Negative for decreased appetite, diaphoresis, fever, weakness, malaise/fatigue, weight gain and weight loss.   HENT: Negative for congestion, ear discharge, ear pain and nosebleeds.    Eyes: Negative for blurred vision, double vision and visual disturbance.   Cardiovascular: Positive for chest pain. Negative for claudication, cyanosis, dyspnea on exertion, irregular heartbeat, leg swelling, near-syncope, orthopnea, palpitations, paroxysmal nocturnal dyspnea and syncope.   Respiratory: Negative for cough, hemoptysis, shortness of breath, sleep disturbances due to breathing, snoring, sputum production and wheezing.    Endocrine: Negative for polydipsia, polyphagia and polyuria.   Hematologic/Lymphatic: Negative for adenopathy and bleeding problem. Does not bruise/bleed easily.   Skin: Negative for color change, nail changes, poor wound healing and rash.   Musculoskeletal: Negative for muscle cramps and muscle weakness.   Gastrointestinal: Negative for abdominal pain, anorexia, change in bowel habit, hematochezia, nausea and vomiting.   Genitourinary: Negative for dysuria, frequency and hematuria.   Neurological: Negative for brief paralysis, difficulty with concentration, excessive daytime sleepiness, dizziness, focal weakness, headaches, light-headedness, seizures and vertigo.  "  Psychiatric/Behavioral: Negative for altered mental status and depression.   Allergic/Immunologic: Negative for persistent infections.        Objective:/62 (BP Location: Left arm, Patient Position: Sitting, BP Method: X-Large (Automatic))   Pulse 68   Ht 5' 10" (1.778 m)   Wt 101.5 kg (223 lb 12.3 oz)   BMI 32.11 kg/m²             Physical Exam   Constitutional: He is oriented to person, place, and time. He appears well-developed and well-nourished.   obese   HENT:   Head: Normocephalic.   Right Ear: External ear normal.   Left Ear: External ear normal.   Nose: Nose normal.   Inspection of lips, teeth and gums normal   Eyes: EOM are normal. Pupils are equal, round, and reactive to light. No scleral icterus.   Neck: Normal range of motion. Neck supple. No JVD present. No tracheal deviation present. No thyromegaly present.   Cardiovascular: Normal rate, regular rhythm and intact distal pulses. Exam reveals no gallop and no friction rub.   No murmur heard.  Pulses:       Dorsalis pedis pulses are 2+ on the right side, and 2+ on the left side.        Posterior tibial pulses are 2+ on the right side, and 2+ on the left side.   Pulmonary/Chest: Effort normal and breath sounds normal.   Abdominal: Bowel sounds are normal. He exhibits no distension. There is no hepatosplenomegaly. There is no tenderness. There is no guarding.   Musculoskeletal: Normal range of motion. He exhibits no edema or tenderness.   Lymphadenopathy:   Palpation of neck and groin lymph nodes normal   Neurological: He is alert and oriented to person, place, and time. No cranial nerve deficit. He exhibits normal muscle tone. Coordination normal.   Skin: Skin is dry.   Palpation of skin normal   Psychiatric: His behavior is normal. Judgment and thought content normal.         Assessment:       1. Essential hypertension    2. PAT (paroxysmal atrial tachycardia)    3. Familial hypercholesterolemia    4. CKD (chronic kidney disease), stage III  "   5. Obesity (BMI 30-39.9)         Plan:       Adelfo was seen today for left heart cath and abnormal cardiovascular stress test.    Diagnoses and all orders for this visit:    Essential hypertension    PAT (paroxysmal atrial tachycardia)  -     Cancel: Cardiac event monitor (30 day); Future; Expected date: 12/19/2018    Familial hypercholesterolemia    CKD (chronic kidney disease), stage III    Obesity (BMI 30-39.9)

## 2018-12-12 ENCOUNTER — CLINICAL SUPPORT (OUTPATIENT)
Dept: CARDIOLOGY | Facility: HOSPITAL | Age: 78
End: 2018-12-12
Attending: INTERNAL MEDICINE
Payer: MEDICARE

## 2018-12-12 DIAGNOSIS — R00.2 PALPITATIONS: ICD-10-CM

## 2018-12-12 PROCEDURE — 93272 ECG/REVIEW INTERPRET ONLY: CPT | Mod: ,,, | Performed by: INTERNAL MEDICINE

## 2018-12-12 PROCEDURE — 93271 ECG/MONITORING AND ANALYSIS: CPT

## 2018-12-14 ENCOUNTER — DOCUMENTATION ONLY (OUTPATIENT)
Dept: CARDIOLOGY | Facility: HOSPITAL | Age: 78
End: 2018-12-14

## 2018-12-14 NOTE — PROGRESS NOTES
The above patient is currently wearing a 30 day cardiac event monitor for palpitations. Patients activated alert his cardiac event monitor while exercising with symptoms of Shortness of breath.  Tele rhythmics  noted present rhythm as Atrial Flutter with RVR and Aberrancy with rates of 157-169. Mr. Rodriguez was contacted by [employer] monitoring company for symptoms and activity. Patient stated he's exercising. Follow up was requested. Reports will be placed under this encounter for further review.

## 2018-12-19 ENCOUNTER — TELEPHONE (OUTPATIENT)
Dept: CARDIOLOGY | Facility: CLINIC | Age: 78
End: 2018-12-19

## 2018-12-19 ENCOUNTER — PATIENT MESSAGE (OUTPATIENT)
Dept: CARDIOLOGY | Facility: CLINIC | Age: 78
End: 2018-12-19

## 2018-12-20 ENCOUNTER — TELEPHONE (OUTPATIENT)
Dept: CARDIOLOGY | Facility: CLINIC | Age: 78
End: 2018-12-20

## 2018-12-20 ENCOUNTER — PATIENT MESSAGE (OUTPATIENT)
Dept: CARDIOLOGY | Facility: CLINIC | Age: 78
End: 2018-12-20

## 2018-12-20 DIAGNOSIS — I48.3 TYPICAL ATRIAL FLUTTER: Primary | ICD-10-CM

## 2018-12-20 RX ORDER — METOPROLOL SUCCINATE 50 MG/1
50 TABLET, EXTENDED RELEASE ORAL DAILY
Qty: 90 TABLET | Refills: 3 | Status: SHIPPED | OUTPATIENT
Start: 2018-12-20 | End: 2019-02-20

## 2018-12-31 RX ORDER — IRBESARTAN 150 MG/1
TABLET ORAL
Qty: 30 TABLET | Refills: 10 | Status: SHIPPED | OUTPATIENT
Start: 2018-12-31 | End: 2019-10-19 | Stop reason: SDUPTHER

## 2019-01-03 ENCOUNTER — LAB VISIT (OUTPATIENT)
Dept: LAB | Facility: HOSPITAL | Age: 79
End: 2019-01-03
Attending: INTERNAL MEDICINE
Payer: MEDICARE

## 2019-01-03 DIAGNOSIS — M1A.9XX1 CHRONIC TOPHACEOUS GOUT: ICD-10-CM

## 2019-01-03 LAB
ALBUMIN SERPL BCP-MCNC: 3.5 G/DL
ALP SERPL-CCNC: 87 U/L
ALT SERPL W/O P-5'-P-CCNC: 20 U/L
ANION GAP SERPL CALC-SCNC: 6 MMOL/L
AST SERPL-CCNC: 16 U/L
BASOPHILS # BLD AUTO: 0.02 K/UL
BASOPHILS NFR BLD: 0.3 %
BILIRUB SERPL-MCNC: 0.7 MG/DL
BUN SERPL-MCNC: 32 MG/DL
CALCIUM SERPL-MCNC: 9.2 MG/DL
CHLORIDE SERPL-SCNC: 107 MMOL/L
CO2 SERPL-SCNC: 25 MMOL/L
CREAT SERPL-MCNC: 2 MG/DL
DIFFERENTIAL METHOD: ABNORMAL
EOSINOPHIL # BLD AUTO: 0.3 K/UL
EOSINOPHIL NFR BLD: 5.5 %
ERYTHROCYTE [DISTWIDTH] IN BLOOD BY AUTOMATED COUNT: 13.3 %
EST. GFR  (AFRICAN AMERICAN): 35.9 ML/MIN/1.73 M^2
EST. GFR  (NON AFRICAN AMERICAN): 31 ML/MIN/1.73 M^2
GLUCOSE SERPL-MCNC: 105 MG/DL
HCT VFR BLD AUTO: 35 %
HGB BLD-MCNC: 11.3 G/DL
IMM GRANULOCYTES # BLD AUTO: 0.02 K/UL
IMM GRANULOCYTES NFR BLD AUTO: 0.3 %
LYMPHOCYTES # BLD AUTO: 1 K/UL
LYMPHOCYTES NFR BLD: 16.8 %
MCH RBC QN AUTO: 31.7 PG
MCHC RBC AUTO-ENTMCNC: 32.3 G/DL
MCV RBC AUTO: 98 FL
MONOCYTES # BLD AUTO: 1.1 K/UL
MONOCYTES NFR BLD: 19.1 %
NEUTROPHILS # BLD AUTO: 3.4 K/UL
NEUTROPHILS NFR BLD: 58 %
NRBC BLD-RTO: 0 /100 WBC
PLATELET # BLD AUTO: 179 K/UL
PMV BLD AUTO: 12.2 FL
POTASSIUM SERPL-SCNC: 4.1 MMOL/L
PROT SERPL-MCNC: 6.7 G/DL
RBC # BLD AUTO: 3.57 M/UL
SODIUM SERPL-SCNC: 138 MMOL/L
WBC # BLD AUTO: 5.82 K/UL

## 2019-01-03 PROCEDURE — 84550 ASSAY OF BLOOD/URIC ACID: CPT

## 2019-01-03 PROCEDURE — 85025 COMPLETE CBC W/AUTO DIFF WBC: CPT

## 2019-01-03 PROCEDURE — 36415 COLL VENOUS BLD VENIPUNCTURE: CPT | Mod: PO

## 2019-01-03 PROCEDURE — 80053 COMPREHEN METABOLIC PANEL: CPT

## 2019-01-04 ENCOUNTER — TELEPHONE (OUTPATIENT)
Dept: RHEUMATOLOGY | Facility: CLINIC | Age: 79
End: 2019-01-04

## 2019-01-04 LAB — URATE SERPL-MCNC: 3.8 MG/DL

## 2019-01-06 ENCOUNTER — PATIENT MESSAGE (OUTPATIENT)
Dept: CARDIOLOGY | Facility: CLINIC | Age: 79
End: 2019-01-06

## 2019-01-27 ENCOUNTER — OFFICE VISIT (OUTPATIENT)
Dept: URGENT CARE | Facility: CLINIC | Age: 79
End: 2019-01-27
Payer: MEDICARE

## 2019-01-27 VITALS
WEIGHT: 223 LBS | DIASTOLIC BLOOD PRESSURE: 70 MMHG | HEART RATE: 72 BPM | RESPIRATION RATE: 16 BRPM | BODY MASS INDEX: 31.92 KG/M2 | OXYGEN SATURATION: 96 % | TEMPERATURE: 97 F | HEIGHT: 70 IN | SYSTOLIC BLOOD PRESSURE: 146 MMHG

## 2019-01-27 DIAGNOSIS — B96.89 ACUTE BACTERIAL BRONCHITIS: Primary | ICD-10-CM

## 2019-01-27 DIAGNOSIS — Z87.09 HX OF INTRINSIC ASTHMA: ICD-10-CM

## 2019-01-27 DIAGNOSIS — R09.81 SINUS CONGESTION: ICD-10-CM

## 2019-01-27 DIAGNOSIS — N18.30 CKD (CHRONIC KIDNEY DISEASE) STAGE 3, GFR 30-59 ML/MIN: ICD-10-CM

## 2019-01-27 DIAGNOSIS — J20.8 ACUTE BACTERIAL BRONCHITIS: Primary | ICD-10-CM

## 2019-01-27 PROCEDURE — 96372 THER/PROPH/DIAG INJ SC/IM: CPT | Mod: S$GLB,,, | Performed by: EMERGENCY MEDICINE

## 2019-01-27 PROCEDURE — 99214 OFFICE O/P EST MOD 30 MIN: CPT | Mod: 25,S$GLB,, | Performed by: EMERGENCY MEDICINE

## 2019-01-27 PROCEDURE — 3078F DIAST BP <80 MM HG: CPT | Mod: CPTII,S$GLB,, | Performed by: EMERGENCY MEDICINE

## 2019-01-27 PROCEDURE — 1101F PR PT FALLS ASSESS DOC 0-1 FALLS W/OUT INJ PAST YR: ICD-10-PCS | Mod: CPTII,S$GLB,, | Performed by: EMERGENCY MEDICINE

## 2019-01-27 PROCEDURE — 96372 PR INJECTION,THERAP/PROPH/DIAG2ST, IM OR SUBCUT: ICD-10-PCS | Mod: S$GLB,,, | Performed by: EMERGENCY MEDICINE

## 2019-01-27 PROCEDURE — 1101F PT FALLS ASSESS-DOCD LE1/YR: CPT | Mod: CPTII,S$GLB,, | Performed by: EMERGENCY MEDICINE

## 2019-01-27 PROCEDURE — 99214 PR OFFICE/OUTPT VISIT, EST, LEVL IV, 30-39 MIN: ICD-10-PCS | Mod: 25,S$GLB,, | Performed by: EMERGENCY MEDICINE

## 2019-01-27 PROCEDURE — 3077F PR MOST RECENT SYSTOLIC BLOOD PRESSURE >= 140 MM HG: ICD-10-PCS | Mod: CPTII,S$GLB,, | Performed by: EMERGENCY MEDICINE

## 2019-01-27 PROCEDURE — 3077F SYST BP >= 140 MM HG: CPT | Mod: CPTII,S$GLB,, | Performed by: EMERGENCY MEDICINE

## 2019-01-27 PROCEDURE — 3078F PR MOST RECENT DIASTOLIC BLOOD PRESSURE < 80 MM HG: ICD-10-PCS | Mod: CPTII,S$GLB,, | Performed by: EMERGENCY MEDICINE

## 2019-01-27 RX ORDER — AZITHROMYCIN 250 MG/1
TABLET, FILM COATED ORAL
Qty: 6 TABLET | Refills: 0 | Status: SHIPPED | OUTPATIENT
Start: 2019-01-27 | End: 2019-02-12 | Stop reason: ALTCHOICE

## 2019-01-27 RX ORDER — BETAMETHASONE SODIUM PHOSPHATE AND BETAMETHASONE ACETATE 3; 3 MG/ML; MG/ML
9 INJECTION, SUSPENSION INTRA-ARTICULAR; INTRALESIONAL; INTRAMUSCULAR; SOFT TISSUE
Status: COMPLETED | OUTPATIENT
Start: 2019-01-27 | End: 2019-01-27

## 2019-01-27 RX ADMIN — BETAMETHASONE SODIUM PHOSPHATE AND BETAMETHASONE ACETATE 9 MG: 3; 3 INJECTION, SUSPENSION INTRA-ARTICULAR; INTRALESIONAL; INTRAMUSCULAR; SOFT TISSUE at 12:01

## 2019-01-27 NOTE — PATIENT INSTRUCTIONS
REST AND HYDRATE  1.5 CC CELESTONE GIVEN IN CLINIC  ZPACK RX  MUCINEX DM TWICE DAILY FOR COUGH  ADVAIR AND ALBUTEROL FOR WHEEZING/COUGH/TIGHTNESS IN CHEST  TYLENOL OR IBUPROFEN FOR LOW GRADE TEMP  GO TO THE ER IF WORSE  FOLLOW UP WITH YOUR PCP    SEE BRONCHITIS SHEET  SEE SINUSITIS SHEET  Bronchitis, Antibiotic Treatment (Adult)    Bronchitis is an infection of the air passages (bronchial tubes) in your lungs. It often occurs when you have a cold. This illness is contagious during the first few days and is spread through the air by coughing and sneezing, or by direct contact (touching the sick person and then touching your own eyes, nose, or mouth).  Symptoms of bronchitis include cough with mucus (phlegm) and low-grade fever. Bronchitis usually lasts 7 to 14 days. Mild cases can be treated with simple home remedies. More severe infection is treated with an antibiotic.  Home care  Follow these guidelines when caring for yourself at home:  · If your symptoms are severe, rest at home for the first 2 to 3 days. When you go back to your usual activities, don't let yourself get too tired.  · Do not smoke. Also avoid being exposed to secondhand smoke.  · You may use over-the-counter medicines to control fever or pain, unless another medicine was prescribed. (Note: If you have chronic liver or kidney disease or have ever had a stomach ulcer or gastrointestinal bleeding, talk with your healthcare provider before using these medicines. Also talk to your provider if you are taking medicine to prevent blood clots.) Aspirin should never be given to anyone younger than 18 years of age who is ill with a viral infection or fever. It may cause severe liver or brain damage.  · Your appetite may be poor, so a light diet is fine. Avoid dehydration by drinking 6 to 8 glasses of fluids per day (such as water, soft drinks, sports drinks, juices, tea, or soup). Extra fluids will help loosen secretions in the nose and  lungs.  · Over-the-counter cough, cold, and sore-throat medicines will not shorten the length of the illness, but they may be helpful to reduce symptoms. (Note: Do not use decongestants if you have high blood pressure.)  · Finish all antibiotic medicine. Do this even if you are feeling better after only a few days.  Follow-up care  Follow up with your healthcare provider, or as advised. If you had an X-ray or ECG (electrocardiogram), a specialist will review it. You will be notified of any new findings that may affect your care.  Note: If you are age 65 or older, or if you have a chronic lung disease or condition that affects your immune system, or you smoke, talk to your healthcare provider about having pneumococcal vaccinations and a yearly influenza vaccination (flu shot).  When to seek medical advice  Call your healthcare provider right away if any of these occur:  · Fever of 100.4°F (38°C) or higher  · Coughing up increased amounts of colored sputum  · Weakness, drowsiness, headache, facial pain, ear pain, or a stiff neck  Call 911, or get immediate medical care  Contact emergency services right away if any of these occur.  · Coughing up blood  · Worsening weakness, drowsiness, headache, or stiff neck  · Trouble breathing, wheezing, or pain with breathing  Date Last Reviewed: 9/13/2015  © 9475-0980 [employer]. [address] [place] [address]. All rights reserved. This information is not intended as a substitute for professional medical care. Always follow your healthcare professional's instructions.        Sinusitis (Antibiotic Treatment)    The sinuses are air-filled spaces within the bones of the face. They connect to the inside of the nose. Sinusitis is an inflammation of the tissue lining the sinus cavity. Sinus inflammation can occur during a cold. It can also be due to allergies to pollens and other particles in the air. Sinusitis can cause symptoms of sinus congestion and  fullness. A sinus infection causes fever, headache and facial pain. There is often green or yellow drainage from the nose or into the back of the throat (post-nasal drip). You have been given antibiotics to treat this condition.  Home care:  · Take the full course of antibiotics as instructed. Do not stop taking them, even if you feel better.  · Drink plenty of water, hot tea, and other liquids. This may help thin mucus. It also may promote sinus drainage.  · Heat may help soothe painful areas of the face. Use a towel soaked in hot water. Or,  the shower and direct the hot spray onto your face. Using a vaporizer along with a menthol rub at night may also help.   · An expectorant containing guaifenesin may help thin the mucus and promote drainage from the sinuses.  · Over-the-counter decongestants may be used unless a similar medicine was prescribed. Nasal sprays work the fastest. Use one that contains phenylephrine or oxymetazoline. First blow the nose gently. Then use the spray. Do not use these medicines more often than directed on the label or symptoms may get worse. You may also use tablets containing pseudoephedrine. Avoid products that combine ingredients, because side effects may be increased. Read labels. You can also ask the pharmacist for help. (NOTE: Persons with high blood pressure should not use decongestants. They can raise blood pressure.)  · Over-the-counter antihistamines may help if allergies contributed to your sinusitis.    · Do not use nasal rinses or irrigation during an acute sinus infection, unless told to by your health care provider. Rinsing may spread the infection to other sinuses.  · Use acetaminophen or ibuprofen to control pain, unless another pain medicine was prescribed. (If you have chronic liver or kidney disease or ever had a stomach ulcer, talk with your doctor before using these medicines. Aspirin should never be used in anyone under 18 years of age who is ill with a  fever. It may cause severe liver damage.)  · Don't smoke. This can worsen symptoms.  Follow-up care  Follow up with your healthcare provider or our staff if you are not improving within the next week.  When to seek medical advice  Call your healthcare provider if any of these occur:  · Facial pain or headache becoming more severe  · Stiff neck  · Unusual drowsiness or confusion  · Swelling of the forehead or eyelids  · Vision problems, including blurred or double vision  · Fever of 100.4ºF (38ºC) or higher, or as directed by your healthcare provider  · Seizure  · Breathing problems  · Symptoms not resolving within 10 days  Date Last Reviewed: 4/13/2015  © 6002-9817 [employer]. [address] [place] [address], [place] [address]. All rights reserved. This information is not intended as a substitute for professional medical care. Always follow your healthcare professional's instructions.

## 2019-01-27 NOTE — PROGRESS NOTES
"Subjective:       Patient ID: Adelfo ZHANG MD Jennifer is a 78 y.o. male.    Vitals:    01/27/19 1138   BP: (!) 146/70   Pulse: 72   Resp: 16   Temp: 97.3 °F (36.3 °C)   SpO2: 96%   Weight: 101.2 kg (223 lb)   Height: 5' 10" (1.778 m)       Chief Complaint: Cough and Sinus Problem    Pt states sinus and chest congestion with cough x 1 week. NO FEVERS, NO CHILLS, NO SOB AS IMPROVED WITH INHALERS OF ADVAIR AND RARE USE OF PROAIR. HAS HX OF PAROXYSMAL AFLUTTER AND ASTHMA AND CKD III AND HIS NEIGHBOR AND FRIEND DR MCCABE HAS THIS UNDER CONTROL PER PATIENT. HE FEELS BETTER AFTER MUCINEX AND ALBUTEROL TODAY      Cough   This is a new problem. The current episode started in the past 7 days. The problem has been unchanged. The cough is productive of sputum. Associated symptoms include ear congestion, nasal congestion and a sore throat. Pertinent negatives include no chest pain, chills, fever, headaches, rash or shortness of breath. Nothing aggravates the symptoms. Treatments tried: mucinex. The treatment provided mild relief. His past medical history is significant for asthma, bronchitis and pneumonia.   Sinus Problem   Associated symptoms include congestion, coughing and a sore throat. Pertinent negatives include no chills, headaches or shortness of breath.     Review of Systems   Constitution: Negative for chills and fever.   HENT: Positive for congestion and sore throat.    Eyes: Negative for blurred vision.   Cardiovascular: Negative for chest pain.   Respiratory: Positive for cough and sputum production. Negative for shortness of breath.    Skin: Negative for rash.   Musculoskeletal: Negative for back pain and joint pain.   Gastrointestinal: Negative for abdominal pain, diarrhea, nausea and vomiting.   Neurological: Negative for headaches.   Psychiatric/Behavioral: The patient is not nervous/anxious.        Objective:      Physical Exam   Constitutional: He is oriented to person, place, and time. He appears " well-developed and well-nourished. He is cooperative.  Non-toxic appearance. He does not appear ill. No distress.   HENT:   Head: Normocephalic and atraumatic.   Right Ear: Hearing, tympanic membrane, external ear and ear canal normal.   Left Ear: Hearing, tympanic membrane, external ear and ear canal normal.   Nose: No mucosal edema, rhinorrhea or nasal deformity. No epistaxis. Right sinus exhibits no maxillary sinus tenderness and no frontal sinus tenderness. Left sinus exhibits no maxillary sinus tenderness and no frontal sinus tenderness.   Mouth/Throat: Uvula is midline, oropharynx is clear and moist and mucous membranes are normal. No trismus in the jaw. Normal dentition. No uvula swelling. No posterior oropharyngeal erythema.   NASAL CONGESTION  MAXILLARY SINUS TENDERNESS TO PALPATION   Eyes: Conjunctivae and lids are normal. No scleral icterus.   Sclera clear bilat   Neck: Trachea normal, full passive range of motion without pain and phonation normal. Neck supple.   Cardiovascular: Normal rate, regular rhythm, normal heart sounds, intact distal pulses and normal pulses.   Pulmonary/Chest: Effort normal and breath sounds normal. No respiratory distress.   VERY FAINT END EXPIRATORY WHEEZE CLEARED WITH COUGH   Abdominal: Soft. Normal appearance and bowel sounds are normal. There is no tenderness.   Musculoskeletal: Normal range of motion. He exhibits no edema or deformity.   Neurological: He is alert and oriented to person, place, and time. He exhibits normal muscle tone. Coordination normal.   Skin: Skin is warm, dry and intact. He is not diaphoretic. No pallor.   Psychiatric: He has a normal mood and affect. His speech is normal and behavior is normal. Cognition and memory are normal.   Nursing note and vitals reviewed.      Assessment:       1. Acute bacterial bronchitis    2. Sinus congestion    3. Hx of intrinsic asthma    4. CKD (chronic kidney disease) stage 3, GFR 30-59 ml/min        Plan:       Adelfo  was seen today for cough and sinus problem.    Diagnoses and all orders for this visit:    Acute bacterial bronchitis    Sinus congestion    Hx of intrinsic asthma    CKD (chronic kidney disease) stage 3, GFR 30-59 ml/min    Other orders  -     betamethasone acetate-betamethasone sodium phosphate injection 9 mg  -     azithromycin (Z-NICOLASA) 250 MG tablet; Take 2 tablets by mouth on day 1; Take 1 tablet by mouth on days 2-5      Patient Instructions   REST AND HYDRATE  1.5 CC CELESTONE GIVEN IN CLINIC  ZPACK RX  MUCINEX DM TWICE DAILY FOR COUGH  ADVAIR AND ALBUTEROL FOR WHEEZING/COUGH/TIGHTNESS IN CHEST  TYLENOL OR IBUPROFEN FOR LOW GRADE TEMP  GO TO THE ER IF WORSE  FOLLOW UP WITH YOUR PCP    SEE BRONCHITIS SHEET  SEE SINUSITIS SHEET  Bronchitis, Antibiotic Treatment (Adult)    Bronchitis is an infection of the air passages (bronchial tubes) in your lungs. It often occurs when you have a cold. This illness is contagious during the first few days and is spread through the air by coughing and sneezing, or by direct contact (touching the sick person and then touching your own eyes, nose, or mouth).  Symptoms of bronchitis include cough with mucus (phlegm) and low-grade fever. Bronchitis usually lasts 7 to 14 days. Mild cases can be treated with simple home remedies. More severe infection is treated with an antibiotic.  Home care  Follow these guidelines when caring for yourself at home:  · If your symptoms are severe, rest at home for the first 2 to 3 days. When you go back to your usual activities, don't let yourself get too tired.  · Do not smoke. Also avoid being exposed to secondhand smoke.  · You may use over-the-counter medicines to control fever or pain, unless another medicine was prescribed. (Note: If you have chronic liver or kidney disease or have ever had a stomach ulcer or gastrointestinal bleeding, talk with your healthcare provider before using these medicines. Also talk to your provider if you are taking  medicine to prevent blood clots.) Aspirin should never be given to anyone younger than 18 years of age who is ill with a viral infection or fever. It may cause severe liver or brain damage.  · Your appetite may be poor, so a light diet is fine. Avoid dehydration by drinking 6 to 8 glasses of fluids per day (such as water, soft drinks, sports drinks, juices, tea, or soup). Extra fluids will help loosen secretions in the nose and lungs.  · Over-the-counter cough, cold, and sore-throat medicines will not shorten the length of the illness, but they may be helpful to reduce symptoms. (Note: Do not use decongestants if you have high blood pressure.)  · Finish all antibiotic medicine. Do this even if you are feeling better after only a few days.  Follow-up care  Follow up with your healthcare provider, or as advised. If you had an X-ray or ECG (electrocardiogram), a specialist will review it. You will be notified of any new findings that may affect your care.  Note: If you are age 65 or older, or if you have a chronic lung disease or condition that affects your immune system, or you smoke, talk to your healthcare provider about having pneumococcal vaccinations and a yearly influenza vaccination (flu shot).  When to seek medical advice  Call your healthcare provider right away if any of these occur:  · Fever of 100.4°F (38°C) or higher  · Coughing up increased amounts of colored sputum  · Weakness, drowsiness, headache, facial pain, ear pain, or a stiff neck  Call 911, or get immediate medical care  Contact emergency services right away if any of these occur.  · Coughing up blood  · Worsening weakness, drowsiness, headache, or stiff neck  · Trouble breathing, wheezing, or pain with breathing  Date Last Reviewed: 9/13/2015 © 2000-2017 [employer]. [address] [place] [address], [place] [address]. All rights reserved. This information is not intended as a substitute for professional medical care. Always follow your  healthcare professional's instructions.        Sinusitis (Antibiotic Treatment)    The sinuses are air-filled spaces within the bones of the face. They connect to the inside of the nose. Sinusitis is an inflammation of the tissue lining the sinus cavity. Sinus inflammation can occur during a cold. It can also be due to allergies to pollens and other particles in the air. Sinusitis can cause symptoms of sinus congestion and fullness. A sinus infection causes fever, headache and facial pain. There is often green or yellow drainage from the nose or into the back of the throat (post-nasal drip). You have been given antibiotics to treat this condition.  Home care:  · Take the full course of antibiotics as instructed. Do not stop taking them, even if you feel better.  · Drink plenty of water, hot tea, and other liquids. This may help thin mucus. It also may promote sinus drainage.  · Heat may help soothe painful areas of the face. Use a towel soaked in hot water. Or,  the shower and direct the hot spray onto your face. Using a vaporizer along with a menthol rub at night may also help.   · An expectorant containing guaifenesin may help thin the mucus and promote drainage from the sinuses.  · Over-the-counter decongestants may be used unless a similar medicine was prescribed. Nasal sprays work the fastest. Use one that contains phenylephrine or oxymetazoline. First blow the nose gently. Then use the spray. Do not use these medicines more often than directed on the label or symptoms may get worse. You may also use tablets containing pseudoephedrine. Avoid products that combine ingredients, because side effects may be increased. Read labels. You can also ask the pharmacist for help. (NOTE: Persons with high blood pressure should not use decongestants. They can raise blood pressure.)  · Over-the-counter antihistamines may help if allergies contributed to your sinusitis.    · Do not use nasal rinses or irrigation during  an acute sinus infection, unless told to by your health care provider. Rinsing may spread the infection to other sinuses.  · Use acetaminophen or ibuprofen to control pain, unless another pain medicine was prescribed. (If you have chronic liver or kidney disease or ever had a stomach ulcer, talk with your doctor before using these medicines. Aspirin should never be used in anyone under 18 years of age who is ill with a fever. It may cause severe liver damage.)  · Don't smoke. This can worsen symptoms.  Follow-up care  Follow up with your healthcare provider or our staff if you are not improving within the next week.  When to seek medical advice  Call your healthcare provider if any of these occur:  · Facial pain or headache becoming more severe  · Stiff neck  · Unusual drowsiness or confusion  · Swelling of the forehead or eyelids  · Vision problems, including blurred or double vision  · Fever of 100.4ºF (38ºC) or higher, or as directed by your healthcare provider  · Seizure  · Breathing problems  · Symptoms not resolving within 10 days  Date Last Reviewed: 4/13/2015  © 5082-7131 [employer]. [address] [place] [address], [place] [address]. All rights reserved. This information is not intended as a substitute for professional medical care. Always follow your healthcare professional's instructions.

## 2019-02-05 ENCOUNTER — PATIENT MESSAGE (OUTPATIENT)
Dept: INTERNAL MEDICINE | Facility: CLINIC | Age: 79
End: 2019-02-05

## 2019-02-06 RX ORDER — PROMETHAZINE HYDROCHLORIDE AND CODEINE PHOSPHATE 6.25; 1 MG/5ML; MG/5ML
5 SOLUTION ORAL EVERY 4 HOURS PRN
Qty: 200 ML | Refills: 0 | Status: SHIPPED | OUTPATIENT
Start: 2019-02-06 | End: 2019-02-16

## 2019-02-06 RX ORDER — BENZONATATE 100 MG/1
CAPSULE ORAL
Qty: 40 CAPSULE | Refills: 1 | Status: SHIPPED | OUTPATIENT
Start: 2019-02-06 | End: 2019-02-12 | Stop reason: ALTCHOICE

## 2019-02-06 RX ORDER — PREDNISONE 20 MG/1
TABLET ORAL
Qty: 20 TABLET | Refills: 0 | Status: SHIPPED | OUTPATIENT
Start: 2019-02-06 | End: 2019-02-12 | Stop reason: ALTCHOICE

## 2019-02-06 NOTE — TELEPHONE ENCOUNTER
Prescriptions for prednisone, Tessalon Perles, and promethazine with codeine have been sent to the patient's pharmacy. An e-mail has been sent to the patient.

## 2019-02-09 ENCOUNTER — PATIENT MESSAGE (OUTPATIENT)
Dept: INTERNAL MEDICINE | Facility: CLINIC | Age: 79
End: 2019-02-09

## 2019-02-12 ENCOUNTER — OFFICE VISIT (OUTPATIENT)
Dept: INTERNAL MEDICINE | Facility: CLINIC | Age: 79
End: 2019-02-12
Payer: MEDICARE

## 2019-02-12 VITALS
WEIGHT: 226 LBS | BODY MASS INDEX: 32.35 KG/M2 | HEIGHT: 70 IN | DIASTOLIC BLOOD PRESSURE: 61 MMHG | TEMPERATURE: 99 F | SYSTOLIC BLOOD PRESSURE: 130 MMHG | HEART RATE: 64 BPM | OXYGEN SATURATION: 98 %

## 2019-02-12 DIAGNOSIS — G47.33 OSA (OBSTRUCTIVE SLEEP APNEA): ICD-10-CM

## 2019-02-12 DIAGNOSIS — K76.0 NAFLD (NONALCOHOLIC FATTY LIVER DISEASE): ICD-10-CM

## 2019-02-12 DIAGNOSIS — N18.30 CKD (CHRONIC KIDNEY DISEASE), STAGE III: ICD-10-CM

## 2019-02-12 DIAGNOSIS — I10 ESSENTIAL HYPERTENSION: Primary | Chronic | ICD-10-CM

## 2019-02-12 DIAGNOSIS — J45.20 INTERMITTENT ASTHMA WITHOUT COMPLICATION, UNSPECIFIED ASTHMA SEVERITY: ICD-10-CM

## 2019-02-12 DIAGNOSIS — I48.92 ATRIAL FLUTTER, UNSPECIFIED TYPE: Chronic | ICD-10-CM

## 2019-02-12 PROCEDURE — 3075F PR MOST RECENT SYSTOLIC BLOOD PRESS GE 130-139MM HG: ICD-10-PCS | Mod: CPTII,S$GLB,, | Performed by: INTERNAL MEDICINE

## 2019-02-12 PROCEDURE — 99999 PR PBB SHADOW E&M-EST. PATIENT-LVL III: CPT | Mod: PBBFAC,,, | Performed by: INTERNAL MEDICINE

## 2019-02-12 PROCEDURE — 3078F DIAST BP <80 MM HG: CPT | Mod: CPTII,S$GLB,, | Performed by: INTERNAL MEDICINE

## 2019-02-12 PROCEDURE — 99999 PR PBB SHADOW E&M-EST. PATIENT-LVL III: ICD-10-PCS | Mod: PBBFAC,,, | Performed by: INTERNAL MEDICINE

## 2019-02-12 PROCEDURE — 99214 PR OFFICE/OUTPT VISIT, EST, LEVL IV, 30-39 MIN: ICD-10-PCS | Mod: S$GLB,,, | Performed by: INTERNAL MEDICINE

## 2019-02-12 PROCEDURE — 1101F PT FALLS ASSESS-DOCD LE1/YR: CPT | Mod: CPTII,S$GLB,, | Performed by: INTERNAL MEDICINE

## 2019-02-12 PROCEDURE — 1101F PR PT FALLS ASSESS DOC 0-1 FALLS W/OUT INJ PAST YR: ICD-10-PCS | Mod: CPTII,S$GLB,, | Performed by: INTERNAL MEDICINE

## 2019-02-12 PROCEDURE — 3078F PR MOST RECENT DIASTOLIC BLOOD PRESSURE < 80 MM HG: ICD-10-PCS | Mod: CPTII,S$GLB,, | Performed by: INTERNAL MEDICINE

## 2019-02-12 PROCEDURE — 3075F SYST BP GE 130 - 139MM HG: CPT | Mod: CPTII,S$GLB,, | Performed by: INTERNAL MEDICINE

## 2019-02-12 PROCEDURE — 99214 OFFICE O/P EST MOD 30 MIN: CPT | Mod: S$GLB,,, | Performed by: INTERNAL MEDICINE

## 2019-02-12 RX ORDER — OMEPRAZOLE 40 MG/1
40 CAPSULE, DELAYED RELEASE ORAL EVERY MORNING
Qty: 30 CAPSULE | Refills: 3 | Status: SHIPPED | OUTPATIENT
Start: 2019-02-12 | End: 2019-05-31 | Stop reason: SDUPTHER

## 2019-02-19 ENCOUNTER — PATIENT MESSAGE (OUTPATIENT)
Dept: SLEEP MEDICINE | Facility: CLINIC | Age: 79
End: 2019-02-19

## 2019-02-20 ENCOUNTER — OFFICE VISIT (OUTPATIENT)
Dept: SLEEP MEDICINE | Facility: CLINIC | Age: 79
End: 2019-02-20
Payer: MEDICARE

## 2019-02-20 VITALS
HEART RATE: 76 BPM | DIASTOLIC BLOOD PRESSURE: 70 MMHG | SYSTOLIC BLOOD PRESSURE: 148 MMHG | WEIGHT: 226 LBS | BODY MASS INDEX: 32.35 KG/M2 | HEIGHT: 70 IN

## 2019-02-20 DIAGNOSIS — I10 ESSENTIAL HYPERTENSION: Chronic | ICD-10-CM

## 2019-02-20 DIAGNOSIS — I48.92 ATRIAL FLUTTER, UNSPECIFIED TYPE: Chronic | ICD-10-CM

## 2019-02-20 DIAGNOSIS — E66.9 OBESITY (BMI 30-39.9): ICD-10-CM

## 2019-02-20 DIAGNOSIS — G47.33 OBSTRUCTIVE SLEEP APNEA: Primary | ICD-10-CM

## 2019-02-20 PROCEDURE — 99214 PR OFFICE/OUTPT VISIT, EST, LEVL IV, 30-39 MIN: ICD-10-PCS | Mod: S$GLB,,, | Performed by: NURSE PRACTITIONER

## 2019-02-20 PROCEDURE — 3078F DIAST BP <80 MM HG: CPT | Mod: CPTII,S$GLB,, | Performed by: NURSE PRACTITIONER

## 2019-02-20 PROCEDURE — 99999 PR PBB SHADOW E&M-EST. PATIENT-LVL IV: CPT | Mod: PBBFAC,,, | Performed by: NURSE PRACTITIONER

## 2019-02-20 PROCEDURE — 99999 PR PBB SHADOW E&M-EST. PATIENT-LVL IV: ICD-10-PCS | Mod: PBBFAC,,, | Performed by: NURSE PRACTITIONER

## 2019-02-20 PROCEDURE — 1101F PR PT FALLS ASSESS DOC 0-1 FALLS W/OUT INJ PAST YR: ICD-10-PCS | Mod: CPTII,S$GLB,, | Performed by: NURSE PRACTITIONER

## 2019-02-20 PROCEDURE — 1101F PT FALLS ASSESS-DOCD LE1/YR: CPT | Mod: CPTII,S$GLB,, | Performed by: NURSE PRACTITIONER

## 2019-02-20 PROCEDURE — 99214 OFFICE O/P EST MOD 30 MIN: CPT | Mod: S$GLB,,, | Performed by: NURSE PRACTITIONER

## 2019-02-20 PROCEDURE — 3077F SYST BP >= 140 MM HG: CPT | Mod: CPTII,S$GLB,, | Performed by: NURSE PRACTITIONER

## 2019-02-20 PROCEDURE — 3077F PR MOST RECENT SYSTOLIC BLOOD PRESSURE >= 140 MM HG: ICD-10-PCS | Mod: CPTII,S$GLB,, | Performed by: NURSE PRACTITIONER

## 2019-02-20 PROCEDURE — 3078F PR MOST RECENT DIASTOLIC BLOOD PRESSURE < 80 MM HG: ICD-10-PCS | Mod: CPTII,S$GLB,, | Performed by: NURSE PRACTITIONER

## 2019-02-20 NOTE — PROGRESS NOTES
"This 78 y.o. male patient returns for the management of obstructive sleep apnea.    He has been using CPAP successfully since diagnosis of ANNABELLE in 2008. (actually began 2008, dx'd 1998~)   He continues to report benefit from using CPAP, feels more rested and snoring/apneic pauses resolved.   Using memory foam F20, not happy with [employer] (hose ripped while out of town recently and dme wouldn't provide one)    ESS=6  BASELINE PSG 11/25/08: +ANNABELLE, AHI 52.4, low sat 53%, titrated to 14 cm; wt 235 lbs    Interrogation- airsense machine good condition,avg 7.7h/n. 14l/min leak (much better than in past), 90% tile 13.9cm. AHI 0.8-1.0, JAMISON 0.1    Interim- regularly exercising (kind of competition with brother who might have PAH), Aflutter(seeing Dr. Morales/neighbor, dose of metoprolol caused diarrhea)      REVIEW OF SYSTEMS:  Sleep related symptoms as per HPI; 5# gain . Otherwise a balance review of 10-systems is negative.     PHYSICAL EXAM:  BP (!) 148/70   Pulse 76   Ht 5' 10" (1.778 m)   Wt 102.5 kg (225 lb 15.5 oz)   BMI 32.42 kg/m²   GENERAL: Well groomed; Normally developed; Overweight      ASSESSMENT:    1. ANNABELLE, severe. Remains adherent with PAP, AHI<5, continuing to benefit from therapy. Wants alternative DME    He has medical co-morbidities of hypertension, atrial flutter and obesity.          PLAN:    Treatment:  1. Continue CPAP 8 to 14 cm  2. DME - change to [facility]  3. rtc annually, sooner if needed. See cards as advised re: med mgt atrial flutter and HTN and encouraged continued weight loss efforts for potential improvement of ANNABELLE and overall health benefits    "

## 2019-02-21 NOTE — PROGRESS NOTES
Subjective:       Patient ID: Adelfo VICENTE Rodriguez MD is a 78 y.o. male.    Chief Complaint: Follow-up    HPI   The patient presents for follow-up of medical conditions which include hypertension, chronic kidney disease, asthma, atrial flutter, long-term anticoagulation, hyperuricemia, obstructive sleep apnea on CPAP therapy, and hyperlipidemia.  The patient notes persistent cough.  He is on tapering dose of prednisone and Tessalon Perles.  Symptoms followed bronchitis episode.  His peak expiratory flows were normal.  Minimal sputum production is reported.    Since his last office visit he was evaluated for an abnormal stress test.  A left heart catheterization was negative for coronary artery disease. His statin was discontinued due to myalgias.  He is currently on Eliquis therapy after atrial flutter was noted on Holter monitoring.  Metoprolol was prescribed but the patient developed diarrhea on the medication.  He uses Lasix twice a week for management lower extremity edema. He does admit to weight gain with recent steroid use.  He exercises 4 days a week.  He has not experienced any symptoms of gout; he remains on low-dose colchicine daily.  He reports his blood pressures are usually stable.  Low blood pressure was noted with use of metoprolol however.    Review of Systems   Constitutional: Positive for activity change. Negative for appetite change, fatigue and unexpected weight change.   HENT: Positive for rhinorrhea. Negative for hearing loss, sinus pressure, sore throat and trouble swallowing.    Eyes: Negative for discharge and visual disturbance.   Respiratory: Positive for cough and wheezing. Negative for chest tightness and shortness of breath.    Cardiovascular: Positive for leg swelling. Negative for chest pain and palpitations.   Gastrointestinal: Negative for abdominal pain, blood in stool, constipation, diarrhea, nausea and vomiting.   Endocrine: Negative for polydipsia and polyuria.   Genitourinary:  Negative for difficulty urinating, dysuria, hematuria and urgency.   Musculoskeletal: Negative for arthralgias, back pain, gait problem, joint swelling, myalgias, neck pain and neck stiffness.   Skin: Negative for color change and rash.   Neurological: Positive for headaches. Negative for dizziness, syncope, weakness, light-headedness and numbness.   Psychiatric/Behavioral: Negative for confusion, dysphoric mood and sleep disturbance.       Objective:      Physical Exam   Constitutional: He is oriented to person, place, and time. He appears well-developed and well-nourished. No distress.   The patient has gained 7 lbs. since 10/01/2018.   HENT:   Head: Normocephalic and atraumatic.   Eyes: Conjunctivae and EOM are normal. No scleral icterus.   Neck: Normal range of motion. Neck supple. No JVD present. No thyromegaly present.   Cardiovascular: Normal rate, regular rhythm, normal heart sounds and intact distal pulses. Exam reveals no gallop and no friction rub.   No murmur heard.  Trace ankle edema is present bilaterally.   Pulmonary/Chest: Effort normal and breath sounds normal. No respiratory distress.   Occasional rhonchi are noted bilaterally.   Abdominal: Soft. Bowel sounds are normal. He exhibits no mass. There is no tenderness.   Musculoskeletal: Normal range of motion. He exhibits no tenderness.   Lymphadenopathy:     He has no cervical adenopathy.   Neurological: He is alert and oriented to person, place, and time.   Gait is normal.   Skin: Skin is warm and dry. No rash noted.   Nursing note and vitals reviewed.      Assessment:       1. Essential hypertension    2. Intermittent asthma without complication, unspecified asthma severity    3. ANNABELLE (obstructive sleep apnea)    4. CKD (chronic kidney disease), stage III    5. NAFLD (nonalcoholic fatty liver disease)    6. Atrial flutter, unspecified type        Plan:       Adelfo was seen today for follow-up.  Patient should continue current therapy.  Prednisone  should be tapered as started.  Blood tests and a follow-up visit in 3 months will be obtained.    Diagnoses and all orders for this visit:    Essential hypertension  -     CBC auto differential; Future  -     Comprehensive metabolic panel; Future  -     Lipid panel; Future    Intermittent asthma without complication, unspecified asthma severity    ANNABELLE (obstructive sleep apnea)    CKD (chronic kidney disease), stage III    NAFLD (nonalcoholic fatty liver disease)    Atrial flutter, unspecified type

## 2019-03-03 ENCOUNTER — OFFICE VISIT (OUTPATIENT)
Dept: URGENT CARE | Facility: CLINIC | Age: 79
End: 2019-03-03
Payer: MEDICARE

## 2019-03-03 VITALS
RESPIRATION RATE: 16 BRPM | WEIGHT: 216 LBS | OXYGEN SATURATION: 96 % | DIASTOLIC BLOOD PRESSURE: 67 MMHG | HEART RATE: 80 BPM | HEIGHT: 70 IN | TEMPERATURE: 98 F | SYSTOLIC BLOOD PRESSURE: 122 MMHG | BODY MASS INDEX: 30.92 KG/M2

## 2019-03-03 DIAGNOSIS — S61.214A LACERATION OF RIGHT RING FINGER WITHOUT FOREIGN BODY WITHOUT DAMAGE TO NAIL, INITIAL ENCOUNTER: Primary | ICD-10-CM

## 2019-03-03 PROCEDURE — 3074F PR MOST RECENT SYSTOLIC BLOOD PRESSURE < 130 MM HG: ICD-10-PCS | Mod: CPTII,S$GLB,, | Performed by: EMERGENCY MEDICINE

## 2019-03-03 PROCEDURE — 12002 RPR S/N/AX/GEN/TRNK2.6-7.5CM: CPT | Mod: S$GLB,,, | Performed by: EMERGENCY MEDICINE

## 2019-03-03 PROCEDURE — 99214 PR OFFICE/OUTPT VISIT, EST, LEVL IV, 30-39 MIN: ICD-10-PCS | Mod: 25,S$GLB,, | Performed by: EMERGENCY MEDICINE

## 2019-03-03 PROCEDURE — 1101F PT FALLS ASSESS-DOCD LE1/YR: CPT | Mod: CPTII,S$GLB,, | Performed by: EMERGENCY MEDICINE

## 2019-03-03 PROCEDURE — 3074F SYST BP LT 130 MM HG: CPT | Mod: CPTII,S$GLB,, | Performed by: EMERGENCY MEDICINE

## 2019-03-03 PROCEDURE — 1101F PR PT FALLS ASSESS DOC 0-1 FALLS W/OUT INJ PAST YR: ICD-10-PCS | Mod: CPTII,S$GLB,, | Performed by: EMERGENCY MEDICINE

## 2019-03-03 PROCEDURE — 3078F DIAST BP <80 MM HG: CPT | Mod: CPTII,S$GLB,, | Performed by: EMERGENCY MEDICINE

## 2019-03-03 PROCEDURE — 3078F PR MOST RECENT DIASTOLIC BLOOD PRESSURE < 80 MM HG: ICD-10-PCS | Mod: CPTII,S$GLB,, | Performed by: EMERGENCY MEDICINE

## 2019-03-03 PROCEDURE — 99214 OFFICE O/P EST MOD 30 MIN: CPT | Mod: 25,S$GLB,, | Performed by: EMERGENCY MEDICINE

## 2019-03-03 PROCEDURE — 12002 LACERATION REPAIR: ICD-10-PCS | Mod: S$GLB,,, | Performed by: EMERGENCY MEDICINE

## 2019-03-03 RX ORDER — SULFAMETHOXAZOLE AND TRIMETHOPRIM 800; 160 MG/1; MG/1
1 TABLET ORAL 2 TIMES DAILY
Qty: 14 TABLET | Refills: 0 | Status: SHIPPED | OUTPATIENT
Start: 2019-03-03 | End: 2019-03-10

## 2019-03-03 RX ORDER — MUPIROCIN 20 MG/G
OINTMENT TOPICAL
Qty: 22 G | Refills: 1 | Status: SHIPPED | OUTPATIENT
Start: 2019-03-03 | End: 2019-04-01 | Stop reason: ALTCHOICE

## 2019-03-03 NOTE — PROGRESS NOTES
"Subjective:       Patient ID: Adelfo ZHANG MD Jennifer is a 78 y.o. male.    Vitals:    03/03/19 1532   BP: 122/67   Pulse: 80   Resp: 16   Temp: 97.6 °F (36.4 °C)   SpO2: 96%   Weight: 98 kg (216 lb)   Height: 5' 10" (1.778 m)       Chief Complaint: Laceration (Right ring finger)    Patient reports that he was cooking and cut his right ring finger 20 minutes ago.Last tetanus shot 9/21/2015.  Sustained a large 2-3 cm deep flap laceration of the distal volar and lateral aspect of the right 4th digit.  Tetanus shot is up-to-date.  It is bleeding briskly secondary to Eliquis usage.      Laceration    The incident occurred less than 1 hour ago. Pain location: right ring finger. The laceration is 2 cm in size. The laceration mechanism was a dirty knife. The pain is at a severity of 1/10. The pain has been constant since onset. It is unknown if a foreign body is present. His tetanus status is UTD.     Review of Systems   Constitution: Negative for weakness and malaise/fatigue.   HENT: Negative for nosebleeds.    Cardiovascular: Negative for chest pain and syncope.   Respiratory: Negative for shortness of breath.    Musculoskeletal: Negative for back pain, joint pain and neck pain.   Gastrointestinal: Negative for abdominal pain.   Genitourinary: Negative for hematuria.   Neurological: Negative for dizziness and numbness.       Objective:      Physical Exam   Constitutional: He is oriented to person, place, and time. He appears well-developed and well-nourished.   HENT:   Head: Normocephalic and atraumatic. Head is without abrasion, without contusion and without laceration.   Eyes: Conjunctivae, EOM and lids are normal. Pupils are equal, round, and reactive to light.   Neck: Trachea normal, full passive range of motion without pain and phonation normal. Neck supple.   Cardiovascular: Normal rate, regular rhythm and normal heart sounds.   Pulmonary/Chest: Effort normal and breath sounds normal. No stridor. No respiratory " "distress.   Musculoskeletal: Normal range of motion. He exhibits edema and tenderness.   Neurological: He is alert and oriented to person, place, and time.   Skin: Skin is warm, dry and intact. Capillary refill takes less than 2 seconds. No abrasion, no bruising, no burn, no ecchymosis, no laceration, no lesion and no rash noted. No erythema.   Deep a right 4th digit volar and lateral aspect of the finger on the right hand there is a 3 cm laceration with brisk bleeding.  No bone exposed no tenderness structures exposed.  Able to stop the bleeding with suture repair.  See procedure note   Psychiatric: He has a normal mood and affect. His speech is normal and behavior is normal. Judgment and thought content normal. Cognition and memory are normal.   Nursing note and vitals reviewed.        Laceration Repair  Date/Time: 3/3/2019 5:03 PM  Performed by: Eb Amor MD  Authorized by: Eb Amor MD   Consent Done: Yes  Consent: Verbal consent obtained. Written consent not obtained.  Risks and benefits: risks, benefits and alternatives were discussed  Consent given by: patient  Patient understanding: patient states understanding of the procedure being performed  Imaging studies: imaging studies available  Required items: required blood products, implants, devices, and special equipment available  Patient identity confirmed: name and verbally with patient  Time out: Immediately prior to procedure a "time out" was called to verify the correct patient, procedure, equipment, support staff and site/side marked as required.  Body area: upper extremity  Location details: right ring finger  Laceration length: 3 cm  Foreign bodies: no foreign bodies  Tendon involvement: none  Nerve involvement: none  Vascular damage: yes (Capillary or arterial are bleeding, brisk)  Anesthesia: digital block    Anesthesia:  Local Anesthetic: lidocaine 2% without epinephrine  Anesthetic total: 10 mL  Patient sedated: no  Preparation: " Patient was prepped and draped in the usual sterile fashion.  Irrigation solution: saline  Irrigation method: syringe  Amount of cleaning: standard  Debridement: minimal  Degree of undermining: none  Skin closure: 4-0 nylon  Number of sutures: 11  Technique: simple  Approximation: close  Approximation difficulty: simple  Dressing: 4x4 sterile gauze and antibiotic ointment  Patient tolerance: Patient tolerated the procedure well with no immediate complications  Comments: GOOD COSMESIS  GOOD HEMOSTASIS  NO COMPLICATIONS  GIVEN INSTRUCTIONS OF WHEN TO RETURN FOR SUTURE REMOVAL  Eleven sutures placed total, good hemostasis, adequate cosmesis, covered with Bactroban.        Assessment:       1. Laceration of right ring finger without foreign body without damage to nail, initial encounter        Plan:       Adelfo was seen today for laceration.    Diagnoses and all orders for this visit:    Laceration of right ring finger without foreign body without damage to nail, initial encounter  -     Laceration Repair    Other orders  -     sulfamethoxazole-trimethoprim 800-160mg (BACTRIM DS) 800-160 mg Tab; Take 1 tablet by mouth 2 (two) times daily. for 7 days  -     mupirocin (BACTROBAN) 2 % ointment; Apply to affected area 3 times daily          Patient Instructions   Keep wound clean with antibacterial soap and water like dial.  Use Bactroban ointment prescribed to you for 3 days, and keep clean and covered with ointment for 3 days.  After the 3rd day, allow the wound to get completely dry.  After that keep covered and dry.  Return in 12-14 days for suture removal and wound check.  Return sooner if having drainage of pus, worse pain, any other concerns or wound dehiscence or coming apart.  Bactrim double strength prescription secondary to penicillin allergy and location of the deep wound placed on antibiotics.  Review laceration sheet  Return in 12-14 days for suture removal        Laceration: All Closures  A laceration is a  cut through the skin. This will usually require stitches (sutures) or staples if it is deep. Minor cuts may be treated with a surgical tape closure or skin glue.    Home care  · Your healthcare provider may prescribe an antibiotic. This is to help prevent infection. Follow all instructions for taking this medicine. Take the medicine every day until it is gone or you are told to stop. You should not have any left over.  · The healthcare provider may prescribe medicines for pain. Follow instructions for taking them.  · Follow the healthcare providers instructions on how to care for the cut.  · Keep the wound clean and dry. Do not get the wound wet until you are told it is okay to do so. If the area gets wet, gently pat it dry with a clean cloth. Replace the wet bandage with a dry one.  · If a bandage was applied and it becomes wet or dirty, replace it. Otherwise, leave it in place for the first 24 hours.  · Caring for sutures or staples: Once you no longer need to keep them dry, clean the wound daily. First, remove the bandage. Then wash the area gently with soap and warm water, or as directed by the health care provider. Use a wet cotton swab to loosen and remove any blood or crust that forms. After cleaning, apply a thin layer of antibiotic ointment if advised. Then put on a new bandage unless you are told not to.  · Caring for skin glue: Dont put apply liquid, ointment, or cream on the wound while the glue is in place. Avoid activities that cause heavy sweating. Protect the wound from sunlight. Do not scratch, rub, or pick at the adhesive film. Do not place tape directly over the film. The glue should peel off within 5 to 10 days.   · Caring for surgical tape: Keep the area dry. If it gets wet, blot it dry with a clean towel. Surgical tape usually falls off within 7 to 10 days. If it has not fallen off after 10 days, you can take it off yourself. Put mineral oil or petroleum jelly on a cotton ball and gently rub  the tape until it is removed.  · Once you can get the wound wet, you may shower as usual but do not soak the wound in water (no tub baths or swimming)  · Even with proper treatment, a wound infection may sometimes occur. Check the wound daily for signs of infection listed below.  Scalp wounds  During the first two days, you may carefully rinse your hair in the shower to remove blood, glass or dirt particles. After two days, you may shower and shampoo your hair normally. Do not soak your scalp in the tub or go swimming until the stitches or staples have been removed. Talk with your healthcare provider before applying any antibiotic ointment to the wound.  Mouth wounds  Eat soft foods to reduce pain. If the cut is inside of your mouth, clean by rinsing after each meal and at bedtime with a mixture of equal parts water and hydrogen peroxide (do not swallow!). Or, you can use a cotton swab to directly apply hydrogen peroxide onto the cut. Mouth wounds can be painful when eating. You may use an over-the-counter local numbing solution for pain relief. If this is not available, you may use any numbing solution intended for teething babies. You may apply this directly to the sores with a cotton-tip swab or with your finger.  Follow-up care  Follow up with your healthcare provider as advised. Ask your healthcare provider how long sutures should be left in place. Be sure to return for suture removal as directed. If dissolving stitches were used in the mouth, these should fall out or dissolve without the need for removal. If tape closures were used, remove them yourself when your provider recommends if they have not fallen off on their own. If skin glue was used, the film will wear off by itself.  When to seek medical advice  Call your healthcare provider right away if any of these occur:  · Wound bleeding not controlled by direct pressure  · Signs of infection, including increasing pain in the wound, increasing wound redness  or swelling, or pus or bad odor coming from the wound  · Fever of 100.4°F (38.ºC) or higher or as directed by your healthcare provider  · Stitches or staples come apart or fall out or surgical tape falls off before 7 days  · Wound edges re-open  · Wound changes colors  · Numbness around the wound   · Decreased movement around the injured area  Date Last Reviewed: 6/14/2015  © 7826-8956 [employer]. [address] [place] [address], [place] [address]. All rights reserved. This information is not intended as a substitute for professional medical care. Always follow your healthcare professional's instructions.        Hand Laceration: All Closures  A laceration is a cut through the skin. You have a cut on the hand. Deep cuts usually require stitches (sutures) or staples. Minor cuts may be closed with surgical tape or skin adhesive.   X-rays may be done if something may have entered the skin through the cut. Your may also be given a tetanus shot. This may be given if you are not updated on this vaccination and the object that cut you may carry tetanus.    Home care  · Your healthcare provider may prescribe an antibiotic. This is to help prevent infection. Follow all instructions for taking this medicine. Take the medicine every day until it is gone or you are told to stop. You should not have any left over.  · The healthcare provider may prescribe medicines for pain. Follow instructions for taking them.  · Follow the healthcare providers instructions on how to care for the cut.  · Keep the wound clean and dry. Do not get the wound wet until you are told it is okay to do so. If the bandage gets wet, remove it. Gently pat the wound dry with a clean cloth. Then put on a clean, dry bandage..  · To help prevent infection, wash your hands with soap and water before and after caring for the wound.   · Caring for sutures or staples: Once you no longer need to keep them dry, clean the wound daily. First, remove the bandage.  Then wash the area gently with soap and warm water, or as directed by the health care provider. Use a wet cotton swab to loosen and remove any blood or crust that forms. After cleaning, apply a thin layer of antibiotic ointment if advised. Then put on a new bandage unless you are told not to.  · Caring for skin glue: Dont put apply liquid, ointment, or cream on the wound while the glue is in place. Avoid activities that cause heavy sweating. Protect the wound from sunlight. Do not scratch, rub, or pick at the adhesive film. Do not place tape directly over the film. The glue should peel off within 5 to 10 days.   · Caring for surgical tape: Keep the area dry. If it gets wet, blot it dry with a clean towel. Surgical tape usually falls off within 7 to 10 days. If it has not fallen off after 10 days, you can take it off yourself. Put mineral oil or petroleum jelly on a cotton ball and gently rub the tape until it is removed.  · Once you can get the wound wet, you may shower as usual but do not soak the wound in water (no tub baths or swimming)  · Even with proper treatment, a wound infection may sometimes occur. Check the wound daily for signs of infection listed below.  Follow-up care  Follow up with your healthcare provider as advised. If you have stitches or staples, be sure to return as directed to have them removed.  When to seek medical advice  Call your healthcare provider right away if any of these occur:  · Wound bleeding not controlled by direct pressure  · Signs of infection, including increasing pain in the wound, increasing wound redness or swelling, or pus or bad odor coming from the wound  · Fever of 100.4°F (38.ºC) or as directed by your health care provider  · Stitches or staples come apart or fall out or surgical tape falls off before 7 days  · Wound edges re-open  · Wound changes colors  · Numbness or weakness in the affected hand   · Decreased movement of the hand  Date Last Reviewed: 6/10/2015  ©  9239-2607 [employer]. [address] [place] [address], [place] [address]. All rights reserved. This information is not intended as a substitute for professional medical care. Always follow your healthcare professional's instructions.

## 2019-03-03 NOTE — PATIENT INSTRUCTIONS
Keep wound clean with antibacterial soap and water like dial.  Use Bactroban ointment prescribed to you for 3 days, and keep clean and covered with ointment for 3 days.  After the 3rd day, allow the wound to get completely dry.  After that keep covered and dry.  Return in 12-14 days for suture removal and wound check.  Return sooner if having drainage of pus, worse pain, any other concerns or wound dehiscence or coming apart.  Bactrim double strength prescription secondary to penicillin allergy and location of the deep wound placed on antibiotics.  Review laceration sheet  Return in 12-14 days for suture removal        Laceration: All Closures  A laceration is a cut through the skin. This will usually require stitches (sutures) or staples if it is deep. Minor cuts may be treated with a surgical tape closure or skin glue.    Home care  · Your healthcare provider may prescribe an antibiotic. This is to help prevent infection. Follow all instructions for taking this medicine. Take the medicine every day until it is gone or you are told to stop. You should not have any left over.  · The healthcare provider may prescribe medicines for pain. Follow instructions for taking them.  · Follow the healthcare providers instructions on how to care for the cut.  · Keep the wound clean and dry. Do not get the wound wet until you are told it is okay to do so. If the area gets wet, gently pat it dry with a clean cloth. Replace the wet bandage with a dry one.  · If a bandage was applied and it becomes wet or dirty, replace it. Otherwise, leave it in place for the first 24 hours.  · Caring for sutures or staples: Once you no longer need to keep them dry, clean the wound daily. First, remove the bandage. Then wash the area gently with soap and warm water, or as directed by the health care provider. Use a wet cotton swab to loosen and remove any blood or crust that forms. After cleaning, apply a thin layer of antibiotic ointment if  advised. Then put on a new bandage unless you are told not to.  · Caring for skin glue: Dont put apply liquid, ointment, or cream on the wound while the glue is in place. Avoid activities that cause heavy sweating. Protect the wound from sunlight. Do not scratch, rub, or pick at the adhesive film. Do not place tape directly over the film. The glue should peel off within 5 to 10 days.   · Caring for surgical tape: Keep the area dry. If it gets wet, blot it dry with a clean towel. Surgical tape usually falls off within 7 to 10 days. If it has not fallen off after 10 days, you can take it off yourself. Put mineral oil or petroleum jelly on a cotton ball and gently rub the tape until it is removed.  · Once you can get the wound wet, you may shower as usual but do not soak the wound in water (no tub baths or swimming)  · Even with proper treatment, a wound infection may sometimes occur. Check the wound daily for signs of infection listed below.  Scalp wounds  During the first two days, you may carefully rinse your hair in the shower to remove blood, glass or dirt particles. After two days, you may shower and shampoo your hair normally. Do not soak your scalp in the tub or go swimming until the stitches or staples have been removed. Talk with your healthcare provider before applying any antibiotic ointment to the wound.  Mouth wounds  Eat soft foods to reduce pain. If the cut is inside of your mouth, clean by rinsing after each meal and at bedtime with a mixture of equal parts water and hydrogen peroxide (do not swallow!). Or, you can use a cotton swab to directly apply hydrogen peroxide onto the cut. Mouth wounds can be painful when eating. You may use an over-the-counter local numbing solution for pain relief. If this is not available, you may use any numbing solution intended for teething babies. You may apply this directly to the sores with a cotton-tip swab or with your finger.  Follow-up care  Follow up with your  healthcare provider as advised. Ask your healthcare provider how long sutures should be left in place. Be sure to return for suture removal as directed. If dissolving stitches were used in the mouth, these should fall out or dissolve without the need for removal. If tape closures were used, remove them yourself when your provider recommends if they have not fallen off on their own. If skin glue was used, the film will wear off by itself.  When to seek medical advice  Call your healthcare provider right away if any of these occur:  · Wound bleeding not controlled by direct pressure  · Signs of infection, including increasing pain in the wound, increasing wound redness or swelling, or pus or bad odor coming from the wound  · Fever of 100.4°F (38.ºC) or higher or as directed by your healthcare provider  · Stitches or staples come apart or fall out or surgical tape falls off before 7 days  · Wound edges re-open  · Wound changes colors  · Numbness around the wound   · Decreased movement around the injured area  Date Last Reviewed: 6/14/2015 © 2000-2017 [employer]. [address] [place] [address]. All rights reserved. This information is not intended as a substitute for professional medical care. Always follow your healthcare professional's instructions.        Hand Laceration: All Closures  A laceration is a cut through the skin. You have a cut on the hand. Deep cuts usually require stitches (sutures) or staples. Minor cuts may be closed with surgical tape or skin adhesive.   X-rays may be done if something may have entered the skin through the cut. Your may also be given a tetanus shot. This may be given if you are not updated on this vaccination and the object that cut you may carry tetanus.    Home care  · Your healthcare provider may prescribe an antibiotic. This is to help prevent infection. Follow all instructions for taking this medicine. Take the medicine every day until it is gone or  you are told to stop. You should not have any left over.  · The healthcare provider may prescribe medicines for pain. Follow instructions for taking them.  · Follow the healthcare providers instructions on how to care for the cut.  · Keep the wound clean and dry. Do not get the wound wet until you are told it is okay to do so. If the bandage gets wet, remove it. Gently pat the wound dry with a clean cloth. Then put on a clean, dry bandage..  · To help prevent infection, wash your hands with soap and water before and after caring for the wound.   · Caring for sutures or staples: Once you no longer need to keep them dry, clean the wound daily. First, remove the bandage. Then wash the area gently with soap and warm water, or as directed by the health care provider. Use a wet cotton swab to loosen and remove any blood or crust that forms. After cleaning, apply a thin layer of antibiotic ointment if advised. Then put on a new bandage unless you are told not to.  · Caring for skin glue: Dont put apply liquid, ointment, or cream on the wound while the glue is in place. Avoid activities that cause heavy sweating. Protect the wound from sunlight. Do not scratch, rub, or pick at the adhesive film. Do not place tape directly over the film. The glue should peel off within 5 to 10 days.   · Caring for surgical tape: Keep the area dry. If it gets wet, blot it dry with a clean towel. Surgical tape usually falls off within 7 to 10 days. If it has not fallen off after 10 days, you can take it off yourself. Put mineral oil or petroleum jelly on a cotton ball and gently rub the tape until it is removed.  · Once you can get the wound wet, you may shower as usual but do not soak the wound in water (no tub baths or swimming)  · Even with proper treatment, a wound infection may sometimes occur. Check the wound daily for signs of infection listed below.  Follow-up care  Follow up with your healthcare provider as advised. If you have  stitches or staples, be sure to return as directed to have them removed.  When to seek medical advice  Call your healthcare provider right away if any of these occur:  · Wound bleeding not controlled by direct pressure  · Signs of infection, including increasing pain in the wound, increasing wound redness or swelling, or pus or bad odor coming from the wound  · Fever of 100.4°F (38.ºC) or as directed by your health care provider  · Stitches or staples come apart or fall out or surgical tape falls off before 7 days  · Wound edges re-open  · Wound changes colors  · Numbness or weakness in the affected hand   · Decreased movement of the hand  Date Last Reviewed: 6/10/2015  © 5004-6623 [employer]. [address] [place] [address], [place] [address]. All rights reserved. This information is not intended as a substitute for professional medical care. Always follow your healthcare professional's instructions.

## 2019-03-06 ENCOUNTER — TELEPHONE (OUTPATIENT)
Dept: INTERNAL MEDICINE | Facility: CLINIC | Age: 79
End: 2019-03-06

## 2019-03-06 RX ORDER — AZELASTINE 1 MG/ML
2 SPRAY, METERED NASAL 2 TIMES DAILY
Qty: 30 ML | Refills: 5 | Status: SHIPPED | OUTPATIENT
Start: 2019-03-06 | End: 2020-04-23

## 2019-03-15 ENCOUNTER — CLINICAL SUPPORT (OUTPATIENT)
Dept: URGENT CARE | Facility: CLINIC | Age: 79
End: 2019-03-15
Payer: MEDICARE

## 2019-03-15 VITALS
SYSTOLIC BLOOD PRESSURE: 130 MMHG | OXYGEN SATURATION: 98 % | HEART RATE: 66 BPM | TEMPERATURE: 98 F | RESPIRATION RATE: 18 BRPM | DIASTOLIC BLOOD PRESSURE: 68 MMHG | WEIGHT: 216 LBS | HEIGHT: 70 IN | BODY MASS INDEX: 30.92 KG/M2

## 2019-03-15 DIAGNOSIS — Z48.02 ENCOUNTER FOR REMOVAL OF SUTURES: Primary | ICD-10-CM

## 2019-03-15 PROCEDURE — 99024 POSTOP FOLLOW-UP VISIT: CPT | Mod: S$GLB,,, | Performed by: NURSE PRACTITIONER

## 2019-03-15 PROCEDURE — 99024 SUTURE REMOVAL: ICD-10-PCS | Mod: S$GLB,,, | Performed by: NURSE PRACTITIONER

## 2019-03-15 NOTE — PROCEDURES
Suture Removal  Date/Time: 3/15/2019 3:50 PM  Location procedure was performed: [facility]  Performed by: Ethel Kirkpatrick NP  Authorized by: Ethel Kirkpatrick NP   Body area: upper extremity  Location details: right ring finger  Description of findings: well healed   Wound Appearance: clean, nonpurulent, normal color, nontender and no drainage  Sutures Removed: 11  Post-removal: bandaid applied  Facility: sutures placed in this facility  Complications: No  Estimated blood loss (mL): 0  Specimens: No  Implants: No  Patient tolerance: Patient tolerated the procedure well with no immediate complications

## 2019-03-15 NOTE — PATIENT INSTRUCTIONS
"  Suture or Staple Removal  You were seen today for a suture or staple removal. Your wound is healing as expected. The wound has healed well enough that the sutures or staples can be removed. The wound will continue to heal for the next few months.  At this time there is no sign of infection.   Home care  · If you have pain, take pain medicine as advised by your healthcare provider.   · Keep your wound clean and protected by covering it with a bandage for the next week or so.   · Wash your hands with soap and warm water before and after caring for your wound. This helps prevent infection.  · Clean the wound gently with soap and warm water daily or as directed by your childs health care provider. Do not use iodine, alcohol, or other cleansers on the wound.  Gently pat it dry. Put on a new bandage, if needed. Do not reuse bandages.  · If the area gets wet, gently pat it dry with a clean cloth. Replace the wet bandage with a dry one.  · Check the wound daily for signs of infection. (These are listed under "When to seek medical advice" below.)  · You may shower and bathe as usual. Swimming is now permitted.  Follow-up care  Follow up with your healthcare provider as advised.  When to seek medical advice   Call your healthcare provider if any of the following occur:  · Wound reopens or bleeds  · Signs of an infection, such as:  ¨ Increasing redness or swelling around the wound  ¨ Increased warmth from the wound  ¨ Worsening pain  ¨ Red streaking lines away from the wound  ¨ Fluid draining from the wound  · Fever of 100.4°F (38°C) or higher, or as directed by your child's healthcare provider  Date Last Reviewed: 9/27/2015  © 0932-4623 [employer]. [address] [place] [address], [place] [address]. All rights reserved. This information is not intended as a substitute for professional medical care. Always follow your healthcare professional's instructions.        "

## 2019-03-15 NOTE — PROGRESS NOTES
"Subjective:       Patient ID: Adelfo ZHANG MD Jennifer is a 78 y.o. male.    Vitals:  height is 5' 10" (1.778 m) and weight is 98 kg (216 lb). His temperature is 97.5 °F (36.4 °C). His blood pressure is 130/68 and his pulse is 66. His respiration is 18 and oxygen saturation is 98%.     Chief Complaint: Suture / Staple Removal    Patient was seen on 3/3 and was given 11 sutures in rt finger. Patient still experiencing some pain but it is relieved with Tylenol.  Completed antibiotics.      Suture / Staple Removal   The sutures were placed 11 to 14 days ago. He tried antibiotic ointment use since the wound repair. The treatment provided significant relief. There has been no drainage from the wound. There is no redness present. There is no swelling present. The pain has improved. There is difficulty moving the extremity or digit due to pain.       Constitution: Negative for activity change, chills and fever.   Skin: Positive for laceration. Negative for color change, wound, skin thickening/induration, puncture wound and erythema.       Objective:      Physical Exam   Constitutional: He is oriented to person, place, and time. He appears well-developed and well-nourished.   HENT:   Head: Normocephalic and atraumatic. Head is without abrasion, without contusion and without laceration.   Right Ear: External ear normal.   Left Ear: External ear normal.   Nose: Nose normal.   Mouth/Throat: Oropharynx is clear and moist.   Eyes: Conjunctivae, EOM and lids are normal. Pupils are equal, round, and reactive to light.   Neck: Trachea normal, full passive range of motion without pain and phonation normal. Neck supple.   Cardiovascular: Normal rate, regular rhythm, normal heart sounds and intact distal pulses.   Pulmonary/Chest: Effort normal and breath sounds normal. No stridor. No respiratory distress.   Musculoskeletal: Normal range of motion.        Right hand: He exhibits normal range of motion, no tenderness, no bony tenderness, " "normal two-point discrimination, normal capillary refill, no deformity, no laceration (well healed laceration to right 4th digit) and no swelling. Normal sensation noted. Normal strength noted.   Neurological: He is alert and oriented to person, place, and time.   Skin: Skin is warm, dry and intact. Capillary refill takes less than 2 seconds. No abrasion, no bruising, no burn, no ecchymosis, no laceration, no lesion and no rash noted. No erythema.   Psychiatric: He has a normal mood and affect. His speech is normal and behavior is normal. Judgment and thought content normal. Cognition and memory are normal.   Nursing note and vitals reviewed.      Assessment:       1. Encounter for removal of sutures        Plan:         Encounter for removal of sutures  -     Suture Removal      Patient Instructions     Suture or Staple Removal  You were seen today for a suture or staple removal. Your wound is healing as expected. The wound has healed well enough that the sutures or staples can be removed. The wound will continue to heal for the next few months.  At this time there is no sign of infection.   Home care  · If you have pain, take pain medicine as advised by your healthcare provider.   · Keep your wound clean and protected by covering it with a bandage for the next week or so.   · Wash your hands with soap and warm water before and after caring for your wound. This helps prevent infection.  · Clean the wound gently with soap and warm water daily or as directed by your childs health care provider. Do not use iodine, alcohol, or other cleansers on the wound.  Gently pat it dry. Put on a new bandage, if needed. Do not reuse bandages.  · If the area gets wet, gently pat it dry with a clean cloth. Replace the wet bandage with a dry one.  · Check the wound daily for signs of infection. (These are listed under "When to seek medical advice" below.)  · You may shower and bathe as usual. Swimming is now permitted.  Follow-up " care  Follow up with your healthcare provider as advised.  When to seek medical advice   Call your healthcare provider if any of the following occur:  · Wound reopens or bleeds  · Signs of an infection, such as:  ¨ Increasing redness or swelling around the wound  ¨ Increased warmth from the wound  ¨ Worsening pain  ¨ Red streaking lines away from the wound  ¨ Fluid draining from the wound  · Fever of 100.4°F (38°C) or higher, or as directed by your child's healthcare provider  Date Last Reviewed: 9/27/2015 © 2000-2017 [email or web address]. [address] [place] [address]. All rights reserved. This information is not intended as a substitute for professional medical care. Always follow your healthcare professional's instructions.

## 2019-03-18 ENCOUNTER — OFFICE VISIT (OUTPATIENT)
Dept: URGENT CARE | Facility: CLINIC | Age: 79
End: 2019-03-18
Payer: MEDICARE

## 2019-03-18 VITALS
DIASTOLIC BLOOD PRESSURE: 66 MMHG | SYSTOLIC BLOOD PRESSURE: 138 MMHG | BODY MASS INDEX: 30.92 KG/M2 | TEMPERATURE: 97 F | RESPIRATION RATE: 16 BRPM | OXYGEN SATURATION: 98 % | HEART RATE: 68 BPM | HEIGHT: 70 IN | WEIGHT: 216 LBS

## 2019-03-18 DIAGNOSIS — T81.30XA WOUND DEHISCENCE: Primary | ICD-10-CM

## 2019-03-18 PROCEDURE — 99499 NO LOS: ICD-10-PCS | Mod: S$GLB,,, | Performed by: EMERGENCY MEDICINE

## 2019-03-18 PROCEDURE — 99499 UNLISTED E&M SERVICE: CPT | Mod: S$GLB,,, | Performed by: EMERGENCY MEDICINE

## 2019-03-18 RX ORDER — SULFAMETHOXAZOLE AND TRIMETHOPRIM 800; 160 MG/1; MG/1
1 TABLET ORAL 2 TIMES DAILY
Qty: 14 TABLET | Refills: 0 | Status: SHIPPED | OUTPATIENT
Start: 2019-03-18 | End: 2019-03-25

## 2019-03-18 NOTE — PATIENT INSTRUCTIONS
Bactrim double strength prescription sent to her pharmacy for 1 week.  Keep the tube gauze dressing on until Wednesday.  After that use mupirocin ointment 2 times daily and keep covered for a total of 1 week.  After the 1 week., transition to a Band-Aid.  As it has dehisced or , part, at this point we will allow the wound to close from the inside out, by secondary intention.  Return sooner for any other concerns.    Wound Check, Infection/opening/Dehiscence  You have a wound that has become infected. The wound will not heal properly unless the infection is cleared. Infection in a wound may also spread if it is not treated. In most cases, antibiotic medicines are prescribed to treat a wound infection.   Symptoms of a wound infection include:  · Redness or swelling around the wound  · Warmth coming from the wound  · New or worsening pain  · Red streaks around the wound  · Draining pus  · Fever  Home care  Follow all directions you are given to treat the infection.  Medicines  Take all medicines as prescribed.   · If you were given antibiotics, take them until they are gone or your healthcare provider tells you to stop. It is vital to finish the antibiotics even if you feel better. If you do not finish them, the infection may come back and be harder to treat.  · If your infection is not responding to the medicines you are taking, you may be prescribed new medicines.  · Take medicine for pain as directed by your healthcare provider.  Wound care  Care for your wound as directed by your healthcare provider.  · Apply a warm compress (clean cloth soaked in hot water) to the infected area for about 5 to 10 minutes at a time. Be very careful not to burn yourself. Test the cloth on a non-infected area to make sure it is not too hot.  · Continue to change the dressing daily. If it becomes wet, stained with wound fluid, or dirty, change it sooner. To change it:  ¨ Wash your hands with soap and water before changing the  dressing.  ¨ Carefully remove the dressing and tape. If it sticks to the wound, you may need to wet it a little to remove it. (Do not do this if your healthcare provider has told you not to.)  ¨ Gently clean the wound with clean water (or saline) using gauze, a clean washcloth, or cotton swab.  ¨ Do not use soap, alcohol, peroxide or other cleansers.  ¨ If you were told to dry the wound before putting on a new dressing, gently pat. Do not rub.  ¨ Throw out the old dressing.  ¨ Wash your hands again before opening the new, clean dressing.  ¨ Wash your hands again when you are done.  Follow-up care  Follow up with your healthcare provider as advised. If a culture was done, you will be notified if your treatment needs to change. Call as directed for the results.  When to seek medical advice  Call your health care provider right away if any of these occur:  · Symptoms of infection don't start to improve within 2 days of starting antibiotics  · Symptoms of infection get worse  · New symptoms, such as red streaks around the wound  · Fever of 100.4°F (38.0°C) or higher for more than 2 days after starting the antibiotics  Date Last Reviewed: 8/10/2015  © 7890-8254 [employer]. [address] [place] [address], [place] [address]. All rights reserved. This information is not intended as a substitute for professional medical care. Always follow your healthcare professional's instructions.

## 2019-03-18 NOTE — PROGRESS NOTES
"Subjective:       Patient ID: Adelfo ZHANG MD Jennifer is a 78 y.o. male.    Vitals:    03/18/19 1020   BP: 138/66   Pulse: 68   Resp: 16   Temp: 97 °F (36.1 °C)   SpO2: 98%   Weight: 98 kg (216 lb)   Height: 5' 10" (1.778 m)       Chief Complaint: Wound Check    Pt states laceration of right ring finger 3/3/19. Pt had sutures placed on 3/3/19 and suture removal on 3/15/19. Pt states area has some drainage and has opened a bit since suture removal 4 days ago.  He states that they were removed on Friday and that on Saturday he did the elliptical machine and it since has open up even further.  There is drainage of serous fluid, not pus, however there is some erythema and tenderness surrounding the wound.  It is consistent with wound dehiscence and very minor infection.  I have explained to the patient that from here on out we will allow the wound to close by secondary intention or from the inside out.      Wound Check   He was originally treated more than 14 days ago. Previous treatment included laceration repair. There has been clear discharge from the wound. There is new swelling present. The pain has not changed.     Review of Systems   Constitution: Negative for chills and fever.   HENT: Negative for sore throat.    Eyes: Negative for blurred vision.   Cardiovascular: Negative for chest pain.   Respiratory: Negative for shortness of breath.    Skin: Positive for poor wound healing. Negative for rash.   Musculoskeletal: Negative for back pain and joint pain.   Gastrointestinal: Negative for abdominal pain, diarrhea, nausea and vomiting.   Neurological: Negative for headaches.   Psychiatric/Behavioral: The patient is not nervous/anxious.        Objective:      Physical Exam   Constitutional: He is oriented to person, place, and time. He appears well-developed and well-nourished.   HENT:   Head: Normocephalic and atraumatic. Head is without abrasion, without contusion and without laceration.   Eyes: Conjunctivae, EOM " and lids are normal. Pupils are equal, round, and reactive to light.   Neck: Trachea normal, full passive range of motion without pain and phonation normal. Neck supple.   Cardiovascular: Normal rate, regular rhythm and normal heart sounds.   Pulmonary/Chest: Effort normal and breath sounds normal. No stridor. No respiratory distress.   Musculoskeletal: Normal range of motion. He exhibits tenderness.   At site of wound   Neurological: He is alert and oriented to person, place, and time. No sensory deficit.   Skin: Skin is warm, dry and intact. Capillary refill takes less than 2 seconds. No abrasion, no bruising, no burn, no ecchymosis, no laceration, no lesion and no rash noted. There is erythema.   Examination of the right 4th digit volar aspect at the site of the previously repaired wound has dehisced and slightly raised, serous drainage without pus draining, very minimal erythema and minimal tenderness to palpation consistent with wound dehiscence and minor cellulitis.  Cleaned thoroughly and covered with Bactroban and tube gauze dressing and will allow to heal by secondary intention from the inside out.   Psychiatric: He has a normal mood and affect. His speech is normal and behavior is normal. Cognition and memory are normal.   Nursing note and vitals reviewed.      Assessment:       1. Wound dehiscence        Plan:       Adelfo was seen today for wound check.    Diagnoses and all orders for this visit:    Wound dehiscence    Other orders  -     sulfamethoxazole-trimethoprim 800-160mg (BACTRIM DS) 800-160 mg Tab; Take 1 tablet by mouth 2 (two) times daily. for 7 days       Patient Instructions   Bactrim double strength prescription sent to her pharmacy for 1 week.  Keep the tube gauze dressing on until Wednesday.  After that use mupirocin ointment 2 times daily and keep covered for a total of 1 week.  After the 1 week., transition to a Band-Aid.  As it has dehisced or , part, at this point we will allow the  wound to close from the inside out, by secondary intention.  Return sooner for any other concerns.    Wound Check, Infection/opening/Dehiscence  You have a wound that has become infected. The wound will not heal properly unless the infection is cleared. Infection in a wound may also spread if it is not treated. In most cases, antibiotic medicines are prescribed to treat a wound infection.   Symptoms of a wound infection include:  · Redness or swelling around the wound  · Warmth coming from the wound  · New or worsening pain  · Red streaks around the wound  · Draining pus  · Fever  Home care  Follow all directions you are given to treat the infection.  Medicines  Take all medicines as prescribed.   · If you were given antibiotics, take them until they are gone or your healthcare provider tells you to stop. It is vital to finish the antibiotics even if you feel better. If you do not finish them, the infection may come back and be harder to treat.  · If your infection is not responding to the medicines you are taking, you may be prescribed new medicines.  · Take medicine for pain as directed by your healthcare provider.  Wound care  Care for your wound as directed by your healthcare provider.  · Apply a warm compress (clean cloth soaked in hot water) to the infected area for about 5 to 10 minutes at a time. Be very careful not to burn yourself. Test the cloth on a non-infected area to make sure it is not too hot.  · Continue to change the dressing daily. If it becomes wet, stained with wound fluid, or dirty, change it sooner. To change it:  ¨ Wash your hands with soap and water before changing the dressing.  ¨ Carefully remove the dressing and tape. If it sticks to the wound, you may need to wet it a little to remove it. (Do not do this if your healthcare provider has told you not to.)  ¨ Gently clean the wound with clean water (or saline) using gauze, a clean washcloth, or cotton swab.  ¨ Do not use soap, alcohol,  peroxide or other cleansers.  ¨ If you were told to dry the wound before putting on a new dressing, gently pat. Do not rub.  ¨ Throw out the old dressing.  ¨ Wash your hands again before opening the new, clean dressing.  ¨ Wash your hands again when you are done.  Follow-up care  Follow up with your healthcare provider as advised. If a culture was done, you will be notified if your treatment needs to change. Call as directed for the results.  When to seek medical advice  Call your health care provider right away if any of these occur:  · Symptoms of infection don't start to improve within 2 days of starting antibiotics  · Symptoms of infection get worse  · New symptoms, such as red streaks around the wound  · Fever of 100.4°F (38.0°C) or higher for more than 2 days after starting the antibiotics  Date Last Reviewed: 8/10/2015  © 5126-0050 [employer]. [address] [place] [address], [place] [address]. All rights reserved. This information is not intended as a substitute for professional medical care. Always follow your healthcare professional's instructions.

## 2019-03-31 ENCOUNTER — PATIENT MESSAGE (OUTPATIENT)
Dept: INTERNAL MEDICINE | Facility: CLINIC | Age: 79
End: 2019-03-31

## 2019-04-01 ENCOUNTER — OFFICE VISIT (OUTPATIENT)
Dept: INTERNAL MEDICINE | Facility: CLINIC | Age: 79
End: 2019-04-01
Payer: MEDICARE

## 2019-04-01 VITALS
RESPIRATION RATE: 20 BRPM | HEIGHT: 70 IN | BODY MASS INDEX: 31.91 KG/M2 | SYSTOLIC BLOOD PRESSURE: 112 MMHG | WEIGHT: 222.88 LBS | TEMPERATURE: 99 F | DIASTOLIC BLOOD PRESSURE: 50 MMHG

## 2019-04-01 DIAGNOSIS — S61.209A OPEN WOUND OF FINGER, INITIAL ENCOUNTER: Primary | ICD-10-CM

## 2019-04-01 DIAGNOSIS — I25.118 CORONARY ARTERY DISEASE OF NATIVE ARTERY OF NATIVE HEART WITH STABLE ANGINA PECTORIS: ICD-10-CM

## 2019-04-01 PROCEDURE — 99999 PR PBB SHADOW E&M-EST. PATIENT-LVL III: ICD-10-PCS | Mod: PBBFAC,,, | Performed by: FAMILY MEDICINE

## 2019-04-01 PROCEDURE — 3074F PR MOST RECENT SYSTOLIC BLOOD PRESSURE < 130 MM HG: ICD-10-PCS | Mod: CPTII,S$GLB,, | Performed by: FAMILY MEDICINE

## 2019-04-01 PROCEDURE — 1101F PT FALLS ASSESS-DOCD LE1/YR: CPT | Mod: CPTII,S$GLB,, | Performed by: FAMILY MEDICINE

## 2019-04-01 PROCEDURE — 99213 PR OFFICE/OUTPT VISIT, EST, LEVL III, 20-29 MIN: ICD-10-PCS | Mod: S$GLB,,, | Performed by: FAMILY MEDICINE

## 2019-04-01 PROCEDURE — 3078F DIAST BP <80 MM HG: CPT | Mod: CPTII,S$GLB,, | Performed by: FAMILY MEDICINE

## 2019-04-01 PROCEDURE — 99213 OFFICE O/P EST LOW 20 MIN: CPT | Mod: S$GLB,,, | Performed by: FAMILY MEDICINE

## 2019-04-01 PROCEDURE — 1101F PR PT FALLS ASSESS DOC 0-1 FALLS W/OUT INJ PAST YR: ICD-10-PCS | Mod: CPTII,S$GLB,, | Performed by: FAMILY MEDICINE

## 2019-04-01 PROCEDURE — 3074F SYST BP LT 130 MM HG: CPT | Mod: CPTII,S$GLB,, | Performed by: FAMILY MEDICINE

## 2019-04-01 PROCEDURE — 3078F PR MOST RECENT DIASTOLIC BLOOD PRESSURE < 80 MM HG: ICD-10-PCS | Mod: CPTII,S$GLB,, | Performed by: FAMILY MEDICINE

## 2019-04-01 PROCEDURE — 99999 PR PBB SHADOW E&M-EST. PATIENT-LVL III: CPT | Mod: PBBFAC,,, | Performed by: FAMILY MEDICINE

## 2019-04-02 NOTE — PROGRESS NOTES
Subjective:   Patient ID: Adelfo VICENTE Rodriguez MD is a 78 y.o. male.    Chief Complaint: Wound Infection (right hand, ring finger)      HPI  77 yo sp 11 stitches In finger then wound dehiscence here to see if wound now healing well. Minimal to no pain. No fevers or chills.    Patient queried and denies any further complaints.      ALLERGIES AND MEDICATIONS: updated and reviewed.  Review of patient's allergies indicates:   Allergen Reactions    Amoxicillin Hives    Allopurinol analogues Other (See Comments)     Abnormal transaminases       Current Outpatient Medications:     ADVAIR DISKUS 250-50 mcg/dose diskus inhaler, INHALE 1 DOSE BY MOUTH TWICE A DAY-- RINSE MOUTH AFTER USE, Disp: 60 each, Rfl: 5    albuterol (PROAIR HFA) 90 mcg/actuation inhaler, Inhale 2 puffs into the lungs every 4 (four) hours as needed for Wheezing., Disp: 1 Inhaler, Rfl: 6    apixaban (ELIQUIS) 5 mg Tab, Take 1 tablet (5 mg total) by mouth 2 (two) times daily., Disp: 180 tablet, Rfl: 3    aspirin (ECOTRIN) 81 MG EC tablet, Take 81 mg by mouth every evening. , Disp: , Rfl:     azelastine (ASTELIN) 137 mcg (0.1 %) nasal spray, 2 sprays (274 mcg total) by Nasal route 2 (two) times daily., Disp: 30 mL, Rfl: 5    colchicine (COLCRYS) 0.6 mg tablet, 0.3mg( 1/2 0.6mg tablet) daily, Disp: 45 tablet, Rfl: 1    febuxostat (ULORIC) 80 mg Tab, Take 1 tablet (80 mg total) by mouth once daily., Disp: 90 tablet, Rfl: 1    felodipine (PLENDIL) 5 MG 24 hr tablet, Take 1 tablet (5 mg total) by mouth once daily., Disp: 90 tablet, Rfl: 3    fish oil-omega-3 fatty acids 300-1,000 mg capsule, Take 2 g by mouth once daily.  , Disp: , Rfl:     fluticasone (FLONASE) 50 mcg/actuation nasal spray, 2 sprays (100 mcg total) by Each Nare route once daily., Disp: 48 g, Rfl: 3    furosemide (LASIX) 40 MG tablet, Take 1 tablet p.o. twice a week as needed for fluid retention. (Patient taking differently: every other day. ), Disp: 90 tablet, Rfl: 3     "glucosamine & chondroit sul.Na 750-600 mg Tab, Take 750 mg by mouth., Disp: , Rfl:     irbesartan (AVAPRO) 150 MG tablet, TAKE 1 TABLET BY MOUTH ONCE DAILY FOR BLOOD PRESSURE, Disp: 30 tablet, Rfl: 10    ketoconazole (NIZORAL) 2 % shampoo, WASH affected area every other day, Disp: 120 mL, Rfl: 0    loratadine (CLARITIN) 10 mg tablet, Take 10 mg by mouth daily as needed.  , Disp: , Rfl:     omeprazole (PRILOSEC) 40 MG capsule, Take 1 capsule (40 mg total) by mouth every morning., Disp: 30 capsule, Rfl: 3    triamcinolone acetonide 0.1% (KENALOG) 0.1 % cream, Apply topically 2 (two) times daily., Disp: 80 g, Rfl: 2    Review of Systems   Constitutional: Negative for activity change, appetite change, chills, diaphoresis, fatigue, fever and unexpected weight change.   HENT: Negative for congestion, ear discharge, ear pain, postnasal drip, rhinorrhea, sneezing and sore throat.    Eyes: Negative for photophobia and discharge.   Respiratory: Negative for cough, chest tightness, shortness of breath and wheezing.    Cardiovascular: Negative for chest pain and palpitations.   Gastrointestinal: Negative for abdominal distention, abdominal pain, diarrhea, nausea and vomiting.   Genitourinary: Negative for dysuria.   Musculoskeletal: Negative for arthralgias and neck pain.   Skin: Negative for rash.   Neurological: Negative for headaches.       Objective:     Vitals:    04/01/19 1100   BP: (!) 112/50   Resp: 20   Temp: 98.6 °F (37 °C)   Weight: 101.1 kg (222 lb 14.2 oz)   Height: 5' 10" (1.778 m)   PainSc:   2     Body mass index is 31.98 kg/m².    Physical Exam   Constitutional: He appears well-developed and well-nourished.   HENT:   Head: Normocephalic and atraumatic.   Right Ear: Hearing, tympanic membrane, external ear and ear canal normal. No drainage or swelling. No decreased hearing is noted.   Left Ear: Hearing, tympanic membrane, external ear and ear canal normal. No drainage or swelling. No decreased hearing is " noted.   Nose: Nose normal. No rhinorrhea.   Mouth/Throat: Oropharynx is clear and moist. No oropharyngeal exudate, posterior oropharyngeal edema or posterior oropharyngeal erythema.   Eyes: Pupils are equal, round, and reactive to light. Conjunctivae, EOM and lids are normal. Right eye exhibits no discharge and no exudate. Left eye exhibits no discharge and no exudate. Right conjunctiva is not injected. Left conjunctiva is not injected.   Neck: Trachea normal and full passive range of motion without pain. Normal carotid pulses, no hepatojugular reflux and no JVD present. Carotid bruit is not present. No neck rigidity. No edema and no erythema present. No thyroid mass and no thyromegaly present.   Cardiovascular: Normal rate, regular rhythm and normal heart sounds.   Pulmonary/Chest: Effort normal. No respiratory distress.   Abdominal: Soft. Normal appearance and bowel sounds are normal. There is no tenderness. There is negative Jeter's sign.   Lymphadenopathy:     He has no cervical adenopathy.   Neurological: He is alert.   Skin: Skin is warm and dry.   Psychiatric: He has a normal mood and affect. His speech is normal and behavior is normal.   Nursing note and vitals reviewed.      Assessment and Plan:   Adelfo was seen today for wound infection.    Diagnoses and all orders for this visit:    Open wound of finger, initial encounter    Coronary artery disease of native artery of native heart with stable angina pectoris    healing well. Soap and water bid. F/u prn    No follow-ups on file.    THIS NOTE WILL BE SHARED WITH THE PATIENT.

## 2019-04-15 RX ORDER — FLUTICASONE PROPIONATE AND SALMETEROL 50; 250 UG/1; UG/1
POWDER RESPIRATORY (INHALATION)
Qty: 60 EACH | Refills: 5 | Status: SHIPPED | OUTPATIENT
Start: 2019-04-15 | End: 2019-10-19 | Stop reason: SDUPTHER

## 2019-04-17 ENCOUNTER — PATIENT MESSAGE (OUTPATIENT)
Dept: INTERNAL MEDICINE | Facility: CLINIC | Age: 79
End: 2019-04-17

## 2019-04-17 DIAGNOSIS — Z23 ENCOUNTER FOR IMMUNIZATION: Primary | ICD-10-CM

## 2019-04-29 ENCOUNTER — CLINICAL SUPPORT (OUTPATIENT)
Dept: INFECTIOUS DISEASES | Facility: CLINIC | Age: 79
End: 2019-04-29
Payer: MEDICARE

## 2019-04-29 PROCEDURE — 90750 ZOSTER RECOMBINANT VACCINE: ICD-10-PCS | Mod: S$GLB,,, | Performed by: INTERNAL MEDICINE

## 2019-04-29 PROCEDURE — 90471 IMMUNIZATION ADMIN: CPT | Mod: S$GLB,,, | Performed by: INTERNAL MEDICINE

## 2019-04-29 PROCEDURE — 90471 ZOSTER RECOMBINANT VACCINE: ICD-10-PCS | Mod: S$GLB,,, | Performed by: INTERNAL MEDICINE

## 2019-04-29 PROCEDURE — 90750 HZV VACC RECOMBINANT IM: CPT | Mod: S$GLB,,, | Performed by: INTERNAL MEDICINE

## 2019-04-29 PROCEDURE — 99999 PR PBB SHADOW E&M-EST. PATIENT-LVL I: ICD-10-PCS | Mod: PBBFAC,,,

## 2019-04-29 PROCEDURE — 99999 PR PBB SHADOW E&M-EST. PATIENT-LVL I: CPT | Mod: PBBFAC,,,

## 2019-04-29 NOTE — PROGRESS NOTES
Mr. Rodriguez received initial dose Shingrix vaccine and tolerated it well. He left the unit in NAD.

## 2019-04-30 ENCOUNTER — OFFICE VISIT (OUTPATIENT)
Dept: RHEUMATOLOGY | Facility: CLINIC | Age: 79
End: 2019-04-30
Payer: MEDICARE

## 2019-04-30 VITALS
BODY MASS INDEX: 35.09 KG/M2 | DIASTOLIC BLOOD PRESSURE: 64 MMHG | HEIGHT: 68 IN | HEART RATE: 77 BPM | SYSTOLIC BLOOD PRESSURE: 142 MMHG | WEIGHT: 231.5 LBS

## 2019-04-30 DIAGNOSIS — M1A.9XX1 CHRONIC TOPHACEOUS GOUT: ICD-10-CM

## 2019-04-30 DIAGNOSIS — I10 ESSENTIAL HYPERTENSION: Chronic | ICD-10-CM

## 2019-04-30 PROCEDURE — 3077F PR MOST RECENT SYSTOLIC BLOOD PRESSURE >= 140 MM HG: ICD-10-PCS | Mod: CPTII,S$GLB,, | Performed by: INTERNAL MEDICINE

## 2019-04-30 PROCEDURE — 99999 PR PBB SHADOW E&M-EST. PATIENT-LVL IV: CPT | Mod: PBBFAC,,, | Performed by: INTERNAL MEDICINE

## 2019-04-30 PROCEDURE — 3078F PR MOST RECENT DIASTOLIC BLOOD PRESSURE < 80 MM HG: ICD-10-PCS | Mod: CPTII,S$GLB,, | Performed by: INTERNAL MEDICINE

## 2019-04-30 PROCEDURE — 1101F PT FALLS ASSESS-DOCD LE1/YR: CPT | Mod: CPTII,S$GLB,, | Performed by: INTERNAL MEDICINE

## 2019-04-30 PROCEDURE — 3077F SYST BP >= 140 MM HG: CPT | Mod: CPTII,S$GLB,, | Performed by: INTERNAL MEDICINE

## 2019-04-30 PROCEDURE — 1101F PR PT FALLS ASSESS DOC 0-1 FALLS W/OUT INJ PAST YR: ICD-10-PCS | Mod: CPTII,S$GLB,, | Performed by: INTERNAL MEDICINE

## 2019-04-30 PROCEDURE — 99213 OFFICE O/P EST LOW 20 MIN: CPT | Mod: S$GLB,,, | Performed by: INTERNAL MEDICINE

## 2019-04-30 PROCEDURE — 99999 PR PBB SHADOW E&M-EST. PATIENT-LVL IV: ICD-10-PCS | Mod: PBBFAC,,, | Performed by: INTERNAL MEDICINE

## 2019-04-30 PROCEDURE — 99213 PR OFFICE/OUTPT VISIT, EST, LEVL III, 20-29 MIN: ICD-10-PCS | Mod: S$GLB,,, | Performed by: INTERNAL MEDICINE

## 2019-04-30 PROCEDURE — 3078F DIAST BP <80 MM HG: CPT | Mod: CPTII,S$GLB,, | Performed by: INTERNAL MEDICINE

## 2019-04-30 RX ORDER — FEBUXOSTAT 40 MG/1
40 TABLET, FILM COATED ORAL DAILY
Qty: 90 TABLET | Refills: 1 | Status: SHIPPED | OUTPATIENT
Start: 2019-04-30 | End: 2019-05-10

## 2019-04-30 ASSESSMENT — ROUTINE ASSESSMENT OF PATIENT INDEX DATA (RAPID3)
WHEN YOU AWAKENED IN THE MORNING OVER THE LAST WEEK, PLEASE INDICATE THE AMOUNT OF TIME IT TAKES UNTIL YOU ARE AS LIMBER AS YOU WILL BE FOR THE DAY: 25 MINS
MDHAQ FUNCTION SCORE: 0
PAIN SCORE: 0
PATIENT GLOBAL ASSESSMENT SCORE: .5
AM STIFFNESS SCORE: 1, YES
PSYCHOLOGICAL DISTRESS SCORE: 0
FATIGUE SCORE: 0
TOTAL RAPID3 SCORE: .17

## 2019-04-30 NOTE — ASSESSMENT & PLAN NOTE
Cmp, uric acid today  Discussed risks/benefits of febuxostat 80mg daily regarding new FDA mandated black box warning for increased cardiovascular death risk. He had normal coronaries on [facility] 10/31/18 so low risk. But  Will decrease to 40mg daily as last uric acid 3.8  Cont colchicine 0.6mg 0.5 tab daily for prophylaxis  Recheck cmp and uric acid in 4 wks  RTC 6 months

## 2019-04-30 NOTE — PROGRESS NOTES
"Subjective:       Patient ID: Adelfo ZHANG MD Jennifer is a 78 y.o. male.    Chief Complaint: chronic tophaceous gout  HPI asymptomatic. No acute gout. Gained at few # with wife out of town. Exercises minimal 4x/wk 45 min each.   Review of Systems   Constitutional: Negative for appetite change, chills, fatigue, fever and unexpected weight change.   HENT: Negative for mouth sores and trouble swallowing.    Eyes: Negative for pain, redness and visual disturbance.   Respiratory: Negative for cough, shortness of breath and wheezing.    Cardiovascular: Negative for chest pain, palpitations and leg swelling.   Gastrointestinal: Negative for abdominal pain, blood in stool, constipation, diarrhea and nausea.   Genitourinary: Negative for difficulty urinating, dysuria, genital sores and hematuria.   Musculoskeletal: Negative for arthralgias, back pain, gait problem, joint swelling, myalgias, neck pain and neck stiffness.   Skin: Negative for color change, pallor and rash.   Neurological: Negative for weakness and headaches.   Hematological: Bruises/bleeds easily.   Psychiatric/Behavioral: Negative for dysphoric mood and sleep disturbance.         Objective:   BP (!) 142/64   Pulse 77   Ht 5' 8.4" (1.737 m)   Wt 105 kg (231 lb 8 oz)   BMI 34.79 kg/m²      Physical Exam   Constitutional: He is oriented to person, place, and time and well-developed, well-nourished, and in no distress.   HENT:   Head: Normocephalic and atraumatic.   Mouth/Throat: Oropharynx is clear and moist.   Tophi earlobes   Eyes: Conjunctivae and EOM are normal. Pupils are equal, round, and reactive to light.   Neck: Normal range of motion. Neck supple. No thyromegaly present.   No cervical bruits   Cardiovascular: Normal rate, regular rhythm, normal heart sounds and intact distal pulses.  Exam reveals no gallop and no friction rub.    No murmur heard.  Pulmonary/Chest: Breath sounds normal. He has no wheezes. He has no rales. He exhibits no tenderness. "   Abdominal: Soft. He exhibits no distension and no mass. There is no tenderness.       Right Side Rheumatological Exam     Examination finds the shoulder, elbow, wrist, knee, 1st PIP, 1st MCP, 2nd PIP, 2nd MCP, 3rd PIP, 3rd MCP, 4th PIP, 4th MCP, 5th PIP and 5th MCP normal.    Left Side Rheumatological Exam     Examination finds the shoulder, elbow, wrist, knee, 1st PIP, 1st MCP, 2nd PIP, 2nd MCP, 3rd PIP, 3rd MCP, 4th PIP, 4th MCP, 5th PIP and 5th MCP normal.      Lymphadenopathy:     He has no cervical adenopathy.   Neurological: He is alert and oriented to person, place, and time. He displays normal reflexes. He exhibits normal muscle tone. Gait normal.   Motor strength nl. UE and LE bilateral   Skin: Skin is warm and dry. No rash noted. No erythema. No pallor.     Healing laceration right right finger pad   Psychiatric: Mood, memory, affect and judgment normal.       Results for MCKINLEY BISHOP MD (MRN [id number]) as of 4/30/2019 09:47   Ref. Range 1/3/2019 11:38   Sodium Latest Ref Range: 136 - 145 mmol/L 138   Potassium Latest Ref Range: 3.5 - 5.1 mmol/L 4.1   Chloride Latest Ref Range: 95 - 110 mmol/L 107   CO2 Latest Ref Range: 23 - 29 mmol/L 25   Anion Gap Latest Ref Range: 8 - 16 mmol/L 6 (L)   BUN, Bld Latest Ref Range: 8 - 23 mg/dL 32 (H)   Creatinine Latest Ref Range: 0.5 - 1.4 mg/dL 2.0 (H)   eGFR if non African American Latest Ref Range: >60 mL/min/1.73 m^2 31.0 (A)   eGFR if African American Latest Ref Range: >60 mL/min/1.73 m^2 35.9 (A)   Glucose Latest Ref Range: 70 - 110 mg/dL 105   Calcium Latest Ref Range: 8.7 - 10.5 mg/dL 9.2   Alkaline Phosphatase Latest Ref Range: 55 - 135 U/L 87   PROTEIN TOTAL Latest Ref Range: 6.0 - 8.4 g/dL 6.7   Albumin Latest Ref Range: 3.5 - 5.2 g/dL 3.5   Uric Acid Latest Ref Range: 3.4 - 7.0 mg/dL 3.8   BILIRUBIN TOTAL Latest Ref Range: 0.1 - 1.0 mg/dL 0.7   AST Latest Ref Range: 10 - 40 U/L 16   ALT Latest Ref Range: 10 - 44 U/L 20     Assessment/Plan          Problem List Items Addressed This Visit     Chronic tophaceous gout    Overview       Results for MCKINLEY BISHOP MD (MRN [id number]) as of 4/30/2019 09:47   Ref. Range 1/3/2019 11:38   Sodium Latest Ref Range: 136 - 145 mmol/L 138   Potassium Latest Ref Range: 3.5 - 5.1 mmol/L 4.1   Chloride Latest Ref Range: 95 - 110 mmol/L 107   CO2 Latest Ref Range: 23 - 29 mmol/L 25   Anion Gap Latest Ref Range: 8 - 16 mmol/L 6 (L)   BUN, Bld Latest Ref Range: 8 - 23 mg/dL 32 (H)   Creatinine Latest Ref Range: 0.5 - 1.4 mg/dL 2.0 (H)   eGFR if non African American Latest Ref Range: >60 mL/min/1.73 m^2 31.0 (A)   eGFR if African American Latest Ref Range: >60 mL/min/1.73 m^2 35.9 (A)   Glucose Latest Ref Range: 70 - 110 mg/dL 105   Calcium Latest Ref Range: 8.7 - 10.5 mg/dL 9.2   Alkaline Phosphatase Latest Ref Range: 55 - 135 U/L 87   PROTEIN TOTAL Latest Ref Range: 6.0 - 8.4 g/dL 6.7   Albumin Latest Ref Range: 3.5 - 5.2 g/dL 3.5   Uric Acid Latest Ref Range: 3.4 - 7.0 mg/dL 3.8   BILIRUBIN TOTAL Latest Ref Range: 0.1 - 1.0 mg/dL 0.7   AST Latest Ref Range: 10 - 40 U/L 16   ALT Latest Ref Range: 10 - 44 U/L 20            Current Assessment & Plan     Cmp, uric acid today  Discussed risks/benefits of febuxostat 80mg daily regarding new FDA mandated black box warning for increased cardiovascular death risk. He had normal coronaries on [facility] 10/31/18 so low risk. But  Will decrease to 40mg daily as last uric acid 3.8  Cont colchicine 0.6mg 0.5 tab daily for prophylaxis  Recheck cmp and uric acid in 4 wks  RTC 6 months         Relevant Orders    Comprehensive metabolic panel    Uric acid    Essential hypertension (Chronic)    Current Assessment & Plan     142/64

## 2019-05-10 ENCOUNTER — TELEPHONE (OUTPATIENT)
Dept: RHEUMATOLOGY | Facility: CLINIC | Age: 79
End: 2019-05-10

## 2019-05-10 ENCOUNTER — LAB VISIT (OUTPATIENT)
Dept: LAB | Facility: HOSPITAL | Age: 79
End: 2019-05-10
Attending: INTERNAL MEDICINE
Payer: MEDICARE

## 2019-05-10 DIAGNOSIS — M1A.9XX1 CHRONIC TOPHACEOUS GOUT: ICD-10-CM

## 2019-05-10 DIAGNOSIS — I10 ESSENTIAL HYPERTENSION: Chronic | ICD-10-CM

## 2019-05-10 LAB
ALBUMIN SERPL BCP-MCNC: 3.6 G/DL (ref 3.5–5.2)
ALBUMIN SERPL BCP-MCNC: 3.6 G/DL (ref 3.5–5.2)
ALP SERPL-CCNC: 87 U/L (ref 55–135)
ALP SERPL-CCNC: 87 U/L (ref 55–135)
ALT SERPL W/O P-5'-P-CCNC: 20 U/L (ref 10–44)
ALT SERPL W/O P-5'-P-CCNC: 20 U/L (ref 10–44)
ANION GAP SERPL CALC-SCNC: 7 MMOL/L (ref 8–16)
ANION GAP SERPL CALC-SCNC: 7 MMOL/L (ref 8–16)
AST SERPL-CCNC: 20 U/L (ref 10–40)
AST SERPL-CCNC: 20 U/L (ref 10–40)
BASOPHILS # BLD AUTO: 0.07 K/UL (ref 0–0.2)
BASOPHILS NFR BLD: 1.4 % (ref 0–1.9)
BILIRUB SERPL-MCNC: 0.4 MG/DL (ref 0.1–1)
BILIRUB SERPL-MCNC: 0.4 MG/DL (ref 0.1–1)
BUN SERPL-MCNC: 30 MG/DL (ref 8–23)
BUN SERPL-MCNC: 30 MG/DL (ref 8–23)
CALCIUM SERPL-MCNC: 9.3 MG/DL (ref 8.7–10.5)
CALCIUM SERPL-MCNC: 9.3 MG/DL (ref 8.7–10.5)
CHLORIDE SERPL-SCNC: 109 MMOL/L (ref 95–110)
CHLORIDE SERPL-SCNC: 109 MMOL/L (ref 95–110)
CHOLEST SERPL-MCNC: 230 MG/DL (ref 120–199)
CHOLEST/HDLC SERPL: 3.3 {RATIO} (ref 2–5)
CO2 SERPL-SCNC: 25 MMOL/L (ref 23–29)
CO2 SERPL-SCNC: 25 MMOL/L (ref 23–29)
CREAT SERPL-MCNC: 1.9 MG/DL (ref 0.5–1.4)
CREAT SERPL-MCNC: 1.9 MG/DL (ref 0.5–1.4)
DIFFERENTIAL METHOD: ABNORMAL
EOSINOPHIL # BLD AUTO: 0.3 K/UL (ref 0–0.5)
EOSINOPHIL NFR BLD: 6.5 % (ref 0–8)
ERYTHROCYTE [DISTWIDTH] IN BLOOD BY AUTOMATED COUNT: 13.3 % (ref 11.5–14.5)
EST. GFR  (AFRICAN AMERICAN): 38.2 ML/MIN/1.73 M^2
EST. GFR  (AFRICAN AMERICAN): 38.2 ML/MIN/1.73 M^2
EST. GFR  (NON AFRICAN AMERICAN): 33 ML/MIN/1.73 M^2
EST. GFR  (NON AFRICAN AMERICAN): 33 ML/MIN/1.73 M^2
GLUCOSE SERPL-MCNC: 96 MG/DL (ref 70–110)
GLUCOSE SERPL-MCNC: 96 MG/DL (ref 70–110)
HCT VFR BLD AUTO: 35.6 % (ref 40–54)
HDLC SERPL-MCNC: 70 MG/DL (ref 40–75)
HDLC SERPL: 30.4 % (ref 20–50)
HGB BLD-MCNC: 11.3 G/DL (ref 14–18)
IMM GRANULOCYTES # BLD AUTO: 0.02 K/UL (ref 0–0.04)
IMM GRANULOCYTES NFR BLD AUTO: 0.4 % (ref 0–0.5)
LDLC SERPL CALC-MCNC: 148.2 MG/DL (ref 63–159)
LYMPHOCYTES # BLD AUTO: 1.4 K/UL (ref 1–4.8)
LYMPHOCYTES NFR BLD: 27.5 % (ref 18–48)
MCH RBC QN AUTO: 31.9 PG (ref 27–31)
MCHC RBC AUTO-ENTMCNC: 31.7 G/DL (ref 32–36)
MCV RBC AUTO: 101 FL (ref 82–98)
MONOCYTES # BLD AUTO: 0.7 K/UL (ref 0.3–1)
MONOCYTES NFR BLD: 14.5 % (ref 4–15)
NEUTROPHILS # BLD AUTO: 2.5 K/UL (ref 1.8–7.7)
NEUTROPHILS NFR BLD: 49.7 % (ref 38–73)
NONHDLC SERPL-MCNC: 160 MG/DL
NRBC BLD-RTO: 0 /100 WBC
PLATELET # BLD AUTO: 211 K/UL (ref 150–350)
PMV BLD AUTO: 11.5 FL (ref 9.2–12.9)
POTASSIUM SERPL-SCNC: 4.5 MMOL/L (ref 3.5–5.1)
POTASSIUM SERPL-SCNC: 4.5 MMOL/L (ref 3.5–5.1)
PROT SERPL-MCNC: 6.6 G/DL (ref 6–8.4)
PROT SERPL-MCNC: 6.6 G/DL (ref 6–8.4)
RBC # BLD AUTO: 3.54 M/UL (ref 4.6–6.2)
SODIUM SERPL-SCNC: 141 MMOL/L (ref 136–145)
SODIUM SERPL-SCNC: 141 MMOL/L (ref 136–145)
TRIGL SERPL-MCNC: 59 MG/DL (ref 30–150)
URATE SERPL-MCNC: 5.4 MG/DL (ref 3.4–7)
WBC # BLD AUTO: 5.05 K/UL (ref 3.9–12.7)

## 2019-05-10 PROCEDURE — 84550 ASSAY OF BLOOD/URIC ACID: CPT

## 2019-05-10 PROCEDURE — 85025 COMPLETE CBC W/AUTO DIFF WBC: CPT

## 2019-05-10 PROCEDURE — 80053 COMPREHEN METABOLIC PANEL: CPT

## 2019-05-10 PROCEDURE — 80061 LIPID PANEL: CPT

## 2019-05-10 PROCEDURE — 36415 COLL VENOUS BLD VENIPUNCTURE: CPT | Mod: PN

## 2019-05-10 RX ORDER — FEBUXOSTAT 40 MG/1
TABLET, FILM COATED ORAL
Qty: 45 TABLET | Refills: 1 | Status: SHIPPED | OUTPATIENT
Start: 2019-05-10 | End: 2019-10-30 | Stop reason: SDUPTHER

## 2019-05-21 ENCOUNTER — OFFICE VISIT (OUTPATIENT)
Dept: INTERNAL MEDICINE | Facility: CLINIC | Age: 79
End: 2019-05-21
Payer: MEDICARE

## 2019-05-21 VITALS
TEMPERATURE: 98 F | BODY MASS INDEX: 33.75 KG/M2 | DIASTOLIC BLOOD PRESSURE: 56 MMHG | WEIGHT: 222.69 LBS | SYSTOLIC BLOOD PRESSURE: 112 MMHG | HEART RATE: 66 BPM | RESPIRATION RATE: 18 BRPM | HEIGHT: 68 IN

## 2019-05-21 DIAGNOSIS — M1A.9XX1 CHRONIC TOPHACEOUS GOUT: ICD-10-CM

## 2019-05-21 DIAGNOSIS — N18.30 CKD (CHRONIC KIDNEY DISEASE), STAGE III: ICD-10-CM

## 2019-05-21 DIAGNOSIS — E78.01 FAMILIAL HYPERCHOLESTEROLEMIA: ICD-10-CM

## 2019-05-21 DIAGNOSIS — E78.5 HYPERLIPIDEMIA, UNSPECIFIED HYPERLIPIDEMIA TYPE: ICD-10-CM

## 2019-05-21 DIAGNOSIS — I48.92 ATRIAL FLUTTER, UNSPECIFIED TYPE: Chronic | ICD-10-CM

## 2019-05-21 DIAGNOSIS — I10 ESSENTIAL HYPERTENSION: Primary | Chronic | ICD-10-CM

## 2019-05-21 DIAGNOSIS — J45.20 INTERMITTENT ASTHMA WITHOUT COMPLICATION, UNSPECIFIED ASTHMA SEVERITY: ICD-10-CM

## 2019-05-21 DIAGNOSIS — R35.1 NOCTURIA: ICD-10-CM

## 2019-05-21 PROCEDURE — 3074F SYST BP LT 130 MM HG: CPT | Mod: CPTII,S$GLB,, | Performed by: INTERNAL MEDICINE

## 2019-05-21 PROCEDURE — 3074F PR MOST RECENT SYSTOLIC BLOOD PRESSURE < 130 MM HG: ICD-10-PCS | Mod: CPTII,S$GLB,, | Performed by: INTERNAL MEDICINE

## 2019-05-21 PROCEDURE — 99214 PR OFFICE/OUTPT VISIT, EST, LEVL IV, 30-39 MIN: ICD-10-PCS | Mod: S$GLB,,, | Performed by: INTERNAL MEDICINE

## 2019-05-21 PROCEDURE — 99214 OFFICE O/P EST MOD 30 MIN: CPT | Mod: S$GLB,,, | Performed by: INTERNAL MEDICINE

## 2019-05-21 PROCEDURE — 3078F DIAST BP <80 MM HG: CPT | Mod: CPTII,S$GLB,, | Performed by: INTERNAL MEDICINE

## 2019-05-21 PROCEDURE — 1101F PR PT FALLS ASSESS DOC 0-1 FALLS W/OUT INJ PAST YR: ICD-10-PCS | Mod: CPTII,S$GLB,, | Performed by: INTERNAL MEDICINE

## 2019-05-21 PROCEDURE — 99999 PR PBB SHADOW E&M-EST. PATIENT-LVL III: ICD-10-PCS | Mod: PBBFAC,,, | Performed by: INTERNAL MEDICINE

## 2019-05-21 PROCEDURE — 1101F PT FALLS ASSESS-DOCD LE1/YR: CPT | Mod: CPTII,S$GLB,, | Performed by: INTERNAL MEDICINE

## 2019-05-21 PROCEDURE — 3078F PR MOST RECENT DIASTOLIC BLOOD PRESSURE < 80 MM HG: ICD-10-PCS | Mod: CPTII,S$GLB,, | Performed by: INTERNAL MEDICINE

## 2019-05-21 PROCEDURE — 99999 PR PBB SHADOW E&M-EST. PATIENT-LVL III: CPT | Mod: PBBFAC,,, | Performed by: INTERNAL MEDICINE

## 2019-05-23 ENCOUNTER — OFFICE VISIT (OUTPATIENT)
Dept: OPTOMETRY | Facility: CLINIC | Age: 79
End: 2019-05-23
Payer: MEDICARE

## 2019-05-23 DIAGNOSIS — H52.4 MYOPIA WITH ASTIGMATISM AND PRESBYOPIA, BILATERAL: ICD-10-CM

## 2019-05-23 DIAGNOSIS — H52.203 MYOPIA WITH ASTIGMATISM AND PRESBYOPIA, BILATERAL: ICD-10-CM

## 2019-05-23 DIAGNOSIS — H52.13 MYOPIA WITH ASTIGMATISM AND PRESBYOPIA, BILATERAL: ICD-10-CM

## 2019-05-23 DIAGNOSIS — H25.13 NUCLEAR SCLEROSIS, BILATERAL: Primary | ICD-10-CM

## 2019-05-23 DIAGNOSIS — I10 ESSENTIAL HYPERTENSION: ICD-10-CM

## 2019-05-23 DIAGNOSIS — H43.812 POSTERIOR VITREOUS DETACHMENT OF LEFT EYE: ICD-10-CM

## 2019-05-23 PROCEDURE — 92015 DETERMINE REFRACTIVE STATE: CPT | Mod: S$GLB,,, | Performed by: OPTOMETRIST

## 2019-05-23 PROCEDURE — 99999 PR PBB SHADOW E&M-EST. PATIENT-LVL II: ICD-10-PCS | Mod: PBBFAC,,, | Performed by: OPTOMETRIST

## 2019-05-23 PROCEDURE — 92014 PR EYE EXAM, EST PATIENT,COMPREHESV: ICD-10-PCS | Mod: S$GLB,,, | Performed by: OPTOMETRIST

## 2019-05-23 PROCEDURE — 92014 COMPRE OPH EXAM EST PT 1/>: CPT | Mod: S$GLB,,, | Performed by: OPTOMETRIST

## 2019-05-23 PROCEDURE — 92015 PR REFRACTION: ICD-10-PCS | Mod: S$GLB,,, | Performed by: OPTOMETRIST

## 2019-05-23 PROCEDURE — 99999 PR PBB SHADOW E&M-EST. PATIENT-LVL II: CPT | Mod: PBBFAC,,, | Performed by: OPTOMETRIST

## 2019-05-23 NOTE — PROGRESS NOTES
HPI     DLS: 02/06/2017  78 year old male    Nuclear sclerosis, bilateral  Essential hypertension  Myopia with astigmatism and presbyopia, bilateral       Last edited by Svetlana Daniels on 5/23/2019 10:49 AM. (History)            Assessment /Plan     For exam results, see Encounter Report.    Nuclear sclerosis, bilateral    Essential hypertension    Posterior vitreous detachment of left eye    Myopia with astigmatism and presbyopia, bilateral            1.  Educated on cataracts and affects on vision.  Patient happy with happy vision.  Monitor.  2.  No retinopathy--monitor yearly.  BP control.  3.  Educated pt on floaters.  Retina flat and intact OU--no holes, tears, breaks, or RDs.    4.  Bifocal rx given.  Ok to continue with current rx.

## 2019-05-27 NOTE — PROGRESS NOTES
Subjective:       Patient ID: Adelfo ZHANG Jennifer, PhD is a 78 y.o. male.    Chief Complaint: Follow-up (3 month) and Hypertension    HPI   The patient presents for follow-up of medical conditions.  Active medical problems include hypertension, asthma, chronic rhinitis, chronic kidney disease, atrial flutter, and myalgias.  The patient discontinued use of his statin due to muscle aches.  He has not noted any improvement in the muscle aches since stopping the statin several months ago.    Asthma has been stable on current therapy.  Peak expiratory flows have ranged from 450-590.  The patient's blood pressure diarrhea is reviewed.  Blood pressure readings have been within acceptable range.  The patient is resting well at night.  He still exercises fairly regularly.  Chronic rhinitis symptoms have been stable.    Occasional palpitations are noted during activity.  There is no chest pain, acute shortness of breath, or syncope.    Review of Systems   Constitutional: Negative for activity change and unexpected weight change.   HENT: Negative for hearing loss, rhinorrhea and trouble swallowing.    Eyes: Negative for discharge and visual disturbance.   Respiratory: Negative for chest tightness and wheezing.    Cardiovascular: Positive for palpitations. Negative for chest pain.   Gastrointestinal: Negative for blood in stool, constipation, diarrhea and vomiting.   Endocrine: Negative for polydipsia and polyuria.   Genitourinary: Negative for difficulty urinating, hematuria and urgency.   Musculoskeletal: Positive for myalgias. Negative for arthralgias, joint swelling and neck pain.   Neurological: Negative for weakness and headaches.   Psychiatric/Behavioral: Negative for confusion and dysphoric mood.       Objective:      Physical Exam   Constitutional: He is oriented to person, place, and time. He appears well-developed and well-nourished. No distress.   HENT:   Head: Normocephalic and atraumatic.   Eyes: Conjunctivae and  EOM are normal. No scleral icterus.   Neck: Normal range of motion. Neck supple. No JVD present. No thyromegaly present.   Cardiovascular: Normal rate, regular rhythm, normal heart sounds and intact distal pulses. Exam reveals no gallop and no friction rub.   No murmur heard.  Pulmonary/Chest: Effort normal and breath sounds normal. No respiratory distress. He has no wheezes. He has no rales.   Abdominal: Soft. Bowel sounds are normal. He exhibits no mass. There is no tenderness.   Musculoskeletal: Normal range of motion. He exhibits no tenderness.   Lymphadenopathy:     He has no cervical adenopathy.   Neurological: He is alert and oriented to person, place, and time.   Gait is normal.   Skin: Skin is warm and dry. No rash noted.   Nursing note and vitals reviewed.      Results for orders placed or performed in visit on 05/10/19   CBC auto differential   Result Value Ref Range    WBC 5.05 3.90 - 12.70 K/uL    RBC 3.54 (L) 4.60 - 6.20 M/uL    Hemoglobin 11.3 (L) 14.0 - 18.0 g/dL    Hematocrit 35.6 (L) 40.0 - 54.0 %    Mean Corpuscular Volume 101 (H) 82 - 98 fL    Mean Corpuscular Hemoglobin 31.9 (H) 27.0 - 31.0 pg    Mean Corpuscular Hemoglobin Conc 31.7 (L) 32.0 - 36.0 g/dL    RDW 13.3 11.5 - 14.5 %    Platelets 211 150 - 350 K/uL    MPV 11.5 9.2 - 12.9 fL    Immature Granulocytes 0.4 0.0 - 0.5 %    Gran # (ANC) 2.5 1.8 - 7.7 K/uL    Immature Grans (Abs) 0.02 0.00 - 0.04 K/uL    Lymph # 1.4 1.0 - 4.8 K/uL    Mono # 0.7 0.3 - 1.0 K/uL    Eos # 0.3 0.0 - 0.5 K/uL    Baso # 0.07 0.00 - 0.20 K/uL    nRBC 0 0 /100 WBC    Gran% 49.7 38.0 - 73.0 %    Lymph% 27.5 18.0 - 48.0 %    Mono% 14.5 4.0 - 15.0 %    Eosinophil% 6.5 0.0 - 8.0 %    Basophil% 1.4 0.0 - 1.9 %    Differential Method Automated    Comprehensive metabolic panel   Result Value Ref Range    Sodium 141 136 - 145 mmol/L    Potassium 4.5 3.5 - 5.1 mmol/L    Chloride 109 95 - 110 mmol/L    CO2 25 23 - 29 mmol/L    Glucose 96 70 - 110 mg/dL    BUN, Bld 30 (H) 8 -  23 mg/dL    Creatinine 1.9 (H) 0.5 - 1.4 mg/dL    Calcium 9.3 8.7 - 10.5 mg/dL    Total Protein 6.6 6.0 - 8.4 g/dL    Albumin 3.6 3.5 - 5.2 g/dL    Total Bilirubin 0.4 0.1 - 1.0 mg/dL    Alkaline Phosphatase 87 55 - 135 U/L    AST 20 10 - 40 U/L    ALT 20 10 - 44 U/L    Anion Gap 7 (L) 8 - 16 mmol/L    eGFR if African American 38.2 (A) >60 mL/min/1.73 m^2    eGFR if non  33.0 (A) >60 mL/min/1.73 m^2   Lipid panel   Result Value Ref Range    Cholesterol 230 (H) 120 - 199 mg/dL    Triglycerides 59 30 - 150 mg/dL    HDL 70 40 - 75 mg/dL    LDL Cholesterol 148.2 63.0 - 159.0 mg/dL    Hdl/Cholesterol Ratio 30.4 20.0 - 50.0 %    Total Cholesterol/HDL Ratio 3.3 2.0 - 5.0    Non-HDL Cholesterol 160 mg/dL   Comprehensive metabolic panel   Result Value Ref Range    Sodium 141 136 - 145 mmol/L    Potassium 4.5 3.5 - 5.1 mmol/L    Chloride 109 95 - 110 mmol/L    CO2 25 23 - 29 mmol/L    Glucose 96 70 - 110 mg/dL    BUN, Bld 30 (H) 8 - 23 mg/dL    Creatinine 1.9 (H) 0.5 - 1.4 mg/dL    Calcium 9.3 8.7 - 10.5 mg/dL    Total Protein 6.6 6.0 - 8.4 g/dL    Albumin 3.6 3.5 - 5.2 g/dL    Total Bilirubin 0.4 0.1 - 1.0 mg/dL    Alkaline Phosphatase 87 55 - 135 U/L    AST 20 10 - 40 U/L    ALT 20 10 - 44 U/L    Anion Gap 7 (L) 8 - 16 mmol/L    eGFR if African American 38.2 (A) >60 mL/min/1.73 m^2    eGFR if non  33.0 (A) >60 mL/min/1.73 m^2   Uric acid   Result Value Ref Range    Uric Acid 5.4 3.4 - 7.0 mg/dL       Assessment:       1. Essential hypertension    2. Intermittent asthma without complication, unspecified asthma severity    3. Familial hypercholesterolemia    4. Atrial flutter, unspecified type    5. CKD (chronic kidney disease), stage III    6. Chronic tophaceous gout    7. Hyperlipidemia, unspecified hyperlipidemia type    8. Nocturia        Plan:       Adelfo was seen today for follow-up and hypertension.  Current therapy will be continued.  We discussed possibly restarting statin therapy in  view of patient's muscle symptoms remaining unchanged off of medication.  Blood tests and a follow-up visit in October 2019 will be obtained.    Diagnoses and all orders for this visit:    Essential hypertension  -     CBC auto differential; Future  -     TSH; Future  -     Urinalysis; Future    Intermittent asthma without complication, unspecified asthma severity    Familial hypercholesterolemia    Atrial flutter, unspecified type    CKD (chronic kidney disease), stage III    Chronic tophaceous gout    Hyperlipidemia, unspecified hyperlipidemia type  -     Comprehensive metabolic panel; Future  -     Lipid panel; Future    Nocturia  -     Prostate Specific Antigen, Diagnostic; Future

## 2019-05-28 RX ORDER — FUROSEMIDE 40 MG/1
TABLET ORAL
Qty: 90 TABLET | Refills: 0 | Status: SHIPPED | OUTPATIENT
Start: 2019-05-28 | End: 2020-06-28

## 2019-05-31 RX ORDER — OMEPRAZOLE 40 MG/1
40 CAPSULE, DELAYED RELEASE ORAL EVERY MORNING
Qty: 30 CAPSULE | Refills: 3 | Status: SHIPPED | OUTPATIENT
Start: 2019-05-31 | End: 2020-04-20

## 2019-06-06 ENCOUNTER — TELEPHONE (OUTPATIENT)
Dept: CARDIOLOGY | Facility: CLINIC | Age: 79
End: 2019-06-06

## 2019-06-06 DIAGNOSIS — R00.2 PALPITATIONS: Primary | ICD-10-CM

## 2019-06-07 ENCOUNTER — OFFICE VISIT (OUTPATIENT)
Dept: CARDIOLOGY | Facility: CLINIC | Age: 79
End: 2019-06-07
Payer: MEDICARE

## 2019-06-07 VITALS
BODY MASS INDEX: 32.47 KG/M2 | OXYGEN SATURATION: 95 % | DIASTOLIC BLOOD PRESSURE: 58 MMHG | SYSTOLIC BLOOD PRESSURE: 120 MMHG | HEART RATE: 78 BPM | WEIGHT: 226.81 LBS | HEIGHT: 70 IN

## 2019-06-07 DIAGNOSIS — I10 ESSENTIAL HYPERTENSION: Chronic | ICD-10-CM

## 2019-06-07 DIAGNOSIS — E78.01 FAMILIAL HYPERCHOLESTEROLEMIA: ICD-10-CM

## 2019-06-07 DIAGNOSIS — I25.118 CORONARY ARTERY DISEASE OF NATIVE ARTERY OF NATIVE HEART WITH STABLE ANGINA PECTORIS: ICD-10-CM

## 2019-06-07 DIAGNOSIS — E66.9 OBESITY (BMI 30-39.9): ICD-10-CM

## 2019-06-07 DIAGNOSIS — I48.92 ATRIAL FLUTTER, UNSPECIFIED TYPE: Primary | Chronic | ICD-10-CM

## 2019-06-07 DIAGNOSIS — R00.2 PALPITATIONS: ICD-10-CM

## 2019-06-07 PROCEDURE — 3078F DIAST BP <80 MM HG: CPT | Mod: CPTII,S$GLB,, | Performed by: INTERNAL MEDICINE

## 2019-06-07 PROCEDURE — 99214 PR OFFICE/OUTPT VISIT, EST, LEVL IV, 30-39 MIN: ICD-10-PCS | Mod: S$GLB,,, | Performed by: INTERNAL MEDICINE

## 2019-06-07 PROCEDURE — 3074F PR MOST RECENT SYSTOLIC BLOOD PRESSURE < 130 MM HG: ICD-10-PCS | Mod: CPTII,S$GLB,, | Performed by: INTERNAL MEDICINE

## 2019-06-07 PROCEDURE — 3074F SYST BP LT 130 MM HG: CPT | Mod: CPTII,S$GLB,, | Performed by: INTERNAL MEDICINE

## 2019-06-07 PROCEDURE — 99214 OFFICE O/P EST MOD 30 MIN: CPT | Mod: S$GLB,,, | Performed by: INTERNAL MEDICINE

## 2019-06-07 PROCEDURE — 99999 PR PBB SHADOW E&M-EST. PATIENT-LVL V: CPT | Mod: PBBFAC,,, | Performed by: INTERNAL MEDICINE

## 2019-06-07 PROCEDURE — 99999 PR PBB SHADOW E&M-EST. PATIENT-LVL V: ICD-10-PCS | Mod: PBBFAC,,, | Performed by: INTERNAL MEDICINE

## 2019-06-07 PROCEDURE — 93000 EKG 12-LEAD: ICD-10-PCS | Mod: S$GLB,,, | Performed by: INTERNAL MEDICINE

## 2019-06-07 PROCEDURE — 93000 ELECTROCARDIOGRAM COMPLETE: CPT | Mod: S$GLB,,, | Performed by: INTERNAL MEDICINE

## 2019-06-07 PROCEDURE — 1101F PT FALLS ASSESS-DOCD LE1/YR: CPT | Mod: CPTII,S$GLB,, | Performed by: INTERNAL MEDICINE

## 2019-06-07 PROCEDURE — 1101F PR PT FALLS ASSESS DOC 0-1 FALLS W/OUT INJ PAST YR: ICD-10-PCS | Mod: CPTII,S$GLB,, | Performed by: INTERNAL MEDICINE

## 2019-06-07 PROCEDURE — 3078F PR MOST RECENT DIASTOLIC BLOOD PRESSURE < 80 MM HG: ICD-10-PCS | Mod: CPTII,S$GLB,, | Performed by: INTERNAL MEDICINE

## 2019-06-07 RX ORDER — ATORVASTATIN CALCIUM 40 MG/1
40 TABLET, FILM COATED ORAL DAILY
Qty: 90 TABLET | Refills: 3 | Status: SHIPPED | OUTPATIENT
Start: 2019-06-07 | End: 2020-04-20

## 2019-06-07 NOTE — PROGRESS NOTES
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 male who presents for follow-up of No chief complaint on file.      HPI     The patient is a 78 year old male followed by Dr Vogel with hypertension, hyperlipidemia, gout, CKD, ANNABELLE on CPAP. On an annual physical 10/1/18 he reported chest tightness with exercise. A stress echo was ordered and was reported as abnormal. He was referred to Interventional  and a LHC was normal. He later reported chest pain associated wit heat rates of 150. He has 2 siblings with history of PAT. An event monitor detected an episode of flutter with 2:1 conduction.Since his last visit he has kept a log of his AF event which range 2 to 6 times a months the longest > 10 minutes. He has no chest pain or DIEGO.,He is exercises 30-45 minutes most days, He stopped metoprolol due to low BPs.    Event Monitor 12/12/18    The baseline transmission on this 4 week event monitor shows sinus rhythm at 68 bpm. There were 7 episodes of shortness of breath reported which correlated with sinus rhythm with occasional PVCs. An episode of palpitations correlated with sinus tachycardia at 101 bpm.    On 12/14/2018 at 3:27 PM, a transmission associated with shortness of breath shows a regular narrow complex tachycardia at a rate of 157 bpm. This likely represents atrial flutter wtih 2:1 AV conduction. A follow-up transmission performed 20 minutes later shows sinus rhythm       Lab Results   Component Value Date     05/10/2019     05/10/2019    K 4.5 05/10/2019    K 4.5 05/10/2019     05/10/2019     05/10/2019    CO2 25 05/10/2019    CO2 25 05/10/2019    BUN 30 (H) 05/10/2019    BUN 30 (H) 05/10/2019    CREATININE 1.9 (H) 05/10/2019    CREATININE 1.9 (H) 05/10/2019    GLU 96 05/10/2019    GLU 96 05/10/2019    AST 20 05/10/2019    AST 20 05/10/2019    ALT 20 05/10/2019    ALT 20 05/10/2019    ALBUMIN 3.6 05/10/2019    ALBUMIN 3.6 05/10/2019    PROT 6.6 05/10/2019    PROT 6.6  05/10/2019    BILITOT 0.4 05/10/2019    BILITOT 0.4 05/10/2019    WBC 5.05 05/10/2019    HGB 11.3 (L) 05/10/2019    HCT 35.6 (L) 05/10/2019     (H) 05/10/2019     05/10/2019    INR 0.9 10/31/2018    PSA 2.63 08/15/2013    TSH 3.207 09/21/2018         Lab Results   Component Value Date    CHOL 230 (H) 05/10/2019    HDL 70 05/10/2019    TRIG 59 05/10/2019       Lab Results   Component Value Date    LDLCALC 148.2 05/10/2019       Past Medical History:   Diagnosis Date    ALLERGIC RHINITIS     Anemia     Asthma     GERD (gastroesophageal reflux disease)     Hyperlipidemia     Hypertension     NAFLD (nonalcoholic fatty liver disease)     ANNABELLE (obstructive sleep apnea)     on CPAP    Squamous cell cancer of skin of hand        Current Outpatient Medications:     ADVAIR DISKUS 250-50 mcg/dose diskus inhaler, INHALE 1 PUFF BY MOUTH TWICE DAILY. RINSE MOUTH AFTER USE, Disp: 60 each, Rfl: 5    albuterol (PROAIR HFA) 90 mcg/actuation inhaler, Inhale 2 puffs into the lungs every 4 (four) hours as needed for Wheezing., Disp: 1 Inhaler, Rfl: 6    apixaban (ELIQUIS) 5 mg Tab, Take 1 tablet (5 mg total) by mouth 2 (two) times daily., Disp: 180 tablet, Rfl: 3    aspirin (ECOTRIN) 81 MG EC tablet, Take 81 mg by mouth every evening. , Disp: , Rfl:     azelastine (ASTELIN) 137 mcg (0.1 %) nasal spray, 2 sprays (274 mcg total) by Nasal route 2 (two) times daily., Disp: 30 mL, Rfl: 5    colchicine (COLCRYS) 0.6 mg tablet, 0.3mg( 1/2 0.6mg tablet) daily, Disp: 45 tablet, Rfl: 1    febuxostat (ULORIC) 40 mg Tab, 1/2 tab(20mg) daily, Disp: 45 tablet, Rfl: 1    felodipine (PLENDIL) 5 MG 24 hr tablet, Take 1 tablet (5 mg total) by mouth once daily., Disp: 90 tablet, Rfl: 3    fish oil-omega-3 fatty acids 300-1,000 mg capsule, Take 2 g by mouth once daily.  , Disp: , Rfl:     fluticasone (FLONASE) 50 mcg/actuation nasal spray, 2 sprays (100 mcg total) by Each Nare route once daily., Disp: 48 g, Rfl: 3     furosemide (LASIX) 40 MG tablet, TAKE 1 TABLET BY MOUTH EVERY DAY, Disp: 90 tablet, Rfl: 0    glucosamine & chondroit sul.Na 750-600 mg Tab, Take 750 mg by mouth., Disp: , Rfl:     irbesartan (AVAPRO) 150 MG tablet, TAKE 1 TABLET BY MOUTH ONCE DAILY FOR BLOOD PRESSURE, Disp: 30 tablet, Rfl: 10    ketoconazole (NIZORAL) 2 % shampoo, WASH affected area every other day, Disp: 120 mL, Rfl: 0    loratadine (CLARITIN) 10 mg tablet, Take 10 mg by mouth daily as needed.  , Disp: , Rfl:     omeprazole (PRILOSEC) 40 MG capsule, Take 1 capsule (40 mg total) by mouth every morning., Disp: 30 capsule, Rfl: 3    triamcinolone acetonide 0.1% (KENALOG) 0.1 % cream, Apply topically 2 (two) times daily., Disp: 80 g, Rfl: 2          Review of Systems   Constitution: Negative for decreased appetite, diaphoresis, fever, malaise/fatigue, weight gain and weight loss.   HENT: Negative for congestion, ear discharge, ear pain and nosebleeds.    Eyes: Negative for blurred vision, double vision and visual disturbance.   Cardiovascular: Negative for chest pain, claudication, cyanosis, dyspnea on exertion, irregular heartbeat, leg swelling, near-syncope, orthopnea, palpitations, paroxysmal nocturnal dyspnea and syncope.   Respiratory: Negative for cough, hemoptysis, shortness of breath, sleep disturbances due to breathing, snoring, sputum production and wheezing.    Endocrine: Negative for polydipsia, polyphagia and polyuria.   Hematologic/Lymphatic: Negative for adenopathy and bleeding problem. Does not bruise/bleed easily.   Skin: Negative for color change, nail changes, poor wound healing and rash.   Musculoskeletal: Negative for muscle cramps and muscle weakness.   Gastrointestinal: Negative for abdominal pain, anorexia, change in bowel habit, hematochezia, nausea and vomiting.   Genitourinary: Negative for dysuria, frequency and hematuria.   Neurological: Negative for brief paralysis, difficulty with concentration, excessive daytime  sleepiness, dizziness, focal weakness, headaches, light-headedness, seizures, vertigo and weakness.   Psychiatric/Behavioral: Negative for altered mental status and depression.   Allergic/Immunologic: Negative for persistent infections.        Objective:There were no vitals taken for this visit.            Physical Exam   Constitutional: He is oriented to person, place, and time. He appears well-developed and well-nourished.   obese   HENT:   Head: Normocephalic.   Right Ear: External ear normal.   Left Ear: External ear normal.   Nose: Nose normal.   Inspection of lips, teeth and gums normal   Eyes: Pupils are equal, round, and reactive to light. EOM are normal. No scleral icterus.   Neck: Normal range of motion. Neck supple. No JVD present. No tracheal deviation present. No thyromegaly present.   Cardiovascular: Normal rate, regular rhythm and intact distal pulses. Exam reveals no gallop and no friction rub.   No murmur heard.  Pulses:       Dorsalis pedis pulses are 2+ on the right side, and 2+ on the left side.        Posterior tibial pulses are 2+ on the right side, and 2+ on the left side.   Pulmonary/Chest: Effort normal and breath sounds normal.   Abdominal: Bowel sounds are normal. He exhibits no distension. There is no hepatosplenomegaly. There is no tenderness. There is no guarding.   Musculoskeletal: Normal range of motion. He exhibits no edema or tenderness.   Lymphadenopathy:   Palpation of neck and groin lymph nodes normal   Neurological: He is alert and oriented to person, place, and time. No cranial nerve deficit. He exhibits normal muscle tone. Coordination normal.   Skin: Skin is dry.   Palpation of skin normal   Psychiatric: His behavior is normal. Judgment and thought content normal.         Assessment:       No diagnosis found.     Plan:       There are no diagnoses linked to this encounter.

## 2019-06-11 ENCOUNTER — TELEPHONE (OUTPATIENT)
Dept: ELECTROPHYSIOLOGY | Facility: CLINIC | Age: 79
End: 2019-06-11

## 2019-06-11 DIAGNOSIS — I48.92 ATRIAL FLUTTER, UNSPECIFIED TYPE: Primary | ICD-10-CM

## 2019-06-11 NOTE — TELEPHONE ENCOUNTER
I called and spoke to  about his appointment.  We rescheduled his appointment from 6- to 6-.    would like to see Patient in between  Ep Lab case.  Patient verbalized understanding.              ----- Message from Fay Francisco sent at 6/11/2019  2:53 PM CDT -----  Contact: Self  .[facility]    Who Left Message for Patient: Diamond  Communication Preference: [phone]  Additional Information: regarding getting appt schedule. Thanks

## 2019-06-12 ENCOUNTER — HOSPITAL ENCOUNTER (OUTPATIENT)
Dept: CARDIOLOGY | Facility: CLINIC | Age: 79
Discharge: HOME | End: 2019-06-12
Payer: MEDICARE

## 2019-06-12 ENCOUNTER — INITIAL CONSULT (OUTPATIENT)
Dept: ELECTROPHYSIOLOGY | Facility: CLINIC | Age: 79
End: 2019-06-12
Payer: MEDICARE

## 2019-06-12 DIAGNOSIS — I48.92 ATRIAL FLUTTER, UNSPECIFIED TYPE: Primary | Chronic | ICD-10-CM

## 2019-06-12 DIAGNOSIS — I47.19 PAT (PAROXYSMAL ATRIAL TACHYCARDIA): ICD-10-CM

## 2019-06-12 DIAGNOSIS — G47.33 OSA (OBSTRUCTIVE SLEEP APNEA): ICD-10-CM

## 2019-06-12 DIAGNOSIS — I48.92 ATRIAL FLUTTER, UNSPECIFIED TYPE: ICD-10-CM

## 2019-06-12 DIAGNOSIS — N18.30 CKD (CHRONIC KIDNEY DISEASE), STAGE III: ICD-10-CM

## 2019-06-12 DIAGNOSIS — I10 ESSENTIAL HYPERTENSION: Chronic | ICD-10-CM

## 2019-06-12 PROCEDURE — 93010 ELECTROCARDIOGRAM REPORT: CPT | Mod: S$GLB,,, | Performed by: INTERNAL MEDICINE

## 2019-06-12 PROCEDURE — 93005 ELECTROCARDIOGRAM TRACING: CPT | Mod: S$GLB,,, | Performed by: INTERNAL MEDICINE

## 2019-06-12 PROCEDURE — 99214 PR OFFICE/OUTPT VISIT, EST, LEVL IV, 30-39 MIN: ICD-10-PCS | Mod: S$GLB,,, | Performed by: INTERNAL MEDICINE

## 2019-06-12 PROCEDURE — 1101F PR PT FALLS ASSESS DOC 0-1 FALLS W/OUT INJ PAST YR: ICD-10-PCS | Mod: CPTII,S$GLB,, | Performed by: INTERNAL MEDICINE

## 2019-06-12 PROCEDURE — 99999 PR PBB SHADOW E&M-EST. PATIENT-LVL IV: ICD-10-PCS | Mod: PBBFAC,,, | Performed by: INTERNAL MEDICINE

## 2019-06-12 PROCEDURE — 3078F PR MOST RECENT DIASTOLIC BLOOD PRESSURE < 80 MM HG: ICD-10-PCS | Mod: CPTII,S$GLB,, | Performed by: INTERNAL MEDICINE

## 2019-06-12 PROCEDURE — 1101F PT FALLS ASSESS-DOCD LE1/YR: CPT | Mod: CPTII,S$GLB,, | Performed by: INTERNAL MEDICINE

## 2019-06-12 PROCEDURE — 93010 RHYTHM STRIP: ICD-10-PCS | Mod: S$GLB,,, | Performed by: INTERNAL MEDICINE

## 2019-06-12 PROCEDURE — 93005 RHYTHM STRIP: ICD-10-PCS | Mod: S$GLB,,, | Performed by: INTERNAL MEDICINE

## 2019-06-12 PROCEDURE — 99214 OFFICE O/P EST MOD 30 MIN: CPT | Mod: S$GLB,,, | Performed by: INTERNAL MEDICINE

## 2019-06-12 PROCEDURE — 3075F PR MOST RECENT SYSTOLIC BLOOD PRESS GE 130-139MM HG: ICD-10-PCS | Mod: CPTII,S$GLB,, | Performed by: INTERNAL MEDICINE

## 2019-06-12 PROCEDURE — 3078F DIAST BP <80 MM HG: CPT | Mod: CPTII,S$GLB,, | Performed by: INTERNAL MEDICINE

## 2019-06-12 PROCEDURE — 99999 PR PBB SHADOW E&M-EST. PATIENT-LVL IV: CPT | Mod: PBBFAC,,, | Performed by: INTERNAL MEDICINE

## 2019-06-12 PROCEDURE — 3075F SYST BP GE 130 - 139MM HG: CPT | Mod: CPTII,S$GLB,, | Performed by: INTERNAL MEDICINE

## 2019-06-12 NOTE — LETTER
June 12, 2019      Isac Morales MD  [address]  [facility] [address]           [facility] - Arrhythmia  [address]  [facility] [address]  Phone: [phone]  Fax: [phone]          Patient: Adelfo Rodriguez, PhD   MR Number: [id number]   YOB: 1940   Date of Visit: 6/12/2019       Dear Dr. Isac Morales:    Thank you for referring Adelfo Rodriguez to me for evaluation. Attached you will find relevant portions of my assessment and plan of care.    If you have questions, please do not hesitate to call me. I look forward to following Adelfo Rodriguez along with you.    Sincerely,    Gil Wilson MD    Enclosure  CC:  No Recipients    If you would like to receive this communication electronically, please contact [email or web address] or [phone] to request more information on [employer] Link access.    For providers and/or their staff who would like to refer a patient to [facility], please contact us through our one-stop-shop provider referral line, [facility], at [phone].    If you feel you have received this communication in error or would no longer like to receive these types of communications, please e-mail [email or web address]

## 2019-06-12 NOTE — H&P (VIEW-ONLY)
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 Rodriguez today in our electrophysiology clinic for his atrial arrhythmias. 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 In 2018 he began having exertional induced palpitations that would last for a few minutes. A 30 day event monitor was ordered. He had 1 such event whose onset was not captured. It was a narrow complex tachycardia at around 150 bpm that could have represented 2:1 atrial flutter. Another event was characterized by a short imelda of nonsustained atrial tachycardia. He was initiated on eliquis and metoprolol 50mg daily. He was intolerant of the metoprolol (symptomatic bradycardia in the 50s). He continues to exercise and 1-2 times weekly he has an episode. He exercises with a heart rate monitor and is able to record his work outs. With workouts associated with his symptoms, his heart rate curve rises to a plateau of ~120-130. Then there is a sudden spike in his heart rate to 150-160 that remains for 5-10 minutes then suddenly stops and returns to baseline. He notes palpitations and chest heaviness with these events. He had an abnormal stress echo with preserved LVEF 10/2018. Coronary angiography noted no obstructive CAD.    I reviewed all available ECGs in Epic which note sinus rhythm.    My interpretation of today's in clinic ECG is normal sinus rhythm with normal intervals    Review of Systems   Constitution: Negative for fever and malaise/fatigue.   HENT: Negative for congestion and sore throat.    Eyes: Negative for blurred vision and visual disturbance.   Cardiovascular: Positive for leg swelling and palpitations. Negative for chest pain, dyspnea on  exertion, near-syncope and syncope.   Respiratory: Negative for cough and shortness of breath.    Hematologic/Lymphatic: Negative for bleeding problem. Does not bruise/bleed easily.   Skin: Negative.    Musculoskeletal: Negative.    Gastrointestinal: Negative for bloating, abdominal pain, hematochezia and melena.   Neurological: Negative for dizziness, focal weakness and weakness.        Objective:    Physical Exam   Constitutional: He is oriented to person, place, and time. He appears well-developed and well-nourished. No distress.   HENT:   Head: Normocephalic and atraumatic.   Eyes: Conjunctivae are normal. Right eye exhibits no discharge. Left eye exhibits no discharge.   Neck: Neck supple. No JVD present.   Cardiovascular: Normal rate and regular rhythm. Exam reveals no gallop and no friction rub.   No murmur heard.  Pulmonary/Chest: Effort normal and breath sounds normal. No respiratory distress. He has no wheezes. He has no rales.   Abdominal: Soft. Bowel sounds are normal. He exhibits no distension. There is no tenderness. There is no rebound.   Musculoskeletal: He exhibits no edema.   Neurological: He is alert and oriented to person, place, and time.   Skin: Skin is warm and dry. He is not diaphoretic.   Psychiatric: He has a normal mood and affect. His behavior is normal. Judgment and thought content normal.   Vitals reviewed.        Assessment:       1. Atrial flutter, unspecified type    2. Essential hypertension    3. PAT (paroxysmal atrial tachycardia)    4. CKD (chronic kidney disease), stage III    5. 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 I had a long discussion with the patient about the pathophysiology and risks of atrial flutter and its basic pathophysiology, including its health implications and treatment options. We also discussed its association with atrial fibrillation and how 50% of patients diagnosed with atrial flutter are diagnosed with atrial fibrillation within 5 years. His [id number] score is 3 and anticoagulation is currently recommended.  I also discussed the goal to reduce symptomatic arrhythmic episodes by pharmacologic and/or procedural methods and utilizing a rhythm versus a rate control strategy. Rhythm control strategy would be RFA of the CTI and EP study to attempt to induce an atrial tachycardia. If 1 AT is induced reliably would attempt map and ablation. Would then stop eliquis unless he is eventually diagnosed with atrial fibrillation. He was intolerant of metoprolol for a rate control strategy. Can try low dose verapamil. I spent about a half hour discussing the nature of EP study and ablation, including possible transseptal puncture. We discussed risks and benefits at length. Our discussion included, but was not limited to the risk of death, infection, bleeding, stroke, MI, cardiac perforation, vascular injury, embolism, cardiac tamponade, skin burns, and other organic injury including the possibility for need for surgery or pacemaker implantation. He understood. He would like to think about it.     Since his symptoms are increasing I think it is reasonable to monitor him again in the interim. If atrial fibrillation is diagnosed on this monitor then would recommend anti-arrhythmic therapy for rhythm control.    Plan  30 day monitor  Return to clinic in 6 weeks    Thank you for allowing me to participate in the care of this patient. Please do not hesitate to call me with any questions or concerns.    Gil Wilson MD, PhD  Cardiac Electrophysiology

## 2019-06-12 NOTE — PROGRESS NOTES
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 Rodriguez today in our electrophysiology clinic for his atrial arrhythmias. 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 In 2018 he began having exertional induced palpitations that would last for a few minutes. A 30 day event monitor was ordered. He had 1 such event whose onset was not captured. It was a narrow complex tachycardia at around 150 bpm that could have represented 2:1 atrial flutter. Another event was characterized by a short imelda of nonsustained atrial tachycardia. He was initiated on eliquis and metoprolol 50mg daily. He was intolerant of the metoprolol (symptomatic bradycardia in the 50s). He continues to exercise and 1-2 times weekly he has an episode. He exercises with a heart rate monitor and is able to record his work outs. With workouts associated with his symptoms, his heart rate curve rises to a plateau of ~120-130. Then there is a sudden spike in his heart rate to 150-160 that remains for 5-10 minutes then suddenly stops and returns to baseline. He notes palpitations and chest heaviness with these events. He had an abnormal stress echo with preserved LVEF 10/2018. Coronary angiography noted no obstructive CAD.    I reviewed all available ECGs in Epic which note sinus rhythm.    My interpretation of today's in clinic ECG is normal sinus rhythm with normal intervals    Review of Systems   Constitution: Negative for fever and malaise/fatigue.   HENT: Negative for congestion and sore throat.    Eyes: Negative for blurred vision and visual disturbance.   Cardiovascular: Positive for leg swelling and palpitations. Negative for chest pain, dyspnea on  exertion, near-syncope and syncope.   Respiratory: Negative for cough and shortness of breath.    Hematologic/Lymphatic: Negative for bleeding problem. Does not bruise/bleed easily.   Skin: Negative.    Musculoskeletal: Negative.    Gastrointestinal: Negative for bloating, abdominal pain, hematochezia and melena.   Neurological: Negative for dizziness, focal weakness and weakness.        Objective:    Physical Exam   Constitutional: He is oriented to person, place, and time. He appears well-developed and well-nourished. No distress.   HENT:   Head: Normocephalic and atraumatic.   Eyes: Conjunctivae are normal. Right eye exhibits no discharge. Left eye exhibits no discharge.   Neck: Neck supple. No JVD present.   Cardiovascular: Normal rate and regular rhythm. Exam reveals no gallop and no friction rub.   No murmur heard.  Pulmonary/Chest: Effort normal and breath sounds normal. No respiratory distress. He has no wheezes. He has no rales.   Abdominal: Soft. Bowel sounds are normal. He exhibits no distension. There is no tenderness. There is no rebound.   Musculoskeletal: He exhibits no edema.   Neurological: He is alert and oriented to person, place, and time.   Skin: Skin is warm and dry. He is not diaphoretic.   Psychiatric: He has a normal mood and affect. His behavior is normal. Judgment and thought content normal.   Vitals reviewed.        Assessment:       1. Atrial flutter, unspecified type    2. Essential hypertension    3. PAT (paroxysmal atrial tachycardia)    4. CKD (chronic kidney disease), stage III    5. 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 I had a long discussion with the patient about the pathophysiology and risks of atrial flutter and its basic pathophysiology, including its health implications and treatment options. We also discussed its association with atrial fibrillation and how 50% of patients diagnosed with atrial flutter are diagnosed with atrial fibrillation within 5 years. His [id number] score is 3 and anticoagulation is currently recommended.  I also discussed the goal to reduce symptomatic arrhythmic episodes by pharmacologic and/or procedural methods and utilizing a rhythm versus a rate control strategy. Rhythm control strategy would be RFA of the CTI and EP study to attempt to induce an atrial tachycardia. If 1 AT is induced reliably would attempt map and ablation. Would then stop eliquis unless he is eventually diagnosed with atrial fibrillation. He was intolerant of metoprolol for a rate control strategy. Can try low dose verapamil. I spent about a half hour discussing the nature of EP study and ablation, including possible transseptal puncture. We discussed risks and benefits at length. Our discussion included, but was not limited to the risk of death, infection, bleeding, stroke, MI, cardiac perforation, vascular injury, embolism, cardiac tamponade, skin burns, and other organic injury including the possibility for need for surgery or pacemaker implantation. He understood. He would like to think about it.     Since his symptoms are increasing I think it is reasonable to monitor him again in the interim. If atrial fibrillation is diagnosed on this monitor then would recommend anti-arrhythmic therapy for rhythm control.    Plan  30 day monitor  Return to clinic in 6 weeks    Thank you for allowing me to participate in the care of this patient. Please do not hesitate to call me with any questions or concerns.    iGl Wilson MD, PhD  Cardiac Electrophysiology

## 2019-06-13 ENCOUNTER — PATIENT MESSAGE (OUTPATIENT)
Dept: ELECTROPHYSIOLOGY | Facility: CLINIC | Age: 79
End: 2019-06-13

## 2019-06-14 ENCOUNTER — PATIENT MESSAGE (OUTPATIENT)
Dept: CARDIOLOGY | Facility: CLINIC | Age: 79
End: 2019-06-14

## 2019-06-14 ENCOUNTER — PATIENT MESSAGE (OUTPATIENT)
Dept: ELECTROPHYSIOLOGY | Facility: CLINIC | Age: 79
End: 2019-06-14

## 2019-06-14 VITALS
HEART RATE: 70 BPM | WEIGHT: 226 LBS | HEIGHT: 70 IN | SYSTOLIC BLOOD PRESSURE: 130 MMHG | BODY MASS INDEX: 32.35 KG/M2 | DIASTOLIC BLOOD PRESSURE: 64 MMHG

## 2019-06-14 DIAGNOSIS — I48.3 TYPICAL ATRIAL FLUTTER: ICD-10-CM

## 2019-06-14 NOTE — TELEPHONE ENCOUNTER
Spoke to Dr. Rodriguez.  Preliminarily scheduled ablation for 7/5.  He reports he will discuss with his wife and let me know if this is an ok date.     Dr. Rodriguez also reports he would like his records updated.  He reports his weight is 226 lbs, not 240 as it is in his [facility] (updated per his request).  He also reports he is not taking metoprolol any longer as he is intolerant to it.  Lastly, he reports he is taking 2.5 mg BID of Eliquis due to eGFR of 38.    Let Dr. Rodriguez know I will discuss the Eliquis with Dr. Wilson as the information I have for Eliquis reflects he should take 5 mg BID.     Dr. Rodriguez verbalizes understanding and will send portal message once he has confirmed date with his wife.

## 2019-06-17 ENCOUNTER — PATIENT MESSAGE (OUTPATIENT)
Dept: RHEUMATOLOGY | Facility: CLINIC | Age: 79
End: 2019-06-17

## 2019-06-17 NOTE — TELEPHONE ENCOUNTER
Spoke to Dr. Rodriguez.  5 mg BID recommendations given.  He verbalizes understanding and agrees to take 5 mg BID as directed.  He will call our office with any questions or concerns.

## 2019-06-19 ENCOUNTER — PATIENT MESSAGE (OUTPATIENT)
Dept: ELECTROPHYSIOLOGY | Facility: CLINIC | Age: 79
End: 2019-06-19

## 2019-06-24 ENCOUNTER — PATIENT MESSAGE (OUTPATIENT)
Dept: ELECTROPHYSIOLOGY | Facility: CLINIC | Age: 79
End: 2019-06-24

## 2019-06-24 DIAGNOSIS — I48.92 ATRIAL FLUTTER, UNSPECIFIED TYPE: Primary | ICD-10-CM

## 2019-06-25 ENCOUNTER — TELEPHONE (OUTPATIENT)
Dept: ELECTROPHYSIOLOGY | Facility: CLINIC | Age: 79
End: 2019-06-25

## 2019-06-25 NOTE — TELEPHONE ENCOUNTER
----- Message from Adela Nguyen MA sent at 6/25/2019  4:01 PM CDT -----  Contact: Patient      ----- Message -----  From: Leonila Brownlee  Sent: 6/25/2019   1:39 PM  To: Steve [facility]LEITE[facility] Staff    The Pt is calling to speak with Divina regarding his procedure. Please call him back @ [phone]. Thanks, Leonila

## 2019-06-25 NOTE — TELEPHONE ENCOUNTER
Spoke to Dr. Rodriguez who has questions regarding when f/u would be (told him ~3months) and if it was ok to use bidet post ablation (told him this was likely ok as long as water is not touching access site).  He verbalizes understanding and will call office with any additional questions or concerns.

## 2019-06-26 ENCOUNTER — LAB VISIT (OUTPATIENT)
Dept: LAB | Facility: HOSPITAL | Age: 79
End: 2019-06-26
Attending: INTERNAL MEDICINE
Payer: MEDICARE

## 2019-06-26 DIAGNOSIS — I48.92 ATRIAL FLUTTER, UNSPECIFIED TYPE: ICD-10-CM

## 2019-06-26 DIAGNOSIS — M1A.9XX1 CHRONIC TOPHACEOUS GOUT: ICD-10-CM

## 2019-06-26 LAB
ALBUMIN SERPL BCP-MCNC: 3.6 G/DL (ref 3.5–5.2)
ALP SERPL-CCNC: 95 U/L (ref 55–135)
ALT SERPL W/O P-5'-P-CCNC: 20 U/L (ref 10–44)
ANION GAP SERPL CALC-SCNC: 8 MMOL/L (ref 8–16)
ANION GAP SERPL CALC-SCNC: 8 MMOL/L (ref 8–16)
APTT BLDCRRT: 30.7 SEC (ref 21–32)
AST SERPL-CCNC: 22 U/L (ref 10–40)
BASOPHILS # BLD AUTO: 0.07 K/UL (ref 0–0.2)
BASOPHILS NFR BLD: 1.3 % (ref 0–1.9)
BILIRUB SERPL-MCNC: 0.4 MG/DL (ref 0.1–1)
BUN SERPL-MCNC: 33 MG/DL (ref 8–23)
BUN SERPL-MCNC: 33 MG/DL (ref 8–23)
CALCIUM SERPL-MCNC: 9.2 MG/DL (ref 8.7–10.5)
CALCIUM SERPL-MCNC: 9.2 MG/DL (ref 8.7–10.5)
CHLORIDE SERPL-SCNC: 108 MMOL/L (ref 95–110)
CHLORIDE SERPL-SCNC: 108 MMOL/L (ref 95–110)
CO2 SERPL-SCNC: 25 MMOL/L (ref 23–29)
CO2 SERPL-SCNC: 25 MMOL/L (ref 23–29)
CREAT SERPL-MCNC: 1.9 MG/DL (ref 0.5–1.4)
CREAT SERPL-MCNC: 1.9 MG/DL (ref 0.5–1.4)
DIFFERENTIAL METHOD: ABNORMAL
EOSINOPHIL # BLD AUTO: 0.5 K/UL (ref 0–0.5)
EOSINOPHIL NFR BLD: 8.9 % (ref 0–8)
ERYTHROCYTE [DISTWIDTH] IN BLOOD BY AUTOMATED COUNT: 12.1 % (ref 11.5–14.5)
EST. GFR  (AFRICAN AMERICAN): 38.2 ML/MIN/1.73 M^2
EST. GFR  (AFRICAN AMERICAN): 38.2 ML/MIN/1.73 M^2
EST. GFR  (NON AFRICAN AMERICAN): 33 ML/MIN/1.73 M^2
EST. GFR  (NON AFRICAN AMERICAN): 33 ML/MIN/1.73 M^2
GLUCOSE SERPL-MCNC: 86 MG/DL (ref 70–110)
GLUCOSE SERPL-MCNC: 86 MG/DL (ref 70–110)
HCT VFR BLD AUTO: 35.3 % (ref 40–54)
HGB BLD-MCNC: 11.4 G/DL (ref 14–18)
IMM GRANULOCYTES # BLD AUTO: 0.02 K/UL (ref 0–0.04)
IMM GRANULOCYTES NFR BLD AUTO: 0.4 % (ref 0–0.5)
INR PPP: 1 (ref 0.8–1.2)
LYMPHOCYTES # BLD AUTO: 1.4 K/UL (ref 1–4.8)
LYMPHOCYTES NFR BLD: 26.6 % (ref 18–48)
MCH RBC QN AUTO: 31.7 PG (ref 27–31)
MCHC RBC AUTO-ENTMCNC: 32.3 G/DL (ref 32–36)
MCV RBC AUTO: 98 FL (ref 82–98)
MONOCYTES # BLD AUTO: 0.8 K/UL (ref 0.3–1)
MONOCYTES NFR BLD: 14.2 % (ref 4–15)
NEUTROPHILS # BLD AUTO: 2.6 K/UL (ref 1.8–7.7)
NEUTROPHILS NFR BLD: 48.6 % (ref 38–73)
NRBC BLD-RTO: 0 /100 WBC
PLATELET # BLD AUTO: 199 K/UL (ref 150–350)
PMV BLD AUTO: 12.1 FL (ref 9.2–12.9)
POTASSIUM SERPL-SCNC: 4.5 MMOL/L (ref 3.5–5.1)
POTASSIUM SERPL-SCNC: 4.5 MMOL/L (ref 3.5–5.1)
PROT SERPL-MCNC: 6.4 G/DL (ref 6–8.4)
PROTHROMBIN TIME: 10.2 SEC (ref 9–12.5)
RBC # BLD AUTO: 3.6 M/UL (ref 4.6–6.2)
SODIUM SERPL-SCNC: 141 MMOL/L (ref 136–145)
SODIUM SERPL-SCNC: 141 MMOL/L (ref 136–145)
URATE SERPL-MCNC: 6.7 MG/DL (ref 3.4–7)
WBC # BLD AUTO: 5.3 K/UL (ref 3.9–12.7)

## 2019-06-26 PROCEDURE — 85730 THROMBOPLASTIN TIME PARTIAL: CPT

## 2019-06-26 PROCEDURE — 84550 ASSAY OF BLOOD/URIC ACID: CPT

## 2019-06-26 PROCEDURE — 85610 PROTHROMBIN TIME: CPT

## 2019-06-26 PROCEDURE — 80053 COMPREHEN METABOLIC PANEL: CPT

## 2019-06-26 PROCEDURE — 85025 COMPLETE CBC W/AUTO DIFF WBC: CPT

## 2019-06-26 PROCEDURE — 36415 COLL VENOUS BLD VENIPUNCTURE: CPT | Mod: PN

## 2019-06-27 ENCOUNTER — PATIENT MESSAGE (OUTPATIENT)
Dept: RHEUMATOLOGY | Facility: CLINIC | Age: 79
End: 2019-06-27

## 2019-06-27 ENCOUNTER — TELEPHONE (OUTPATIENT)
Dept: RHEUMATOLOGY | Facility: CLINIC | Age: 79
End: 2019-06-27

## 2019-07-01 ENCOUNTER — PATIENT MESSAGE (OUTPATIENT)
Dept: RHEUMATOLOGY | Facility: CLINIC | Age: 79
End: 2019-07-01

## 2019-07-03 ENCOUNTER — TELEPHONE (OUTPATIENT)
Dept: ELECTROPHYSIOLOGY | Facility: CLINIC | Age: 79
End: 2019-07-03

## 2019-07-03 NOTE — TELEPHONE ENCOUNTER
Attempting to reach Dr. Rodriguez to confirm procedure for Friday morning.  No answer to home or mobile lines.  Message left on mobile reminding him of arrival time on Friday morning is 10 am, nothing to eat or drink after midnight tomorrow night, and reminder to not take Eliquis Friday morning.  Asked that he call our office with any questions or concerns.       Notes:  - Pt no longer on Metoprolol due to intolerance.    -LEO to be done definitely due to pt self dosing Eliquis of 2.5 mg BID since Dec (due to personal research).  After our previous conversation about this, he agreed to take 5 mg BID.

## 2019-07-04 ENCOUNTER — ANESTHESIA EVENT (OUTPATIENT)
Dept: MEDSURG UNIT | Facility: HOSPITAL | Age: 79
End: 2019-07-04
Payer: MEDICARE

## 2019-07-05 ENCOUNTER — ANESTHESIA (OUTPATIENT)
Dept: MEDSURG UNIT | Facility: HOSPITAL | Age: 79
End: 2019-07-05
Payer: MEDICARE

## 2019-07-05 ENCOUNTER — HOSPITAL ENCOUNTER (OUTPATIENT)
Facility: HOSPITAL | Age: 79
Discharge: HOME | End: 2019-07-05
Attending: INTERNAL MEDICINE | Admitting: INTERNAL MEDICINE
Payer: MEDICARE

## 2019-07-05 VITALS
TEMPERATURE: 97 F | DIASTOLIC BLOOD PRESSURE: 72 MMHG | BODY MASS INDEX: 31.5 KG/M2 | RESPIRATION RATE: 20 BRPM | SYSTOLIC BLOOD PRESSURE: 155 MMHG | WEIGHT: 220 LBS | OXYGEN SATURATION: 97 % | HEIGHT: 70 IN | HEART RATE: 58 BPM

## 2019-07-05 DIAGNOSIS — I48.92 ATRIAL FLUTTER: Primary | ICD-10-CM

## 2019-07-05 DIAGNOSIS — I48.92 ATRIAL FLUTTER, UNSPECIFIED TYPE: ICD-10-CM

## 2019-07-05 LAB
ABO + RH BLD: NORMAL
ALBUMIN SERPL BCP-MCNC: 3.7 G/DL (ref 3.5–5.2)
ALP SERPL-CCNC: 89 U/L (ref 55–135)
ALT SERPL W/O P-5'-P-CCNC: 21 U/L (ref 10–44)
ANION GAP SERPL CALC-SCNC: 9 MMOL/L (ref 8–16)
AST SERPL-CCNC: 25 U/L (ref 10–40)
BILIRUB SERPL-MCNC: 0.5 MG/DL (ref 0.1–1)
BLD GP AB SCN CELLS X3 SERPL QL: NORMAL
BUN SERPL-MCNC: 33 MG/DL (ref 8–23)
CALCIUM SERPL-MCNC: 9.3 MG/DL (ref 8.7–10.5)
CHLORIDE SERPL-SCNC: 112 MMOL/L (ref 95–110)
CO2 SERPL-SCNC: 23 MMOL/L (ref 23–29)
CREAT SERPL-MCNC: 1.9 MG/DL (ref 0.5–1.4)
EST. GFR  (AFRICAN AMERICAN): 38.2 ML/MIN/1.73 M^2
EST. GFR  (NON AFRICAN AMERICAN): 33 ML/MIN/1.73 M^2
GLUCOSE SERPL-MCNC: 89 MG/DL (ref 70–110)
POTASSIUM SERPL-SCNC: 4.4 MMOL/L (ref 3.5–5.1)
PROT SERPL-MCNC: 6.4 G/DL (ref 6–8.4)
SODIUM SERPL-SCNC: 144 MMOL/L (ref 136–145)

## 2019-07-05 PROCEDURE — 27201423 OPTIME MED/SURG SUP & DEVICES STERILE SUPPLY: Performed by: INTERNAL MEDICINE

## 2019-07-05 PROCEDURE — 93621 COMP EP EVL L PAC&REC C SINS: CPT | Performed by: INTERNAL MEDICINE

## 2019-07-05 PROCEDURE — 25000003 PHARM REV CODE 250: Performed by: NURSE ANESTHETIST, CERTIFIED REGISTERED

## 2019-07-05 PROCEDURE — 93623 PRGRMD STIMJ&PACG IV RX NFS: CPT | Mod: 26,,, | Performed by: INTERNAL MEDICINE

## 2019-07-05 PROCEDURE — 93653 PR ELECTROPHYS EVAL, COMPREHEN, W/SUPRAVENT TACHYCARD TRMT: ICD-10-PCS | Mod: ,,, | Performed by: INTERNAL MEDICINE

## 2019-07-05 PROCEDURE — 93623 PR STIM/PACING HEART POST IV DRUG INFU: ICD-10-PCS | Mod: 26,,, | Performed by: INTERNAL MEDICINE

## 2019-07-05 PROCEDURE — 93653 COMPRE EP EVAL TX SVT: CPT | Performed by: INTERNAL MEDICINE

## 2019-07-05 PROCEDURE — 37000009 HC ANESTHESIA EA ADD 15 MINS: Performed by: INTERNAL MEDICINE

## 2019-07-05 PROCEDURE — D9220A PRA ANESTHESIA: ICD-10-PCS | Mod: ANES,,, | Performed by: ANESTHESIOLOGY

## 2019-07-05 PROCEDURE — 93653 COMPRE EP EVAL TX SVT: CPT | Mod: ,,, | Performed by: INTERNAL MEDICINE

## 2019-07-05 PROCEDURE — 93005 ELECTROCARDIOGRAM TRACING: CPT | Mod: 59

## 2019-07-05 PROCEDURE — C1894 INTRO/SHEATH, NON-LASER: HCPCS | Performed by: INTERNAL MEDICINE

## 2019-07-05 PROCEDURE — C1733 CATH, EP, OTHR THAN COOL-TIP: HCPCS | Performed by: INTERNAL MEDICINE

## 2019-07-05 PROCEDURE — 80053 COMPREHEN METABOLIC PANEL: CPT

## 2019-07-05 PROCEDURE — D9220A PRA ANESTHESIA: Mod: CRNA,,, | Performed by: NURSE ANESTHETIST, CERTIFIED REGISTERED

## 2019-07-05 PROCEDURE — 93613 INTRACARDIAC EPHYS 3D MAPG: CPT | Performed by: INTERNAL MEDICINE

## 2019-07-05 PROCEDURE — 93613 PR INTRACARD ELECTROPHYS 3-DIMENS MAPPING: ICD-10-PCS | Mod: ,,, | Performed by: INTERNAL MEDICINE

## 2019-07-05 PROCEDURE — 93613 INTRACARDIAC EPHYS 3D MAPG: CPT | Mod: ,,, | Performed by: INTERNAL MEDICINE

## 2019-07-05 PROCEDURE — 63600175 PHARM REV CODE 636 W HCPCS: Performed by: INTERNAL MEDICINE

## 2019-07-05 PROCEDURE — 37000008 HC ANESTHESIA 1ST 15 MINUTES: Performed by: INTERNAL MEDICINE

## 2019-07-05 PROCEDURE — C1730 CATH, EP, 19 OR FEW ELECT: HCPCS | Performed by: INTERNAL MEDICINE

## 2019-07-05 PROCEDURE — 86901 BLOOD TYPING SEROLOGIC RH(D): CPT

## 2019-07-05 PROCEDURE — D9220A PRA ANESTHESIA: Mod: ANES,,, | Performed by: ANESTHESIOLOGY

## 2019-07-05 PROCEDURE — 93623 PRGRMD STIMJ&PACG IV RX NFS: CPT | Performed by: INTERNAL MEDICINE

## 2019-07-05 PROCEDURE — 93010 EKG 12-LEAD: ICD-10-PCS | Mod: ,,, | Performed by: INTERNAL MEDICINE

## 2019-07-05 PROCEDURE — D9220A PRA ANESTHESIA: ICD-10-PCS | Mod: CRNA,,, | Performed by: NURSE ANESTHETIST, CERTIFIED REGISTERED

## 2019-07-05 PROCEDURE — 25000003 PHARM REV CODE 250: Performed by: INTERNAL MEDICINE

## 2019-07-05 PROCEDURE — 63600175 PHARM REV CODE 636 W HCPCS: Performed by: NURSE ANESTHETIST, CERTIFIED REGISTERED

## 2019-07-05 PROCEDURE — 93621 COMP EP EVL L PAC&REC C SINS: CPT | Mod: 26,,, | Performed by: INTERNAL MEDICINE

## 2019-07-05 PROCEDURE — 93010 ELECTROCARDIOGRAM REPORT: CPT | Mod: ,,, | Performed by: INTERNAL MEDICINE

## 2019-07-05 PROCEDURE — 93621: ICD-10-PCS | Mod: 26,,, | Performed by: INTERNAL MEDICINE

## 2019-07-05 PROCEDURE — 36415 COLL VENOUS BLD VENIPUNCTURE: CPT

## 2019-07-05 RX ORDER — ATORVASTATIN CALCIUM 20 MG/1
40 TABLET, FILM COATED ORAL NIGHTLY
Status: DISCONTINUED | OUTPATIENT
Start: 2019-07-05 | End: 2019-07-05 | Stop reason: HOSPADM

## 2019-07-05 RX ORDER — ASPIRIN 81 MG/1
81 TABLET ORAL NIGHTLY
Status: DISCONTINUED | OUTPATIENT
Start: 2019-07-05 | End: 2019-07-05 | Stop reason: HOSPADM

## 2019-07-05 RX ORDER — PROPOFOL 10 MG/ML
VIAL (ML) INTRAVENOUS CONTINUOUS PRN
Status: DISCONTINUED | OUTPATIENT
Start: 2019-07-05 | End: 2019-07-05

## 2019-07-05 RX ORDER — LIDOCAINE HCL/PF 100 MG/5ML
SYRINGE (ML) INTRAVENOUS
Status: DISCONTINUED | OUTPATIENT
Start: 2019-07-05 | End: 2019-07-05

## 2019-07-05 RX ORDER — ONDANSETRON 2 MG/ML
4 INJECTION INTRAMUSCULAR; INTRAVENOUS DAILY PRN
Status: DISCONTINUED | OUTPATIENT
Start: 2019-07-05 | End: 2019-07-05 | Stop reason: HOSPADM

## 2019-07-05 RX ORDER — FENTANYL CITRATE 50 UG/ML
25 INJECTION, SOLUTION INTRAMUSCULAR; INTRAVENOUS EVERY 5 MIN PRN
Status: DISCONTINUED | OUTPATIENT
Start: 2019-07-05 | End: 2019-07-05 | Stop reason: HOSPADM

## 2019-07-05 RX ORDER — MIDAZOLAM HYDROCHLORIDE 1 MG/ML
INJECTION INTRAMUSCULAR; INTRAVENOUS
Status: DISCONTINUED | OUTPATIENT
Start: 2019-07-05 | End: 2019-07-05

## 2019-07-05 RX ORDER — LIDOCAINE HYDROCHLORIDE 20 MG/ML
INJECTION, SOLUTION INFILTRATION; PERINEURAL
Status: DISCONTINUED | OUTPATIENT
Start: 2019-07-05 | End: 2019-07-05

## 2019-07-05 RX ORDER — HEPARIN SODIUM 200 [USP'U]/100ML
INJECTION, SOLUTION INTRAVENOUS
Status: DISCONTINUED | OUTPATIENT
Start: 2019-07-05 | End: 2019-07-05

## 2019-07-05 RX ORDER — FELODIPINE 5 MG/1
5 TABLET, EXTENDED RELEASE ORAL DAILY
Status: DISCONTINUED | OUTPATIENT
Start: 2019-07-06 | End: 2019-07-05 | Stop reason: HOSPADM

## 2019-07-05 RX ORDER — GLYCOPYRROLATE 0.2 MG/ML
INJECTION INTRAMUSCULAR; INTRAVENOUS
Status: DISCONTINUED | OUTPATIENT
Start: 2019-07-05 | End: 2019-07-05

## 2019-07-05 RX ORDER — SODIUM CHLORIDE 9 MG/ML
INJECTION, SOLUTION INTRAVENOUS CONTINUOUS
Status: DISCONTINUED | OUTPATIENT
Start: 2019-07-05 | End: 2019-07-05 | Stop reason: HOSPADM

## 2019-07-05 RX ORDER — PANTOPRAZOLE SODIUM 40 MG/1
40 TABLET, DELAYED RELEASE ORAL DAILY
Status: DISCONTINUED | OUTPATIENT
Start: 2019-07-06 | End: 2019-07-05 | Stop reason: HOSPADM

## 2019-07-05 RX ORDER — SODIUM CHLORIDE 0.9 % (FLUSH) 0.9 %
10 SYRINGE (ML) INJECTION
Status: DISCONTINUED | OUTPATIENT
Start: 2019-07-05 | End: 2019-07-05 | Stop reason: HOSPADM

## 2019-07-05 RX ORDER — SODIUM CHLORIDE 9 MG/ML
INJECTION, SOLUTION INTRAVENOUS CONTINUOUS PRN
Status: DISCONTINUED | OUTPATIENT
Start: 2019-07-05 | End: 2019-07-05

## 2019-07-05 RX ORDER — SODIUM CHLORIDE 0.9 % (FLUSH) 0.9 %
5 SYRINGE (ML) INJECTION
Status: DISCONTINUED | OUTPATIENT
Start: 2019-07-05 | End: 2019-07-05

## 2019-07-05 RX ADMIN — MIDAZOLAM HYDROCHLORIDE 2 MG: 1 INJECTION, SOLUTION INTRAMUSCULAR; INTRAVENOUS at 12:07

## 2019-07-05 RX ADMIN — LIDOCAINE HYDROCHLORIDE 100 MG: 20 INJECTION, SOLUTION INTRAVENOUS at 12:07

## 2019-07-05 RX ADMIN — GLYCOPYRROLATE 0.2 MG: 0.2 INJECTION, SOLUTION INTRAMUSCULAR; INTRAVENOUS at 02:07

## 2019-07-05 RX ADMIN — ISOPROTERENOL HYDROCHLORIDE 2 MCG/MIN: 0.2 INJECTION, SOLUTION INTRAMUSCULAR; INTRAVENOUS at 02:07

## 2019-07-05 RX ADMIN — PROPOFOL 100 MCG/KG/MIN: 10 INJECTION, EMULSION INTRAVENOUS at 12:07

## 2019-07-05 RX ADMIN — SODIUM CHLORIDE: 0.9 INJECTION, SOLUTION INTRAVENOUS at 12:07

## 2019-07-05 NOTE — INTERVAL H&P NOTE
He presents today to [facility] for scheduled EP study with AFL RFA with Dr. Wilson. He denies any chest pain, palpitations, SOB, DIEGO, dizziness, light headedness, weakness, syncope, or near syncopal episodes. He note he has AFl a few times per month. He denies any bleeding, infections, rashes, fevers, or surgeries in the past 30 days. He is currently taking eliquis 5 mg BID (last dose last night), aspirin 81 mg daily (last dose this AM), plendil daily, lasix 40 mg twice weekly (last dose yesterday), and irbesartan 150 mg daily (last dose this AM).    I have personally reviewed the patient's EKG today, which shows NSR at 83 bpm.     The patient has been examined and the H&P has been reviewed:    I concur with the findings and no changes have occurred since H&P was written.    Review of Systems   Constitution: Negative. Negative for fevers/chills, weakness and malaise/fatigue.   HENT: Negative.  Negative for ear pain and tinnitus.    Eyes: Negative for blurred vision.   Cardiovascular: Negative. Negative for chest pain, dyspnea on exertion, leg swelling, near-syncope, palpitations and syncope.   Respiratory: Negative. Negative for shortness of breath.    Endocrine: Negative.  Negative for polyuria.   Hematologic/Lymphatic: Negative for significant bleeding or bruise/bleed easily.   Skin: Negative.  Negative for rash.   Musculoskeletal: Negative.  Negative for joint pain and muscle weakness.   Gastrointestinal: Negative.  Negative for abdominal pain hematemesis, hematochezia, and change in bowel habit.   Genitourinary: Negative for frequency or hematuria.   Neurological: Negative.  Negative for dizziness.   Psychiatric/Behavioral: Negative.  Negative for depression. The patient is not nervous/anxious.       Physical Exam  Constitutional: He is oriented to person, place, and time. He appears well-developed and well-nourished.   HENT:   Head: Normocephalic and atraumatic.   Eyes: Conjunctivae, EOM and lids are normal. No  scleral icterus.   Neck: Normal range of motion. No JVD present. No tracheal deviation present. No thyromegaly present.   Cardiovascular: Normal rate and intact distal pulses. Regular rhythm present. No extrasystoles are present. PMI is not displaced. Exam reveals no gallop and no friction rub. No murmur heard.  Pulses:       Radial pulses are 2+ on the right side, and 2+ on the left side.        Pedal pulses are 2+ on the right side, and 2+ on the left side.   Pulmonary/Chest: Effort normal and breath sounds normal. No accessory muscle usage. No tachypnea. No respiratory distress. He has no wheezes. He has no rales.   Abdominal: Soft. Bowel sounds are normal. He exhibits no distension. There is no hepatosplenomegaly. There is no tenderness.   Musculoskeletal: Normal range of motion. He exhibits no edema.   Neurological: He is alert and oriented to person, place, and time. He has normal reflexes. He exhibits normal muscle tone.   Skin: Skin is warm and dry. No rash noted.   Psychiatric: He has a normal mood and affect. His behavior is normal.   Nursing note and vitals reviewed.    Active Hospital Problems    Diagnosis  POA    Atrial flutter [I48.92]  Yes     Chronic      Resolved Hospital Problems   No resolved problems to display.     Plan:  -LEO prior to rule out thrombus   -EPS AFL RFA.  -Sedation per anesthesia.    Dr. Wilson at bedside. Prior to procedure, extensive discussion with patient regarding risks and benefits of EPS AFL RFA, and patient would like to proceed.  Risks of procedure include but are not limited to the risk of infection, bleeding, stroke, paralysis, cardiac tamponade, pulmonary vein stenosis, atrioesophageal fistula, phrenic nerve damage, MI, embolism, perforation requiring emergency draining or surgery, AV block, possibility for need for pacemaker implantation, and death.  The patient voices understanding and all questions have been answered. No further questions/concerns voiced at this  time. Consents signed.    MICHELLE Morrell-C  Cardiology Electrophysiology  NP   [facility]    Attending: Gil Wilson MD

## 2019-07-05 NOTE — CONSULTS
[facility]  Cardiology  Consult Note    Patient Name: Adelfo Rodriguez, PhD  MRN: [id number]  Admission Date: 7/5/2019  Hospital Length of Stay: 0 days  Code Status: Prior   Attending Provider: Gil Wilson MD   Consulting Provider: Freida Ashton MD  Primary Care Physician: Romero Vogel MD  Principal Problem:<principal problem not specified>    Patient information was obtained from patient and ER records.     Consults  Subjective:     Chief Complaint:  AFL ablation      HPI: Adelfo Rodriguez is a 77 yo M with PMHx significant for HTN, DLD, CKD3, COPD, ANNABELLE (on CPAP) who presents for LEO prior to RFA with Dr Wilson. He has hx of palpitations during exercise with ambulatory monitor correlating with an episode of nonsutained AT and an episode of regular sustained narrow complex tachycardia at a rate of ~150 bpm which could represent typical AFL as per Dr Wilson. There is documentation in charts that patient had been self dosing Eliquis of 2.5 mg BID since December - however now taking full dose AC.       Of note patient completed EGD 10/2018 for w/u of LUIS which demonstrated normal esophagus with non-bleeding erosive gastropathy.     Dysphagia or odynophagia:  No  Liver Disease, esophageal disease, or known varices:  No  Upper GI Bleeding: No  Snoring:  Yes  Sleep Apnea:  Yes  Prior neck surgery or radiation:  No  History of anesthetic difficulties:  No  Family history of anesthetic difficulties:  No  Last oral intake:  12 hours ago  Able to move neck in all directions:  No        SYLVIA 10/2018: PER REPORT:     1 - Normal left ventricular systolic function (EF 60-65%).     2 - No wall motion abnormalities.     3 - Biatrial enlargement.     4 - Mildly enlarged ascending aorta.     5 - Normal left ventricular diastolic function.     6 - Normal right ventricular systolic function.    Past Medical History:   Diagnosis Date    ALLERGIC RHINITIS     Anemia     Asthma     GERD (gastroesophageal  reflux disease)     Hyperlipidemia     Hypertension     NAFLD (nonalcoholic fatty liver disease)     ANNABELLE (obstructive sleep apnea)     on CPAP    Squamous cell cancer of skin of hand        Past Surgical History:   Procedure Laterality Date    APPENDECTOMY      CARDIAC CATHETERIZATION  10/31/2018    no stent    CARDIAC CATHETERIZATION  1998    no stent    COLONOSCOPY N/A 6/19/2018    Performed by [facility] ([facility] F[facility])    COLONOSCOPY N/A 6/14/2013    Performed by [facility] at [facility] END[facility] (4TH FLR)    EGD (ESOPHAGOGASTRODUODENOSCOPY) N/A 10/23/2018    Performed by Bart Hernandez MD at [facility] ENDO (4TH FLR)    FOOT SURGERY      testicular biopsy         Review of patient's allergies indicates:   Allergen Reactions    Amoxicillin Hives    Allopurinol analogues Other (See Comments)     Abnormal transaminases       No current facility-administered medications on file prior to encounter.      Current Outpatient Medications on File Prior to Encounter   Medication Sig    ADVAIR DISKUS 250-50 mcg/dose diskus inhaler INHALE 1 PUFF BY MOUTH TWICE DAILY. RINSE MOUTH AFTER USE    apixaban (ELIQUIS) 5 mg Tab Take 1 tablet (5 mg total) by mouth 2 (two) times daily.    aspirin (ECOTRIN) 81 MG EC tablet Take 81 mg by mouth every evening.     atorvastatin (LIPITOR) 40 MG tablet Take 1 tablet (40 mg total) by mouth once daily. (Patient taking differently: Take 40 mg by mouth every evening. )    colchicine (COLCRYS) 0.6 mg tablet 0.3mg( 1/2 0.6mg tablet) daily    febuxostat (ULORIC) 40 mg Tab 1/2 tab(20mg) daily    felodipine (PLENDIL) 5 MG 24 hr tablet Take 1 tablet (5 mg total) by mouth once daily.    fish oil-omega-3 fatty acids 300-1,000 mg capsule Take 2 g by mouth once daily.      fluticasone (FLONASE) 50 mcg/actuation nasal spray 2 sprays (100 mcg total) by Each Nare route once daily.    furosemide (LASIX) 40 MG tablet TAKE 1 TABLET BY MOUTH EVERY DAY (Patient taking differently: Twice  weekly)    glucosamine & chondroit sul.Na 750-600 mg Tab Take 750 mg by mouth.    irbesartan (AVAPRO) 150 MG tablet TAKE 1 TABLET BY MOUTH ONCE DAILY FOR BLOOD PRESSURE    ketoconazole (NIZORAL) 2 % shampoo WASH affected area every other day    loratadine (CLARITIN) 10 mg tablet Take 10 mg by mouth daily as needed.      omeprazole (PRILOSEC) 40 MG capsule Take 1 capsule (40 mg total) by mouth every morning.    albuterol (PROAIR HFA) 90 mcg/actuation inhaler Inhale 2 puffs into the lungs every 4 (four) hours as needed for Wheezing.    azelastine (ASTELIN) 137 mcg (0.1 %) nasal spray 2 sprays (274 mcg total) by Nasal route 2 (two) times daily.    triamcinolone acetonide 0.1% (KENALOG) 0.1 % cream Apply topically 2 (two) times daily.     Family History     Problem Relation (Age of Onset)    Arthritis Brother    Blindness Maternal Grandmother    COPD Father    Cancer Mother    Cataracts Maternal Grandmother    Glaucoma Mother    Heart disease Father, Brother    Retinal detachment Mother, Father        Tobacco Use    Smoking status: Former Smoker     Packs/day: 1.00     Years: 10.00     Pack years: 10.00     Last attempt to quit: 1968     Years since quittin.5    Smokeless tobacco: Former User    Tobacco comment:  last smoke   Substance and Sexual Activity    Alcohol use: Yes     Alcohol/week: 1.2 oz     Types: 1 Glasses of wine, 1 Shots of liquor per week     Frequency: 4 or more times a week     Drinks per session: 1 or 2     Binge frequency: Less than monthly     Comment: 5 days per week    Drug use: No    Sexual activity: Yes     Review of Systems   Constitution: Negative for chills, decreased appetite, diaphoresis, fever, malaise/fatigue, night sweats, weight gain and weight loss.   Eyes: Negative.    Cardiovascular: Negative for chest pain, irregular heartbeat, leg swelling, near-syncope, orthopnea, palpitations, paroxysmal nocturnal dyspnea and syncope.   Respiratory: Negative for cough,  shortness of breath, sputum production and wheezing.    Endocrine: Negative.    Hematologic/Lymphatic: Negative for bleeding problem.   Skin: Negative for color change, flushing, rash and suspicious lesions.   Musculoskeletal: Negative.    Gastrointestinal: Negative for bloating, abdominal pain, change in bowel habit, constipation, diarrhea, heartburn, melena, nausea and vomiting.   Genitourinary: Negative for flank pain, frequency, hematuria, incomplete emptying and urgency.   Neurological: Negative for dizziness, focal weakness, headaches, light-headedness, numbness, paresthesias, seizures, sensory change and weakness.   Psychiatric/Behavioral: Negative for altered mental status. The patient is not nervous/anxious.      Objective:     Vital Signs (Most Recent):    Vital Signs (24h Range):           There is no height or weight on file to calculate BMI.            No intake or output data in the 24 hours ending 07/05/19 1037    Lines/Drains/Airways          None          Physical Exam   Constitutional: He is oriented to person, place, and time. He appears well-developed and well-nourished. No distress.   HENT:   Head: Normocephalic and atraumatic.   Mouth/Throat: Oropharynx is clear and moist.   Eyes: Pupils are equal, round, and reactive to light. EOM are normal.   Neck: Normal range of motion. Neck supple. No JVD present.   Cardiovascular: Normal rate and regular rhythm. Exam reveals no gallop and no friction rub.   No murmur heard.  Pulmonary/Chest: Effort normal and breath sounds normal. No respiratory distress. He has no wheezes. He has no rales. He exhibits no tenderness.   Abdominal: Soft. Bowel sounds are normal. He exhibits no distension. There is no tenderness.   Musculoskeletal: Normal range of motion. He exhibits no edema.   Lymphadenopathy:     He has no cervical adenopathy.   Neurological: He is alert and oriented to person, place, and time.   Skin: Skin is warm and dry. No erythema.   Psychiatric: He  has a normal mood and affect. His behavior is normal. Judgment and thought content normal.       Mallampati: 3  ASA: 2    Significant Labs: All pertinent lab results from the last 24 hours have been reviewed.    Significant Imaging: Reviewed in EPIC.    Assessment and Plan:     Atrial flutter  1. LEO for evaluation of ELIZABETH prior to RFA with Dr Wilson.     -No absolute contraindications of esophageal stricture, tumor, perforation, laceration,or diverticulum and/or active GI bleed  -The risks, benefits & alternatives of the procedure were explained to the patient.   -The risks of transesophageal echo include but are not limited to:  Dental trauma, esophageal trauma/perforation, bleeding, laryngospasm/brochospasm, aspiration, sore throat/hoarseness, & dislodgement of the endotracheal tube/nasogastric tube (where applicable).    -The risks of moderate sedation include hypotension, respiratory depression, arrhythmias, bronchospasm, & death.    -Informed consent was obtained. The patient is agreeable to proceed with the procedure and all questions and concerns addressed.    Case discussed with an attending in echocardiography lab.     Further recommendations per attending addendum          Thank you for your consult.     Freida Ashton MD  Cardiology   [facility]

## 2019-07-05 NOTE — HPI
Adelfo Rodriguez is a 77 yo M with PMHx significant for HTN, DLD, CKD3, COPD, ANNABELLE (on CPAP) who presents for LEO prior to RFA with Dr Wilson. He has hx of palpitations during exercise with ambulatory monitor correlating with an episode of nonsutained AT and an episode of regular sustained narrow complex tachycardia at a rate of ~150 bpm which could represent typical AFL as per Dr Wilson. There is documentation in charts that patient has been self dosing Eliquis of 2.5 mg BID since December.       Of note patient completed EGD 10/2018 for w/u of LUIS which demonstrated normal esophagus with non-bleeding erosive gastropathy.     SYLVIA 10/2018: PER REPORT:     1 - Normal left ventricular systolic function (EF 60-65%).     2 - No wall motion abnormalities.     3 - Biatrial enlargement.     4 - Mildly enlarged ascending aorta.     5 - Normal left ventricular diastolic function.     6 - Normal right ventricular systolic function.

## 2019-07-05 NOTE — ASSESSMENT & PLAN NOTE
1. LEO for evaluation of ELIZABETH prior to RFA with Dr Wilson.     -No absolute contraindications of esophageal stricture, tumor, perforation, laceration,or diverticulum and/or active GI bleed  -The risks, benefits & alternatives of the procedure were explained to the patient.   -The risks of transesophageal echo include but are not limited to:  Dental trauma, esophageal trauma/perforation, bleeding, laryngospasm/brochospasm, aspiration, sore throat/hoarseness, & dislodgement of the endotracheal tube/nasogastric tube (where applicable).    -The risks of moderate sedation include hypotension, respiratory depression, arrhythmias, bronchospasm, & death.    -Informed consent was obtained. The patient is agreeable to proceed with the procedure and all questions and concerns addressed.    Case discussed with an attending in echocardiography lab.     Further recommendations per attending addendum

## 2019-07-05 NOTE — ANESTHESIA PREPROCEDURE EVALUATION
07/05/2019  Adelfo Rodriguez, PhD is a 78 y.o., male with atrial flutter here for LEO and possible ablation.    Pre-operative evaluation for Procedure(s) (LRB):  Ablation, Atrial Flutter, Typical (N/A)  Transesophageal echo (LEO) intra-procedure log documentation (N/A)         Patient Active Problem List   Diagnosis    Asthma, intermittent    Essential hypertension    Hyperlipidemia    ANNABELLE (obstructive sleep apnea)    Chronic rhinitis    Back pain    Chronic tophaceous gout    Obesity (BMI 30-39.9)    NAFLD (nonalcoholic fatty liver disease)    Pancreatic cyst    Impingement syndrome of right shoulder    Hypertensive kidney disease    Chronic right shoulder pain    Chronic anemia    Colon polyps    Screening for colon cancer    Hyponatremia    CKD (chronic kidney disease), stage III    Coronary artery disease of native artery of native heart with stable angina pectoris    GERD (gastroesophageal reflux disease)    Abnormal cardiovascular stress test    PAT (paroxysmal atrial tachycardia)    Atrial flutter       Review of patient's allergies indicates:   Allergen Reactions    Amoxicillin Hives    Allopurinol analogues Other (See Comments)     Abnormal transaminases       No current facility-administered medications on file prior to encounter.      Current Outpatient Medications on File Prior to Encounter   Medication Sig Dispense Refill    ADVAIR DISKUS 250-50 mcg/dose diskus inhaler INHALE 1 PUFF BY MOUTH TWICE DAILY. RINSE MOUTH AFTER USE 60 each 5    apixaban (ELIQUIS) 5 mg Tab Take 1 tablet (5 mg total) by mouth 2 (two) times daily. 180 tablet 3    aspirin (ECOTRIN) 81 MG EC tablet Take 81 mg by mouth every evening.       atorvastatin (LIPITOR) 40 MG tablet Take 1 tablet (40 mg total) by mouth once daily. (Patient taking differently: Take 40 mg by mouth every evening. ) 90  tablet 3    colchicine (COLCRYS) 0.6 mg tablet 0.3mg( 1/2 0.6mg tablet) daily 45 tablet 1    febuxostat (ULORIC) 40 mg Tab 1/2 tab(20mg) daily 45 tablet 1    felodipine (PLENDIL) 5 MG 24 hr tablet Take 1 tablet (5 mg total) by mouth once daily. 90 tablet 3    fish oil-omega-3 fatty acids 300-1,000 mg capsule Take 2 g by mouth once daily.        fluticasone (FLONASE) 50 mcg/actuation nasal spray 2 sprays (100 mcg total) by Each Nare route once daily. 48 g 3    furosemide (LASIX) 40 MG tablet TAKE 1 TABLET BY MOUTH EVERY DAY (Patient taking differently: Twice weekly) 90 tablet 0    glucosamine & chondroit sul.Na 750-600 mg Tab Take 750 mg by mouth.      irbesartan (AVAPRO) 150 MG tablet TAKE 1 TABLET BY MOUTH ONCE DAILY FOR BLOOD PRESSURE 30 tablet 10    ketoconazole (NIZORAL) 2 % shampoo WASH affected area every other day 120 mL 0    loratadine (CLARITIN) 10 mg tablet Take 10 mg by mouth daily as needed.        omeprazole (PRILOSEC) 40 MG capsule Take 1 capsule (40 mg total) by mouth every morning. 30 capsule 3    albuterol (PROAIR HFA) 90 mcg/actuation inhaler Inhale 2 puffs into the lungs every 4 (four) hours as needed for Wheezing. 1 Inhaler 6    azelastine (ASTELIN) 137 mcg (0.1 %) nasal spray 2 sprays (274 mcg total) by Nasal route 2 (two) times daily. 30 mL 5    triamcinolone acetonide 0.1% (KENALOG) 0.1 % cream Apply topically 2 (two) times daily. 80 g 2       Past Surgical History:   Procedure Laterality Date    APPENDECTOMY      CARDIAC CATHETERIZATION  10/31/2018    no stent    CARDIAC CATHETERIZATION  1998    no stent    COLONOSCOPY N/A 6/19/2018    Performed by Wade [facility]O (4TH FLR)    COLONOSCOPY N/A 6/14/2013    Performed by Wade [facility] at [facility] ENDO (4TH FLR)    EGD (ESOPHAGOGASTRODUODENOSCOPY) N/A 10/23/2018    Performed by Bart Hernandez MD at [facility] ENDO (4TH FLR)    FOOT SURGERY      testicular biopsy         Social History     Socioeconomic History     Marital status:      Spouse name: Not on file    Number of children: Not on file    Years of education: Not on file    Highest education level: Not on file   Occupational History    Not on file   Social Needs    Financial resource strain: Not hard at all    Food insecurity:     Worry: Never true     Inability: Never true    Transportation needs:     Medical: No     Non-medical: No   Tobacco Use    Smoking status: Former Smoker     Packs/day: 1.00     Years: 10.00     Pack years: 10.00     Last attempt to quit: 1968     Years since quittin.5    Smokeless tobacco: Former User    Tobacco comment:  last smoke   Substance and Sexual Activity    Alcohol use: Yes     Alcohol/week: 1.2 oz     Types: 1 Glasses of wine, 1 Shots of liquor per week     Frequency: 4 or more times a week     Drinks per session: 1 or 2     Binge frequency: Less than monthly     Comment: 5 days per week    Drug use: No    Sexual activity: Yes   Lifestyle    Physical activity:     Days per week: 4 days     Minutes per session: 50 min    Stress: Not at all   Relationships    Social connections:     Talks on phone: Three times a week     Gets together: Once a week     Attends Gnosticist service: Not on file     Active member of club or organization: Yes     Attends meetings of clubs or organizations: More than 4 times per year     Relationship status:    Other Topics Concern    Not on file   Social History Narrative    Not on file         CBC: No results for input(s): WBC, RBC, HGB, HCT, PLT, MCV, MCH, MCHC in the last 72 hours.    CMP: No results for input(s): NA, K, CL, CO2, BUN, CREATININE, GLU, MG, PHOS, CALCIUM, ALBUMIN, PROT, ALKPHOS, ALT, AST, BILITOT in the last 72 hours.    INR  No results for input(s): PT, INR, PROTIME, APTT in the last 72 hours.              2D Echo:  No results found for this or any previous visit.      Anesthesia Evaluation    I have reviewed the Patient Summary Reports.    I  have reviewed the Nursing Notes.   I have reviewed the Medications.     Review of Systems  Anesthesia Hx:  No problems with previous Anesthesia    Hematology/Oncology:  Hematology Normal   Oncology Normal     EENT/Dental:EENT/Dental Normal   Cardiovascular:   Hypertension CAD  Dysrhythmias   Denies Angina.    Pulmonary:   Asthma Sleep Apnea, CPAP    Renal/:   Chronic Renal Disease, CRI    Hepatic/GI:   GERD, well controlled Liver Disease,    Musculoskeletal:  Musculoskeletal Normal    Neurological:  Neurology Normal    Endocrine:  Endocrine Normal    Dermatological:  Skin Normal    Psych:  Psychiatric Normal           Physical Exam  General:  Well nourished, Obesity    Airway/Jaw/Neck:  Airway Findings: Mouth Opening: Normal Tongue: Normal  General Airway Assessment: Adult  Mallampati: III  Improves to II with phonation.  TM Distance: Normal, at least 6 cm  Jaw/Neck Findings:  Neck ROM: Normal ROM     Eyes/Ears/Nose:  Eyes/Ears/Nose Findings:    Dental:  Dental Findings: In tact   Chest/Lungs:  Chest/Lungs Findings: Clear to auscultation, Normal Respiratory Rate     Heart/Vascular:  Heart Findings: Rate: Normal  Rhythm: Regular Rhythm  Sounds: Normal  Heart Murmur  Vascular Findings:        Mental Status:  Mental Status Findings:  Cooperative, Alert and Oriented         Anesthesia Plan  Type of Anesthesia, risks & benefits discussed:  Anesthesia Type:  general, MAC  Patient's Preference: General/MAC  Intra-op Monitoring Plan: standard ASA monitors  Intra-op Monitoring Plan Comments:   Post Op Pain Control Plan:   Post Op Pain Control Plan Comments: IV meds as needed  Induction:   IV  Beta Blocker:  Patient is not currently on a Beta-Blocker (No further documentation required).       Informed Consent: Patient understands risks and agrees with Anesthesia plan.  Questions answered. Anesthesia consent signed with patient.  ASA Score: 3     Day of Surgery Review of History & Physical:    H&P update referred to the  provider.     Anesthesia Plan Notes: Discussed anesthetic options, pt understands and agrees with plan        Ready For Surgery From Anesthesia Perspective.

## 2019-07-05 NOTE — PROGRESS NOTES
Patient admitted to recovery see [facility] for complete assessment pacu bcg's maintained safety measures verified patient instructed on pain scale and patient verbalized understanding. Also dr. Tubbs s here speaking with patient and also ekg done and placed in patient's chart. Also called patient's wife cell and no one answered and called waiting room but she wasn't there patient stated he will call her when he gets to his room.

## 2019-07-05 NOTE — Clinical Note
Manual pressure applied to the left femoral vein sheath insertion site. Pressure applied to bilateral groin until hemostasis achieved, gauze and Tegaderm applied.

## 2019-07-05 NOTE — DISCHARGE SUMMARY
Discharge Summary  Electrophysiology       Admit Date: 7/5/2019    Discharge Date :7/5/2019    Discharge Physician: Candi Gupta    Discharged Condition: good    Discharge Diagnosis: Atrial flutter, unspecified type [I48.92]    Treatments/Procedures: Procedure(s) (LRB):  Ablation, Atrial Flutter, Typical (N/A)  Transesophageal echo (LEO) intra-procedure log documentation (N/A)    HPI/Hospital Course:     78 year-old male with PMHx significant for HTN, COPD, CKD3 and ANNABELLE who presents for outpatient atrial flutter ablation on 7/5/19.     Patient reports having exertional induced palpitations in the past. A 30 day event monitor was ordered and detected the following events: (1) a narrow complex tachycardia at 150 bpm that could have represented 2:1 atrial flutter and (2) a short imelda of nonsustained atrial tachycardia. He was started on eliquis for anticoagulation therapy and metoprolol 50mg daily for rate control. He was intolerant of the metoprolol (symptomatic bradycardia in the 50s). He continues to exercise and with workouts his heart rate curve rises to a plateau of ~120-130 bpm followed by a sudden spike in his heart rate to 150-160 bpm that remains for 5-10 minutes and then suddenly stops and returns to baseline. He notes palpitations and chest heaviness with these events. He had an abnormal stress echo with preserved LVEF in October 2018. Coronary angiography noted no obstructive CAD.    On 7/5/19, he underwent EPS with CS catheter placement/pacing, isuprel infusion for attempt at tachycardia stimulation and then radiofrequency ablation of the cavo-tricuspid isthmus with 3D mapping. It was noted that he had a normal HV interval, no dual AV alma delia physiology and no evidence of antegrade or retrograde conducting accessory pathways. Of note, the only sustained tachycardia induced during the EPS was sinus tachycardia and likely reflects sinus node reentry. Nonsustained, unstable atrial tachycardias induced  otherwise (low lateral RA and LA) and were not mappable or ablatable. Overall, he had a successful ablation of his typical atrial flutter. Post procedure he was monitored and did well. He was able to ambulate independently.     Disposition: Home or Self Care    Diet: Regular    Follow up: Office 10-14 days    Activity: No heavy lifting till seen in office.    Current Discharge Medication List      CONTINUE these medications which have NOT CHANGED    Details   ADVAIR DISKUS 250-50 mcg/dose diskus inhaler INHALE 1 PUFF BY MOUTH TWICE DAILY. RINSE MOUTH AFTER USE  Qty: 60 each, Refills: 5      apixaban (ELIQUIS) 5 mg Tab Take 1 tablet (5 mg total) by mouth 2 (two) times daily.  Qty: 180 tablet, Refills: 3    Associated Diagnoses: Typical atrial flutter      aspirin (ECOTRIN) 81 MG EC tablet Take 81 mg by mouth every evening.       atorvastatin (LIPITOR) 40 MG tablet Take 1 tablet (40 mg total) by mouth once daily.  Qty: 90 tablet, Refills: 3      colchicine (COLCRYS) 0.6 mg tablet 0.3mg( 1/2 0.6mg tablet) daily  Qty: 45 tablet, Refills: 1      febuxostat (ULORIC) 40 mg Tab 1/2 tab(20mg) daily  Qty: 45 tablet, Refills: 1      felodipine (PLENDIL) 5 MG 24 hr tablet Take 1 tablet (5 mg total) by mouth once daily.  Qty: 90 tablet, Refills: 3    Comments: New dosage as of 9/21/15      fish oil-omega-3 fatty acids 300-1,000 mg capsule Take 2 g by mouth once daily.        fluticasone (FLONASE) 50 mcg/actuation nasal spray 2 sprays (100 mcg total) by Each Nare route once daily.  Qty: 48 g, Refills: 3      furosemide (LASIX) 40 MG tablet TAKE 1 TABLET BY MOUTH EVERY DAY  Qty: 90 tablet, Refills: 0      glucosamine & chondroit sul.Na 750-600 mg Tab Take 750 mg by mouth.      irbesartan (AVAPRO) 150 MG tablet TAKE 1 TABLET BY MOUTH ONCE DAILY FOR BLOOD PRESSURE  Qty: 30 tablet, Refills: 10      ketoconazole (NIZORAL) 2 % shampoo WASH affected area every other day  Qty: 120 mL, Refills: 0      loratadine (CLARITIN) 10 mg tablet  Take 10 mg by mouth daily as needed.        omeprazole (PRILOSEC) 40 MG capsule Take 1 capsule (40 mg total) by mouth every morning.  Qty: 30 capsule, Refills: 3      albuterol (PROAIR HFA) 90 mcg/actuation inhaler Inhale 2 puffs into the lungs every 4 (four) hours as needed for Wheezing.  Qty: 1 Inhaler, Refills: 6      azelastine (ASTELIN) 137 mcg (0.1 %) nasal spray 2 sprays (274 mcg total) by Nasal route 2 (two) times daily.  Qty: 30 mL, Refills: 5      triamcinolone acetonide 0.1% (KENALOG) 0.1 % cream Apply topically 2 (two) times daily.  Qty: 80 g, Refills: 2             Discharge Procedure Orders   Diet Cardiac     No driving until:   Order Comments: No driving or operating heavy machinery for 24-48 hours after your procedure because you received sedation.     Other restrictions (specify):   Order Comments: 1. Do not strain or lift anything greater than 5 lb for 1 week. No bending or strenuous activity for 1 week.  2. Do not drive or operate any dangerous machinery for 24-48 hours.   3. After 24 hours, a shower is allowed. No dressings needed to your groin sites.    5. Clean the area with mild soap and water.   6. Once the skin has healed (in 1 week), bathing in a tub or swimming is allowed.   7. Inspect the groin site daily and report to the physician any signs of infection at the site: redness, pain, fever >100.4, unusual pain at the access site or affected extremity, unusual swelling at the access site, or any yellow, white or green drainage.  Call 911 if you have:   Bleeding from the puncture site that you cannot stop by doing the following:   Relax and lie down right away. Keep your leg flat and apply firm pressure to the site using your fingers and a gauze pad. Keep the pressure on for 10 minutes. Continue this until the bleeding stops. This may take awhile. When bleeding stops, cover the site with a sterile bandage and keep your leg still as much as possible.  8. Follow up with Dr. Wilson in 6  weeks.  9. Continue Pantoprazole 40 mg once daily for 30 days after your procedure, THEN discontinue.   10. Continue all of your home medications. Resume your eliquis tonight.     Lifting restrictions     Notify your health care provider if you experience any of the following:   Order Comments: For any concerning medical symptoms.     Notify your health care provider if you experience any of the following:  increased confusion or weakness     Notify your health care provider if you experience any of the following:  persistent dizziness, light-headedness, or visual disturbances     Notify your health care provider if you experience any of the following:  worsening rash     Notify your health care provider if you experience any of the following:  severe persistent headache     Notify your health care provider if you experience any of the following:  difficulty breathing or increased cough     Notify your health care provider if you experience any of the following:  redness, tenderness, or signs of infection (pain, swelling, redness, odor or green/yellow discharge around incision site)     Notify your health care provider if you experience any of the following:  severe uncontrolled pain     Notify your health care provider if you experience any of the following:  persistent nausea and vomiting or diarrhea     Notify your health care provider if you experience any of the following:  temperature >100.4     Remove dressing in 24 hours   Order Comments: Remove your groin dressings in 24 hour. You may shower at this time.     Candi Gupta, PGY-5 (Cardiology Fellow)

## 2019-07-05 NOTE — PLAN OF CARE
Problem: Adult Inpatient Plan of Care  Goal: Plan of Care Review  Outcome: Ongoing (interventions implemented as appropriate)  Report received from TERRI Jolly. Patient s/p AFL RFA. bilat groin sites cdi, soft. + pulses. Post procedure protocol discussed with patient. Call light in reach. Family at bedside. Will monitor.

## 2019-07-05 NOTE — SUBJECTIVE & OBJECTIVE
Past Medical History:   Diagnosis Date    ALLERGIC RHINITIS     Anemia     Asthma     GERD (gastroesophageal reflux disease)     Hyperlipidemia     Hypertension     NAFLD (nonalcoholic fatty liver disease)     ANNABELLE (obstructive sleep apnea)     on CPAP    Squamous cell cancer of skin of hand        Past Surgical History:   Procedure Laterality Date    APPENDECTOMY      CARDIAC CATHETERIZATION  10/31/2018    no stent    CARDIAC CATHETERIZATION  1998    no stent    COLONOSCOPY N/A 6/19/2018    Performed by Wade [facility] Garza MD at [facility] (4TH FLR)    COLONOSCOPY N/A 6/14/2013    Performed by Wade De La Garza MD at [facility] (4TH FLR)    EGD (ESOPHAGOGASTRODUODENOSCOPY) N/A 10/23/2018    Performed by Bart Hernandez MD at [facility] (4TH FLR)    FOOT SURGERY      testicular biopsy         Review of patient's allergies indicates:   Allergen Reactions    Amoxicillin Hives    Allopurinol analogues Other (See Comments)     Abnormal transaminases       No current facility-administered medications on file prior to encounter.      Current Outpatient Medications on File Prior to Encounter   Medication Sig    ADVAIR DISKUS 250-50 mcg/dose diskus inhaler INHALE 1 PUFF BY MOUTH TWICE DAILY. RINSE MOUTH AFTER USE    apixaban (ELIQUIS) 5 mg Tab Take 1 tablet (5 mg total) by mouth 2 (two) times daily.    aspirin (ECOTRIN) 81 MG EC tablet Take 81 mg by mouth every evening.     atorvastatin (LIPITOR) 40 MG tablet Take 1 tablet (40 mg total) by mouth once daily. (Patient taking differently: Take 40 mg by mouth every evening. )    colchicine (COLCRYS) 0.6 mg tablet 0.3mg( 1/2 0.6mg tablet) daily    febuxostat (ULORIC) 40 mg Tab 1/2 tab(20mg) daily    felodipine (PLENDIL) 5 MG 24 hr tablet Take 1 tablet (5 mg total) by mouth once daily.    fish oil-omega-3 fatty acids 300-1,000 mg capsule Take 2 g by mouth once daily.      fluticasone (FLONASE) 50 mcg/actuation nasal spray 2 sprays (100 mcg total) by Each Nare route  once daily.    furosemide (LASIX) 40 MG tablet TAKE 1 TABLET BY MOUTH EVERY DAY (Patient taking differently: Twice weekly)    glucosamine & chondroit sul.Na 750-600 mg Tab Take 750 mg by mouth.    irbesartan (AVAPRO) 150 MG tablet TAKE 1 TABLET BY MOUTH ONCE DAILY FOR BLOOD PRESSURE    ketoconazole (NIZORAL) 2 % shampoo WASH affected area every other day    loratadine (CLARITIN) 10 mg tablet Take 10 mg by mouth daily as needed.      omeprazole (PRILOSEC) 40 MG capsule Take 1 capsule (40 mg total) by mouth every morning.    albuterol (PROAIR HFA) 90 mcg/actuation inhaler Inhale 2 puffs into the lungs every 4 (four) hours as needed for Wheezing.    azelastine (ASTELIN) 137 mcg (0.1 %) nasal spray 2 sprays (274 mcg total) by Nasal route 2 (two) times daily.    triamcinolone acetonide 0.1% (KENALOG) 0.1 % cream Apply topically 2 (two) times daily.     Family History     Problem Relation (Age of Onset)    Arthritis Brother    Blindness Maternal Grandmother    COPD Father    Cancer Mother    Cataracts Maternal Grandmother    Glaucoma Mother    Heart disease Father, Brother    Retinal detachment Mother, Father        Tobacco Use    Smoking status: Former Smoker     Packs/day: 1.00     Years: 10.00     Pack years: 10.00     Last attempt to quit: 1968     Years since quittin.5    Smokeless tobacco: Former User    Tobacco comment:  last smoke   Substance and Sexual Activity    Alcohol use: Yes     Alcohol/week: 1.2 oz     Types: 1 Glasses of wine, 1 Shots of liquor per week     Frequency: 4 or more times a week     Drinks per session: 1 or 2     Binge frequency: Less than monthly     Comment: 5 days per week    Drug use: No    Sexual activity: Yes     Review of Systems   Constitution: Negative for chills, decreased appetite, diaphoresis, fever, malaise/fatigue, night sweats, weight gain and weight loss.   Eyes: Negative.    Cardiovascular: Negative for chest pain, irregular heartbeat, leg swelling,  near-syncope, orthopnea, palpitations, paroxysmal nocturnal dyspnea and syncope.   Respiratory: Negative for cough, shortness of breath, sputum production and wheezing.    Endocrine: Negative.    Hematologic/Lymphatic: Negative for bleeding problem.   Skin: Negative for color change, flushing, rash and suspicious lesions.   Musculoskeletal: Negative.    Gastrointestinal: Negative for bloating, abdominal pain, change in bowel habit, constipation, diarrhea, heartburn, melena, nausea and vomiting.   Genitourinary: Negative for flank pain, frequency, hematuria, incomplete emptying and urgency.   Neurological: Negative for dizziness, focal weakness, headaches, light-headedness, numbness, paresthesias, seizures, sensory change and weakness.   Psychiatric/Behavioral: Negative for altered mental status. The patient is not nervous/anxious.      Objective:     Vital Signs (Most Recent):    Vital Signs (24h Range):           There is no height or weight on file to calculate BMI.            No intake or output data in the 24 hours ending 07/05/19 1037    Lines/Drains/Airways          None          Physical Exam   Constitutional: He is oriented to person, place, and time. He appears well-developed and well-nourished. No distress.   HENT:   Head: Normocephalic and atraumatic.   Mouth/Throat: Oropharynx is clear and moist.   Eyes: Pupils are equal, round, and reactive to light. EOM are normal.   Neck: Normal range of motion. Neck supple. No JVD present.   Cardiovascular: Normal rate and regular rhythm. Exam reveals no gallop and no friction rub.   No murmur heard.  Pulmonary/Chest: Effort normal and breath sounds normal. No respiratory distress. He has no wheezes. He has no rales. He exhibits no tenderness.   Abdominal: Soft. Bowel sounds are normal. He exhibits no distension. There is no tenderness.   Musculoskeletal: Normal range of motion. He exhibits no edema.   Lymphadenopathy:     He has no cervical adenopathy.    Neurological: He is alert and oriented to person, place, and time.   Skin: Skin is warm and dry. No erythema.   Psychiatric: He has a normal mood and affect. His behavior is normal. Judgment and thought content normal.       Significant Labs: All pertinent lab results from the last 24 hours have been reviewed.    Significant Imaging: Reviewed in EPIC.

## 2019-07-05 NOTE — PLAN OF CARE
Problem: Adult Inpatient Plan of Care  Goal: Plan of Care Review  Outcome: Ongoing (interventions implemented as appropriate)  Patient arrived to room. PIV placed, labs sent. Admit assessment completed. Plan of care discussed with patient. Will monitor. Report called to Lizzie.

## 2019-07-05 NOTE — TRANSFER OF CARE
"Anesthesia Transfer of Care Note    Patient: Adelfo VICENTE Rodriguez, PhD    Procedure(s) Performed: Procedure(s) (LRB):  Ablation, Atrial Flutter, Typical (N/A)  Transesophageal echo (LEO) intra-procedure log documentation (N/A)    Patient location: PACU    Anesthesia Type: general    Transport from OR: Transported from OR on 6-10 L/min O2 by face mask with adequate spontaneous ventilation    Post pain: adequate analgesia    Post assessment: no apparent anesthetic complications    Post vital signs: stable    Level of consciousness: sedated    Nausea/Vomiting: no nausea/vomiting    Complications: none    Transfer of care protocol was followed      Last vitals:   Visit Vitals  BP (!) 147/70 (BP Location: Right arm, Patient Position: Lying)   Pulse 67   Temp 36.4 °C (97.6 °F) (Oral)   Resp 16   Ht 5' 10" (1.778 m)   Wt 99.8 kg (220 lb)   SpO2 97%   BMI 31.57 kg/m²     "

## 2019-07-05 NOTE — NURSING TRANSFER
Nursing Transfer Note      7/5/2019     Transfer To: 318    Transfer via stretcher    Transfer with cardiac monitoring    Transported by escort with ticket to ride    Medicines sent: see epic    Chart send with patient: Yes    Notified: reported to antony belle    Patient reassessed at: see epic (date, time)    Upon arrival to floor: to room no complaints no distress noted.

## 2019-07-06 NOTE — ANESTHESIA POSTPROCEDURE EVALUATION
Anesthesia Post Evaluation    Patient: Adelfo Rodriguez, PhD    Procedure(s) Performed: Procedure(s) (LRB):  Ablation, Atrial Flutter, Typical (N/A)  Transesophageal echo (LEO) intra-procedure log documentation (N/A)    Final Anesthesia Type: general  Patient location during evaluation: PACU  Patient participation: Yes- Able to Participate  Level of consciousness: awake and alert  Post-procedure vital signs: reviewed and stable  Pain management: adequate  Airway patency: patent  PONV status at discharge: No PONV  Anesthetic complications: no      Cardiovascular status: blood pressure returned to baseline  Respiratory status: unassisted  Hydration status: euvolemic  Follow-up not needed.          Vitals Value Taken Time   /72 7/5/2019  7:30 PM   Temp 36.3 °C (97.4 °F) 7/5/2019  5:00 PM   Pulse 58 7/5/2019  7:30 PM   Resp 20 7/5/2019  5:00 PM   SpO2 97 % 7/5/2019  5:00 PM         Event Time     Out of Recovery 16:50:00          Pain/Ana Laura Score: Ana Laura Score: 10 (7/5/2019  4:59 PM)

## 2019-07-06 NOTE — PROGRESS NOTES
Patient discharged per MD order.  assessed pt and stated it was okay to discharge. Instructions given on medications, wound care, activity, signs of infection, when to call MD, and follow up appointments. Pt verbalized understanding.  Patient AAOx3, VSS, no c/o pain or discomfort at this time. Pt educated on importance of taking eliquis and stated he would take his nightly dose at home. Telemetry and PIV removed. Patient left unit via wheelchair with transport and family.

## 2019-07-06 NOTE — PROGRESS NOTES
Bedrest protocol complete. Pt ambulated down goode with PCT. Pt tolerated well. Bilateral groin dressings are clean, dry, and intact with no evidence of bleeding or hematoma. Pt aware awaiting discharge.

## 2019-07-15 ENCOUNTER — TELEPHONE (OUTPATIENT)
Dept: ELECTROPHYSIOLOGY | Facility: CLINIC | Age: 79
End: 2019-07-15

## 2019-07-15 ENCOUNTER — CLINICAL SUPPORT (OUTPATIENT)
Dept: CARDIOLOGY | Facility: CLINIC | Age: 79
End: 2019-07-15
Attending: INTERNAL MEDICINE
Payer: MEDICARE

## 2019-07-15 DIAGNOSIS — I48.92 ATRIAL FLUTTER, UNSPECIFIED TYPE: ICD-10-CM

## 2019-07-15 DIAGNOSIS — I48.92 ATRIAL FLUTTER, UNSPECIFIED TYPE: Primary | ICD-10-CM

## 2019-07-15 PROCEDURE — 93926 CV US PSEUDOANEURYSM EVALUATION/TREATMENT: ICD-10-PCS | Mod: S$GLB,,, | Performed by: INTERNAL MEDICINE

## 2019-07-15 PROCEDURE — 93971 CV US PSEUDOANEURYSM EVALUATION/TREATMENT: ICD-10-PCS | Mod: S$GLB,,, | Performed by: INTERNAL MEDICINE

## 2019-07-15 PROCEDURE — 93926 LOWER EXTREMITY STUDY: CPT | Mod: S$GLB,,, | Performed by: INTERNAL MEDICINE

## 2019-07-15 PROCEDURE — 93971 EXTREMITY STUDY: CPT | Mod: S$GLB,,, | Performed by: INTERNAL MEDICINE

## 2019-07-15 NOTE — TELEPHONE ENCOUNTER
----- Message from Adela Nguyen MA sent at 7/15/2019 12:35 PM CDT -----  Contact: Patient      ----- Message -----  From: Leonila Brownlee  Sent: 7/15/2019   9:48 AM  To: Steve PLEITEZ Staff    The Pt is calling to report that he has a lump in his groin where his incision is. Please call him back @ [phone]. Thanks, Leonila

## 2019-07-15 NOTE — TELEPHONE ENCOUNTER
Spoke to Dr. Rodriguez who reports a dime sized knot at right groin access site (AFL RFA done on 7/5).  He reports it feels like a pulled muscle and has gotten slightly larger.  He reports skin color is same as left leg and he is able to move leg freely without pain.      Discussed with Dr. Wilson who would like to do pseudoaneurysm ultrasound.     Spoke to Dr. Rodriguez and scheduled ultrasound for 3 pm today.  Directions and instructions reviewed.  Dr. Rodriguez verbalizes understanding and appreciates call.

## 2019-07-16 ENCOUNTER — TELEPHONE (OUTPATIENT)
Dept: ELECTROPHYSIOLOGY | Facility: CLINIC | Age: 79
End: 2019-07-16

## 2019-07-16 DIAGNOSIS — G47.33 OBSTRUCTIVE SLEEP APNEA: Primary | ICD-10-CM

## 2019-07-16 NOTE — TELEPHONE ENCOUNTER
----- Message from Gil Wilson MD sent at 7/16/2019  6:55 AM CDT -----  Please notify the patient that his groin ultrasound was normal.

## 2019-07-20 ENCOUNTER — PATIENT MESSAGE (OUTPATIENT)
Dept: RHEUMATOLOGY | Facility: CLINIC | Age: 79
End: 2019-07-20

## 2019-07-23 ENCOUNTER — PATIENT MESSAGE (OUTPATIENT)
Dept: SLEEP MEDICINE | Facility: CLINIC | Age: 79
End: 2019-07-23

## 2019-07-25 ENCOUNTER — TELEPHONE (OUTPATIENT)
Dept: RHEUMATOLOGY | Facility: CLINIC | Age: 79
End: 2019-07-25

## 2019-07-25 ENCOUNTER — LAB VISIT (OUTPATIENT)
Dept: LAB | Facility: HOSPITAL | Age: 79
End: 2019-07-25
Attending: INTERNAL MEDICINE
Payer: MEDICARE

## 2019-07-25 DIAGNOSIS — M1A.9XX1 CHRONIC TOPHACEOUS GOUT: ICD-10-CM

## 2019-07-25 LAB
ALBUMIN SERPL BCP-MCNC: 3.5 G/DL (ref 3.5–5.2)
ALP SERPL-CCNC: 83 U/L (ref 55–135)
ALT SERPL W/O P-5'-P-CCNC: 19 U/L (ref 10–44)
ANION GAP SERPL CALC-SCNC: 9 MMOL/L (ref 8–16)
AST SERPL-CCNC: 22 U/L (ref 10–40)
BILIRUB SERPL-MCNC: 0.6 MG/DL (ref 0.1–1)
BUN SERPL-MCNC: 33 MG/DL (ref 8–23)
CALCIUM SERPL-MCNC: 9.6 MG/DL (ref 8.7–10.5)
CHLORIDE SERPL-SCNC: 107 MMOL/L (ref 95–110)
CO2 SERPL-SCNC: 25 MMOL/L (ref 23–29)
CREAT SERPL-MCNC: 2.1 MG/DL (ref 0.5–1.4)
EST. GFR  (AFRICAN AMERICAN): 33.8 ML/MIN/1.73 M^2
EST. GFR  (NON AFRICAN AMERICAN): 29.3 ML/MIN/1.73 M^2
GLUCOSE SERPL-MCNC: 92 MG/DL (ref 70–110)
POTASSIUM SERPL-SCNC: 4.2 MMOL/L (ref 3.5–5.1)
PROT SERPL-MCNC: 6.2 G/DL (ref 6–8.4)
SODIUM SERPL-SCNC: 141 MMOL/L (ref 136–145)
URATE SERPL-MCNC: 6.9 MG/DL (ref 3.4–7)

## 2019-07-25 PROCEDURE — 84550 ASSAY OF BLOOD/URIC ACID: CPT

## 2019-07-25 PROCEDURE — 36415 COLL VENOUS BLD VENIPUNCTURE: CPT | Mod: PN

## 2019-07-25 PROCEDURE — 80053 COMPREHEN METABOLIC PANEL: CPT

## 2019-08-06 ENCOUNTER — HOSPITAL ENCOUNTER (OUTPATIENT)
Dept: CARDIOLOGY | Facility: CLINIC | Age: 79
Discharge: HOME | End: 2019-08-06
Payer: MEDICARE

## 2019-08-06 ENCOUNTER — OFFICE VISIT (OUTPATIENT)
Dept: ELECTROPHYSIOLOGY | Facility: CLINIC | Age: 79
End: 2019-08-06
Payer: MEDICARE

## 2019-08-06 VITALS
HEART RATE: 62 BPM | WEIGHT: 220 LBS | DIASTOLIC BLOOD PRESSURE: 55 MMHG | BODY MASS INDEX: 31.5 KG/M2 | HEIGHT: 70 IN | SYSTOLIC BLOOD PRESSURE: 135 MMHG

## 2019-08-06 DIAGNOSIS — G47.33 OSA (OBSTRUCTIVE SLEEP APNEA): ICD-10-CM

## 2019-08-06 DIAGNOSIS — I10 ESSENTIAL HYPERTENSION: Chronic | ICD-10-CM

## 2019-08-06 DIAGNOSIS — I47.19 PAT (PAROXYSMAL ATRIAL TACHYCARDIA): Primary | ICD-10-CM

## 2019-08-06 DIAGNOSIS — I48.92 ATRIAL FLUTTER, UNSPECIFIED TYPE: ICD-10-CM

## 2019-08-06 PROCEDURE — 93005 ELECTROCARDIOGRAM TRACING: CPT | Mod: S$GLB,,, | Performed by: INTERNAL MEDICINE

## 2019-08-06 PROCEDURE — 3075F SYST BP GE 130 - 139MM HG: CPT | Mod: CPTII,S$GLB,, | Performed by: INTERNAL MEDICINE

## 2019-08-06 PROCEDURE — 93010 RHYTHM STRIP: ICD-10-PCS | Mod: S$GLB,,, | Performed by: INTERNAL MEDICINE

## 2019-08-06 PROCEDURE — 3075F PR MOST RECENT SYSTOLIC BLOOD PRESS GE 130-139MM HG: ICD-10-PCS | Mod: CPTII,S$GLB,, | Performed by: INTERNAL MEDICINE

## 2019-08-06 PROCEDURE — 99214 OFFICE O/P EST MOD 30 MIN: CPT | Mod: S$GLB,,, | Performed by: INTERNAL MEDICINE

## 2019-08-06 PROCEDURE — 93010 ELECTROCARDIOGRAM REPORT: CPT | Mod: S$GLB,,, | Performed by: INTERNAL MEDICINE

## 2019-08-06 PROCEDURE — 3078F PR MOST RECENT DIASTOLIC BLOOD PRESSURE < 80 MM HG: ICD-10-PCS | Mod: CPTII,S$GLB,, | Performed by: INTERNAL MEDICINE

## 2019-08-06 PROCEDURE — 99999 PR PBB SHADOW E&M-EST. PATIENT-LVL III: ICD-10-PCS | Mod: PBBFAC,,, | Performed by: INTERNAL MEDICINE

## 2019-08-06 PROCEDURE — 1101F PT FALLS ASSESS-DOCD LE1/YR: CPT | Mod: CPTII,S$GLB,, | Performed by: INTERNAL MEDICINE

## 2019-08-06 PROCEDURE — 99999 PR PBB SHADOW E&M-EST. PATIENT-LVL III: CPT | Mod: PBBFAC,,, | Performed by: INTERNAL MEDICINE

## 2019-08-06 PROCEDURE — 99214 PR OFFICE/OUTPT VISIT, EST, LEVL IV, 30-39 MIN: ICD-10-PCS | Mod: S$GLB,,, | Performed by: INTERNAL MEDICINE

## 2019-08-06 PROCEDURE — 93005 RHYTHM STRIP: ICD-10-PCS | Mod: S$GLB,,, | Performed by: INTERNAL MEDICINE

## 2019-08-06 PROCEDURE — 3078F DIAST BP <80 MM HG: CPT | Mod: CPTII,S$GLB,, | Performed by: INTERNAL MEDICINE

## 2019-08-06 PROCEDURE — 1101F PR PT FALLS ASSESS DOC 0-1 FALLS W/OUT INJ PAST YR: ICD-10-PCS | Mod: CPTII,S$GLB,, | Performed by: INTERNAL MEDICINE

## 2019-08-06 RX ORDER — METOPROLOL SUCCINATE 25 MG/1
12.5 TABLET, EXTENDED RELEASE ORAL DAILY
Qty: 15 TABLET | Refills: 11 | Status: SHIPPED | OUTPATIENT
Start: 2019-08-06 | End: 2020-04-20 | Stop reason: SDUPTHER

## 2019-08-06 NOTE — PROGRESS NOTES
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 In 2018 he began having exertional induced palpitations that would last for a few minutes. A 30 day event monitor was ordered. He had 1 such event whose onset was not captured. It was a narrow complex tachycardia at around 150 bpm that could have represented 2:1 atrial flutter. Another event was characterized by a short imelda of nonsustained atrial tachycardia. He was initiated on eliquis and metoprolol 50mg daily. He was intolerant of the metoprolol (symptomatic bradycardia in the 50s). He continues to exercise and 1-2 times weekly he has an episode. He exercises with a heart rate monitor and is able to record his work outs. With workouts associated with his symptoms, his heart rate curve rises to a plateau of ~120-130. Then there is a sudden spike in his heart rate to 150-160 that remains for 5-10 minutes then suddenly stops and returns to baseline. He notes palpitations and chest heaviness with these events. He had an abnormal stress echo with preserved LVEF 10/2018. Coronary angiography noted no obstructive CAD.    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 Unstable, nonsustainable left atrial and low lateral right atrial ATs were induced. Sustained sinus node tachycardia was however induced in response to atrial extrastimuli. This was likely sinus node reentry. Due to potential 2:1 tach/AFL noted on his event monitor, a CTI ablation was performed. He returns for follow-up. Since his procedure he has had 1 instance of sudden tachycardia  to 150s during exercise, lasting ~7 minutes. He otherwise feels well.    I reviewed all available ECGs in Epic which note sinus rhythm.    My interpretation of today's in clinic ECG is normal sinus rhythm with normal intervals    Review of Systems   Constitution: Negative for fever and malaise/fatigue.   HENT: Negative for congestion and sore throat.    Eyes: Negative for blurred vision and visual disturbance.   Cardiovascular: Positive for leg swelling and palpitations. Negative for chest pain, dyspnea on exertion, near-syncope and syncope.   Respiratory: Negative for cough and shortness of breath.    Hematologic/Lymphatic: Negative for bleeding problem. Does not bruise/bleed easily.   Skin: Negative.    Musculoskeletal: Negative.    Gastrointestinal: Negative for bloating, abdominal pain, hematochezia and melena.   Neurological: Negative for dizziness, focal weakness and weakness.        Objective:    Physical Exam   Constitutional: He is oriented to person, place, and time. He appears well-developed and well-nourished. No distress.   HENT:   Head: Normocephalic and atraumatic.   Eyes: Conjunctivae are normal. Right eye exhibits no discharge. Left eye exhibits no discharge.   Neck: Neck supple. No JVD present.   Cardiovascular: Normal rate and regular rhythm. Exam reveals no gallop and no friction rub.   No murmur heard.  Pulmonary/Chest: Effort normal and breath sounds normal. No respiratory distress. He has no wheezes. He has no rales.   Abdominal: Soft. Bowel sounds are normal. He exhibits no distension. There is no tenderness. There is no rebound.   Musculoskeletal: He exhibits no edema.   Neurological: He is alert and oriented to person, place, and time.   Skin: Skin is warm and dry. He is not diaphoretic.   Psychiatric: He has a normal mood and affect. His behavior is normal. Judgment and thought content normal.   Vitals reviewed.        Assessment:       1. PAT (paroxysmal atrial tachycardia)    2.  Essential hypertension    3. 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 He is s/p RFA of the CTI and EP study. The only sustained tachycardia induced with sinus tachycardia. Recommend trial of low dose of metoprolol (12.5mg daily, was intolerant to 50mg daily in the past) and stopping eliquis. Discussed potential benefit of ILR implant for long-term monitoring for potential atrial fibrillation. He was agreeable.     Thank you for allowing me to participate in the care of this patient. Please do not hesitate to call me with any questions or concerns.    Gil Wilson MD, PhD  Cardiac Electrophysiology

## 2019-08-07 ENCOUNTER — TELEPHONE (OUTPATIENT)
Dept: ELECTROPHYSIOLOGY | Facility: CLINIC | Age: 79
End: 2019-08-07

## 2019-09-17 ENCOUNTER — TELEPHONE (OUTPATIENT)
Dept: ELECTROPHYSIOLOGY | Facility: CLINIC | Age: 79
End: 2019-09-17

## 2019-09-17 NOTE — TELEPHONE ENCOUNTER
Spoke to Dr. Rodriguez    CONFIRMED procedure arrival time of 2 pm  Reiterated instructions including:    -Directions to check in desk  -Confirmed no recent fever, bleeding, infection or skin rash in the past 30 days.  He reports he did have some area burned off at the dermatologist on 9/10, but they are   -Bathe night prior and morning prior to procedure with Hibiclens solution or an antibacterial soap     Pt verbalizes understanding of above and appreciates call.

## 2019-09-18 ENCOUNTER — HOSPITAL ENCOUNTER (OUTPATIENT)
Facility: HOSPITAL | Age: 79
Discharge: HOME | End: 2019-09-18
Attending: INTERNAL MEDICINE | Admitting: INTERNAL MEDICINE
Payer: MEDICARE

## 2019-09-18 VITALS
WEIGHT: 219 LBS | OXYGEN SATURATION: 99 % | RESPIRATION RATE: 20 BRPM | BODY MASS INDEX: 32.44 KG/M2 | DIASTOLIC BLOOD PRESSURE: 64 MMHG | TEMPERATURE: 98 F | HEART RATE: 61 BPM | SYSTOLIC BLOOD PRESSURE: 144 MMHG | HEIGHT: 69 IN

## 2019-09-18 DIAGNOSIS — I47.19 ATRIAL TACHYCARDIA: ICD-10-CM

## 2019-09-18 DIAGNOSIS — I47.19 PAT (PAROXYSMAL ATRIAL TACHYCARDIA): Primary | ICD-10-CM

## 2019-09-18 PROCEDURE — 93005 ELECTROCARDIOGRAM TRACING: CPT

## 2019-09-18 PROCEDURE — 93010 ELECTROCARDIOGRAM REPORT: CPT | Mod: ,,, | Performed by: INTERNAL MEDICINE

## 2019-09-18 PROCEDURE — 33285 INSJ SUBQ CAR RHYTHM MNTR: CPT | Mod: ,,, | Performed by: INTERNAL MEDICINE

## 2019-09-18 PROCEDURE — C1764 EVENT RECORDER, CARDIAC: HCPCS | Performed by: INTERNAL MEDICINE

## 2019-09-18 PROCEDURE — 33285 INSJ SUBQ CAR RHYTHM MNTR: CPT | Performed by: INTERNAL MEDICINE

## 2019-09-18 PROCEDURE — 93010 EKG 12-LEAD: ICD-10-PCS | Mod: ,,, | Performed by: INTERNAL MEDICINE

## 2019-09-18 PROCEDURE — 63600175 PHARM REV CODE 636 W HCPCS: Performed by: INTERNAL MEDICINE

## 2019-09-18 PROCEDURE — 33285 PR INSERTION,SUBQ CARDIAC RHYTHM MONITOR, W/PRGRMG: ICD-10-PCS | Mod: ,,, | Performed by: INTERNAL MEDICINE

## 2019-09-18 PROCEDURE — 25000003 PHARM REV CODE 250: Performed by: INTERNAL MEDICINE

## 2019-09-18 DEVICE — MON LNQ11 REVEAL LINQ USA FW2.0
Type: IMPLANTABLE DEVICE | Site: CHEST  WALL | Status: FUNCTIONAL
Brand: REVEAL LINQ™

## 2019-09-18 RX ORDER — LIDOCAINE HYDROCHLORIDE AND EPINEPHRINE 20; 10 MG/ML; UG/ML
INJECTION, SOLUTION INFILTRATION; PERINEURAL
Status: DISCONTINUED | OUTPATIENT
Start: 2019-09-18 | End: 2019-09-18 | Stop reason: HOSPADM

## 2019-09-18 RX ADMIN — VANCOMYCIN HYDROCHLORIDE 1500 MG: 1.5 INJECTION, POWDER, LYOPHILIZED, FOR SOLUTION INTRAVENOUS at 02:09

## 2019-09-18 NOTE — DISCHARGE SUMMARY
[facility]  Cardiac Electrophysiology  Discharge Summary      Patient Name: Adelfo Rodriguez, PhD  MRN: [id number]  Admission Date: 9/18/2019  Hospital Length of Stay: 0 days  Discharge Date and Time:  09/18/2019   Attending Physician:  MD Gil Wilson   Discharging Provider: Primitivo Mcdaniels NP  Primary Care Physician: Romero Vogel MD    HPI:  79 year-old male  with PMH  hypertension, COPD, stage 3 chronic kidney disease, and obstructive sleep apnea. In 2018 he began having exertional induced palpitations that would last for a few minutes. A 30 day event monitor was ordered. He had 1 such event whose onset was not captured. It was a narrow complex tachycardia at around 150 bpm that could have represented 2:1 atrial flutter. Another event was characterized by a short imelda of nonsustained atrial tachycardia. He was initiated on eliquis and metoprolol 50mg daily. He was intolerant of the metoprolol (symptomatic bradycardia in the 50s). He continues to exercise and 1-2 times weekly he has an episode. He exercises with a heart rate monitor and is able to record his work outs. With workouts associated with his symptoms, his heart rate curve rises to a plateau of ~120-130. Then there is a sudden spike in his heart rate to 150-160 that remains for 5-10 minutes then suddenly stops and returns to baseline. He notes palpitations and chest heaviness with these events. He had an abnormal stress echo with preserved LVEF 10/2018. Coronary angiography noted no obstructive CAD.  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 Sustained sinus node tachycardia was however induced in response to atrial extrastimuli. This was likely sinus node reentry. Due to potential 2:1 tach/AFL noted on his event monitor, a CTI ablation was performed.     On last EP clinic visit with MD Wilson  On 8/6/19   Recommended trial of low dose of metoprolol  (12.5mg daily, was intolerant to 50mg daily in the past) and stopping eliquis.   Patient elected to proceed for planned  ILR implant for long-term monitoring for potential atrial fibrillation.   Patient stopped eliquis on that visit      Patient presented to short stay cardiac unit on 9/18/19 for planned ILR implant. he denies any  Bleeding chest pains, malaise, fevers, chills, rash or any other acute symptoms on admit       EKG in NSR at 62 BPM    Procedure(s) (LRB):  Insertion, Implantable Loop Recorder (N/A)     Hospital Course:  Pt underwent ILR implant , tolerated procedure, no acute complications noted. Left chest site clean dry and intact >  . Pt to follow up with device clinic in 1 week for wound check , and 3 months with MD Gil Wilson   Discharge plans/instructions discussed with patient and spouse who verbalized understanding and agreement of plans of care.  No further questions or concern voiced at this time.         Final Active Diagnoses:    Diagnosis Date Noted POA    PRINCIPAL PROBLEM:  PAT (paroxysmal atrial tachycardia) [I47.1] 11/19/2018 Yes      Problems Resolved During this Admission:       Discharged Condition: good    Disposition: Home or Self CareHOME    Follow Up:  Follow-up Information     PROV C ARRHYTHMIA In 1 week.    Specialty:  Arrhythmia  Why:  For wound re-check  Contact information:  [facility] [id number] [phone]           Gil Wilson MD In 3 months.    Specialties:  Electrophysiology, Cardiology  Contact information:  [address]  [facility] [id number] [phone]                 Patient Instructions:      Diet Cardiac     Notify your health care provider if you experience any of the following:  temperature >100.4     Notify your health care provider if you experience any of the following:  persistent nausea and vomiting or diarrhea     Notify your health care provider if you experience any of the following:  severe uncontrolled pain      Notify your health care provider if you experience any of the following:  redness, tenderness, or signs of infection (pain, swelling, redness, odor or green/yellow discharge around incision site)     Notify your health care provider if you experience any of the following:  difficulty breathing or increased cough     Notify your health care provider if you experience any of the following:  severe persistent headache     Notify your health care provider if you experience any of the following:  worsening rash     Notify your health care provider if you experience any of the following:  persistent dizziness, light-headedness, or visual disturbances     Notify your health care provider if you experience any of the following:  increased confusion or weakness     Notify your health care provider if you experience any of the following:   Order Comments: For any concerning medical symptoms     Remove dressing in 48 hours     Medications:  Reconciled Home Medications:      Medication List      CHANGE how you take these medications    atorvastatin 40 MG tablet  Commonly known as:  LIPITOR  Take 1 tablet (40 mg total) by mouth once daily.  What changed:  when to take this     furosemide 40 MG tablet  Commonly known as:  LASIX  TAKE 1 TABLET BY MOUTH EVERY DAY  What changed:  additional instructions        CONTINUE taking these medications    ADVAIR DISKUS 250-50 mcg/dose diskus inhaler  Generic drug:  fluticasone-salmeterol 250-50 mcg/dose  INHALE 1 PUFF BY MOUTH TWICE DAILY. RINSE MOUTH AFTER USE     albuterol 90 mcg/actuation inhaler  Commonly known as:  PROAIR HFA  Inhale 2 puffs into the lungs every 4 (four) hours as needed for Wheezing.     azelastine 137 mcg (0.1 %) nasal spray  Commonly known as:  ASTELIN  2 sprays (274 mcg total) by Nasal route 2 (two) times daily.     CLARITIN 10 mg tablet  Generic drug:  loratadine  Take 10 mg by mouth daily as needed.     colchicine 0.6 mg tablet  Commonly known as:   COLCRYS  0.3mg( 1/2 0.6mg tablet) daily     febuxostat 40 mg Tab  Commonly known as:  ULORIC  1/2 tab(20mg) daily     felodipine 5 MG 24 hr tablet  Commonly known as:  PLENDIL  Take 1 tablet (5 mg total) by mouth once daily.     fish oil-omega-3 fatty acids 300-1,000 mg capsule  Take 2 g by mouth once daily.     fluticasone propionate 50 mcg/actuation nasal spray  Commonly known as:  FLONASE  2 sprays (100 mcg total) by Each Nare route once daily.     glucosamine-chondroitn sulf.Na 750-600 mg Tab  Take 750 mg by mouth.     irbesartan 150 MG tablet  Commonly known as:  AVAPRO  TAKE 1 TABLET BY MOUTH ONCE DAILY FOR BLOOD PRESSURE     ketoconazole 2 % shampoo  Commonly known as:  NIZORAL  WASH affected area every other day     metoprolol succinate 25 MG 24 hr tablet  Commonly known as:  TOPROL-XL  Take 0.5 tablets (12.5 mg total) by mouth once daily.     triamcinolone acetonide 0.1% 0.1 % cream  Commonly known as:  KENALOG  Apply topically 2 (two) times daily.        ASK your doctor about these medications    omeprazole 40 MG capsule  Commonly known as:  PRILOSEC  Take 1 capsule (40 mg total) by mouth every morning.            Time spent on the discharge of patient: 15  minutes    Primitivo Mcdaniels NP  Cardiac Electrophysiology  [facility]    STAFF MD Gil Wilson

## 2019-09-18 NOTE — NURSING
Pt d/c'd to home per md orders.  Vss.  l cw site remains cdi.  0 bleed, 0 hematoma.  Foam dressing applied to site prior to discharge.  piv removed intact and without difficulty.  Instructed pt on home medications, post procedure precautions and follow up visits.  Pt and family verbalizes understanding.  Pt in no acute distress and verbalizes no complaints.

## 2019-09-18 NOTE — H&P
[facility]  Cardiac Electrophysiology  History and Physical     Admission Date: 9/18/2019  Code Status: Prior   Attending Provider: Gil Wilson MD   Principal Problem: AT    Subjective:     Chief Complaint:  AT    HPI:  78 year-old male  with PMH  hypertension, COPD, stage 3 chronic kidney disease, and obstructive sleep apnea. In 2018 he began having exertional induced palpitations that would last for a few minutes. A 30 day event monitor was ordered. He had 1 such event whose onset was not captured. It was a narrow complex tachycardia at around 150 bpm that could have represented 2:1 atrial flutter. Another event was characterized by a short imelda of nonsustained atrial tachycardia. He was initiated on eliquis and metoprolol 50mg daily. He was intolerant of the metoprolol (symptomatic bradycardia in the 50s). He continues to exercise and 1-2 times weekly he has an episode. He exercises with a heart rate monitor and is able to record his work outs. With workouts associated with his symptoms, his heart rate curve rises to a plateau of ~120-130. Then there is a sudden spike in his heart rate to 150-160 that remains for 5-10 minutes then suddenly stops and returns to baseline. He notes palpitations and chest heaviness with these events. He had an abnormal stress echo with preserved LVEF 10/2018. Coronary angiography noted no obstructive CAD.  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 Sustained sinus node tachycardia was however induced in response to atrial extrastimuli. This was likely sinus node reentry. Due to potential 2:1 tach/AFL noted on his event monitor, a CTI ablation was performed.    On last EP clinic visit with MD Wilson  On 8/6/19   Recommended trial of low dose of metoprolol (12.5mg daily, was intolerant to 50mg daily in the past) and stopping eliquis.   Patient elected to proceed for planned  ILR  implant for long-term monitoring for potential atrial fibrillation.   Patient stopped eliquis on that visit     Patient presented to short stay cardiac unit on 9/18/19 for planned ILR implant. he denies any  Bleeding chest pains, malaise, fevers, chills, rash or any other acute symptoms     EKG in NSR at 62 BPM    Past Medical History:   Diagnosis Date    ALLERGIC RHINITIS     Anemia     Asthma     Hyperlipidemia     Hypertension     NAFLD (nonalcoholic fatty liver disease)     ANNABELLE (obstructive sleep apnea)     on CPAP    Squamous cell cancer of skin of hand        Past Surgical History:   Procedure Laterality Date    Ablation, Atrial Flutter, Typical N/A 7/5/2019    Performed by Gil Wilson MD at [facility] EP LAB    APPENDECTOMY      CARDIAC CATHETERIZATION  10/31/2018    no stent    CARDIAC CATHETERIZATION  1998    no stent    COLONOSCOPY N/A 6/19/2018    Performed by Wade De La Garza MD at [facility] ENDO (4TH FLR)    COLONOSCOPY N/A 6/14/2013    Performed by Wade De La Garza MD at [facility] ENDO (4TH FLR)    EGD (ESOPHAGOGASTRODUODENOSCOPY) N/A 10/23/2018    Performed by Bart Hernandez MD at [facility] ENDO (4TH FLR)    FOOT SURGERY      testicular biopsy      Transesophageal echo (LEO) intra-procedure log documentation N/A 7/5/2019    Performed by [facility] Diagnostic Provider at [facility] EP LAB       Review of patient's allergies indicates:   Allergen Reactions    Amoxicillin Hives    Allopurinol analogues Other (See Comments)     Abnormal transaminases       No current facility-administered medications on file prior to encounter.      Current Outpatient Medications on File Prior to Encounter   Medication Sig    ADVAIR DISKUS 250-50 mcg/dose diskus inhaler INHALE 1 PUFF BY MOUTH TWICE DAILY. RINSE MOUTH AFTER USE    atorvastatin (LIPITOR) 40 MG tablet Take 1 tablet (40 mg total) by mouth once daily. (Patient taking differently: Take 40 mg by mouth every evening. )    colchicine (COLCRYS) 0.6 mg tablet 0.3mg( 1/2 0.6mg  tablet) daily    febuxostat (ULORIC) 40 mg Tab 1/2 tab(20mg) daily    felodipine (PLENDIL) 5 MG 24 hr tablet Take 1 tablet (5 mg total) by mouth once daily.    fish oil-omega-3 fatty acids 300-1,000 mg capsule Take 2 g by mouth once daily.      fluticasone (FLONASE) 50 mcg/actuation nasal spray 2 sprays (100 mcg total) by Each Nare route once daily.    furosemide (LASIX) 40 MG tablet TAKE 1 TABLET BY MOUTH EVERY DAY (Patient taking differently: Twice weekly)    glucosamine & chondroit sul.Na 750-600 mg Tab Take 750 mg by mouth.    irbesartan (AVAPRO) 150 MG tablet TAKE 1 TABLET BY MOUTH ONCE DAILY FOR BLOOD PRESSURE    ketoconazole (NIZORAL) 2 % shampoo WASH affected area every other day    loratadine (CLARITIN) 10 mg tablet Take 10 mg by mouth daily as needed.      metoprolol succinate (TOPROL-XL) 25 MG 24 hr tablet Take 0.5 tablets (12.5 mg total) by mouth once daily.    albuterol (PROAIR HFA) 90 mcg/actuation inhaler Inhale 2 puffs into the lungs every 4 (four) hours as needed for Wheezing.    azelastine (ASTELIN) 137 mcg (0.1 %) nasal spray 2 sprays (274 mcg total) by Nasal route 2 (two) times daily.    omeprazole (PRILOSEC) 40 MG capsule Take 1 capsule (40 mg total) by mouth every morning.    triamcinolone acetonide 0.1% (KENALOG) 0.1 % cream Apply topically 2 (two) times daily.     Family History     Problem Relation (Age of Onset)    Arthritis Brother    Blindness Maternal Grandmother    COPD Father    Cancer Mother    Cataracts Maternal Grandmother    Glaucoma Mother    Heart disease Father, Brother    Retinal detachment Mother, Father        Tobacco Use    Smoking status: Former Smoker     Packs/day: 1.00     Years: 10.00     Pack years: 10.00     Last attempt to quit: 1968     Years since quittin.7    Smokeless tobacco: Former User    Tobacco comment:  last smoke   Substance and Sexual Activity    Alcohol use: Yes     Alcohol/week: 1.2 oz     Types: 1 Glasses of wine, 1 Shots  of liquor per week     Frequency: 4 or more times a week     Drinks per session: 1 or 2     Binge frequency: Less than monthly     Comment: 5 days per week    Drug use: No    Sexual activity: Yes     ROS        Review of Systems   Constitution: Negative for fevers/chills. Negative for weakness/ malaise   HENT: Negative for ear pain and tinnitus.   Eyes: Negative for blurred vision.   Cardiovascular: Positive for palpitations .   Negative for chest pain,  near-syncope, and syncope.   Respiratory:  Negative for shortness of breath   Endocrine:  Negative for polyuria.   Hematologic/Lymphatic: Negative for bruise/bleed easily.   Skin:  Negative for rash.   Musculoskeletal: Negative for joint pain and muscle weakness.   Extremity: Negative for swelling in the lower extremities.   Gastrointestinal:  Negative for abdominal pain and change in bowel habit.   Genitourinary: Negative for frequency.   Neurological:  Negative for dizziness.   Psychiatric/Behavioral:  Negative for depression, anxiety        Objective:     Vital Signs (Most Recent):  Temp: 97.4 °F (36.3 °C) (09/18/19 1400)  Pulse: 69 (09/18/19 1400)  Resp: 20 (09/18/19 1400)  BP: (!) 127/59 (09/18/19 1418)  SpO2: 98 % (09/18/19 1400) Vital Signs (24h Range):  Temp:  [97.4 °F (36.3 °C)] 97.4 °F (36.3 °C)  Pulse:  [69] 69  Resp:  [20] 20  SpO2:  [98 %] 98 %  BP: (115-127)/(57-59) 127/59       Weight: 99.3 kg (219 lb)  Body mass index is 32.34 kg/m².    SpO2: 98 %       Physical Exam        PE:   General: Well developed, well nourished, No distress on room air   HEENT: Head is normocephalic, atraumatic;  Lungs: Clear to auscultation bilaterally.  No wheezing. Normal respiratory effort.  Cardiovascular:  S1 S2 Normal rate, regular rhythm and normal heart sounds.    PMI is not displaced  Extremities: No LE edema. Pulses 2+ and symmetric.   Abdomen: Abdomen is soft, non-tender non-distended with normal bowel sounds.  Skin: Skin color, texture, turgor normal. No  rashes.  Musculoskeletal: normal range of motion   Neurologic: Normal strength and tone. No focal numbness or weakness.   Psychiatric: normal mood and affect.  behavior is normal. Alert and oriented X 3.  Labs reviewed            Significant Imaging: chart reviewed     Assessment and Plan:     PAT (paroxysmal atrial tachycardia)  ILR implantation          Prior to procedure, extensive discussion with patient regarding risks and benefits of  ILR implantation  , and patient  would like to proceed.  The patient voices understanding and all questions have been answered. No further questions/concerns voiced at this time.         Primitivo Mcdaniels NP  Cardiac Electrophysiology  [facility]    STAFF MD Gil Wilson

## 2019-09-18 NOTE — PLAN OF CARE
Problem: Adult Inpatient Plan of Care  Goal: Plan of Care Review  Outcome: Ongoing (interventions implemented as appropriate)  Received report from Lyla. Patient s/p ILR, AAOx3. VSS, no c/o pain or discomfort at this time, resp even and unlabored. Mid chest incision VITALY with DB. No active bleeding. No hematoma noted. Post procedure protocol reviewed with patient and patient's family. Understanding verbalized. Family members at bedside. Nurse call bell within reach. Will continue to monitor per post procedure protocol.

## 2019-09-18 NOTE — NURSING
Pt arrived to unit accompanied by family.  Oriented pt to rm and unit.  Pre op orders and assessment initiated.  Pt remains npo.  Pt in no acute distress and verbalizes no complaints.  Will continue to monitor.  Call bell within reach.

## 2019-09-18 NOTE — SUBJECTIVE & OBJECTIVE
Past Medical History:   Diagnosis Date    ALLERGIC RHINITIS     Anemia     Asthma     Hyperlipidemia     Hypertension     NAFLD (nonalcoholic fatty liver disease)     ANNABELLE (obstructive sleep apnea)     on CPAP    Squamous cell cancer of skin of hand        Past Surgical History:   Procedure Laterality Date    Ablation, Atrial Flutter, Typical N/A 7/5/2019    Performed by Gil Wilson MD at [facility] EP LAB    APPENDECTOMY      CARDIAC CATHETERIZATION  10/31/2018    no stent    CARDIAC CATHETERIZATION  1998    no stent    COLONOSCOPY N/A 6/19/2018    Performed by Wade De La Garza MD at [facility] ENDO (4TH FLR)    COLONOSCOPY N/A 6/14/2013    Performed by Wade De La Garza MD at [facility] ENDO (4TH FLR)    EGD (ESOPHAGOGASTRODUODENOSCOPY) N/A 10/23/2018    Performed by Bart Hernandez MD at [facility] ENDO (4TH FLR)    FOOT SURGERY      testicular biopsy      Transesophageal echo (LEO) intra-procedure log documentation N/A 7/5/2019    Performed by [facility] Diagnostic Provider at [facility] EP LAB       Review of patient's allergies indicates:   Allergen Reactions    Amoxicillin Hives    Allopurinol analogues Other (See Comments)     Abnormal transaminases       No current facility-administered medications on file prior to encounter.      Current Outpatient Medications on File Prior to Encounter   Medication Sig    ADVAIR DISKUS 250-50 mcg/dose diskus inhaler INHALE 1 PUFF BY MOUTH TWICE DAILY. RINSE MOUTH AFTER USE    atorvastatin (LIPITOR) 40 MG tablet Take 1 tablet (40 mg total) by mouth once daily. (Patient taking differently: Take 40 mg by mouth every evening. )    colchicine (COLCRYS) 0.6 mg tablet 0.3mg( 1/2 0.6mg tablet) daily    febuxostat (ULORIC) 40 mg Tab 1/2 tab(20mg) daily    felodipine (PLENDIL) 5 MG 24 hr tablet Take 1 tablet (5 mg total) by mouth once daily.    fish oil-omega-3 fatty acids 300-1,000 mg capsule Take 2 g by mouth once daily.      fluticasone (FLONASE) 50 mcg/actuation nasal spray 2 sprays (100 mcg  total) by Each Nare route once daily.    furosemide (LASIX) 40 MG tablet TAKE 1 TABLET BY MOUTH EVERY DAY (Patient taking differently: Twice weekly)    glucosamine & chondroit sul.Na 750-600 mg Tab Take 750 mg by mouth.    irbesartan (AVAPRO) 150 MG tablet TAKE 1 TABLET BY MOUTH ONCE DAILY FOR BLOOD PRESSURE    ketoconazole (NIZORAL) 2 % shampoo WASH affected area every other day    loratadine (CLARITIN) 10 mg tablet Take 10 mg by mouth daily as needed.      metoprolol succinate (TOPROL-XL) 25 MG 24 hr tablet Take 0.5 tablets (12.5 mg total) by mouth once daily.    albuterol (PROAIR HFA) 90 mcg/actuation inhaler Inhale 2 puffs into the lungs every 4 (four) hours as needed for Wheezing.    azelastine (ASTELIN) 137 mcg (0.1 %) nasal spray 2 sprays (274 mcg total) by Nasal route 2 (two) times daily.    omeprazole (PRILOSEC) 40 MG capsule Take 1 capsule (40 mg total) by mouth every morning.    triamcinolone acetonide 0.1% (KENALOG) 0.1 % cream Apply topically 2 (two) times daily.     Family History     Problem Relation (Age of Onset)    Arthritis Brother    Blindness Maternal Grandmother    COPD Father    Cancer Mother    Cataracts Maternal Grandmother    Glaucoma Mother    Heart disease Father, Brother    Retinal detachment Mother, Father        Tobacco Use    Smoking status: Former Smoker     Packs/day: 1.00     Years: 10.00     Pack years: 10.00     Last attempt to quit: 1968     Years since quittin.7    Smokeless tobacco: Former User    Tobacco comment:  last smoke   Substance and Sexual Activity    Alcohol use: Yes     Alcohol/week: 1.2 oz     Types: 1 Glasses of wine, 1 Shots of liquor per week     Frequency: 4 or more times a week     Drinks per session: 1 or 2     Binge frequency: Less than monthly     Comment: 5 days per week    Drug use: No    Sexual activity: Yes     ROS        Review of Systems   Constitution: Negative for fevers/chills. Negative for weakness/ malaise   HENT:  Negative for ear pain and tinnitus.   Eyes: Negative for blurred vision.   Cardiovascular: Positive for palpitations .   Negative for chest pain,  near-syncope, and syncope.   Respiratory:  Negative for shortness of breath   Endocrine:  Negative for polyuria.   Hematologic/Lymphatic: Negative for bruise/bleed easily.   Skin:  Negative for rash.   Musculoskeletal: Negative for joint pain and muscle weakness.   Extremity: Negative for swelling in the lower extremities.   Gastrointestinal:  Negative for abdominal pain and change in bowel habit.   Genitourinary: Negative for frequency.   Neurological:  Negative for dizziness.   Psychiatric/Behavioral:  Negative for depression, anxiety        Objective:     Vital Signs (Most Recent):  Temp: 97.4 °F (36.3 °C) (09/18/19 1400)  Pulse: 69 (09/18/19 1400)  Resp: 20 (09/18/19 1400)  BP: (!) 127/59 (09/18/19 1418)  SpO2: 98 % (09/18/19 1400) Vital Signs (24h Range):  Temp:  [97.4 °F (36.3 °C)] 97.4 °F (36.3 °C)  Pulse:  [69] 69  Resp:  [20] 20  SpO2:  [98 %] 98 %  BP: (115-127)/(57-59) 127/59       Weight: 99.3 kg (219 lb)  Body mass index is 32.34 kg/m².    SpO2: 98 %       Physical Exam        PE:   General: Well developed, well nourished, No distress on room air   HEENT: Head is normocephalic, atraumatic;  Lungs: Clear to auscultation bilaterally.  No wheezing. Normal respiratory effort.  Cardiovascular:  S1 S2 Normal rate, regular rhythm and normal heart sounds.    PMI is not displaced  Extremities: No LE edema. Pulses 2+ and symmetric.   Abdomen: Abdomen is soft, non-tender non-distended with normal bowel sounds.  Skin: Skin color, texture, turgor normal. No rashes.  Musculoskeletal: normal range of motion   Neurologic: Normal strength and tone. No focal numbness or weakness.   Psychiatric: normal mood and affect.  behavior is normal. Alert and oriented X 3.  Labs reviewed            Significant Imaging: chart reviewed

## 2019-09-19 NOTE — PROGRESS NOTES
I have personally taken the history and examined the patient and agree with the resident,s note as stated above         Results for MCKINLEY BISHOP (MRN [id number]) as of 4/17/2018 09:12   Ref. Range 4/13/2018 08:53   WBC Latest Ref Range: 3.90 - 12.70 K/uL 6.42   RBC Latest Ref Range: 4.60 - 6.20 M/uL 3.74 (L)   Hemoglobin Latest Ref Range: 14.0 - 18.0 g/dL 11.6 (L)   Hematocrit Latest Ref Range: 40.0 - 54.0 % 36.1 (L)   MCV Latest Ref Range: 82 - 98 fL 97   MCH Latest Ref Range: 27.0 - 31.0 pg 31.0   MCHC Latest Ref Range: 32.0 - 36.0 g/dL 32.1   RDW Latest Ref Range: 11.5 - 14.5 % 12.5   Platelets Latest Ref Range: 150 - 350 K/uL 214   MPV Latest Ref Range: 9.2 - 12.9 fL 10.7   Gran% Latest Ref Range: 38.0 - 73.0 % 48.3   Gran # (ANC) Latest Ref Range: 1.8 - 7.7 K/uL 3.1   Immature Granulocytes Latest Ref Range: 0.0 - 0.5 % 0.6 (H)   Immature Grans (Abs) Latest Ref Range: 0.00 - 0.04 K/uL 0.04   Lymph% Latest Ref Range: 18.0 - 48.0 % 29.6   Lymph # Latest Ref Range: 1.0 - 4.8 K/uL 1.9   Mono% Latest Ref Range: 4.0 - 15.0 % 13.6   Mono # Latest Ref Range: 0.3 - 1.0 K/uL 0.9   Eosinophil% Latest Ref Range: 0.0 - 8.0 % 6.7   Eos # Latest Ref Range: 0.0 - 0.5 K/uL 0.4   Basophil% Latest Ref Range: 0.0 - 1.9 % 1.2   Baso # Latest Ref Range: 0.00 - 0.20 K/uL 0.08   nRBC Latest Ref Range: 0 /100 WBC 0   Sodium Latest Ref Range: 136 - 145 mmol/L 134 (L)   Potassium Latest Ref Range: 3.5 - 5.1 mmol/L 4.5   Chloride Latest Ref Range: 95 - 110 mmol/L 102   CO2 Latest Ref Range: 23 - 29 mmol/L 24   Anion Gap Latest Ref Range: 8 - 16 mmol/L 8   BUN, Bld Latest Ref Range: 8 - 23 mg/dL 44 (H)   Creatinine Latest Ref Range: 0.5 - 1.4 mg/dL 2.1 (H)   eGFR if non African American Latest Ref Range: >60 mL/min/1.73 m^2 29.5 (A)   eGFR if African American Latest Ref Range: >60 mL/min/1.73 m^2 34.1 (A)   Glucose Latest Ref Range: 70 - 110 mg/dL 100   Calcium Latest Ref Range: 8.7 - 10.5 mg/dL 9.6   Alkaline Phosphatase Latest Ref  Range: 55 - 135 U/L 93   Total Protein Latest Ref Range: 6.0 - 8.4 g/dL 6.7   Albumin Latest Ref Range: 3.5 - 5.2 g/dL 3.8   Uric Acid Latest Ref Range: 3.4 - 7.0 mg/dL 5.6   Total Bilirubin Latest Ref Range: 0.1 - 1.0 mg/dL 0.5   AST Latest Ref Range: 10 - 40 U/L 22   ALT Latest Ref Range: 10 - 44 U/L 30   Differential Method Unknown Automated   Results for MCKINLEY BISHOP (MRN [id number]) as of 4/17/2018 09:12   Ref. Range 12/27/2016 09:11 4/12/2017 09:21 9/22/2017 08:37 9/27/2017 09:33 4/13/2018 08:53   Uric Acid Latest Ref Range: 3.4 - 7.0 mg/dL 5.0 5.4 4.5 4.5 5.6   ContResults for MCKINLEY BISHOP (MRN [id number]) as of 4/17/2018 09:12   Ref. 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   CKD stage 3b  obesity      Repeat SUA, CMP in 4 wks  Continue Weight reduction, refer to Bariatric Medicine if not successful on own  Continue walking program recently started  Cont febuxostat 40mg daily, adjust after repeat SUA in 4 wks  RTC 6 months with cmp and SUA   absent

## 2019-09-23 ENCOUNTER — TELEPHONE (OUTPATIENT)
Dept: ELECTROPHYSIOLOGY | Facility: CLINIC | Age: 79
End: 2019-09-23

## 2019-09-26 ENCOUNTER — CLINICAL SUPPORT (OUTPATIENT)
Dept: CARDIOLOGY | Facility: HOSPITAL | Age: 79
End: 2019-09-26
Attending: INTERNAL MEDICINE
Payer: MEDICARE

## 2019-09-26 DIAGNOSIS — I47.19 PAT (PAROXYSMAL ATRIAL TACHYCARDIA): ICD-10-CM

## 2019-10-16 RX ORDER — COLCHICINE 0.6 MG/1
TABLET ORAL
Qty: 45 TABLET | Refills: 0 | Status: SHIPPED | OUTPATIENT
Start: 2019-10-16 | End: 2019-10-30 | Stop reason: SDUPTHER

## 2019-10-17 RX ORDER — FLUTICASONE PROPIONATE 50 MCG
SPRAY, SUSPENSION (ML) NASAL
Qty: 48 ML | Refills: 3 | Status: SHIPPED | OUTPATIENT
Start: 2019-10-17 | End: 2020-10-12

## 2019-10-18 ENCOUNTER — CLINICAL SUPPORT (OUTPATIENT)
Dept: CARDIOLOGY | Facility: HOSPITAL | Age: 79
End: 2019-10-18
Attending: INTERNAL MEDICINE
Payer: MEDICARE

## 2019-10-18 ENCOUNTER — LAB VISIT (OUTPATIENT)
Dept: LAB | Facility: HOSPITAL | Age: 79
End: 2019-10-18
Attending: INTERNAL MEDICINE
Payer: MEDICARE

## 2019-10-18 ENCOUNTER — TELEPHONE (OUTPATIENT)
Dept: RHEUMATOLOGY | Facility: CLINIC | Age: 79
End: 2019-10-18

## 2019-10-18 DIAGNOSIS — M1A.9XX1 CHRONIC TOPHACEOUS GOUT: ICD-10-CM

## 2019-10-18 DIAGNOSIS — R35.1 NOCTURIA: ICD-10-CM

## 2019-10-18 DIAGNOSIS — I47.19 PAT (PAROXYSMAL ATRIAL TACHYCARDIA): ICD-10-CM

## 2019-10-18 DIAGNOSIS — D64.89 ANEMIA DUE TO OTHER CAUSE, NOT CLASSIFIED: Primary | ICD-10-CM

## 2019-10-18 DIAGNOSIS — I10 ESSENTIAL HYPERTENSION: Chronic | ICD-10-CM

## 2019-10-18 DIAGNOSIS — E78.5 HYPERLIPIDEMIA, UNSPECIFIED HYPERLIPIDEMIA TYPE: ICD-10-CM

## 2019-10-18 LAB
ALBUMIN SERPL BCP-MCNC: 3.9 G/DL (ref 3.5–5.2)
ALBUMIN SERPL BCP-MCNC: 3.9 G/DL (ref 3.5–5.2)
ALP SERPL-CCNC: 88 U/L (ref 55–135)
ALP SERPL-CCNC: 88 U/L (ref 55–135)
ALT SERPL W/O P-5'-P-CCNC: 21 U/L (ref 10–44)
ALT SERPL W/O P-5'-P-CCNC: 21 U/L (ref 10–44)
ANION GAP SERPL CALC-SCNC: 8 MMOL/L (ref 8–16)
ANION GAP SERPL CALC-SCNC: 8 MMOL/L (ref 8–16)
AST SERPL-CCNC: 23 U/L (ref 10–40)
AST SERPL-CCNC: 23 U/L (ref 10–40)
BASOPHILS # BLD AUTO: 0.09 K/UL (ref 0–0.2)
BASOPHILS # BLD AUTO: 0.09 K/UL (ref 0–0.2)
BASOPHILS NFR BLD: 1.5 % (ref 0–1.9)
BASOPHILS NFR BLD: 1.5 % (ref 0–1.9)
BILIRUB SERPL-MCNC: 0.5 MG/DL (ref 0.1–1)
BILIRUB SERPL-MCNC: 0.5 MG/DL (ref 0.1–1)
BUN SERPL-MCNC: 28 MG/DL (ref 8–23)
BUN SERPL-MCNC: 28 MG/DL (ref 8–23)
CALCIUM SERPL-MCNC: 9.3 MG/DL (ref 8.7–10.5)
CALCIUM SERPL-MCNC: 9.3 MG/DL (ref 8.7–10.5)
CHLORIDE SERPL-SCNC: 109 MMOL/L (ref 95–110)
CHLORIDE SERPL-SCNC: 109 MMOL/L (ref 95–110)
CHOLEST SERPL-MCNC: 154 MG/DL (ref 120–199)
CHOLEST/HDLC SERPL: 2 {RATIO} (ref 2–5)
CO2 SERPL-SCNC: 25 MMOL/L (ref 23–29)
CO2 SERPL-SCNC: 25 MMOL/L (ref 23–29)
COMPLEXED PSA SERPL-MCNC: 2.9 NG/ML (ref 0–4)
CREAT SERPL-MCNC: 1.9 MG/DL (ref 0.5–1.4)
CREAT SERPL-MCNC: 1.9 MG/DL (ref 0.5–1.4)
DIFFERENTIAL METHOD: ABNORMAL
DIFFERENTIAL METHOD: ABNORMAL
EOSINOPHIL # BLD AUTO: 0.3 K/UL (ref 0–0.5)
EOSINOPHIL # BLD AUTO: 0.3 K/UL (ref 0–0.5)
EOSINOPHIL NFR BLD: 5.2 % (ref 0–8)
EOSINOPHIL NFR BLD: 5.2 % (ref 0–8)
ERYTHROCYTE [DISTWIDTH] IN BLOOD BY AUTOMATED COUNT: 13.2 % (ref 11.5–14.5)
ERYTHROCYTE [DISTWIDTH] IN BLOOD BY AUTOMATED COUNT: 13.2 % (ref 11.5–14.5)
EST. GFR  (AFRICAN AMERICAN): 37.9 ML/MIN/1.73 M^2
EST. GFR  (AFRICAN AMERICAN): 37.9 ML/MIN/1.73 M^2
EST. GFR  (NON AFRICAN AMERICAN): 32.8 ML/MIN/1.73 M^2
EST. GFR  (NON AFRICAN AMERICAN): 32.8 ML/MIN/1.73 M^2
GLUCOSE SERPL-MCNC: 94 MG/DL (ref 70–110)
GLUCOSE SERPL-MCNC: 94 MG/DL (ref 70–110)
HCT VFR BLD AUTO: 38.2 % (ref 40–54)
HCT VFR BLD AUTO: 38.2 % (ref 40–54)
HDLC SERPL-MCNC: 76 MG/DL (ref 40–75)
HDLC SERPL: 49.4 % (ref 20–50)
HGB BLD-MCNC: 11.7 G/DL (ref 14–18)
HGB BLD-MCNC: 11.7 G/DL (ref 14–18)
IMM GRANULOCYTES # BLD AUTO: 0.02 K/UL (ref 0–0.04)
IMM GRANULOCYTES # BLD AUTO: 0.02 K/UL (ref 0–0.04)
IMM GRANULOCYTES NFR BLD AUTO: 0.3 % (ref 0–0.5)
IMM GRANULOCYTES NFR BLD AUTO: 0.3 % (ref 0–0.5)
LDLC SERPL CALC-MCNC: 66.2 MG/DL (ref 63–159)
LYMPHOCYTES # BLD AUTO: 2 K/UL (ref 1–4.8)
LYMPHOCYTES # BLD AUTO: 2 K/UL (ref 1–4.8)
LYMPHOCYTES NFR BLD: 32.8 % (ref 18–48)
LYMPHOCYTES NFR BLD: 32.8 % (ref 18–48)
MCH RBC QN AUTO: 30.5 PG (ref 27–31)
MCH RBC QN AUTO: 30.5 PG (ref 27–31)
MCHC RBC AUTO-ENTMCNC: 30.6 G/DL (ref 32–36)
MCHC RBC AUTO-ENTMCNC: 30.6 G/DL (ref 32–36)
MCV RBC AUTO: 100 FL (ref 82–98)
MCV RBC AUTO: 100 FL (ref 82–98)
MONOCYTES # BLD AUTO: 0.8 K/UL (ref 0.3–1)
MONOCYTES # BLD AUTO: 0.8 K/UL (ref 0.3–1)
MONOCYTES NFR BLD: 12.8 % (ref 4–15)
MONOCYTES NFR BLD: 12.8 % (ref 4–15)
NEUTROPHILS # BLD AUTO: 2.8 K/UL (ref 1.8–7.7)
NEUTROPHILS # BLD AUTO: 2.8 K/UL (ref 1.8–7.7)
NEUTROPHILS NFR BLD: 47.4 % (ref 38–73)
NEUTROPHILS NFR BLD: 47.4 % (ref 38–73)
NONHDLC SERPL-MCNC: 78 MG/DL
NRBC BLD-RTO: 0 /100 WBC
NRBC BLD-RTO: 0 /100 WBC
PLATELET # BLD AUTO: 185 K/UL (ref 150–350)
PLATELET # BLD AUTO: 185 K/UL (ref 150–350)
PMV BLD AUTO: 12.4 FL (ref 9.2–12.9)
PMV BLD AUTO: 12.4 FL (ref 9.2–12.9)
POTASSIUM SERPL-SCNC: 4.2 MMOL/L (ref 3.5–5.1)
POTASSIUM SERPL-SCNC: 4.2 MMOL/L (ref 3.5–5.1)
PROT SERPL-MCNC: 6.7 G/DL (ref 6–8.4)
PROT SERPL-MCNC: 6.7 G/DL (ref 6–8.4)
RBC # BLD AUTO: 3.83 M/UL (ref 4.6–6.2)
RBC # BLD AUTO: 3.83 M/UL (ref 4.6–6.2)
SODIUM SERPL-SCNC: 142 MMOL/L (ref 136–145)
SODIUM SERPL-SCNC: 142 MMOL/L (ref 136–145)
TRIGL SERPL-MCNC: 59 MG/DL (ref 30–150)
TSH SERPL DL<=0.005 MIU/L-ACNC: 2.97 UIU/ML (ref 0.4–4)
URATE SERPL-MCNC: 6 MG/DL (ref 3.4–7)
WBC # BLD AUTO: 6 K/UL (ref 3.9–12.7)
WBC # BLD AUTO: 6 K/UL (ref 3.9–12.7)

## 2019-10-18 PROCEDURE — 93298 CARDIAC DEVICE CHECK - REMOTE: ICD-10-PCS | Mod: ,,, | Performed by: INTERNAL MEDICINE

## 2019-10-18 PROCEDURE — 84153 ASSAY OF PSA TOTAL: CPT

## 2019-10-18 PROCEDURE — 93298 REM INTERROG DEV EVAL SCRMS: CPT | Mod: ,,, | Performed by: INTERNAL MEDICINE

## 2019-10-18 PROCEDURE — 84443 ASSAY THYROID STIM HORMONE: CPT

## 2019-10-18 PROCEDURE — 84550 ASSAY OF BLOOD/URIC ACID: CPT

## 2019-10-18 PROCEDURE — 36415 COLL VENOUS BLD VENIPUNCTURE: CPT | Mod: PN

## 2019-10-18 PROCEDURE — 80061 LIPID PANEL: CPT

## 2019-10-18 PROCEDURE — 80053 COMPREHEN METABOLIC PANEL: CPT

## 2019-10-18 PROCEDURE — 93299 CARDIAC DEVICE CHECK - REMOTE: CPT | Performed by: INTERNAL MEDICINE

## 2019-10-18 PROCEDURE — 85025 COMPLETE CBC W/AUTO DIFF WBC: CPT

## 2019-10-21 ENCOUNTER — OFFICE VISIT (OUTPATIENT)
Dept: INTERNAL MEDICINE | Facility: CLINIC | Age: 79
End: 2019-10-21
Payer: MEDICARE

## 2019-10-21 VITALS
HEART RATE: 71 BPM | WEIGHT: 224.88 LBS | RESPIRATION RATE: 18 BRPM | DIASTOLIC BLOOD PRESSURE: 70 MMHG | SYSTOLIC BLOOD PRESSURE: 119 MMHG | BODY MASS INDEX: 33.31 KG/M2 | HEIGHT: 69 IN | TEMPERATURE: 99 F

## 2019-10-21 DIAGNOSIS — L98.9 CHANGING SKIN LESION: ICD-10-CM

## 2019-10-21 DIAGNOSIS — R60.9 EDEMA, UNSPECIFIED TYPE: ICD-10-CM

## 2019-10-21 DIAGNOSIS — J45.20 INTERMITTENT ASTHMA WITHOUT COMPLICATION, UNSPECIFIED ASTHMA SEVERITY: ICD-10-CM

## 2019-10-21 DIAGNOSIS — L98.9 SKIN LESION OF HAND: ICD-10-CM

## 2019-10-21 DIAGNOSIS — E78.49 OTHER HYPERLIPIDEMIA: ICD-10-CM

## 2019-10-21 DIAGNOSIS — J31.0 CHRONIC RHINITIS: Chronic | ICD-10-CM

## 2019-10-21 DIAGNOSIS — Z00.00 WELL ADULT EXAM: Primary | ICD-10-CM

## 2019-10-21 DIAGNOSIS — N18.30 CKD (CHRONIC KIDNEY DISEASE), STAGE III: ICD-10-CM

## 2019-10-21 DIAGNOSIS — I48.92 ATRIAL FLUTTER, UNSPECIFIED TYPE: ICD-10-CM

## 2019-10-21 DIAGNOSIS — I10 ESSENTIAL HYPERTENSION: ICD-10-CM

## 2019-10-21 DIAGNOSIS — M1A.9XX1 CHRONIC TOPHACEOUS GOUT: ICD-10-CM

## 2019-10-21 PROCEDURE — 3078F DIAST BP <80 MM HG: CPT | Mod: CPTII,S$GLB,, | Performed by: INTERNAL MEDICINE

## 2019-10-21 PROCEDURE — 3074F PR MOST RECENT SYSTOLIC BLOOD PRESSURE < 130 MM HG: ICD-10-PCS | Mod: CPTII,S$GLB,, | Performed by: INTERNAL MEDICINE

## 2019-10-21 PROCEDURE — 99214 OFFICE O/P EST MOD 30 MIN: CPT | Mod: S$GLB,,, | Performed by: INTERNAL MEDICINE

## 2019-10-21 PROCEDURE — 99999 PR PBB SHADOW E&M-EST. PATIENT-LVL IV: CPT | Mod: PBBFAC,,, | Performed by: INTERNAL MEDICINE

## 2019-10-21 PROCEDURE — 3078F PR MOST RECENT DIASTOLIC BLOOD PRESSURE < 80 MM HG: ICD-10-PCS | Mod: CPTII,S$GLB,, | Performed by: INTERNAL MEDICINE

## 2019-10-21 PROCEDURE — 99214 PR OFFICE/OUTPT VISIT, EST, LEVL IV, 30-39 MIN: ICD-10-PCS | Mod: S$GLB,,, | Performed by: INTERNAL MEDICINE

## 2019-10-21 PROCEDURE — 99999 PR PBB SHADOW E&M-EST. PATIENT-LVL IV: ICD-10-PCS | Mod: PBBFAC,,, | Performed by: INTERNAL MEDICINE

## 2019-10-21 PROCEDURE — 3074F SYST BP LT 130 MM HG: CPT | Mod: CPTII,S$GLB,, | Performed by: INTERNAL MEDICINE

## 2019-10-21 RX ORDER — FEBUXOSTAT 40 MG/1
TABLET, FILM COATED ORAL
COMMUNITY
End: 2020-04-20

## 2019-10-21 RX ORDER — HYDROCORTISONE ACETATE PRAMOXINE HCL 2.5; 1 G/100G; G/100G
CREAM TOPICAL 3 TIMES DAILY
Qty: 28 G | Refills: 1 | Status: SHIPPED | OUTPATIENT
Start: 2019-10-21 | End: 2020-10-22 | Stop reason: SDUPTHER

## 2019-10-21 RX ORDER — CLINDAMYCIN PHOSPHATE 11.9 MG/ML
SOLUTION TOPICAL
Refills: 5 | Status: ON HOLD | COMMUNITY
Start: 2019-09-10 | End: 2020-06-11 | Stop reason: HOSPADM

## 2019-10-22 RX ORDER — IRBESARTAN 150 MG/1
TABLET ORAL
Qty: 30 TABLET | Refills: 11 | Status: SHIPPED | OUTPATIENT
Start: 2019-10-22 | End: 2020-10-12

## 2019-10-22 RX ORDER — FLUTICASONE PROPIONATE AND SALMETEROL 250; 50 UG/1; UG/1
POWDER RESPIRATORY (INHALATION)
Qty: 60 EACH | Refills: 5 | Status: SHIPPED | OUTPATIENT
Start: 2019-10-22 | End: 2020-04-20

## 2019-10-24 NOTE — PROGRESS NOTES
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    Chief Complaint: Annual Exam    HPI   The patient presents for annual physical exam and follow-up of medical conditions.  The patient states he is doing well overall.  His energy level is good.  He exercises 3-4 days a week for a total of 150 min per week.  He is averaging 7-8 hours of sleep at night.  Active medical conditions include hypertension, asthma, hyperlipidemia, edema atrial flutter, hyperuricemia with chronic tophaceous gout.  He also has chronic rhinitis, chronic kidney disease.  He has been noting scaly enlarging skin  lesions on the left flank and left hand.    Asthma has been stable.  Peak expiratory flows have ranged from 450-500 on average.  He is on maintenance inhaler therapy.    Lower extremity edema is controlled with use of Lasix.  He also uses compression stockings.    He is tolerating Lipitor well.  He has not experienced any severe muscle pains on this preparation.  Recently metoprolol was added by his cardiologist.  He has atrial flutter.  A loop monitor is in place since 09/2019.  A coronary angiogram was negative for obstructive coronary artery disease. Eliquis was discontinued on 08/06/2019.  Aspirin has also been discontinued.  The patient states he discontinued omeprazole.    Immunization record was reviewed.  Screening tests were reviewed.    No interval change in past medical history, family history, or social history since prior evaluations.    Review of Systems   Constitutional: Negative for activity change, appetite change, chills, fatigue, fever and unexpected weight change.   HENT: Positive for postnasal drip. Negative for congestion, ear pain and nosebleeds.    Eyes: Negative for pain, redness, itching and visual disturbance.   Respiratory: Negative for cough, chest tightness, shortness of breath and wheezing.    Cardiovascular: Positive for palpitations (Occasional symptoms are noted) and leg  swelling. Negative for chest pain.   Gastrointestinal: Negative for abdominal pain, blood in stool, constipation, nausea and vomiting.   Genitourinary: Negative for difficulty urinating, dysuria, frequency, hematuria and urgency.   Musculoskeletal: Positive for arthralgias and back pain. Negative for gait problem, joint swelling, myalgias, neck pain and neck stiffness.   Skin: Negative for color change and rash.        Two enlarging skin lesions are noted-on the hand and on the left flank.   Neurological: Negative for dizziness, seizures, syncope, weakness, light-headedness, numbness and headaches.   Hematological: Does not bruise/bleed easily.   Psychiatric/Behavioral: Negative for agitation, confusion, dysphoric mood, hallucinations and sleep disturbance. The patient is not nervous/anxious.        Objective:      Physical Exam   Constitutional: He is oriented to person, place, and time. He appears well-developed and well-nourished. No distress.   HENT:   Head: Normocephalic and atraumatic.   Right Ear: External ear normal.   Left Ear: External ear normal.   Mouth/Throat: Oropharynx is clear and moist. No oropharyngeal exudate.   Eyes: Pupils are equal, round, and reactive to light. Conjunctivae and EOM are normal. No scleral icterus.   Neck: Normal range of motion. Neck supple. No JVD present. No thyromegaly present.   Cardiovascular: Normal rate, regular rhythm, normal heart sounds and intact distal pulses. Exam reveals no gallop and no friction rub.   No murmur heard.  Trace ankle edema is present bilaterally.   Pulmonary/Chest: Effort normal and breath sounds normal. No respiratory distress. He has no wheezes. He has no rales.   Abdominal: Soft. Bowel sounds are normal. He exhibits no mass. There is no tenderness.   Genitourinary: Penis normal.   Genitourinary Comments:   No inguinal hernia.  No testicular masses. Left testicle is atrophic.   Musculoskeletal: Normal range of motion. He exhibits no edema or  tenderness.   Negative straight leg raising test bilaterally.  Hip range of motion is intact.  Knee range of motion is intact.   Lymphadenopathy:     He has no cervical adenopathy.   Neurological: He is alert and oriented to person, place, and time. No cranial nerve deficit. He exhibits normal muscle tone. Coordination normal.   Skin: Skin is warm and dry. No rash noted.   A small scaly lesion is noted on the dorsal aspect of the left hand.  A raised scaly pigmented lesion is noted the left flank area.  No ulceration is present.   Psychiatric: He has a normal mood and affect. His behavior is normal. Judgment and thought content normal.   Nursing note and vitals reviewed.      Lab Visit on 10/18/2019   Component Date Value Ref Range Status    Specimen UA 10/18/2019 Urine, Clean Catch   Final    Color, UA 10/18/2019 Yellow  Yellow, Straw, Madelaine Final    Appearance, UA 10/18/2019 Clear  Clear Final    pH, UA 10/18/2019 5.0  5.0 - 8.0 Final    Specific [place], UA 10/18/2019 1.015  1.005 - 1.030 Final    Protein, UA 10/18/2019 Negative  Negative Final    Comment: Recommend a 24 hour urine protein or a urine   protein/creatinine ratio if globulin induced proteinuria is  clinically suspected.      Glucose, UA 10/18/2019 Negative  Negative Final    Ketones, UA 10/18/2019 Negative  Negative Final    Bilirubin (UA) 10/18/2019 Negative  Negative Final    Occult Blood UA 10/18/2019 Negative  Negative Final    Nitrite, UA 10/18/2019 Negative  Negative Final    Leukocytes, UA 10/18/2019 Negative  Negative Final   Lab Visit on 10/18/2019   Component Date Value Ref Range Status    WBC 10/18/2019 6.00  3.90 - 12.70 K/uL Final    RBC 10/18/2019 3.83* 4.60 - 6.20 M/uL Final    Hemoglobin 10/18/2019 11.7* 14.0 - 18.0 g/dL Final    Hematocrit 10/18/2019 38.2* 40.0 - 54.0 % Final    Mean Corpuscular Volume 10/18/2019 100* 82 - 98 fL Final    Mean Corpuscular Hemoglobin 10/18/2019 30.5  27.0 - 31.0 pg Final    Mean  Corpuscular Hemoglobin Conc 10/18/2019 30.6* 32.0 - 36.0 g/dL Final    RDW 10/18/2019 13.2  11.5 - 14.5 % Final    Platelets 10/18/2019 185  150 - 350 K/uL Final    MPV 10/18/2019 12.4  9.2 - 12.9 fL Final    Immature Granulocytes 10/18/2019 0.3  0.0 - 0.5 % Final    Gran # (ANC) 10/18/2019 2.8  1.8 - 7.7 K/uL Final    Immature Grans (Abs) 10/18/2019 0.02  0.00 - 0.04 K/uL Final    Comment: Mild elevation in immature granulocytes is non specific and   can be seen in a variety of conditions including stress response,   acute inflammation, trauma and pregnancy. Correlation with other   laboratory and clinical findings is essential.      Lymph # 10/18/2019 2.0  1.0 - 4.8 K/uL Final    Mono # 10/18/2019 0.8  0.3 - 1.0 K/uL Final    Eos # 10/18/2019 0.3  0.0 - 0.5 K/uL Final    Baso # 10/18/2019 0.09  0.00 - 0.20 K/uL Final    nRBC 10/18/2019 0  0 /100 WBC Final    Gran% 10/18/2019 47.4  38.0 - 73.0 % Final    Lymph% 10/18/2019 32.8  18.0 - 48.0 % Final    Mono% 10/18/2019 12.8  4.0 - 15.0 % Final    Eosinophil% 10/18/2019 5.2  0.0 - 8.0 % Final    Basophil% 10/18/2019 1.5  0.0 - 1.9 % Final    Differential Method 10/18/2019 Automated   Final    Sodium 10/18/2019 142  136 - 145 mmol/L Final    Potassium 10/18/2019 4.2  3.5 - 5.1 mmol/L Final    Chloride 10/18/2019 109  95 - 110 mmol/L Final    CO2 10/18/2019 25  23 - 29 mmol/L Final    Glucose 10/18/2019 94  70 - 110 mg/dL Final    BUN, Bld 10/18/2019 28* 8 - 23 mg/dL Final    Creatinine 10/18/2019 1.9* 0.5 - 1.4 mg/dL Final    Calcium 10/18/2019 9.3  8.7 - 10.5 mg/dL Final    Total Protein 10/18/2019 6.7  6.0 - 8.4 g/dL Final    Albumin 10/18/2019 3.9  3.5 - 5.2 g/dL Final    Total Bilirubin 10/18/2019 0.5  0.1 - 1.0 mg/dL Final    Comment: For infants and newborns, interpretation of results should be based  on gestational age, weight and in agreement with clinical  observations.  Premature Infant recommended reference ranges:  Up to 24  hours.............<8.0 mg/dL  Up to 48 hours............<12.0 mg/dL  3-5 days..................<15.0 mg/dL  6-29 days.................<15.0 mg/dL      Alkaline Phosphatase 10/18/2019 88  55 - 135 U/L Final    AST 10/18/2019 23  10 - 40 U/L Final    ALT 10/18/2019 21  10 - 44 U/L Final    Anion Gap 10/18/2019 8  8 - 16 mmol/L Final    eGFR if  10/18/2019 37.9* >60 mL/min/1.73 m^2 Final    eGFR if non  10/18/2019 32.8* >60 mL/min/1.73 m^2 Final    Comment: Calculation used to obtain the estimated glomerular filtration  rate (eGFR) is the CKD-EPI equation.       Uric Acid 10/18/2019 6.0  3.4 - 7.0 mg/dL Final    WBC 10/18/2019 6.00  3.90 - 12.70 K/uL Final    RBC 10/18/2019 3.83* 4.60 - 6.20 M/uL Final    Hemoglobin 10/18/2019 11.7* 14.0 - 18.0 g/dL Final    Hematocrit 10/18/2019 38.2* 40.0 - 54.0 % Final    Mean Corpuscular Volume 10/18/2019 100* 82 - 98 fL Final    Mean Corpuscular Hemoglobin 10/18/2019 30.5  27.0 - 31.0 pg Final    Mean Corpuscular Hemoglobin Conc 10/18/2019 30.6* 32.0 - 36.0 g/dL Final    RDW 10/18/2019 13.2  11.5 - 14.5 % Final    Platelets 10/18/2019 185  150 - 350 K/uL Final    MPV 10/18/2019 12.4  9.2 - 12.9 fL Final    Immature Granulocytes 10/18/2019 0.3  0.0 - 0.5 % Final    Gran # (ANC) 10/18/2019 2.8  1.8 - 7.7 K/uL Final    Immature Grans (Abs) 10/18/2019 0.02  0.00 - 0.04 K/uL Final    Comment: Mild elevation in immature granulocytes is non specific and   can be seen in a variety of conditions including stress response,   acute inflammation, trauma and pregnancy. Correlation with other   laboratory and clinical findings is essential.      Lymph # 10/18/2019 2.0  1.0 - 4.8 K/uL Final    Mono # 10/18/2019 0.8  0.3 - 1.0 K/uL Final    Eos # 10/18/2019 0.3  0.0 - 0.5 K/uL Final    Baso # 10/18/2019 0.09  0.00 - 0.20 K/uL Final    nRBC 10/18/2019 0  0 /100 WBC Final    Gran% 10/18/2019 47.4  38.0 - 73.0 % Final    Lymph% 10/18/2019  32.8  18.0 - 48.0 % Final    Mono% 10/18/2019 12.8  4.0 - 15.0 % Final    Eosinophil% 10/18/2019 5.2  0.0 - 8.0 % Final    Basophil% 10/18/2019 1.5  0.0 - 1.9 % Final    Differential Method 10/18/2019 Automated   Final    Sodium 10/18/2019 142  136 - 145 mmol/L Final    Potassium 10/18/2019 4.2  3.5 - 5.1 mmol/L Final    Chloride 10/18/2019 109  95 - 110 mmol/L Final    CO2 10/18/2019 25  23 - 29 mmol/L Final    Glucose 10/18/2019 94  70 - 110 mg/dL Final    BUN, Bld 10/18/2019 28* 8 - 23 mg/dL Final    Creatinine 10/18/2019 1.9* 0.5 - 1.4 mg/dL Final    Calcium 10/18/2019 9.3  8.7 - 10.5 mg/dL Final    Total Protein 10/18/2019 6.7  6.0 - 8.4 g/dL Final    Albumin 10/18/2019 3.9  3.5 - 5.2 g/dL Final    Total Bilirubin 10/18/2019 0.5  0.1 - 1.0 mg/dL Final    Comment: For infants and newborns, interpretation of results should be based  on gestational age, weight and in agreement with clinical  observations.  Premature Infant recommended reference ranges:  Up to 24 hours.............<8.0 mg/dL  Up to 48 hours............<12.0 mg/dL  3-5 days..................<15.0 mg/dL  6-29 days.................<15.0 mg/dL      Alkaline Phosphatase 10/18/2019 88  55 - 135 U/L Final    AST 10/18/2019 23  10 - 40 U/L Final    ALT 10/18/2019 21  10 - 44 U/L Final    Anion Gap 10/18/2019 8  8 - 16 mmol/L Final    eGFR if  10/18/2019 37.9* >60 mL/min/1.73 m^2 Final    eGFR if non  10/18/2019 32.8* >60 mL/min/1.73 m^2 Final    Comment: Calculation used to obtain the estimated glomerular filtration  rate (eGFR) is the CKD-EPI equation.       Cholesterol 10/18/2019 154  120 - 199 mg/dL Final    Comment: The National Cholesterol Education Program (NCEP) has set the  following guidelines (reference ranges) for Cholesterol:  Optimal.....................<200 mg/dL  Borderline High.............200-239 mg/dL  High........................> or = 240 mg/dL      Triglycerides 10/18/2019 59  30 -  150 mg/dL Final    Comment: The National Cholesterol Education Program (NCEP) has set the  following guidelines (reference values) for triglycerides:  Normal......................<150 mg/dL  Borderline High.............150-199 mg/dL  High........................200-499 mg/dL      HDL 10/18/2019 76* 40 - 75 mg/dL Final    Comment: The National Cholesterol Education Program (NCEP) has set the  following guidelines (reference values) for HDL Cholesterol:  Low...............<40 mg/dL  Optimal...........>60 mg/dL      LDL Cholesterol 10/18/2019 66.2  63.0 - 159.0 mg/dL Final    Comment: The National Cholesterol Education Program (NCEP) has set the  following guidelines (reference values) for LDL Cholesterol:  Optimal.......................<130 mg/dL  Borderline High...............130-159 mg/dL  High..........................160-189 mg/dL  Very High.....................>190 mg/dL      Hdl/Cholesterol Ratio 10/18/2019 49.4  20.0 - 50.0 % Final    Total Cholesterol/HDL Ratio 10/18/2019 2.0  2.0 - 5.0 Final    Non-HDL Cholesterol 10/18/2019 78  mg/dL Final    Comment: Risk category and Non-HDL cholesterol goals:  Coronary heart disease (CHD)or equivalent (10-year risk of CHD >20%):  Non-HDL cholesterol goal     <130 mg/dL  Two or more CHD risk factors and 10-year risk of CHD <= 20%:  Non-HDL cholesterol goal     <160 mg/dL  0 to 1 CHD risk factor:  Non-HDL cholesterol goal     <190 mg/dL      TSH 10/18/2019 2.973  0.400 - 4.000 uIU/mL Final    PSA DIAGNOSTIC 10/18/2019 2.9  0.00 - 4.00 ng/mL Final    Comment: PSA Expected levels:  Hormonal Therapy: <0.05 ng/ml  Prostatectomy: <0.01 ng/ml  Radiation Therapy: <1.00 ng/ml         Assessment:       1. Well adult exam    2. Skin lesion of hand    3. Changing skin lesion    4. Intermittent asthma without complication, unspecified asthma severity    5. Other hyperlipidemia    6. Essential hypertension    7. CKD (chronic kidney disease), stage III    8. Atrial flutter,  unspecified type    9. Chronic rhinitis    10. Chronic tophaceous gout    11. Edema, unspecified type        Plan:       Adelfo was seen today for annual exam.  Current therapy will be continued.  High-dose flu vaccine will be given today.  Dermatology will be consulted regarding the skin lesions. The patient is to return to clinic in 6 months.  Diagnoses and all orders for this visit:    Well adult exam    Skin lesion of hand  -     Ambulatory consult to Dermatology    Changing skin lesion  -     Ambulatory consult to Dermatology    Intermittent asthma without complication, unspecified asthma severity    Other hyperlipidemia    Essential hypertension    CKD (chronic kidney disease), stage III    Atrial flutter, unspecified type    Chronic rhinitis    Chronic tophaceous gout    Edema, unspecified type    Other orders  -     hydrocortisone-pramoxine (ANALPRAM-HC) 2.5-1 % Crea; Place rectally 3 (three) times daily.

## 2019-10-26 ENCOUNTER — PATIENT OUTREACH (OUTPATIENT)
Dept: ADMINISTRATIVE | Facility: OTHER | Age: 79
End: 2019-10-26

## 2019-10-28 ENCOUNTER — PATIENT MESSAGE (OUTPATIENT)
Dept: ELECTROPHYSIOLOGY | Facility: CLINIC | Age: 79
End: 2019-10-28

## 2019-10-28 ENCOUNTER — TELEPHONE (OUTPATIENT)
Dept: ELECTROPHYSIOLOGY | Facility: CLINIC | Age: 79
End: 2019-10-28

## 2019-10-28 NOTE — TELEPHONE ENCOUNTER
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 He has asthma and not COPD. Please disregard this diagnosis. Furthermore he has never been diagnosed or observed to have atrial fibrillation. He had atrial flutter, for which we performed an ablation procedure, and has likely a paroxysmal atrial tachycardia that is controlled with metoprolol. Since he had atrial flutter and a paroxysmal atrial tachycardia, an implantable loop recorder was placed for long-term rhythm monitoring to ensure he does not have atrial fibrillation, however to date he has never been observed or diagnosed with atrial fibrillation.

## 2019-10-28 NOTE — TELEPHONE ENCOUNTER
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  No answer to home or mobile numbers.  Voicemail left letting him know Dr. Wilson has sent him a portal message and to please reach out to our office with any questions or concerns.

## 2019-10-30 ENCOUNTER — OFFICE VISIT (OUTPATIENT)
Dept: RHEUMATOLOGY | Facility: CLINIC | Age: 79
End: 2019-10-30
Payer: MEDICARE

## 2019-10-30 VITALS
HEIGHT: 71 IN | DIASTOLIC BLOOD PRESSURE: 59 MMHG | BODY MASS INDEX: 32.1 KG/M2 | HEART RATE: 75 BPM | WEIGHT: 229.31 LBS | SYSTOLIC BLOOD PRESSURE: 136 MMHG

## 2019-10-30 DIAGNOSIS — M1A.9XX1 CHRONIC TOPHACEOUS GOUT: Primary | ICD-10-CM

## 2019-10-30 PROCEDURE — 99999 PR PBB SHADOW E&M-EST. PATIENT-LVL IV: CPT | Mod: PBBFAC,,, | Performed by: INTERNAL MEDICINE

## 2019-10-30 PROCEDURE — 99999 PR PBB SHADOW E&M-EST. PATIENT-LVL IV: ICD-10-PCS | Mod: PBBFAC,,, | Performed by: INTERNAL MEDICINE

## 2019-10-30 PROCEDURE — 1101F PR PT FALLS ASSESS DOC 0-1 FALLS W/OUT INJ PAST YR: ICD-10-PCS | Mod: CPTII,S$GLB,, | Performed by: INTERNAL MEDICINE

## 2019-10-30 PROCEDURE — 3075F SYST BP GE 130 - 139MM HG: CPT | Mod: CPTII,S$GLB,, | Performed by: INTERNAL MEDICINE

## 2019-10-30 PROCEDURE — 3078F PR MOST RECENT DIASTOLIC BLOOD PRESSURE < 80 MM HG: ICD-10-PCS | Mod: CPTII,S$GLB,, | Performed by: INTERNAL MEDICINE

## 2019-10-30 PROCEDURE — 3075F PR MOST RECENT SYSTOLIC BLOOD PRESS GE 130-139MM HG: ICD-10-PCS | Mod: CPTII,S$GLB,, | Performed by: INTERNAL MEDICINE

## 2019-10-30 PROCEDURE — 99213 PR OFFICE/OUTPT VISIT, EST, LEVL III, 20-29 MIN: ICD-10-PCS | Mod: S$GLB,,, | Performed by: INTERNAL MEDICINE

## 2019-10-30 PROCEDURE — 3078F DIAST BP <80 MM HG: CPT | Mod: CPTII,S$GLB,, | Performed by: INTERNAL MEDICINE

## 2019-10-30 PROCEDURE — 99213 OFFICE O/P EST LOW 20 MIN: CPT | Mod: S$GLB,,, | Performed by: INTERNAL MEDICINE

## 2019-10-30 PROCEDURE — 1101F PT FALLS ASSESS-DOCD LE1/YR: CPT | Mod: CPTII,S$GLB,, | Performed by: INTERNAL MEDICINE

## 2019-10-30 RX ORDER — FEBUXOSTAT 40 MG/1
TABLET, FILM COATED ORAL
Qty: 45 TABLET | Refills: 1 | Status: SHIPPED | OUTPATIENT
Start: 2019-10-30 | End: 2020-05-11

## 2019-10-30 RX ORDER — COLCHICINE 0.6 MG/1
TABLET ORAL
Qty: 45 TABLET | Refills: 1 | Status: SHIPPED | OUTPATIENT
Start: 2019-10-30 | End: 2020-05-11 | Stop reason: SDUPTHER

## 2019-10-30 ASSESSMENT — ROUTINE ASSESSMENT OF PATIENT INDEX DATA (RAPID3)
MDHAQ FUNCTION SCORE: 0
TOTAL RAPID3 SCORE: 0
FATIGUE SCORE: 0
PAIN SCORE: 0
PSYCHOLOGICAL DISTRESS SCORE: 0
PATIENT GLOBAL ASSESSMENT SCORE: 0
AM STIFFNESS SCORE: 0, NO

## 2019-10-30 NOTE — PROGRESS NOTES
I have personally taken the history and examined the patient and agree with the resident,s note as stated above       Chronic tophaceous gout, intercritical. SUA 6 mg/dl on febuxostat 20mg daily  CKD stable  Hyperlipidemia, controlled on atorvastatin 40mg every evening. LDL 66 10/18/19  Hypertension,  136/59    Cmp, uric acid in 4 wks  Cont febuxostat 20mg daily  Cont colchicine 0.3mg daily  RTC 6 months with cmp, uric acid        Answers for HPI/ROS submitted by the patient on 10/28/2019   fever: No  eye redness: No  headaches: No  shortness of breath: No  chest pain: No  trouble swallowing: No  diarrhea: No  constipation: No  unexpected weight change: No  genital sore: No  During the last 3 days, have you had a skin rash?: No  Bruises or bleeds easily: Yes  cough: No

## 2019-10-30 NOTE — PROGRESS NOTES
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 male.    Chief Complaint: Chronic Tophaceous Gout    HPI   79 year old male with a history including CKD III, anemia, and gout last seen in this clinic on 4/30/19. Today he reports no acute gout flares and no joint pain or swelling. He is taking .3mg colchicine daily and 20mg febuxostat daily. Previously tried allopurinol which caused elevated hepatic enzymes. Since last visit he has undergone loop recorder implantation for atrial flutter. He says today he's feeling great and tolerating his medications without issue.    Review of Systems   Constitutional: Negative for fever and unexpected weight change.   HENT: Negative for trouble swallowing.    Eyes: Negative for redness.   Respiratory: Positive for cough. Negative for shortness of breath.    Cardiovascular: Negative for chest pain.   Gastrointestinal: Positive for diarrhea. Negative for constipation.   Genitourinary: Negative for genital sores.   Skin: Negative for rash.   Neurological: Negative for headaches.         Objective:   BP (!) 136/59   Pulse 75   Ht 5' 10.8" (1.798 m)   Wt 104 kg (229 lb 4.8 oz)   BMI 32.16 kg/m²      Physical Exam   Constitutional: He is well-developed, well-nourished, and in no distress.   HENT:   Head: Normocephalic and atraumatic.   Cardiovascular: Normal rate and regular rhythm.    Pulmonary/Chest: Effort normal and breath sounds normal. No respiratory distress. He has no wheezes.       Right Side Rheumatological Exam     Examination finds the shoulder, elbow, wrist, knee, 1st PIP, 1st MCP, 2nd PIP, 2nd MCP, 3rd PIP, 3rd MCP, 4th PIP, 4th MCP, 5th PIP and 5th MCP normal.    Left Side Rheumatological Exam     Examination finds the shoulder, elbow, wrist, knee, 1st PIP, 1st MCP, 2nd PIP, 2nd MCP, 3rd PIP, 3rd MCP, 4th PIP, 4th MCP, 5th PIP and 5th MCP normal.           Results for MCKINLEY RODRIGUEZ, PHD (MRN [id number]) as of 10/30/2019 10:39   Ref. Range " 10/18/2019 08:40   WBC Latest Ref Range: 3.90 - 12.70 K/uL 6.00   RBC Latest Ref Range: 4.60 - 6.20 M/uL 3.83 (L)   Hemoglobin Latest Ref Range: 14.0 - 18.0 g/dL 11.7 (L)   Hematocrit Latest Ref Range: 40.0 - 54.0 % 38.2 (L)   MCV Latest Ref Range: 82 - 98 fL 100 (H)   MCH Latest Ref Range: 27.0 - 31.0 pg 30.5   MCHC Latest Ref Range: 32.0 - 36.0 g/dL 30.6 (L)   RDW Latest Ref Range: 11.5 - 14.5 % 13.2   Platelets Latest Ref Range: 150 - 350 K/uL 185   MPV Latest Ref Range: 9.2 - 12.9 fL 12.4   Gran% Latest Ref Range: 38.0 - 73.0 % 47.4   Gran # (ANC) Latest Ref Range: 1.8 - 7.7 K/uL 2.8   Lymph% Latest Ref Range: 18.0 - 48.0 % 32.8   Lymph # Latest Ref Range: 1.0 - 4.8 K/uL 2.0   Mono% Latest Ref Range: 4.0 - 15.0 % 12.8   Mono # Latest Ref Range: 0.3 - 1.0 K/uL 0.8   Eosinophil% Latest Ref Range: 0.0 - 8.0 % 5.2   Eos # Latest Ref Range: 0.0 - 0.5 K/uL 0.3   Basophil% Latest Ref Range: 0.0 - 1.9 % 1.5   Baso # Latest Ref Range: 0.00 - 0.20 K/uL 0.09   nRBC Latest Ref Range: 0 /100 WBC 0   Differential Method Unknown Automated   Immature Grans (Abs) Latest Ref Range: 0.00 - 0.04 K/uL 0.02   Immature Granulocytes Latest Ref Range: 0.0 - 0.5 % 0.3   Sodium Latest Ref Range: 136 - 145 mmol/L 142   Potassium Latest Ref Range: 3.5 - 5.1 mmol/L 4.2   Chloride Latest Ref Range: 95 - 110 mmol/L 109   CO2 Latest Ref Range: 23 - 29 mmol/L 25   Anion Gap Latest Ref Range: 8 - 16 mmol/L 8   BUN, Bld Latest Ref Range: 8 - 23 mg/dL 28 (H)   Creatinine Latest Ref Range: 0.5 - 1.4 mg/dL 1.9 (H)   eGFR if non African American Latest Ref Range: >60 mL/min/1.73 m^2 32.8 (A)   eGFR if African American Latest Ref Range: >60 mL/min/1.73 m^2 37.9 (A)   Glucose Latest Ref Range: 70 - 110 mg/dL 94   Calcium Latest Ref Range: 8.7 - 10.5 mg/dL 9.3   Alkaline Phosphatase Latest Ref Range: 55 - 135 U/L 88   PROTEIN TOTAL Latest Ref Range: 6.0 - 8.4 g/dL 6.7   Albumin Latest Ref Range: 3.5 - 5.2 g/dL 3.9   BILIRUBIN TOTAL Latest Ref Range: 0.1  - 1.0 mg/dL 0.5   AST Latest Ref Range: 10 - 40 U/L 23   ALT Latest Ref Range: 10 - 44 U/L 21   Results for MCKINLEY BISHOP, PHD (MRN [id number]) as of 10/30/2019 10:39   Ref. Range 10/18/2019 08:40   Cholesterol Latest Ref Range: 120 - 199 mg/dL 154   HDL Latest Ref Range: 40 - 75 mg/dL 76 (H)   Hdl/Cholesterol Ratio Latest Ref Range: 20.0 - 50.0 % 49.4   LDL Cholesterol External Latest Ref Range: 63.0 - 159.0 mg/dL 66.2   Non-HDL Cholesterol Latest Units: mg/dL 78   Total Cholesterol/HDL Ratio Latest Ref Range: 2.0 - 5.0  2.0   Triglycerides Latest Ref Range: 30 - 150 mg/dL 59   Results for MCKINLEY BISHOP, PHD (MRN [id number]) as of 10/30/2019 10:39   Ref. Range 10/18/2019 08:40   TSH Latest Ref Range: 0.400 - 4.000 uIU/mL 2.973   PSA DIAGNOSTIC Latest Ref Range: 0.00 - 4.00 ng/mL 2.9   Heart Cath 10/31/18  - Left Main Coronary Artery:             The LM is normal. There is ROBERT 3 flow.     - Left Anterior Descending Artery:             The LAD is normal. There is ROBERT 3 flow.     - Left Circumflex Artery:             The LCX is normal. There is ROBERT 3 flow.     - Right Coronary Artery:             The RCA is normal. There is ROBERT 3 flow.     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

## 2019-11-11 RX ORDER — FELODIPINE 5 MG/1
TABLET, EXTENDED RELEASE ORAL
Qty: 90 TABLET | Refills: 0 | Status: SHIPPED | OUTPATIENT
Start: 2019-11-11 | End: 2020-02-05

## 2019-11-17 ENCOUNTER — CLINICAL SUPPORT (OUTPATIENT)
Dept: CARDIOLOGY | Facility: HOSPITAL | Age: 79
End: 2019-11-17
Attending: INTERNAL MEDICINE
Payer: MEDICARE

## 2019-11-17 DIAGNOSIS — I47.19 PAT (PAROXYSMAL ATRIAL TACHYCARDIA): ICD-10-CM

## 2019-11-17 PROCEDURE — 93298 REM INTERROG DEV EVAL SCRMS: CPT | Mod: ,,, | Performed by: INTERNAL MEDICINE

## 2019-11-17 PROCEDURE — 93298 CARDIAC DEVICE CHECK - REMOTE: ICD-10-PCS | Mod: ,,, | Performed by: INTERNAL MEDICINE

## 2019-11-17 PROCEDURE — 93299 CARDIAC DEVICE CHECK - REMOTE: CPT | Performed by: INTERNAL MEDICINE

## 2019-11-18 ENCOUNTER — LAB VISIT (OUTPATIENT)
Dept: LAB | Facility: HOSPITAL | Age: 79
End: 2019-11-18
Attending: INTERNAL MEDICINE
Payer: MEDICARE

## 2019-11-18 DIAGNOSIS — M1A.9XX1 CHRONIC TOPHACEOUS GOUT: ICD-10-CM

## 2019-11-18 DIAGNOSIS — D64.89 ANEMIA DUE TO OTHER CAUSE, NOT CLASSIFIED: ICD-10-CM

## 2019-11-18 LAB
ALBUMIN SERPL BCP-MCNC: 3.9 G/DL (ref 3.5–5.2)
ALP SERPL-CCNC: 82 U/L (ref 55–135)
ALT SERPL W/O P-5'-P-CCNC: 22 U/L (ref 10–44)
ANION GAP SERPL CALC-SCNC: 8 MMOL/L (ref 8–16)
AST SERPL-CCNC: 26 U/L (ref 10–40)
BILIRUB SERPL-MCNC: 0.8 MG/DL (ref 0.1–1)
BUN SERPL-MCNC: 26 MG/DL (ref 8–23)
CALCIUM SERPL-MCNC: 9.4 MG/DL (ref 8.7–10.5)
CHLORIDE SERPL-SCNC: 109 MMOL/L (ref 95–110)
CO2 SERPL-SCNC: 25 MMOL/L (ref 23–29)
CREAT SERPL-MCNC: 1.8 MG/DL (ref 0.5–1.4)
EST. GFR  (AFRICAN AMERICAN): 40.5 ML/MIN/1.73 M^2
EST. GFR  (NON AFRICAN AMERICAN): 35 ML/MIN/1.73 M^2
FERRITIN SERPL-MCNC: 24 NG/ML (ref 20–300)
FOLATE SERPL-MCNC: 7.9 NG/ML (ref 4–24)
GLUCOSE SERPL-MCNC: 95 MG/DL (ref 70–110)
IRON SERPL-MCNC: 92 UG/DL (ref 45–160)
POTASSIUM SERPL-SCNC: 4.8 MMOL/L (ref 3.5–5.1)
PROT SERPL-MCNC: 6.7 G/DL (ref 6–8.4)
SATURATED IRON: 19 % (ref 20–50)
SODIUM SERPL-SCNC: 142 MMOL/L (ref 136–145)
TOTAL IRON BINDING CAPACITY: 475 UG/DL (ref 250–450)
TRANSFERRIN SERPL-MCNC: 321 MG/DL (ref 200–375)
URATE SERPL-MCNC: 5.4 MG/DL (ref 3.4–7)
VIT B12 SERPL-MCNC: 275 PG/ML (ref 210–950)

## 2019-11-18 PROCEDURE — 82607 VITAMIN B-12: CPT

## 2019-11-18 PROCEDURE — 84238 ASSAY NONENDOCRINE RECEPTOR: CPT

## 2019-11-18 PROCEDURE — 80053 COMPREHEN METABOLIC PANEL: CPT

## 2019-11-18 PROCEDURE — 36415 COLL VENOUS BLD VENIPUNCTURE: CPT | Mod: PN

## 2019-11-18 PROCEDURE — 82728 ASSAY OF FERRITIN: CPT

## 2019-11-18 PROCEDURE — 84550 ASSAY OF BLOOD/URIC ACID: CPT

## 2019-11-18 PROCEDURE — 83540 ASSAY OF IRON: CPT

## 2019-11-18 PROCEDURE — 82746 ASSAY OF FOLIC ACID SERUM: CPT

## 2019-11-19 ENCOUNTER — TELEPHONE (OUTPATIENT)
Dept: RHEUMATOLOGY | Facility: CLINIC | Age: 79
End: 2019-11-19

## 2019-11-19 DIAGNOSIS — D63.1 ANEMIA DUE TO STAGE 3 CHRONIC KIDNEY DISEASE: Primary | ICD-10-CM

## 2019-11-19 DIAGNOSIS — N18.30 ANEMIA DUE TO STAGE 3 CHRONIC KIDNEY DISEASE: Primary | ICD-10-CM

## 2019-11-20 LAB — STFR SERPL-MCNC: 3.4 MG/L (ref 1.8–4.6)

## 2019-11-21 ENCOUNTER — PATIENT MESSAGE (OUTPATIENT)
Dept: INTERNAL MEDICINE | Facility: CLINIC | Age: 79
End: 2019-11-21

## 2019-11-24 ENCOUNTER — TELEPHONE (OUTPATIENT)
Dept: GASTROENTEROLOGY | Facility: CLINIC | Age: 79
End: 2019-11-24

## 2019-11-25 NOTE — TELEPHONE ENCOUNTER
----- Message from Edis Nunez MD sent at 11/19/2019  8:57 AM CST -----  The ferritin is low as is the iron saturation, indicating iron deficiency anemia. Would suggest f/u with Elisa Almeida in Gastroenterology. The B12 is low normal. The uric acid is fine and improved. Continue febuxostat 20mg daily. The CKD stage 3 is stable. Liver tests remain normal. KAREN

## 2019-11-27 ENCOUNTER — TELEPHONE (OUTPATIENT)
Dept: GASTROENTEROLOGY | Facility: CLINIC | Age: 79
End: 2019-11-27

## 2019-11-27 NOTE — TELEPHONE ENCOUNTER
MA spoke to pt offer pt apt with Dr. Hernandez   Pt denied stating he already schedule apt with Dr. Gutierrez and thank MA for calling

## 2019-12-10 ENCOUNTER — PATIENT OUTREACH (OUTPATIENT)
Dept: ADMINISTRATIVE | Facility: OTHER | Age: 79
End: 2019-12-10

## 2019-12-11 ENCOUNTER — INITIAL CONSULT (OUTPATIENT)
Dept: DERMATOLOGY | Facility: CLINIC | Age: 79
End: 2019-12-11
Payer: MEDICARE

## 2019-12-11 DIAGNOSIS — D22.9 MULTIPLE BENIGN NEVI: ICD-10-CM

## 2019-12-11 DIAGNOSIS — L21.9 SEBORRHEIC DERMATITIS: Primary | ICD-10-CM

## 2019-12-11 DIAGNOSIS — L82.0 INFLAMED SEBORRHEIC KERATOSIS: ICD-10-CM

## 2019-12-11 DIAGNOSIS — Z12.83 SCREENING EXAM FOR SKIN CANCER: ICD-10-CM

## 2019-12-11 PROCEDURE — 99203 OFFICE O/P NEW LOW 30 MIN: CPT | Mod: 25,S$GLB,, | Performed by: DERMATOLOGY

## 2019-12-11 PROCEDURE — 99203 PR OFFICE/OUTPT VISIT, NEW, LEVL III, 30-44 MIN: ICD-10-PCS | Mod: 25,S$GLB,, | Performed by: DERMATOLOGY

## 2019-12-11 PROCEDURE — 1159F MED LIST DOCD IN RCRD: CPT | Mod: S$GLB,,, | Performed by: DERMATOLOGY

## 2019-12-11 PROCEDURE — 1126F AMNT PAIN NOTED NONE PRSNT: CPT | Mod: S$GLB,,, | Performed by: DERMATOLOGY

## 2019-12-11 PROCEDURE — 1101F PT FALLS ASSESS-DOCD LE1/YR: CPT | Mod: CPTII,S$GLB,, | Performed by: DERMATOLOGY

## 2019-12-11 PROCEDURE — 99999 PR PBB SHADOW E&M-EST. PATIENT-LVL II: CPT | Mod: PBBFAC,,, | Performed by: DERMATOLOGY

## 2019-12-11 PROCEDURE — 17110 PR DESTRUCTION BENIGN LESIONS UP TO 14: ICD-10-PCS | Mod: S$GLB,,, | Performed by: DERMATOLOGY

## 2019-12-11 PROCEDURE — 1126F PR PAIN SEVERITY QUANTIFIED, NO PAIN PRESENT: ICD-10-PCS | Mod: S$GLB,,, | Performed by: DERMATOLOGY

## 2019-12-11 PROCEDURE — 99999 PR PBB SHADOW E&M-EST. PATIENT-LVL II: ICD-10-PCS | Mod: PBBFAC,,, | Performed by: DERMATOLOGY

## 2019-12-11 PROCEDURE — 1159F PR MEDICATION LIST DOCUMENTED IN MEDICAL RECORD: ICD-10-PCS | Mod: S$GLB,,, | Performed by: DERMATOLOGY

## 2019-12-11 PROCEDURE — 1101F PR PT FALLS ASSESS DOC 0-1 FALLS W/OUT INJ PAST YR: ICD-10-PCS | Mod: CPTII,S$GLB,, | Performed by: DERMATOLOGY

## 2019-12-11 PROCEDURE — 17110 DESTRUCTION B9 LES UP TO 14: CPT | Mod: S$GLB,,, | Performed by: DERMATOLOGY

## 2019-12-11 RX ORDER — KETOCONAZOLE 20 MG/G
CREAM TOPICAL 2 TIMES DAILY
Qty: 60 G | Refills: 2 | Status: SHIPPED | OUTPATIENT
Start: 2019-12-11 | End: 2020-12-01 | Stop reason: SDUPTHER

## 2019-12-11 NOTE — LETTER
December 11, 2019      Romero Vogel MD  [address] [facility] [address]  [place] [address]           [facility]  [address]  [place] [address]  Phone: [phone]  Fax: [phone]          Patient: Adelfo Rodriguez, PhD   MR Number: [id number]   YOB: 1940   Date of Visit: 12/11/2019       Dear Dr. Romero Vogel:    Thank you for referring Adelfo Rodriguez to me for evaluation. Attached you will find relevant portions of my assessment and plan of care.    If you have questions, please do not hesitate to call me. I look forward to following Adelfo Rodriguez along with you.    Sincerely,    Meli Cunningham MD    Enclosure  CC:  No Recipients    If you would like to receive this communication electronically, please contact [email or web address] or [phone] to request more information on [employer] Link access.    For providers and/or their staff who would like to refer a patient to [facility], please contact us through our one-stop-shop provider referral line, [facility], at [phone].    If you feel you have received this communication in error or would no longer like to receive these types of communications, please e-mail [email or web address]

## 2019-12-11 NOTE — PROGRESS NOTES
Subjective:       Patient ID:  Adelfo ZHANG Jennifer, PhD is a 79 y.o. male who presents for   Chief Complaint   Patient presents with    Skin Check     TBSE    Seborrheic Dermatitis     scalp     78 yo male present today for a TBSE.  Has several spots on the face, L dorsal hand and  L postauricular ear.   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 No history of melanoma.  Does not wear sunscreen daily.  Wears hat and long sleeves in yard.      Seborrheic Dermatitis  - Initial  Affected locations: scalp and face  Signs / symptoms: itching and scaling  Treatments tried: keto & DHS shampoo.    admits to picking at spots on skin when he is stressed.  Uses dove soap.  Has LE swelling, had reaction to compression stockings rx, using OTC without issue.    Review of Systems   Skin: Positive for wears hat. Negative for daily sunscreen use, activity-related sunscreen use and recent sunburn.   Hematologic/Lymphatic: Bruises/bleeds easily.        Objective:    Physical Exam   Constitutional: He appears well-developed and well-nourished. No distress.   Neurological: He is alert and oriented to person, place, and time. He is not disoriented.   Psychiatric: He has a normal mood and affect.   Skin:   Areas Examined (abnormalities noted in diagram):   Scalp / Hair Palpated and Inspected  Head / Face Inspection Performed  Neck Inspection Performed  Chest / Axilla Inspection Performed  Abdomen Inspection Performed  Genitals / Buttocks / Groin Inspection Performed  Back Inspection Performed  RUE Inspected  LUE Inspection Performed  RLE Inspected  LLE Inspection Performed  Nails and Digits Inspection Performed                   Diagram Legend     Erythematous scaling macule/papule c/w actinic keratosis       Vascular papule c/w angioma      Pigmented verrucoid papule/plaque c/w seborrheic keratosis      Yellow umbilicated papule c/w sebaceous hyperplasia      Irregularly shaped tan macule c/w lentigo     1-2 mm smooth white papules  consistent with Milia      Movable subcutaneous cyst with punctum c/w epidermal inclusion cyst      Subcutaneous movable cyst c/w pilar cyst      Firm pink to brown papule c/w dermatofibroma      Pedunculated fleshy papule(s) c/w skin tag(s)      Evenly pigmented macule c/w junctional nevus     Mildly variegated pigmented, slightly irregular-bordered macule c/w mildly atypical nevus      Flesh colored to evenly pigmented papule c/w intradermal nevus       Pink pearly papule/plaque c/w basal cell carcinoma      Erythematous hyperkeratotic cursted plaque c/w SCC      Surgical scar with no sign of skin cancer recurrence      Open and closed comedones      Inflammatory papules and pustules      Verrucoid papule consistent consistent with wart     Erythematous eczematous patches and plaques     Dystrophic onycholytic nail with subungual debris c/w onychomycosis     Umbilicated papule    Erythematous-base heme-crusted tan verrucoid plaque consistent with inflamed seborrheic keratosis     Erythematous Silvery Scaling Plaque c/w Psoriasis     See annotation      Assessment / Plan:        Seborrheic dermatitis  -     ketoconazole (NIZORAL) 2 % cream; Apply topically 2 (two) times daily. To dry rash on face prn  Dispense: 60 g; Refill: 2  Will notify me if needs refills of rx shampoo    Inflamed seborrheic keratosis  Cryosurgery procedure note:    Verbal consent from the patient is obtained including, but not limited to, risk of hypopigmentation/hyperpigmentation, scar, recurrence of lesion. Liquid nitrogen cryosurgery is applied to 6 lesions to produce a freeze injury. The patient is aware that blisters may form and is instructed on wound care with gentle cleansing and use of vaseline ointment to keep moist until healed. The patient is supplied a handout on cryosurgery and is instructed to call if lesions do not completely resolve.    Repeated picking, rubbing, and scratching of the skin can exacerbate the condition and lead  to pigmentary changes and scarring.  The patient was urged to stop these behaviors.    Screening exam for skin cancer  Area of previous SCC left hand examined. Site well healed with no signs of recurrence.    Total body skin examination performed today including at least 12 points as noted in physical examination. No lesions suspicious for malignancy noted.    Multiple benign nevi    Discussed ABCDE's of nevi.  Monitor for new mole or moles that are becoming bigger, darker, irritated, or developing irregular borders. Brochure provided.    Patient instructed in importance in daily sun protection of at least spf 30. Sun avoidance and topical protection discussed.     Patient encouraged to wear hat for all outdoor exposure.     Also discussed sun protective clothing.             Follow up in about 6 months (around 6/11/2020) for for TBSE.     12/20/19 addendum - path report from Dr. Staton shows SCC left hand 16 Jan 2018. Will scan to media.

## 2019-12-17 ENCOUNTER — CLINICAL SUPPORT (OUTPATIENT)
Dept: CARDIOLOGY | Facility: HOSPITAL | Age: 79
End: 2019-12-17
Attending: INTERNAL MEDICINE
Payer: MEDICARE

## 2019-12-17 DIAGNOSIS — I47.19 PAT (PAROXYSMAL ATRIAL TACHYCARDIA): ICD-10-CM

## 2019-12-17 PROCEDURE — 93298 CARDIAC DEVICE CHECK - REMOTE: ICD-10-PCS | Mod: ,,, | Performed by: INTERNAL MEDICINE

## 2019-12-17 PROCEDURE — 93298 REM INTERROG DEV EVAL SCRMS: CPT | Mod: ,,, | Performed by: INTERNAL MEDICINE

## 2019-12-17 PROCEDURE — 93299 CARDIAC DEVICE CHECK - REMOTE: CPT | Performed by: INTERNAL MEDICINE

## 2019-12-18 ENCOUNTER — PATIENT OUTREACH (OUTPATIENT)
Dept: ADMINISTRATIVE | Facility: OTHER | Age: 79
End: 2019-12-18

## 2019-12-19 ENCOUNTER — OFFICE VISIT (OUTPATIENT)
Dept: ELECTROPHYSIOLOGY | Facility: CLINIC | Age: 79
End: 2019-12-19
Payer: MEDICARE

## 2019-12-19 ENCOUNTER — HOSPITAL ENCOUNTER (OUTPATIENT)
Dept: CARDIOLOGY | Facility: CLINIC | Age: 79
Discharge: HOME | End: 2019-12-19
Payer: MEDICARE

## 2019-12-19 VITALS
WEIGHT: 228.19 LBS | HEART RATE: 56 BPM | SYSTOLIC BLOOD PRESSURE: 130 MMHG | DIASTOLIC BLOOD PRESSURE: 58 MMHG | HEIGHT: 70 IN | BODY MASS INDEX: 32.67 KG/M2

## 2019-12-19 DIAGNOSIS — I47.19 PAT (PAROXYSMAL ATRIAL TACHYCARDIA): Primary | ICD-10-CM

## 2019-12-19 DIAGNOSIS — G47.33 OSA (OBSTRUCTIVE SLEEP APNEA): ICD-10-CM

## 2019-12-19 DIAGNOSIS — I48.92 ATRIAL FLUTTER, UNSPECIFIED TYPE: ICD-10-CM

## 2019-12-19 DIAGNOSIS — I10 ESSENTIAL HYPERTENSION: Chronic | ICD-10-CM

## 2019-12-19 PROCEDURE — 99213 PR OFFICE/OUTPT VISIT, EST, LEVL III, 20-29 MIN: ICD-10-PCS | Mod: S$GLB,,, | Performed by: INTERNAL MEDICINE

## 2019-12-19 PROCEDURE — 1126F PR PAIN SEVERITY QUANTIFIED, NO PAIN PRESENT: ICD-10-PCS | Mod: S$GLB,,, | Performed by: INTERNAL MEDICINE

## 2019-12-19 PROCEDURE — 93005 ELECTROCARDIOGRAM TRACING: CPT | Mod: S$GLB,,, | Performed by: INTERNAL MEDICINE

## 2019-12-19 PROCEDURE — 93005 RHYTHM STRIP: ICD-10-PCS | Mod: S$GLB,,, | Performed by: INTERNAL MEDICINE

## 2019-12-19 PROCEDURE — 93010 RHYTHM STRIP: ICD-10-PCS | Mod: S$GLB,,, | Performed by: INTERNAL MEDICINE

## 2019-12-19 PROCEDURE — 93010 ELECTROCARDIOGRAM REPORT: CPT | Mod: S$GLB,,, | Performed by: INTERNAL MEDICINE

## 2019-12-19 PROCEDURE — 3078F DIAST BP <80 MM HG: CPT | Mod: CPTII,S$GLB,, | Performed by: INTERNAL MEDICINE

## 2019-12-19 PROCEDURE — 99213 OFFICE O/P EST LOW 20 MIN: CPT | Mod: S$GLB,,, | Performed by: INTERNAL MEDICINE

## 2019-12-19 PROCEDURE — 1159F MED LIST DOCD IN RCRD: CPT | Mod: S$GLB,,, | Performed by: INTERNAL MEDICINE

## 2019-12-19 PROCEDURE — 1101F PT FALLS ASSESS-DOCD LE1/YR: CPT | Mod: CPTII,S$GLB,, | Performed by: INTERNAL MEDICINE

## 2019-12-19 PROCEDURE — 1126F AMNT PAIN NOTED NONE PRSNT: CPT | Mod: S$GLB,,, | Performed by: INTERNAL MEDICINE

## 2019-12-19 PROCEDURE — 1101F PR PT FALLS ASSESS DOC 0-1 FALLS W/OUT INJ PAST YR: ICD-10-PCS | Mod: CPTII,S$GLB,, | Performed by: INTERNAL MEDICINE

## 2019-12-19 PROCEDURE — 99999 PR PBB SHADOW E&M-EST. PATIENT-LVL III: CPT | Mod: PBBFAC,,, | Performed by: INTERNAL MEDICINE

## 2019-12-19 PROCEDURE — 99999 PR PBB SHADOW E&M-EST. PATIENT-LVL III: ICD-10-PCS | Mod: PBBFAC,,, | Performed by: INTERNAL MEDICINE

## 2019-12-19 PROCEDURE — 3075F PR MOST RECENT SYSTOLIC BLOOD PRESS GE 130-139MM HG: ICD-10-PCS | Mod: CPTII,S$GLB,, | Performed by: INTERNAL MEDICINE

## 2019-12-19 PROCEDURE — 1159F PR MEDICATION LIST DOCUMENTED IN MEDICAL RECORD: ICD-10-PCS | Mod: S$GLB,,, | Performed by: INTERNAL MEDICINE

## 2019-12-19 PROCEDURE — 3075F SYST BP GE 130 - 139MM HG: CPT | Mod: CPTII,S$GLB,, | Performed by: INTERNAL MEDICINE

## 2019-12-19 PROCEDURE — 3078F PR MOST RECENT DIASTOLIC BLOOD PRESSURE < 80 MM HG: ICD-10-PCS | Mod: CPTII,S$GLB,, | Performed by: INTERNAL MEDICINE

## 2019-12-19 NOTE — PROGRESS NOTES
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 In 2018 he began having exertional induced palpitations that would last for a few minutes. A 30 day event monitor was ordered. He had 1 such event whose onset was not captured. It was a narrow complex tachycardia at around 150 bpm that could have represented 2:1 atrial flutter. Another event was characterized by a short imelda of nonsustained atrial tachycardia. He was initiated on eliquis and metoprolol 50mg daily. He was intolerant of the metoprolol (symptomatic bradycardia in the 50s). He continues to exercise and 1-2 times weekly he has an episode. He exercises with a heart rate monitor and is able to record his work outs. With workouts associated with his symptoms, his heart rate curve rises to a plateau of ~120-130. Then there is a sudden spike in his heart rate to 150-160 that remains for 5-10 minutes then suddenly stops and returns to baseline. He notes palpitations and chest heaviness with these events. He had an abnormal stress echo with preserved LVEF 10/2018. Coronary angiography noted no obstructive CAD.     [place] underwent EPS on 7/5/2019. Unstable, nonsustainable left atrial and low lateral right atrial ATs were induced. Sustained sinus node tachycardia was however induced in response to atrial extrastimuli. This was likely sinus node reentry. Due to potential 2:1 tach/AFL noted on his event monitor, a CTI ablation was performed. He returned for follow-up 8/2019. Since his procedure he had 1 instance of sudden tachycardia to 150s  during exercise, lasting ~7 minutes. He otherwise felt well. We elected to implant an ILR for long-term rhythm monitoring for possible AF, which was performed 9/2019.    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 He feels well. ILR notes infrequent short episodes of atrial tachycardia. Longest episode he noted was 10/3/2019, lasting 2 minutes, however we only have 23 seconds of this as it likely fell just below detection.      My interpretation of today's in clinic ECG is normal sinus rhythm with normal intervals    Review of Systems   Constitution: Negative for fever and malaise/fatigue.   HENT: Negative for congestion and sore throat.    Eyes: Negative for blurred vision and visual disturbance.   Cardiovascular: Positive for palpitations. Negative for chest pain, dyspnea on exertion, leg swelling, near-syncope and syncope.   Respiratory: Negative for cough and shortness of breath.    Hematologic/Lymphatic: Negative for bleeding problem. Does not bruise/bleed easily.   Skin: Negative.    Musculoskeletal: Negative.    Gastrointestinal: Negative for bloating, abdominal pain, hematochezia and melena.   Neurological: Negative for dizziness, focal weakness and weakness.        Objective:    Physical Exam   Constitutional: He is oriented to person, place, and time. He appears well-developed and well-nourished. No distress.   HENT:   Head: Normocephalic and atraumatic.   Eyes: Conjunctivae are normal. Right eye exhibits no discharge. Left eye exhibits no discharge.   Neck: Neck supple. No JVD present.   Cardiovascular: Normal rate and regular rhythm. Exam reveals no gallop and no friction rub.   No murmur heard.  Pulmonary/Chest: Effort normal and breath sounds normal. No respiratory distress. He has no wheezes. He has no rales.   Abdominal: Soft. Bowel sounds are normal. He exhibits no distension. There is no tenderness. There is no rebound.   Musculoskeletal: He exhibits no edema.    Neurological: He is alert and oriented to person, place, and time.   Skin: Skin is warm and dry. He is not diaphoretic.   Psychiatric: He has a normal mood and affect. His behavior is normal. Judgment and thought content normal.   Vitals reviewed.        Assessment:       1. PAT (paroxysmal atrial tachycardia)    2. Essential hypertension    3. 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 He is s/p RFA of the CTI and EP study. The only sustained tachycardia induced with sinus tachycardia. Recommend trial of low dose of metoprolol (12.5mg daily, was intolerant to 50mg daily in the past) and stopping eliquis. He now is s/p ILR insertion for long-term monitoring for potential AF. No AF observed to date. Has infrequent short episodes of AT.    Continue remote monitoring  RTC in 12 months    Thank you for allowing me to participate in the care of this patient. Please do not hesitate to call me with any questions or concerns.    Gil Wilson MD, PhD  Cardiac Electrophysiology

## 2020-01-16 ENCOUNTER — CLINICAL SUPPORT (OUTPATIENT)
Dept: CARDIOLOGY | Facility: HOSPITAL | Age: 80
End: 2020-01-16
Attending: INTERNAL MEDICINE
Payer: MEDICARE

## 2020-01-16 DIAGNOSIS — I47.19 PAT (PAROXYSMAL ATRIAL TACHYCARDIA): ICD-10-CM

## 2020-01-16 PROCEDURE — 93298 CARDIAC DEVICE CHECK - REMOTE: ICD-10-PCS | Mod: ,,, | Performed by: INTERNAL MEDICINE

## 2020-01-16 PROCEDURE — 93298 REM INTERROG DEV EVAL SCRMS: CPT | Mod: ,,, | Performed by: INTERNAL MEDICINE

## 2020-01-21 ENCOUNTER — OFFICE VISIT (OUTPATIENT)
Dept: GASTROENTEROLOGY | Facility: CLINIC | Age: 80
End: 2020-01-21
Payer: MEDICARE

## 2020-01-21 VITALS
HEIGHT: 70 IN | BODY MASS INDEX: 33.33 KG/M2 | DIASTOLIC BLOOD PRESSURE: 58 MMHG | WEIGHT: 232.81 LBS | SYSTOLIC BLOOD PRESSURE: 138 MMHG

## 2020-01-21 DIAGNOSIS — Z87.11 HISTORY OF GASTRIC ULCER: ICD-10-CM

## 2020-01-21 DIAGNOSIS — Z86.010 HISTORY OF COLON POLYPS: ICD-10-CM

## 2020-01-21 DIAGNOSIS — D53.9 MACROCYTIC ANEMIA: ICD-10-CM

## 2020-01-21 DIAGNOSIS — D50.0 IRON DEFICIENCY ANEMIA DUE TO CHRONIC BLOOD LOSS: Primary | ICD-10-CM

## 2020-01-21 DIAGNOSIS — K86.2 PANCREAS CYST: ICD-10-CM

## 2020-01-21 PROCEDURE — 1159F PR MEDICATION LIST DOCUMENTED IN MEDICAL RECORD: ICD-10-PCS | Mod: S$GLB,,, | Performed by: INTERNAL MEDICINE

## 2020-01-21 PROCEDURE — 1101F PT FALLS ASSESS-DOCD LE1/YR: CPT | Mod: CPTII,S$GLB,, | Performed by: INTERNAL MEDICINE

## 2020-01-21 PROCEDURE — 1159F MED LIST DOCD IN RCRD: CPT | Mod: S$GLB,,, | Performed by: INTERNAL MEDICINE

## 2020-01-21 PROCEDURE — 99214 PR OFFICE/OUTPT VISIT, EST, LEVL IV, 30-39 MIN: ICD-10-PCS | Mod: S$GLB,,, | Performed by: INTERNAL MEDICINE

## 2020-01-21 PROCEDURE — 3075F SYST BP GE 130 - 139MM HG: CPT | Mod: CPTII,S$GLB,, | Performed by: INTERNAL MEDICINE

## 2020-01-21 PROCEDURE — 3078F DIAST BP <80 MM HG: CPT | Mod: CPTII,S$GLB,, | Performed by: INTERNAL MEDICINE

## 2020-01-21 PROCEDURE — 1126F AMNT PAIN NOTED NONE PRSNT: CPT | Mod: S$GLB,,, | Performed by: INTERNAL MEDICINE

## 2020-01-21 PROCEDURE — 99214 OFFICE O/P EST MOD 30 MIN: CPT | Mod: S$GLB,,, | Performed by: INTERNAL MEDICINE

## 2020-01-21 PROCEDURE — 99999 PR PBB SHADOW E&M-EST. PATIENT-LVL III: CPT | Mod: PBBFAC,,, | Performed by: INTERNAL MEDICINE

## 2020-01-21 PROCEDURE — 3075F PR MOST RECENT SYSTOLIC BLOOD PRESS GE 130-139MM HG: ICD-10-PCS | Mod: CPTII,S$GLB,, | Performed by: INTERNAL MEDICINE

## 2020-01-21 PROCEDURE — 99999 PR PBB SHADOW E&M-EST. PATIENT-LVL III: ICD-10-PCS | Mod: PBBFAC,,, | Performed by: INTERNAL MEDICINE

## 2020-01-21 PROCEDURE — 3078F PR MOST RECENT DIASTOLIC BLOOD PRESSURE < 80 MM HG: ICD-10-PCS | Mod: CPTII,S$GLB,, | Performed by: INTERNAL MEDICINE

## 2020-01-21 PROCEDURE — 1101F PR PT FALLS ASSESS DOC 0-1 FALLS W/OUT INJ PAST YR: ICD-10-PCS | Mod: CPTII,S$GLB,, | Performed by: INTERNAL MEDICINE

## 2020-01-21 PROCEDURE — 1126F PR PAIN SEVERITY QUANTIFIED, NO PAIN PRESENT: ICD-10-PCS | Mod: S$GLB,,, | Performed by: INTERNAL MEDICINE

## 2020-01-21 NOTE — PROGRESS NOTES
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 male here for follow-up of iron deficiency.  Previously seen by our PA in September 2018 for iron deficiency anemia.  Diagnosis at that time included anemia of chronic disease. EGD had been done 10/23/2018 with findings of a 5 mm erosion in the pre-pyloric area.  Gastric biopsies were normal. The exam was otherwise normal.  Duodenal biopsies were normal. I personally reviewed the images from the EGD.  My impression is that this was possibly an ulcer.  Colonoscopy had been done 06/19/2018 by Dr. De La Garza for history of colon polyps.  The exam was to the cecum with fair bowel preparation.  Sigmoid colon diverticulosis was noted.  The exam was otherwise normal, and a 5 year follow-up was recommended.  Colonoscopy previous to that was done 06/14/2013 also by Dr. De La Garza for screening purposes.  This was to the cecum with good bowel preparation.  A 5 mm tubular adenoma was removed from the ascending colon.  A 5 mm tubular adenoma was removed from the transverse colon.    He denies heartburn, abdominal pain, black stools, or blood in the stool.  He has no difficulties eating.  He denies changes in bowel patterns.  No other GI complaints.  He takes no acid reducers.  He is not on any blood thinners.  He denies NSAID use.            PMHX:  has a past medical history of ALLERGIC RHINITIS, Anemia, Asthma, Hyperlipidemia, Hypertension, NAFLD (nonalcoholic fatty liver disease), ANNABELLE (obstructive sleep apnea), and Squamous cell cancer of skin of hand.    PSHX:  has a past surgical history that includes Appendectomy; testicular biopsy; Foot surgery; Colonoscopy (N/A, 6/19/2018); Esophagogastroduodenoscopy (N/A, 10/23/2018); Cardiac catheterization (10/31/2018); Cardiac catheterization (1998); and Insertion of implantable loop  recorder (N/A, 9/18/2019).    The patient's social and family histories were reviewed by me and updated in the appropriate section of the electronic medical record.    Review of patient's allergies indicates:   Allergen Reactions    Amoxicillin Hives    Allopurinol analogues Other (See Comments)     Abnormal transaminases       Prior to Admission medications    Medication Sig Start Date End Date Taking? Authorizing Provider   albuterol (PROAIR HFA) 90 mcg/actuation inhaler Inhale 2 puffs into the lungs every 4 (four) hours as needed for Wheezing. 1/25/16  Yes Romero Vogel MD   atorvastatin (LIPITOR) 40 MG tablet Take 1 tablet (40 mg total) by mouth once daily.  Patient taking differently: Take 40 mg by mouth every evening.  6/7/19  Yes Isac Morales MD   azelastine (ASTELIN) 137 mcg (0.1 %) nasal spray 2 sprays (274 mcg total) by Nasal route 2 (two) times daily.  Patient taking differently: 2 sprays by Nasal route 2 (two) times daily as needed.  3/6/19 3/5/20 Yes Romero Vogel MD   colchicine (COLCRYS) 0.6 mg tablet TAKE 1/2 TABLET BY MOUTH DAILY 10/30/19  Yes Edis Nunez MD   febuxostat (ULORIC) 40 mg Tab 1/2 tab(20mg) daily 10/30/19  Yes Edis Nunez MD   felodipine (PLENDIL) 5 MG 24 hr tablet TAKE 1 TABLET BY MOUTH EVERY DAY 11/11/19  Yes Romero Vogel MD   fish oil-omega-3 fatty acids 300-1,000 mg capsule Take 2 g by mouth once daily.     Yes Historical Provider, MD   fluticasone propionate (FLONASE) 50 mcg/actuation nasal spray INSTILL 2 SPRAYS INTO EACH NOSTRIL ONCE DAILY 10/17/19  Yes Romero Vogel MD   fluticasone-salmeterol diskus inhaler 250-50 mcg INHALE 1 PUFF BY MOUTH TWICE DAILY. RINSE MOUTH AFTER USE 10/22/19  Yes Romero Vogel MD   furosemide (LASIX) 40 MG tablet TAKE 1 TABLET BY MOUTH EVERY DAY  Patient taking differently: Twice weekly 5/28/19  Yes Romero Vogel MD   glucosamine & chondroit sul.Na 750-600 mg Tab Take 750 mg by mouth.   Yes Historical Provider, MD  "  irbesartan (AVAPRO) 150 MG tablet TAKE 1 TABLET BY MOUTH ONCE DAILY FOR BLOOD PRESSURE 10/22/19  Yes Romero Vogel MD   ketoconazole (NIZORAL) 2 % cream Apply topically 2 (two) times daily. To dry rash on face prn 12/11/19  Yes Meli Cunningham MD   ketoconazole (NIZORAL) 2 % shampoo WASH affected area every other day 12/26/17  Yes Romero Vogel MD   loratadine (CLARITIN) 10 mg tablet Take 10 mg by mouth daily as needed.     Yes Historical Provider, MD   metoprolol succinate (TOPROL-XL) 25 MG 24 hr tablet Take 0.5 tablets (12.5 mg total) by mouth once daily. 8/6/19 8/5/20 Yes Gil Wilson MD   clindamycin (CLEOCIN T) 1 % external solution APPLY TOPICALLY TO AFFECTED AREA BID 9/10/19   Historical Provider, MD   febuxostat (ULORIC) 40 mg Tab Uloric 40 mg tablet    Historical Provider, MD   hepatitis A and B vaccine, PF, (TWINRIX, PF,) 720 FORREST unit- 20 mcg/mL Syrg suspension Twinrix (PF) 720 FORREST unit-20 mcg/mL intramuscular syringe   inject 1 milliliter intramuscularly    Historical Provider, MD   hydrocortisone-pramoxine (ANALPRAM-HC) 2.5-1 % Crea Place rectally 3 (three) times daily.  Patient not taking: Reported on 1/21/2020 10/21/19   Romero Vogel MD   omeprazole (PRILOSEC) 40 MG capsule Take 1 capsule (40 mg total) by mouth every morning. 5/31/19   Bart Hernandez MD   triamcinolone acetonide 0.1% (KENALOG) 0.1 % cream Apply topically 2 (two) times daily. 3/16/18 8/6/19  Romero Vogel MD         Objective Findings:  Vital Signs:  BP (!) 138/58   Ht 5' 9.5" (1.765 m)   Wt 105.6 kg (232 lb 12.9 oz)   BMI 33.89 kg/m²  Body mass index is 33.89 kg/m².    Physical Exam:  General Appearance:  Well appearing in no acute distress, appears stated age      Labs:  Lab Results   Component Value Date    WBC 6.00 10/18/2019    WBC 6.00 10/18/2019    HGB 11.7 (L) 10/18/2019    HGB 11.7 (L) 10/18/2019    HCT 38.2 (L) 10/18/2019    HCT 38.2 (L) 10/18/2019     (H) 10/18/2019     (H) 10/18/2019    RDW " 13.2 10/18/2019    RDW 13.2 10/18/2019     10/18/2019     10/18/2019    GRAN 2.8 10/18/2019    GRAN 47.4 10/18/2019    GRAN 2.8 10/18/2019    GRAN 47.4 10/18/2019    LYMPH 2.0 10/18/2019    LYMPH 32.8 10/18/2019    LYMPH 2.0 10/18/2019    LYMPH 32.8 10/18/2019    MONO 0.8 10/18/2019    MONO 12.8 10/18/2019    MONO 0.8 10/18/2019    MONO 12.8 10/18/2019    EOS 0.3 10/18/2019    EOS 0.3 10/18/2019    BASO 0.09 10/18/2019    BASO 0.09 10/18/2019     Lab Results   Component Value Date     11/18/2019    K 4.8 11/18/2019     11/18/2019    CO2 25 11/18/2019    GLU 95 11/18/2019    BUN 26 (H) 11/18/2019    CREATININE 1.8 (H) 11/18/2019    CALCIUM 9.4 11/18/2019    PROT 6.7 11/18/2019    ALBUMIN 3.9 11/18/2019    BILITOT 0.8 11/18/2019    ALKPHOS 82 11/18/2019    AST 26 11/18/2019    ALT 22 11/18/2019     Soluble transferrin receptor is normal. TIBC is elevated at 475.  Ferritin of 24.             Imaging:  MRI/MRCP 04/17/2017:  Unchanged 0.9 cm cystic lesion at the head/ neck junction of the pancreas, possible  pseudocyst or cystic neoplasm.                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 Iron deficiency anemia due to chronic blood loss    2. History of gastric ulcer    3. History of colon polyps    4. Macrocytic anemia    5. Pancreas cyst      Multifactorial anemia.  He has anemia of chronic disease with macrocytosis, but also has evidence of iron deficiency with an elevated TIBC and borderline ferritin.  No significant GI complaints.  No overt signs of GI bleeding.  He has a history of peptic ulcer disease with a gastric antral ulcer seen in October 2018.  No follow-up since that time.  He takes no acid reducers and also denies NSAID use.  He also has history of colon polyps and his last colonoscopy was noted to be fair prep which would increase the risk of a missed lesion.    As a side note, chart review indicates he has a history of pancreatic cyst.  I do  not see that this has he had been followed up on.  Last imaging study was done in April 2017.  Fortunately it was stable at that time.        Recommendations:  1.  I will arrange for EGD and colonoscopy with me to assess further.  If this is unremarkable, he may need a wireless PillCam endoscopy.  2.  Will message our advanced endoscopy services regarding the pancreatic cyst for any necessary follow-up    Follow-up pending the above      Medical decision making:  Visit time 25 min with more than 50% of time spent counseling on evaluation and treatment of iron deficiency anemia as well as follow-up of gastric ulcers and colon polyps.        Von Gutierrez MD

## 2020-01-22 DIAGNOSIS — Z12.11 SPECIAL SCREENING FOR MALIGNANT NEOPLASMS, COLON: Primary | ICD-10-CM

## 2020-01-22 RX ORDER — POLYETHYLENE GLYCOL 3350, SODIUM SULFATE ANHYDROUS, SODIUM BICARBONATE, SODIUM CHLORIDE, POTASSIUM CHLORIDE 236; 22.74; 6.74; 5.86; 2.97 G/4L; G/4L; G/4L; G/4L; G/4L
4 POWDER, FOR SOLUTION ORAL ONCE
Qty: 4000 ML | Refills: 0 | Status: SHIPPED | OUTPATIENT
Start: 2020-01-22 | End: 2020-01-22

## 2020-02-05 RX ORDER — FELODIPINE 5 MG/1
TABLET, EXTENDED RELEASE ORAL
Qty: 90 TABLET | Refills: 3 | Status: SHIPPED | OUTPATIENT
Start: 2020-02-05 | End: 2021-02-05 | Stop reason: SDUPTHER

## 2020-02-10 ENCOUNTER — TELEPHONE (OUTPATIENT)
Dept: GASTROENTEROLOGY | Facility: CLINIC | Age: 80
End: 2020-02-10

## 2020-02-10 NOTE — TELEPHONE ENCOUNTER
MA attempted to contact patient with recommendations per Dr. Gutierrez, but he did not answer. Left voicemail for patient to return a call to our office.

## 2020-02-10 NOTE — TELEPHONE ENCOUNTER
----- Message from Von Gutierrez MD sent at 2/10/2020  3:30 PM CST -----  Regarding: contact patient  Johanna, please let him know that during review of his medical record, I came across his prior history of a pancreatic cyst.  This was last evaluated in April 2017.  Fortunately was stable at that time, but may require further follow-up.  I am asking Alma to have [facility] review this.    Alma,   Will you please forward this gentleman's chart to 1 of our [facility] guys for review.  Would likely need a pancreatic cyst clinic appointment.

## 2020-02-11 ENCOUNTER — TELEPHONE (OUTPATIENT)
Dept: GASTROENTEROLOGY | Facility: CLINIC | Age: 80
End: 2020-02-11

## 2020-02-11 NOTE — TELEPHONE ENCOUNTER
MA spoke with patient.     Dr. Rodriguez is aware Dr. Gutierrez will have our advance providers review his records regarding his pancreatic cyst.     Patient verbalized understanding. He wanted Dr. Gutierrez to be aware, he had a reaction to allopurinol that caused his liver enzymes to be elevated. An MRI was done showing it was stable and a repeat MRI done 1 yr later also showed it was stable. The hepatologist recommended no further follow up was needed because his levels returned to normal.     Patient is ok with whatever recommendations our advance providers decide.

## 2020-02-19 ENCOUNTER — OFFICE VISIT (OUTPATIENT)
Dept: URGENT CARE | Facility: CLINIC | Age: 80
End: 2020-02-19
Payer: MEDICARE

## 2020-02-19 VITALS
HEIGHT: 70 IN | BODY MASS INDEX: 33.21 KG/M2 | SYSTOLIC BLOOD PRESSURE: 120 MMHG | DIASTOLIC BLOOD PRESSURE: 65 MMHG | WEIGHT: 232 LBS | OXYGEN SATURATION: 96 % | TEMPERATURE: 98 F | RESPIRATION RATE: 16 BRPM | HEART RATE: 63 BPM

## 2020-02-19 DIAGNOSIS — L03.113 CELLULITIS OF RIGHT FOREARM: Primary | ICD-10-CM

## 2020-02-19 PROCEDURE — 99213 PR OFFICE/OUTPT VISIT, EST, LEVL III, 20-29 MIN: ICD-10-PCS | Mod: S$GLB,,, | Performed by: NURSE PRACTITIONER

## 2020-02-19 PROCEDURE — 99213 OFFICE O/P EST LOW 20 MIN: CPT | Mod: S$GLB,,, | Performed by: NURSE PRACTITIONER

## 2020-02-19 RX ORDER — MUPIROCIN 20 MG/G
OINTMENT TOPICAL
Qty: 22 G | Refills: 1 | Status: SHIPPED | OUTPATIENT
Start: 2020-02-19 | End: 2020-10-22

## 2020-02-19 RX ORDER — DOXYCYCLINE 100 MG/1
100 CAPSULE ORAL 2 TIMES DAILY
Qty: 14 CAPSULE | Refills: 0 | Status: SHIPPED | OUTPATIENT
Start: 2020-02-19 | End: 2020-02-26

## 2020-02-19 NOTE — PATIENT INSTRUCTIONS
Please follow up with your Primary care provider within 2-5 days if your signs and symptoms have not resolved or worsen.     If your condition worsens or fails to improve we recommend that you receive another evaluation at the emergency room immediately or contact your primary medical clinic to discuss your concerns.   You must understand that you have received an Urgent Care treatment only and that you may be released before all of your medical problems are known or treated. You, the patient, will arrange for follow up care as instructed.     RED FLAGS/WARNING SYMPTOMS DISCUSSED WITH PATIENT THAT WOULD WARRANT EMERGENT MEDICAL ATTENTION. PATIENT VERBALIZED UNDERSTANDING.       Cellulitis  Cellulitis is an infection of the deep layers of skin. A break in the skin, such as a cut or scratch, can let bacteria under the skin. If the bacteria get to deep layers of the skin, it can be serious. If not treated, cellulitis can get into the bloodstream and lymph nodes. The infection can then spread throughout the body. This causes serious illness.  Cellulitis causes the affected skin to become red, swollen, warm, and sore. The reddened areas have a visible border. An open sore may leak fluid (pus). You may have a fever, chills, and pain.  Cellulitis is treated with antibiotics taken for 7 to 10 days. An open sore may be cleaned and covered with cool wet gauze. Symptoms should get better 1 to 2 days after treatment is started. Make sure to take all the antibiotics for the full number of days until they are gone. Keep taking the medicine even if your symptoms go away.  Home care  Follow these tips:  · Limit the use of the part of your body with cellulitis.   · If the infection is on your leg, keep your leg raised while sitting. This will help to reduce swelling.  · Take all of the antibiotic medicine exactly as directed until it is gone. Do not miss any doses, especially during the first 7 days. Dont stop taking the medicine  when your symptoms get better.  · Keep the affected area clean and dry.  · Wash your hands with soap and warm water before and after touching your skin. Anyone else who touches your skin should also wash his or her hands. Don't share towels.  Follow-up care  Follow up with your healthcare provider, or as advised. If your infection does not go away on the first antibiotic, your healthcare provider will prescribe a different one.  When to seek medical advice  Call your healthcare provider right away if any of these occur:  · Red areas that spread  · Swelling or pain that gets worse  · Fluid leaking from the skin (pus)  · Fever higher of 100.4º F (38.0º C) or higher after 2 days on antibiotics  Date Last Reviewed: 9/1/2016  © 1377-2108 [email or web address]. [address] [place] [address], [place] [address]. All rights reserved. This information is not intended as a substitute for professional medical care. Always follow your healthcare professional's instructions.

## 2020-02-19 NOTE — PROGRESS NOTES
"Subjective:       Patient ID: Adelfo ZHANG Jojoradhika, PhD is a 79 y.o. male.    Vitals:  height is 5' 9.5" (1.765 m) and weight is 105.2 kg (232 lb). His temperature is 97.6 °F (36.4 °C). His blood pressure is 120/65 and his pulse is 63. His respiration is 16 and oxygen saturation is 96%.     Chief Complaint: Wound Check    Pt. Injured his arm by hitting it on a hotel room door a few days ago. Just wants to make sure its healing ok and that it is not infected.     Arm Injury    The incident occurred 2 days ago. Incident location: [place]. The injury mechanism was a direct blow. The pain is present in the right forearm. The quality of the pain is described as aching and burning. The pain is at a severity of 2/10. The pain is mild. The pain has been constant since the incident. Associated symptoms include numbness. Pertinent negatives include no chest pain or tingling. Nothing aggravates the symptoms. He has tried nothing (Mupirocin ) for the symptoms. The treatment provided no relief.       Constitution: Negative for chills, fatigue and fever.   HENT: Negative for congestion and sore throat.    Neck: Negative for painful lymph nodes.   Cardiovascular: Negative for chest pain and leg swelling.   Eyes: Negative for double vision and blurred vision.   Respiratory: Negative for cough and shortness of breath.    Gastrointestinal: Negative for nausea, vomiting and diarrhea.   Genitourinary: Negative for dysuria, frequency and urgency.   Musculoskeletal: Negative for joint pain, joint swelling, muscle cramps and muscle ache.   Skin: Positive for erythema (+erythema, warmth and tenderness to right forearm. mild clear drainage from abrasion). Negative for color change, pale and rash.   Allergic/Immunologic: Negative for seasonal allergies.   Neurological: Positive for numbness. Negative for dizziness, history of vertigo, light-headedness, passing out and headaches.   Hematologic/Lymphatic: Negative for swollen lymph nodes, easy " bruising/bleeding and history of blood clots. Does not bruise/bleed easily.   Psychiatric/Behavioral: Negative for nervous/anxious, sleep disturbance and depression. The patient is not nervous/anxious.        Objective:      Physical Exam   Constitutional: He is oriented to person, place, and time. He appears well-developed and well-nourished. He is cooperative.  Non-toxic appearance. He does not appear ill. No distress.   HENT:   Head: Normocephalic and atraumatic.   Right Ear: Hearing, tympanic membrane, external ear and ear canal normal.   Left Ear: Hearing, tympanic membrane, external ear and ear canal normal.   Nose: Nose normal. No mucosal edema, rhinorrhea or nasal deformity. No epistaxis. Right sinus exhibits no maxillary sinus tenderness and no frontal sinus tenderness. Left sinus exhibits no maxillary sinus tenderness and no frontal sinus tenderness.   Mouth/Throat: Uvula is midline, oropharynx is clear and moist and mucous membranes are normal. No trismus in the jaw. Normal dentition. No uvula swelling. No posterior oropharyngeal erythema.   Eyes: Conjunctivae and lids are normal. Right eye exhibits no discharge. Left eye exhibits no discharge. No scleral icterus.   Neck: Trachea normal, normal range of motion, full passive range of motion without pain and phonation normal. Neck supple.   Cardiovascular: Normal rate, regular rhythm, normal heart sounds, intact distal pulses and normal pulses.   Pulmonary/Chest: Effort normal and breath sounds normal. No respiratory distress.   Abdominal: Soft. Normal appearance and bowel sounds are normal. He exhibits no distension, no pulsatile midline mass and no mass. There is no tenderness.   Musculoskeletal: Normal range of motion. He exhibits no edema or deformity.   Neurological: He is alert and oriented to person, place, and time. He exhibits normal muscle tone. Coordination normal.   Skin: Skin is warm, dry, not diaphoretic and not pale. Lesions:  abrasion and  erythema (+erythema, warmth and tenderness to right forearm. mild clear drainage from abrasion)  Psychiatric: He has a normal mood and affect. His speech is normal and behavior is normal. Judgment and thought content normal. Cognition and memory are normal.   Nursing note and vitals reviewed.        Assessment:       1. Cellulitis of right forearm        Plan:         Cellulitis of right forearm  -     mupirocin (BACTROBAN) 2 % ointment; Apply to affected area 3 times daily  Dispense: 22 g; Refill: 1  -     doxycycline (VIBRAMYCIN) 100 MG Cap; Take 1 capsule (100 mg total) by mouth 2 (two) times daily. for 7 days  Dispense: 14 capsule; Refill: 0         Please follow up with your Primary care provider within 2-5 days if your signs and symptoms have not resolved or worsen.     If your condition worsens or fails to improve we recommend that you receive another evaluation at the emergency room immediately or contact your primary medical clinic to discuss your concerns.   You must understand that you have received an Urgent Care treatment only and that you may be released before all of your medical problems are known or treated. You, the patient, will arrange for follow up care as instructed.     RED FLAGS/WARNING SYMPTOMS DISCUSSED WITH PATIENT THAT WOULD WARRANT EMERGENT MEDICAL ATTENTION. PATIENT VERBALIZED UNDERSTANDING.       Cellulitis  Cellulitis is an infection of the deep layers of skin. A break in the skin, such as a cut or scratch, can let bacteria under the skin. If the bacteria get to deep layers of the skin, it can be serious. If not treated, cellulitis can get into the bloodstream and lymph nodes. The infection can then spread throughout the body. This causes serious illness.  Cellulitis causes the affected skin to become red, swollen, warm, and sore. The reddened areas have a visible border. An open sore may leak fluid (pus). You may have a fever, chills, and pain.  Cellulitis is treated with antibiotics  taken for 7 to 10 days. An open sore may be cleaned and covered with cool wet gauze. Symptoms should get better 1 to 2 days after treatment is started. Make sure to take all the antibiotics for the full number of days until they are gone. Keep taking the medicine even if your symptoms go away.  Home care  Follow these tips:  · Limit the use of the part of your body with cellulitis.   · If the infection is on your leg, keep your leg raised while sitting. This will help to reduce swelling.  · Take all of the antibiotic medicine exactly as directed until it is gone. Do not miss any doses, especially during the first 7 days. Dont stop taking the medicine when your symptoms get better.  · Keep the affected area clean and dry.  · Wash your hands with soap and warm water before and after touching your skin. Anyone else who touches your skin should also wash his or her hands. Don't share towels.  Follow-up care  Follow up with your healthcare provider, or as advised. If your infection does not go away on the first antibiotic, your healthcare provider will prescribe a different one.  When to seek medical advice  Call your healthcare provider right away if any of these occur:  · Red areas that spread  · Swelling or pain that gets worse  · Fluid leaking from the skin (pus)  · Fever higher of 100.4º F (38.0º C) or higher after 2 days on antibiotics  Date Last Reviewed: 9/1/2016  © 3945-6222 [employer]. [address] [place] [address], [place] [address]. All rights reserved. This information is not intended as a substitute for professional medical care. Always follow your healthcare professional's instructions.

## 2020-02-28 ENCOUNTER — CLINICAL SUPPORT (OUTPATIENT)
Dept: CARDIOLOGY | Facility: HOSPITAL | Age: 80
End: 2020-02-28
Payer: MEDICARE

## 2020-02-28 DIAGNOSIS — Z95.818 PRESENCE OF OTHER CARDIAC IMPLANTS AND GRAFTS: ICD-10-CM

## 2020-02-28 PROCEDURE — G2066 INTER DEVC REMOTE 30D: HCPCS | Performed by: INTERNAL MEDICINE

## 2020-02-28 PROCEDURE — 93298 REM INTERROG DEV EVAL SCRMS: CPT | Mod: ,,, | Performed by: INTERNAL MEDICINE

## 2020-02-28 PROCEDURE — 93298 CARDIAC DEVICE CHECK - REMOTE: ICD-10-PCS | Mod: ,,, | Performed by: INTERNAL MEDICINE

## 2020-03-19 ENCOUNTER — TELEPHONE (OUTPATIENT)
Dept: ENDOSCOPY | Facility: HOSPITAL | Age: 80
End: 2020-03-19

## 2020-03-19 NOTE — TELEPHONE ENCOUNTER
Dr. Gutierrez,    As per your request, patient's EGD and Colonoscopy procedures have been rescheduled to 5/6/20.    Thanks,  Lindsay

## 2020-03-24 ENCOUNTER — TELEPHONE (OUTPATIENT)
Dept: RHEUMATOLOGY | Facility: CLINIC | Age: 80
End: 2020-03-24

## 2020-03-24 ENCOUNTER — PATIENT MESSAGE (OUTPATIENT)
Dept: RHEUMATOLOGY | Facility: CLINIC | Age: 80
End: 2020-03-24

## 2020-03-29 ENCOUNTER — CLINICAL SUPPORT (OUTPATIENT)
Dept: CARDIOLOGY | Facility: HOSPITAL | Age: 80
End: 2020-03-29
Payer: MEDICARE

## 2020-03-29 DIAGNOSIS — Z95.818 PRESENCE OF OTHER CARDIAC IMPLANTS AND GRAFTS: ICD-10-CM

## 2020-03-29 PROCEDURE — G2066 INTER DEVC REMOTE 30D: HCPCS | Performed by: INTERNAL MEDICINE

## 2020-03-29 PROCEDURE — 93298 REM INTERROG DEV EVAL SCRMS: CPT | Mod: ,,, | Performed by: INTERNAL MEDICINE

## 2020-03-29 PROCEDURE — 93298 CARDIAC DEVICE CHECK - REMOTE: ICD-10-PCS | Mod: ,,, | Performed by: INTERNAL MEDICINE

## 2020-04-13 ENCOUNTER — PATIENT MESSAGE (OUTPATIENT)
Dept: DERMATOLOGY | Facility: CLINIC | Age: 80
End: 2020-04-13

## 2020-04-15 ENCOUNTER — OFFICE VISIT (OUTPATIENT)
Dept: DERMATOLOGY | Facility: CLINIC | Age: 80
End: 2020-04-15
Payer: MEDICARE

## 2020-04-15 DIAGNOSIS — B07.9 VERRUCA: Primary | ICD-10-CM

## 2020-04-15 PROCEDURE — 1159F MED LIST DOCD IN RCRD: CPT | Mod: ,,, | Performed by: DERMATOLOGY

## 2020-04-15 PROCEDURE — 1101F PT FALLS ASSESS-DOCD LE1/YR: CPT | Mod: CPTII,,, | Performed by: DERMATOLOGY

## 2020-04-15 PROCEDURE — 99213 PR OFFICE/OUTPT VISIT, EST, LEVL III, 20-29 MIN: ICD-10-PCS | Mod: 95,,, | Performed by: DERMATOLOGY

## 2020-04-15 PROCEDURE — 99213 OFFICE O/P EST LOW 20 MIN: CPT | Mod: 95,,, | Performed by: DERMATOLOGY

## 2020-04-15 PROCEDURE — 1159F PR MEDICATION LIST DOCUMENTED IN MEDICAL RECORD: ICD-10-PCS | Mod: ,,, | Performed by: DERMATOLOGY

## 2020-04-15 PROCEDURE — 1101F PR PT FALLS ASSESS DOC 0-1 FALLS W/OUT INJ PAST YR: ICD-10-PCS | Mod: CPTII,,, | Performed by: DERMATOLOGY

## 2020-04-15 NOTE — PROGRESS NOTES
TELEMEDICINE DERMATOLOGY CONSULTATION  The patient location is: home  The chief complaint leading to consultation is: bump on finger    Total time spent with patient: 8 minutes  Each patient to whom he or she provides medical services by telemedicine is:  (1) informed of the relationship between the physician and patient and the respective role of any other health care provider with respect to management of the patient; and (2) notified that he or she may decline to receive medical services by telemedicine and may withdraw from such care at any time.      Subjective:       Patient ID:  Adelfo ZHANG Jennifer, PhD is a 79 y.o. male who presents for bump on finger.    HPI  78 yo male here for evaluation of new bump on right finger over past month. It is irritated, itchy, and rough.  He has not tried any treatment for it.  It is getting worse.  He wants to make sure it is not a skin cancer.    Review of Systems   Skin: Negative for itching.   Hematologic/Lymphatic: Does not bruise/bleed easily.        Objective:    Physical Exam   Constitutional: He appears well-developed and well-nourished. No distress.   Neurological: He is alert and oriented to person, place, and time. He is not disoriented.   Psychiatric: He has a normal mood and affect.   Skin:   Areas Examined (abnormalities noted in diagram):   Nails and Digits Inspection Performed              Diagram Legend     Erythematous scaling macule/papule c/w actinic keratosis       Vascular papule c/w angioma      Pigmented verrucoid papule/plaque c/w seborrheic keratosis      Yellow umbilicated papule c/w sebaceous hyperplasia      Irregularly shaped tan macule c/w lentigo     1-2 mm smooth white papules consistent with Milia      Movable subcutaneous cyst with punctum c/w epidermal inclusion cyst      Subcutaneous movable cyst c/w pilar cyst      Firm pink to brown papule c/w dermatofibroma      Pedunculated fleshy papule(s) c/w skin tag(s)      Evenly pigmented macule  c/w junctional nevus     Mildly variegated pigmented, slightly irregular-bordered macule c/w mildly atypical nevus      Flesh colored to evenly pigmented papule c/w intradermal nevus       Pink pearly papule/plaque c/w basal cell carcinoma      Erythematous hyperkeratotic cursted plaque c/w SCC      Surgical scar with no sign of skin cancer recurrence      Open and closed comedones      Inflammatory papules and pustules      Verrucoid papule consistent consistent with wart     Erythematous eczematous patches and plaques     Dystrophic onycholytic nail with subungual debris c/w onychomycosis     Umbilicated papule    Erythematous-base heme-crusted tan verrucoid plaque consistent with inflamed seborrheic keratosis     Erythematous Silvery Scaling Plaque c/w Psoriasis     See annotation                  Assessment / Plan:        Anant    Discussed benign nature of condition and reviewed OTC treatments to use    Pt will contact me if grows/worsens/changes           Follow up if symptoms worsen or fail to improve.

## 2020-04-15 NOTE — PATIENT INSTRUCTIONS
Treating Warts     You and your healthcare provider can discuss whether your warts need to be treated.     You and your healthcare provider can talk about what treatment may be best for your wart or warts. To get rid of your warts, your healthcare provider may need to try more than one type of treatment. The methods described below are often used to treat warts.  Types of treatment  · Do nothing. Most warts will resolve within 2 years, even without treatment. So doing nothing is sometimes a good option. This is particularly true for smaller warts that are not causing symptoms.  · Cryotherapy (liquid nitrogen). This kills skin cells by freezing them. It kills the warts and destroys skin infected by the wart-causing virus. This is done in your healthcare providers office and will cause some discomfort. It may take several treatments over several weeks to get rid of the warts.  · Topical medicines. Prescribed topical medicines can be put on the skin. These are usually applied in the healthcare provider's office. But some prescriptions may be applied at home.  · Over-the-counter (OTC) topical treatments. OTC medicines that most often contain salicylic acid may be an option. These patches, liquids, and creams are used at home. The medicine is applied daily to the wart and nearby skin. It's usually left on overnight. The dead skin is filed down the next day. In 1 to 3 days, the procedure can be repeated. Topical treatments are sometimes combined with cryotherapy.  · Electrodessication and curettage (ED & C).  For this procedure, the healthcare provider applies numbing medicine to the wart. Then the wart is scraped or cut off. This type of treatment is usually not the first line of therapy.  · Laser surgery.  This can vaporize wart tissue or destroy the blood vessels that feed the wart. This is done in the healthcare provider's office.  · Shots (injections). These can be used to treat warts that dont respond to other  treatments, such as stubborn or painful warts around the nails. This is done in the healthcare providers office.  When to seek medical treatment  Its a good idea to have your healthcare provider check your warts. That way your provider can rule out any other skin problems. Sometimes a callous or a corn can look like a wart, but the treatments may differ. Treatment can also provide relief from warts that bleed, burn, hurt, or itch. Genital warts should always be treated. They can spread to other people through sexual contact. And they may cause genital or cervical cancer.  Getting good results  After having your warts treated, new warts may still appear. Dont be discouraged. Warts often come back. See your healthcare provider again to discuss this. Your provider can tell you about the treatments that most likely will help clear your skin of warts.   Date Last Reviewed: 2/1/2017  © 9965-2619 [employer] [address] [place] [address], [place] [address]. All rights reserved. This information is not intended as a substitute for professional medical care. Always follow your healthcare professional's instructions.

## 2020-04-16 ENCOUNTER — TELEPHONE (OUTPATIENT)
Dept: ELECTROPHYSIOLOGY | Facility: CLINIC | Age: 80
End: 2020-04-16

## 2020-04-16 ENCOUNTER — TELEPHONE (OUTPATIENT)
Dept: CARDIOLOGY | Facility: HOSPITAL | Age: 80
End: 2020-04-16

## 2020-04-16 NOTE — TELEPHONE ENCOUNTER
Pt contacted the [facility] on this afternoon and stated he was returning a call.  (please see telephone message from this am) Pt was informed the two symptom activated events, one on 4/2/20 and one on 4/9/20 revealed ST.  Pt stated he was outside cutting the grass on both occassions.  Pt was instructed to increase his Metoprolol to 25 mg once daily as long as his systolic BP is >110 bpm.  Pt stated his systollic BP generally runs in the 120's. Pt was also instructed to contact the clinic with any questions and/or concerns.  Pt verbalized understanding.    Device RN notified of pt's return call and that he was given the instructions as per Dr. López.

## 2020-04-16 NOTE — TELEPHONE ENCOUNTER
[facility] alert received showing 2 symptom episodes dated 4/2 @ 15:42 and 4/9 @ 14:21. Called patient to assess symptoms, no answer, left voicemail with clinic phone number for patient to return call.

## 2020-04-16 NOTE — TELEPHONE ENCOUNTER
----- Message from Benjie López MD sent at 4/16/2020  2:01 PM CDT -----  Looks like more of the same stuff he's had in the past: some ST, and 2 symptomatic episodes of sudden-onset regular NCT. First was unclear duration (ongoing at end of strip), and second was a little over a minute. HR about 150 bpm.  Please check BP. If SBP >110, please increase toprol to 25 qd.  Follow up with Dr. Wilson in a week.      ----- Message -----  From: Taylor Tong RN  Sent: 4/16/2020   1:37 PM CDT  To: MD Dr. Rene Rivera,    I'm currently working remotely and reviewing loop recorder alerts. I received an alert on one of Dr. Tubbs's patient, and I'm reviewing with you because you are on consults.    ILR implanted for suspected AF. Thus far, no AF has been detected on Dr. Rodriguez's loop. However, today's transmission shows 2 symptom events dating 4/2 and 4/9. Both events are similar, sudden onset, narrow complex tach, I cannot r/o AFL. Do you mind reviewing the strip (I'll ask Sharee to bring to your office) and let me know what you think?     Patient is not on any OAC's either. Last note from Dr. Wilson(12/2019) only mentions him having pAT. I called Dr. Rodriguez to assess symptoms but he didn't answer.     Thanks,  Johnnie

## 2020-04-17 ENCOUNTER — TELEPHONE (OUTPATIENT)
Dept: ELECTROPHYSIOLOGY | Facility: CLINIC | Age: 80
End: 2020-04-17

## 2020-04-17 ENCOUNTER — PATIENT MESSAGE (OUTPATIENT)
Dept: INTERNAL MEDICINE | Facility: CLINIC | Age: 80
End: 2020-04-17

## 2020-04-17 NOTE — TELEPHONE ENCOUNTER
----- Message from Taylor Tong RN sent at 4/17/2020  2:19 PM CDT -----  Ronny Murcia,    He called the clinic back and spoke to Bucky, who provided the information to him. There's a telephone note in Epic documented, and Bucky noted that his BP usually runs around the 120's and he was instructed to increase his dose.     Chela,  Johnnie  ----- Message -----  From: Divina Reid RN  Sent: 4/17/2020   2:14 PM CDT  To: Taylor Tong RN    Hi Johnnie,    The nurses are working the provider's inbaskets and I am just seeing below.  Did you speak to Dr. Rodriguez regarding BP and recs from Dr. López and new refill for Metoprolol?      Just don't want to duplicate things.    Divina    ----- Message -----  From: Taylor Tong RN  Sent: 4/17/2020   2:10 PM CDT  To: Steve PLEITEZ Staff        ----- Message -----  From: Benjie López MD  Sent: 4/16/2020   2:01 PM CDT  To: Gil Wilson MD, Taylor Tong RN    Looks like more of the same stuff he's had in the past: some ST, and 2 symptomatic episodes of sudden-onset regular NCT. First was unclear duration (ongoing at end of strip), and second was a little over a minute. HR about 150 bpm.  Please check BP. If SBP >110, please increase toprol to 25 qd.  Follow up with Dr. Wilson in a week.      ----- Message -----  From: Taylor Tong RN  Sent: 4/16/2020   1:37 PM CDT  To: MD Dr. Rene Rivera,    I'm currently working remotely and reviewing loop recorder alerts. I received an alert on one of Dr. Tubbs's patient, and I'm reviewing with you because you are on consults.    ILR implanted for suspected AF. Thus far, no AF has been detected on Dr. Rodriguez's loop. However, today's transmission shows 2 symptom events dating 4/2 and 4/9. Both events are similar, sudden onset, narrow complex tach, I cannot r/o AFL. Do you mind reviewing the strip (I'll ask Sharee to bring to your office) and let me know what you think?     Patient is not on any OAC's either. Last note from  Dr. Wilson(12/2019) only mentions him having pAT. I called Dr. Rodriguez to assess symptoms but he didn't answer.     Thanks,  Johnnie

## 2020-04-17 NOTE — TELEPHONE ENCOUNTER
Note - Will set reminder to follow up with Dr. Rodriguez in one week to see how he is doing on increased dose recommended by Dr. López and go from there.

## 2020-04-19 ENCOUNTER — PATIENT MESSAGE (OUTPATIENT)
Dept: ELECTROPHYSIOLOGY | Facility: CLINIC | Age: 80
End: 2020-04-19

## 2020-04-20 ENCOUNTER — PATIENT MESSAGE (OUTPATIENT)
Dept: ELECTROPHYSIOLOGY | Facility: CLINIC | Age: 80
End: 2020-04-20

## 2020-04-20 ENCOUNTER — PATIENT MESSAGE (OUTPATIENT)
Dept: RHEUMATOLOGY | Facility: CLINIC | Age: 80
End: 2020-04-20

## 2020-04-20 ENCOUNTER — OFFICE VISIT (OUTPATIENT)
Dept: INTERNAL MEDICINE | Facility: CLINIC | Age: 80
End: 2020-04-20
Payer: MEDICARE

## 2020-04-20 VITALS
WEIGHT: 228.63 LBS | OXYGEN SATURATION: 98 % | SYSTOLIC BLOOD PRESSURE: 122 MMHG | HEART RATE: 64 BPM | RESPIRATION RATE: 18 BRPM | DIASTOLIC BLOOD PRESSURE: 60 MMHG | TEMPERATURE: 99 F | BODY MASS INDEX: 32.73 KG/M2 | HEIGHT: 70 IN

## 2020-04-20 DIAGNOSIS — I10 ESSENTIAL HYPERTENSION: Primary | ICD-10-CM

## 2020-04-20 DIAGNOSIS — E78.49 OTHER HYPERLIPIDEMIA: ICD-10-CM

## 2020-04-20 DIAGNOSIS — J31.0 CHRONIC RHINITIS: ICD-10-CM

## 2020-04-20 DIAGNOSIS — J45.20 INTERMITTENT ASTHMA WITHOUT COMPLICATION, UNSPECIFIED ASTHMA SEVERITY: ICD-10-CM

## 2020-04-20 DIAGNOSIS — I47.19 PAT (PAROXYSMAL ATRIAL TACHYCARDIA): ICD-10-CM

## 2020-04-20 DIAGNOSIS — N18.30 CKD (CHRONIC KIDNEY DISEASE), STAGE III: ICD-10-CM

## 2020-04-20 PROCEDURE — 99214 OFFICE O/P EST MOD 30 MIN: CPT | Mod: S$GLB,,, | Performed by: INTERNAL MEDICINE

## 2020-04-20 PROCEDURE — 3074F SYST BP LT 130 MM HG: CPT | Mod: CPTII,S$GLB,, | Performed by: INTERNAL MEDICINE

## 2020-04-20 PROCEDURE — 1126F PR PAIN SEVERITY QUANTIFIED, NO PAIN PRESENT: ICD-10-PCS | Mod: S$GLB,,, | Performed by: INTERNAL MEDICINE

## 2020-04-20 PROCEDURE — 1159F MED LIST DOCD IN RCRD: CPT | Mod: S$GLB,,, | Performed by: INTERNAL MEDICINE

## 2020-04-20 PROCEDURE — 3074F PR MOST RECENT SYSTOLIC BLOOD PRESSURE < 130 MM HG: ICD-10-PCS | Mod: CPTII,S$GLB,, | Performed by: INTERNAL MEDICINE

## 2020-04-20 PROCEDURE — 1101F PT FALLS ASSESS-DOCD LE1/YR: CPT | Mod: CPTII,S$GLB,, | Performed by: INTERNAL MEDICINE

## 2020-04-20 PROCEDURE — 3078F PR MOST RECENT DIASTOLIC BLOOD PRESSURE < 80 MM HG: ICD-10-PCS | Mod: CPTII,S$GLB,, | Performed by: INTERNAL MEDICINE

## 2020-04-20 PROCEDURE — 3078F DIAST BP <80 MM HG: CPT | Mod: CPTII,S$GLB,, | Performed by: INTERNAL MEDICINE

## 2020-04-20 PROCEDURE — 1159F PR MEDICATION LIST DOCUMENTED IN MEDICAL RECORD: ICD-10-PCS | Mod: S$GLB,,, | Performed by: INTERNAL MEDICINE

## 2020-04-20 PROCEDURE — 99999 PR PBB SHADOW E&M-EST. PATIENT-LVL III: CPT | Mod: PBBFAC,,, | Performed by: INTERNAL MEDICINE

## 2020-04-20 PROCEDURE — 1101F PR PT FALLS ASSESS DOC 0-1 FALLS W/OUT INJ PAST YR: ICD-10-PCS | Mod: CPTII,S$GLB,, | Performed by: INTERNAL MEDICINE

## 2020-04-20 PROCEDURE — 99999 PR PBB SHADOW E&M-EST. PATIENT-LVL III: ICD-10-PCS | Mod: PBBFAC,,, | Performed by: INTERNAL MEDICINE

## 2020-04-20 PROCEDURE — 99214 PR OFFICE/OUTPT VISIT, EST, LEVL IV, 30-39 MIN: ICD-10-PCS | Mod: S$GLB,,, | Performed by: INTERNAL MEDICINE

## 2020-04-20 PROCEDURE — 1126F AMNT PAIN NOTED NONE PRSNT: CPT | Mod: S$GLB,,, | Performed by: INTERNAL MEDICINE

## 2020-04-20 RX ORDER — FLUTICASONE PROPIONATE AND SALMETEROL 250; 50 UG/1; UG/1
POWDER RESPIRATORY (INHALATION)
Qty: 60 EACH | Refills: 6 | Status: SHIPPED | OUTPATIENT
Start: 2020-04-20 | End: 2020-10-21

## 2020-04-20 RX ORDER — POLYETHYLENE GLYCOL-3350 AND ELECTROLYTES 236; 6.74; 5.86; 2.97; 22.74 G/274.31G; G/274.31G; G/274.31G; G/274.31G; G/274.31G
POWDER, FOR SOLUTION ORAL
Status: ON HOLD | COMMUNITY
Start: 2020-02-05 | End: 2020-06-11 | Stop reason: HOSPADM

## 2020-04-20 RX ORDER — ATORVASTATIN CALCIUM 40 MG/1
20 TABLET, FILM COATED ORAL DAILY
Qty: 90 TABLET | Refills: 3
Start: 2020-04-20 | End: 2020-06-08

## 2020-04-20 RX ORDER — METOPROLOL SUCCINATE 25 MG/1
25 TABLET, EXTENDED RELEASE ORAL DAILY
Qty: 90 TABLET | Refills: 3 | Status: SHIPPED | OUTPATIENT
Start: 2020-04-20 | End: 2021-04-09 | Stop reason: SDUPTHER

## 2020-04-21 ENCOUNTER — TELEPHONE (OUTPATIENT)
Dept: INTERNAL MEDICINE | Facility: CLINIC | Age: 80
End: 2020-04-21

## 2020-04-21 ENCOUNTER — PATIENT MESSAGE (OUTPATIENT)
Dept: INTERNAL MEDICINE | Facility: CLINIC | Age: 80
End: 2020-04-21

## 2020-04-21 DIAGNOSIS — Z00.00 WELL ADULT EXAM: Primary | ICD-10-CM

## 2020-04-21 DIAGNOSIS — Z12.5 ENCOUNTER FOR PROSTATE CANCER SCREENING: ICD-10-CM

## 2020-04-21 NOTE — TELEPHONE ENCOUNTER
Sent remind me msg for July to reschedule oct labs at [facility].    Urine schedule is not open, !!

## 2020-04-22 ENCOUNTER — LAB VISIT (OUTPATIENT)
Dept: LAB | Facility: HOSPITAL | Age: 80
End: 2020-04-22
Attending: INTERNAL MEDICINE
Payer: MEDICARE

## 2020-04-22 ENCOUNTER — PATIENT MESSAGE (OUTPATIENT)
Dept: RHEUMATOLOGY | Facility: CLINIC | Age: 80
End: 2020-04-22

## 2020-04-22 DIAGNOSIS — N18.30 CKD (CHRONIC KIDNEY DISEASE), STAGE III: ICD-10-CM

## 2020-04-22 DIAGNOSIS — E78.49 OTHER HYPERLIPIDEMIA: ICD-10-CM

## 2020-04-22 DIAGNOSIS — M1A.9XX1 CHRONIC TOPHACEOUS GOUT: ICD-10-CM

## 2020-04-22 DIAGNOSIS — I10 ESSENTIAL HYPERTENSION: ICD-10-CM

## 2020-04-22 LAB
ALBUMIN SERPL BCP-MCNC: 3.8 G/DL (ref 3.5–5.2)
ALBUMIN SERPL BCP-MCNC: 3.8 G/DL (ref 3.5–5.2)
ALP SERPL-CCNC: 81 U/L (ref 55–135)
ALP SERPL-CCNC: 81 U/L (ref 55–135)
ALT SERPL W/O P-5'-P-CCNC: 24 U/L (ref 10–44)
ALT SERPL W/O P-5'-P-CCNC: 24 U/L (ref 10–44)
ANION GAP SERPL CALC-SCNC: 7 MMOL/L (ref 8–16)
ANION GAP SERPL CALC-SCNC: 7 MMOL/L (ref 8–16)
AST SERPL-CCNC: 23 U/L (ref 10–40)
AST SERPL-CCNC: 23 U/L (ref 10–40)
BASOPHILS # BLD AUTO: 0.07 K/UL (ref 0–0.2)
BASOPHILS NFR BLD: 1.2 % (ref 0–1.9)
BILIRUB SERPL-MCNC: 0.6 MG/DL (ref 0.1–1)
BILIRUB SERPL-MCNC: 0.6 MG/DL (ref 0.1–1)
BUN SERPL-MCNC: 33 MG/DL (ref 8–23)
BUN SERPL-MCNC: 33 MG/DL (ref 8–23)
CALCIUM SERPL-MCNC: 9.4 MG/DL (ref 8.7–10.5)
CALCIUM SERPL-MCNC: 9.4 MG/DL (ref 8.7–10.5)
CHLORIDE SERPL-SCNC: 106 MMOL/L (ref 95–110)
CHLORIDE SERPL-SCNC: 106 MMOL/L (ref 95–110)
CHOLEST SERPL-MCNC: 161 MG/DL (ref 120–199)
CHOLEST/HDLC SERPL: 2 {RATIO} (ref 2–5)
CO2 SERPL-SCNC: 27 MMOL/L (ref 23–29)
CO2 SERPL-SCNC: 27 MMOL/L (ref 23–29)
CREAT SERPL-MCNC: 1.9 MG/DL (ref 0.5–1.4)
CREAT SERPL-MCNC: 1.9 MG/DL (ref 0.5–1.4)
DIFFERENTIAL METHOD: ABNORMAL
EOSINOPHIL # BLD AUTO: 0.4 K/UL (ref 0–0.5)
EOSINOPHIL NFR BLD: 6.1 % (ref 0–8)
ERYTHROCYTE [DISTWIDTH] IN BLOOD BY AUTOMATED COUNT: 12.9 % (ref 11.5–14.5)
EST. GFR  (AFRICAN AMERICAN): 37.9 ML/MIN/1.73 M^2
EST. GFR  (AFRICAN AMERICAN): 37.9 ML/MIN/1.73 M^2
EST. GFR  (NON AFRICAN AMERICAN): 32.8 ML/MIN/1.73 M^2
EST. GFR  (NON AFRICAN AMERICAN): 32.8 ML/MIN/1.73 M^2
GLUCOSE SERPL-MCNC: 97 MG/DL (ref 70–110)
GLUCOSE SERPL-MCNC: 97 MG/DL (ref 70–110)
HCT VFR BLD AUTO: 38.2 % (ref 40–54)
HDLC SERPL-MCNC: 79 MG/DL (ref 40–75)
HDLC SERPL: 49.1 % (ref 20–50)
HGB BLD-MCNC: 11.9 G/DL (ref 14–18)
IMM GRANULOCYTES # BLD AUTO: 0.02 K/UL (ref 0–0.04)
IMM GRANULOCYTES NFR BLD AUTO: 0.3 % (ref 0–0.5)
LDLC SERPL CALC-MCNC: 67 MG/DL (ref 63–159)
LYMPHOCYTES # BLD AUTO: 1.8 K/UL (ref 1–4.8)
LYMPHOCYTES NFR BLD: 30.5 % (ref 18–48)
MAGNESIUM SERPL-MCNC: 1.8 MG/DL (ref 1.6–2.6)
MCH RBC QN AUTO: 31.7 PG (ref 27–31)
MCHC RBC AUTO-ENTMCNC: 31.2 G/DL (ref 32–36)
MCV RBC AUTO: 102 FL (ref 82–98)
MONOCYTES # BLD AUTO: 0.8 K/UL (ref 0.3–1)
MONOCYTES NFR BLD: 13.7 % (ref 4–15)
NEUTROPHILS # BLD AUTO: 2.8 K/UL (ref 1.8–7.7)
NEUTROPHILS NFR BLD: 48.2 % (ref 38–73)
NONHDLC SERPL-MCNC: 82 MG/DL
NRBC BLD-RTO: 0 /100 WBC
PLATELET # BLD AUTO: 193 K/UL (ref 150–350)
PMV BLD AUTO: 12.8 FL (ref 9.2–12.9)
POTASSIUM SERPL-SCNC: 4.6 MMOL/L (ref 3.5–5.1)
POTASSIUM SERPL-SCNC: 4.6 MMOL/L (ref 3.5–5.1)
PROT SERPL-MCNC: 6.8 G/DL (ref 6–8.4)
PROT SERPL-MCNC: 6.8 G/DL (ref 6–8.4)
RBC # BLD AUTO: 3.75 M/UL (ref 4.6–6.2)
SODIUM SERPL-SCNC: 140 MMOL/L (ref 136–145)
SODIUM SERPL-SCNC: 140 MMOL/L (ref 136–145)
TRIGL SERPL-MCNC: 75 MG/DL (ref 30–150)
URATE SERPL-MCNC: 6.5 MG/DL (ref 3.4–7)
URATE SERPL-MCNC: 6.5 MG/DL (ref 3.4–7)
WBC # BLD AUTO: 5.77 K/UL (ref 3.9–12.7)

## 2020-04-22 PROCEDURE — 85025 COMPLETE CBC W/AUTO DIFF WBC: CPT

## 2020-04-22 PROCEDURE — 83735 ASSAY OF MAGNESIUM: CPT

## 2020-04-22 PROCEDURE — 84550 ASSAY OF BLOOD/URIC ACID: CPT

## 2020-04-22 PROCEDURE — 36415 COLL VENOUS BLD VENIPUNCTURE: CPT | Mod: PN

## 2020-04-22 PROCEDURE — 80061 LIPID PANEL: CPT

## 2020-04-22 PROCEDURE — 80053 COMPREHEN METABOLIC PANEL: CPT

## 2020-04-23 ENCOUNTER — PATIENT MESSAGE (OUTPATIENT)
Dept: RHEUMATOLOGY | Facility: CLINIC | Age: 80
End: 2020-04-23

## 2020-04-23 RX ORDER — AZELASTINE 1 MG/ML
2 SPRAY, METERED NASAL 2 TIMES DAILY PRN
Qty: 30 ML | Refills: 5 | Status: SHIPPED | OUTPATIENT
Start: 2020-04-23 | End: 2022-10-27 | Stop reason: SDUPTHER

## 2020-04-28 ENCOUNTER — CLINICAL SUPPORT (OUTPATIENT)
Dept: CARDIOLOGY | Facility: HOSPITAL | Age: 80
End: 2020-04-28
Payer: MEDICARE

## 2020-04-28 DIAGNOSIS — Z95.818 PRESENCE OF OTHER CARDIAC IMPLANTS AND GRAFTS: ICD-10-CM

## 2020-04-28 PROCEDURE — G2066 INTER DEVC REMOTE 30D: HCPCS | Performed by: INTERNAL MEDICINE

## 2020-04-29 ENCOUNTER — PATIENT MESSAGE (OUTPATIENT)
Dept: ENDOSCOPY | Facility: HOSPITAL | Age: 80
End: 2020-04-29

## 2020-04-30 ENCOUNTER — TELEPHONE (OUTPATIENT)
Dept: ENDOSCOPY | Facility: HOSPITAL | Age: 80
End: 2020-04-30

## 2020-04-30 NOTE — TELEPHONE ENCOUNTER
Patient returned phone call regarding EGD/Colonoscopy procedures scheduled on 5/6/20.   Explained to patient that per Dr. Gutierrez's recommendation, procedures to be cancelled for 5/6/20, but to be reschedule by 6/3/20.   Informed patient that he will be contacted to reschedule procedures once the schedule is available for these dates. Patient verbalized understanding.

## 2020-04-30 NOTE — TELEPHONE ENCOUNTER
Patient contacted regarding Egd/Colonoscopy procedures scheduled on 5/6/20. No answer. Left voicemail message with my direct contact number for patient to return my phone call.    Thanks,  Lindsay

## 2020-05-06 ENCOUNTER — TELEPHONE (OUTPATIENT)
Dept: RHEUMATOLOGY | Facility: CLINIC | Age: 80
End: 2020-05-06

## 2020-05-07 NOTE — PROGRESS NOTES
Pre chart  Answers for HPI/ROS submitted by the patient on 5/6/2020   fever: No  eye redness: No  headaches: No  shortness of breath: No  chest pain: No  trouble swallowing: No  diarrhea: No  constipation: No  unexpected weight change: No  genital sore: No  During the last 3 days, have you had a skin rash?: No  Bruises or bleeds easily: Yes  cough: No

## 2020-05-08 NOTE — PROGRESS NOTES
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    Chief Complaint: Follow-up (6 month)    HPI   The patient presents for follow-up of medical conditions.  Active medical conditions include hypertension, hyperlipidemia, chronic asthma, lower extremity edema hyperuricemia.  The patient monitors his vital signs and peak flows regularly.  Asthma has been stable with peak flows in the 520-530 range.  Metoprolol was recently increased by his cardiologist.  He has had 2 spikes in his blood pressure with activity recently.  He reports his heart rate was down to 53 this morning.  He denies having any fatigue.    Review of Systems   Constitutional: Negative for activity change, appetite change, fatigue and unexpected weight change.   HENT: Negative for sinus pressure and sore throat.    Eyes: Negative for visual disturbance.   Respiratory: Negative for cough, chest tightness, shortness of breath and wheezing.    Cardiovascular: Negative for chest pain, palpitations and leg swelling.   Gastrointestinal: Negative for abdominal pain, blood in stool, nausea and vomiting.   Genitourinary: Negative for dysuria, hematuria and urgency.   Musculoskeletal: Positive for myalgias. Negative for arthralgias, back pain, gait problem, joint swelling and neck stiffness.   Skin: Negative for color change and rash.   Neurological: Negative for dizziness, syncope, weakness, light-headedness, numbness and headaches.   Psychiatric/Behavioral: Negative for sleep disturbance.       Objective:      Physical Exam   Constitutional: He is oriented to person, place, and time. He appears well-developed and well-nourished. No distress.   HENT:   Head: Normocephalic and atraumatic.   Eyes: Conjunctivae and EOM are normal. No scleral icterus.   Neck: Normal range of motion. Neck supple. No JVD present. No thyromegaly present.   Cardiovascular: Normal rate, regular rhythm, normal heart sounds and intact distal pulses. Exam reveals no  gallop and no friction rub.   No murmur heard.  Pulmonary/Chest: Effort normal and breath sounds normal. No respiratory distress. He has no wheezes. He has no rales.   Abdominal: Soft. Bowel sounds are normal. He exhibits no mass. There is no tenderness.   Musculoskeletal: Normal range of motion. He exhibits no tenderness.   Negative straight leg raising test bilaterally.  Hip range of motion is intact.   Lymphadenopathy:     He has no cervical adenopathy.   Neurological: He is alert and oriented to person, place, and time.   Gait is normal.   Skin: Skin is warm and dry. No rash noted.   A small wart is noted on the right index finger.   Nursing note and vitals reviewed.      Assessment:       1. Essential hypertension    2. CKD (chronic kidney disease), stage III    3. Intermittent asthma without complication, unspecified asthma severity    4. Other hyperlipidemia    5. Chronic rhinitis        Plan:     Adelfo was seen today for follow-up.  The dose of Lipitor will be decreased to 20 mg daily in an effort to reduce muscle pain.  The patient will use Lasix every 3 days for control of lower extremity edema.  EGD and colonoscopy are scheduled for 05/06/2020.  Fasting labs will be obtained this week.  A follow-up visit in 4-6 months is recommended.    Diagnoses and all orders for this visit:    Essential hypertension  -     CBC auto differential; Future  -     Comprehensive metabolic panel; Future  -     Lipid panel; Future  -     Uric acid; Future  -     Magnesium; Future    CKD (chronic kidney disease), stage III  -     CBC auto differential; Future  -     Comprehensive metabolic panel; Future  -     Lipid panel; Future  -     Uric acid; Future  -     Magnesium; Future    Intermittent asthma without complication, unspecified asthma severity    Other hyperlipidemia  -     CBC auto differential; Future  -     Comprehensive metabolic panel; Future  -     Lipid panel; Future  -     Uric acid; Future  -     Magnesium;  Future    Chronic rhinitis    Other orders  -     atorvastatin (LIPITOR) 40 MG tablet; Take 0.5 tablets (20 mg total) by mouth once daily.

## 2020-05-11 ENCOUNTER — OFFICE VISIT (OUTPATIENT)
Dept: RHEUMATOLOGY | Facility: CLINIC | Age: 80
End: 2020-05-11
Payer: MEDICARE

## 2020-05-11 DIAGNOSIS — M1A.9XX1 CHRONIC TOPHACEOUS GOUT: Primary | ICD-10-CM

## 2020-05-11 PROCEDURE — 99213 OFFICE O/P EST LOW 20 MIN: CPT | Mod: 95,,, | Performed by: INTERNAL MEDICINE

## 2020-05-11 PROCEDURE — 1101F PT FALLS ASSESS-DOCD LE1/YR: CPT | Mod: CPTII,,, | Performed by: INTERNAL MEDICINE

## 2020-05-11 PROCEDURE — 1159F PR MEDICATION LIST DOCUMENTED IN MEDICAL RECORD: ICD-10-PCS | Mod: ,,, | Performed by: INTERNAL MEDICINE

## 2020-05-11 PROCEDURE — 1101F PR PT FALLS ASSESS DOC 0-1 FALLS W/OUT INJ PAST YR: ICD-10-PCS | Mod: CPTII,,, | Performed by: INTERNAL MEDICINE

## 2020-05-11 PROCEDURE — 1159F MED LIST DOCD IN RCRD: CPT | Mod: ,,, | Performed by: INTERNAL MEDICINE

## 2020-05-11 PROCEDURE — 99213 PR OFFICE/OUTPT VISIT, EST, LEVL III, 20-29 MIN: ICD-10-PCS | Mod: 95,,, | Performed by: INTERNAL MEDICINE

## 2020-05-11 RX ORDER — FEBUXOSTAT 40 MG/1
40 TABLET, FILM COATED ORAL DAILY
Qty: 90 TABLET | Refills: 3 | Status: SHIPPED | OUTPATIENT
Start: 2020-05-11 | End: 2021-05-04 | Stop reason: SDUPTHER

## 2020-05-11 RX ORDER — COLCHICINE 0.6 MG/1
TABLET ORAL
Qty: 45 TABLET | Refills: 1 | Status: SHIPPED | OUTPATIENT
Start: 2020-05-11 | End: 2020-11-11

## 2020-05-11 ASSESSMENT — ROUTINE ASSESSMENT OF PATIENT INDEX DATA (RAPID3)
AM STIFFNESS SCORE: 0, NO
PAIN SCORE: .5
MDHAQ FUNCTION SCORE: 0
PATIENT GLOBAL ASSESSMENT SCORE: 0
PSYCHOLOGICAL DISTRESS SCORE: 0
FATIGUE SCORE: 0
TOTAL RAPID3 SCORE: .17

## 2020-05-11 NOTE — PROGRESS NOTES
I have personally reviewed the history and participated in video visit and discussed assessment and plan with fellow.

## 2020-05-11 NOTE — PROGRESS NOTES
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    Chief Complaint: Gout    The patient location is: Home  The chief complaint leading to consultation is: Gout  Visit type: audiovisual  Total time spent with patient: 20 minutes  Each patient to whom he or she provides medical services by telemedicine is:  (1) informed of the relationship between the physician and patient and the respective role of any other health care provider with respect to management of the patient; and (2) notified that he or she may decline to receive medical services by telemedicine and may withdraw from such care at any time.    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 He was last seen in October 2019. He has not had a flare since 5 years ago. He has not been consuming a low purine diet, he exercises regularly (seated elliptical, walking, and gardening).     Review of Systems   Constitutional: Negative for fever and unexpected weight change.   HENT: Negative for trouble swallowing.    Eyes: Negative for redness.   Respiratory: Negative for cough and shortness of breath.    Cardiovascular: Negative for chest pain.   Gastrointestinal: Negative for constipation and diarrhea.   Genitourinary: Negative for genital sores.   Skin: Negative for rash.   Neurological: Negative for headaches.   Hematological: Bruises/bleeds easily.         Objective:   There were no vitals taken for this visit.     Physical Exam   Constitutional: He is well-developed, well-nourished, and in no distress. No distress.   Skin: He is not diaphoretic.          No data to display     CBC (4/22/2020): Hgb 11.9, otherwise WNL  CMP: Cr 1.9, GFR 32.8 stable  Uric acid: 6.5     Assessment:       1. Chronic tophaceous gout        This is a 78 year old man with a history of asthma, CKD stage III, HLD, HTN, and chronic  tophaceous gout on febuxostat 40mg daily and colchicine 0.3mg daily. SUA 6.5, febuxostat recently increased to 40mg. Repeat uric acid already scheduled, goal is <5.     Plan:       Problem List Items Addressed This Visit        Orthopedic    Chronic tophaceous gout - Primary        - Continue febuxostat 40mg and colchicine 0.3mg daily   - Repeat uric acid 1 month after previous draw    Patient seen and discussed with Dr. Nunez    RTC in 6 months    Althea Milligan M.D.  Rheumatology, PGY 4

## 2020-05-14 ENCOUNTER — PATIENT MESSAGE (OUTPATIENT)
Dept: SLEEP MEDICINE | Facility: CLINIC | Age: 80
End: 2020-05-14

## 2020-05-14 DIAGNOSIS — G47.33 OBSTRUCTIVE SLEEP APNEA: Primary | ICD-10-CM

## 2020-05-18 ENCOUNTER — TELEPHONE (OUTPATIENT)
Dept: ENDOSCOPY | Facility: HOSPITAL | Age: 80
End: 2020-05-18

## 2020-05-18 DIAGNOSIS — D50.0 IRON DEFICIENCY ANEMIA DUE TO CHRONIC BLOOD LOSS: Primary | ICD-10-CM

## 2020-05-18 DIAGNOSIS — Z12.11 SPECIAL SCREENING FOR MALIGNANT NEOPLASMS, COLON: Primary | ICD-10-CM

## 2020-05-18 DIAGNOSIS — Z87.11 HISTORY OF GASTRIC ULCER: ICD-10-CM

## 2020-05-18 RX ORDER — SODIUM, POTASSIUM,MAG SULFATES 17.5-3.13G
1 SOLUTION, RECONSTITUTED, ORAL ORAL DAILY
Qty: 1 KIT | Refills: 0 | Status: SHIPPED | OUTPATIENT
Start: 2020-05-18 | End: 2020-05-20

## 2020-05-21 ENCOUNTER — LAB VISIT (OUTPATIENT)
Dept: LAB | Facility: HOSPITAL | Age: 80
End: 2020-05-21
Attending: INTERNAL MEDICINE
Payer: MEDICARE

## 2020-05-21 DIAGNOSIS — M1A.9XX1 CHRONIC TOPHACEOUS GOUT: ICD-10-CM

## 2020-05-21 PROCEDURE — 36415 COLL VENOUS BLD VENIPUNCTURE: CPT | Mod: PN

## 2020-05-21 PROCEDURE — 84550 ASSAY OF BLOOD/URIC ACID: CPT

## 2020-05-21 PROCEDURE — 80053 COMPREHEN METABOLIC PANEL: CPT

## 2020-05-22 ENCOUNTER — PATIENT MESSAGE (OUTPATIENT)
Dept: RHEUMATOLOGY | Facility: CLINIC | Age: 80
End: 2020-05-22

## 2020-05-22 LAB
ALBUMIN SERPL BCP-MCNC: 3.9 G/DL (ref 3.5–5.2)
ALP SERPL-CCNC: 78 U/L (ref 55–135)
ALT SERPL W/O P-5'-P-CCNC: 21 U/L (ref 10–44)
ANION GAP SERPL CALC-SCNC: 10 MMOL/L (ref 8–16)
AST SERPL-CCNC: 20 U/L (ref 10–40)
BILIRUB SERPL-MCNC: 0.8 MG/DL (ref 0.1–1)
BUN SERPL-MCNC: 27 MG/DL (ref 8–23)
CALCIUM SERPL-MCNC: 8.9 MG/DL (ref 8.7–10.5)
CHLORIDE SERPL-SCNC: 107 MMOL/L (ref 95–110)
CO2 SERPL-SCNC: 24 MMOL/L (ref 23–29)
CREAT SERPL-MCNC: 1.9 MG/DL (ref 0.5–1.4)
EST. GFR  (AFRICAN AMERICAN): 37.9 ML/MIN/1.73 M^2
EST. GFR  (NON AFRICAN AMERICAN): 32.8 ML/MIN/1.73 M^2
GLUCOSE SERPL-MCNC: 81 MG/DL (ref 70–110)
POTASSIUM SERPL-SCNC: 4.3 MMOL/L (ref 3.5–5.1)
PROT SERPL-MCNC: 6.8 G/DL (ref 6–8.4)
SODIUM SERPL-SCNC: 141 MMOL/L (ref 136–145)
URATE SERPL-MCNC: 5.7 MG/DL (ref 3.4–7)

## 2020-05-28 ENCOUNTER — CLINICAL SUPPORT (OUTPATIENT)
Dept: CARDIOLOGY | Facility: HOSPITAL | Age: 80
End: 2020-05-28
Attending: INTERNAL MEDICINE
Payer: MEDICARE

## 2020-05-28 DIAGNOSIS — I47.19 PAT (PAROXYSMAL ATRIAL TACHYCARDIA): ICD-10-CM

## 2020-05-28 DIAGNOSIS — Z95.818 PRESENCE OF OTHER CARDIAC IMPLANTS AND GRAFTS: ICD-10-CM

## 2020-05-28 PROCEDURE — G2066 INTER DEVC REMOTE 30D: HCPCS | Performed by: INTERNAL MEDICINE

## 2020-05-28 PROCEDURE — 93298 REM INTERROG DEV EVAL SCRMS: CPT | Mod: ,,, | Performed by: INTERNAL MEDICINE

## 2020-05-28 PROCEDURE — 93298 CARDIAC DEVICE CHECK - REMOTE: ICD-10-PCS | Mod: ,,, | Performed by: INTERNAL MEDICINE

## 2020-06-08 RX ORDER — ATORVASTATIN CALCIUM 40 MG/1
TABLET, FILM COATED ORAL
Qty: 90 TABLET | Refills: 3 | Status: SHIPPED | OUTPATIENT
Start: 2020-06-08 | End: 2020-10-22

## 2020-06-09 ENCOUNTER — LAB VISIT (OUTPATIENT)
Dept: INTERNAL MEDICINE | Facility: CLINIC | Age: 80
End: 2020-06-09
Payer: MEDICARE

## 2020-06-09 DIAGNOSIS — Z87.11 HISTORY OF GASTRIC ULCER: ICD-10-CM

## 2020-06-09 DIAGNOSIS — D50.0 IRON DEFICIENCY ANEMIA DUE TO CHRONIC BLOOD LOSS: ICD-10-CM

## 2020-06-09 PROCEDURE — U0003 INFECTIOUS AGENT DETECTION BY NUCLEIC ACID (DNA OR RNA); SEVERE ACUTE RESPIRATORY SYNDROME CORONAVIRUS 2 (SARS-COV-2) (CORONAVIRUS DISEASE [COVID-19]), AMPLIFIED PROBE TECHNIQUE, MAKING USE OF HIGH THROUGHPUT TECHNOLOGIES AS DESCRIBED BY CMS-2020-01-R: HCPCS

## 2020-06-10 LAB — SARS-COV-2 RNA RESP QL NAA+PROBE: NOT DETECTED

## 2020-06-11 ENCOUNTER — ANESTHESIA (OUTPATIENT)
Dept: ENDOSCOPY | Facility: HOSPITAL | Age: 80
End: 2020-06-11
Payer: MEDICARE

## 2020-06-11 ENCOUNTER — HOSPITAL ENCOUNTER (OUTPATIENT)
Facility: HOSPITAL | Age: 80
Discharge: HOME | End: 2020-06-11
Attending: INTERNAL MEDICINE | Admitting: INTERNAL MEDICINE
Payer: MEDICARE

## 2020-06-11 ENCOUNTER — ANESTHESIA EVENT (OUTPATIENT)
Dept: ENDOSCOPY | Facility: HOSPITAL | Age: 80
End: 2020-06-11
Payer: MEDICARE

## 2020-06-11 VITALS
TEMPERATURE: 98 F | HEART RATE: 58 BPM | BODY MASS INDEX: 31.5 KG/M2 | WEIGHT: 220 LBS | OXYGEN SATURATION: 98 % | DIASTOLIC BLOOD PRESSURE: 64 MMHG | RESPIRATION RATE: 16 BRPM | SYSTOLIC BLOOD PRESSURE: 132 MMHG | HEIGHT: 70 IN

## 2020-06-11 DIAGNOSIS — D50.9 IRON DEFICIENCY ANEMIA, UNSPECIFIED IRON DEFICIENCY ANEMIA TYPE: Primary | ICD-10-CM

## 2020-06-11 DIAGNOSIS — K63.5 POLYP OF COLON, UNSPECIFIED PART OF COLON, UNSPECIFIED TYPE: Chronic | ICD-10-CM

## 2020-06-11 DIAGNOSIS — D50.9 IDA (IRON DEFICIENCY ANEMIA): ICD-10-CM

## 2020-06-11 PROCEDURE — 25000003 PHARM REV CODE 250: Performed by: INTERNAL MEDICINE

## 2020-06-11 PROCEDURE — 27201089 HC SNARE, DISP (ANY): Performed by: INTERNAL MEDICINE

## 2020-06-11 PROCEDURE — 88305 TISSUE EXAM BY PATHOLOGIST: CPT | Mod: 26,,, | Performed by: PATHOLOGY

## 2020-06-11 PROCEDURE — 43239 EGD BIOPSY SINGLE/MULTIPLE: CPT | Mod: 51,,, | Performed by: INTERNAL MEDICINE

## 2020-06-11 PROCEDURE — 27201012 HC FORCEPS, HOT/COLD, DISP: Performed by: INTERNAL MEDICINE

## 2020-06-11 PROCEDURE — 88305 TISSUE EXAM BY PATHOLOGIST: CPT | Mod: 59 | Performed by: PATHOLOGY

## 2020-06-11 PROCEDURE — 43239 EGD BIOPSY SINGLE/MULTIPLE: CPT | Performed by: INTERNAL MEDICINE

## 2020-06-11 PROCEDURE — 37000008 HC ANESTHESIA 1ST 15 MINUTES: Performed by: INTERNAL MEDICINE

## 2020-06-11 PROCEDURE — 37000009 HC ANESTHESIA EA ADD 15 MINS: Performed by: INTERNAL MEDICINE

## 2020-06-11 PROCEDURE — E9220 PRA ENDO ANESTHESIA: ICD-10-PCS | Mod: 33,,, | Performed by: NURSE ANESTHETIST, CERTIFIED REGISTERED

## 2020-06-11 PROCEDURE — 45385 COLONOSCOPY W/LESION REMOVAL: CPT | Performed by: INTERNAL MEDICINE

## 2020-06-11 PROCEDURE — 88305 TISSUE EXAM BY PATHOLOGIST: ICD-10-PCS | Mod: 26,,, | Performed by: PATHOLOGY

## 2020-06-11 PROCEDURE — 45385 PR COLONOSCOPY,REMV LESN,SNARE: ICD-10-PCS | Mod: 33,,, | Performed by: INTERNAL MEDICINE

## 2020-06-11 PROCEDURE — 43239 PR EGD, FLEX, W/BIOPSY, SGL/MULTI: ICD-10-PCS | Mod: 51,,, | Performed by: INTERNAL MEDICINE

## 2020-06-11 PROCEDURE — E9220 PRA ENDO ANESTHESIA: HCPCS | Mod: 33,,, | Performed by: NURSE ANESTHETIST, CERTIFIED REGISTERED

## 2020-06-11 PROCEDURE — 63600175 PHARM REV CODE 636 W HCPCS: Performed by: NURSE ANESTHETIST, CERTIFIED REGISTERED

## 2020-06-11 PROCEDURE — 45385 COLONOSCOPY W/LESION REMOVAL: CPT | Mod: 33,,, | Performed by: INTERNAL MEDICINE

## 2020-06-11 RX ORDER — PROPOFOL 10 MG/ML
VIAL (ML) INTRAVENOUS CONTINUOUS PRN
Status: DISCONTINUED | OUTPATIENT
Start: 2020-06-11 | End: 2020-06-11

## 2020-06-11 RX ORDER — LIDOCAINE HCL/PF 100 MG/5ML
SYRINGE (ML) INTRAVENOUS
Status: DISCONTINUED | OUTPATIENT
Start: 2020-06-11 | End: 2020-06-11

## 2020-06-11 RX ORDER — SODIUM CHLORIDE 9 MG/ML
INJECTION, SOLUTION INTRAVENOUS CONTINUOUS
Status: DISCONTINUED | OUTPATIENT
Start: 2020-06-11 | End: 2020-06-11 | Stop reason: HOSPADM

## 2020-06-11 RX ORDER — PROPOFOL 10 MG/ML
VIAL (ML) INTRAVENOUS
Status: DISCONTINUED | OUTPATIENT
Start: 2020-06-11 | End: 2020-06-11

## 2020-06-11 RX ADMIN — Medication 100 MG: at 08:06

## 2020-06-11 RX ADMIN — PROPOFOL 20 MG: 10 INJECTION, EMULSION INTRAVENOUS at 08:06

## 2020-06-11 RX ADMIN — SODIUM CHLORIDE: 0.9 INJECTION, SOLUTION INTRAVENOUS at 07:06

## 2020-06-11 RX ADMIN — PROPOFOL 70 MG: 10 INJECTION, EMULSION INTRAVENOUS at 08:06

## 2020-06-11 RX ADMIN — PROPOFOL 150 MCG/KG/MIN: 10 INJECTION, EMULSION INTRAVENOUS at 08:06

## 2020-06-11 RX ADMIN — SODIUM CHLORIDE: 0.9 INJECTION, SOLUTION INTRAVENOUS at 08:06

## 2020-06-11 RX ADMIN — PROPOFOL 30 MG: 10 INJECTION, EMULSION INTRAVENOUS at 08:06

## 2020-06-11 NOTE — ANESTHESIA PREPROCEDURE EVALUATION
06/11/2020  Adelfo VICENTE Rodriguez, PhD is a 79 y.o., male.    Past Medical History:   Diagnosis Date    ALLERGIC RHINITIS     Anemia     Asthma     Hyperlipidemia     Hypertension     NAFLD (nonalcoholic fatty liver disease)     ANNABELLE (obstructive sleep apnea)     on CPAP    Squamous cell cancer of skin of hand      Past Surgical History:   Procedure Laterality Date    APPENDECTOMY      CARDIAC CATHETERIZATION  10/31/2018    no stent    CARDIAC CATHETERIZATION  1998    no stent    COLONOSCOPY N/A 6/19/2018    Procedure: COLONOSCOPY;  Surgeon: Wade De La Garza MD;  Location: [facility] ENDO ([address] [place] [address]);  Service: Endoscopy;  Laterality: N/A;    ESOPHAGOGASTRODUODENOSCOPY N/A 10/23/2018    Procedure: EGD (ESOPHAGOGASTRODUODENOSCOPY);  Surgeon: Bart Hernandez MD;  Location: [facility] ENDO ([address] [place] [address]);  Service: Endoscopy;  Laterality: N/A;    FOOT SURGERY      INSERTION OF IMPLANTABLE LOOP RECORDER N/A 9/18/2019    Procedure: Insertion, Implantable Loop Recorder;  Surgeon: Gil Wilson MD;  Location: [facility] EP LAB;  Service: Cardiology;  Laterality: N/A;  AT, ILR, MDT, Local, NM, 3 Prep    testicular biopsy       Patient Active Problem List   Diagnosis    Asthma, intermittent    Essential hypertension    Hyperlipidemia    ANNABELLE (obstructive sleep apnea)    Chronic rhinitis    Back pain    Chronic tophaceous gout    Obesity (BMI 30-39.9)    NAFLD (nonalcoholic fatty liver disease)    Pancreatic cyst    Impingement syndrome of right shoulder    Hypertensive kidney disease    Chronic right shoulder pain    Chronic anemia    Colon polyps    Screening for colon cancer    Hyponatremia    CKD (chronic kidney disease), stage III    GERD (gastroesophageal reflux disease)    Abnormal cardiovascular stress test    PAT (paroxysmal atrial tachycardia)         Anesthesia Evaluation    I have reviewed the Patient  Summary Reports.      I have reviewed the Medications.     Review of Systems  Anesthesia Hx:  No previous Anesthesia    Social:  Former Smoker, Alcohol Use    Cardiovascular:   Hypertension, well controlled    Pulmonary:   Asthma asymptomatic Sleep Apnea, CPAP    Renal/:   Chronic Renal Disease, CRI    Hepatic/GI:   Bowel Prep. Denies GERD. Liver Disease,    Musculoskeletal:   Arthritis         Physical Exam  General:  Obesity    Airway/Jaw/Neck:  Airway Findings: Mouth Opening: Normal Tongue: Normal  General Airway Assessment: Adult  Mallampati: II  Improves to II with phonation.  TM Distance: Normal, at least 6 cm      Dental:  Dental Findings: In tact             Anesthesia Plan  Type of Anesthesia, risks & benefits discussed:  Anesthesia Type:  general  Patient's Preference:   Intra-op Monitoring Plan: standard ASA monitors  Intra-op Monitoring Plan Comments:   Post Op Pain Control Plan:   Post Op Pain Control Plan Comments:   Induction:   IV  Beta Blocker:  Patient is on a Beta-Blocker and has received one dose within the past 24 hours (No further documentation required).       Informed Consent: Patient understands risks and agrees with Anesthesia plan.  Questions answered. Anesthesia consent signed with patient.  ASA Score: 3     Day of Surgery Review of History & Physical: I have interviewed and examined the patient. I have reviewed the patient's H&P dated:            Ready For Surgery From Anesthesia Perspective.        Implemented All Universal Safety Interventions:  Champlain to call system. Call bell, personal items and telephone within reach. Instruct patient to call for assistance. Room bathroom lighting operational. Non-slip footwear when patient is off stretcher. Physically safe environment: no spills, clutter or unnecessary equipment. Stretcher in lowest position, wheels locked, appropriate side rails in place.

## 2020-06-11 NOTE — TRANSFER OF CARE
"Anesthesia Transfer of Care Note    Patient: Adelfo VICENTE Rodriguez, PhD    Procedure(s) Performed: Procedure(s) (LRB):  EGD (ESOPHAGOGASTRODUODENOSCOPY) (N/A)  COLONOSCOPY (N/A)    Patient location: [facility]    Anesthesia Type: general    Transport from OR: Transported from OR on room air with adequate spontaneous ventilation    Post pain: adequate analgesia    Post assessment: no apparent anesthetic complications and tolerated procedure well    Post vital signs: stable    Level of consciousness: awake, alert and oriented    Nausea/Vomiting: no nausea/vomiting    Complications: none    Transfer of care protocol was followed      Last vitals:   Visit Vitals  /60 (BP Location: Left arm, Patient Position: Lying)   Pulse 66   Temp 36.9 °C (98.4 °F)   Resp 16   Ht 5' 9.5" (1.765 m)   Wt 99.8 kg (220 lb)   SpO2 98%   BMI 32.02 kg/m²     "

## 2020-06-11 NOTE — ANESTHESIA POSTPROCEDURE EVALUATION
Anesthesia Post Evaluation    Patient: Adelfo Rodriguez, PhD    Procedure(s) Performed: Procedure(s) (LRB):  EGD (ESOPHAGOGASTRODUODENOSCOPY) (N/A)  COLONOSCOPY (N/A)    Final Anesthesia Type: general    Patient location during evaluation: PACU  Patient participation: Yes- Able to Participate  Level of consciousness: awake and alert and oriented  Post-procedure vital signs: reviewed and stable  Pain management: adequate  Airway patency: patent    PONV status at discharge: No PONV  Anesthetic complications: no      Cardiovascular status: blood pressure returned to baseline and hemodynamically stable  Respiratory status: unassisted, spontaneous ventilation and room air  Hydration status: euvolemic  Follow-up not needed.          Vitals Value Taken Time   /60 6/11/2020  8:54 AM   Temp 36.9 °C (98.4 °F) 6/11/2020  7:38 AM   Pulse 66 6/11/2020  8:54 AM   Resp 16 6/11/2020  8:54 AM   SpO2 98 % 6/11/2020  8:54 AM         No case tracking events are documented in the log.      Pain/Ana Laura Score: Ana Laura Score: 10 (6/11/2020  8:54 AM)

## 2020-06-11 NOTE — DISCHARGE INSTRUCTIONS

## 2020-06-11 NOTE — H&P
Short Stay Endoscopy History and Physical    PCP - Romero Vogel MD    Procedure - EGD/Colonoscopy  ASA - per anesthesia  Mallampati - per anesthesia  History of Anesthesia problems - no  Family history Anesthesia problems -  no   Plan of anesthesia - MAC    HPI:  This is a 79 y.o. male here for evaluation of iron deficiency, colon polyps, and history of gastric ulcer.  Feeling well.       ROS:  Constitutional: No fevers, chills, No weight loss  CV: No chest pain  Pulm: No cough, No shortness of breath  Ophtho: No vision changes  GI: see HPI    Medical History:  has a past medical history of ALLERGIC RHINITIS, Anemia, Asthma, Hyperlipidemia, Hypertension, NAFLD (nonalcoholic fatty liver disease), ANNABELLE (obstructive sleep apnea), and Squamous cell cancer of skin of hand.    Surgical History:  has a past surgical history that includes Appendectomy; testicular biopsy; Foot surgery; Colonoscopy (N/A, 6/19/2018); Esophagogastroduodenoscopy (N/A, 10/23/2018); Cardiac catheterization (10/31/2018); Cardiac catheterization (1998); and Insertion of implantable loop recorder (N/A, 9/18/2019).    Family History: family history includes Arthritis in his brother; Blindness in his maternal grandmother; COPD in his father; Cancer in his mother; Cataracts in his maternal grandmother; Glaucoma in his mother; Heart disease in his brother and father; Retinal detachment in his father and mother.. Otherwise no colon cancer, inflammatory bowel disease, or GI malignancies.    Social History:  reports that he quit smoking about 52 years ago. He has a 10.00 pack-year smoking history. He has quit using smokeless tobacco. He reports that he drinks about 2.0 standard drinks of alcohol per week. He reports that he does not use drugs.    Review of patient's allergies indicates:   Allergen Reactions    Amoxicillin Hives    Allopurinol analogues Other (See Comments)     Abnormal transaminases       Medications:   Medications Prior to Admission    Medication Sig Dispense Refill Last Dose    atorvastatin (LIPITOR) 40 MG tablet TAKE 1 TABLET(40 MG) BY MOUTH EVERY DAY 90 tablet 3 6/10/2020 at Unknown time    colchicine (COLCRYS) 0.6 mg tablet TAKE 1/2 TABLET BY MOUTH DAILY 45 tablet 1 6/11/2020 at Unknown time    febuxostat (ULORIC) 40 mg Tab Take 1 tablet (40 mg total) by mouth once daily. 90 tablet 3 6/11/2020 at Unknown time    felodipine (PLENDIL) 5 MG 24 hr tablet TAKE 1 TABLET BY MOUTH EVERY DAY 90 tablet 3 6/11/2020 at Unknown time    fish oil-omega-3 fatty acids 300-1,000 mg capsule Take 2 g by mouth once daily.     Past Week at Unknown time    fluticasone propionate (FLONASE) 50 mcg/actuation nasal spray INSTILL 2 SPRAYS INTO EACH NOSTRIL ONCE DAILY 48 mL 3 6/11/2020 at Unknown time    furosemide (LASIX) 40 MG tablet TAKE 1 TABLET BY MOUTH EVERY DAY (Patient taking differently: Twice weekly) 90 tablet 0 Past Week at Unknown time    glucosamine & chondroit sul.Na 750-600 mg Tab Take 750 mg by mouth.   Past Week at Unknown time    irbesartan (AVAPRO) 150 MG tablet TAKE 1 TABLET BY MOUTH ONCE DAILY FOR BLOOD PRESSURE 30 tablet 11 6/11/2020 at Unknown time    ketoconazole (NIZORAL) 2 % shampoo WASH affected area every other day 120 mL 0 Past Week at Unknown time    loratadine (CLARITIN) 10 mg tablet Take 10 mg by mouth daily as needed.     6/10/2020 at Unknown time    metoprolol succinate (TOPROL-XL) 25 MG 24 hr tablet Take 1 tablet (25 mg total) by mouth once daily. 90 tablet 3 6/11/2020 at Unknown time    WIXELA INHUB 250-50 mcg/dose diskus inhaler INHALE 1 PUFF BY MOUTH TWICE DAILY. RINSE MOUTH AFTER USE 60 each 6 6/11/2020 at Unknown time    albuterol (PROAIR HFA) 90 mcg/actuation inhaler Inhale 2 puffs into the lungs every 4 (four) hours as needed for Wheezing. 1 Inhaler 6 Unknown at Unknown time    azelastine (ASTELIN) 137 mcg (0.1 %) nasal spray 2 sprays (274 mcg total) by Nasal route 2 (two) times daily as needed. 30 mL 5 More than  a month at Unknown time    clindamycin (CLEOCIN T) 1 % external solution APPLY TOPICALLY TO AFFECTED AREA BID  5 Taking    GAVILYTE-G 236-22.74-6.74 -5.86 gram suspension DISSOLVE AND TK PO AS DIRECTED FOR 1 DOSE   Taking    hepatitis A and B vaccine, PF, (TWINRIX, PF,) 720 FORREST unit- 20 mcg/mL Syrg suspension Twinrix (PF) 720 FORREST unit-20 mcg/mL intramuscular syringe   inject 1 milliliter intramuscularly   More than a month at Unknown time    hydrocortisone-pramoxine (ANALPRAM-HC) 2.5-1 % Crea Place rectally 3 (three) times daily. 28 g 1 More than a month at Unknown time    ketoconazole (NIZORAL) 2 % cream Apply topically 2 (two) times daily. To dry rash on face prn 60 g 2 More than a month at Unknown time    mupirocin (BACTROBAN) 2 % ointment Apply to affected area 3 times daily 22 g 1 More than a month at Unknown time    triamcinolone acetonide 0.1% (KENALOG) 0.1 % cream Apply topically 2 (two) times daily. 80 g 2 Taking       Physical Exam:    Vital Signs:   Vitals:    06/11/20 0738   BP: (!) 147/67   Pulse: 61   Resp: 16   Temp: 98.4 °F (36.9 °C)       General Appearance: Well appearing in no acute distress  Eyes:    No scleral icterus  ENT: Neck supple, Lips, mucosa, and tongue normal; teeth and gums normal  Lungs: CTA anteriorly  Heart:  Regular rate, S1, S2 normal, no murmurs heard.  Abdomen: Soft, non tender, non distended with normal bowel sounds. No hepatosplenomegaly, ascites, or mass.    Labs:  Lab Results   Component Value Date    WBC 5.77 04/22/2020    HGB 11.9 (L) 04/22/2020    HCT 38.2 (L) 04/22/2020     04/22/2020    CHOL 161 04/22/2020    TRIG 75 04/22/2020    HDL 79 (H) 04/22/2020    ALT 21 05/21/2020    AST 20 05/21/2020     05/21/2020    K 4.3 05/21/2020     05/21/2020    CREATININE 1.9 (H) 05/21/2020    BUN 27 (H) 05/21/2020    CO2 24 05/21/2020    TSH 2.973 10/18/2019    PSA 2.63 08/15/2013    INR 1.0 06/26/2019         Assessment:  79 y.o. male with iron  deficiency, history of colon polyps, and history of gastric ulcer.    Plan:  Proceed with EGD and colonoscopy today.  I have explained the risks and benefits of endoscopy procedures to the patient including but not limited to bleeding, perforation, infection, and death.  All questions answered.        Von Gutierrez MD

## 2020-06-11 NOTE — [employer] PATIENT INSTRUCTIONS
Discharge Summary/Instructions after an Endoscopic Procedure  Patient Name: Adelfo Goldberg  Patient MRN: [id number]  Patient YOB: 1940 Thursday, June 11, 2020  Von Gutierrez MD  RESTRICTIONS:  During your procedure today, you received medications for sedation.  These   medications may affect your judgment, balance and coordination.  Therefore,   for 24 hours, you have the following restrictions:   - DO NOT drive a car, operate machinery, make legal/financial decisions,   sign important papers or drink alcohol.    ACTIVITY:  Today: no heavy lifting, straining or running due to procedural   sedation/anesthesia.  The following day: return to full activity including work.  DIET:  Eat and drink normally unless instructed otherwise.     TREATMENT FOR COMMON SIDE EFFECTS:  - Mild abdominal pain, nausea, belching, bloating or excessive gas:  rest,   eat lightly and use a heating pad.  - Sore Throat: treat with throat lozenges and/or gargle with warm salt   water.  - Because air was used during the procedure, expelling large amounts of air   from your rectum or belching is normal.  - If a bowel prep was taken, you may not have a bowel movement for 1-3 days.    This is normal.  SYMPTOMS TO WATCH FOR AND REPORT TO YOUR PHYSICIAN:  1. Abdominal pain or bloating, other than gas cramps.  2. Chest pain.  3. Back pain.  4. Signs of infection such as: chills or fever occurring within 24 hours   after the procedure.  5. Rectal bleeding, which would show as bright red, maroon, or black stools.   (A tablespoon of blood from the rectum is not serious, especially if   hemorrhoids are present.)  6. Vomiting.  7. Weakness or dizziness.  GO DIRECTLY TO THE NEAREST EMERGENCY ROOM IF YOU HAVE ANY OF THE FOLLOWING:      Difficulty breathing              Chills and/or fever over 101 F   Persistent vomiting and/or vomiting blood   Severe abdominal pain   Severe chest pain   Black, tarry stools   Bleeding- more than one  tablespoon   Any other symptom or condition that you feel may need urgent attention  Your doctor recommends these additional instructions:  If any biopsies were taken, your doctors clinic will contact you in 1 to 2   weeks with any results.  - Await pathology results.   - Telephone GI clinic for pathology results in 1 week.   - Perform a colonoscopy now.   - See colonoscopy report for further details.  For questions, problems or results please call your physician - Von Gutierrez MD at Work:  [phone].  [facility], EMERGENCY ROOM PHONE NUMBER: [phone]  IF A COMPLICATION OR EMERGENCY SITUATION ARISES AND YOU ARE UNABLE TO REACH   YOUR PHYSICIAN - GO DIRECTLY TO THE EMERGENCY ROOM.  Von Gutierrez MD  6/11/2020 8:54:15 AM  This report has been verified and signed electronically.  [employer]

## 2020-06-11 NOTE — PROVATION PATIENT INSTRUCTIONS
Discharge Summary/Instructions after an Endoscopic Procedure  Patient Name: Adelfo Goldberg  Patient MRN: [id number]  Patient YOB: 1940 Thursday, June 11, 2020  Von Gutierrez MD  RESTRICTIONS:  During your procedure today, you received medications for sedation.  These   medications may affect your judgment, balance and coordination.  Therefore,   for 24 hours, you have the following restrictions:   - DO NOT drive a car, operate machinery, make legal/financial decisions,   sign important papers or drink alcohol.    ACTIVITY:  Today: no heavy lifting, straining or running due to procedural   sedation/anesthesia.  The following day: return to full activity including work.  DIET:  Eat and drink normally unless instructed otherwise.     TREATMENT FOR COMMON SIDE EFFECTS:  - Mild abdominal pain, nausea, belching, bloating or excessive gas:  rest,   eat lightly and use a heating pad.  - Sore Throat: treat with throat lozenges and/or gargle with warm salt   water.  - Because air was used during the procedure, expelling large amounts of air   from your rectum or belching is normal.  - If a bowel prep was taken, you may not have a bowel movement for 1-3 days.    This is normal.  SYMPTOMS TO WATCH FOR AND REPORT TO YOUR PHYSICIAN:  1. Abdominal pain or bloating, other than gas cramps.  2. Chest pain.  3. Back pain.  4. Signs of infection such as: chills or fever occurring within 24 hours   after the procedure.  5. Rectal bleeding, which would show as bright red, maroon, or black stools.   (A tablespoon of blood from the rectum is not serious, especially if   hemorrhoids are present.)  6. Vomiting.  7. Weakness or dizziness.  GO DIRECTLY TO THE NEAREST EMERGENCY ROOM IF YOU HAVE ANY OF THE FOLLOWING:      Difficulty breathing              Chills and/or fever over 101 F   Persistent vomiting and/or vomiting blood   Severe abdominal pain   Severe chest pain   Black, tarry stools   Bleeding- more than one  tablespoon   Any other symptom or condition that you feel may need urgent attention  Your doctor recommends these additional instructions:  If any biopsies were taken, your doctors clinic will contact you in 1 to 2   weeks with any results.  - Discharge patient to home.   - Patient has a contact number available for emergencies.  The signs and   symptoms of potential delayed complications were discussed with the   patient.  Return to normal activities tomorrow.  Written discharge   instructions were provided to the patient.   - Resume previous diet.   - Continue present medications.   - Await pathology results.   - Telephone GI clinic for pathology results in 1 week.   - Repeat colonoscopy in 3 years for surveillance of multiple polyps.  For questions, problems or results please call your physician - Von Gutierrez MD at Work:  [phone].  [facility], EMERGENCY ROOM PHONE NUMBER: [phone]  IF A COMPLICATION OR EMERGENCY SITUATION ARISES AND YOU ARE UNABLE TO REACH   YOUR PHYSICIAN - GO DIRECTLY TO THE EMERGENCY ROOM.  Von Gutierrez MD  6/11/2020 8:51:13 AM  This report has been verified and signed electronically.  [employer]

## 2020-06-14 LAB
FINAL PATHOLOGIC DIAGNOSIS: NORMAL
GROSS: NORMAL

## 2020-06-24 ENCOUNTER — NURSE TRIAGE (OUTPATIENT)
Dept: ADMINISTRATIVE | Facility: CLINIC | Age: 80
End: 2020-06-24

## 2020-06-27 ENCOUNTER — CLINICAL SUPPORT (OUTPATIENT)
Dept: CARDIOLOGY | Facility: HOSPITAL | Age: 80
End: 2020-06-27
Attending: INTERNAL MEDICINE
Payer: MEDICARE

## 2020-06-27 DIAGNOSIS — I47.19 PAT (PAROXYSMAL ATRIAL TACHYCARDIA): ICD-10-CM

## 2020-06-27 DIAGNOSIS — Z95.818 PRESENCE OF OTHER CARDIAC IMPLANTS AND GRAFTS: ICD-10-CM

## 2020-06-27 PROCEDURE — G2066 INTER DEVC REMOTE 30D: HCPCS | Performed by: INTERNAL MEDICINE

## 2020-06-27 PROCEDURE — 93298 REM INTERROG DEV EVAL SCRMS: CPT | Mod: ,,, | Performed by: INTERNAL MEDICINE

## 2020-06-27 PROCEDURE — 93298 CARDIAC DEVICE CHECK - REMOTE: ICD-10-PCS | Mod: ,,, | Performed by: INTERNAL MEDICINE

## 2020-07-27 ENCOUNTER — CLINICAL SUPPORT (OUTPATIENT)
Dept: CARDIOLOGY | Facility: HOSPITAL | Age: 80
End: 2020-07-27
Attending: INTERNAL MEDICINE
Payer: MEDICARE

## 2020-07-27 DIAGNOSIS — I47.19 PAT (PAROXYSMAL ATRIAL TACHYCARDIA): ICD-10-CM

## 2020-07-27 DIAGNOSIS — Z95.818 PRESENCE OF OTHER CARDIAC IMPLANTS AND GRAFTS: ICD-10-CM

## 2020-07-27 PROCEDURE — G2066 INTER DEVC REMOTE 30D: HCPCS | Performed by: INTERNAL MEDICINE

## 2020-07-27 PROCEDURE — 93298 CARDIAC DEVICE CHECK - REMOTE: ICD-10-PCS | Mod: ,,, | Performed by: INTERNAL MEDICINE

## 2020-07-27 PROCEDURE — 93298 REM INTERROG DEV EVAL SCRMS: CPT | Mod: ,,, | Performed by: INTERNAL MEDICINE

## 2020-07-29 DIAGNOSIS — K22.70 BARRETT'S ESOPHAGUS WITHOUT DYSPLASIA: Primary | ICD-10-CM

## 2020-07-29 RX ORDER — OMEPRAZOLE 20 MG/1
20 CAPSULE, DELAYED RELEASE ORAL DAILY
Qty: 90 CAPSULE | Refills: 3 | Status: SHIPPED | OUTPATIENT
Start: 2020-07-29 | End: 2021-08-04 | Stop reason: SDUPTHER

## 2020-07-29 NOTE — PROGRESS NOTES
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 He needs to be on a PPI indefinitely. Tell pt I have sent him information on BE in the portal. He needs to f/u with Dr. Gutierrez to further discuss BE and treatment plan.    Colon polyps were benign, still needs repeat colonoscopy in 5 years.

## 2020-08-12 ENCOUNTER — TELEPHONE (OUTPATIENT)
Dept: GASTROENTEROLOGY | Facility: CLINIC | Age: 80
End: 2020-08-12

## 2020-08-12 NOTE — TELEPHONE ENCOUNTER
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 He needs to be on a PPI indefinitely. Tell pt I have sent him information on BE in the portal. He needs to f/u with Dr. Gutierrez to further discuss BE and treatment plan.    Colon polyps were benign, still needs repeat colonoscopy in 5 years.

## 2020-08-24 ENCOUNTER — PATIENT MESSAGE (OUTPATIENT)
Dept: GASTROENTEROLOGY | Facility: CLINIC | Age: 80
End: 2020-08-24

## 2020-08-26 ENCOUNTER — CLINICAL SUPPORT (OUTPATIENT)
Dept: CARDIOLOGY | Facility: HOSPITAL | Age: 80
End: 2020-08-26
Attending: INTERNAL MEDICINE
Payer: MEDICARE

## 2020-08-26 ENCOUNTER — TELEPHONE (OUTPATIENT)
Dept: CARDIOLOGY | Facility: HOSPITAL | Age: 80
End: 2020-08-26

## 2020-08-26 DIAGNOSIS — Z95.818 PRESENCE OF OTHER CARDIAC IMPLANTS AND GRAFTS: ICD-10-CM

## 2020-08-26 DIAGNOSIS — I48.91 UNSPECIFIED ATRIAL FIBRILLATION: ICD-10-CM

## 2020-08-26 DIAGNOSIS — I47.19 PAT (PAROXYSMAL ATRIAL TACHYCARDIA): ICD-10-CM

## 2020-08-26 PROCEDURE — G2066 INTER DEVC REMOTE 30D: HCPCS | Performed by: INTERNAL MEDICINE

## 2020-08-26 PROCEDURE — 93298 CARDIAC DEVICE CHECK - REMOTE: ICD-10-PCS | Mod: ,,, | Performed by: INTERNAL MEDICINE

## 2020-08-26 PROCEDURE — 93298 REM INTERROG DEV EVAL SCRMS: CPT | Mod: ,,, | Performed by: INTERNAL MEDICINE

## 2020-08-26 NOTE — TELEPHONE ENCOUNTER
[place] remote ILR transmission received; symptom event recorded 8/13/20 @ 12:29pm  Egram c/w sinus tach -> 60 secs of AT/SVT -> return to sinus tach  Contacted the patient, he was walking/exercising and felt his HR increase, no other associated symptoms.  He states this is the same he's had for the past two years.  Pt currently taking Toprol 25mg daily.  Pt advised that event would be reviewed with Dr. Wilson and he would be contacted with any new orders or recommendations.

## 2020-09-04 ENCOUNTER — PATIENT OUTREACH (OUTPATIENT)
Dept: ADMINISTRATIVE | Facility: OTHER | Age: 80
End: 2020-09-04

## 2020-09-08 ENCOUNTER — OFFICE VISIT (OUTPATIENT)
Dept: GASTROENTEROLOGY | Facility: CLINIC | Age: 80
End: 2020-09-08
Payer: MEDICARE

## 2020-09-08 DIAGNOSIS — K86.2 PANCREAS CYST: ICD-10-CM

## 2020-09-08 DIAGNOSIS — K22.70 BARRETT'S ESOPHAGUS WITHOUT DYSPLASIA: Primary | ICD-10-CM

## 2020-09-08 DIAGNOSIS — Z86.010 HISTORY OF COLON POLYPS: ICD-10-CM

## 2020-09-08 DIAGNOSIS — D50.9 IRON DEFICIENCY ANEMIA, UNSPECIFIED IRON DEFICIENCY ANEMIA TYPE: ICD-10-CM

## 2020-09-08 PROCEDURE — 1101F PR PT FALLS ASSESS DOC 0-1 FALLS W/OUT INJ PAST YR: ICD-10-PCS | Mod: CPTII,,, | Performed by: INTERNAL MEDICINE

## 2020-09-08 PROCEDURE — 1159F PR MEDICATION LIST DOCUMENTED IN MEDICAL RECORD: ICD-10-PCS | Mod: ,,, | Performed by: INTERNAL MEDICINE

## 2020-09-08 PROCEDURE — 99214 PR OFFICE/OUTPT VISIT, EST, LEVL IV, 30-39 MIN: ICD-10-PCS | Mod: 95,,, | Performed by: INTERNAL MEDICINE

## 2020-09-08 PROCEDURE — 1101F PT FALLS ASSESS-DOCD LE1/YR: CPT | Mod: CPTII,,, | Performed by: INTERNAL MEDICINE

## 2020-09-08 PROCEDURE — 99214 OFFICE O/P EST MOD 30 MIN: CPT | Mod: 95,,, | Performed by: INTERNAL MEDICINE

## 2020-09-08 PROCEDURE — 1159F MED LIST DOCD IN RCRD: CPT | Mod: ,,, | Performed by: INTERNAL MEDICINE

## 2020-09-08 NOTE — PROGRESS NOTES
Gastroenterology Telemedicine Virtual Visit    The patient location is:  Patient Home  The chief complaint leading to consultation is:  Follow-up anemia and recent diagnosis of Ramirez's esophagus   Visit type: Virtual visit with synchronous audio and video      Narrative:  79 y.o. male here on a telemedicine visit for follow-up after EGD and colonoscopy which were done June 11th.  At seen at [facility] on January 21st for iron deficiency anemia, history of peptic ulcer disease, pancreatic cyst, and history of colon polyps.  Our procedures were postponed slightly by the COVID-19 outbreak.  The endoscopy revealed a 2 cm area of Ramirez's esophagus without dysplasia.  The endoscopy was otherwise normal.  The colonoscopy removed 9 adenomatous polyps.  I recommended repeat in 3 years.  I also noted sigmoid colon diverticulosis as well as hemorrhoids.  Following the discovery of Ramirez's, he started taking omeprazole 20 mg daily.  He has no current GI complaints.  He is having normal bowel movements without blood.  He has not been seen in our cyst clinic.    Last hemoglobin 11.9 on 04/22/2020            Assessment:  1. 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 Iron deficiency anemia, unspecified iron deficiency anemia type    3. History of colon polyps    4. Pancreas cyst      Blood counts have been stable, and in fact have been stable over many years.  He has anemia going back as far as 2004.  EGD and colonoscopy unrevealing for cause of anemia.  We discussed evaluation of the small bowel in more detail.  The main purpose of this evaluation would be to rule out small-bowel polyps or tumors.  I feel that this is less likely given the long period of time that his anemia has been present.  We discussed the procedure in detail including risks and benefits.  He prefers to defer that for now and will think about having it done.      Recommendation:  He will need surveillance EGD and colonoscopy both 3 years after last.   He should take a daily PPI indefinitely, and well continue omeprazole 20 mg daily.  We discussed risks and benefits of long-term PPI use.  Benefits of the medication outweigh risks.  Follow-up with [facility] for pancreas cyst recommendations.          Total time spent with patient:  25 min      Each patient to whom he or she provides medical services by telemedicine is:  (1) informed of the relationship between the physician and patient and the respective role of any other health care provider with respect to management of the patient; and (2) notified that he or she may decline to receive medical services by telemedicine and may withdraw from such care at any time.

## 2020-09-10 ENCOUNTER — TELEPHONE (OUTPATIENT)
Dept: ENDOSCOPY | Facility: HOSPITAL | Age: 80
End: 2020-09-10

## 2020-09-10 DIAGNOSIS — K86.2 PANCREATIC CYST: Primary | ICD-10-CM

## 2020-09-10 NOTE — TELEPHONE ENCOUNTER
----- Message from Niko Lambert MD sent at 9/9/2020  7:26 AM CDT -----  Regarding: RE: Pancreas cyst  It's worth looking at again to make sure it hasn't changed.    Alma can you schedule for an EUS re: panc cyst  ----- Message -----  From: Von Gutierrez MD  Sent: 9/8/2020  11:06 PM CDT  To: Rhett Gregory MD, Ermias Cedeno MD, #  Subject: Pancreas cyst                                    This patient has a small pancreas cyst, last evaluated 3 years ago by MRCP and appeared stable at the time.  Does this require ongoing surveillance, and if so, would you all help to arrange?    Harlan

## 2020-09-16 ENCOUNTER — TELEPHONE (OUTPATIENT)
Dept: ENDOSCOPY | Facility: HOSPITAL | Age: 80
End: 2020-09-16

## 2020-09-16 NOTE — TELEPHONE ENCOUNTER
----- Message from Michele Mederos sent at 9/16/2020  3:11 PM CDT -----  Contact: jerica Ferrera pt is returning your call         Please contact pt [phone]

## 2020-09-16 NOTE — TELEPHONE ENCOUNTER
Spoke with patient. EUS scheduled fir 10/1 at 10a. Reviewed prep instructions. Mr Rodriguez verbalized understanding.

## 2020-09-23 ENCOUNTER — PATIENT MESSAGE (OUTPATIENT)
Dept: GASTROENTEROLOGY | Facility: CLINIC | Age: 80
End: 2020-09-23

## 2020-09-23 ENCOUNTER — TELEPHONE (OUTPATIENT)
Dept: ENDOSCOPY | Facility: HOSPITAL | Age: 80
End: 2020-09-23

## 2020-09-23 ENCOUNTER — PATIENT MESSAGE (OUTPATIENT)
Dept: ENDOSCOPY | Facility: HOSPITAL | Age: 80
End: 2020-09-23

## 2020-09-23 DIAGNOSIS — K86.2 PANCREAS CYST: ICD-10-CM

## 2020-09-23 NOTE — TELEPHONE ENCOUNTER
Spoke with patient about instructions for UEUS scheduled 10/1/20 at 1000 and covid-19 test 9/28/20 at 1130 at [facility].  Instructions sent by Barnebysjohanna.

## 2020-09-25 ENCOUNTER — CLINICAL SUPPORT (OUTPATIENT)
Dept: CARDIOLOGY | Facility: HOSPITAL | Age: 80
End: 2020-09-25
Payer: MEDICARE

## 2020-09-25 DIAGNOSIS — Z95.818 PRESENCE OF OTHER CARDIAC IMPLANTS AND GRAFTS: ICD-10-CM

## 2020-09-25 PROCEDURE — 93298 REM INTERROG DEV EVAL SCRMS: CPT | Mod: ,,, | Performed by: INTERNAL MEDICINE

## 2020-09-25 PROCEDURE — 93298 CARDIAC DEVICE CHECK - REMOTE: ICD-10-PCS | Mod: ,,, | Performed by: INTERNAL MEDICINE

## 2020-09-25 PROCEDURE — G2066 INTER DEVC REMOTE 30D: HCPCS | Performed by: INTERNAL MEDICINE

## 2020-09-28 ENCOUNTER — LAB VISIT (OUTPATIENT)
Dept: URGENT CARE | Facility: CLINIC | Age: 80
End: 2020-09-28
Payer: MEDICARE

## 2020-09-28 VITALS — TEMPERATURE: 98 F

## 2020-09-28 DIAGNOSIS — K86.2 PANCREAS CYST: ICD-10-CM

## 2020-09-28 PROCEDURE — U0003 INFECTIOUS AGENT DETECTION BY NUCLEIC ACID (DNA OR RNA); SEVERE ACUTE RESPIRATORY SYNDROME CORONAVIRUS 2 (SARS-COV-2) (CORONAVIRUS DISEASE [COVID-19]), AMPLIFIED PROBE TECHNIQUE, MAKING USE OF HIGH THROUGHPUT TECHNOLOGIES AS DESCRIBED BY CMS-2020-01-R: HCPCS

## 2020-09-29 LAB — SARS-COV-2 RNA RESP QL NAA+PROBE: NOT DETECTED

## 2020-10-01 ENCOUNTER — ANESTHESIA (OUTPATIENT)
Dept: ENDOSCOPY | Facility: HOSPITAL | Age: 80
End: 2020-10-01
Payer: MEDICARE

## 2020-10-01 ENCOUNTER — ANESTHESIA EVENT (OUTPATIENT)
Dept: ENDOSCOPY | Facility: HOSPITAL | Age: 80
End: 2020-10-01
Payer: MEDICARE

## 2020-10-01 ENCOUNTER — HOSPITAL ENCOUNTER (OUTPATIENT)
Facility: HOSPITAL | Age: 80
Discharge: HOME | End: 2020-10-01
Attending: INTERNAL MEDICINE | Admitting: INTERNAL MEDICINE
Payer: MEDICARE

## 2020-10-01 VITALS
DIASTOLIC BLOOD PRESSURE: 78 MMHG | HEIGHT: 70 IN | TEMPERATURE: 98 F | SYSTOLIC BLOOD PRESSURE: 137 MMHG | BODY MASS INDEX: 31.35 KG/M2 | RESPIRATION RATE: 16 BRPM | WEIGHT: 219 LBS | OXYGEN SATURATION: 100 % | HEART RATE: 58 BPM

## 2020-10-01 DIAGNOSIS — K86.2 PANCREATIC CYST: Primary | ICD-10-CM

## 2020-10-01 PROCEDURE — D9220A PRA ANESTHESIA: Mod: ANES,,, | Performed by: ANESTHESIOLOGY

## 2020-10-01 PROCEDURE — 25000003 PHARM REV CODE 250: Performed by: NURSE ANESTHETIST, CERTIFIED REGISTERED

## 2020-10-01 PROCEDURE — 43259 EGD US EXAM DUODENUM/JEJUNUM: CPT | Mod: ,,, | Performed by: INTERNAL MEDICINE

## 2020-10-01 PROCEDURE — 43259 EGD US EXAM DUODENUM/JEJUNUM: CPT | Performed by: INTERNAL MEDICINE

## 2020-10-01 PROCEDURE — 43259 PR ENDOSCOPIC ULTRASOUND EXAM: ICD-10-PCS | Mod: ,,, | Performed by: INTERNAL MEDICINE

## 2020-10-01 PROCEDURE — D9220A PRA ANESTHESIA: Mod: CRNA,,, | Performed by: NURSE ANESTHETIST, CERTIFIED REGISTERED

## 2020-10-01 PROCEDURE — 25000003 PHARM REV CODE 250: Performed by: INTERNAL MEDICINE

## 2020-10-01 PROCEDURE — D9220A PRA ANESTHESIA: ICD-10-PCS | Mod: CRNA,,, | Performed by: NURSE ANESTHETIST, CERTIFIED REGISTERED

## 2020-10-01 PROCEDURE — 37000009 HC ANESTHESIA EA ADD 15 MINS: Performed by: INTERNAL MEDICINE

## 2020-10-01 PROCEDURE — 37000008 HC ANESTHESIA 1ST 15 MINUTES: Performed by: INTERNAL MEDICINE

## 2020-10-01 PROCEDURE — 63600175 PHARM REV CODE 636 W HCPCS: Performed by: NURSE ANESTHETIST, CERTIFIED REGISTERED

## 2020-10-01 PROCEDURE — D9220A PRA ANESTHESIA: ICD-10-PCS | Mod: ANES,,, | Performed by: ANESTHESIOLOGY

## 2020-10-01 RX ORDER — SODIUM CHLORIDE 9 MG/ML
INJECTION, SOLUTION INTRAVENOUS CONTINUOUS PRN
Status: DISCONTINUED | OUTPATIENT
Start: 2020-10-01 | End: 2020-10-01

## 2020-10-01 RX ORDER — LIDOCAINE HYDROCHLORIDE 20 MG/ML
SOLUTION OROPHARYNGEAL
Status: DISCONTINUED | OUTPATIENT
Start: 2020-10-01 | End: 2020-10-01

## 2020-10-01 RX ORDER — ONDANSETRON 2 MG/ML
4 INJECTION INTRAMUSCULAR; INTRAVENOUS ONCE AS NEEDED
Status: DISCONTINUED | OUTPATIENT
Start: 2020-10-01 | End: 2020-10-01 | Stop reason: HOSPADM

## 2020-10-01 RX ORDER — LIDOCAINE HCL/PF 100 MG/5ML
SYRINGE (ML) INTRAVENOUS
Status: DISCONTINUED | OUTPATIENT
Start: 2020-10-01 | End: 2020-10-01

## 2020-10-01 RX ORDER — PROPOFOL 10 MG/ML
VIAL (ML) INTRAVENOUS CONTINUOUS PRN
Status: DISCONTINUED | OUTPATIENT
Start: 2020-10-01 | End: 2020-10-01

## 2020-10-01 RX ORDER — PROPOFOL 10 MG/ML
VIAL (ML) INTRAVENOUS
Status: DISCONTINUED | OUTPATIENT
Start: 2020-10-01 | End: 2020-10-01

## 2020-10-01 RX ORDER — SODIUM CHLORIDE 9 MG/ML
INJECTION, SOLUTION INTRAVENOUS CONTINUOUS
Status: DISCONTINUED | OUTPATIENT
Start: 2020-10-01 | End: 2020-10-01 | Stop reason: HOSPADM

## 2020-10-01 RX ADMIN — LIDOCAINE HYDROCHLORIDE 15 ML: 20 SOLUTION ORAL; TOPICAL at 10:10

## 2020-10-01 RX ADMIN — PROPOFOL 30 MG: 10 INJECTION, EMULSION INTRAVENOUS at 10:10

## 2020-10-01 RX ADMIN — PROPOFOL 70 MG: 10 INJECTION, EMULSION INTRAVENOUS at 10:10

## 2020-10-01 RX ADMIN — SODIUM CHLORIDE: 0.9 INJECTION, SOLUTION INTRAVENOUS at 10:10

## 2020-10-01 RX ADMIN — PROPOFOL 50 MG: 10 INJECTION, EMULSION INTRAVENOUS at 10:10

## 2020-10-01 RX ADMIN — Medication 50 MG: at 10:10

## 2020-10-01 RX ADMIN — PROPOFOL 150 MCG/KG/MIN: 10 INJECTION, EMULSION INTRAVENOUS at 10:10

## 2020-10-01 NOTE — ANESTHESIA PREPROCEDURE EVALUATION
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  Pre-operative evaluation for ULTRASOUND, UPPER GI TRACT, ENDOSCOPIC (N/A)    Chief Complaint: pancreatic cyst    PMH:  PAT (paroxysmal atrial tachycardia) - metoprolol- happens every few weeks   Essential hypertension - irbesartan   ANNABELLE (obstructive sleep apnea)    Intermittent asthma-rare use of inhaler  HLP      Past Surgical History:   Procedure Laterality Date    APPENDECTOMY      CARDIAC CATHETERIZATION  10/31/2018    no stent    CARDIAC CATHETERIZATION  1998    no stent    COLONOSCOPY N/A 6/19/2018    Procedure: COLONOSCOPY;  Surgeon: Wade De La Garza MD;  Location: [facility] (4TH FLR);  Service: Endoscopy;  Laterality: N/A;    COLONOSCOPY N/A 6/11/2020    Procedure: COLONOSCOPY;  Surgeon: Von Gutierrez MD;  [facility]  Service: Endoscopy;  Laterality: N/A;  3/17/20-pt confirmed appt on 3/20/20 with new arrival time of 0715, and updated visitor/ride instructions-BB  patient to be rescheduled by 6/3/20 per Dr. Gutierrez-JAVIER  patient requests to be rescheduled on a Thursday or Friday-BB  patient requested suprep-BB  loop recorder-BB  C    ESOPHAGOGASTRODUODENOSCOPY N/A 10/23/2018    Procedure: EGD (ESOPHAGOGASTRODUODENOSCOPY);  Surgeon: Bart Hernandez MD;  [facility]  Service: Endoscopy;  Laterality: N/A;    ESOPHAGOGASTRODUODENOSCOPY N/A 6/11/2020    Procedure: EGD (ESOPHAGOGASTRODUODENOSCOPY);  Surgeon: Von Gutierrez MD;  [facility]  Service: Endoscopy;  Laterality: N/A;  3/17/20-pt confirmed appt on 3/20/20 with new arrival time of 0715, and updated visitor/ride instructions-BB  patient to be rescheduled by 6/3/20 per Dr. Gutierrez-JAVIER  patient requests to be rescheduled on a Thursday or Friday-BB  patient requested suprep-BB    FOOT SURGERY      INSERTION OF IMPLANTABLE LOOP RECORDER N/A  2019    Procedure: Insertion, Implantable Loop Recorder;  Surgeon: Gil Wilson MD;  Location: [facility];  Service: Cardiology;  Laterality: N/A;  AT, ILR, MDT, [place], [place], 3 Prep    testicular biopsy           Vital Signs Range (Last 24H):         CBC:   No results for input(s): WBC, RBC, HGB, HCT, PLT, MCV, MCH, MCHC in the last 720 hours.    CMP: No results for input(s): NA, K, CL, CO2, BUN, CREATININE, GLU, MG, PHOS, CALCIUM, ALBUMIN, PROT, ALKPHOS, ALT, AST, BILITOT in the last 720 hours.    INR:  No results for input(s): PT, INR, PROTIME, APTT in the last 720 hours.      Diagnostic Studies:      EKD Echo:  Anesthesia Evaluation          Review of Systems      Physical Exam  General:  Obesity, Large Beard    Airway/Jaw/Neck:  Airway Findings: Mouth Opening: Normal Tongue: Normal  General Airway Assessment: Average, Possible difficult intubation, Possible difficult mask airway  Mallampati: II  Improves to I with phonation.  TM Distance: Normal, at least 6 cm  Jaw/Neck Findings:  Neck ROM: Normal ROM  Neck Findings:  Girth Increased, Short Neck      Dental:  Dental Findings: In tact   Chest/Lungs:  Chest/Lungs Findings: Normal Respiratory Rate     Heart/Vascular:  Heart Findings: Rate: Normal  Rhythm: Regular Rhythm        Mental Status:  Mental Status Findings:  Cooperative, Alert and Oriented         Anesthesia Plan  Type of Anesthesia, risks & benefits discussed:  Anesthesia Type:  general  Patient's Preference:   Intra-op Monitoring Plan:   Intra-op Monitoring Plan Comments:   Post Op Pain Control Plan:   Post Op Pain Control Plan Comments:   Induction:   IV  Beta Blocker:  Patient is not currently on a Beta-Blocker (No further documentation required).       Informed Consent: Patient understands risks and agrees with Anesthesia plan.  Questions answered. Anesthesia consent signed with patient.  ASA Score: 3     Day of Surgery Review of History & Physical:    H&P update referred to the  surgeon.         Ready For Surgery From Anesthesia Perspective.

## 2020-10-01 NOTE — TRANSFER OF CARE
"Anesthesia Transfer of Care Note    Patient: Adelfo VICENTE Rodriguez, PhD    Procedure(s) Performed: Procedure(s) (LRB):  ULTRASOUND, UPPER GI TRACT, ENDOSCOPIC (N/A)    Patient location: [facility]    Anesthesia Type: general    Transport from OR: Transported from OR on 2-3 L/min O2 by NC with adequate spontaneous ventilation    Post pain: adequate analgesia    Post assessment: no apparent anesthetic complications and tolerated procedure well    Post vital signs: stable    Level of consciousness: awake, alert and oriented    Complications: none    Transfer of care protocol was followed      Last vitals:   Visit Vitals  BP (!) 168/76 (BP Location: Left arm, Patient Position: Sitting)   Pulse 99   Temp 36.4 °C (97.5 °F) (Temporal)   Resp 16   Ht 5' 9.5" (1.765 m)   Wt 99.3 kg (219 lb)   SpO2 99%   BMI 31.88 kg/m²     "

## 2020-10-01 NOTE — ANESTHESIA POSTPROCEDURE EVALUATION
Anesthesia Post Evaluation    Patient: Adelfo Rodriguez, PhD    Procedure(s) Performed: Procedure(s) (LRB):  ULTRASOUND, UPPER GI TRACT, ENDOSCOPIC (N/A)    Final Anesthesia Type: general    Patient location during evaluation: PACU  Patient participation: Yes- Able to Participate  Level of consciousness: awake and alert and oriented  Post-procedure vital signs: reviewed and stable  Pain management: adequate  Airway patency: patent    PONV status at discharge: No PONV  Anesthetic complications: no      Cardiovascular status: hemodynamically stable  Respiratory status: unassisted, spontaneous ventilation and room air  Hydration status: euvolemic  Follow-up not needed.          Vitals Value Taken Time   /77 10/01/20 1121   Temp 36.6 °C (97.8 °F) 10/01/20 1115   Pulse 57 10/01/20 1124   Resp 16 10/01/20 1115   SpO2 97 % 10/01/20 1124   Vitals shown include unvalidated device data.      No case tracking events are documented in the log.      Pain/Ana Laura Score: Ana Laura Score: 10 (10/1/2020 11:26 AM)

## 2020-10-01 NOTE — [employer] PATIENT INSTRUCTIONS
Discharge Summary/Instructions after an Endoscopic Procedure  Patient Name: Adelfo Goldberg  Patient MRN: [id number]  Patient YOB: 1940 Thursday, October 1, 2020  Niko Lambert MD  RESTRICTIONS:  During your procedure today, you received medications for sedation.  These   medications may affect your judgment, balance and coordination.  Therefore,   for 24 hours, you have the following restrictions:   - DO NOT drive a car, operate machinery, make legal/financial decisions,   sign important papers or drink alcohol.    ACTIVITY:  Today: no heavy lifting, straining or running due to procedural   sedation/anesthesia.  The following day: return to full activity including work.  DIET:  Eat and drink normally unless instructed otherwise.     TREATMENT FOR COMMON SIDE EFFECTS:  - Mild abdominal pain, nausea, belching, bloating or excessive gas:  rest,   eat lightly and use a heating pad.  - Sore Throat: treat with throat lozenges and/or gargle with warm salt   water.  - Because air was used during the procedure, expelling large amounts of air   from your rectum or belching is normal.  - If a bowel prep was taken, you may not have a bowel movement for 1-3 days.    This is normal.  SYMPTOMS TO WATCH FOR AND REPORT TO YOUR PHYSICIAN:  1. Abdominal pain or bloating, other than gas cramps.  2. Chest pain.  3. Back pain.  4. Signs of infection such as: chills or fever occurring within 24 hours   after the procedure.  5. Rectal bleeding, which would show as bright red, maroon, or black stools.   (A tablespoon of blood from the rectum is not serious, especially if   hemorrhoids are present.)  6. Vomiting.  7. Weakness or dizziness.  GO DIRECTLY TO THE NEAREST EMERGENCY ROOM IF YOU HAVE ANY OF THE FOLLOWING:      Difficulty breathing              Chills and/or fever over 101 F   Persistent vomiting and/or vomiting blood   Severe abdominal pain   Severe chest pain   Black, tarry stools   Bleeding- more than one  tablespoon   Any other symptom or condition that you feel may need urgent attention  Your doctor recommends these additional instructions:  If any biopsies were taken, your doctors clinic will contact you in 1 to 2   weeks with any results.  - Discharge patient to home.   - Resume previous diet.   - Continue present medications.   - Perform magnetic resonance imaging (MRI) with gadolinium in 1-2 years. ACR   guidelines would suggest 2 year or no further evaluation however because it   was seen to be 9mm  3 years ago, yearly surveillance would be another   option.  For questions, problems or results please call your physician - Niko Lambert MD at Work:  [phone].  [facility], EMERGENCY ROOM PHONE NUMBER: [phone]  IF A COMPLICATION OR EMERGENCY SITUATION ARISES AND YOU ARE UNABLE TO REACH   YOUR PHYSICIAN - GO DIRECTLY TO THE EMERGENCY ROOM.  Niko Lambert MD  10/1/2020 10:43:22 AM  This report has been verified and signed electronically.  [employer]

## 2020-10-01 NOTE — H&P
Short Stay Endoscopy History and Physical    PCP - Romero Vogel MD  Referring Physician - Niko Lambert MD  [address] [facility] [address]  [address]  [place] [address]    Procedure - eus  ASA - per anesthesia  Mallampati - per anesthesia  History of Anesthesia problems - no  Family history Anesthesia problems -  no   Plan of anesthesia - General    HPI:  This is a 80 y.o. male here for evaluation of: pancreas cyst    Reflux - no  Dysphagia - no  Abdominal pain - no  Diarrhea - no    ROS:  Constitutional: No fevers, chills, No weight loss  CV: No chest pain  Pulm: No cough, No shortness of breath  Ophtho: No vision changes  GI: see HPI  Derm: No rash    Medical History:  has a past medical history of ALLERGIC RHINITIS, Anemia, Asthma, Hyperlipidemia, Hypertension, NAFLD (nonalcoholic fatty liver disease), ANNABELLE (obstructive sleep apnea), and Squamous cell cancer of skin of hand.    Surgical History:  has a past surgical history that includes Appendectomy; testicular biopsy; Foot surgery; Colonoscopy (N/A, 6/19/2018); Esophagogastroduodenoscopy (N/A, 10/23/2018); Cardiac catheterization (10/31/2018); Cardiac catheterization (1998); Insertion of implantable loop recorder (N/A, 9/18/2019); Esophagogastroduodenoscopy (N/A, 6/11/2020); and Colonoscopy (N/A, 6/11/2020).    Family History: family history includes Arthritis in his brother; Blindness in his maternal grandmother; COPD in his father; Cancer in his mother; Cataracts in his maternal grandmother; Glaucoma in his mother; Heart disease in his brother and father; Retinal detachment in his father and mother..    Social History:  reports that he quit smoking about 52 years ago. He has a 10.00 pack-year smoking history. He has quit using smokeless tobacco. He reports current alcohol use of about 2.0 standard drinks of alcohol per week. He reports that he does not use drugs.    Review of patient's allergies indicates:   Allergen Reactions    Amoxicillin Hives     Allopurinol analogues Other (See Comments)     Abnormal transaminases       Medications:   Medications Prior to Admission   Medication Sig Dispense Refill Last Dose    atorvastatin (LIPITOR) 40 MG tablet TAKE 1 TABLET(40 MG) BY MOUTH EVERY DAY 90 tablet 3 9/30/2020 at Unknown time    colchicine (COLCRYS) 0.6 mg tablet TAKE 1/2 TABLET BY MOUTH DAILY 45 tablet 1 9/30/2020 at Unknown time    febuxostat (ULORIC) 40 mg Tab Take 1 tablet (40 mg total) by mouth once daily. 90 tablet 3 9/30/2020 at Unknown time    felodipine (PLENDIL) 5 MG 24 hr tablet TAKE 1 TABLET BY MOUTH EVERY DAY 90 tablet 3 10/1/2020 at Unknown time    fish oil-omega-3 fatty acids 300-1,000 mg capsule Take 2 g by mouth once daily.     9/30/2020 at Unknown time    fluticasone propionate (FLONASE) 50 mcg/actuation nasal spray INSTILL 2 SPRAYS INTO EACH NOSTRIL ONCE DAILY 48 mL 3 10/1/2020 at Unknown time    glucosamine & chondroit sul.Na 750-600 mg Tab Take 750 mg by mouth.   9/30/2020 at Unknown time    irbesartan (AVAPRO) 150 MG tablet TAKE 1 TABLET BY MOUTH ONCE DAILY FOR BLOOD PRESSURE 30 tablet 11 10/1/2020 at Unknown time    loratadine (CLARITIN) 10 mg tablet Take 10 mg by mouth daily as needed.     9/30/2020 at Unknown time    metoprolol succinate (TOPROL-XL) 25 MG 24 hr tablet Take 1 tablet (25 mg total) by mouth once daily. 90 tablet 3 10/1/2020 at Unknown time    mupirocin (BACTROBAN) 2 % ointment Apply to affected area 3 times daily 22 g 1 10/1/2020 at Unknown time    omeprazole (PRILOSEC) 20 MG capsule Take 1 capsule (20 mg total) by mouth once daily. 90 capsule 3 9/30/2020 at Unknown time    WIXELA INHUB 250-50 mcg/dose diskus inhaler INHALE 1 PUFF BY MOUTH TWICE DAILY. RINSE MOUTH AFTER USE 60 each 6 10/1/2020 at Unknown time    albuterol (PROAIR HFA) 90 mcg/actuation inhaler Inhale 2 puffs into the lungs every 4 (four) hours as needed for Wheezing. 1 Inhaler 6 More than a month at Unknown time    azelastine (ASTELIN) 137  mcg (0.1 %) nasal spray 2 sprays (274 mcg total) by Nasal route 2 (two) times daily as needed. 30 mL 5 More than a month at Unknown time    furosemide (LASIX) 40 MG tablet TAKE 1 TABLET BY MOUTH EVERY DAY 90 tablet 0 9/29/2020    hepatitis A and B vaccine, PF, (TWINRIX, PF,) 720 FORREST unit- 20 mcg/mL Syrg suspension Twinrix (PF) 720 FORREST unit-20 mcg/mL intramuscular syringe   inject 1 milliliter intramuscularly       hydrocortisone-pramoxine (ANALPRAM-HC) 2.5-1 % Crea Place rectally 3 (three) times daily. 28 g 1     ketoconazole (NIZORAL) 2 % cream Apply topically 2 (two) times daily. To dry rash on face prn 60 g 2     ketoconazole (NIZORAL) 2 % shampoo WASH affected area every other day 120 mL 0     triamcinolone acetonide 0.1% (KENALOG) 0.1 % cream Apply topically 2 (two) times daily. 80 g 2        Physical Exam:    Vital Signs:   Vitals:    10/01/20 0951   BP: (!) 168/76   Pulse: 99   Resp: 16   Temp: 97.5 °F (36.4 °C)       General Appearance: Well appearing in no acute distress    Labs:  Lab Results   Component Value Date    WBC 5.77 04/22/2020    HGB 11.9 (L) 04/22/2020    HCT 38.2 (L) 04/22/2020     04/22/2020    CHOL 161 04/22/2020    TRIG 75 04/22/2020    HDL 79 (H) 04/22/2020    ALT 21 05/21/2020    AST 20 05/21/2020     05/21/2020    K 4.3 05/21/2020     05/21/2020    CREATININE 1.9 (H) 05/21/2020    BUN 27 (H) 05/21/2020    CO2 24 05/21/2020    TSH 2.973 10/18/2019    PSA 2.63 08/15/2013    INR 1.0 06/26/2019       I have explained the risks and benefits of this endoscopic procedure to the patient including but not limited to bleeding, inflammation, infection, perforation, and death.      Niko Lambert MD

## 2020-10-01 NOTE — DISCHARGE INSTRUCTIONS
Endoscopic Ultrasound (EUS)    An endoscopic ultrasound (EUS) is a test to look at the inside of your gastrointestinal (GI) tract. It's commonly used to look for cancers or growths in the esophagus, stomach, pancreas, liver, and rectum. It can help to stage cancer (see how advanced a cancer is). EUS may also be used to help diagnose certain diseases or to drain cysts or abscesses.  What is EUS?  EUS shows both ultrasound images and live video of the GI tract. During the test, a flexible tube called an endoscope (scope) is used. At the end of the scope is a tiny video camera and light. The video camera sends live images to a monitor. The scope also contains a very small ultrasound device. This uses sound waves to create images and send them to a monitor.  A needle is passed through the scope. The needle can be used take a small sample of tissue for testing. This is called a biopsy. The needle can be used to take a sample of fluid. This is called fine-needle aspiration (FNA).  Risks and possible complications of EUS  Risks and possible complications include the following:  · Bleeding  · Infection  · A perforation (hole) in the digestive tract   · Risks of sedation or anesthesia   Before the test  Be prepared prior to the test:  · Tell your healthcare provider what medicine you take. This includes vitamins, herbs, and over-the-counter medicine. It also includes any blood thinners, such as warfarin, clopidogrel, ibuprofen, or daily aspirin. Ask your healthcare provider if you need to stop taking some or all of them before the test.  · You may be prescribed antibiotics to take before or after the test. This depends on the area being studied and what is done during the test. These medicines help prevent infection.  · Carefully follow the instructions for preparing for the test to make sure results are accurate. Instructions may include:  ¨ If youre having an EUS of the upper GI tract (esophagus, stomach, duodenum,  pancreas, liver):  § Do not eat or drink for 6 hours before the test.  ¨ If youre having an EUS of the lower GI tract (rectum):  § Before the test, do bowel prep as instructed to clean your rectum of stool. This may involve a clear liquid diet and using a laxative (liquid or pills) the night before the test. Or it may mean doing one or more enemas the morning of the test.  § Do not eat or drink for 6 hours before the test.  · Be sure to arrive on time at the facility. Bring your identification and health insurance card. Leave valuables at home. If you have them, bring X-rays or other test results with you.  Let the healthcare provider know  For your safety, tell the healthcare provider if you:  · Take insulin. Your dose may need to be changed on the day of your test.  · Are allergic to latex.  · Have any other allergies.  · Are taking blood thinners.   During the test  An endoscopic ultrasound usually takes place in a hospital. The procedure itself may take 1 to 2 hours. You will likely go home soon afterward. During the test:  · You lie on your left side on an exam table.  · An intravenous (IV) line will be put into a vein in your arm or hand. This line supplies fluids and medicines. To keep you comfortable during the test, you will be given a sedative medicine. This medicine prevents discomfort and will make you sleepy.  · If you are having an EUS of the upper GI tract, local anesthetic may be sprayed in your throat. This will help you be more comfortable as the healthcare provider inserts the scope. The healthcare provider then gently puts the flexible scope into your mouth or nose and down your throat.  · If youre having an EUS of the lower GI tract, the healthcare provider gently puts the flexible scope into your anus.  · During the test, the scope sends live video and ultrasound images from inside your body to nearby monitors. These are used to examine your GI tract. Specialized procedures, such as drainage,  are done as needed.  · The healthcare provider may discuss the results with you soon after the test. Biopsy results take several  days.  · In most cases, you can go home within a few hours of the test. When you leave the facility, have an adult family member or friend drive you, even if you don't feel that sleepy.  After the test  Here is what to expect after the test:  · You may feel tired from the sedative. This should wear off by the end of the day.  · If you had an upper digestive endoscopy, your throat may feel sore for a day or two. Over-the-counter sore throat lozenges and spray should help.  · You can eat and drink normally as soon as the test is done.  When to call the healthcare provider  Call your healthcare provider if you notice any of the following:  · Fever of 100.4°F (38.0°C) or higher, or as advised by your healthcare provider  · Shortness of breath  · Vomiting blood, blood in stool, or black stools  · Coughing or hoarse voice that wont go away   Date Last Reviewed: 7/1/2016  © 1935-2910 [employer]. [address] [place] [address]. All rights reserved. This information is not intended as a substitute for professional medical care. Always follow your healthcare professional's instructions.

## 2020-10-02 ENCOUNTER — PATIENT MESSAGE (OUTPATIENT)
Dept: INTERNAL MEDICINE | Facility: CLINIC | Age: 80
End: 2020-10-02

## 2020-10-19 ENCOUNTER — LAB VISIT (OUTPATIENT)
Dept: LAB | Facility: HOSPITAL | Age: 80
End: 2020-10-19
Attending: INTERNAL MEDICINE
Payer: MEDICARE

## 2020-10-19 DIAGNOSIS — M1A.9XX1 CHRONIC TOPHACEOUS GOUT: ICD-10-CM

## 2020-10-19 DIAGNOSIS — Z00.00 WELL ADULT EXAM: ICD-10-CM

## 2020-10-19 DIAGNOSIS — Z12.5 ENCOUNTER FOR PROSTATE CANCER SCREENING: ICD-10-CM

## 2020-10-19 LAB
ALBUMIN SERPL BCP-MCNC: 3.8 G/DL (ref 3.5–5.2)
ALBUMIN SERPL BCP-MCNC: 3.8 G/DL (ref 3.5–5.2)
ALP SERPL-CCNC: 68 U/L (ref 55–135)
ALP SERPL-CCNC: 68 U/L (ref 55–135)
ALT SERPL W/O P-5'-P-CCNC: 23 U/L (ref 10–44)
ALT SERPL W/O P-5'-P-CCNC: 23 U/L (ref 10–44)
ANION GAP SERPL CALC-SCNC: 9 MMOL/L (ref 8–16)
ANION GAP SERPL CALC-SCNC: 9 MMOL/L (ref 8–16)
AST SERPL-CCNC: 22 U/L (ref 10–40)
AST SERPL-CCNC: 22 U/L (ref 10–40)
BASOPHILS # BLD AUTO: 0.05 K/UL (ref 0–0.2)
BASOPHILS NFR BLD: 0.9 % (ref 0–1.9)
BILIRUB SERPL-MCNC: 0.7 MG/DL (ref 0.1–1)
BILIRUB SERPL-MCNC: 0.7 MG/DL (ref 0.1–1)
BUN SERPL-MCNC: 30 MG/DL (ref 8–23)
BUN SERPL-MCNC: 30 MG/DL (ref 8–23)
CALCIUM SERPL-MCNC: 9.1 MG/DL (ref 8.7–10.5)
CALCIUM SERPL-MCNC: 9.1 MG/DL (ref 8.7–10.5)
CHLORIDE SERPL-SCNC: 109 MMOL/L (ref 95–110)
CHLORIDE SERPL-SCNC: 109 MMOL/L (ref 95–110)
CHOLEST SERPL-MCNC: 158 MG/DL (ref 120–199)
CHOLEST/HDLC SERPL: 2.2 {RATIO} (ref 2–5)
CO2 SERPL-SCNC: 24 MMOL/L (ref 23–29)
CO2 SERPL-SCNC: 24 MMOL/L (ref 23–29)
COMPLEXED PSA SERPL-MCNC: 2.7 NG/ML (ref 0–4)
CREAT SERPL-MCNC: 1.8 MG/DL (ref 0.5–1.4)
CREAT SERPL-MCNC: 1.8 MG/DL (ref 0.5–1.4)
DIFFERENTIAL METHOD: ABNORMAL
EOSINOPHIL # BLD AUTO: 0.3 K/UL (ref 0–0.5)
EOSINOPHIL NFR BLD: 5.5 % (ref 0–8)
ERYTHROCYTE [DISTWIDTH] IN BLOOD BY AUTOMATED COUNT: 13 % (ref 11.5–14.5)
EST. GFR  (AFRICAN AMERICAN): 40.2 ML/MIN/1.73 M^2
EST. GFR  (AFRICAN AMERICAN): 40.2 ML/MIN/1.73 M^2
EST. GFR  (NON AFRICAN AMERICAN): 34.8 ML/MIN/1.73 M^2
EST. GFR  (NON AFRICAN AMERICAN): 34.8 ML/MIN/1.73 M^2
GLUCOSE SERPL-MCNC: 99 MG/DL (ref 70–110)
GLUCOSE SERPL-MCNC: 99 MG/DL (ref 70–110)
HCT VFR BLD AUTO: 38.5 % (ref 40–54)
HDLC SERPL-MCNC: 72 MG/DL (ref 40–75)
HDLC SERPL: 45.6 % (ref 20–50)
HGB BLD-MCNC: 11.8 G/DL (ref 14–18)
IMM GRANULOCYTES # BLD AUTO: 0.02 K/UL (ref 0–0.04)
IMM GRANULOCYTES NFR BLD AUTO: 0.4 % (ref 0–0.5)
LDLC SERPL CALC-MCNC: 71.4 MG/DL (ref 63–159)
LYMPHOCYTES # BLD AUTO: 2.1 K/UL (ref 1–4.8)
LYMPHOCYTES NFR BLD: 37.4 % (ref 18–48)
MCH RBC QN AUTO: 31.3 PG (ref 27–31)
MCHC RBC AUTO-ENTMCNC: 30.6 G/DL (ref 32–36)
MCV RBC AUTO: 102 FL (ref 82–98)
MONOCYTES # BLD AUTO: 0.8 K/UL (ref 0.3–1)
MONOCYTES NFR BLD: 13.8 % (ref 4–15)
NEUTROPHILS # BLD AUTO: 2.4 K/UL (ref 1.8–7.7)
NEUTROPHILS NFR BLD: 42 % (ref 38–73)
NONHDLC SERPL-MCNC: 86 MG/DL
NRBC BLD-RTO: 0 /100 WBC
PLATELET # BLD AUTO: 182 K/UL (ref 150–350)
PMV BLD AUTO: 12.6 FL (ref 9.2–12.9)
POTASSIUM SERPL-SCNC: 4.5 MMOL/L (ref 3.5–5.1)
POTASSIUM SERPL-SCNC: 4.5 MMOL/L (ref 3.5–5.1)
PROT SERPL-MCNC: 6.7 G/DL (ref 6–8.4)
PROT SERPL-MCNC: 6.7 G/DL (ref 6–8.4)
RBC # BLD AUTO: 3.77 M/UL (ref 4.6–6.2)
SODIUM SERPL-SCNC: 142 MMOL/L (ref 136–145)
SODIUM SERPL-SCNC: 142 MMOL/L (ref 136–145)
TRIGL SERPL-MCNC: 73 MG/DL (ref 30–150)
TSH SERPL DL<=0.005 MIU/L-ACNC: 3.72 UIU/ML (ref 0.4–4)
URATE SERPL-MCNC: 5.6 MG/DL (ref 3.4–7)
URATE SERPL-MCNC: 5.6 MG/DL (ref 3.4–7)
WBC # BLD AUTO: 5.59 K/UL (ref 3.9–12.7)

## 2020-10-19 PROCEDURE — 80053 COMPREHEN METABOLIC PANEL: CPT

## 2020-10-19 PROCEDURE — 84443 ASSAY THYROID STIM HORMONE: CPT

## 2020-10-19 PROCEDURE — 84153 ASSAY OF PSA TOTAL: CPT

## 2020-10-19 PROCEDURE — 84550 ASSAY OF BLOOD/URIC ACID: CPT

## 2020-10-19 PROCEDURE — 85025 COMPLETE CBC W/AUTO DIFF WBC: CPT

## 2020-10-19 PROCEDURE — 36415 COLL VENOUS BLD VENIPUNCTURE: CPT | Mod: PN

## 2020-10-19 PROCEDURE — 80061 LIPID PANEL: CPT

## 2020-10-22 ENCOUNTER — PATIENT MESSAGE (OUTPATIENT)
Dept: INTERNAL MEDICINE | Facility: CLINIC | Age: 80
End: 2020-10-22

## 2020-10-22 ENCOUNTER — TELEPHONE (OUTPATIENT)
Dept: INTERNAL MEDICINE | Facility: CLINIC | Age: 80
End: 2020-10-22

## 2020-10-22 ENCOUNTER — OFFICE VISIT (OUTPATIENT)
Dept: INTERNAL MEDICINE | Facility: CLINIC | Age: 80
End: 2020-10-22
Payer: MEDICARE

## 2020-10-22 VITALS
HEART RATE: 62 BPM | HEIGHT: 70 IN | RESPIRATION RATE: 15 BRPM | OXYGEN SATURATION: 98 % | BODY MASS INDEX: 32.67 KG/M2 | WEIGHT: 228.19 LBS | DIASTOLIC BLOOD PRESSURE: 60 MMHG | SYSTOLIC BLOOD PRESSURE: 120 MMHG | TEMPERATURE: 98 F

## 2020-10-22 DIAGNOSIS — Z00.00 WELL ADULT EXAM: Primary | ICD-10-CM

## 2020-10-22 DIAGNOSIS — N18.30 STAGE 3 CHRONIC KIDNEY DISEASE, UNSPECIFIED WHETHER STAGE 3A OR 3B CKD: ICD-10-CM

## 2020-10-22 DIAGNOSIS — E78.49 OTHER HYPERLIPIDEMIA: ICD-10-CM

## 2020-10-22 DIAGNOSIS — J31.0 CHRONIC RHINITIS: Chronic | ICD-10-CM

## 2020-10-22 DIAGNOSIS — G47.33 OSA (OBSTRUCTIVE SLEEP APNEA): ICD-10-CM

## 2020-10-22 DIAGNOSIS — I10 ESSENTIAL HYPERTENSION: ICD-10-CM

## 2020-10-22 DIAGNOSIS — K21.00 GASTROESOPHAGEAL REFLUX DISEASE WITH ESOPHAGITIS WITHOUT HEMORRHAGE: ICD-10-CM

## 2020-10-22 DIAGNOSIS — D64.9 CHRONIC ANEMIA: ICD-10-CM

## 2020-10-22 DIAGNOSIS — J45.20 INTERMITTENT ASTHMA WITHOUT COMPLICATION, UNSPECIFIED ASTHMA SEVERITY: ICD-10-CM

## 2020-10-22 PROCEDURE — 3074F PR MOST RECENT SYSTOLIC BLOOD PRESSURE < 130 MM HG: ICD-10-PCS | Mod: CPTII,S$GLB,, | Performed by: INTERNAL MEDICINE

## 2020-10-22 PROCEDURE — 99999 PR PBB SHADOW E&M-EST. PATIENT-LVL V: ICD-10-PCS | Mod: PBBFAC,,, | Performed by: INTERNAL MEDICINE

## 2020-10-22 PROCEDURE — 99397 PR PREVENTIVE VISIT,EST,65 & OVER: ICD-10-PCS | Mod: S$GLB,,, | Performed by: INTERNAL MEDICINE

## 2020-10-22 PROCEDURE — 3078F PR MOST RECENT DIASTOLIC BLOOD PRESSURE < 80 MM HG: ICD-10-PCS | Mod: CPTII,S$GLB,, | Performed by: INTERNAL MEDICINE

## 2020-10-22 PROCEDURE — 99999 PR PBB SHADOW E&M-EST. PATIENT-LVL V: CPT | Mod: PBBFAC,,, | Performed by: INTERNAL MEDICINE

## 2020-10-22 PROCEDURE — 3074F SYST BP LT 130 MM HG: CPT | Mod: CPTII,S$GLB,, | Performed by: INTERNAL MEDICINE

## 2020-10-22 PROCEDURE — 3078F DIAST BP <80 MM HG: CPT | Mod: CPTII,S$GLB,, | Performed by: INTERNAL MEDICINE

## 2020-10-22 PROCEDURE — 99397 PER PM REEVAL EST PAT 65+ YR: CPT | Mod: S$GLB,,, | Performed by: INTERNAL MEDICINE

## 2020-10-22 RX ORDER — HYDROCORTISONE ACETATE PRAMOXINE HCL 2.5; 1 G/100G; G/100G
CREAM TOPICAL 3 TIMES DAILY
Qty: 28 G | Refills: 1
Start: 2020-10-22 | End: 2021-11-16 | Stop reason: SDUPTHER

## 2020-10-22 RX ORDER — KETOCONAZOLE 20 MG/ML
SHAMPOO, SUSPENSION TOPICAL
Qty: 120 ML | Refills: 0 | Status: SHIPPED | OUTPATIENT
Start: 2020-10-22 | End: 2021-02-11

## 2020-10-22 RX ORDER — ATORVASTATIN CALCIUM 20 MG/1
40 TABLET, FILM COATED ORAL DAILY
Qty: 90 TABLET | Refills: 3
Start: 2020-10-22 | End: 2021-10-26

## 2020-10-22 RX ORDER — INFLUENZA A VIRUS A/MICHIGAN/45/2015 X-275 (H1N1) ANTIGEN (FORMALDEHYDE INACTIVATED), INFLUENZA A VIRUS A/SINGAPORE/INFIMH-16-0019/2016 IVR-186 (H3N2) ANTIGEN (FORMALDEHYDE INACTIVATED), INFLUENZA B VIRUS B/PHUKET/3073/2013 ANTIGEN (FORMALDEHYDE INACTIVATED), AND INFLUENZA B VIRUS B/MARYLAND/15/2016 BX-69A ANTIGEN (FORMALDEHYDE INACTIVATED) 60; 60; 60; 60 UG/.7ML; UG/.7ML; UG/.7ML; UG/.7ML
INJECTION, SUSPENSION INTRAMUSCULAR
COMMUNITY
Start: 2020-10-19 | End: 2021-04-27 | Stop reason: ALTCHOICE

## 2020-10-22 RX ORDER — FLUTICASONE PROPIONATE AND SALMETEROL 250; 50 UG/1; UG/1
POWDER RESPIRATORY (INHALATION)
Qty: 180 EACH | Refills: 3
Start: 2020-10-22 | End: 2022-01-04 | Stop reason: SDUPTHER

## 2020-10-25 ENCOUNTER — CLINICAL SUPPORT (OUTPATIENT)
Dept: CARDIOLOGY | Facility: HOSPITAL | Age: 80
End: 2020-10-25
Payer: MEDICARE

## 2020-10-25 DIAGNOSIS — Z95.818 PRESENCE OF OTHER CARDIAC IMPLANTS AND GRAFTS: ICD-10-CM

## 2020-10-25 PROCEDURE — 93298 CARDIAC DEVICE CHECK - REMOTE: ICD-10-PCS | Mod: ,,, | Performed by: INTERNAL MEDICINE

## 2020-10-25 PROCEDURE — G2066 INTER DEVC REMOTE 30D: HCPCS | Performed by: INTERNAL MEDICINE

## 2020-10-25 PROCEDURE — 93298 REM INTERROG DEV EVAL SCRMS: CPT | Mod: ,,, | Performed by: INTERNAL MEDICINE

## 2020-11-02 NOTE — PROGRESS NOTES
Subjective:       Patient ID: Adelfo ZHANG eJnnifer, PhD is a 80 y.o. male.    Chief Complaint: Annual Exam    HPI   The patient presents for annual physical examination.  Active medical conditions include hypertension, asthma, chronic rhinitis, chronic kidney disease, anemia, and hyperlipidemia.  He has been treated for paroxysmal atrial tachycardia, hyperuricemia, and gout.  He has not experienced any recent episodes of acute gout.    The patient was diagnosed to have Ramirez's esophagus by his gastroenterologist Dr. Gutierrez.  A follow-up EGD in 3 years is recommended.  He remains on omeprazole 20 mg daily as recommended by GI.  He has not experienced any recent symptoms of heartburn.  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  He will be due for colonoscopy in 3 years for follow-up of colon polyps.    Blood pressure readings have ranged from 120-130 systolic recently.  His asthma has been stable.  There has been no flare of rhinitis symptoms.  He is resting well at night and is averaging approximately 8 hr of sleep.    Immunization record was reviewed.  Screening tests were reviewed.    No interval change in past medical history, family history, or social history since prior evaluations.    Review of Systems   Constitutional: Negative for activity change and unexpected weight change.   HENT: Negative for hearing loss, rhinorrhea and trouble swallowing.    Eyes: Negative for discharge and visual disturbance.   Respiratory: Negative for chest tightness and wheezing.    Cardiovascular: Positive for palpitations (Rare palpitations are noted.). Negative for chest pain.   Gastrointestinal: Positive for anal bleeding ( occasional hemorrhoidal bleeding is noted.). Negative for abdominal pain, change in bowel habit, constipation, diarrhea, vomiting and change in bowel habit.   Endocrine: Negative for polydipsia and polyuria.   Genitourinary: Negative for difficulty urinating, hematuria and urgency.    Musculoskeletal: Positive for arthralgias and back pain. Negative for joint swelling and neck pain.        Chronic lower back pain symptoms are noted.  The patient still receives therapeutic massages per his massage therapist regularly for management.   Integumentary:         Scalp itching and flaking are noted.   Neurological: Negative for weakness and headaches.   Psychiatric/Behavioral: Negative for confusion and dysphoric mood.         Objective:      Physical Exam  Constitutional:       General: He is not in acute distress.     Appearance: He is well-developed.   HENT:      Head: Normocephalic and atraumatic.      Right Ear: External ear normal.      Left Ear: External ear normal.      Mouth/Throat:      Pharynx: No oropharyngeal exudate.   Eyes:      General: No scleral icterus.     Conjunctiva/sclera: Conjunctivae normal.      Pupils: Pupils are equal, round, and reactive to light.   Neck:      Musculoskeletal: Normal range of motion and neck supple.      Thyroid: No thyromegaly.      Vascular: No JVD.   Cardiovascular:      Rate and Rhythm: Normal rate and regular rhythm.      Heart sounds: Normal heart sounds. No murmur. No friction rub. No gallop.    Pulmonary:      Effort: Pulmonary effort is normal. No respiratory distress.      Breath sounds: Normal breath sounds. No wheezing or rales.   Abdominal:      General: Bowel sounds are normal. There is no distension.      Palpations: Abdomen is soft. There is no mass.      Tenderness: There is no abdominal tenderness. There is no guarding.   Musculoskeletal: Normal range of motion.         General: No tenderness.   Lymphadenopathy:      Cervical: No cervical adenopathy.   Skin:     General: Skin is warm and dry.      Findings: No rash.      Comments: Slight flaking is noted of the scalp.  No patchy alopecia is present.   Neurological:      Mental Status: He is alert and oriented to person, place, and time.      Cranial Nerves: No cranial nerve deficit.       Motor: No abnormal muscle tone.      Deep Tendon Reflexes: Reflexes are normal and symmetric.   Psychiatric:         Behavior: Behavior normal.         Thought Content: Thought content normal.         Lab Visit on 10/19/2020   Component Date Value Ref Range Status    Specimen UA 10/19/2020 Urine, Unspecified   Final    Color, UA 10/19/2020 Yellow  Yellow, Straw, Madelaine Final    Appearance, UA 10/19/2020 Clear  Clear Final    pH, UA 10/19/2020 5.0  5.0 - 8.0 Final    Specific [place], UA 10/19/2020 1.020  1.005 - 1.030 Final    Protein, UA 10/19/2020 Negative  Negative Final    Comment: Recommend a 24 hour urine protein or a urine   protein/creatinine ratio if globulin induced proteinuria is  clinically suspected.      Glucose, UA 10/19/2020 Negative  Negative Final    Ketones, UA 10/19/2020 Negative  Negative Final    Bilirubin (UA) 10/19/2020 Negative  Negative Final    Occult Blood UA 10/19/2020 Negative  Negative Final    Nitrite, UA 10/19/2020 Negative  Negative Final    Leukocytes, UA 10/19/2020 Negative  Negative Final   Lab Visit on 10/19/2020   Component Date Value Ref Range Status    Sodium 10/19/2020 142  136 - 145 mmol/L Final    Potassium 10/19/2020 4.5  3.5 - 5.1 mmol/L Final    Chloride 10/19/2020 109  95 - 110 mmol/L Final    CO2 10/19/2020 24  23 - 29 mmol/L Final    Glucose 10/19/2020 99  70 - 110 mg/dL Final    BUN 10/19/2020 30* 8 - 23 mg/dL Final    Creatinine 10/19/2020 1.8* 0.5 - 1.4 mg/dL Final    Calcium 10/19/2020 9.1  8.7 - 10.5 mg/dL Final    Total Protein 10/19/2020 6.7  6.0 - 8.4 g/dL Final    Albumin 10/19/2020 3.8  3.5 - 5.2 g/dL Final    Total Bilirubin 10/19/2020 0.7  0.1 - 1.0 mg/dL Final    Comment: For infants and newborns, interpretation of results should be based  on gestational age, weight and in agreement with clinical  observations.  Premature Infant recommended reference ranges:  Up to 24 hours.............<8.0 mg/dL  Up to 48 hours............<12.0  mg/dL  3-5 days..................<15.0 mg/dL  6-29 days.................<15.0 mg/dL      Alkaline Phosphatase 10/19/2020 68  55 - 135 U/L Final    AST 10/19/2020 22  10 - 40 U/L Final    ALT 10/19/2020 23  10 - 44 U/L Final    Anion Gap 10/19/2020 9  8 - 16 mmol/L Final    eGFR if African American 10/19/2020 40.2* >60 mL/min/1.73 m^2 Final    eGFR if non African American 10/19/2020 34.8* >60 mL/min/1.73 m^2 Final    Comment: Calculation used to obtain the estimated glomerular filtration  rate (eGFR) is the CKD-EPI equation.       Uric Acid 10/19/2020 5.6  3.4 - 7.0 mg/dL Final    WBC 10/19/2020 5.59  3.90 - 12.70 K/uL Final    RBC 10/19/2020 3.77* 4.60 - 6.20 M/uL Final    Hemoglobin 10/19/2020 11.8* 14.0 - 18.0 g/dL Final    Hematocrit 10/19/2020 38.5* 40.0 - 54.0 % Final    MCV 10/19/2020 102* 82 - 98 fL Final    MCH 10/19/2020 31.3* 27.0 - 31.0 pg Final    MCHC 10/19/2020 30.6* 32.0 - 36.0 g/dL Final    RDW 10/19/2020 13.0  11.5 - 14.5 % Final    Platelets 10/19/2020 182  150 - 350 K/uL Final    MPV 10/19/2020 12.6  9.2 - 12.9 fL Final    Immature Granulocytes 10/19/2020 0.4  0.0 - 0.5 % Final    Gran # (ANC) 10/19/2020 2.4  1.8 - 7.7 K/uL Final    Immature Grans (Abs) 10/19/2020 0.02  0.00 - 0.04 K/uL Final    Comment: Mild elevation in immature granulocytes is non specific and   can be seen in a variety of conditions including stress response,   acute inflammation, trauma and pregnancy. Correlation with other   laboratory and clinical findings is essential.      Lymph # 10/19/2020 2.1  1.0 - 4.8 K/uL Final    Mono # 10/19/2020 0.8  0.3 - 1.0 K/uL Final    Eos # 10/19/2020 0.3  0.0 - 0.5 K/uL Final    Baso # 10/19/2020 0.05  0.00 - 0.20 K/uL Final    nRBC 10/19/2020 0  0 /100 WBC Final    Gran % 10/19/2020 42.0  38.0 - 73.0 % Final    Lymph % 10/19/2020 37.4  18.0 - 48.0 % Final    Mono % 10/19/2020 13.8  4.0 - 15.0 % Final    Eosinophil % 10/19/2020 5.5  0.0 - 8.0 % Final     Basophil % 10/19/2020 0.9  0.0 - 1.9 % Final    Differential Method 10/19/2020 Automated   Final    Sodium 10/19/2020 142  136 - 145 mmol/L Final    Potassium 10/19/2020 4.5  3.5 - 5.1 mmol/L Final    Chloride 10/19/2020 109  95 - 110 mmol/L Final    CO2 10/19/2020 24  23 - 29 mmol/L Final    Glucose 10/19/2020 99  70 - 110 mg/dL Final    BUN 10/19/2020 30* 8 - 23 mg/dL Final    Creatinine 10/19/2020 1.8* 0.5 - 1.4 mg/dL Final    Calcium 10/19/2020 9.1  8.7 - 10.5 mg/dL Final    Total Protein 10/19/2020 6.7  6.0 - 8.4 g/dL Final    Albumin 10/19/2020 3.8  3.5 - 5.2 g/dL Final    Total Bilirubin 10/19/2020 0.7  0.1 - 1.0 mg/dL Final    Comment: For infants and newborns, interpretation of results should be based  on gestational age, weight and in agreement with clinical  observations.  Premature Infant recommended reference ranges:  Up to 24 hours.............<8.0 mg/dL  Up to 48 hours............<12.0 mg/dL  3-5 days..................<15.0 mg/dL  6-29 days.................<15.0 mg/dL      Alkaline Phosphatase 10/19/2020 68  55 - 135 U/L Final    AST 10/19/2020 22  10 - 40 U/L Final    ALT 10/19/2020 23  10 - 44 U/L Final    Anion Gap 10/19/2020 9  8 - 16 mmol/L Final    eGFR if African American 10/19/2020 40.2* >60 mL/min/1.73 m^2 Final    eGFR if non African American 10/19/2020 34.8* >60 mL/min/1.73 m^2 Final    Comment: Calculation used to obtain the estimated glomerular filtration  rate (eGFR) is the CKD-EPI equation.       Cholesterol 10/19/2020 158  120 - 199 mg/dL Final    Comment: The National Cholesterol Education Program (NCEP) has set the  following guidelines (reference ranges) for Cholesterol:  Optimal.....................<200 mg/dL  Borderline High.............200-239 mg/dL  High........................> or = 240 mg/dL      Triglycerides 10/19/2020 73  30 - 150 mg/dL Final    Comment: The National Cholesterol Education Program (NCEP) has set the  following guidelines (reference  values) for triglycerides:  Normal......................<150 mg/dL  Borderline High.............150-199 mg/dL  High........................200-499 mg/dL      HDL 10/19/2020 72  40 - 75 mg/dL Final    Comment: The National Cholesterol Education Program (NCEP) has set the  following guidelines (reference values) for HDL Cholesterol:  Low...............<40 mg/dL  Optimal...........>60 mg/dL      LDL Cholesterol 10/19/2020 71.4  63.0 - 159.0 mg/dL Final    Comment: The National Cholesterol Education Program (NCEP) has set the  following guidelines (reference values) for LDL Cholesterol:  Optimal.......................<130 mg/dL  Borderline High...............130-159 mg/dL  High..........................160-189 mg/dL  Very High.....................>190 mg/dL      HDL/Cholesterol Ratio 10/19/2020 45.6  20.0 - 50.0 % Final    Total Cholesterol/HDL Ratio 10/19/2020 2.2  2.0 - 5.0 Final    Non-HDL Cholesterol 10/19/2020 86  mg/dL Final    Comment: Risk category and Non-HDL cholesterol goals:  Coronary heart disease (CHD)or equivalent (10-year risk of CHD >20%):  Non-HDL cholesterol goal     <130 mg/dL  Two or more CHD risk factors and 10-year risk of CHD <= 20%:  Non-HDL cholesterol goal     <160 mg/dL  0 to 1 CHD risk factor:  Non-HDL cholesterol goal     <190 mg/dL      PSA, Screen 10/19/2020 2.7  0.00 - 4.00 ng/mL Final    Comment: PSA Expected levels:  Hormonal Therapy: <0.05 ng/ml  Prostatectomy: <0.01 ng/ml  Radiation Therapy: <1.00 ng/ml      TSH 10/19/2020 3.718  0.400 - 4.000 uIU/mL Final    Uric Acid 10/19/2020 5.6  3.4 - 7.0 mg/dL Final   Lab Visit on 09/28/2020   Component Date Value Ref Range Status    SARS-CoV2 (COVID-19) Qualitative P* 09/28/2020 Not Detected  Not Detected Final    Comment: This test utilizes a real-time reverse transcription  polymerase chain reaction procedure to amplify and   detect the SARS-CoV-2 RdRp and N genes.    The analytical sensitivity (limit of detection) of   this assay  is 100 copies/mL.   A Detected result is considered positive for COVID-19.  This patient is considered infected with the   SARS-CoV-2 virus and is presumed to be contagious.    A Not Detected result means that SARS-CoV-2 RNA is not  present above the limit of detection. It does not rule  out the possibility of COVID-19 and should not be the  sole basis for treatment decisions.  If COVID-19 is   strongly suspected based on clinical and exposure   history,re-testing should be considered.    This test is only for use under Food and Drug   Administration s Emergency Use Authorization (EUA).   Commercial reagents are provided by [employer].  Performance characteristics of the EUA have been   independently verified by [facility]   Department of Pathology and L                           [facility].           Assessment:       1. Well adult exam    2. Essential hypertension    3. Stage 3 chronic kidney disease, unspecified whether stage 3a or 3b CKD    4. Other hyperlipidemia    5. Chronic anemia    6. Gastroesophageal reflux disease with esophagitis without hemorrhage    7. ANNABELLE (obstructive sleep apnea)    8. Intermittent asthma without complication, unspecified asthma severity    9. Chronic rhinitis        Plan:     Adelfo was seen today for annual exam.  Atorvastatin prescription will be sent.  The current therapy will be continued.  Renewals for ketoconazole shampoo and pramoxine hemorrhoidal cream will be sent.  A follow-up visit in 6 months is recommended along with laboratory studies.    Diagnoses and all orders for this visit:    Well adult exam    Essential hypertension  -     Comprehensive Metabolic Panel; Future  -     Lipid Panel; Future  -     CBC auto differential; Future    Stage 3 chronic kidney disease, unspecified whether stage 3a or 3b CKD  -     Comprehensive Metabolic Panel; Future  -     Lipid Panel; Future  -     CBC auto differential; Future    Other  hyperlipidemia    Chronic anemia    Gastroesophageal reflux disease with esophagitis without hemorrhage    ANNABELLE (obstructive sleep apnea)    Intermittent asthma without complication, unspecified asthma severity    Chronic rhinitis    Other orders  -     atorvastatin (LIPITOR) 20 MG tablet; Take 2 tablets (40 mg total) by mouth once daily.  -     hydrocortisone-pramoxine (ANALPRAM-HC) 2.5-1 % Crea; Place rectally 3 (three) times daily.  -     ketoconazole (NIZORAL) 2 % shampoo; WASH affected area every other day  -     fluticasone-salmeterol diskus inhaler 250-50 mcg; INHALE 1 PUFF BY MOUTH TWICE DAILY. RINSE MOUTH AFTER USE

## 2020-11-09 RX ORDER — COLCHICINE 0.6 MG/1
TABLET ORAL
Qty: 45 TABLET | Refills: 1 | OUTPATIENT
Start: 2020-11-09

## 2020-11-10 ENCOUNTER — TELEPHONE (OUTPATIENT)
Dept: RHEUMATOLOGY | Facility: CLINIC | Age: 80
End: 2020-11-10

## 2020-11-11 ENCOUNTER — OFFICE VISIT (OUTPATIENT)
Dept: RHEUMATOLOGY | Facility: CLINIC | Age: 80
End: 2020-11-11
Payer: MEDICARE

## 2020-11-11 VITALS
DIASTOLIC BLOOD PRESSURE: 59 MMHG | WEIGHT: 227.13 LBS | HEART RATE: 56 BPM | HEIGHT: 70 IN | BODY MASS INDEX: 32.51 KG/M2 | SYSTOLIC BLOOD PRESSURE: 120 MMHG

## 2020-11-11 DIAGNOSIS — M1A.9XX1 CHRONIC TOPHACEOUS GOUT: Primary | ICD-10-CM

## 2020-11-11 PROCEDURE — 99213 PR OFFICE/OUTPT VISIT, EST, LEVL III, 20-29 MIN: ICD-10-PCS | Mod: S$GLB,,, | Performed by: INTERNAL MEDICINE

## 2020-11-11 PROCEDURE — 1101F PT FALLS ASSESS-DOCD LE1/YR: CPT | Mod: CPTII,S$GLB,, | Performed by: INTERNAL MEDICINE

## 2020-11-11 PROCEDURE — 1126F AMNT PAIN NOTED NONE PRSNT: CPT | Mod: S$GLB,,, | Performed by: INTERNAL MEDICINE

## 2020-11-11 PROCEDURE — 3074F PR MOST RECENT SYSTOLIC BLOOD PRESSURE < 130 MM HG: ICD-10-PCS | Mod: CPTII,S$GLB,, | Performed by: INTERNAL MEDICINE

## 2020-11-11 PROCEDURE — 1159F PR MEDICATION LIST DOCUMENTED IN MEDICAL RECORD: ICD-10-PCS | Mod: S$GLB,,, | Performed by: INTERNAL MEDICINE

## 2020-11-11 PROCEDURE — 3074F SYST BP LT 130 MM HG: CPT | Mod: CPTII,S$GLB,, | Performed by: INTERNAL MEDICINE

## 2020-11-11 PROCEDURE — 1159F MED LIST DOCD IN RCRD: CPT | Mod: S$GLB,,, | Performed by: INTERNAL MEDICINE

## 2020-11-11 PROCEDURE — 3078F DIAST BP <80 MM HG: CPT | Mod: CPTII,S$GLB,, | Performed by: INTERNAL MEDICINE

## 2020-11-11 PROCEDURE — 1126F PR PAIN SEVERITY QUANTIFIED, NO PAIN PRESENT: ICD-10-PCS | Mod: S$GLB,,, | Performed by: INTERNAL MEDICINE

## 2020-11-11 PROCEDURE — 3078F PR MOST RECENT DIASTOLIC BLOOD PRESSURE < 80 MM HG: ICD-10-PCS | Mod: CPTII,S$GLB,, | Performed by: INTERNAL MEDICINE

## 2020-11-11 PROCEDURE — 99213 OFFICE O/P EST LOW 20 MIN: CPT | Mod: S$GLB,,, | Performed by: INTERNAL MEDICINE

## 2020-11-11 PROCEDURE — 99999 PR PBB SHADOW E&M-EST. PATIENT-LVL IV: ICD-10-PCS | Mod: PBBFAC,,, | Performed by: INTERNAL MEDICINE

## 2020-11-11 PROCEDURE — 99999 PR PBB SHADOW E&M-EST. PATIENT-LVL IV: CPT | Mod: PBBFAC,,, | Performed by: INTERNAL MEDICINE

## 2020-11-11 PROCEDURE — 1101F PR PT FALLS ASSESS DOC 0-1 FALLS W/OUT INJ PAST YR: ICD-10-PCS | Mod: CPTII,S$GLB,, | Performed by: INTERNAL MEDICINE

## 2020-11-11 NOTE — PROGRESS NOTES
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

## 2020-11-11 NOTE — PROGRESS NOTES
Pre chart    Answers for HPI/ROS submitted by the patient on 11/9/2020   fever: No  eye redness: No  mouth sores: No  headaches: No  shortness of breath: No  chest pain: No  trouble swallowing: No  diarrhea: No  constipation: No  unexpected weight change: No  genital sore: No  During the last 3 days, have you had a skin rash?: No  Bruises or bleeds easily: Yes  cough: No

## 2020-11-11 NOTE — PROGRESS NOTES
"Subjective:       Patient ID: Adelfo ZHANG Jennifer, PhD is a 80 y.o. male.    Chief Complaint: No chief complaint on file.    HPI     This is a 78 year old man with a history of asthma, CKD stage III, HLD, HTN, and chronic tophaceous gout. He was last seen in on 5/11/20. On febuxostat 40 mg and colchicine 0.3 mg daily. He has been doing well, with no worsening pain or any recent flares. He is doing well on the colchicine and febuxostat, not currently taking any NSAIDs. He previously had symptoms of gout in his BL feet, with his last flare occurring in the right foot. Denies any foot pain or other worsening symptoms. Continuing to do well with a low-purine diet. He continues to exercise a minimum 150 minutes/week doing yard work, walking, seated elliptical.    Review of Systems   Constitutional: Negative for fever and unexpected weight change.   HENT: Negative for mouth sores and trouble swallowing.    Eyes: Negative for redness.   Respiratory: Negative for cough and shortness of breath.    Cardiovascular: Negative for chest pain.   Gastrointestinal: Negative for constipation and diarrhea.   Genitourinary: Negative for genital sores.   Skin: Negative for rash.   Neurological: Negative for headaches.   Hematological: Bruises/bleeds easily.         Objective:   BP (!) 120/59   Pulse (!) 56   Ht 5' 9.6" (1.768 m)   Wt 103 kg (227 lb 1.6 oz)   BMI 32.96 kg/m²      Physical Exam   Constitutional: He is well-developed, well-nourished, and in no distress. No distress.       Right Side Rheumatological Exam     Examination finds the shoulder, elbow, wrist, knee, 1st PIP, 1st MCP, 2nd PIP, 2nd MCP, 3rd PIP, 3rd MCP, 4th PIP, 4th MCP, 5th PIP and 5th MCP normal.    Foot Exam   Right foot exam exhibits signs of no tophus    Range of Motion   Ankle Joint   Dorsiflexion: normal   Plantar flexion: normal   Subtalar Joint   Inversion: normal   Eversion: normal   First MTP Joint: normal  Ankle Swelling: positive    Left Side " Rheumatological Exam     Examination finds the shoulder, elbow, wrist, knee, 1st PIP, 1st MCP, 2nd PIP, 2nd MCP, 3rd PIP, 3rd MCP, 4th PIP, 4th MCP, 5th PIP and 5th MCP normal.    Foot Exam   Left foot exam exhibits signs of no tophus    Range of Motion   Ankle Joint  Dorsiflexion: normal   Plantar flexion: normal     Subtalar Joint   Inversion: normal   Eversion: normal   First MTP Joint: normal  Ankle Swelling: positive      Skin: He is not diaphoretic.        No data to display     CBC (10/19/2020): Hgb 11.8, stable, otherwise WNL  CMP (10/19/20): Cr 1.8, GFR 34.8 stable  Uric acid: 5.6 (10/19/20); 5.7 (5/11/10)      Assessment:       1. Chronic tophaceous gout        This is a 78 year old man with a history of asthma, CKD stage III, HLD, HTN, and chronic tophaceous gout on febuxostat 40mg daily and colchicine 0.3 mg daily without recent flares.    Plan:       Problem List Items Addressed This Visit        Orthopedic    Chronic tophaceous gout - Primary      - Dr. Rodriguez has chronic tophaceous gout evidenced on X-ray on daily fexbuxostat and colchicine, has been doing well. No recent flares, last uric acid 5.6.  - Continue daily febuxostat and colchicine. Will repeat uric acid in 6 months unless he becomes symptomatic before then.  - Has been asyptomatic without flare for >3-6 months, and CKDIII with GFR 34.8, will discontinue daily colchicine  - Continued to encourage him to do daily exercise, with aerobic modalities as well as strength training. Continue low-purine diet with anti-inflammatory foods.  RTC in 6 months       Patient was examined by me and the history, exam and plan were discussed with Dr. Nunez.      Sj Lake MD  PGY-1 PM&R

## 2020-11-16 RX ORDER — COLCHICINE 0.6 MG/1
TABLET ORAL
Qty: 90 TABLET | Refills: 1 | Status: SHIPPED | OUTPATIENT
Start: 2020-11-16 | End: 2021-05-11

## 2020-11-17 ENCOUNTER — OFFICE VISIT (OUTPATIENT)
Dept: OPTOMETRY | Facility: CLINIC | Age: 80
End: 2020-11-17
Payer: MEDICARE

## 2020-11-17 DIAGNOSIS — H52.13 MYOPIA WITH ASTIGMATISM AND PRESBYOPIA, BILATERAL: ICD-10-CM

## 2020-11-17 DIAGNOSIS — H25.13 NUCLEAR SCLEROSIS, BILATERAL: Primary | ICD-10-CM

## 2020-11-17 DIAGNOSIS — H52.203 MYOPIA WITH ASTIGMATISM AND PRESBYOPIA, BILATERAL: ICD-10-CM

## 2020-11-17 DIAGNOSIS — H43.812 POSTERIOR VITREOUS DETACHMENT OF LEFT EYE: ICD-10-CM

## 2020-11-17 DIAGNOSIS — I10 ESSENTIAL HYPERTENSION: ICD-10-CM

## 2020-11-17 DIAGNOSIS — H52.4 MYOPIA WITH ASTIGMATISM AND PRESBYOPIA, BILATERAL: ICD-10-CM

## 2020-11-17 PROCEDURE — 99999 PR PBB SHADOW E&M-EST. PATIENT-LVL III: CPT | Mod: PBBFAC,,, | Performed by: OPTOMETRIST

## 2020-11-17 PROCEDURE — 99999 PR PBB SHADOW E&M-EST. PATIENT-LVL III: ICD-10-PCS | Mod: PBBFAC,,, | Performed by: OPTOMETRIST

## 2020-11-17 PROCEDURE — 1126F PR PAIN SEVERITY QUANTIFIED, NO PAIN PRESENT: ICD-10-PCS | Mod: S$GLB,,, | Performed by: OPTOMETRIST

## 2020-11-17 PROCEDURE — 92014 PR EYE EXAM, EST PATIENT,COMPREHESV: ICD-10-PCS | Mod: S$GLB,,, | Performed by: OPTOMETRIST

## 2020-11-17 PROCEDURE — 92014 COMPRE OPH EXAM EST PT 1/>: CPT | Mod: S$GLB,,, | Performed by: OPTOMETRIST

## 2020-11-17 PROCEDURE — 3288F FALL RISK ASSESSMENT DOCD: CPT | Mod: CPTII,S$GLB,, | Performed by: OPTOMETRIST

## 2020-11-17 PROCEDURE — 92015 PR REFRACTION: ICD-10-PCS | Mod: S$GLB,,, | Performed by: OPTOMETRIST

## 2020-11-17 PROCEDURE — 3288F PR FALLS RISK ASSESSMENT DOCUMENTED: ICD-10-PCS | Mod: CPTII,S$GLB,, | Performed by: OPTOMETRIST

## 2020-11-17 PROCEDURE — 1126F AMNT PAIN NOTED NONE PRSNT: CPT | Mod: S$GLB,,, | Performed by: OPTOMETRIST

## 2020-11-17 PROCEDURE — 1101F PT FALLS ASSESS-DOCD LE1/YR: CPT | Mod: CPTII,S$GLB,, | Performed by: OPTOMETRIST

## 2020-11-17 PROCEDURE — 92015 DETERMINE REFRACTIVE STATE: CPT | Mod: S$GLB,,, | Performed by: OPTOMETRIST

## 2020-11-17 PROCEDURE — 1101F PR PT FALLS ASSESS DOC 0-1 FALLS W/OUT INJ PAST YR: ICD-10-PCS | Mod: CPTII,S$GLB,, | Performed by: OPTOMETRIST

## 2020-11-17 NOTE — PROGRESS NOTES
HPI     Last eye exam was 5/23/19 with Dr. Galan.  Patient states needing to update glasses rx today. Hasn't gotten new   glasses in several years. Wearing them more for reading and computer.  Patient denies diplopia, headaches, flashes/floaters, and pain.      Last edited by Linda Potter on 11/17/2020  1:13 PM. (History)            Assessment /Plan     For exam results, see Encounter Report.    Nuclear sclerosis, bilateral    Essential hypertension    Posterior vitreous detachment of left eye    Myopia with astigmatism and presbyopia, bilateral            1.  Educated on cataracts and affects on vision.  Patient happy with vision.  Monitor.  2.  No retinopathy--monitor yearly.  BP control.  3.  Longstanding-stable.  Retina flat and intact OU--no holes, tears, breaks, or RDs.    4.  Bifocal rx given.

## 2020-11-18 ENCOUNTER — PATIENT MESSAGE (OUTPATIENT)
Dept: SLEEP MEDICINE | Facility: CLINIC | Age: 80
End: 2020-11-18

## 2020-11-21 DIAGNOSIS — G47.33 OBSTRUCTIVE SLEEP APNEA: Primary | ICD-10-CM

## 2020-11-24 ENCOUNTER — CLINICAL SUPPORT (OUTPATIENT)
Dept: CARDIOLOGY | Facility: HOSPITAL | Age: 80
End: 2020-11-24
Payer: MEDICARE

## 2020-11-24 DIAGNOSIS — Z95.818 PRESENCE OF OTHER CARDIAC IMPLANTS AND GRAFTS: ICD-10-CM

## 2020-11-24 DIAGNOSIS — I49.8 OTHER SPECIFIED CARDIAC ARRHYTHMIAS: Primary | ICD-10-CM

## 2020-11-24 PROCEDURE — 93298 CARDIAC DEVICE CHECK - REMOTE: ICD-10-PCS | Mod: ,,, | Performed by: INTERNAL MEDICINE

## 2020-11-24 PROCEDURE — G2066 INTER DEVC REMOTE 30D: HCPCS | Performed by: INTERNAL MEDICINE

## 2020-11-24 PROCEDURE — 93298 REM INTERROG DEV EVAL SCRMS: CPT | Mod: ,,, | Performed by: INTERNAL MEDICINE

## 2020-12-01 ENCOUNTER — OFFICE VISIT (OUTPATIENT)
Dept: DERMATOLOGY | Facility: CLINIC | Age: 80
End: 2020-12-01
Payer: MEDICARE

## 2020-12-01 DIAGNOSIS — D48.5 NEOPLASM OF UNCERTAIN BEHAVIOR OF SKIN: ICD-10-CM

## 2020-12-01 DIAGNOSIS — B07.9 VERRUCA: ICD-10-CM

## 2020-12-01 DIAGNOSIS — D18.01 CHERRY ANGIOMA: ICD-10-CM

## 2020-12-01 DIAGNOSIS — L82.1 SEBORRHEIC KERATOSES: ICD-10-CM

## 2020-12-01 DIAGNOSIS — Z12.83 SCREENING EXAM FOR SKIN CANCER: ICD-10-CM

## 2020-12-01 DIAGNOSIS — B35.1 ONYCHOMYCOSIS: ICD-10-CM

## 2020-12-01 DIAGNOSIS — D22.9 MULTIPLE BENIGN NEVI: Primary | ICD-10-CM

## 2020-12-01 DIAGNOSIS — L21.9 SEBORRHEIC DERMATITIS: ICD-10-CM

## 2020-12-01 PROCEDURE — 1126F PR PAIN SEVERITY QUANTIFIED, NO PAIN PRESENT: ICD-10-PCS | Mod: S$GLB,,, | Performed by: DERMATOLOGY

## 2020-12-01 PROCEDURE — 88305 TISSUE EXAM BY PATHOLOGIST: ICD-10-PCS | Mod: 26,,, | Performed by: PATHOLOGY

## 2020-12-01 PROCEDURE — 17110 DESTRUCTION B9 LES UP TO 14: CPT | Mod: 59,S$GLB,, | Performed by: DERMATOLOGY

## 2020-12-01 PROCEDURE — 99999 PR PBB SHADOW E&M-EST. PATIENT-LVL IV: CPT | Mod: PBBFAC,,, | Performed by: DERMATOLOGY

## 2020-12-01 PROCEDURE — 88342 CHG IMMUNOCYTOCHEMISTRY: ICD-10-PCS | Mod: 26,,, | Performed by: PATHOLOGY

## 2020-12-01 PROCEDURE — 11102 TANGNTL BX SKIN SINGLE LES: CPT | Mod: S$GLB,,, | Performed by: DERMATOLOGY

## 2020-12-01 PROCEDURE — 88305 TISSUE EXAM BY PATHOLOGIST: CPT | Mod: 26,,, | Performed by: PATHOLOGY

## 2020-12-01 PROCEDURE — 88312 SPECIAL STAINS GROUP 1: CPT | Mod: 26,,, | Performed by: PATHOLOGY

## 2020-12-01 PROCEDURE — 1159F PR MEDICATION LIST DOCUMENTED IN MEDICAL RECORD: ICD-10-PCS | Mod: S$GLB,,, | Performed by: DERMATOLOGY

## 2020-12-01 PROCEDURE — 1101F PR PT FALLS ASSESS DOC 0-1 FALLS W/OUT INJ PAST YR: ICD-10-PCS | Mod: CPTII,S$GLB,, | Performed by: DERMATOLOGY

## 2020-12-01 PROCEDURE — 88305 TISSUE EXAM BY PATHOLOGIST: CPT | Performed by: PATHOLOGY

## 2020-12-01 PROCEDURE — 1126F AMNT PAIN NOTED NONE PRSNT: CPT | Mod: S$GLB,,, | Performed by: DERMATOLOGY

## 2020-12-01 PROCEDURE — 1159F MED LIST DOCD IN RCRD: CPT | Mod: S$GLB,,, | Performed by: DERMATOLOGY

## 2020-12-01 PROCEDURE — 3288F FALL RISK ASSESSMENT DOCD: CPT | Mod: CPTII,S$GLB,, | Performed by: DERMATOLOGY

## 2020-12-01 PROCEDURE — 88312 PR  SPECIAL STAINS,GROUP I: ICD-10-PCS | Mod: 26,,, | Performed by: PATHOLOGY

## 2020-12-01 PROCEDURE — 3288F PR FALLS RISK ASSESSMENT DOCUMENTED: ICD-10-PCS | Mod: CPTII,S$GLB,, | Performed by: DERMATOLOGY

## 2020-12-01 PROCEDURE — 88342 IMHCHEM/IMCYTCHM 1ST ANTB: CPT | Mod: 26,,, | Performed by: PATHOLOGY

## 2020-12-01 PROCEDURE — 99214 OFFICE O/P EST MOD 30 MIN: CPT | Mod: 25,S$GLB,, | Performed by: DERMATOLOGY

## 2020-12-01 PROCEDURE — 17110 PR DESTRUCTION BENIGN LESIONS UP TO 14: ICD-10-PCS | Mod: 59,S$GLB,, | Performed by: DERMATOLOGY

## 2020-12-01 PROCEDURE — 88342 IMHCHEM/IMCYTCHM 1ST ANTB: CPT | Performed by: PATHOLOGY

## 2020-12-01 PROCEDURE — 11102 PR TANGENTIAL BIOPSY, SKIN, SINGLE LESION: ICD-10-PCS | Mod: S$GLB,,, | Performed by: DERMATOLOGY

## 2020-12-01 PROCEDURE — 1101F PT FALLS ASSESS-DOCD LE1/YR: CPT | Mod: CPTII,S$GLB,, | Performed by: DERMATOLOGY

## 2020-12-01 PROCEDURE — 99214 PR OFFICE/OUTPT VISIT, EST, LEVL IV, 30-39 MIN: ICD-10-PCS | Mod: 25,S$GLB,, | Performed by: DERMATOLOGY

## 2020-12-01 PROCEDURE — 88312 SPECIAL STAINS GROUP 1: CPT | Performed by: PATHOLOGY

## 2020-12-01 PROCEDURE — 99999 PR PBB SHADOW E&M-EST. PATIENT-LVL IV: ICD-10-PCS | Mod: PBBFAC,,, | Performed by: DERMATOLOGY

## 2020-12-01 RX ORDER — FLUOCINONIDE TOPICAL SOLUTION USP, 0.05% 0.5 MG/ML
SOLUTION TOPICAL 2 TIMES DAILY
Qty: 60 ML | Refills: 3 | Status: SHIPPED | OUTPATIENT
Start: 2020-12-01

## 2020-12-01 RX ORDER — HYDROCORTISONE 25 MG/G
CREAM TOPICAL 2 TIMES DAILY
Qty: 28 G | Refills: 3 | Status: SHIPPED | OUTPATIENT
Start: 2020-12-01 | End: 2022-05-31 | Stop reason: SDUPTHER

## 2020-12-01 RX ORDER — KETOCONAZOLE 20 MG/G
CREAM TOPICAL 2 TIMES DAILY
Status: CANCELLED | OUTPATIENT
Start: 2020-12-01

## 2020-12-01 RX ORDER — KETOCONAZOLE 20 MG/G
CREAM TOPICAL 2 TIMES DAILY
Qty: 60 G | Refills: 2 | Status: SHIPPED | OUTPATIENT
Start: 2020-12-01 | End: 2022-01-06 | Stop reason: SDUPTHER

## 2020-12-01 NOTE — PATIENT INSTRUCTIONS
The patient was instructed to start dilute vinegar soaks, with 1 part white household vinegar mixed with 4 parts lukewarm water, to the affected areas for 10-15 minutes three times a week.

## 2020-12-01 NOTE — PROGRESS NOTES
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 male who presents for   Chief Complaint   Patient presents with    Skin Check     tbse     Patient is a 79 yo male present for tbse.  Patient has a history of SCC 2018, no other skin cancers.  Patient has a Rash on Left foot present for 6 weeks . Rash it red and scaling with itching.  No treatment.  Last seen virtually for wart on finger 4/2020, resolved with OTC treatments.      Review of Systems   Skin: Positive for dry skin, activity-related sunscreen use and wears hat. Negative for daily sunscreen use and recent sunburn.   Hematologic/Lymphatic: Bruises/bleeds easily.        Objective:    Physical Exam   Constitutional: He appears well-developed and well-nourished. No distress.   Neurological: He is alert and oriented to person, place, and time. He is not disoriented.   Psychiatric: He has a normal mood and affect.   Skin:   Areas Examined (abnormalities noted in diagram):   Scalp / Hair Palpated and Inspected  Head / Face Inspection Performed  Neck Inspection Performed  Chest / Axilla Inspection Performed  Abdomen Inspection Performed  Genitals / Buttocks / Groin Inspection Performed  Back Inspection Performed  RUE Inspected  LUE Inspection Performed  RLE Inspected  LLE Inspection Performed  Nails and Digits Inspection Performed                   Diagram Legend     Erythematous scaling macule/papule c/w actinic keratosis       Vascular papule c/w angioma      Pigmented verrucoid papule/plaque c/w seborrheic keratosis      Yellow umbilicated papule c/w sebaceous hyperplasia      Irregularly shaped tan macule c/w lentigo     1-2 mm smooth white papules consistent with Milia      Movable subcutaneous cyst with punctum c/w epidermal inclusion cyst      Subcutaneous movable cyst c/w pilar cyst      Firm pink to brown papule c/w dermatofibroma      Pedunculated fleshy papule(s) c/w skin tag(s)      Evenly pigmented macule c/w junctional nevus      Mildly variegated pigmented, slightly irregular-bordered macule c/w mildly atypical nevus      Flesh colored to evenly pigmented papule c/w intradermal nevus       Pink pearly papule/plaque c/w basal cell carcinoma      Erythematous hyperkeratotic cursted plaque c/w SCC      Surgical scar with no sign of skin cancer recurrence      Open and closed comedones      Inflammatory papules and pustules      Verrucoid papule consistent consistent with wart     Erythematous eczematous patches and plaques     Dystrophic onycholytic nail with subungual debris c/w onychomycosis     Umbilicated papule    Erythematous-base heme-crusted tan verrucoid plaque consistent with inflamed seborrheic keratosis     Erythematous Silvery Scaling Plaque c/w Psoriasis     See annotation          Assessment / Plan:      Pathology Orders:     Normal Orders This Visit    Specimen to Pathology, Dermatology     Questions:    Procedure Type: Dermatology and skin neoplasms    Number of Specimens: 1    ------------------------: -------------------------    Spec 1 Procedure: Biopsy    Spec 1 Clinical Impression: r/o bcc vs seb derm    Spec 1 Source: right lateral hairline    Clinical Information: shiny plaque 1.3 cm        Multiple benign nevi  Discussed ABCDE's of nevi.  Monitor for new mole or moles that are becoming bigger, darker, irritated, or developing irregular borders.     Seborrheic dermatitis  -     ketoconazole (NIZORAL) 2 % cream; Apply topically 2 (two) times daily. To dry rash on face daily for maintenance  Dispense: 60 g; Refill: 2  -     hydrocortisone 2.5 % cream; Apply topically 2 (two) times daily. As needed for severe flares of rash on face and neck  Dispense: 28 g; Refill: 3  -     fluocinonide (LIDEX) 0.05 % external solution; Apply topically 2 (two) times daily. For severe rashes in scalp and ears only prn  Dispense: 60 mL; Refill: 3    The side effects of topical steroids were discussed, including cutaneous atrophy,  hypopigmentation, and tachyphylaxis with prolonged use.  The patient was counseled to only use the topical steroids as long as necessary to treat the condition, and then stop.    Screening exam for skin cancer  Area of previous SCC examined. Site well healed with no signs of recurrence.    Total body skin examination performed today including at least 12 points as noted in physical examination. Suspicious lesions noted.    Seborrheic keratoses  These are benign inherited growths without a malignant potential. Reassurance given to patient. No treatment is necessary.     Cherry angioma  This is a benign vascular lesion. Reassurance given. No treatment required.     Neoplasm of uncertain behavior of skin right lateral hairline r/o bcc vs seb derm  -     Specimen to Pathology, Dermatology  Right lateral hairline - r/o BCC  Shave biopsy procedure note:    Shave biopsy performed after verbal consent including risk of infection, scar, recurrence, need for additional treatment of site. Area prepped with alcohol, anesthetized with approximately 1.0cc of 1% lidocaine with epinephrine. Lesional tissue shaved with razor blade. Hemostasis achieved with application of aluminum chloride. No complications. Dressing applied. Wound care explained.    Onychomycosis  The patient was instructed to start dilute vinegar soaks, with 1 part white household vinegar mixed with 4 parts lukewarm water, to the affected areas for 10-15 minutes three times a week.       Verruca  Right shin  Cryosurgery procedure note:    Verbal consent from the patient is obtained including, but not limited to, risk of hypopigmentation/hyperpigmentation, scar, recurrence of lesion. Liquid nitrogen cryosurgery is applied to 1 lesions to produce a freeze injury. The patient is aware that blisters may form and is instructed on wound care with gentle cleansing and use of vaseline ointment to keep moist until healed. The patient is supplied a handout on cryosurgery and  is instructed to call if lesions do not completely resolve.      Follow up in about 1 year (around 12/1/2021) for for TBSE.

## 2020-12-08 ENCOUNTER — HOSPITAL ENCOUNTER (OUTPATIENT)
Dept: CARDIOLOGY | Facility: CLINIC | Age: 80
Discharge: HOME | End: 2020-12-08
Payer: MEDICARE

## 2020-12-08 ENCOUNTER — OFFICE VISIT (OUTPATIENT)
Dept: ELECTROPHYSIOLOGY | Facility: CLINIC | Age: 80
End: 2020-12-08
Payer: MEDICARE

## 2020-12-08 VITALS
HEART RATE: 61 BPM | DIASTOLIC BLOOD PRESSURE: 57 MMHG | SYSTOLIC BLOOD PRESSURE: 125 MMHG | BODY MASS INDEX: 33.14 KG/M2 | WEIGHT: 223.75 LBS | HEIGHT: 69 IN

## 2020-12-08 DIAGNOSIS — I47.19 PAT (PAROXYSMAL ATRIAL TACHYCARDIA): Primary | ICD-10-CM

## 2020-12-08 DIAGNOSIS — I10 ESSENTIAL HYPERTENSION: Chronic | ICD-10-CM

## 2020-12-08 DIAGNOSIS — G47.33 OSA (OBSTRUCTIVE SLEEP APNEA): ICD-10-CM

## 2020-12-08 DIAGNOSIS — I49.8 OTHER SPECIFIED CARDIAC ARRHYTHMIAS: ICD-10-CM

## 2020-12-08 PROCEDURE — 99999 PR PBB SHADOW E&M-EST. PATIENT-LVL IV: CPT | Mod: PBBFAC,,, | Performed by: INTERNAL MEDICINE

## 2020-12-08 PROCEDURE — 99213 PR OFFICE/OUTPT VISIT, EST, LEVL III, 20-29 MIN: ICD-10-PCS | Mod: S$GLB,,, | Performed by: INTERNAL MEDICINE

## 2020-12-08 PROCEDURE — 93005 RHYTHM STRIP: ICD-10-PCS | Mod: S$GLB,,, | Performed by: INTERNAL MEDICINE

## 2020-12-08 PROCEDURE — 1126F PR PAIN SEVERITY QUANTIFIED, NO PAIN PRESENT: ICD-10-PCS | Mod: S$GLB,,, | Performed by: INTERNAL MEDICINE

## 2020-12-08 PROCEDURE — 1101F PT FALLS ASSESS-DOCD LE1/YR: CPT | Mod: CPTII,S$GLB,, | Performed by: INTERNAL MEDICINE

## 2020-12-08 PROCEDURE — 1101F PR PT FALLS ASSESS DOC 0-1 FALLS W/OUT INJ PAST YR: ICD-10-PCS | Mod: CPTII,S$GLB,, | Performed by: INTERNAL MEDICINE

## 2020-12-08 PROCEDURE — 1126F AMNT PAIN NOTED NONE PRSNT: CPT | Mod: S$GLB,,, | Performed by: INTERNAL MEDICINE

## 2020-12-08 PROCEDURE — 93010 RHYTHM STRIP: ICD-10-PCS | Mod: S$GLB,,, | Performed by: INTERNAL MEDICINE

## 2020-12-08 PROCEDURE — 99213 OFFICE O/P EST LOW 20 MIN: CPT | Mod: S$GLB,,, | Performed by: INTERNAL MEDICINE

## 2020-12-08 PROCEDURE — 93010 ELECTROCARDIOGRAM REPORT: CPT | Mod: S$GLB,,, | Performed by: INTERNAL MEDICINE

## 2020-12-08 PROCEDURE — 1159F MED LIST DOCD IN RCRD: CPT | Mod: S$GLB,,, | Performed by: INTERNAL MEDICINE

## 2020-12-08 PROCEDURE — 1159F PR MEDICATION LIST DOCUMENTED IN MEDICAL RECORD: ICD-10-PCS | Mod: S$GLB,,, | Performed by: INTERNAL MEDICINE

## 2020-12-08 PROCEDURE — 3288F FALL RISK ASSESSMENT DOCD: CPT | Mod: CPTII,S$GLB,, | Performed by: INTERNAL MEDICINE

## 2020-12-08 PROCEDURE — 3074F PR MOST RECENT SYSTOLIC BLOOD PRESSURE < 130 MM HG: ICD-10-PCS | Mod: CPTII,S$GLB,, | Performed by: INTERNAL MEDICINE

## 2020-12-08 PROCEDURE — 3074F SYST BP LT 130 MM HG: CPT | Mod: CPTII,S$GLB,, | Performed by: INTERNAL MEDICINE

## 2020-12-08 PROCEDURE — 93005 ELECTROCARDIOGRAM TRACING: CPT | Mod: S$GLB,,, | Performed by: INTERNAL MEDICINE

## 2020-12-08 PROCEDURE — 3078F PR MOST RECENT DIASTOLIC BLOOD PRESSURE < 80 MM HG: ICD-10-PCS | Mod: CPTII,S$GLB,, | Performed by: INTERNAL MEDICINE

## 2020-12-08 PROCEDURE — 3288F PR FALLS RISK ASSESSMENT DOCUMENTED: ICD-10-PCS | Mod: CPTII,S$GLB,, | Performed by: INTERNAL MEDICINE

## 2020-12-08 PROCEDURE — 3078F DIAST BP <80 MM HG: CPT | Mod: CPTII,S$GLB,, | Performed by: INTERNAL MEDICINE

## 2020-12-08 PROCEDURE — 99999 PR PBB SHADOW E&M-EST. PATIENT-LVL IV: ICD-10-PCS | Mod: PBBFAC,,, | Performed by: INTERNAL MEDICINE

## 2020-12-08 NOTE — PROGRESS NOTES
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 In 2018 he began having exertional induced palpitations that would last for a few minutes. A 30 day event monitor was ordered. He had 1 such event whose onset was not captured. It was a narrow complex tachycardia at around 150 bpm that could have represented 2:1 atrial flutter. Another event was characterized by a short imelda of nonsustained atrial tachycardia. He was initiated on eliquis and metoprolol 50mg daily. He was intolerant of the metoprolol (symptomatic bradycardia in the 50s). He continues to exercise and 1-2 times weekly he has an episode. He exercises with a heart rate monitor and is able to record his work outs. With workouts associated with his symptoms, his heart rate curve rises to a plateau of ~120-130. Then there is a sudden spike in his heart rate to 150-160 that remains for 5-10 minutes then suddenly stops and returns to baseline. He notes palpitations and chest heaviness with these events. He had an abnormal stress echo with preserved LVEF 10/2018. Coronary angiography noted no obstructive CAD.    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 Unstable, nonsustainable left atrial and low lateral right atrial ATs were induced. Sustained sinus node tachycardia was however induced in response to atrial extrastimuli. This was likely sinus node reentry. Due to potential 2:1 tach/AFL noted on his event monitor, a CTI ablation was performed. He returned for follow-up 8/2019. Since his procedure he had 1 instance of sudden tachycardia to 150s  during exercise, lasting ~7 minutes. He otherwise felt well. We elected to implant an ILR for long-term rhythm monitoring for possible AF, which was performed 9/2019.    Dr. Rodriguez returned for 3 month post-ILR implant follow-up 12/2019. He felt well. ILR notes infrequent short episodes of atrial tachycardia. Longest episode he noted was 10/3/2019, lasting 2 minutes, however we only have 23 seconds of this as it likely fell just below detection.    Interim Hx:   Jennifer returns for follow-up. ILR monitoring over this past year have noted that some of his symptom activations corresponded with sinus rhythm with PVCs. He has had a few episodes consistent with short runs of an AT, some lasting <1 minute. Longest episodes last 5-7.5 minutes. No AF has been observed. He feels great.      My interpretation of today's in clinic ECG is normal sinus rhythm with normal intervals    Review of Systems   Constitution: Negative for fever and malaise/fatigue.   HENT: Negative for congestion and sore throat.    Eyes: Negative for blurred vision and visual disturbance.   Cardiovascular: Positive for palpitations. Negative for chest pain, dyspnea on exertion, leg swelling, near-syncope and syncope.   Respiratory: Negative for cough and shortness of breath.    Hematologic/Lymphatic: Negative for bleeding problem. Does not bruise/bleed easily.   Skin: Negative.    Musculoskeletal: Negative.    Gastrointestinal: Negative for bloating, abdominal pain, hematochezia and melena.   Neurological: Negative for dizziness, focal weakness and weakness.        Objective:    Physical Exam   Constitutional: He is oriented to person, place, and time. He appears well-developed and well-nourished. No distress.   HENT:   Head: Normocephalic and atraumatic.   Eyes: Conjunctivae are normal. Right eye exhibits no discharge. Left eye exhibits no discharge.   Neck: Neck supple. No JVD present.   Cardiovascular: Normal rate and regular rhythm. Exam  reveals no gallop and no friction rub.   No murmur heard.  Pulmonary/Chest: Effort normal and breath sounds normal. No respiratory distress. He has no wheezes. He has no rales.   Abdominal: Soft. Bowel sounds are normal. He exhibits no distension. There is no abdominal tenderness. There is no rebound.   Musculoskeletal:         General: No edema.   Neurological: He is alert and oriented to person, place, and time.   Skin: Skin is warm and dry. He is not diaphoretic.   Psychiatric: He has a normal mood and affect. His behavior is normal. Judgment and thought content normal.   Vitals reviewed.        Assessment:       1. PAT (paroxysmal atrial tachycardia)    2. Essential hypertension    3. 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 He is s/p RFA of the CTI and EP study. The only sustained tachycardia induced with sinus tachycardia. Recommend trial of low dose of metoprolol (12.5mg daily, was intolerant to 50mg daily in the past) and stopping eliquis. He now is s/p ILR insertion for long-term monitoring for potential AF. No AF observed to date. Has infrequent short episodes of AT.    Continue remote monitoring  RTC in 12 months    Thank you for allowing me to participate in the care of this patient. Please do not hesitate to call me with any questions or concerns.    Gil Wilson MD, PhD  Cardiac Electrophysiology

## 2020-12-09 ENCOUNTER — TELEPHONE (OUTPATIENT)
Dept: SLEEP MEDICINE | Facility: OTHER | Age: 80
End: 2020-12-09

## 2020-12-09 LAB
COMMENT: NORMAL
FINAL PATHOLOGIC DIAGNOSIS: NORMAL
GROSS: NORMAL
MICROSCOPIC EXAM: NORMAL

## 2020-12-24 ENCOUNTER — CLINICAL SUPPORT (OUTPATIENT)
Dept: CARDIOLOGY | Facility: HOSPITAL | Age: 80
End: 2020-12-24
Payer: MEDICARE

## 2020-12-24 DIAGNOSIS — Z95.818 PRESENCE OF OTHER CARDIAC IMPLANTS AND GRAFTS: ICD-10-CM

## 2020-12-24 PROCEDURE — G2066 INTER DEVC REMOTE 30D: HCPCS | Performed by: INTERNAL MEDICINE

## 2020-12-24 PROCEDURE — 93298 REM INTERROG DEV EVAL SCRMS: CPT | Mod: ,,, | Performed by: INTERNAL MEDICINE

## 2020-12-24 PROCEDURE — 93298 CARDIAC DEVICE CHECK - REMOTE: ICD-10-PCS | Mod: ,,, | Performed by: INTERNAL MEDICINE

## 2021-01-05 ENCOUNTER — HOSPITAL ENCOUNTER (OUTPATIENT)
Dept: SLEEP MEDICINE | Facility: OTHER | Age: 81
Discharge: HOME | End: 2021-01-05
Attending: NURSE PRACTITIONER
Payer: MEDICARE

## 2021-01-05 DIAGNOSIS — G47.33 OBSTRUCTIVE SLEEP APNEA: ICD-10-CM

## 2021-01-05 PROCEDURE — 95810 POLYSOM 6/> YRS 4/> PARAM: CPT

## 2021-01-05 PROCEDURE — 95810 POLYSOM 6/> YRS 4/> PARAM: CPT | Mod: 26,,, | Performed by: INTERNAL MEDICINE

## 2021-01-05 PROCEDURE — 95810 PR POLYSOMNOGRAPHY, 4 OR MORE: ICD-10-PCS | Mod: 26,,, | Performed by: INTERNAL MEDICINE

## 2021-01-08 ENCOUNTER — TELEPHONE (OUTPATIENT)
Dept: SLEEP MEDICINE | Facility: CLINIC | Age: 81
End: 2021-01-08

## 2021-01-09 ENCOUNTER — IMMUNIZATION (OUTPATIENT)
Dept: INTERNAL MEDICINE | Facility: CLINIC | Age: 81
End: 2021-01-09
Payer: MEDICARE

## 2021-01-09 DIAGNOSIS — Z23 NEED FOR VACCINATION: ICD-10-CM

## 2021-01-09 PROCEDURE — 91300 COVID-19, MRNA, LNP-S, PF, 30 MCG/0.3 ML DOSE VACCINE: CPT | Mod: PBBFAC | Performed by: FAMILY MEDICINE

## 2021-01-23 ENCOUNTER — CLINICAL SUPPORT (OUTPATIENT)
Dept: CARDIOLOGY | Facility: HOSPITAL | Age: 81
End: 2021-01-23
Payer: MEDICARE

## 2021-01-23 DIAGNOSIS — Z95.818 PRESENCE OF OTHER CARDIAC IMPLANTS AND GRAFTS: ICD-10-CM

## 2021-01-23 PROCEDURE — 93298 CARDIAC DEVICE CHECK - REMOTE: ICD-10-PCS | Mod: ,,, | Performed by: INTERNAL MEDICINE

## 2021-01-23 PROCEDURE — G2066 INTER DEVC REMOTE 30D: HCPCS | Performed by: INTERNAL MEDICINE

## 2021-01-23 PROCEDURE — 93298 REM INTERROG DEV EVAL SCRMS: CPT | Mod: ,,, | Performed by: INTERNAL MEDICINE

## 2021-01-30 ENCOUNTER — IMMUNIZATION (OUTPATIENT)
Dept: INTERNAL MEDICINE | Facility: CLINIC | Age: 81
End: 2021-01-30
Payer: MEDICARE

## 2021-01-30 DIAGNOSIS — Z23 NEED FOR VACCINATION: Primary | ICD-10-CM

## 2021-01-30 PROCEDURE — 91300 COVID-19, MRNA, LNP-S, PF, 30 MCG/0.3 ML DOSE VACCINE: CPT | Mod: PBBFAC | Performed by: FAMILY MEDICINE

## 2021-01-30 PROCEDURE — 0002A COVID-19, MRNA, LNP-S, PF, 30 MCG/0.3 ML DOSE VACCINE: CPT | Mod: PBBFAC | Performed by: FAMILY MEDICINE

## 2021-02-01 ENCOUNTER — PATIENT MESSAGE (OUTPATIENT)
Dept: RHEUMATOLOGY | Facility: CLINIC | Age: 81
End: 2021-02-01

## 2021-02-05 RX ORDER — FELODIPINE 5 MG/1
5 TABLET, EXTENDED RELEASE ORAL DAILY
Qty: 90 TABLET | Refills: 3 | Status: SHIPPED | OUTPATIENT
Start: 2021-02-05 | End: 2021-12-17

## 2021-02-22 ENCOUNTER — CLINICAL SUPPORT (OUTPATIENT)
Dept: CARDIOLOGY | Facility: HOSPITAL | Age: 81
End: 2021-02-22
Payer: MEDICARE

## 2021-02-22 DIAGNOSIS — Z95.818 PRESENCE OF OTHER CARDIAC IMPLANTS AND GRAFTS: ICD-10-CM

## 2021-02-22 PROCEDURE — G2066 INTER DEVC REMOTE 30D: HCPCS | Performed by: INTERNAL MEDICINE

## 2021-02-22 PROCEDURE — 93298 REM INTERROG DEV EVAL SCRMS: CPT | Mod: ,,, | Performed by: INTERNAL MEDICINE

## 2021-02-22 PROCEDURE — 93298 CARDIAC DEVICE CHECK - REMOTE: ICD-10-PCS | Mod: ,,, | Performed by: INTERNAL MEDICINE

## 2021-02-23 ENCOUNTER — PATIENT MESSAGE (OUTPATIENT)
Dept: RHEUMATOLOGY | Facility: CLINIC | Age: 81
End: 2021-02-23

## 2021-03-24 ENCOUNTER — CLINICAL SUPPORT (OUTPATIENT)
Dept: CARDIOLOGY | Facility: HOSPITAL | Age: 81
End: 2021-03-24
Payer: MEDICARE

## 2021-03-24 DIAGNOSIS — Z95.818 PRESENCE OF OTHER CARDIAC IMPLANTS AND GRAFTS: ICD-10-CM

## 2021-03-24 PROCEDURE — G2066 INTER DEVC REMOTE 30D: HCPCS | Performed by: INTERNAL MEDICINE

## 2021-03-24 PROCEDURE — 93298 CARDIAC DEVICE CHECK - REMOTE: ICD-10-PCS | Mod: ,,, | Performed by: INTERNAL MEDICINE

## 2021-03-24 PROCEDURE — 93298 REM INTERROG DEV EVAL SCRMS: CPT | Mod: ,,, | Performed by: INTERNAL MEDICINE

## 2021-04-09 DIAGNOSIS — I47.19 PAT (PAROXYSMAL ATRIAL TACHYCARDIA): ICD-10-CM

## 2021-04-09 RX ORDER — METOPROLOL SUCCINATE 25 MG/1
25 TABLET, EXTENDED RELEASE ORAL DAILY
Qty: 90 TABLET | Refills: 3 | Status: SHIPPED | OUTPATIENT
Start: 2021-04-09 | End: 2022-03-31

## 2021-04-22 ENCOUNTER — TELEPHONE (OUTPATIENT)
Dept: RHEUMATOLOGY | Facility: CLINIC | Age: 81
End: 2021-04-22

## 2021-04-22 ENCOUNTER — LAB VISIT (OUTPATIENT)
Dept: LAB | Facility: HOSPITAL | Age: 81
End: 2021-04-22
Attending: INTERNAL MEDICINE
Payer: MEDICARE

## 2021-04-22 DIAGNOSIS — M1A.9XX1 CHRONIC TOPHACEOUS GOUT: ICD-10-CM

## 2021-04-22 DIAGNOSIS — I10 ESSENTIAL HYPERTENSION: ICD-10-CM

## 2021-04-22 DIAGNOSIS — N18.30 STAGE 3 CHRONIC KIDNEY DISEASE, UNSPECIFIED WHETHER STAGE 3A OR 3B CKD: ICD-10-CM

## 2021-04-22 LAB
ALBUMIN SERPL BCP-MCNC: 3.8 G/DL (ref 3.5–5.2)
ALBUMIN SERPL BCP-MCNC: 3.8 G/DL (ref 3.5–5.2)
ALP SERPL-CCNC: 102 U/L (ref 55–135)
ALP SERPL-CCNC: 102 U/L (ref 55–135)
ALT SERPL W/O P-5'-P-CCNC: 23 U/L (ref 10–44)
ALT SERPL W/O P-5'-P-CCNC: 23 U/L (ref 10–44)
ANION GAP SERPL CALC-SCNC: 7 MMOL/L (ref 8–16)
ANION GAP SERPL CALC-SCNC: 7 MMOL/L (ref 8–16)
AST SERPL-CCNC: 20 U/L (ref 10–40)
AST SERPL-CCNC: 20 U/L (ref 10–40)
BASOPHILS # BLD AUTO: 0.07 K/UL (ref 0–0.2)
BASOPHILS NFR BLD: 1.2 % (ref 0–1.9)
BILIRUB SERPL-MCNC: 0.4 MG/DL (ref 0.1–1)
BILIRUB SERPL-MCNC: 0.4 MG/DL (ref 0.1–1)
BUN SERPL-MCNC: 32 MG/DL (ref 8–23)
BUN SERPL-MCNC: 32 MG/DL (ref 8–23)
CALCIUM SERPL-MCNC: 9.1 MG/DL (ref 8.7–10.5)
CALCIUM SERPL-MCNC: 9.1 MG/DL (ref 8.7–10.5)
CHLORIDE SERPL-SCNC: 107 MMOL/L (ref 95–110)
CHLORIDE SERPL-SCNC: 107 MMOL/L (ref 95–110)
CHOLEST SERPL-MCNC: 162 MG/DL (ref 120–199)
CHOLEST/HDLC SERPL: 2.1 {RATIO} (ref 2–5)
CO2 SERPL-SCNC: 26 MMOL/L (ref 23–29)
CO2 SERPL-SCNC: 26 MMOL/L (ref 23–29)
CREAT SERPL-MCNC: 1.9 MG/DL (ref 0.5–1.4)
CREAT SERPL-MCNC: 1.9 MG/DL (ref 0.5–1.4)
DIFFERENTIAL METHOD: ABNORMAL
EOSINOPHIL # BLD AUTO: 0.3 K/UL (ref 0–0.5)
EOSINOPHIL NFR BLD: 5.1 % (ref 0–8)
ERYTHROCYTE [DISTWIDTH] IN BLOOD BY AUTOMATED COUNT: 12.8 % (ref 11.5–14.5)
EST. GFR  (AFRICAN AMERICAN): 37.7 ML/MIN/1.73 M^2
EST. GFR  (AFRICAN AMERICAN): 37.7 ML/MIN/1.73 M^2
EST. GFR  (NON AFRICAN AMERICAN): 32.6 ML/MIN/1.73 M^2
EST. GFR  (NON AFRICAN AMERICAN): 32.6 ML/MIN/1.73 M^2
GLUCOSE SERPL-MCNC: 102 MG/DL (ref 70–110)
GLUCOSE SERPL-MCNC: 102 MG/DL (ref 70–110)
HCT VFR BLD AUTO: 37.4 % (ref 40–54)
HDLC SERPL-MCNC: 78 MG/DL (ref 40–75)
HDLC SERPL: 48.1 % (ref 20–50)
HGB BLD-MCNC: 11.8 G/DL (ref 14–18)
IMM GRANULOCYTES # BLD AUTO: 0.02 K/UL (ref 0–0.04)
IMM GRANULOCYTES NFR BLD AUTO: 0.3 % (ref 0–0.5)
LDLC SERPL CALC-MCNC: 67.2 MG/DL (ref 63–159)
LYMPHOCYTES # BLD AUTO: 1.8 K/UL (ref 1–4.8)
LYMPHOCYTES NFR BLD: 28.9 % (ref 18–48)
MCH RBC QN AUTO: 31.9 PG (ref 27–31)
MCHC RBC AUTO-ENTMCNC: 31.6 G/DL (ref 32–36)
MCV RBC AUTO: 101 FL (ref 82–98)
MONOCYTES # BLD AUTO: 0.9 K/UL (ref 0.3–1)
MONOCYTES NFR BLD: 14 % (ref 4–15)
NEUTROPHILS # BLD AUTO: 3.1 K/UL (ref 1.8–7.7)
NEUTROPHILS NFR BLD: 50.5 % (ref 38–73)
NONHDLC SERPL-MCNC: 84 MG/DL
NRBC BLD-RTO: 0 /100 WBC
PLATELET # BLD AUTO: 187 K/UL (ref 150–450)
PMV BLD AUTO: 12.8 FL (ref 9.2–12.9)
POTASSIUM SERPL-SCNC: 4.5 MMOL/L (ref 3.5–5.1)
POTASSIUM SERPL-SCNC: 4.5 MMOL/L (ref 3.5–5.1)
PROT SERPL-MCNC: 6.8 G/DL (ref 6–8.4)
PROT SERPL-MCNC: 6.8 G/DL (ref 6–8.4)
RBC # BLD AUTO: 3.7 M/UL (ref 4.6–6.2)
SODIUM SERPL-SCNC: 140 MMOL/L (ref 136–145)
SODIUM SERPL-SCNC: 140 MMOL/L (ref 136–145)
TRIGL SERPL-MCNC: 84 MG/DL (ref 30–150)
URATE SERPL-MCNC: 6.1 MG/DL (ref 3.4–7)
WBC # BLD AUTO: 6.05 K/UL (ref 3.9–12.7)

## 2021-04-22 PROCEDURE — 85025 COMPLETE CBC W/AUTO DIFF WBC: CPT | Performed by: INTERNAL MEDICINE

## 2021-04-22 PROCEDURE — 36415 COLL VENOUS BLD VENIPUNCTURE: CPT | Mod: PN | Performed by: INTERNAL MEDICINE

## 2021-04-22 PROCEDURE — 80053 COMPREHEN METABOLIC PANEL: CPT | Performed by: INTERNAL MEDICINE

## 2021-04-22 PROCEDURE — 84550 ASSAY OF BLOOD/URIC ACID: CPT | Performed by: INTERNAL MEDICINE

## 2021-04-22 PROCEDURE — 80061 LIPID PANEL: CPT | Performed by: INTERNAL MEDICINE

## 2021-04-23 ENCOUNTER — CLINICAL SUPPORT (OUTPATIENT)
Dept: CARDIOLOGY | Facility: HOSPITAL | Age: 81
End: 2021-04-23
Payer: MEDICARE

## 2021-04-23 DIAGNOSIS — Z95.818 PRESENCE OF OTHER CARDIAC IMPLANTS AND GRAFTS: ICD-10-CM

## 2021-04-23 PROCEDURE — 93298 CARDIAC DEVICE CHECK - REMOTE: ICD-10-PCS | Mod: ,,, | Performed by: INTERNAL MEDICINE

## 2021-04-23 PROCEDURE — G2066 INTER DEVC REMOTE 30D: HCPCS | Performed by: INTERNAL MEDICINE

## 2021-04-23 PROCEDURE — 93298 REM INTERROG DEV EVAL SCRMS: CPT | Mod: ,,, | Performed by: INTERNAL MEDICINE

## 2021-04-27 ENCOUNTER — OFFICE VISIT (OUTPATIENT)
Dept: INTERNAL MEDICINE | Facility: CLINIC | Age: 81
End: 2021-04-27
Payer: MEDICARE

## 2021-04-27 ENCOUNTER — PATIENT MESSAGE (OUTPATIENT)
Dept: INTERNAL MEDICINE | Facility: CLINIC | Age: 81
End: 2021-04-27

## 2021-04-27 VITALS
WEIGHT: 222.25 LBS | BODY MASS INDEX: 32.92 KG/M2 | HEIGHT: 69 IN | DIASTOLIC BLOOD PRESSURE: 58 MMHG | SYSTOLIC BLOOD PRESSURE: 110 MMHG | RESPIRATION RATE: 16 BRPM | TEMPERATURE: 98 F | HEART RATE: 52 BPM

## 2021-04-27 DIAGNOSIS — Z00.00 WELL ADULT EXAM: Primary | ICD-10-CM

## 2021-04-27 DIAGNOSIS — I10 ESSENTIAL HYPERTENSION: ICD-10-CM

## 2021-04-27 DIAGNOSIS — Z12.5 ENCOUNTER FOR SCREENING FOR MALIGNANT NEOPLASM OF PROSTATE: ICD-10-CM

## 2021-04-27 DIAGNOSIS — Z12.5 ENCOUNTER FOR PROSTATE CANCER SCREENING: ICD-10-CM

## 2021-04-27 DIAGNOSIS — R35.1 NOCTURIA: ICD-10-CM

## 2021-04-27 DIAGNOSIS — N18.30 STAGE 3 CHRONIC KIDNEY DISEASE, UNSPECIFIED WHETHER STAGE 3A OR 3B CKD: ICD-10-CM

## 2021-04-27 PROCEDURE — 1126F PR PAIN SEVERITY QUANTIFIED, NO PAIN PRESENT: ICD-10-PCS | Mod: S$GLB,,, | Performed by: INTERNAL MEDICINE

## 2021-04-27 PROCEDURE — 3288F PR FALLS RISK ASSESSMENT DOCUMENTED: ICD-10-PCS | Mod: CPTII,S$GLB,, | Performed by: INTERNAL MEDICINE

## 2021-04-27 PROCEDURE — 99999 PR PBB SHADOW E&M-EST. PATIENT-LVL V: CPT | Mod: PBBFAC,,, | Performed by: INTERNAL MEDICINE

## 2021-04-27 PROCEDURE — 1159F MED LIST DOCD IN RCRD: CPT | Mod: S$GLB,,, | Performed by: INTERNAL MEDICINE

## 2021-04-27 PROCEDURE — 1159F PR MEDICATION LIST DOCUMENTED IN MEDICAL RECORD: ICD-10-PCS | Mod: S$GLB,,, | Performed by: INTERNAL MEDICINE

## 2021-04-27 PROCEDURE — 99999 PR PBB SHADOW E&M-EST. PATIENT-LVL V: ICD-10-PCS | Mod: PBBFAC,,, | Performed by: INTERNAL MEDICINE

## 2021-04-27 PROCEDURE — 99214 OFFICE O/P EST MOD 30 MIN: CPT | Mod: S$GLB,,, | Performed by: INTERNAL MEDICINE

## 2021-04-27 PROCEDURE — 3288F FALL RISK ASSESSMENT DOCD: CPT | Mod: CPTII,S$GLB,, | Performed by: INTERNAL MEDICINE

## 2021-04-27 PROCEDURE — 1101F PR PT FALLS ASSESS DOC 0-1 FALLS W/OUT INJ PAST YR: ICD-10-PCS | Mod: CPTII,S$GLB,, | Performed by: INTERNAL MEDICINE

## 2021-04-27 PROCEDURE — 99214 PR OFFICE/OUTPT VISIT, EST, LEVL IV, 30-39 MIN: ICD-10-PCS | Mod: S$GLB,,, | Performed by: INTERNAL MEDICINE

## 2021-04-27 PROCEDURE — 1126F AMNT PAIN NOTED NONE PRSNT: CPT | Mod: S$GLB,,, | Performed by: INTERNAL MEDICINE

## 2021-04-27 PROCEDURE — 1101F PT FALLS ASSESS-DOCD LE1/YR: CPT | Mod: CPTII,S$GLB,, | Performed by: INTERNAL MEDICINE

## 2021-05-04 DIAGNOSIS — K22.70 BARRETT'S ESOPHAGUS WITHOUT DYSPLASIA: ICD-10-CM

## 2021-05-04 RX ORDER — OMEPRAZOLE 20 MG/1
20 CAPSULE, DELAYED RELEASE ORAL DAILY
Qty: 90 CAPSULE | Refills: 3 | Status: CANCELLED | OUTPATIENT
Start: 2021-05-04 | End: 2022-05-04

## 2021-05-04 RX ORDER — FEBUXOSTAT 40 MG/1
40 TABLET, FILM COATED ORAL DAILY
Qty: 90 TABLET | Refills: 3 | Status: SHIPPED | OUTPATIENT
Start: 2021-05-04 | End: 2022-02-18 | Stop reason: SDUPTHER

## 2021-05-11 ENCOUNTER — PATIENT MESSAGE (OUTPATIENT)
Dept: RHEUMATOLOGY | Facility: CLINIC | Age: 81
End: 2021-05-11

## 2021-05-11 ENCOUNTER — OFFICE VISIT (OUTPATIENT)
Dept: RHEUMATOLOGY | Facility: CLINIC | Age: 81
End: 2021-05-11
Payer: MEDICARE

## 2021-05-11 VITALS
HEIGHT: 70 IN | BODY MASS INDEX: 32.5 KG/M2 | DIASTOLIC BLOOD PRESSURE: 61 MMHG | HEART RATE: 59 BPM | SYSTOLIC BLOOD PRESSURE: 128 MMHG | WEIGHT: 227 LBS

## 2021-05-11 DIAGNOSIS — M1A.9XX1 CHRONIC TOPHACEOUS GOUT: Primary | ICD-10-CM

## 2021-05-11 PROCEDURE — 99213 PR OFFICE/OUTPT VISIT, EST, LEVL III, 20-29 MIN: ICD-10-PCS | Mod: S$GLB,,, | Performed by: INTERNAL MEDICINE

## 2021-05-11 PROCEDURE — 3288F PR FALLS RISK ASSESSMENT DOCUMENTED: ICD-10-PCS | Mod: CPTII,S$GLB,, | Performed by: INTERNAL MEDICINE

## 2021-05-11 PROCEDURE — 99213 OFFICE O/P EST LOW 20 MIN: CPT | Mod: S$GLB,,, | Performed by: INTERNAL MEDICINE

## 2021-05-11 PROCEDURE — 99999 PR PBB SHADOW E&M-EST. PATIENT-LVL IV: ICD-10-PCS | Mod: PBBFAC,,, | Performed by: INTERNAL MEDICINE

## 2021-05-11 PROCEDURE — 1126F AMNT PAIN NOTED NONE PRSNT: CPT | Mod: S$GLB,,, | Performed by: INTERNAL MEDICINE

## 2021-05-11 PROCEDURE — 3288F FALL RISK ASSESSMENT DOCD: CPT | Mod: CPTII,S$GLB,, | Performed by: INTERNAL MEDICINE

## 2021-05-11 PROCEDURE — 1159F MED LIST DOCD IN RCRD: CPT | Mod: S$GLB,,, | Performed by: INTERNAL MEDICINE

## 2021-05-11 PROCEDURE — 1126F PR PAIN SEVERITY QUANTIFIED, NO PAIN PRESENT: ICD-10-PCS | Mod: S$GLB,,, | Performed by: INTERNAL MEDICINE

## 2021-05-11 PROCEDURE — 99999 PR PBB SHADOW E&M-EST. PATIENT-LVL IV: CPT | Mod: PBBFAC,,, | Performed by: INTERNAL MEDICINE

## 2021-05-11 PROCEDURE — 1101F PT FALLS ASSESS-DOCD LE1/YR: CPT | Mod: CPTII,S$GLB,, | Performed by: INTERNAL MEDICINE

## 2021-05-11 PROCEDURE — 1101F PR PT FALLS ASSESS DOC 0-1 FALLS W/OUT INJ PAST YR: ICD-10-PCS | Mod: CPTII,S$GLB,, | Performed by: INTERNAL MEDICINE

## 2021-05-11 PROCEDURE — 1159F PR MEDICATION LIST DOCUMENTED IN MEDICAL RECORD: ICD-10-PCS | Mod: S$GLB,,, | Performed by: INTERNAL MEDICINE

## 2021-05-23 ENCOUNTER — CLINICAL SUPPORT (OUTPATIENT)
Dept: CARDIOLOGY | Facility: HOSPITAL | Age: 81
End: 2021-05-23
Payer: MEDICARE

## 2021-05-23 DIAGNOSIS — Z95.818 PRESENCE OF OTHER CARDIAC IMPLANTS AND GRAFTS: ICD-10-CM

## 2021-05-23 PROCEDURE — G2066 INTER DEVC REMOTE 30D: HCPCS | Performed by: INTERNAL MEDICINE

## 2021-05-23 PROCEDURE — 93298 REM INTERROG DEV EVAL SCRMS: CPT | Mod: ,,, | Performed by: INTERNAL MEDICINE

## 2021-05-23 PROCEDURE — 93298 CARDIAC DEVICE CHECK - REMOTE: ICD-10-PCS | Mod: ,,, | Performed by: INTERNAL MEDICINE

## 2021-06-11 ENCOUNTER — LAB VISIT (OUTPATIENT)
Dept: LAB | Facility: HOSPITAL | Age: 81
End: 2021-06-11
Attending: INTERNAL MEDICINE
Payer: MEDICARE

## 2021-06-11 DIAGNOSIS — M1A.9XX1 CHRONIC TOPHACEOUS GOUT: ICD-10-CM

## 2021-06-11 LAB
ALBUMIN SERPL BCP-MCNC: 3.9 G/DL (ref 3.5–5.2)
ALP SERPL-CCNC: 67 U/L (ref 55–135)
ALT SERPL W/O P-5'-P-CCNC: 18 U/L (ref 10–44)
ANION GAP SERPL CALC-SCNC: 10 MMOL/L (ref 8–16)
AST SERPL-CCNC: 19 U/L (ref 10–40)
BILIRUB SERPL-MCNC: 1 MG/DL (ref 0.1–1)
BUN SERPL-MCNC: 29 MG/DL (ref 8–23)
CALCIUM SERPL-MCNC: 9.3 MG/DL (ref 8.7–10.5)
CHLORIDE SERPL-SCNC: 108 MMOL/L (ref 95–110)
CO2 SERPL-SCNC: 21 MMOL/L (ref 23–29)
CREAT SERPL-MCNC: 1.7 MG/DL (ref 0.5–1.4)
EST. GFR  (AFRICAN AMERICAN): 43.1 ML/MIN/1.73 M^2
EST. GFR  (NON AFRICAN AMERICAN): 37.3 ML/MIN/1.73 M^2
GLUCOSE SERPL-MCNC: 97 MG/DL (ref 70–110)
POTASSIUM SERPL-SCNC: 4.6 MMOL/L (ref 3.5–5.1)
PROT SERPL-MCNC: 6.8 G/DL (ref 6–8.4)
SODIUM SERPL-SCNC: 139 MMOL/L (ref 136–145)
URATE SERPL-MCNC: 4.9 MG/DL (ref 3.4–7)

## 2021-06-11 PROCEDURE — 36415 COLL VENOUS BLD VENIPUNCTURE: CPT | Mod: PN | Performed by: INTERNAL MEDICINE

## 2021-06-11 PROCEDURE — 84550 ASSAY OF BLOOD/URIC ACID: CPT | Performed by: INTERNAL MEDICINE

## 2021-06-11 PROCEDURE — 80053 COMPREHEN METABOLIC PANEL: CPT | Performed by: INTERNAL MEDICINE

## 2021-06-22 ENCOUNTER — CLINICAL SUPPORT (OUTPATIENT)
Dept: CARDIOLOGY | Facility: HOSPITAL | Age: 81
End: 2021-06-22
Payer: MEDICARE

## 2021-06-22 DIAGNOSIS — Z95.818 PRESENCE OF OTHER CARDIAC IMPLANTS AND GRAFTS: ICD-10-CM

## 2021-06-22 PROCEDURE — 93298 REM INTERROG DEV EVAL SCRMS: CPT | Mod: ,,, | Performed by: INTERNAL MEDICINE

## 2021-06-22 PROCEDURE — 93298 CARDIAC DEVICE CHECK - REMOTE: ICD-10-PCS | Mod: ,,, | Performed by: INTERNAL MEDICINE

## 2021-06-22 PROCEDURE — G2066 INTER DEVC REMOTE 30D: HCPCS | Performed by: INTERNAL MEDICINE

## 2021-07-22 ENCOUNTER — CLINICAL SUPPORT (OUTPATIENT)
Dept: CARDIOLOGY | Facility: HOSPITAL | Age: 81
End: 2021-07-22
Payer: MEDICARE

## 2021-07-22 DIAGNOSIS — Z95.818 PRESENCE OF OTHER CARDIAC IMPLANTS AND GRAFTS: ICD-10-CM

## 2021-07-22 PROCEDURE — G2066 INTER DEVC REMOTE 30D: HCPCS | Performed by: INTERNAL MEDICINE

## 2021-07-22 PROCEDURE — 93298 CARDIAC DEVICE CHECK - REMOTE: ICD-10-PCS | Mod: ,,, | Performed by: INTERNAL MEDICINE

## 2021-07-22 PROCEDURE — 93298 REM INTERROG DEV EVAL SCRMS: CPT | Mod: ,,, | Performed by: INTERNAL MEDICINE

## 2021-08-04 ENCOUNTER — PATIENT MESSAGE (OUTPATIENT)
Dept: GASTROENTEROLOGY | Facility: CLINIC | Age: 81
End: 2021-08-04

## 2021-08-04 DIAGNOSIS — K22.70 BARRETT'S ESOPHAGUS WITHOUT DYSPLASIA: ICD-10-CM

## 2021-08-04 RX ORDER — OMEPRAZOLE 20 MG/1
20 CAPSULE, DELAYED RELEASE ORAL DAILY
Qty: 90 CAPSULE | Refills: 3 | Status: SHIPPED | OUTPATIENT
Start: 2021-08-04 | End: 2021-10-06 | Stop reason: SDUPTHER

## 2021-08-21 ENCOUNTER — CLINICAL SUPPORT (OUTPATIENT)
Dept: CARDIOLOGY | Facility: HOSPITAL | Age: 81
End: 2021-08-21
Payer: MEDICARE

## 2021-08-21 DIAGNOSIS — Z95.818 PRESENCE OF OTHER CARDIAC IMPLANTS AND GRAFTS: ICD-10-CM

## 2021-08-21 PROCEDURE — 93298 CARDIAC DEVICE CHECK - REMOTE: ICD-10-PCS | Mod: ,,, | Performed by: INTERNAL MEDICINE

## 2021-08-21 PROCEDURE — 93298 REM INTERROG DEV EVAL SCRMS: CPT | Mod: ,,, | Performed by: INTERNAL MEDICINE

## 2021-08-21 PROCEDURE — G2066 INTER DEVC REMOTE 30D: HCPCS | Performed by: INTERNAL MEDICINE

## 2021-10-04 ENCOUNTER — PATIENT MESSAGE (OUTPATIENT)
Dept: SLEEP MEDICINE | Facility: CLINIC | Age: 81
End: 2021-10-04

## 2021-10-04 ENCOUNTER — TELEPHONE (OUTPATIENT)
Dept: ELECTROPHYSIOLOGY | Facility: CLINIC | Age: 81
End: 2021-10-04

## 2021-10-06 ENCOUNTER — OFFICE VISIT (OUTPATIENT)
Dept: NEUROLOGY | Facility: CLINIC | Age: 81
End: 2021-10-06
Payer: MEDICARE

## 2021-10-06 VITALS
HEIGHT: 69 IN | BODY MASS INDEX: 32.98 KG/M2 | SYSTOLIC BLOOD PRESSURE: 145 MMHG | DIASTOLIC BLOOD PRESSURE: 67 MMHG | HEART RATE: 65 BPM | WEIGHT: 222.69 LBS

## 2021-10-06 DIAGNOSIS — E78.49 OTHER HYPERLIPIDEMIA: ICD-10-CM

## 2021-10-06 DIAGNOSIS — G47.33 OBSTRUCTIVE SLEEP APNEA: Primary | ICD-10-CM

## 2021-10-06 PROCEDURE — 3288F PR FALLS RISK ASSESSMENT DOCUMENTED: ICD-10-PCS | Mod: CPTII,S$GLB,, | Performed by: NURSE PRACTITIONER

## 2021-10-06 PROCEDURE — 1159F MED LIST DOCD IN RCRD: CPT | Mod: CPTII,S$GLB,, | Performed by: NURSE PRACTITIONER

## 2021-10-06 PROCEDURE — 1126F PR PAIN SEVERITY QUANTIFIED, NO PAIN PRESENT: ICD-10-PCS | Mod: CPTII,S$GLB,, | Performed by: NURSE PRACTITIONER

## 2021-10-06 PROCEDURE — 1101F PR PT FALLS ASSESS DOC 0-1 FALLS W/OUT INJ PAST YR: ICD-10-PCS | Mod: CPTII,S$GLB,, | Performed by: NURSE PRACTITIONER

## 2021-10-06 PROCEDURE — 1159F PR MEDICATION LIST DOCUMENTED IN MEDICAL RECORD: ICD-10-PCS | Mod: CPTII,S$GLB,, | Performed by: NURSE PRACTITIONER

## 2021-10-06 PROCEDURE — 99214 OFFICE O/P EST MOD 30 MIN: CPT | Mod: S$GLB,,, | Performed by: NURSE PRACTITIONER

## 2021-10-06 PROCEDURE — 3288F FALL RISK ASSESSMENT DOCD: CPT | Mod: CPTII,S$GLB,, | Performed by: NURSE PRACTITIONER

## 2021-10-06 PROCEDURE — 3078F DIAST BP <80 MM HG: CPT | Mod: CPTII,S$GLB,, | Performed by: NURSE PRACTITIONER

## 2021-10-06 PROCEDURE — 1101F PT FALLS ASSESS-DOCD LE1/YR: CPT | Mod: CPTII,S$GLB,, | Performed by: NURSE PRACTITIONER

## 2021-10-06 PROCEDURE — 99999 PR PBB SHADOW E&M-EST. PATIENT-LVL III: ICD-10-PCS | Mod: PBBFAC,,, | Performed by: NURSE PRACTITIONER

## 2021-10-06 PROCEDURE — 3078F PR MOST RECENT DIASTOLIC BLOOD PRESSURE < 80 MM HG: ICD-10-PCS | Mod: CPTII,S$GLB,, | Performed by: NURSE PRACTITIONER

## 2021-10-06 PROCEDURE — 3077F SYST BP >= 140 MM HG: CPT | Mod: CPTII,S$GLB,, | Performed by: NURSE PRACTITIONER

## 2021-10-06 PROCEDURE — 99999 PR PBB SHADOW E&M-EST. PATIENT-LVL III: CPT | Mod: PBBFAC,,, | Performed by: NURSE PRACTITIONER

## 2021-10-06 PROCEDURE — 99214 PR OFFICE/OUTPT VISIT, EST, LEVL IV, 30-39 MIN: ICD-10-PCS | Mod: S$GLB,,, | Performed by: NURSE PRACTITIONER

## 2021-10-06 PROCEDURE — 3077F PR MOST RECENT SYSTOLIC BLOOD PRESSURE >= 140 MM HG: ICD-10-PCS | Mod: CPTII,S$GLB,, | Performed by: NURSE PRACTITIONER

## 2021-10-06 PROCEDURE — 1126F AMNT PAIN NOTED NONE PRSNT: CPT | Mod: CPTII,S$GLB,, | Performed by: NURSE PRACTITIONER

## 2021-10-08 RX ORDER — IRBESARTAN 150 MG/1
150 TABLET ORAL DAILY
Qty: 30 TABLET | Refills: 11 | Status: SHIPPED | OUTPATIENT
Start: 2021-10-08 | End: 2022-06-03

## 2021-10-18 ENCOUNTER — PATIENT MESSAGE (OUTPATIENT)
Dept: RHEUMATOLOGY | Facility: CLINIC | Age: 81
End: 2021-10-18
Payer: MEDICARE

## 2021-10-18 ENCOUNTER — TELEPHONE (OUTPATIENT)
Dept: RHEUMATOLOGY | Facility: CLINIC | Age: 81
End: 2021-10-18

## 2021-10-19 ENCOUNTER — LAB VISIT (OUTPATIENT)
Dept: LAB | Facility: HOSPITAL | Age: 81
End: 2021-10-19
Attending: INTERNAL MEDICINE
Payer: MEDICARE

## 2021-10-19 ENCOUNTER — PATIENT MESSAGE (OUTPATIENT)
Dept: RHEUMATOLOGY | Facility: CLINIC | Age: 81
End: 2021-10-19
Payer: MEDICARE

## 2021-10-19 DIAGNOSIS — N18.30 STAGE 3 CHRONIC KIDNEY DISEASE, UNSPECIFIED WHETHER STAGE 3A OR 3B CKD: ICD-10-CM

## 2021-10-19 DIAGNOSIS — Z00.00 WELL ADULT EXAM: ICD-10-CM

## 2021-10-19 DIAGNOSIS — R35.1 NOCTURIA: ICD-10-CM

## 2021-10-19 DIAGNOSIS — Z12.5 ENCOUNTER FOR PROSTATE CANCER SCREENING: ICD-10-CM

## 2021-10-19 DIAGNOSIS — I10 ESSENTIAL HYPERTENSION: ICD-10-CM

## 2021-10-19 LAB
ALBUMIN SERPL BCP-MCNC: 3.8 G/DL (ref 3.5–5.2)
ALP SERPL-CCNC: 71 U/L (ref 55–135)
ALT SERPL W/O P-5'-P-CCNC: 14 U/L (ref 10–44)
ANION GAP SERPL CALC-SCNC: 9 MMOL/L (ref 8–16)
AST SERPL-CCNC: 15 U/L (ref 10–40)
BASOPHILS # BLD AUTO: 0.06 K/UL (ref 0–0.2)
BASOPHILS NFR BLD: 1.1 % (ref 0–1.9)
BILIRUB SERPL-MCNC: 0.7 MG/DL (ref 0.1–1)
BUN SERPL-MCNC: 28 MG/DL (ref 8–23)
CALCIUM SERPL-MCNC: 9.4 MG/DL (ref 8.7–10.5)
CHLORIDE SERPL-SCNC: 109 MMOL/L (ref 95–110)
CO2 SERPL-SCNC: 23 MMOL/L (ref 23–29)
COMPLEXED PSA SERPL-MCNC: 3.4 NG/ML (ref 0–4)
CREAT SERPL-MCNC: 1.8 MG/DL (ref 0.5–1.4)
DIFFERENTIAL METHOD: ABNORMAL
EOSINOPHIL # BLD AUTO: 0.3 K/UL (ref 0–0.5)
EOSINOPHIL NFR BLD: 6 % (ref 0–8)
ERYTHROCYTE [DISTWIDTH] IN BLOOD BY AUTOMATED COUNT: 12.7 % (ref 11.5–14.5)
EST. GFR  (AFRICAN AMERICAN): 39.9 ML/MIN/1.73 M^2
EST. GFR  (NON AFRICAN AMERICAN): 34.5 ML/MIN/1.73 M^2
GLUCOSE SERPL-MCNC: 96 MG/DL (ref 70–110)
HCT VFR BLD AUTO: 35.9 % (ref 40–54)
HGB BLD-MCNC: 11.3 G/DL (ref 14–18)
IMM GRANULOCYTES # BLD AUTO: 0.01 K/UL (ref 0–0.04)
IMM GRANULOCYTES NFR BLD AUTO: 0.2 % (ref 0–0.5)
LYMPHOCYTES # BLD AUTO: 1.7 K/UL (ref 1–4.8)
LYMPHOCYTES NFR BLD: 30.8 % (ref 18–48)
MCH RBC QN AUTO: 31.3 PG (ref 27–31)
MCHC RBC AUTO-ENTMCNC: 31.5 G/DL (ref 32–36)
MCV RBC AUTO: 99 FL (ref 82–98)
MONOCYTES # BLD AUTO: 0.7 K/UL (ref 0.3–1)
MONOCYTES NFR BLD: 13.1 % (ref 4–15)
NEUTROPHILS # BLD AUTO: 2.6 K/UL (ref 1.8–7.7)
NEUTROPHILS NFR BLD: 48.8 % (ref 38–73)
NRBC BLD-RTO: 0 /100 WBC
PLATELET # BLD AUTO: 174 K/UL (ref 150–450)
PMV BLD AUTO: 12.8 FL (ref 9.2–12.9)
POTASSIUM SERPL-SCNC: 4.6 MMOL/L (ref 3.5–5.1)
PROT SERPL-MCNC: 6.1 G/DL (ref 6–8.4)
RBC # BLD AUTO: 3.61 M/UL (ref 4.6–6.2)
SODIUM SERPL-SCNC: 141 MMOL/L (ref 136–145)
TSH SERPL DL<=0.005 MIU/L-ACNC: 3.63 UIU/ML (ref 0.4–4)
URATE SERPL-MCNC: 5.2 MG/DL (ref 3.4–7)
WBC # BLD AUTO: 5.35 K/UL (ref 3.9–12.7)

## 2021-10-19 PROCEDURE — 84443 ASSAY THYROID STIM HORMONE: CPT | Performed by: INTERNAL MEDICINE

## 2021-10-19 PROCEDURE — 80053 COMPREHEN METABOLIC PANEL: CPT | Performed by: INTERNAL MEDICINE

## 2021-10-19 PROCEDURE — 85025 COMPLETE CBC W/AUTO DIFF WBC: CPT | Performed by: INTERNAL MEDICINE

## 2021-10-19 PROCEDURE — 84153 ASSAY OF PSA TOTAL: CPT | Performed by: INTERNAL MEDICINE

## 2021-10-19 PROCEDURE — 84550 ASSAY OF BLOOD/URIC ACID: CPT | Performed by: INTERNAL MEDICINE

## 2021-10-19 PROCEDURE — 36415 COLL VENOUS BLD VENIPUNCTURE: CPT | Mod: PN | Performed by: INTERNAL MEDICINE

## 2021-10-20 ENCOUNTER — CLINICAL SUPPORT (OUTPATIENT)
Dept: CARDIOLOGY | Facility: HOSPITAL | Age: 81
End: 2021-10-20
Payer: MEDICARE

## 2021-10-20 ENCOUNTER — PATIENT MESSAGE (OUTPATIENT)
Dept: INTERNAL MEDICINE | Facility: CLINIC | Age: 81
End: 2021-10-20
Payer: MEDICARE

## 2021-10-20 DIAGNOSIS — Z95.818 PRESENCE OF OTHER CARDIAC IMPLANTS AND GRAFTS: ICD-10-CM

## 2021-10-20 DIAGNOSIS — I10 ESSENTIAL HYPERTENSION: Primary | ICD-10-CM

## 2021-10-20 PROCEDURE — G2066 INTER DEVC REMOTE 30D: HCPCS | Performed by: INTERNAL MEDICINE

## 2021-10-20 PROCEDURE — 93298 CARDIAC DEVICE CHECK - REMOTE: ICD-10-PCS | Mod: ,,, | Performed by: INTERNAL MEDICINE

## 2021-10-20 PROCEDURE — 93298 REM INTERROG DEV EVAL SCRMS: CPT | Mod: ,,, | Performed by: INTERNAL MEDICINE

## 2021-10-21 ENCOUNTER — LAB VISIT (OUTPATIENT)
Dept: LAB | Facility: HOSPITAL | Age: 81
End: 2021-10-21
Attending: INTERNAL MEDICINE
Payer: MEDICARE

## 2021-10-21 DIAGNOSIS — I10 ESSENTIAL HYPERTENSION: ICD-10-CM

## 2021-10-21 LAB
CHOLEST SERPL-MCNC: 156 MG/DL (ref 120–199)
CHOLEST/HDLC SERPL: 2.1 {RATIO} (ref 2–5)
HDLC SERPL-MCNC: 73 MG/DL (ref 40–75)
HDLC SERPL: 46.8 % (ref 20–50)
LDLC SERPL CALC-MCNC: 69.6 MG/DL (ref 63–159)
NONHDLC SERPL-MCNC: 83 MG/DL
TRIGL SERPL-MCNC: 67 MG/DL (ref 30–150)

## 2021-10-21 PROCEDURE — 36415 COLL VENOUS BLD VENIPUNCTURE: CPT | Mod: PN | Performed by: INTERNAL MEDICINE

## 2021-10-21 PROCEDURE — 80061 LIPID PANEL: CPT | Performed by: INTERNAL MEDICINE

## 2021-10-26 ENCOUNTER — OFFICE VISIT (OUTPATIENT)
Dept: INTERNAL MEDICINE | Facility: CLINIC | Age: 81
End: 2021-10-26
Payer: MEDICARE

## 2021-10-26 VITALS
HEIGHT: 69 IN | DIASTOLIC BLOOD PRESSURE: 60 MMHG | TEMPERATURE: 98 F | WEIGHT: 222 LBS | BODY MASS INDEX: 32.88 KG/M2 | HEART RATE: 68 BPM | SYSTOLIC BLOOD PRESSURE: 128 MMHG | RESPIRATION RATE: 16 BRPM

## 2021-10-26 DIAGNOSIS — I10 ESSENTIAL HYPERTENSION: ICD-10-CM

## 2021-10-26 DIAGNOSIS — J45.20 INTERMITTENT ASTHMA WITHOUT COMPLICATION, UNSPECIFIED ASTHMA SEVERITY: Primary | ICD-10-CM

## 2021-10-26 DIAGNOSIS — D64.9 CHRONIC ANEMIA: ICD-10-CM

## 2021-10-26 DIAGNOSIS — G47.33 OSA (OBSTRUCTIVE SLEEP APNEA): ICD-10-CM

## 2021-10-26 DIAGNOSIS — R82.998 PINK-COLORED URINE: ICD-10-CM

## 2021-10-26 DIAGNOSIS — E78.49 OTHER HYPERLIPIDEMIA: ICD-10-CM

## 2021-10-26 DIAGNOSIS — N18.30 STAGE 3 CHRONIC KIDNEY DISEASE, UNSPECIFIED WHETHER STAGE 3A OR 3B CKD: ICD-10-CM

## 2021-10-26 PROCEDURE — 99214 PR OFFICE/OUTPT VISIT, EST, LEVL IV, 30-39 MIN: ICD-10-PCS | Mod: S$GLB,,, | Performed by: INTERNAL MEDICINE

## 2021-10-26 PROCEDURE — 99999 PR PBB SHADOW E&M-EST. PATIENT-LVL IV: ICD-10-PCS | Mod: PBBFAC,,, | Performed by: INTERNAL MEDICINE

## 2021-10-26 PROCEDURE — 99214 OFFICE O/P EST MOD 30 MIN: CPT | Mod: S$GLB,,, | Performed by: INTERNAL MEDICINE

## 2021-10-26 PROCEDURE — 99999 PR PBB SHADOW E&M-EST. PATIENT-LVL IV: CPT | Mod: PBBFAC,,, | Performed by: INTERNAL MEDICINE

## 2021-10-26 RX ORDER — FUROSEMIDE 40 MG/1
40 TABLET ORAL DAILY
Qty: 90 TABLET | Refills: 3 | Status: SHIPPED | OUTPATIENT
Start: 2021-10-26 | End: 2023-11-16 | Stop reason: SDUPTHER

## 2021-10-26 RX ORDER — ATORVASTATIN CALCIUM 20 MG/1
20 TABLET, FILM COATED ORAL DAILY
Qty: 90 TABLET | Refills: 3 | Status: SHIPPED | OUTPATIENT
Start: 2021-10-26 | End: 2022-10-23

## 2021-10-27 ENCOUNTER — PATIENT MESSAGE (OUTPATIENT)
Dept: UROLOGY | Facility: CLINIC | Age: 81
End: 2021-10-27

## 2021-10-27 ENCOUNTER — OFFICE VISIT (OUTPATIENT)
Dept: UROLOGY | Facility: CLINIC | Age: 81
End: 2021-10-27
Payer: MEDICARE

## 2021-10-27 VITALS
HEART RATE: 69 BPM | WEIGHT: 223.56 LBS | HEIGHT: 69 IN | DIASTOLIC BLOOD PRESSURE: 70 MMHG | SYSTOLIC BLOOD PRESSURE: 145 MMHG | BODY MASS INDEX: 33.11 KG/M2

## 2021-10-27 DIAGNOSIS — N40.1 BPH WITH URINARY OBSTRUCTION: ICD-10-CM

## 2021-10-27 DIAGNOSIS — I78.1 TELANGIECTASIA OF SKIN: Primary | ICD-10-CM

## 2021-10-27 DIAGNOSIS — N13.8 BPH WITH URINARY OBSTRUCTION: ICD-10-CM

## 2021-10-27 DIAGNOSIS — R31.0 GROSS HEMATURIA: ICD-10-CM

## 2021-10-27 PROCEDURE — 1125F AMNT PAIN NOTED PAIN PRSNT: CPT | Mod: CPTII,S$GLB,, | Performed by: NURSE PRACTITIONER

## 2021-10-27 PROCEDURE — 3078F PR MOST RECENT DIASTOLIC BLOOD PRESSURE < 80 MM HG: ICD-10-PCS | Mod: CPTII,S$GLB,, | Performed by: NURSE PRACTITIONER

## 2021-10-27 PROCEDURE — 1159F MED LIST DOCD IN RCRD: CPT | Mod: CPTII,S$GLB,, | Performed by: NURSE PRACTITIONER

## 2021-10-27 PROCEDURE — 88112 PR  CYTOPATH, CELL ENHANCE TECH: ICD-10-PCS | Mod: 26,,, | Performed by: PATHOLOGY

## 2021-10-27 PROCEDURE — 99204 OFFICE O/P NEW MOD 45 MIN: CPT | Mod: S$GLB,,, | Performed by: NURSE PRACTITIONER

## 2021-10-27 PROCEDURE — 1101F PT FALLS ASSESS-DOCD LE1/YR: CPT | Mod: CPTII,S$GLB,, | Performed by: NURSE PRACTITIONER

## 2021-10-27 PROCEDURE — 99999 PR PBB SHADOW E&M-EST. PATIENT-LVL V: ICD-10-PCS | Mod: PBBFAC,,, | Performed by: NURSE PRACTITIONER

## 2021-10-27 PROCEDURE — 3078F DIAST BP <80 MM HG: CPT | Mod: CPTII,S$GLB,, | Performed by: NURSE PRACTITIONER

## 2021-10-27 PROCEDURE — 88112 CYTOPATH CELL ENHANCE TECH: CPT | Mod: 26,,, | Performed by: PATHOLOGY

## 2021-10-27 PROCEDURE — 1160F PR REVIEW ALL MEDS BY PRESCRIBER/CLIN PHARMACIST DOCUMENTED: ICD-10-PCS | Mod: CPTII,S$GLB,, | Performed by: NURSE PRACTITIONER

## 2021-10-27 PROCEDURE — 3288F PR FALLS RISK ASSESSMENT DOCUMENTED: ICD-10-PCS | Mod: CPTII,S$GLB,, | Performed by: NURSE PRACTITIONER

## 2021-10-27 PROCEDURE — 99204 PR OFFICE/OUTPT VISIT, NEW, LEVL IV, 45-59 MIN: ICD-10-PCS | Mod: S$GLB,,, | Performed by: NURSE PRACTITIONER

## 2021-10-27 PROCEDURE — 1101F PR PT FALLS ASSESS DOC 0-1 FALLS W/OUT INJ PAST YR: ICD-10-PCS | Mod: CPTII,S$GLB,, | Performed by: NURSE PRACTITIONER

## 2021-10-27 PROCEDURE — 88112 CYTOPATH CELL ENHANCE TECH: CPT | Performed by: PATHOLOGY

## 2021-10-27 PROCEDURE — 1160F RVW MEDS BY RX/DR IN RCRD: CPT | Mod: CPTII,S$GLB,, | Performed by: NURSE PRACTITIONER

## 2021-10-27 PROCEDURE — 1125F PR PAIN SEVERITY QUANTIFIED, PAIN PRESENT: ICD-10-PCS | Mod: CPTII,S$GLB,, | Performed by: NURSE PRACTITIONER

## 2021-10-27 PROCEDURE — 1159F PR MEDICATION LIST DOCUMENTED IN MEDICAL RECORD: ICD-10-PCS | Mod: CPTII,S$GLB,, | Performed by: NURSE PRACTITIONER

## 2021-10-27 PROCEDURE — 99999 PR PBB SHADOW E&M-EST. PATIENT-LVL V: CPT | Mod: PBBFAC,,, | Performed by: NURSE PRACTITIONER

## 2021-10-27 PROCEDURE — 3077F SYST BP >= 140 MM HG: CPT | Mod: CPTII,S$GLB,, | Performed by: NURSE PRACTITIONER

## 2021-10-27 PROCEDURE — 3077F PR MOST RECENT SYSTOLIC BLOOD PRESSURE >= 140 MM HG: ICD-10-PCS | Mod: CPTII,S$GLB,, | Performed by: NURSE PRACTITIONER

## 2021-10-27 PROCEDURE — 3288F FALL RISK ASSESSMENT DOCD: CPT | Mod: CPTII,S$GLB,, | Performed by: NURSE PRACTITIONER

## 2021-10-27 RX ORDER — LIDOCAINE HYDROCHLORIDE 20 MG/ML
JELLY TOPICAL ONCE
Status: CANCELLED | OUTPATIENT
Start: 2021-10-27 | End: 2021-10-27

## 2021-10-27 RX ORDER — CIPROFLOXACIN 500 MG/1
500 TABLET ORAL ONCE
Status: CANCELLED | OUTPATIENT
Start: 2021-10-27 | End: 2021-10-27

## 2021-10-29 LAB
FINAL PATHOLOGIC DIAGNOSIS: NORMAL
Lab: NORMAL

## 2021-11-09 ENCOUNTER — HOSPITAL ENCOUNTER (OUTPATIENT)
Dept: RADIOLOGY | Facility: HOSPITAL | Age: 81
Discharge: HOME | End: 2021-11-09
Attending: NURSE PRACTITIONER
Payer: MEDICARE

## 2021-11-09 DIAGNOSIS — R31.0 GROSS HEMATURIA: ICD-10-CM

## 2021-11-09 DIAGNOSIS — N40.1 BPH WITH URINARY OBSTRUCTION: ICD-10-CM

## 2021-11-09 DIAGNOSIS — N13.8 BPH WITH URINARY OBSTRUCTION: ICD-10-CM

## 2021-11-09 LAB
CREAT SERPL-MCNC: 2.2 MG/DL (ref 0.5–1.4)
SAMPLE: ABNORMAL

## 2021-11-09 PROCEDURE — 74178 CT ABD&PLV WO CNTR FLWD CNTR: CPT | Mod: 26,,, | Performed by: RADIOLOGY

## 2021-11-09 PROCEDURE — 74178 CT UROGRAM ABD PELVIS W WO: ICD-10-PCS | Mod: 26,,, | Performed by: RADIOLOGY

## 2021-11-09 PROCEDURE — 74178 CT ABD&PLV WO CNTR FLWD CNTR: CPT | Mod: TC

## 2021-11-09 PROCEDURE — 25500020 PHARM REV CODE 255: Performed by: NURSE PRACTITIONER

## 2021-11-09 RX ADMIN — IOHEXOL 130 ML: 350 INJECTION, SOLUTION INTRAVENOUS at 05:11

## 2021-11-15 ENCOUNTER — PATIENT MESSAGE (OUTPATIENT)
Dept: INTERNAL MEDICINE | Facility: CLINIC | Age: 81
End: 2021-11-15
Payer: MEDICARE

## 2021-11-17 ENCOUNTER — PROCEDURE VISIT (OUTPATIENT)
Dept: UROLOGY | Facility: CLINIC | Age: 81
End: 2021-11-17
Payer: MEDICARE

## 2021-11-17 ENCOUNTER — OFFICE VISIT (OUTPATIENT)
Dept: OPTOMETRY | Facility: CLINIC | Age: 81
End: 2021-11-17
Payer: MEDICARE

## 2021-11-17 VITALS
DIASTOLIC BLOOD PRESSURE: 62 MMHG | HEIGHT: 69 IN | SYSTOLIC BLOOD PRESSURE: 133 MMHG | RESPIRATION RATE: 18 BRPM | HEART RATE: 65 BPM | WEIGHT: 223.56 LBS | BODY MASS INDEX: 33.11 KG/M2 | TEMPERATURE: 98 F

## 2021-11-17 DIAGNOSIS — R31.0 GROSS HEMATURIA: ICD-10-CM

## 2021-11-17 DIAGNOSIS — H52.203 MYOPIA WITH ASTIGMATISM AND PRESBYOPIA, BILATERAL: ICD-10-CM

## 2021-11-17 DIAGNOSIS — H43.812 POSTERIOR VITREOUS DETACHMENT OF LEFT EYE: ICD-10-CM

## 2021-11-17 DIAGNOSIS — I10 ESSENTIAL HYPERTENSION: ICD-10-CM

## 2021-11-17 DIAGNOSIS — H52.13 MYOPIA WITH ASTIGMATISM AND PRESBYOPIA, BILATERAL: ICD-10-CM

## 2021-11-17 DIAGNOSIS — H25.13 NUCLEAR SCLEROSIS, BILATERAL: Primary | ICD-10-CM

## 2021-11-17 DIAGNOSIS — H52.4 MYOPIA WITH ASTIGMATISM AND PRESBYOPIA, BILATERAL: ICD-10-CM

## 2021-11-17 PROCEDURE — 99999 PR PBB SHADOW E&M-EST. PATIENT-LVL III: ICD-10-PCS | Mod: PBBFAC,,, | Performed by: OPTOMETRIST

## 2021-11-17 PROCEDURE — 1126F AMNT PAIN NOTED NONE PRSNT: CPT | Mod: CPTII,S$GLB,, | Performed by: OPTOMETRIST

## 2021-11-17 PROCEDURE — 99999 PR PBB SHADOW E&M-EST. PATIENT-LVL III: CPT | Mod: PBBFAC,,, | Performed by: OPTOMETRIST

## 2021-11-17 PROCEDURE — 52000 CYSTOSCOPY: ICD-10-PCS | Mod: S$GLB,,, | Performed by: UROLOGY

## 2021-11-17 PROCEDURE — 92014 PR EYE EXAM, EST PATIENT,COMPREHESV: ICD-10-PCS | Mod: S$GLB,,, | Performed by: OPTOMETRIST

## 2021-11-17 PROCEDURE — 92015 DETERMINE REFRACTIVE STATE: CPT | Mod: S$GLB,,, | Performed by: OPTOMETRIST

## 2021-11-17 PROCEDURE — 52000 CYSTOURETHROSCOPY: CPT | Mod: S$GLB,,, | Performed by: UROLOGY

## 2021-11-17 PROCEDURE — 1159F PR MEDICATION LIST DOCUMENTED IN MEDICAL RECORD: ICD-10-PCS | Mod: CPTII,S$GLB,, | Performed by: OPTOMETRIST

## 2021-11-17 PROCEDURE — 1126F PR PAIN SEVERITY QUANTIFIED, NO PAIN PRESENT: ICD-10-PCS | Mod: CPTII,S$GLB,, | Performed by: OPTOMETRIST

## 2021-11-17 PROCEDURE — 92015 PR REFRACTION: ICD-10-PCS | Mod: S$GLB,,, | Performed by: OPTOMETRIST

## 2021-11-17 PROCEDURE — 1159F MED LIST DOCD IN RCRD: CPT | Mod: CPTII,S$GLB,, | Performed by: OPTOMETRIST

## 2021-11-17 PROCEDURE — 92014 COMPRE OPH EXAM EST PT 1/>: CPT | Mod: S$GLB,,, | Performed by: OPTOMETRIST

## 2021-11-17 RX ORDER — CIPROFLOXACIN 500 MG/1
500 TABLET ORAL ONCE
Status: SHIPPED | OUTPATIENT
Start: 2021-11-17

## 2021-11-17 RX ORDER — LIDOCAINE HYDROCHLORIDE 20 MG/ML
JELLY TOPICAL ONCE
Status: COMPLETED | OUTPATIENT
Start: 2021-11-17 | End: 2021-11-17

## 2021-11-17 RX ADMIN — LIDOCAINE HYDROCHLORIDE: 20 JELLY TOPICAL at 11:11

## 2021-11-18 ENCOUNTER — PATIENT MESSAGE (OUTPATIENT)
Dept: INTERNAL MEDICINE | Facility: CLINIC | Age: 81
End: 2021-11-18
Payer: MEDICARE

## 2021-11-18 RX ORDER — HYDROCORTISONE ACETATE PRAMOXINE HCL 2.5; 1 G/100G; G/100G
CREAM TOPICAL 3 TIMES DAILY
Qty: 28 G | Refills: 1 | Status: SHIPPED | OUTPATIENT
Start: 2021-11-18 | End: 2023-05-27

## 2021-11-19 ENCOUNTER — CLINICAL SUPPORT (OUTPATIENT)
Dept: CARDIOLOGY | Facility: HOSPITAL | Age: 81
End: 2021-11-19
Payer: MEDICARE

## 2021-11-19 DIAGNOSIS — Z95.818 PRESENCE OF OTHER CARDIAC IMPLANTS AND GRAFTS: ICD-10-CM

## 2021-11-19 PROCEDURE — 93298 CARDIAC DEVICE CHECK - REMOTE: ICD-10-PCS | Mod: ,,, | Performed by: INTERNAL MEDICINE

## 2021-11-19 PROCEDURE — 93298 REM INTERROG DEV EVAL SCRMS: CPT | Mod: ,,, | Performed by: INTERNAL MEDICINE

## 2021-11-19 PROCEDURE — G2066 INTER DEVC REMOTE 30D: HCPCS | Performed by: INTERNAL MEDICINE

## 2021-11-24 RX ORDER — FLUTICASONE PROPIONATE 50 MCG
2 SPRAY, SUSPENSION (ML) NASAL DAILY
Qty: 48 G | Refills: 3 | Status: SHIPPED | OUTPATIENT
Start: 2021-11-24 | End: 2022-11-29

## 2021-12-07 ENCOUNTER — OFFICE VISIT (OUTPATIENT)
Dept: DERMATOLOGY | Facility: CLINIC | Age: 81
End: 2021-12-07
Payer: MEDICARE

## 2021-12-07 DIAGNOSIS — L21.9 SEBORRHEIC DERMATITIS: ICD-10-CM

## 2021-12-07 DIAGNOSIS — Z12.83 SCREENING EXAM FOR SKIN CANCER: Primary | ICD-10-CM

## 2021-12-07 DIAGNOSIS — L57.0 ACTINIC KERATOSIS: ICD-10-CM

## 2021-12-07 PROCEDURE — 17000 DESTRUCT PREMALG LESION: CPT | Mod: S$GLB,,, | Performed by: DERMATOLOGY

## 2021-12-07 PROCEDURE — 3288F PR FALLS RISK ASSESSMENT DOCUMENTED: ICD-10-PCS | Mod: CPTII,S$GLB,, | Performed by: DERMATOLOGY

## 2021-12-07 PROCEDURE — 1126F PR PAIN SEVERITY QUANTIFIED, NO PAIN PRESENT: ICD-10-PCS | Mod: CPTII,S$GLB,, | Performed by: DERMATOLOGY

## 2021-12-07 PROCEDURE — 99999 PR PBB SHADOW E&M-EST. PATIENT-LVL III: CPT | Mod: PBBFAC,,, | Performed by: DERMATOLOGY

## 2021-12-07 PROCEDURE — 1126F AMNT PAIN NOTED NONE PRSNT: CPT | Mod: CPTII,S$GLB,, | Performed by: DERMATOLOGY

## 2021-12-07 PROCEDURE — 99214 OFFICE O/P EST MOD 30 MIN: CPT | Mod: 25,S$GLB,, | Performed by: DERMATOLOGY

## 2021-12-07 PROCEDURE — 17000 PR DESTRUCTION(LASER SURGERY,CRYOSURGERY,CHEMOSURGERY),PREMALIGNANT LESIONS,FIRST LESION: ICD-10-PCS | Mod: S$GLB,,, | Performed by: DERMATOLOGY

## 2021-12-07 PROCEDURE — 1101F PT FALLS ASSESS-DOCD LE1/YR: CPT | Mod: CPTII,S$GLB,, | Performed by: DERMATOLOGY

## 2021-12-07 PROCEDURE — 1101F PR PT FALLS ASSESS DOC 0-1 FALLS W/OUT INJ PAST YR: ICD-10-PCS | Mod: CPTII,S$GLB,, | Performed by: DERMATOLOGY

## 2021-12-07 PROCEDURE — 3288F FALL RISK ASSESSMENT DOCD: CPT | Mod: CPTII,S$GLB,, | Performed by: DERMATOLOGY

## 2021-12-07 PROCEDURE — 99999 PR PBB SHADOW E&M-EST. PATIENT-LVL III: ICD-10-PCS | Mod: PBBFAC,,, | Performed by: DERMATOLOGY

## 2021-12-07 PROCEDURE — 99214 PR OFFICE/OUTPT VISIT, EST, LEVL IV, 30-39 MIN: ICD-10-PCS | Mod: 25,S$GLB,, | Performed by: DERMATOLOGY

## 2021-12-07 PROCEDURE — 17003 DESTRUCTION, PREMALIGNANT LESIONS; SECOND THROUGH 14 LESIONS: ICD-10-PCS | Mod: 59,S$GLB,, | Performed by: DERMATOLOGY

## 2021-12-07 PROCEDURE — 17003 DESTRUCT PREMALG LES 2-14: CPT | Mod: 59,S$GLB,, | Performed by: DERMATOLOGY

## 2021-12-07 NOTE — PROGRESS NOTES
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  Today, The patient denies any moles or growths of the skin that are rapidly growing, hurting, itching, bleeding, or changing colors.    Hx of seb derm - uses keto shampoo and cream - needs refill of cream -    Fungus around toes - using tea tree oil several times daily - much better.  Review of Systems   Constitutional: Negative for fever, chills and fatigue.   Skin: Positive for wears hat. Negative for daily sunscreen use and recent sunburn.   Hematologic/Lymphatic: Does not bruise/bleed easily.        Objective:    Physical Exam   Constitutional: He appears well-developed and well-nourished. No distress.   Neurological: He is alert and oriented to person, place, and time. He is not disoriented.   Psychiatric: He has a normal mood and affect.   Skin:   Areas Examined (abnormalities noted in diagram):   Scalp / Hair Palpated and Inspected  Head / Face Inspection Performed  Neck Inspection Performed  Chest / Axilla Inspection Performed  Abdomen Inspection Performed  Genitals / Buttocks / Groin Inspection Performed  Back Inspection Performed  RUE Inspected  LUE Inspection Performed  RLE Inspected  LLE Inspection Performed  Nails and Digits Inspection Performed                   Diagram Legend     Erythematous scaling macule/papule c/w actinic keratosis       Vascular papule c/w angioma      Pigmented verrucoid papule/plaque c/w seborrheic keratosis      Yellow umbilicated papule c/w sebaceous hyperplasia      Irregularly shaped tan macule c/w lentigo     1-2 mm smooth white papules consistent with Milia      Movable subcutaneous cyst with punctum c/w epidermal inclusion cyst      Subcutaneous movable cyst c/w pilar cyst      Firm pink to brown papule c/w dermatofibroma      Pedunculated  fleshy papule(s) c/w skin tag(s)      Evenly pigmented macule c/w junctional nevus     Mildly variegated pigmented, slightly irregular-bordered macule c/w mildly atypical nevus      Flesh colored to evenly pigmented papule c/w intradermal nevus       Pink pearly papule/plaque c/w basal cell carcinoma      Erythematous hyperkeratotic cursted plaque c/w SCC      Surgical scar with no sign of skin cancer recurrence      Open and closed comedones      Inflammatory papules and pustules      Verrucoid papule consistent consistent with wart     Erythematous eczematous patches and plaques     Dystrophic onycholytic nail with subungual debris c/w onychomycosis     Umbilicated papule    Erythematous-base heme-crusted tan verrucoid plaque consistent with inflamed seborrheic keratosis     Erythematous Silvery Scaling Plaque c/w Psoriasis     See annotation      Assessment / Plan:        Screening exam for skin cancer  Area of previous SCC examined. Site well healed with no signs of recurrence.    Total body skin examination performed today including at least 12 points as noted in physical examination. No lesions suspicious for malignancy noted.    Recommend daily sun protection/avoidance, use of at least SPF 30, broad spectrum sunscreen (OTC drug), skin self examinations, and routine physician surveillance to optimize early detection  Actinic keratosis  Cryosurgery Procedure Note    Verbal consent from the patient is obtained including, but not limited to, risk of hypopigmentation/hyperpigmentation, scar, recurrence of lesion. The patient is aware of the precancerous quality and need for treatment of these lesions. Liquid nitrogen cryosurgery is applied to the 2 actinic keratoses, as detailed in the physical exam, to produce a freeze injury. The patient is aware that blisters may form and is instructed on wound care with gentle cleansing and use of vaseline ointment to keep moist until healed. The patient is supplied a handout  on cryosurgery and is instructed to call if lesions do not completely resolve.    Seborrheic dermatitis  -     ketoconazole (NIZORAL) 2 % cream; Apply topically 2 (two) times daily. To dry skin PRN  Dispense: 60 g; Refill: 3     encouraged to use for dry skin around nose and behind ears.         Follow up in about 1 year (around 12/7/2022) for for TBSE.

## 2021-12-08 ENCOUNTER — TELEPHONE (OUTPATIENT)
Dept: ELECTROPHYSIOLOGY | Facility: CLINIC | Age: 81
End: 2021-12-08
Payer: MEDICARE

## 2021-12-08 RX ORDER — KETOCONAZOLE 20 MG/G
CREAM TOPICAL 2 TIMES DAILY
Qty: 60 G | Refills: 3 | Status: SHIPPED | OUTPATIENT
Start: 2021-12-08 | End: 2023-05-15 | Stop reason: SDUPTHER

## 2021-12-09 ENCOUNTER — PATIENT MESSAGE (OUTPATIENT)
Dept: INTERNAL MEDICINE | Facility: CLINIC | Age: 81
End: 2021-12-09
Payer: MEDICARE

## 2021-12-09 ENCOUNTER — OFFICE VISIT (OUTPATIENT)
Dept: CARDIOLOGY | Facility: CLINIC | Age: 81
End: 2021-12-09
Payer: MEDICARE

## 2021-12-09 VITALS
WEIGHT: 226.88 LBS | BODY MASS INDEX: 33.6 KG/M2 | DIASTOLIC BLOOD PRESSURE: 57 MMHG | HEIGHT: 69 IN | SYSTOLIC BLOOD PRESSURE: 138 MMHG | HEART RATE: 62 BPM

## 2021-12-09 DIAGNOSIS — I10 ESSENTIAL HYPERTENSION: Chronic | ICD-10-CM

## 2021-12-09 DIAGNOSIS — I47.19 PAT (PAROXYSMAL ATRIAL TACHYCARDIA): Primary | ICD-10-CM

## 2021-12-09 DIAGNOSIS — E66.9 OBESITY (BMI 30-39.9): ICD-10-CM

## 2021-12-09 DIAGNOSIS — G47.33 OBSTRUCTIVE SLEEP APNEA: ICD-10-CM

## 2021-12-09 PROCEDURE — 99999 PR PBB SHADOW E&M-EST. PATIENT-LVL IV: ICD-10-PCS | Mod: PBBFAC,,, | Performed by: INTERNAL MEDICINE

## 2021-12-09 PROCEDURE — 99213 PR OFFICE/OUTPT VISIT, EST, LEVL III, 20-29 MIN: ICD-10-PCS | Mod: S$GLB,,, | Performed by: INTERNAL MEDICINE

## 2021-12-09 PROCEDURE — 99213 OFFICE O/P EST LOW 20 MIN: CPT | Mod: S$GLB,,, | Performed by: INTERNAL MEDICINE

## 2021-12-09 PROCEDURE — 99999 PR PBB SHADOW E&M-EST. PATIENT-LVL IV: CPT | Mod: PBBFAC,,, | Performed by: INTERNAL MEDICINE

## 2021-12-13 ENCOUNTER — PATIENT MESSAGE (OUTPATIENT)
Dept: INTERNAL MEDICINE | Facility: CLINIC | Age: 81
End: 2021-12-13
Payer: MEDICARE

## 2021-12-16 ENCOUNTER — TELEPHONE (OUTPATIENT)
Dept: PRIMARY CARE CLINIC | Facility: CLINIC | Age: 81
End: 2021-12-16
Payer: MEDICARE

## 2021-12-17 ENCOUNTER — OFFICE VISIT (OUTPATIENT)
Dept: PRIMARY CARE CLINIC | Facility: CLINIC | Age: 81
End: 2021-12-17
Payer: MEDICARE

## 2021-12-17 VITALS
RESPIRATION RATE: 16 BRPM | DIASTOLIC BLOOD PRESSURE: 62 MMHG | HEIGHT: 69 IN | BODY MASS INDEX: 33.97 KG/M2 | SYSTOLIC BLOOD PRESSURE: 134 MMHG | OXYGEN SATURATION: 96 % | HEART RATE: 67 BPM | WEIGHT: 229.38 LBS

## 2021-12-17 DIAGNOSIS — R60.0 BILATERAL LEG EDEMA: Primary | ICD-10-CM

## 2021-12-17 PROCEDURE — 99214 PR OFFICE/OUTPT VISIT, EST, LEVL IV, 30-39 MIN: ICD-10-PCS | Mod: S$GLB,,, | Performed by: FAMILY MEDICINE

## 2021-12-17 PROCEDURE — 99999 PR PBB SHADOW E&M-EST. PATIENT-LVL V: CPT | Mod: PBBFAC,,, | Performed by: FAMILY MEDICINE

## 2021-12-17 PROCEDURE — 99214 OFFICE O/P EST MOD 30 MIN: CPT | Mod: S$GLB,,, | Performed by: FAMILY MEDICINE

## 2021-12-17 PROCEDURE — 99999 PR PBB SHADOW E&M-EST. PATIENT-LVL V: ICD-10-PCS | Mod: PBBFAC,,, | Performed by: FAMILY MEDICINE

## 2021-12-19 ENCOUNTER — CLINICAL SUPPORT (OUTPATIENT)
Dept: CARDIOLOGY | Facility: HOSPITAL | Age: 81
End: 2021-12-19
Payer: MEDICARE

## 2021-12-19 DIAGNOSIS — Z95.818 PRESENCE OF OTHER CARDIAC IMPLANTS AND GRAFTS: ICD-10-CM

## 2021-12-19 PROCEDURE — 93298 CARDIAC DEVICE CHECK - REMOTE: ICD-10-PCS | Mod: ,,, | Performed by: INTERNAL MEDICINE

## 2021-12-19 PROCEDURE — 93298 REM INTERROG DEV EVAL SCRMS: CPT | Mod: ,,, | Performed by: INTERNAL MEDICINE

## 2021-12-19 PROCEDURE — G2066 INTER DEVC REMOTE 30D: HCPCS | Performed by: INTERNAL MEDICINE

## 2022-01-04 RX ORDER — FLUTICASONE PROPIONATE AND SALMETEROL 250; 50 UG/1; UG/1
POWDER RESPIRATORY (INHALATION)
Qty: 180 EACH | Refills: 3 | Status: SHIPPED | OUTPATIENT
Start: 2022-01-04 | End: 2023-01-23

## 2022-01-04 NOTE — TELEPHONE ENCOUNTER
No new care gaps identified.  Powered by [employer]. Reference number: [id number].   1/04/2022 10:53:00 AM CST

## 2022-01-04 NOTE — TELEPHONE ENCOUNTER
Called to [facility] [phone] recorder as dr stark approved (rx printed instead of sending electronic)

## 2022-01-06 ENCOUNTER — LAB VISIT (OUTPATIENT)
Dept: LAB | Facility: HOSPITAL | Age: 82
End: 2022-01-06
Attending: INTERNAL MEDICINE
Payer: MEDICARE

## 2022-01-06 ENCOUNTER — PATIENT MESSAGE (OUTPATIENT)
Dept: INTERNAL MEDICINE | Facility: CLINIC | Age: 82
End: 2022-01-06

## 2022-01-06 ENCOUNTER — OFFICE VISIT (OUTPATIENT)
Dept: INTERNAL MEDICINE | Facility: CLINIC | Age: 82
End: 2022-01-06
Payer: MEDICARE

## 2022-01-06 VITALS
WEIGHT: 227 LBS | BODY MASS INDEX: 33.62 KG/M2 | DIASTOLIC BLOOD PRESSURE: 78 MMHG | TEMPERATURE: 98 F | OXYGEN SATURATION: 99 % | HEART RATE: 58 BPM | HEIGHT: 69 IN | RESPIRATION RATE: 16 BRPM | SYSTOLIC BLOOD PRESSURE: 138 MMHG

## 2022-01-06 DIAGNOSIS — R60.9 EDEMA, UNSPECIFIED TYPE: ICD-10-CM

## 2022-01-06 DIAGNOSIS — I10 ESSENTIAL HYPERTENSION: ICD-10-CM

## 2022-01-06 DIAGNOSIS — N18.30 STAGE 3 CHRONIC KIDNEY DISEASE, UNSPECIFIED WHETHER STAGE 3A OR 3B CKD: ICD-10-CM

## 2022-01-06 DIAGNOSIS — R60.9 EDEMA, UNSPECIFIED TYPE: Primary | ICD-10-CM

## 2022-01-06 DIAGNOSIS — G47.33 OSA (OBSTRUCTIVE SLEEP APNEA): ICD-10-CM

## 2022-01-06 LAB
ALBUMIN SERPL BCP-MCNC: 3.9 G/DL (ref 3.5–5.2)
ALP SERPL-CCNC: 82 U/L (ref 55–135)
ALT SERPL W/O P-5'-P-CCNC: 26 U/L (ref 10–44)
ANION GAP SERPL CALC-SCNC: 8 MMOL/L (ref 8–16)
AST SERPL-CCNC: 24 U/L (ref 10–40)
BILIRUB SERPL-MCNC: 0.9 MG/DL (ref 0.1–1)
BUN SERPL-MCNC: 30 MG/DL (ref 8–23)
CALCIUM SERPL-MCNC: 9.4 MG/DL (ref 8.7–10.5)
CHLORIDE SERPL-SCNC: 108 MMOL/L (ref 95–110)
CO2 SERPL-SCNC: 25 MMOL/L (ref 23–29)
CREAT SERPL-MCNC: 1.8 MG/DL (ref 0.5–1.4)
EST. GFR  (AFRICAN AMERICAN): 39.9 ML/MIN/1.73 M^2
EST. GFR  (NON AFRICAN AMERICAN): 34.5 ML/MIN/1.73 M^2
GLUCOSE SERPL-MCNC: 87 MG/DL (ref 70–110)
POTASSIUM SERPL-SCNC: 4.8 MMOL/L (ref 3.5–5.1)
PROT SERPL-MCNC: 6.8 G/DL (ref 6–8.4)
SODIUM SERPL-SCNC: 141 MMOL/L (ref 136–145)

## 2022-01-06 PROCEDURE — 99999 PR PBB SHADOW E&M-EST. PATIENT-LVL V: CPT | Mod: PBBFAC,,, | Performed by: INTERNAL MEDICINE

## 2022-01-06 PROCEDURE — 99214 PR OFFICE/OUTPT VISIT, EST, LEVL IV, 30-39 MIN: ICD-10-PCS | Mod: S$GLB,,, | Performed by: INTERNAL MEDICINE

## 2022-01-06 PROCEDURE — 99214 OFFICE O/P EST MOD 30 MIN: CPT | Mod: S$GLB,,, | Performed by: INTERNAL MEDICINE

## 2022-01-06 PROCEDURE — 3288F PR FALLS RISK ASSESSMENT DOCUMENTED: ICD-10-PCS | Mod: CPTII,S$GLB,, | Performed by: INTERNAL MEDICINE

## 2022-01-06 PROCEDURE — 1125F PR PAIN SEVERITY QUANTIFIED, PAIN PRESENT: ICD-10-PCS | Mod: CPTII,S$GLB,, | Performed by: INTERNAL MEDICINE

## 2022-01-06 PROCEDURE — 1160F PR REVIEW ALL MEDS BY PRESCRIBER/CLIN PHARMACIST DOCUMENTED: ICD-10-PCS | Mod: CPTII,S$GLB,, | Performed by: INTERNAL MEDICINE

## 2022-01-06 PROCEDURE — 3078F PR MOST RECENT DIASTOLIC BLOOD PRESSURE < 80 MM HG: ICD-10-PCS | Mod: CPTII,S$GLB,, | Performed by: INTERNAL MEDICINE

## 2022-01-06 PROCEDURE — 1159F MED LIST DOCD IN RCRD: CPT | Mod: CPTII,S$GLB,, | Performed by: INTERNAL MEDICINE

## 2022-01-06 PROCEDURE — 3075F PR MOST RECENT SYSTOLIC BLOOD PRESS GE 130-139MM HG: ICD-10-PCS | Mod: CPTII,S$GLB,, | Performed by: INTERNAL MEDICINE

## 2022-01-06 PROCEDURE — 1160F RVW MEDS BY RX/DR IN RCRD: CPT | Mod: CPTII,S$GLB,, | Performed by: INTERNAL MEDICINE

## 2022-01-06 PROCEDURE — 1159F PR MEDICATION LIST DOCUMENTED IN MEDICAL RECORD: ICD-10-PCS | Mod: CPTII,S$GLB,, | Performed by: INTERNAL MEDICINE

## 2022-01-06 PROCEDURE — 36415 COLL VENOUS BLD VENIPUNCTURE: CPT | Mod: PO | Performed by: INTERNAL MEDICINE

## 2022-01-06 PROCEDURE — 1125F AMNT PAIN NOTED PAIN PRSNT: CPT | Mod: CPTII,S$GLB,, | Performed by: INTERNAL MEDICINE

## 2022-01-06 PROCEDURE — 80053 COMPREHEN METABOLIC PANEL: CPT | Performed by: INTERNAL MEDICINE

## 2022-01-06 PROCEDURE — 3078F DIAST BP <80 MM HG: CPT | Mod: CPTII,S$GLB,, | Performed by: INTERNAL MEDICINE

## 2022-01-06 PROCEDURE — 3075F SYST BP GE 130 - 139MM HG: CPT | Mod: CPTII,S$GLB,, | Performed by: INTERNAL MEDICINE

## 2022-01-06 PROCEDURE — 1101F PR PT FALLS ASSESS DOC 0-1 FALLS W/OUT INJ PAST YR: ICD-10-PCS | Mod: CPTII,S$GLB,, | Performed by: INTERNAL MEDICINE

## 2022-01-06 PROCEDURE — 99999 PR PBB SHADOW E&M-EST. PATIENT-LVL V: ICD-10-PCS | Mod: PBBFAC,,, | Performed by: INTERNAL MEDICINE

## 2022-01-06 PROCEDURE — 1101F PT FALLS ASSESS-DOCD LE1/YR: CPT | Mod: CPTII,S$GLB,, | Performed by: INTERNAL MEDICINE

## 2022-01-06 PROCEDURE — 3288F FALL RISK ASSESSMENT DOCD: CPT | Mod: CPTII,S$GLB,, | Performed by: INTERNAL MEDICINE

## 2022-01-06 RX ORDER — METOLAZONE 2.5 MG/1
TABLET ORAL
Qty: 30 TABLET | Refills: 2 | Status: SHIPPED | OUTPATIENT
Start: 2022-01-06 | End: 2022-06-03

## 2022-01-06 NOTE — PROGRESS NOTES
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    Chief Complaint: Leg Swelling (Right worse than left.  Pain at 3-4,  wakes up at nights sometimes. )    HPI   The patient presents for evaluation of persistent bilateral leg swelling.  Patient states he has had a flare swelling in both legs since 12/01/2021.  He noted that when 3 in increase in circumference of the calves since then.  He was evaluated at an urgent care clinic.  A venous ultrasound of the lower extremities performed on 12/17/2021 was negative for DVT.  The patient had discontinued use of Plendil (felodipine) on 12/14/2021 in view of the edema.  He had been informed that the calcium channel blocker could be contributing to his lower extremity edema.  The patient notes slight tenderness in the legs with the swelling.  He is currently taking furosemide 2 days a week.  He states his maximum weight at home was 227 lb over the past month.  The patient denies any PND or orthopnea.  He feels rested in the morning.  He uses CPAP machine every night and has noted improves with sleep quality with use of the device.    Active medical conditions include hypertension, chronic kidney disease, chronic asthma, obstructive sleep apnea, paroxysmal atrial tachycardia, fatty liver disease, iron deficiency anemia.  There is no history of ascites or congestive heart failure.      Review of Systems   Constitutional: Negative for activity change, appetite change, fatigue and unexpected weight change.   HENT: Negative for sinus pressure/congestion and sore throat.    Eyes: Negative for visual disturbance.   Respiratory: Negative for cough, chest tightness, shortness of breath and wheezing.    Cardiovascular: Positive for leg swelling. Negative for chest pain and palpitations.   Gastrointestinal: Negative for abdominal pain, blood in stool, nausea and vomiting.   Genitourinary: Negative for dysuria, hematuria and urgency.   Musculoskeletal: Negative  for arthralgias, back pain, gait problem, joint swelling, myalgias and neck stiffness.   Integumentary:  Negative for color change and rash.   Neurological: Negative for dizziness, syncope, weakness, light-headedness, numbness and headaches.   Psychiatric/Behavioral: Negative for sleep disturbance.            Physical Exam  Vitals and nursing note reviewed.   Constitutional:       General: He is not in acute distress.     Appearance: He is well-developed and well-nourished.   HENT:      Head: Normocephalic and atraumatic.   Eyes:      General: No scleral icterus.     Extraocular Movements: EOM normal.      Conjunctiva/sclera: Conjunctivae normal.   Neck:      Thyroid: No thyromegaly.      Vascular: No JVD.   Cardiovascular:      Rate and Rhythm: Normal rate and regular rhythm.      Pulses: Intact distal pulses.      Heart sounds: Normal heart sounds. No murmur heard.  No friction rub. No gallop.    Pulmonary:      Effort: Pulmonary effort is normal. No respiratory distress.      Breath sounds: Normal breath sounds. No wheezing or rales.   Abdominal:      General: Bowel sounds are normal.      Palpations: Abdomen is soft. There is no mass.      Tenderness: There is no abdominal tenderness.   Musculoskeletal:         General: No tenderness. Normal range of motion.      Cervical back: Normal range of motion and neck supple.      Right lower leg: Edema present.      Left lower leg: Edema present.   Lymphadenopathy:      Cervical: No cervical adenopathy.   Skin:     General: Skin is warm and dry.      Findings: No rash.   Neurological:      Mental Status: He is alert and oriented to person, place, and time.      Comments: Gait is normal.           Lab Visit on 01/06/2022   Component Date Value Ref Range Status    Sodium 01/06/2022 141  136 - 145 mmol/L Final    Potassium 01/06/2022 4.8  3.5 - 5.1 mmol/L Final    Chloride 01/06/2022 108  95 - 110 mmol/L Final    CO2 01/06/2022 25  23 - 29 mmol/L Final    Glucose  01/06/2022 87  70 - 110 mg/dL Final    BUN 01/06/2022 30* 8 - 23 mg/dL Final    Creatinine 01/06/2022 1.8* 0.5 - 1.4 mg/dL Final    Calcium 01/06/2022 9.4  8.7 - 10.5 mg/dL Final    Total Protein 01/06/2022 6.8  6.0 - 8.4 g/dL Final    Albumin 01/06/2022 3.9  3.5 - 5.2 g/dL Final    Total Bilirubin 01/06/2022 0.9  0.1 - 1.0 mg/dL Final    Comment: For infants and newborns, interpretation of results should be based  on gestational age, weight and in agreement with clinical  observations.    Premature Infant recommended reference ranges:  Up to 24 hours.............<8.0 mg/dL  Up to 48 hours............<12.0 mg/dL  3-5 days..................<15.0 mg/dL  6-29 days.................<15.0 mg/dL      Alkaline Phosphatase 01/06/2022 82  55 - 135 U/L Final    AST 01/06/2022 24  10 - 40 U/L Final    ALT 01/06/2022 26  10 - 44 U/L Final    Anion Gap 01/06/2022 8  8 - 16 mmol/L Final    eGFR if  01/06/2022 39.9* >60 mL/min/1.73 m^2 Final    eGFR if non  01/06/2022 34.5* >60 mL/min/1.73 m^2 Final    Comment: Calculation used to obtain the estimated glomerular filtration  rate (eGFR) is the CKD-EPI equation.      Hospital Outpatient Visit on 11/09/2021   Component Date Value Ref Range Status    POC Creatinine 11/09/2021 2.2* 0.5 - 1.4 mg/dL Final    Sample 11/09/2021 VENOUS   Final   Office Visit on 10/27/2021   Component Date Value Ref Range Status    Final Pathologic Diagnosis 10/27/2021    Final                    Value:URINE, VOIDED, CYTOLOGY:  Negative for high grade urothelial carcinoma.  Bacteria present  Red blood cells present      Interp By K. Mo Mayhall, M.D., Signed on 10/29/2021 at 15:19    Disclaimer 10/27/2021    Final                    Value:Screening was performed at [facility], [address] [place] [address].     Lab Visit on 10/26/2021   Component Date Value Ref Range Status    Specimen UA 10/26/2021  Urine, Unspecified   Final    Color, UA 10/26/2021 Straw  Yellow, Straw, Madelaine Final    Appearance, UA 10/26/2021 Clear  Clear Final    pH, UA 10/26/2021 5.0  5.0 - 8.0 Final    Specific [place], UA 10/26/2021 1.005  1.005 - 1.030 Final    Protein, UA 10/26/2021 Negative  Negative Final    Comment: Recommend a 24 hour urine protein or a urine   protein/creatinine ratio if globulin induced proteinuria is  clinically suspected.      Glucose, UA 10/26/2021 Negative  Negative Final    Ketones, UA 10/26/2021 Negative  Negative Final    Bilirubin (UA) 10/26/2021 Negative  Negative Final    Occult Blood UA 10/26/2021 Negative  Negative Final    Nitrite, UA 10/26/2021 Negative  Negative Final    Leukocytes, UA 10/26/2021 Negative  Negative Final    Urine Culture, Routine 10/26/2021 No growth   Final   Lab Visit on 10/21/2021   Component Date Value Ref Range Status    Cholesterol 10/21/2021 156  120 - 199 mg/dL Final    Comment: The National Cholesterol Education Program (NCEP) has set the  following guidelines (reference ranges) for Cholesterol:  Optimal.....................<200 mg/dL  Borderline High.............200-239 mg/dL  High........................> or = 240 mg/dL      Triglycerides 10/21/2021 67  30 - 150 mg/dL Final    Comment: The National Cholesterol Education Program (NCEP) has set the  following guidelines (reference values) for triglycerides:  Normal......................<150 mg/dL  Borderline High.............150-199 mg/dL  High........................200-499 mg/dL      HDL 10/21/2021 73  40 - 75 mg/dL Final    Comment: The National Cholesterol Education Program (NCEP) has set the  following guidelines (reference values) for HDL Cholesterol:  Low...............<40 mg/dL  Optimal...........>60 mg/dL      LDL Cholesterol 10/21/2021 69.6  63.0 - 159.0 mg/dL Final    Comment: The National Cholesterol Education Program (NCEP) has set the  following guidelines (reference values) for LDL  Cholesterol:  Optimal.......................<130 mg/dL  Borderline High...............130-159 mg/dL  High..........................160-189 mg/dL  Very High.....................>190 mg/dL      HDL/Cholesterol Ratio 10/21/2021 46.8  20.0 - 50.0 % Final    Total Cholesterol/HDL Ratio 10/21/2021 2.1  2.0 - 5.0 Final    Non-HDL Cholesterol 10/21/2021 83  mg/dL Final    Comment: Risk category and Non-HDL cholesterol goals:  Coronary heart disease (CHD)or equivalent (10-year risk of CHD >20%):  Non-HDL cholesterol goal     <130 mg/dL  Two or more CHD risk factors and 10-year risk of CHD <= 20%:  Non-HDL cholesterol goal     <160 mg/dL  0 to 1 CHD risk factor:  Non-HDL cholesterol goal     <190 mg/dL     Lab Visit on 10/19/2021   Component Date Value Ref Range Status    Specimen UA 10/19/2021 Urine, Clean Catch   Final    Color, UA 10/19/2021 Yellow  Yellow, Straw, Madelaine Final    Appearance, UA 10/19/2021 Clear  Clear Final    pH, UA 10/19/2021 5.0  5.0 - 8.0 Final    Specific [place], UA 10/19/2021 1.015  1.005 - 1.030 Final    Protein, UA 10/19/2021 Negative  Negative Final    Comment: Recommend a 24 hour urine protein or a urine   protein/creatinine ratio if globulin induced proteinuria is  clinically suspected.      Glucose, UA 10/19/2021 Negative  Negative Final    Ketones, UA 10/19/2021 Negative  Negative Final    Bilirubin (UA) 10/19/2021 Negative  Negative Final    Occult Blood UA 10/19/2021 Negative  Negative Final    Nitrite, UA 10/19/2021 Negative  Negative Final    Leukocytes, UA 10/19/2021 Negative  Negative Final   Lab Visit on 10/19/2021   Component Date Value Ref Range Status    Sodium 10/19/2021 141  136 - 145 mmol/L Final    Potassium 10/19/2021 4.6  3.5 - 5.1 mmol/L Final    Chloride 10/19/2021 109  95 - 110 mmol/L Final    CO2 10/19/2021 23  23 - 29 mmol/L Final    Glucose 10/19/2021 96  70 - 110 mg/dL Final    BUN 10/19/2021 28* 8 - 23 mg/dL Final    Creatinine 10/19/2021 1.8* 0.5  - 1.4 mg/dL Final    Calcium 10/19/2021 9.4  8.7 - 10.5 mg/dL Final    Total Protein 10/19/2021 6.1  6.0 - 8.4 g/dL Final    Albumin 10/19/2021 3.8  3.5 - 5.2 g/dL Final    Total Bilirubin 10/19/2021 0.7  0.1 - 1.0 mg/dL Final    Comment: For infants and newborns, interpretation of results should be based  on gestational age, weight and in agreement with clinical  observations.    Premature Infant recommended reference ranges:  Up to 24 hours.............<8.0 mg/dL  Up to 48 hours............<12.0 mg/dL  3-5 days..................<15.0 mg/dL  6-29 days.................<15.0 mg/dL      Alkaline Phosphatase 10/19/2021 71  55 - 135 U/L Final    AST 10/19/2021 15  10 - 40 U/L Final    ALT 10/19/2021 14  10 - 44 U/L Final    Anion Gap 10/19/2021 9  8 - 16 mmol/L Final    eGFR if  10/19/2021 39.9* >60 mL/min/1.73 m^2 Final    eGFR if non  10/19/2021 34.5* >60 mL/min/1.73 m^2 Final    Comment: Calculation used to obtain the estimated glomerular filtration  rate (eGFR) is the CKD-EPI equation.       WBC 10/19/2021 5.35  3.90 - 12.70 K/uL Final    RBC 10/19/2021 3.61* 4.60 - 6.20 M/uL Final    Hemoglobin 10/19/2021 11.3* 14.0 - 18.0 g/dL Final    Hematocrit 10/19/2021 35.9* 40.0 - 54.0 % Final    MCV 10/19/2021 99* 82 - 98 fL Final    MCH 10/19/2021 31.3* 27.0 - 31.0 pg Final    MCHC 10/19/2021 31.5* 32.0 - 36.0 g/dL Final    RDW 10/19/2021 12.7  11.5 - 14.5 % Final    Platelets 10/19/2021 174  150 - 450 K/uL Final    MPV 10/19/2021 12.8  9.2 - 12.9 fL Final    Immature Granulocytes 10/19/2021 0.2  0.0 - 0.5 % Final    Gran # (ANC) 10/19/2021 2.6  1.8 - 7.7 K/uL Final    Immature Grans (Abs) 10/19/2021 0.01  0.00 - 0.04 K/uL Final    Comment: Mild elevation in immature granulocytes is non specific and   can be seen in a variety of conditions including stress response,   acute inflammation, trauma and pregnancy. Correlation with other   laboratory and clinical findings is  essential.      Lymph # 10/19/2021 1.7  1.0 - 4.8 K/uL Final    Mono # 10/19/2021 0.7  0.3 - 1.0 K/uL Final    Eos # 10/19/2021 0.3  0.0 - 0.5 K/uL Final    Baso # 10/19/2021 0.06  0.00 - 0.20 K/uL Final    nRBC 10/19/2021 0  0 /100 WBC Final    Gran % 10/19/2021 48.8  38.0 - 73.0 % Final    Lymph % 10/19/2021 30.8  18.0 - 48.0 % Final    Mono % 10/19/2021 13.1  4.0 - 15.0 % Final    Eosinophil % 10/19/2021 6.0  0.0 - 8.0 % Final    Basophil % 10/19/2021 1.1  0.0 - 1.9 % Final    Differential Method 10/19/2021 Automated   Final    TSH 10/19/2021 3.632  0.400 - 4.000 uIU/mL Final    Uric Acid 10/19/2021 5.2  3.4 - 7.0 mg/dL Final    PSA Diagnostic 10/19/2021 3.4  0.00 - 4.00 ng/mL Final    Comment: PSA Expected levels:  Hormonal Therapy: <0.05 ng/ml  Prostatectomy: <0.01 ng/ml  Radiation Therapy: <1.00 ng/ml         Assessment & Plan:      Adelfo was seen today for leg swelling.  Anticipate the patient will note gradual improvement in the edema since stopping felodipine.  Additional diuretic therapy will be instituted for improvement in symptoms.  Kidney function will be monitored.  An echocardiogram will also be obtained.    Diagnoses and all orders for this visit:    Edema, unspecified type  -     Echo Saline Bubble? No; Future  -     Comprehensive Metabolic Panel; Future  -     Basic Metabolic Panel; Future    Stage 3 chronic kidney disease, unspecified whether stage 3a or 3b CKD  -     Comprehensive Metabolic Panel; Future  -     Basic Metabolic Panel; Future    Essential hypertension  -     Comprehensive Metabolic Panel; Future  -     Basic Metabolic Panel; Future    ANNABELLE (obstructive sleep apnea)    Other orders  -     metOLazone (ZAROXOLYN) 2.5 MG tablet; Take 1 tab po 3 days a week as directed.         Follow up if symptoms worsen or fail to improve.     Romero Vogel MD

## 2022-01-11 ENCOUNTER — HOSPITAL ENCOUNTER (OUTPATIENT)
Dept: CARDIOLOGY | Facility: HOSPITAL | Age: 82
Discharge: HOME | End: 2022-01-11
Attending: INTERNAL MEDICINE
Payer: MEDICARE

## 2022-01-11 VITALS
WEIGHT: 227 LBS | HEART RATE: 52 BPM | BODY MASS INDEX: 33.62 KG/M2 | DIASTOLIC BLOOD PRESSURE: 70 MMHG | HEIGHT: 69 IN | SYSTOLIC BLOOD PRESSURE: 136 MMHG

## 2022-01-11 DIAGNOSIS — R60.9 EDEMA, UNSPECIFIED TYPE: ICD-10-CM

## 2022-01-11 LAB
ASCENDING AORTA: 3.7 CM
AV INDEX (PROSTH): 0.92
AV MEAN GRADIENT: 3 MMHG
AV PEAK GRADIENT: 5 MMHG
AV VALVE AREA: 4.28 CM2
AV VELOCITY RATIO: 0.88
BSA FOR ECHO PROCEDURE: 2.24 M2
CV ECHO LV RWT: 0.31 CM
DOP CALC AO PEAK VEL: 1.14 M/S
DOP CALC AO VTI: 26.87 CM
DOP CALC LVOT AREA: 4.6 CM2
DOP CALC LVOT DIAMETER: 2.43 CM
DOP CALC LVOT PEAK VEL: 1 M/S
DOP CALC LVOT STROKE VOLUME: 115.1 CM3
DOP CALCLVOT PEAK VEL VTI: 24.83 CM
E WAVE DECELERATION TIME: 180.51 MSEC
E/A RATIO: 1.26
E/E' RATIO: 9.26 M/S
ECHO LV POSTERIOR WALL: 0.89 CM (ref 0.6–1.1)
EJECTION FRACTION: 60 %
FRACTIONAL SHORTENING: 38 % (ref 28–44)
INTERVENTRICULAR SEPTUM: 1.01 CM (ref 0.6–1.1)
IVRT: 97.05 MSEC
LA MAJOR: 6.17 CM
LA MINOR: 5.94 CM
LA WIDTH: 4.46 CM
LEFT ATRIUM SIZE: 4.73 CM
LEFT ATRIUM VOLUME INDEX MOD: 43.2 ML/M2
LEFT ATRIUM VOLUME INDEX: 49.8 ML/M2
LEFT ATRIUM VOLUME MOD: 94.25 CM3
LEFT ATRIUM VOLUME: 108.54 CM3
LEFT INTERNAL DIMENSION IN SYSTOLE: 3.62 CM (ref 2.1–4)
LEFT VENTRICLE DIASTOLIC VOLUME INDEX: 76.83 ML/M2
LEFT VENTRICLE DIASTOLIC VOLUME: 167.49 ML
LEFT VENTRICLE MASS INDEX: 100 G/M2
LEFT VENTRICLE SYSTOLIC VOLUME INDEX: 25.4 ML/M2
LEFT VENTRICLE SYSTOLIC VOLUME: 55.29 ML
LEFT VENTRICULAR INTERNAL DIMENSION IN DIASTOLE: 5.81 CM (ref 3.5–6)
LEFT VENTRICULAR MASS: 218.74 G
LV LATERAL E/E' RATIO: 8.8 M/S
LV SEPTAL E/E' RATIO: 9.78 M/S
MV PEAK A VEL: 0.7 M/S
MV PEAK E VEL: 0.88 M/S
MV STENOSIS PRESSURE HALF TIME: 52.35 MS
MV VALVE AREA P 1/2 METHOD: 4.2 CM2
PISA TR MAX VEL: 2.65 M/S
PULM VEIN S/D RATIO: 1.5
PV PEAK D VEL: 0.5 M/S
PV PEAK S VEL: 0.75 M/S
RA MAJOR: 6.24 CM
RA PRESSURE: 8 MMHG
RA WIDTH: 4.16 CM
RIGHT VENTRICULAR END-DIASTOLIC DIMENSION: 3.32 CM
SINUS: 3.75 CM
STJ: 3.29 CM
TDI LATERAL: 0.1 M/S
TDI SEPTAL: 0.09 M/S
TDI: 0.1 M/S
TR MAX PG: 28 MMHG
TRICUSPID ANNULAR PLANE SYSTOLIC EXCURSION: 1.49 CM
TV REST PULMONARY ARTERY PRESSURE: 36 MMHG

## 2022-01-11 PROCEDURE — 93306 ECHO (CUPID ONLY): ICD-10-PCS | Mod: 26,,, | Performed by: INTERNAL MEDICINE

## 2022-01-11 PROCEDURE — 93306 TTE W/DOPPLER COMPLETE: CPT | Mod: 26,,, | Performed by: INTERNAL MEDICINE

## 2022-01-11 PROCEDURE — 93306 TTE W/DOPPLER COMPLETE: CPT

## 2022-01-13 ENCOUNTER — PATIENT MESSAGE (OUTPATIENT)
Dept: INTERNAL MEDICINE | Facility: CLINIC | Age: 82
End: 2022-01-13
Payer: MEDICARE

## 2022-01-13 NOTE — TELEPHONE ENCOUNTER
"email said  " Dr. Vogel, I had the Echocardiogram you ordered on Tuesday. The results included, "Severe left atrial enlargement." What, if anything, is the next step? My wife is concerned. I told her, "It's a serious diagnosis, but not urgent." What do you advise?  Adelfo  "      Please review results and advise if I can help.    "

## 2022-01-18 ENCOUNTER — CLINICAL SUPPORT (OUTPATIENT)
Dept: CARDIOLOGY | Facility: HOSPITAL | Age: 82
End: 2022-01-18
Payer: MEDICARE

## 2022-01-18 DIAGNOSIS — Z95.818 PRESENCE OF OTHER CARDIAC IMPLANTS AND GRAFTS: ICD-10-CM

## 2022-01-18 PROCEDURE — 93298 REM INTERROG DEV EVAL SCRMS: CPT | Mod: ,,, | Performed by: INTERNAL MEDICINE

## 2022-01-18 PROCEDURE — G2066 INTER DEVC REMOTE 30D: HCPCS | Performed by: INTERNAL MEDICINE

## 2022-01-18 PROCEDURE — 93298 CARDIAC DEVICE CHECK - REMOTE: ICD-10-PCS | Mod: ,,, | Performed by: INTERNAL MEDICINE

## 2022-02-15 ENCOUNTER — PATIENT MESSAGE (OUTPATIENT)
Dept: RHEUMATOLOGY | Facility: CLINIC | Age: 82
End: 2022-02-15
Payer: MEDICARE

## 2022-02-15 ENCOUNTER — LAB VISIT (OUTPATIENT)
Dept: LAB | Facility: HOSPITAL | Age: 82
End: 2022-02-15
Attending: INTERNAL MEDICINE
Payer: MEDICARE

## 2022-02-15 DIAGNOSIS — M1A.9XX1 CHRONIC TOPHACEOUS GOUT: ICD-10-CM

## 2022-02-15 LAB
ALBUMIN SERPL BCP-MCNC: 3.7 G/DL (ref 3.5–5.2)
ALP SERPL-CCNC: 73 U/L (ref 55–135)
ALT SERPL W/O P-5'-P-CCNC: 19 U/L (ref 10–44)
ANION GAP SERPL CALC-SCNC: 8 MMOL/L (ref 8–16)
AST SERPL-CCNC: 20 U/L (ref 10–40)
BILIRUB SERPL-MCNC: 0.7 MG/DL (ref 0.1–1)
BUN SERPL-MCNC: 41 MG/DL (ref 8–23)
CALCIUM SERPL-MCNC: 9.6 MG/DL (ref 8.7–10.5)
CHLORIDE SERPL-SCNC: 103 MMOL/L (ref 95–110)
CO2 SERPL-SCNC: 28 MMOL/L (ref 23–29)
CREAT SERPL-MCNC: 1.8 MG/DL (ref 0.5–1.4)
EST. GFR  (AFRICAN AMERICAN): 39.9 ML/MIN/1.73 M^2
EST. GFR  (NON AFRICAN AMERICAN): 34.5 ML/MIN/1.73 M^2
GLUCOSE SERPL-MCNC: 99 MG/DL (ref 70–110)
POTASSIUM SERPL-SCNC: 4.6 MMOL/L (ref 3.5–5.1)
PROT SERPL-MCNC: 6.8 G/DL (ref 6–8.4)
SODIUM SERPL-SCNC: 139 MMOL/L (ref 136–145)
URATE SERPL-MCNC: 7 MG/DL (ref 3.4–7)

## 2022-02-15 PROCEDURE — 80053 COMPREHEN METABOLIC PANEL: CPT | Performed by: INTERNAL MEDICINE

## 2022-02-15 PROCEDURE — 36415 COLL VENOUS BLD VENIPUNCTURE: CPT | Mod: PN | Performed by: INTERNAL MEDICINE

## 2022-02-15 PROCEDURE — 84550 ASSAY OF BLOOD/URIC ACID: CPT | Performed by: INTERNAL MEDICINE

## 2022-02-17 ENCOUNTER — CLINICAL SUPPORT (OUTPATIENT)
Dept: CARDIOLOGY | Facility: HOSPITAL | Age: 82
End: 2022-02-17
Payer: MEDICARE

## 2022-02-17 ENCOUNTER — TELEPHONE (OUTPATIENT)
Dept: RHEUMATOLOGY | Facility: CLINIC | Age: 82
End: 2022-02-17
Payer: MEDICARE

## 2022-02-17 DIAGNOSIS — Z95.818 PRESENCE OF OTHER CARDIAC IMPLANTS AND GRAFTS: ICD-10-CM

## 2022-02-17 PROCEDURE — G2066 INTER DEVC REMOTE 30D: HCPCS | Performed by: INTERNAL MEDICINE

## 2022-02-17 PROCEDURE — 93298 REM INTERROG DEV EVAL SCRMS: CPT | Mod: ,,, | Performed by: INTERNAL MEDICINE

## 2022-02-17 PROCEDURE — 93298 CARDIAC DEVICE CHECK - REMOTE: ICD-10-PCS | Mod: ,,, | Performed by: INTERNAL MEDICINE

## 2022-02-18 ENCOUNTER — OFFICE VISIT (OUTPATIENT)
Dept: RHEUMATOLOGY | Facility: CLINIC | Age: 82
End: 2022-02-18
Payer: MEDICARE

## 2022-02-18 VITALS
SYSTOLIC BLOOD PRESSURE: 124 MMHG | HEART RATE: 79 BPM | BODY MASS INDEX: 32.07 KG/M2 | DIASTOLIC BLOOD PRESSURE: 69 MMHG | HEIGHT: 70 IN | WEIGHT: 224 LBS

## 2022-02-18 DIAGNOSIS — D50.9 IRON DEFICIENCY ANEMIA, UNSPECIFIED IRON DEFICIENCY ANEMIA TYPE: Primary | ICD-10-CM

## 2022-02-18 DIAGNOSIS — E53.8 LOW SERUM VITAMIN B12: ICD-10-CM

## 2022-02-18 PROCEDURE — 1126F PR PAIN SEVERITY QUANTIFIED, NO PAIN PRESENT: ICD-10-PCS | Mod: CPTII,S$GLB,, | Performed by: INTERNAL MEDICINE

## 2022-02-18 PROCEDURE — 99999 PR PBB SHADOW E&M-EST. PATIENT-LVL IV: ICD-10-PCS | Mod: PBBFAC,,, | Performed by: INTERNAL MEDICINE

## 2022-02-18 PROCEDURE — 3074F PR MOST RECENT SYSTOLIC BLOOD PRESSURE < 130 MM HG: ICD-10-PCS | Mod: CPTII,S$GLB,, | Performed by: INTERNAL MEDICINE

## 2022-02-18 PROCEDURE — 1101F PR PT FALLS ASSESS DOC 0-1 FALLS W/OUT INJ PAST YR: ICD-10-PCS | Mod: CPTII,S$GLB,, | Performed by: INTERNAL MEDICINE

## 2022-02-18 PROCEDURE — 1159F MED LIST DOCD IN RCRD: CPT | Mod: CPTII,S$GLB,, | Performed by: INTERNAL MEDICINE

## 2022-02-18 PROCEDURE — 3078F PR MOST RECENT DIASTOLIC BLOOD PRESSURE < 80 MM HG: ICD-10-PCS | Mod: CPTII,S$GLB,, | Performed by: INTERNAL MEDICINE

## 2022-02-18 PROCEDURE — 99213 OFFICE O/P EST LOW 20 MIN: CPT | Mod: S$GLB,,, | Performed by: INTERNAL MEDICINE

## 2022-02-18 PROCEDURE — 1159F PR MEDICATION LIST DOCUMENTED IN MEDICAL RECORD: ICD-10-PCS | Mod: CPTII,S$GLB,, | Performed by: INTERNAL MEDICINE

## 2022-02-18 PROCEDURE — 1101F PT FALLS ASSESS-DOCD LE1/YR: CPT | Mod: CPTII,S$GLB,, | Performed by: INTERNAL MEDICINE

## 2022-02-18 PROCEDURE — 1126F AMNT PAIN NOTED NONE PRSNT: CPT | Mod: CPTII,S$GLB,, | Performed by: INTERNAL MEDICINE

## 2022-02-18 PROCEDURE — 99213 PR OFFICE/OUTPT VISIT, EST, LEVL III, 20-29 MIN: ICD-10-PCS | Mod: S$GLB,,, | Performed by: INTERNAL MEDICINE

## 2022-02-18 PROCEDURE — 3074F SYST BP LT 130 MM HG: CPT | Mod: CPTII,S$GLB,, | Performed by: INTERNAL MEDICINE

## 2022-02-18 PROCEDURE — 99999 PR PBB SHADOW E&M-EST. PATIENT-LVL IV: CPT | Mod: PBBFAC,,, | Performed by: INTERNAL MEDICINE

## 2022-02-18 PROCEDURE — 3288F FALL RISK ASSESSMENT DOCD: CPT | Mod: CPTII,S$GLB,, | Performed by: INTERNAL MEDICINE

## 2022-02-18 PROCEDURE — 3078F DIAST BP <80 MM HG: CPT | Mod: CPTII,S$GLB,, | Performed by: INTERNAL MEDICINE

## 2022-02-18 PROCEDURE — 3288F PR FALLS RISK ASSESSMENT DOCUMENTED: ICD-10-PCS | Mod: CPTII,S$GLB,, | Performed by: INTERNAL MEDICINE

## 2022-02-18 RX ORDER — FEBUXOSTAT 40 MG/1
40 TABLET, FILM COATED ORAL DAILY
Qty: 90 TABLET | Refills: 3 | Status: SHIPPED | OUTPATIENT
Start: 2022-02-18 | End: 2023-03-21 | Stop reason: SDUPTHER

## 2022-02-18 NOTE — PROGRESS NOTES
Pre chart    Answers for HPI/ROS submitted by the patient on 2/15/2022  fever: No  eye redness: No  mouth sores: No  headaches: No  shortness of breath: No  chest pain: No  trouble swallowing: No  diarrhea: No  constipation: No  unexpected weight change: No  genital sore: No  During the last 3 days, have you had a skin rash?: No  Bruises or bleeds easily: No  cough: No

## 2022-02-18 NOTE — PROGRESS NOTES
"Subjective:       Patient ID: Mckinley R Jennifer, PhD is a 81 y.o. male.    Chief Complaint: Chronic tophaceous gout  HPI no acute gout. Bought bike, has ellliptical. + yard work, walking. No acute gout. Occ impingement right shoulder, not currently  Review of Systems   Constitutional: Negative for fever and unexpected weight change.   HENT: Negative for mouth sores and trouble swallowing.    Eyes: Negative for redness.   Respiratory: Negative for cough and shortness of breath.    Cardiovascular: Negative for chest pain.   Gastrointestinal: Negative for constipation and diarrhea.   Genitourinary: Negative for genital sores.   Skin: Negative for rash.   Neurological: Negative for headaches.   Hematological: Does not bruise/bleed easily.         Objective:   /69   Pulse 79   Ht 5' 9.6" (1.768 m)   Wt 101.6 kg (224 lb)   BMI 32.51 kg/m²      Physical Exam   Pulmonary/Chest: Effort normal and breath sounds normal. No stridor. No respiratory distress. He has no wheezes. He has no rhonchi. He has no rales.         Results for MCKINLEY BISHOP, PHD (MRN [id number]) as of 2/18/2022 09:31   Ref. Range 2/15/2022 10:30   Sodium Latest Ref Range: 136 - 145 mmol/L 139   Potassium Latest Ref Range: 3.5 - 5.1 mmol/L 4.6   Chloride Latest Ref Range: 95 - 110 mmol/L 103   CO2 Latest Ref Range: 23 - 29 mmol/L 28   Anion Gap Latest Ref Range: 8 - 16 mmol/L 8   BUN Latest Ref Range: 8 - 23 mg/dL 41 (H)   Creatinine Latest Ref Range: 0.5 - 1.4 mg/dL 1.8 (H)   eGFR if non African American Latest Ref Range: >60 mL/min/1.73 m^2 34.5 (A)   eGFR if African American Latest Ref Range: >60 mL/min/1.73 m^2 39.9 (A)   Glucose Latest Ref Range: 70 - 110 mg/dL 99   Calcium Latest Ref Range: 8.7 - 10.5 mg/dL 9.6   Alkaline Phosphatase Latest Ref Range: 55 - 135 U/L 73   PROTEIN TOTAL Latest Ref Range: 6.0 - 8.4 g/dL 6.8   Albumin Latest Ref Range: 3.5 - 5.2 g/dL 3.7   Uric Acid Latest Ref Range: 3.4 - 7.0 mg/dL 7.0   BILIRUBIN TOTAL Latest " Ref Range: 0.1 - 1.0 mg/dL 0.7   AST Latest Ref Range: 10 - 40 U/L 20   ALT Latest Ref Range: 10 - 44 U/L 19   Results for MCKINLEY BISHOP, PHD (MRN [id number]) as of 2/18/2022 09:31   Ref. 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 Intercritical, no acute attacks since stopping colchicine 0.3mg [place] 11/11/20  H/o Allopurinol hepatotoxicity  CKD stage 3b, stable creat 1.8 eGFR 34.5  obesity, weigh unchanged  Hyperlipidemia, LDL 69.6 .2 10/21/21 on atorvastatin 20mg daily  /69  , metoprolol succinate 25mg daily and irbesartan 150mg daily, furosemide 40mg twice weekly metolazone 2.5mg daily  Mild stable macrocytic anemia    Plan:   Continue febuxostat 40mg daily  Recheck cmp and uric acid in 4 wks prior to consideration of increasing febuxostat dose.  Anemia labs in 4 wks as well  Cont exercise: yard work, walk, elliptical, bike  RTC 6 months with cbc, cmp, uric acid

## 2022-03-01 ENCOUNTER — TELEPHONE (OUTPATIENT)
Dept: ENDOSCOPY | Facility: HOSPITAL | Age: 82
End: 2022-03-01
Payer: MEDICARE

## 2022-03-01 DIAGNOSIS — R93.2 ABNORMAL LIVER DIAGNOSTIC IMAGING: Primary | ICD-10-CM

## 2022-03-03 ENCOUNTER — PATIENT MESSAGE (OUTPATIENT)
Dept: NEUROLOGY | Facility: CLINIC | Age: 82
End: 2022-03-03
Payer: MEDICARE

## 2022-03-18 ENCOUNTER — LAB VISIT (OUTPATIENT)
Dept: LAB | Facility: HOSPITAL | Age: 82
End: 2022-03-18
Attending: INTERNAL MEDICINE
Payer: MEDICARE

## 2022-03-18 ENCOUNTER — PATIENT MESSAGE (OUTPATIENT)
Dept: RHEUMATOLOGY | Facility: CLINIC | Age: 82
End: 2022-03-18
Payer: MEDICARE

## 2022-03-18 DIAGNOSIS — D50.9 IRON DEFICIENCY ANEMIA, UNSPECIFIED IRON DEFICIENCY ANEMIA TYPE: ICD-10-CM

## 2022-03-18 DIAGNOSIS — E53.8 LOW SERUM VITAMIN B12: ICD-10-CM

## 2022-03-18 LAB
FERRITIN SERPL-MCNC: 59 NG/ML (ref 20–300)
IRON SERPL-MCNC: 91 UG/DL (ref 45–160)
SATURATED IRON: 22 % (ref 20–50)
TOTAL IRON BINDING CAPACITY: 410 UG/DL (ref 250–450)
TRANSFERRIN SERPL-MCNC: 277 MG/DL (ref 200–375)
VIT B12 SERPL-MCNC: 224 PG/ML (ref 210–950)

## 2022-03-18 PROCEDURE — 82607 VITAMIN B-12: CPT | Performed by: INTERNAL MEDICINE

## 2022-03-18 PROCEDURE — 36415 COLL VENOUS BLD VENIPUNCTURE: CPT | Mod: PN | Performed by: INTERNAL MEDICINE

## 2022-03-18 PROCEDURE — 84466 ASSAY OF TRANSFERRIN: CPT | Performed by: INTERNAL MEDICINE

## 2022-03-18 PROCEDURE — 82728 ASSAY OF FERRITIN: CPT | Performed by: INTERNAL MEDICINE

## 2022-03-19 ENCOUNTER — CLINICAL SUPPORT (OUTPATIENT)
Dept: CARDIOLOGY | Facility: HOSPITAL | Age: 82
End: 2022-03-19
Payer: MEDICARE

## 2022-03-19 DIAGNOSIS — Z95.818 PRESENCE OF OTHER CARDIAC IMPLANTS AND GRAFTS: ICD-10-CM

## 2022-03-19 PROCEDURE — G2066 INTER DEVC REMOTE 30D: HCPCS | Performed by: INTERNAL MEDICINE

## 2022-03-23 ENCOUNTER — PATIENT MESSAGE (OUTPATIENT)
Dept: ENDOSCOPY | Facility: HOSPITAL | Age: 82
End: 2022-03-23
Payer: MEDICARE

## 2022-03-23 ENCOUNTER — TELEPHONE (OUTPATIENT)
Dept: ENDOSCOPY | Facility: HOSPITAL | Age: 82
End: 2022-03-23
Payer: MEDICARE

## 2022-03-30 ENCOUNTER — PATIENT MESSAGE (OUTPATIENT)
Dept: RHEUMATOLOGY | Facility: CLINIC | Age: 82
End: 2022-03-30
Payer: MEDICARE

## 2022-04-18 ENCOUNTER — CLINICAL SUPPORT (OUTPATIENT)
Dept: CARDIOLOGY | Facility: HOSPITAL | Age: 82
End: 2022-04-18
Payer: MEDICARE

## 2022-04-18 DIAGNOSIS — Z95.818 PRESENCE OF OTHER CARDIAC IMPLANTS AND GRAFTS: ICD-10-CM

## 2022-04-18 PROCEDURE — 93298 REM INTERROG DEV EVAL SCRMS: CPT | Mod: ,,, | Performed by: INTERNAL MEDICINE

## 2022-04-18 PROCEDURE — G2066 INTER DEVC REMOTE 30D: HCPCS | Performed by: INTERNAL MEDICINE

## 2022-04-18 PROCEDURE — 93298 CARDIAC DEVICE CHECK - REMOTE: ICD-10-PCS | Mod: ,,, | Performed by: INTERNAL MEDICINE

## 2022-04-19 ENCOUNTER — PATIENT MESSAGE (OUTPATIENT)
Dept: RHEUMATOLOGY | Facility: CLINIC | Age: 82
End: 2022-04-19
Payer: MEDICARE

## 2022-04-19 ENCOUNTER — LAB VISIT (OUTPATIENT)
Dept: LAB | Facility: HOSPITAL | Age: 82
End: 2022-04-19
Attending: INTERNAL MEDICINE
Payer: MEDICARE

## 2022-04-19 DIAGNOSIS — I10 ESSENTIAL HYPERTENSION: ICD-10-CM

## 2022-04-19 DIAGNOSIS — D64.9 CHRONIC ANEMIA: ICD-10-CM

## 2022-04-19 DIAGNOSIS — M1A.9XX1 CHRONIC TOPHACEOUS GOUT: ICD-10-CM

## 2022-04-19 DIAGNOSIS — E78.49 OTHER HYPERLIPIDEMIA: ICD-10-CM

## 2022-04-19 LAB
ALBUMIN SERPL BCP-MCNC: 3.8 G/DL (ref 3.5–5.2)
ALP SERPL-CCNC: 66 U/L (ref 55–135)
ALT SERPL W/O P-5'-P-CCNC: 20 U/L (ref 10–44)
ANION GAP SERPL CALC-SCNC: 10 MMOL/L (ref 8–16)
AST SERPL-CCNC: 22 U/L (ref 10–40)
BASOPHILS # BLD AUTO: 0.07 K/UL (ref 0–0.2)
BASOPHILS NFR BLD: 1.1 % (ref 0–1.9)
BILIRUB SERPL-MCNC: 1 MG/DL (ref 0.1–1)
BUN SERPL-MCNC: 36 MG/DL (ref 8–23)
CALCIUM SERPL-MCNC: 9.7 MG/DL (ref 8.7–10.5)
CHLORIDE SERPL-SCNC: 102 MMOL/L (ref 95–110)
CHOLEST SERPL-MCNC: 165 MG/DL (ref 120–199)
CHOLEST/HDLC SERPL: 2.4 {RATIO} (ref 2–5)
CO2 SERPL-SCNC: 26 MMOL/L (ref 23–29)
CREAT SERPL-MCNC: 2.2 MG/DL (ref 0.5–1.4)
DIFFERENTIAL METHOD: ABNORMAL
EOSINOPHIL # BLD AUTO: 0.3 K/UL (ref 0–0.5)
EOSINOPHIL NFR BLD: 5.1 % (ref 0–8)
ERYTHROCYTE [DISTWIDTH] IN BLOOD BY AUTOMATED COUNT: 12.6 % (ref 11.5–14.5)
EST. GFR  (AFRICAN AMERICAN): 31.3 ML/MIN/1.73 M^2
EST. GFR  (NON AFRICAN AMERICAN): 27.1 ML/MIN/1.73 M^2
GLUCOSE SERPL-MCNC: 101 MG/DL (ref 70–110)
HCT VFR BLD AUTO: 37.1 % (ref 40–54)
HDLC SERPL-MCNC: 69 MG/DL (ref 40–75)
HDLC SERPL: 41.8 % (ref 20–50)
HGB BLD-MCNC: 11.8 G/DL (ref 14–18)
IMM GRANULOCYTES # BLD AUTO: 0.02 K/UL (ref 0–0.04)
IMM GRANULOCYTES NFR BLD AUTO: 0.3 % (ref 0–0.5)
LDLC SERPL CALC-MCNC: 79.2 MG/DL (ref 63–159)
LYMPHOCYTES # BLD AUTO: 2.3 K/UL (ref 1–4.8)
LYMPHOCYTES NFR BLD: 35.1 % (ref 18–48)
MCH RBC QN AUTO: 31.4 PG (ref 27–31)
MCHC RBC AUTO-ENTMCNC: 31.8 G/DL (ref 32–36)
MCV RBC AUTO: 99 FL (ref 82–98)
MONOCYTES # BLD AUTO: 0.8 K/UL (ref 0.3–1)
MONOCYTES NFR BLD: 12.6 % (ref 4–15)
NEUTROPHILS # BLD AUTO: 3 K/UL (ref 1.8–7.7)
NEUTROPHILS NFR BLD: 45.8 % (ref 38–73)
NONHDLC SERPL-MCNC: 96 MG/DL
NRBC BLD-RTO: 0 /100 WBC
PLATELET # BLD AUTO: 198 K/UL (ref 150–450)
PMV BLD AUTO: 12.8 FL (ref 9.2–12.9)
POTASSIUM SERPL-SCNC: 4.4 MMOL/L (ref 3.5–5.1)
PROT SERPL-MCNC: 6.9 G/DL (ref 6–8.4)
RBC # BLD AUTO: 3.76 M/UL (ref 4.6–6.2)
SODIUM SERPL-SCNC: 138 MMOL/L (ref 136–145)
TRIGL SERPL-MCNC: 84 MG/DL (ref 30–150)
TSH SERPL DL<=0.005 MIU/L-ACNC: 3.15 UIU/ML (ref 0.4–4)
URATE SERPL-MCNC: 7.6 MG/DL (ref 3.4–7)
WBC # BLD AUTO: 6.49 K/UL (ref 3.9–12.7)

## 2022-04-19 PROCEDURE — 84550 ASSAY OF BLOOD/URIC ACID: CPT | Performed by: INTERNAL MEDICINE

## 2022-04-19 PROCEDURE — 84443 ASSAY THYROID STIM HORMONE: CPT | Performed by: INTERNAL MEDICINE

## 2022-04-19 PROCEDURE — 80053 COMPREHEN METABOLIC PANEL: CPT | Performed by: INTERNAL MEDICINE

## 2022-04-19 PROCEDURE — 36415 COLL VENOUS BLD VENIPUNCTURE: CPT | Mod: PN | Performed by: INTERNAL MEDICINE

## 2022-04-19 PROCEDURE — 80061 LIPID PANEL: CPT | Performed by: INTERNAL MEDICINE

## 2022-04-19 PROCEDURE — 85025 COMPLETE CBC W/AUTO DIFF WBC: CPT | Performed by: INTERNAL MEDICINE

## 2022-04-20 ENCOUNTER — TELEPHONE (OUTPATIENT)
Dept: RHEUMATOLOGY | Facility: CLINIC | Age: 82
End: 2022-04-20
Payer: MEDICARE

## 2022-04-26 ENCOUNTER — OFFICE VISIT (OUTPATIENT)
Dept: INTERNAL MEDICINE | Facility: CLINIC | Age: 82
End: 2022-04-26
Payer: MEDICARE

## 2022-04-26 VITALS
WEIGHT: 223.56 LBS | BODY MASS INDEX: 33.11 KG/M2 | DIASTOLIC BLOOD PRESSURE: 70 MMHG | SYSTOLIC BLOOD PRESSURE: 120 MMHG | TEMPERATURE: 97 F | HEIGHT: 69 IN | HEART RATE: 81 BPM | OXYGEN SATURATION: 97 %

## 2022-04-26 DIAGNOSIS — D64.9 CHRONIC ANEMIA: ICD-10-CM

## 2022-04-26 DIAGNOSIS — K86.2 PANCREATIC CYST: ICD-10-CM

## 2022-04-26 DIAGNOSIS — E78.49 OTHER HYPERLIPIDEMIA: ICD-10-CM

## 2022-04-26 DIAGNOSIS — I10 ESSENTIAL HYPERTENSION: ICD-10-CM

## 2022-04-26 DIAGNOSIS — N18.30 STAGE 3 CHRONIC KIDNEY DISEASE, UNSPECIFIED WHETHER STAGE 3A OR 3B CKD: ICD-10-CM

## 2022-04-26 DIAGNOSIS — Z00.00 WELL ADULT EXAM: Primary | ICD-10-CM

## 2022-04-26 DIAGNOSIS — G47.33 OSA (OBSTRUCTIVE SLEEP APNEA): ICD-10-CM

## 2022-04-26 DIAGNOSIS — J45.20 INTERMITTENT ASTHMA WITHOUT COMPLICATION, UNSPECIFIED ASTHMA SEVERITY: ICD-10-CM

## 2022-04-26 DIAGNOSIS — K21.00 GASTROESOPHAGEAL REFLUX DISEASE WITH ESOPHAGITIS WITHOUT HEMORRHAGE: ICD-10-CM

## 2022-04-26 PROCEDURE — 1159F PR MEDICATION LIST DOCUMENTED IN MEDICAL RECORD: ICD-10-PCS | Mod: CPTII,S$GLB,, | Performed by: INTERNAL MEDICINE

## 2022-04-26 PROCEDURE — 3288F FALL RISK ASSESSMENT DOCD: CPT | Mod: CPTII,S$GLB,, | Performed by: INTERNAL MEDICINE

## 2022-04-26 PROCEDURE — 3074F PR MOST RECENT SYSTOLIC BLOOD PRESSURE < 130 MM HG: ICD-10-PCS | Mod: CPTII,S$GLB,, | Performed by: INTERNAL MEDICINE

## 2022-04-26 PROCEDURE — 3288F PR FALLS RISK ASSESSMENT DOCUMENTED: ICD-10-PCS | Mod: CPTII,S$GLB,, | Performed by: INTERNAL MEDICINE

## 2022-04-26 PROCEDURE — 1159F MED LIST DOCD IN RCRD: CPT | Mod: CPTII,S$GLB,, | Performed by: INTERNAL MEDICINE

## 2022-04-26 PROCEDURE — 3074F SYST BP LT 130 MM HG: CPT | Mod: CPTII,S$GLB,, | Performed by: INTERNAL MEDICINE

## 2022-04-26 PROCEDURE — 1101F PR PT FALLS ASSESS DOC 0-1 FALLS W/OUT INJ PAST YR: ICD-10-PCS | Mod: CPTII,S$GLB,, | Performed by: INTERNAL MEDICINE

## 2022-04-26 PROCEDURE — 99397 PR PREVENTIVE VISIT,EST,65 & OVER: ICD-10-PCS | Mod: S$GLB,,, | Performed by: INTERNAL MEDICINE

## 2022-04-26 PROCEDURE — 99397 PER PM REEVAL EST PAT 65+ YR: CPT | Mod: S$GLB,,, | Performed by: INTERNAL MEDICINE

## 2022-04-26 PROCEDURE — 1101F PT FALLS ASSESS-DOCD LE1/YR: CPT | Mod: CPTII,S$GLB,, | Performed by: INTERNAL MEDICINE

## 2022-04-26 PROCEDURE — 1160F PR REVIEW ALL MEDS BY PRESCRIBER/CLIN PHARMACIST DOCUMENTED: ICD-10-PCS | Mod: CPTII,S$GLB,, | Performed by: INTERNAL MEDICINE

## 2022-04-26 PROCEDURE — 99999 PR PBB SHADOW E&M-EST. PATIENT-LVL V: ICD-10-PCS | Mod: PBBFAC,,, | Performed by: INTERNAL MEDICINE

## 2022-04-26 PROCEDURE — 3078F PR MOST RECENT DIASTOLIC BLOOD PRESSURE < 80 MM HG: ICD-10-PCS | Mod: CPTII,S$GLB,, | Performed by: INTERNAL MEDICINE

## 2022-04-26 PROCEDURE — 3078F DIAST BP <80 MM HG: CPT | Mod: CPTII,S$GLB,, | Performed by: INTERNAL MEDICINE

## 2022-04-26 PROCEDURE — 1126F PR PAIN SEVERITY QUANTIFIED, NO PAIN PRESENT: ICD-10-PCS | Mod: CPTII,S$GLB,, | Performed by: INTERNAL MEDICINE

## 2022-04-26 PROCEDURE — 99999 PR PBB SHADOW E&M-EST. PATIENT-LVL V: CPT | Mod: PBBFAC,,, | Performed by: INTERNAL MEDICINE

## 2022-04-26 PROCEDURE — 1160F RVW MEDS BY RX/DR IN RCRD: CPT | Mod: CPTII,S$GLB,, | Performed by: INTERNAL MEDICINE

## 2022-04-26 PROCEDURE — 1126F AMNT PAIN NOTED NONE PRSNT: CPT | Mod: CPTII,S$GLB,, | Performed by: INTERNAL MEDICINE

## 2022-04-27 ENCOUNTER — TELEPHONE (OUTPATIENT)
Dept: NEPHROLOGY | Facility: CLINIC | Age: 82
End: 2022-04-27
Payer: MEDICARE

## 2022-04-27 NOTE — TELEPHONE ENCOUNTER
CHARLEY Sung MA; Erasto Barnes RN  Caller: @[phone] (Today, 10:13 AM)           Previous Messages       ----- Message -----   From: Alexia Graves MA   Sent: 4/27/2022   1:33 PM CDT   To: Lety Vogel LPN       ----- Message -----   From: Alek Willoughby   Sent: 4/27/2022  10:15 AM CDT   To: Lesia Gonzalez Staff     Pt requesting a call back regarding his appt scheduled with Dr. Sweet on 06/22/22 pt states he seen Dr. Graf back in 2018 and would prefer to see Dr. Graf but if the changes cannot be made he would keep his current appt.  Please call to discuss further.          Spoke with patient and informed that DR Graf will arrange a virtual appointment in May and will cancel appointment with Dr Sweet, verbalized understanding.

## 2022-04-28 NOTE — PROGRESS NOTES
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    Chief Complaint: Annual Exam    HPI   The patient presents for annual physical examination and follow-up.  The patient reports he has been doing well.  Home blood pressure readings have been good.  He states his heart rate has been in the low 60s on his current medication.  Leg edema has significantly better.  He monitors the calf circumference.  Current diuretic therapy appears to be very effective.  No adverse side effects have been noted.    His asthma has been stable.  Peak flows have ranged from 520-560.    He has chronic lower back pain.  Does obtain regular therapeutic massage treatments.  These have been very helpful.  He is not taking any pain medication at the present time.    Active medical conditions include hypertension, asthma, chronic kidney disease, and pancreatic cyst.  He has recurrent leg edema.  He also has recurrent lower back pain.    Review of Systems   Constitutional: Negative for activity change, appetite change, chills, fatigue, fever and unexpected weight change.   HENT: Negative for nasal congestion, ear pain, nosebleeds and postnasal drip.    Eyes: Negative for pain, redness, itching and visual disturbance.   Respiratory: Negative for cough, chest tightness, shortness of breath and wheezing.    Cardiovascular: Positive for leg swelling. Negative for chest pain and palpitations.   Gastrointestinal: Negative for abdominal pain, blood in stool, constipation, nausea and vomiting.   Genitourinary: Negative for difficulty urinating, dysuria, frequency, hematuria and urgency.   Musculoskeletal: Positive for arthralgias and back pain. Negative for gait problem, joint swelling, myalgias, neck pain and neck stiffness.   Integumentary:  Negative for color change and rash.   Neurological: Negative for dizziness, seizures, syncope, weakness, light-headedness, numbness and headaches.   Hematological: Does not bruise/bleed  easily.   Psychiatric/Behavioral: Negative for agitation, confusion, hallucinations and sleep disturbance. The patient is not nervous/anxious.             Physical Exam  Vitals and nursing note reviewed.   Constitutional:       General: He is not in acute distress.     Appearance: He is well-developed.      Comments: The patient has lost 4 lb since 01/06/2022.   HENT:      Head: Normocephalic and atraumatic.      Right Ear: External ear normal.      Left Ear: External ear normal.      Mouth/Throat:      Pharynx: No oropharyngeal exudate.   Eyes:      General: No scleral icterus.     Conjunctiva/sclera: Conjunctivae normal.      Pupils: Pupils are equal, round, and reactive to light.   Neck:      Thyroid: No thyromegaly.      Vascular: No JVD.   Cardiovascular:      Rate and Rhythm: Normal rate and regular rhythm.      Heart sounds: Normal heart sounds. No murmur heard.    No friction rub. No gallop.   Pulmonary:      Effort: Pulmonary effort is normal. No respiratory distress.      Breath sounds: Normal breath sounds. No wheezing or rales.   Abdominal:      General: Bowel sounds are normal. There is no distension.      Palpations: Abdomen is soft. There is no mass.      Tenderness: There is no abdominal tenderness. There is no guarding.   Musculoskeletal:         General: No tenderness. Normal range of motion.      Cervical back: Normal range of motion and neck supple.      Right lower leg: No edema.      Left lower leg: No edema.   Lymphadenopathy:      Cervical: No cervical adenopathy.   Skin:     General: Skin is warm and dry.      Findings: No rash.   Neurological:      Mental Status: He is alert and oriented to person, place, and time.      Cranial Nerves: No cranial nerve deficit.      Motor: No abnormal muscle tone.      Deep Tendon Reflexes: Reflexes are normal and symmetric.   Psychiatric:         Behavior: Behavior normal.         Thought Content: Thought content normal.           Lab Visit on 04/19/2022    Component Date Value Ref Range Status    Uric Acid 04/19/2022 7.6 (A) 3.4 - 7.0 mg/dL Final    Cholesterol 04/19/2022 165  120 - 199 mg/dL Final    Comment: The National Cholesterol Education Program (NCEP) has set the  following guidelines (reference ranges) for Cholesterol:  Optimal.....................<200 mg/dL  Borderline High.............200-239 mg/dL  High........................> or = 240 mg/dL      Triglycerides 04/19/2022 84  30 - 150 mg/dL Final    Comment: The National Cholesterol Education Program (NCEP) has set the  following guidelines (reference values) for triglycerides:  Normal......................<150 mg/dL  Borderline High.............150-199 mg/dL  High........................200-499 mg/dL      HDL 04/19/2022 69  40 - 75 mg/dL Final    Comment: The National Cholesterol Education Program (NCEP) has set the  following guidelines (reference values) for HDL Cholesterol:  Low...............<40 mg/dL  Optimal...........>60 mg/dL      LDL Cholesterol 04/19/2022 79.2  63.0 - 159.0 mg/dL Final    Comment: The National Cholesterol Education Program (NCEP) has set the  following guidelines (reference values) for LDL Cholesterol:  Optimal.......................<130 mg/dL  Borderline High...............130-159 mg/dL  High..........................160-189 mg/dL  Very High.....................>190 mg/dL      HDL/Cholesterol Ratio 04/19/2022 41.8  20.0 - 50.0 % Final    Total Cholesterol/HDL Ratio 04/19/2022 2.4  2.0 - 5.0 Final    Non-HDL Cholesterol 04/19/2022 96  mg/dL Final    Comment: Risk category and Non-HDL cholesterol goals:  Coronary heart disease (CHD)or equivalent (10-year risk of CHD >20%):  Non-HDL cholesterol goal     <130 mg/dL  Two or more CHD risk factors and 10-year risk of CHD <= 20%:  Non-HDL cholesterol goal     <160 mg/dL  0 to 1 CHD risk factor:  Non-HDL cholesterol goal     <190 mg/dL      Sodium 04/19/2022 138  136 - 145 mmol/L Final    Potassium 04/19/2022 4.4  3.5 - 5.1  mmol/L Final    Chloride 04/19/2022 102  95 - 110 mmol/L Final    CO2 04/19/2022 26  23 - 29 mmol/L Final    Glucose 04/19/2022 101  70 - 110 mg/dL Final    BUN 04/19/2022 36 (A) 8 - 23 mg/dL Final    Creatinine 04/19/2022 2.2 (A) 0.5 - 1.4 mg/dL Final    Calcium 04/19/2022 9.7  8.7 - 10.5 mg/dL Final    Total Protein 04/19/2022 6.9  6.0 - 8.4 g/dL Final    Albumin 04/19/2022 3.8  3.5 - 5.2 g/dL Final    Total Bilirubin 04/19/2022 1.0  0.1 - 1.0 mg/dL Final    Comment: For infants and newborns, interpretation of results should be based  on gestational age, weight and in agreement with clinical  observations.    Premature Infant recommended reference ranges:  Up to 24 hours.............<8.0 mg/dL  Up to 48 hours............<12.0 mg/dL  3-5 days..................<15.0 mg/dL  6-29 days.................<15.0 mg/dL      Alkaline Phosphatase 04/19/2022 66  55 - 135 U/L Final    AST 04/19/2022 22  10 - 40 U/L Final    ALT 04/19/2022 20  10 - 44 U/L Final    Anion Gap 04/19/2022 10  8 - 16 mmol/L Final    eGFR if  04/19/2022 31.3 (A) >60 mL/min/1.73 m^2 Final    eGFR if non  04/19/2022 27.1 (A) >60 mL/min/1.73 m^2 Final    Comment: Calculation used to obtain the estimated glomerular filtration  rate (eGFR) is the CKD-EPI equation.       WBC 04/19/2022 6.49  3.90 - 12.70 K/uL Final    RBC 04/19/2022 3.76 (A) 4.60 - 6.20 M/uL Final    Hemoglobin 04/19/2022 11.8 (A) 14.0 - 18.0 g/dL Final    Hematocrit 04/19/2022 37.1 (A) 40.0 - 54.0 % Final    MCV 04/19/2022 99 (A) 82 - 98 fL Final    MCH 04/19/2022 31.4 (A) 27.0 - 31.0 pg Final    MCHC 04/19/2022 31.8 (A) 32.0 - 36.0 g/dL Final    RDW 04/19/2022 12.6  11.5 - 14.5 % Final    Platelets 04/19/2022 198  150 - 450 K/uL Final    MPV 04/19/2022 12.8  9.2 - 12.9 fL Final    Immature Granulocytes 04/19/2022 0.3  0.0 - 0.5 % Final    Gran # (ANC) 04/19/2022 3.0  1.8 - 7.7 K/uL Final    Immature Grans (Abs) 04/19/2022 0.02   0.00 - 0.04 K/uL Final    Comment: Mild elevation in immature granulocytes is non specific and   can be seen in a variety of conditions including stress response,   acute inflammation, trauma and pregnancy. Correlation with other   laboratory and clinical findings is essential.      Lymph # 04/19/2022 2.3  1.0 - 4.8 K/uL Final    Mono # 04/19/2022 0.8  0.3 - 1.0 K/uL Final    Eos # 04/19/2022 0.3  0.0 - 0.5 K/uL Final    Baso # 04/19/2022 0.07  0.00 - 0.20 K/uL Final    nRBC 04/19/2022 0  0 /100 WBC Final    Gran % 04/19/2022 45.8  38.0 - 73.0 % Final    Lymph % 04/19/2022 35.1  18.0 - 48.0 % Final    Mono % 04/19/2022 12.6  4.0 - 15.0 % Final    Eosinophil % 04/19/2022 5.1  0.0 - 8.0 % Final    Basophil % 04/19/2022 1.1  0.0 - 1.9 % Final    Differential Method 04/19/2022 Automated   Final    TSH 04/19/2022 3.148  0.400 - 4.000 uIU/mL Final   Lab Visit on 03/18/2022   Component Date Value Ref Range Status    Ferritin 03/18/2022 59  20.0 - 300.0 ng/mL Final    Iron 03/18/2022 91  45 - 160 ug/dL Final    Transferrin 03/18/2022 277  200 - 375 mg/dL Final    TIBC 03/18/2022 410  250 - 450 ug/dL Final    Saturated Iron 03/18/2022 22  20 - 50 % Final    Vitamin B-12 03/18/2022 224  210 - 950 pg/mL Final   Lab Visit on 02/15/2022   Component Date Value Ref Range Status    Sodium 02/15/2022 139  136 - 145 mmol/L Final    Potassium 02/15/2022 4.6  3.5 - 5.1 mmol/L Final    Chloride 02/15/2022 103  95 - 110 mmol/L Final    CO2 02/15/2022 28  23 - 29 mmol/L Final    Glucose 02/15/2022 99  70 - 110 mg/dL Final    BUN 02/15/2022 41 (A) 8 - 23 mg/dL Final    Creatinine 02/15/2022 1.8 (A) 0.5 - 1.4 mg/dL Final    Calcium 02/15/2022 9.6  8.7 - 10.5 mg/dL Final    Total Protein 02/15/2022 6.8  6.0 - 8.4 g/dL Final    Albumin 02/15/2022 3.7  3.5 - 5.2 g/dL Final    Total Bilirubin 02/15/2022 0.7  0.1 - 1.0 mg/dL Final    Comment: For infants and newborns, interpretation of results should be based  on  gestational age, weight and in agreement with clinical  observations.    Premature Infant recommended reference ranges:  Up to 24 hours.............<8.0 mg/dL  Up to 48 hours............<12.0 mg/dL  3-5 days..................<15.0 mg/dL  6-29 days.................<15.0 mg/dL      Alkaline Phosphatase 02/15/2022 73  55 - 135 U/L Final    AST 02/15/2022 20  10 - 40 U/L Final    ALT 02/15/2022 19  10 - 44 U/L Final    Anion Gap 02/15/2022 8  8 - 16 mmol/L Final    eGFR if  02/15/2022 39.9 (A) >60 mL/min/1.73 m^2 Final    eGFR if non  02/15/2022 34.5 (A) >60 mL/min/1.73 m^2 Final    Comment: Calculation used to obtain the estimated glomerular filtration  rate (eGFR) is the CKD-EPI equation.       Uric Acid 02/15/2022 7.0  3.4 - 7.0 mg/dL Final       Assessment & Plan:      Adelfo was seen today for annual exam.  Nephrology consultation will be obtained for follow-up of chronic kidney disease and recent decrease in GFR.    Current therapy will be continued.  Laboratory studies will be repeated in 6 months.    Diagnoses and all orders for this visit:    Well adult exam    Stage 3 chronic kidney disease, unspecified whether stage 3a or 3b CKD  -     Ambulatory referral/consult to Nephrology; Future  -     Comprehensive Metabolic Panel; Future  -     Lipid Panel; Future  -     CBC Auto Differential; Future    Other hyperlipidemia  -     Comprehensive Metabolic Panel; Future  -     Lipid Panel; Future  -     CBC Auto Differential; Future    Essential hypertension    Intermittent asthma without complication, unspecified asthma severity    Chronic anemia    Gastroesophageal reflux disease with esophagitis without hemorrhage    ANNABELLE (obstructive sleep apnea)    Pancreatic cyst         Follow up in about 6 months (around 10/26/2022).     Romero Vogel MD

## 2022-05-08 ENCOUNTER — PATIENT MESSAGE (OUTPATIENT)
Dept: INTERNAL MEDICINE | Facility: CLINIC | Age: 82
End: 2022-05-08
Payer: MEDICARE

## 2022-05-08 DIAGNOSIS — I10 ESSENTIAL HYPERTENSION: Primary | ICD-10-CM

## 2022-05-10 ENCOUNTER — PATIENT MESSAGE (OUTPATIENT)
Dept: INTERNAL MEDICINE | Facility: CLINIC | Age: 82
End: 2022-05-10
Payer: MEDICARE

## 2022-05-10 NOTE — TELEPHONE ENCOUNTER
I sent remind me msg to myself for July to add urine appt for October.    The schedule is not open yet.

## 2022-05-16 ENCOUNTER — TELEPHONE (OUTPATIENT)
Dept: NEPHROLOGY | Facility: CLINIC | Age: 82
End: 2022-05-16
Payer: MEDICARE

## 2022-05-16 DIAGNOSIS — N18.30 STAGE 3 CHRONIC KIDNEY DISEASE, UNSPECIFIED WHETHER STAGE 3A OR 3B CKD: Primary | ICD-10-CM

## 2022-05-16 NOTE — TELEPHONE ENCOUNTER
Mansi Gonzalez Staff  Caller: Unspecified (Today,  3:14 PM)  Adelfo Jennifer calling regarding Patient Advice want to know if you had the change to fit him into Dr. Graf schedule.  He also stated if blood work need to be done, he is have labs done on 05/20 for Dr. Nunez.  call back [phone]       Returned phone call, left message regarding labs being scheduled. Message forwarded to Dr Graf for virtual appointment time.

## 2022-05-17 ENCOUNTER — PATIENT MESSAGE (OUTPATIENT)
Dept: DERMATOLOGY | Facility: CLINIC | Age: 82
End: 2022-05-17
Payer: MEDICARE

## 2022-05-18 ENCOUNTER — CLINICAL SUPPORT (OUTPATIENT)
Dept: CARDIOLOGY | Facility: HOSPITAL | Age: 82
End: 2022-05-18
Attending: INTERNAL MEDICINE
Payer: MEDICARE

## 2022-05-18 DIAGNOSIS — Z95.818 PRESENCE OF OTHER CARDIAC IMPLANTS AND GRAFTS: ICD-10-CM

## 2022-05-18 PROCEDURE — G2066 INTER DEVC REMOTE 30D: HCPCS | Performed by: INTERNAL MEDICINE

## 2022-05-19 ENCOUNTER — PATIENT MESSAGE (OUTPATIENT)
Dept: INTERNAL MEDICINE | Facility: CLINIC | Age: 82
End: 2022-05-19
Payer: MEDICARE

## 2022-05-20 ENCOUNTER — PATIENT MESSAGE (OUTPATIENT)
Dept: RHEUMATOLOGY | Facility: CLINIC | Age: 82
End: 2022-05-20
Payer: MEDICARE

## 2022-05-20 ENCOUNTER — LAB VISIT (OUTPATIENT)
Dept: LAB | Facility: HOSPITAL | Age: 82
End: 2022-05-20
Attending: INTERNAL MEDICINE
Payer: MEDICARE

## 2022-05-20 DIAGNOSIS — M1A.9XX1 CHRONIC TOPHACEOUS GOUT: ICD-10-CM

## 2022-05-20 DIAGNOSIS — N18.30 STAGE 3 CHRONIC KIDNEY DISEASE, UNSPECIFIED WHETHER STAGE 3A OR 3B CKD: ICD-10-CM

## 2022-05-20 LAB
25(OH)D3+25(OH)D2 SERPL-MCNC: 28 NG/ML (ref 30–96)
ALBUMIN SERPL BCP-MCNC: 3.7 G/DL (ref 3.5–5.2)
ANION GAP SERPL CALC-SCNC: 7 MMOL/L (ref 8–16)
BASOPHILS # BLD AUTO: 0.06 K/UL (ref 0–0.2)
BASOPHILS NFR BLD: 1.2 % (ref 0–1.9)
BUN SERPL-MCNC: 59 MG/DL (ref 8–23)
CALCIUM SERPL-MCNC: 9.1 MG/DL (ref 8.7–10.5)
CHLORIDE SERPL-SCNC: 106 MMOL/L (ref 95–110)
CO2 SERPL-SCNC: 26 MMOL/L (ref 23–29)
CREAT SERPL-MCNC: 2.3 MG/DL (ref 0.5–1.4)
DIFFERENTIAL METHOD: ABNORMAL
EOSINOPHIL # BLD AUTO: 0.3 K/UL (ref 0–0.5)
EOSINOPHIL NFR BLD: 6.5 % (ref 0–8)
ERYTHROCYTE [DISTWIDTH] IN BLOOD BY AUTOMATED COUNT: 12.6 % (ref 11.5–14.5)
EST. GFR  (AFRICAN AMERICAN): 29.7 ML/MIN/1.73 M^2
EST. GFR  (NON AFRICAN AMERICAN): 25.7 ML/MIN/1.73 M^2
GLUCOSE SERPL-MCNC: 100 MG/DL (ref 70–110)
HCT VFR BLD AUTO: 35.5 % (ref 40–54)
HGB BLD-MCNC: 11.1 G/DL (ref 14–18)
IMM GRANULOCYTES # BLD AUTO: 0.02 K/UL (ref 0–0.04)
IMM GRANULOCYTES NFR BLD AUTO: 0.4 % (ref 0–0.5)
LYMPHOCYTES # BLD AUTO: 1.6 K/UL (ref 1–4.8)
LYMPHOCYTES NFR BLD: 31.5 % (ref 18–48)
MCH RBC QN AUTO: 30.9 PG (ref 27–31)
MCHC RBC AUTO-ENTMCNC: 31.3 G/DL (ref 32–36)
MCV RBC AUTO: 99 FL (ref 82–98)
MONOCYTES # BLD AUTO: 0.7 K/UL (ref 0.3–1)
MONOCYTES NFR BLD: 12.9 % (ref 4–15)
NEUTROPHILS # BLD AUTO: 2.5 K/UL (ref 1.8–7.7)
NEUTROPHILS NFR BLD: 47.5 % (ref 38–73)
NRBC BLD-RTO: 0 /100 WBC
PHOSPHATE SERPL-MCNC: 4 MG/DL (ref 2.7–4.5)
PLATELET # BLD AUTO: 179 K/UL (ref 150–450)
PMV BLD AUTO: 12.4 FL (ref 9.2–12.9)
POTASSIUM SERPL-SCNC: 4.3 MMOL/L (ref 3.5–5.1)
PTH-INTACT SERPL-MCNC: 262.6 PG/ML (ref 9–77)
RBC # BLD AUTO: 3.59 M/UL (ref 4.6–6.2)
SODIUM SERPL-SCNC: 139 MMOL/L (ref 136–145)
URATE SERPL-MCNC: 7.4 MG/DL (ref 3.4–7)
WBC # BLD AUTO: 5.2 K/UL (ref 3.9–12.7)

## 2022-05-20 PROCEDURE — 84550 ASSAY OF BLOOD/URIC ACID: CPT | Performed by: INTERNAL MEDICINE

## 2022-05-20 PROCEDURE — 83970 ASSAY OF PARATHORMONE: CPT | Performed by: INTERNAL MEDICINE

## 2022-05-20 PROCEDURE — 80069 RENAL FUNCTION PANEL: CPT | Performed by: INTERNAL MEDICINE

## 2022-05-20 PROCEDURE — 85025 COMPLETE CBC W/AUTO DIFF WBC: CPT | Performed by: INTERNAL MEDICINE

## 2022-05-20 PROCEDURE — 82306 VITAMIN D 25 HYDROXY: CPT | Performed by: INTERNAL MEDICINE

## 2022-05-20 PROCEDURE — 36415 COLL VENOUS BLD VENIPUNCTURE: CPT | Mod: PN | Performed by: INTERNAL MEDICINE

## 2022-05-20 NOTE — TELEPHONE ENCOUNTER
I recommend an office visit or urgent care Saturday visit for evaluation.  The area may need to be x-rayed in view of the sudden change in character of the pain.

## 2022-05-20 NOTE — PROGRESS NOTES
The serum uric acid is still above goal. Have you been taking the febuxostat 40mg daily consistently? Let me know so I adjust the dose accordingly. KAREN

## 2022-05-20 NOTE — PROGRESS NOTES
Creatinine has slightly increased compared to prior labs. BUN is also elevated.   Please inform patient of need to increase fluid intake and repeat renal panel in 1 week.  Thanks.

## 2022-05-23 ENCOUNTER — TELEPHONE (OUTPATIENT)
Dept: NEPHROLOGY | Facility: CLINIC | Age: 82
End: 2022-05-23
Payer: MEDICARE

## 2022-05-23 ENCOUNTER — TELEPHONE (OUTPATIENT)
Dept: RHEUMATOLOGY | Facility: CLINIC | Age: 82
End: 2022-05-23
Payer: MEDICARE

## 2022-05-23 DIAGNOSIS — N18.30 STAGE 3 CHRONIC KIDNEY DISEASE, UNSPECIFIED WHETHER STAGE 3A OR 3B CKD: Primary | ICD-10-CM

## 2022-05-23 NOTE — TELEPHONE ENCOUNTER
MD Erasto Borges, TERRI  Cc: SADI Gonzalez Staff  Creatinine has slightly increased compared to prior labs. BUN is also elevated.   Please inform patient of need to increase fluid intake and repeat renal panel in 1 week.   Thanks.       Spoke with patient and Renal Function Panel scheduled.

## 2022-05-23 NOTE — TELEPHONE ENCOUNTER
Ethel Bell, Patient Care Assistant  SADI Gonzalez Staff  Caller: Pt (Today, 10:01 AM)  Pt is requesting a call back in regards to blood work and urine. Pt states he has to get more test completed and would like a call back in regards to getting this done. Pt state he will take the first availability and would like a call when this is being scheduled.       Pt @ [phone]            Phoned patient and labs scheduled for Friday at [facility] per request

## 2022-05-24 ENCOUNTER — HOSPITAL ENCOUNTER (OUTPATIENT)
Dept: RADIOLOGY | Facility: HOSPITAL | Age: 82
Discharge: HOME | End: 2022-05-24
Attending: FAMILY MEDICINE
Payer: MEDICARE

## 2022-05-24 ENCOUNTER — PATIENT MESSAGE (OUTPATIENT)
Dept: SPORTS MEDICINE | Facility: CLINIC | Age: 82
End: 2022-05-24

## 2022-05-24 ENCOUNTER — OFFICE VISIT (OUTPATIENT)
Dept: SPORTS MEDICINE | Facility: CLINIC | Age: 82
End: 2022-05-24
Payer: MEDICARE

## 2022-05-24 VITALS — TEMPERATURE: 98 F | WEIGHT: 224 LBS | HEIGHT: 69 IN | BODY MASS INDEX: 33.18 KG/M2

## 2022-05-24 DIAGNOSIS — R07.81 RIB PAIN ON RIGHT SIDE: Primary | ICD-10-CM

## 2022-05-24 DIAGNOSIS — M25.511 RIGHT SHOULDER PAIN, UNSPECIFIED CHRONICITY: ICD-10-CM

## 2022-05-24 DIAGNOSIS — R07.81 RIB PAIN ON RIGHT SIDE: ICD-10-CM

## 2022-05-24 DIAGNOSIS — S29.011A INTERCOSTAL MUSCLE STRAIN, INITIAL ENCOUNTER: ICD-10-CM

## 2022-05-24 PROCEDURE — 73030 X-RAY EXAM OF SHOULDER: CPT | Mod: 26,50,, | Performed by: RADIOLOGY

## 2022-05-24 PROCEDURE — 1101F PT FALLS ASSESS-DOCD LE1/YR: CPT | Mod: CPTII,S$GLB,, | Performed by: FAMILY MEDICINE

## 2022-05-24 PROCEDURE — 99204 PR OFFICE/OUTPT VISIT, NEW, LEVL IV, 45-59 MIN: ICD-10-PCS | Mod: S$GLB,,, | Performed by: FAMILY MEDICINE

## 2022-05-24 PROCEDURE — 73030 X-RAY EXAM OF SHOULDER: CPT | Mod: TC,50

## 2022-05-24 PROCEDURE — 3288F FALL RISK ASSESSMENT DOCD: CPT | Mod: CPTII,S$GLB,, | Performed by: FAMILY MEDICINE

## 2022-05-24 PROCEDURE — 99204 OFFICE O/P NEW MOD 45 MIN: CPT | Mod: S$GLB,,, | Performed by: FAMILY MEDICINE

## 2022-05-24 PROCEDURE — 99999 PR PBB SHADOW E&M-EST. PATIENT-LVL III: CPT | Mod: PBBFAC,,, | Performed by: FAMILY MEDICINE

## 2022-05-24 PROCEDURE — 99999 PR PBB SHADOW E&M-EST. PATIENT-LVL III: ICD-10-PCS | Mod: PBBFAC,,, | Performed by: FAMILY MEDICINE

## 2022-05-24 PROCEDURE — 71100 X-RAY EXAM RIBS UNI 2 VIEWS: CPT | Mod: 26,RT,, | Performed by: RADIOLOGY

## 2022-05-24 PROCEDURE — 1160F PR REVIEW ALL MEDS BY PRESCRIBER/CLIN PHARMACIST DOCUMENTED: ICD-10-PCS | Mod: CPTII,S$GLB,, | Performed by: FAMILY MEDICINE

## 2022-05-24 PROCEDURE — 1159F MED LIST DOCD IN RCRD: CPT | Mod: CPTII,S$GLB,, | Performed by: FAMILY MEDICINE

## 2022-05-24 PROCEDURE — 1101F PR PT FALLS ASSESS DOC 0-1 FALLS W/OUT INJ PAST YR: ICD-10-PCS | Mod: CPTII,S$GLB,, | Performed by: FAMILY MEDICINE

## 2022-05-24 PROCEDURE — 1126F PR PAIN SEVERITY QUANTIFIED, NO PAIN PRESENT: ICD-10-PCS | Mod: CPTII,S$GLB,, | Performed by: FAMILY MEDICINE

## 2022-05-24 PROCEDURE — 73030 XR SHOULDER COMPLETE 2 OR MORE VIEWS BILATERAL: ICD-10-PCS | Mod: 26,50,, | Performed by: RADIOLOGY

## 2022-05-24 PROCEDURE — 1159F PR MEDICATION LIST DOCUMENTED IN MEDICAL RECORD: ICD-10-PCS | Mod: CPTII,S$GLB,, | Performed by: FAMILY MEDICINE

## 2022-05-24 PROCEDURE — 1160F RVW MEDS BY RX/DR IN RCRD: CPT | Mod: CPTII,S$GLB,, | Performed by: FAMILY MEDICINE

## 2022-05-24 PROCEDURE — 1126F AMNT PAIN NOTED NONE PRSNT: CPT | Mod: CPTII,S$GLB,, | Performed by: FAMILY MEDICINE

## 2022-05-24 PROCEDURE — 71100 X-RAY EXAM RIBS UNI 2 VIEWS: CPT | Mod: TC,RT

## 2022-05-24 PROCEDURE — 3288F PR FALLS RISK ASSESSMENT DOCUMENTED: ICD-10-PCS | Mod: CPTII,S$GLB,, | Performed by: FAMILY MEDICINE

## 2022-05-24 PROCEDURE — 71100 XR RIBS 2 VIEW RIGHT: ICD-10-PCS | Mod: 26,RT,, | Performed by: RADIOLOGY

## 2022-05-24 RX ORDER — MELOXICAM 7.5 MG/1
7.5 TABLET ORAL DAILY
Qty: 15 TABLET | Refills: 0 | Status: CANCELLED | OUTPATIENT
Start: 2022-05-24

## 2022-05-24 NOTE — PROGRESS NOTES
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     HISTORY OF PRESENT ILLNESS  Hand dominance, right    Location:  Posterior inferior right ribs, just inferior to angle of scapula  Onset:  chronic, insidious x 2-3wks  Palliative:     relative rest   oral analgesics, OTC  Provocative:  unclear  Prior:  none  Progression:  plateau discomfort  Quality:  ache  Radiation: none  Severity:  per nursing documentation  Timing:  Wakes him up at night  Trauma:  none    Review of systems (ROS):  A 10+ review of systems was performed with pertinent positives and negatives noted above in the history of present illness. Other systems were negative unless otherwise specified.    PHYSICAL EXAMINATION  General:  The patient is alert and oriented x 3. Mood is pleasant. Observation of ears, eyes and nose reveal no gross abnormalities. HEENT: NCAT, sclera anicteric. Lungs: Respirations are equal and unlabored.     SHOULDER EXAMINATION     OBSERVATION:     Swelling  none  Deformity  none   Discoloration  none   Scapular winging none   Scars   none  Atrophy  none    TENDERNESS / CREPITUS (T/C):          T/C      T/C   Clavicle   -/-  SUPRAspinatus    -/-     AC Jt.    -/-  INFRAspinatus  -/-    SC Jt.    -/-  Deltoid    -/-      G. Tuberosity  -/-  LH BICEP groove  -/-   Acromion:  -/-  Midline Neck   -/-     Scapular Spine -/-  Trapezium   -/-   SMA Scapula  -/-  GH jt. line - post  -/-     Scapulothoracic  -/-         ROM:     Right shoulder   Left shoulder        AROM (PROM)   AROM (PROM)   FE    170° (175°)     170° (175°)     ER at 0°    60°  (65°)    60°  (65°)   ER at 90° ABD  90°  (90°)    90°  (90°)   IR at 90°  ABD   NA  (40°)     NA  (40°)      IR (spine level)   T10     T10    STRENGTH: (* = with pain) RIGHT SHOULDER  LEFT SHOULDER   SCAPTION   5/5    5/5    IR    5/5    5/5   ER    5/5    5/5   BICEPS   5/5    5/5   Deltoid    5/5    5/5     SIGNS:  Painful  side       NEER   -   O[id number]S        -    ADAMS   -   SPEEDS        -   DROP ARM   -   BELLY PRESS       -    X-Body ADD    -   LIFT-OFF        -   HORNBLOWERS      -              STABILITY TESTING   RIGHT SHOULDER  LEFT SHOULDER     Translation     Anterior up face    up face    Posterior up face   up face    Sulcus  < 10mm   < 10 mm     Signs   Apprehension   neg     neg       Relocation   no change    no change      Jerk test  neg    neg    EXTREMITY NEURO-VASCULAR EXAM    Sensation grossly intact to light touch all dermatomal regions.    DTR 2+ Biceps, Triceps, BR and Negative Akashs sign   Grossly intact motor function at Elbow, Wrist and Hand   Distal pulses radial and ulnar 2+, brisk cap refill, symmetric.      NECK:  Painless FROM and spinous processes non-tender. Negative Spurlings sign.       Other Findings:    ASSESSMENT & PLAN   Assessment  #1 discomfort posterior inferior right ribs, just inferior to angle of scapula   Treat as intercostal mm strain    Imaging studies reviewed:   X-ray shoulder bilat 22.05  X-ray ribs, right 22.05    Plan  Benign exam and imaging  We discussed watchful waiting+course of NSAIDs vs. Advanced imaging  Will proceed w/ ww+NSAIDs    We discussed options including    Watchful waiting / relative rest x   Physical therapy    Injection therapy    Consultation    The patient chooses As above   x = prescribed  CSI = corticosteroid injection  VSI = viscosupplement injection  PRPI = platelet rich plasma injection  ia = intra articular  R = right  L = left  B = bilateral   nfSx = surgical consultation was recommended, but patient is not interested in consultation at this time    Physical Therapy        Formal (fPT), @ [facility] facility    Formal (fPT), @ [facility] facility        Homegoing (hgPT), per concurrent fPT recommendations    Homegoing (hgPT), per prior fPT recommendations    Homegoing (hgPT), handout provided        w/  (atPT)    [blank] = not  prescribed  x = prescribed  b = prescribed, and begin as indicated  t = continue as indicated  r = prescribed, and restart as indicated  p = completed prior as indicated  hs = prescribed, and with high school   col = prescribed, and with college or university   nfPT = physical therapy was recommended, but patient is not interested in PT at this time    Activity (e.g. sports, work) restrictions    [blank] = as tolerated  pt = per physical therapist  at = per   NWB = non weight bearing on affected lower extremity, with crutches assistance for ambulation    Bracing    [blank] = not prescribed  r = recommended, but not fit with at todays visit  f = prescribed and fit with at todays visit  t = continue as indicated  d = d/c  p = as needed  rare = use on rare, as-needed basis; advised against chronic use    Pain management Heat  -->checking w/ nephrologist re: NSAIDs dosing  Will hold off on melox course for now   [blank] = No prescription necessary. A handout detailing dosing of appropriate   over-the-counter musculoskeletal analgesics was made available to the patient.   m = meloxicam x 14 days  mp = 14 day course of meloxicam prescribed prior    Follow up 2    If no significant reduction in discomfort sx, order advanced imaging (CT before MRI)    Renal pathology (see notes from    [blank] = as needed  [number] = in [number] weeks  CSI = for corticosteroid injection  VSI = for viscosupplement injection or injection series  PRP = for platelet rich plasma injection or injection series  MRI = after MRI imaging  ns = should surgical options be deferred (no surgery)  o = appointment offered, deferred by patient    Should symptoms worsen or fail to resolve, consider    Revisiting the above options and / or CT / MRI     Vocation:   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

## 2022-05-27 ENCOUNTER — PATIENT MESSAGE (OUTPATIENT)
Dept: NEPHROLOGY | Facility: CLINIC | Age: 82
End: 2022-05-27
Payer: MEDICARE

## 2022-05-27 ENCOUNTER — LAB VISIT (OUTPATIENT)
Dept: LAB | Facility: HOSPITAL | Age: 82
End: 2022-05-27
Attending: INTERNAL MEDICINE
Payer: MEDICARE

## 2022-05-27 DIAGNOSIS — N17.9 AKI (ACUTE KIDNEY INJURY): Primary | ICD-10-CM

## 2022-05-27 DIAGNOSIS — N18.30 STAGE 3 CHRONIC KIDNEY DISEASE, UNSPECIFIED WHETHER STAGE 3A OR 3B CKD: ICD-10-CM

## 2022-05-27 LAB
ALBUMIN SERPL BCP-MCNC: 3.6 G/DL (ref 3.5–5.2)
ANION GAP SERPL CALC-SCNC: 9 MMOL/L (ref 8–16)
BUN SERPL-MCNC: 51 MG/DL (ref 8–23)
CALCIUM SERPL-MCNC: 9.2 MG/DL (ref 8.7–10.5)
CHLORIDE SERPL-SCNC: 102 MMOL/L (ref 95–110)
CO2 SERPL-SCNC: 24 MMOL/L (ref 23–29)
CREAT SERPL-MCNC: 2.6 MG/DL (ref 0.5–1.4)
EST. GFR  (AFRICAN AMERICAN): 25.6 ML/MIN/1.73 M^2
EST. GFR  (NON AFRICAN AMERICAN): 22.1 ML/MIN/1.73 M^2
GLUCOSE SERPL-MCNC: 99 MG/DL (ref 70–110)
PHOSPHATE SERPL-MCNC: 3.1 MG/DL (ref 2.7–4.5)
POTASSIUM SERPL-SCNC: 4.7 MMOL/L (ref 3.5–5.1)
SODIUM SERPL-SCNC: 135 MMOL/L (ref 136–145)

## 2022-05-27 PROCEDURE — 36415 COLL VENOUS BLD VENIPUNCTURE: CPT | Mod: PN | Performed by: INTERNAL MEDICINE

## 2022-05-27 PROCEDURE — 80069 RENAL FUNCTION PANEL: CPT | Performed by: INTERNAL MEDICINE

## 2022-05-27 NOTE — PROGRESS NOTES
Kidney function has worsened.  Please stay hydrated, monitor blood pressure at home and send readings.  Potassium and phosphorus levels normal.  Please report if any new symptoms.     Liset, please schedule a virtual visit for Monday afternoon.     Thanks,  DREW

## 2022-05-28 DIAGNOSIS — N17.9 AKI (ACUTE KIDNEY INJURY): Primary | ICD-10-CM

## 2022-05-30 ENCOUNTER — TELEPHONE (OUTPATIENT)
Dept: NEPHROLOGY | Facility: CLINIC | Age: 82
End: 2022-05-30
Payer: MEDICARE

## 2022-05-30 ENCOUNTER — HOSPITAL ENCOUNTER (OUTPATIENT)
Dept: RADIOLOGY | Facility: HOSPITAL | Age: 82
Discharge: HOME | End: 2022-05-30
Attending: INTERNAL MEDICINE
Payer: MEDICARE

## 2022-05-30 ENCOUNTER — OFFICE VISIT (OUTPATIENT)
Dept: NEPHROLOGY | Facility: CLINIC | Age: 82
End: 2022-05-30
Payer: MEDICARE

## 2022-05-30 DIAGNOSIS — N17.9 AKI (ACUTE KIDNEY INJURY): ICD-10-CM

## 2022-05-30 DIAGNOSIS — I12.9 HYPERTENSIVE KIDNEY DISEASE: ICD-10-CM

## 2022-05-30 DIAGNOSIS — N17.9 AKI (ACUTE KIDNEY INJURY): Primary | ICD-10-CM

## 2022-05-30 DIAGNOSIS — E87.1 HYPONATREMIA: ICD-10-CM

## 2022-05-30 DIAGNOSIS — N18.32 STAGE 3B CHRONIC KIDNEY DISEASE: ICD-10-CM

## 2022-05-30 PROCEDURE — 76770 US EXAM ABDO BACK WALL COMP: CPT | Mod: 26,,, | Performed by: RADIOLOGY

## 2022-05-30 PROCEDURE — 99215 OFFICE O/P EST HI 40 MIN: CPT | Mod: 95,,, | Performed by: INTERNAL MEDICINE

## 2022-05-30 PROCEDURE — 76770 US EXAM ABDO BACK WALL COMP: CPT | Mod: TC

## 2022-05-30 PROCEDURE — 99215 PR OFFICE/OUTPT VISIT, EST, LEVL V, 40-54 MIN: ICD-10-PCS | Mod: 95,,, | Performed by: INTERNAL MEDICINE

## 2022-05-30 PROCEDURE — 76770 US RETROPERITONEAL COMPLETE: ICD-10-PCS | Mod: 26,,, | Performed by: RADIOLOGY

## 2022-05-30 NOTE — TELEPHONE ENCOUNTER
MD Erasto Borges, TERRI  Cc: SADI Gonzalez Staff  Kidney function has worsened.   Please stay hydrated, monitor blood pressure at home and send readings.   Potassium and phosphorus levels normal.   Please report if any new symptoms.     Liset, please schedule a virtual visit for Monday afternoon.     Thanks,   KM       Phoned patient and left message informing of virtual appointment this afternoon at 2:30pm with Dr Graf, Nephrology Clinic Number left on voicemail.

## 2022-05-30 NOTE — PROGRESS NOTES
Subjective:     The patient location is: home in [place]   The chief complaint leading to consultation is: NEFTALI on CKD    Visit type: audiovisual    Face to Face time with patient: 18 minutes  48 minutes of total time spent on the encounter, which includes face to face time and non-face to face time preparing to see the patient (eg, review of tests), Obtaining and/or reviewing separately obtained history, Documenting clinical information in the electronic or other health record, Independently interpreting results (not separately reported) and communicating results to the patient/family/caregiver, or Care coordination (not separately reported).         Each patient to whom he or she provides medical services by telemedicine is:  (1) informed of the relationship between the physician and patient and the respective role of any other health care provider with respect to management of the patient; and (2) notified that he or she may decline to receive medical services by telemedicine and may withdraw from such care at any time.    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 White male who presents for follow up of Acute Renal Failure and Chronic Kidney Disease        HPI     Mr. Goldberg was seen in nephrology clinic for follow up of CKD. He was seen on 10/12/16 in new patient evaluation for CKD and then last followed on 6/12/17. Pt has longstanding hypertension, gout, hyperlipidemia, elevated liver enzymes, non alcoholic fatty liver, ANNABELLE, obesity, remote h/o kidney stone, CKD III and other medical problems.       Urgent appointment was made in the context of NEFTALI as noted on recent labs.    Pt was contacted via my [facility] portal.    He indicated he was taking Advil dual action for MSK pain right upper back for the past few days.    Also noted he was started on metolazone 3 times per week in addition to lasix use sometime in 1/2022.    Echo from 1/2022 showed preserved EF, intermediate CVP.     Per his  cardiologist managing arrhythmia he had changes in medicine which could potentially cause edema, he was referring to Pletal.    Most recently he reported he took 2 tablets of Advil (he is not sure of the strength) per night for around 5 nights in the last week.    Since discussed with him last week, he has stopped taking it, I also suggested to hold ARB and metolazone and increase hydration which he is doing.    Repeat renal panel has been ordered, awaited.    US kidneys ordered, awaited.     Otherwise he reports he had x rays done to evaluate pain and was informed x rays were unremarkable.    Now he reports pain is better with use of Tylenol.    No report of any weight loss/ fever/ loss of appetite/ dizziness/ SOB/ nausea/ vomiting/ diarrhea.        He has h/o prior episode of NEFTALI due to volume depletion.     Per prior notes, pt reports in march this year he was started on lasix for leg edema. This was in addition to his valsartan and HCTZ use along with Felodipine. He states he continues taking it for a few days but started to develop dizziness and headaches. He went to an urgent care for these symptoms. He self discontinued lasix in late march but was placed back again on it in April. His BUN was 44 and creatinine 1.8 in mid march, repeat labs in April did show worsening of renal function with BUN rising to 44 and creatinine to 2.1 subsequent labs did show worsening creatinine to 2.3 and BUN 50 per labs on 6/12/18. After he got off HCTZ and improved fluid intake repeat labs from 6/21/18 showed BUN 21, creatinine 1.5 but overall he has progressively worsening hyponatremia per serial labs. In march 2018 serum Na was 135 which has gradually decreased to 128 in late June 2018. He did get Golytely preparation for colonoscopy in mid June. As such he has chronic hyponatremia since around 2015 which has worsened recently.    He has no proteinuria. In 2016 he had an US abdomen which did show fatty liver. He does not have  any recent cardiac work up. He does exercise 20 minutes twice per day x 4 days but occasionally has DIEGO per him.    Onset of his CKD is somewhere in 7035-7080. Pt reports he was diagnosed with hypertension prior to that but was not really taking any medicines for BP control. 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 He denies any ongoing use.     Pt does not have any dysuria/ decreased urine/ difficulty urinating/ flank pain/ hematuria/ shortness of breath/ chest pain. Prior serial labs noted, creatinine appears to be at 1.7 to 1.9 at baseline. Prior labs noted for negative hep C/B serology. Urinalysis does not show proteinuria. Remote abdominal imaging shows preserved kidney size.     His gout is managed per Dr. Nunez and pt has been on Uloric. Previously Allopurinol was stopped due to rise in liver enzymes.     Renal Function:  Lab Results   Component Value Date    GLU 99 05/27/2022     05/20/2022     (L) 05/27/2022     05/20/2022    K 4.7 05/27/2022    K 4.3 05/20/2022     05/27/2022     05/20/2022    CO2 24 05/27/2022    CO2 26 05/20/2022    BUN 51 (H) 05/27/2022    BUN 59 (H) 05/20/2022    CALCIUM 9.2 05/27/2022    CALCIUM 9.1 05/20/2022    CREATININE 2.6 (H) 05/27/2022    CREATININE 2.3 (H) 05/20/2022    ALBUMIN 3.6 05/27/2022    ALBUMIN 3.7 05/20/2022    PHOS 3.1 05/27/2022    PHOS 4.0 05/20/2022    ESTGFRAFRICA 25.6 (A) 05/27/2022    ESTGFRAFRICA 29.7 (A) 05/20/2022    EGFRNONAA 22.1 (A) 05/27/2022    EGFRNONAA 25.7 (A) 05/20/2022       Urinalysis:  Lab Results   Component Value Date    APPEARANCEUA Clear 05/20/2022    PHUR 5.0 05/20/2022    SPECGRAV 1.015 05/20/2022    PROTEINUA Negative 05/20/2022    GLUCUA Negative 05/20/2022    OCCULTUA Negative 05/20/2022    NITRITE Negative 05/20/2022    LEUKOCYTESUR Negative 05/20/2022       Protein/Creatinine Ratio:  Lab Results   Component Value Date    PROTEINURINE <7 05/20/2022    CREATRANDUR 132.0  05/20/2022    UTPCR Unable to calculate 05/20/2022       CBC:  Lab Results   Component Value Date    WBC 5.20 05/20/2022    HGB 11.1 (L) 05/20/2022    HCT 35.5 (L) 05/20/2022       Vit D 28    Review of Systems   Constitutional: Negative for activity change, appetite change, fatigue and fever.   HENT: Negative for sneezing and sore throat.    Respiratory: Negative for cough, shortness of breath and wheezing.    Cardiovascular: Positive for leg swelling. Negative for chest pain.   Gastrointestinal: Negative for abdominal pain, diarrhea, nausea and vomiting.   Endocrine: Negative for polyuria.   Genitourinary: Negative for decreased urine volume, dysuria, flank pain, frequency, hematuria and urgency.   Musculoskeletal: Negative for arthralgias and myalgias.   Skin: Negative for pallor and rash.   Neurological: Negative for dizziness, light-headedness and headaches.   Does not have any balance issues/ lethargy/ nausea/ diarrhea/ somnolence/ headache    Objective:      Physical Exam   Constitutional: He is oriented to person, place, and time. He appears well-developed and well-nourished.   Virtual visit      Assessment:       1. NEFTALI (acute kidney injury)    2. Stage 3b chronic kidney disease    3. Hypertensive kidney disease    4. Hyponatremia        Plan:     Mr. Rodriguez has NEFTALI superimposed on CKD IIIb which is likely due to longstanding hypertension, occasional NSAID use, obesity. He has very slow progression of CKD and has had prior episode of NEFTALI due to over diuresis. Worsening of chronic hyponatremia was due to diuretic use as well.     Recent NEFTALI due to NSAID use which was more regular and over diuresis, combination of these factors. Work up initiated prior to this visit. Pt was advised to stop NSAID use, hold metolazone, improve hydration, labs are being monitored closely. US kidneys to rule out obstruction. Hold ARB due to NEFTALI.    He has gout and non alcoholic fatty liver. I discussed all the available  serial labs with patient and explained CKD staging, potential risk of progression. I stressed importance of weight loss by calorie control/ portion size, low salt diet, keeping well hydrated, avoiding any NSAID use ever and continuing ARB based regimen when episode of NEFTALI resolves to slow progression of CKD. He will need periodic lab monitoring of eGFR and electrolytes. He remains at high risk for contrast induced nephropathy and also as such would avoid gadolinium use unless benefit outweighs risk. He appears to have chronic mild hyponatremia which could be from a combination of diuretic, excessive intake of dilute fluids. He will limit his water intake to 64 oz.    All available labs and other renal related imaging d/w him in detail. In the past he had expressed given stability of his eGFR he would like to keep more frequent follow up with his PCP than our clinic. Also discussed to avoid Valsartan based regimen in the setting of any acute dehydration from nausea/ vomiting/ diarrhea/ high fever and to have labs checked on such occasions. He expressed understanding of natural progression of CKD despite all the above detailed efforts.     Plan  - periodically monitor renal panel for electrolytes, acid base status, eGFR  - risk of CKD progression d/w patient  - low salt diet  - follow PTH levels, vit D, Ca, phos levels  - periodically trend Hb, consider DAPHNIE when Hb is less than 10   - follow urine studies for proteinuria  - avoid NSAID/ bactrim/ IV contrast/ gadolinium/ aminoglycoside/ Fleet enema where possible  - restart ARB containing regimen for RAAS blockade once NEFTALI improves   - management of gout per Dr. Nunez  - management of hyperlipidemia per Dr. Vogel  - weight loss by controlling calories and portion size is highly advisable especially with other comorbid conditions like ANNABELLE, non alcoholic fatty liver   - During the episode of NEFTALI superimposed on CKD III it was reasonable to hold ARB. Discussed in detail  with him that will finalize the plan after review of labs from today.   - continue to follow with PCP, cardiology      Plan, labs, recommendations were discussed with patient, his questions were answered to his satisfaction.   RTC 6 weeks

## 2022-05-31 ENCOUNTER — OFFICE VISIT (OUTPATIENT)
Dept: DERMATOLOGY | Facility: CLINIC | Age: 82
End: 2022-05-31
Payer: MEDICARE

## 2022-05-31 ENCOUNTER — PATIENT MESSAGE (OUTPATIENT)
Dept: NEPHROLOGY | Facility: CLINIC | Age: 82
End: 2022-05-31
Payer: MEDICARE

## 2022-05-31 DIAGNOSIS — L21.9 SEBORRHEIC DERMATITIS: ICD-10-CM

## 2022-05-31 DIAGNOSIS — L57.0 ACTINIC KERATOSIS: ICD-10-CM

## 2022-05-31 DIAGNOSIS — Z12.83 SCREENING EXAM FOR SKIN CANCER: Primary | ICD-10-CM

## 2022-05-31 DIAGNOSIS — L82.1 SEBORRHEIC KERATOSES: ICD-10-CM

## 2022-05-31 DIAGNOSIS — L82.0 INFLAMED SEBORRHEIC KERATOSIS: ICD-10-CM

## 2022-05-31 PROCEDURE — 17003 DESTRUCTION, PREMALIGNANT LESIONS; SECOND THROUGH 14 LESIONS: ICD-10-PCS | Mod: 59,S$GLB,, | Performed by: DERMATOLOGY

## 2022-05-31 PROCEDURE — 1159F PR MEDICATION LIST DOCUMENTED IN MEDICAL RECORD: ICD-10-PCS | Mod: CPTII,S$GLB,, | Performed by: DERMATOLOGY

## 2022-05-31 PROCEDURE — 17003 DESTRUCT PREMALG LES 2-14: CPT | Mod: 59,S$GLB,, | Performed by: DERMATOLOGY

## 2022-05-31 PROCEDURE — 1159F MED LIST DOCD IN RCRD: CPT | Mod: CPTII,S$GLB,, | Performed by: DERMATOLOGY

## 2022-05-31 PROCEDURE — 3288F FALL RISK ASSESSMENT DOCD: CPT | Mod: CPTII,S$GLB,, | Performed by: DERMATOLOGY

## 2022-05-31 PROCEDURE — 99214 OFFICE O/P EST MOD 30 MIN: CPT | Mod: 25,S$GLB,, | Performed by: DERMATOLOGY

## 2022-05-31 PROCEDURE — 17000 PR DESTRUCTION(LASER SURGERY,CRYOSURGERY,CHEMOSURGERY),PREMALIGNANT LESIONS,FIRST LESION: ICD-10-PCS | Mod: S$GLB,,, | Performed by: DERMATOLOGY

## 2022-05-31 PROCEDURE — 1101F PR PT FALLS ASSESS DOC 0-1 FALLS W/OUT INJ PAST YR: ICD-10-PCS | Mod: CPTII,S$GLB,, | Performed by: DERMATOLOGY

## 2022-05-31 PROCEDURE — 1101F PT FALLS ASSESS-DOCD LE1/YR: CPT | Mod: CPTII,S$GLB,, | Performed by: DERMATOLOGY

## 2022-05-31 PROCEDURE — 99999 PR PBB SHADOW E&M-EST. PATIENT-LVL III: CPT | Mod: PBBFAC,,, | Performed by: DERMATOLOGY

## 2022-05-31 PROCEDURE — 1126F AMNT PAIN NOTED NONE PRSNT: CPT | Mod: CPTII,S$GLB,, | Performed by: DERMATOLOGY

## 2022-05-31 PROCEDURE — 99999 PR PBB SHADOW E&M-EST. PATIENT-LVL III: ICD-10-PCS | Mod: PBBFAC,,, | Performed by: DERMATOLOGY

## 2022-05-31 PROCEDURE — 99214 PR OFFICE/OUTPT VISIT, EST, LEVL IV, 30-39 MIN: ICD-10-PCS | Mod: 25,S$GLB,, | Performed by: DERMATOLOGY

## 2022-05-31 PROCEDURE — 3288F PR FALLS RISK ASSESSMENT DOCUMENTED: ICD-10-PCS | Mod: CPTII,S$GLB,, | Performed by: DERMATOLOGY

## 2022-05-31 PROCEDURE — 17110 DESTRUCTION B9 LES UP TO 14: CPT | Mod: 59,S$GLB,, | Performed by: DERMATOLOGY

## 2022-05-31 PROCEDURE — 17000 DESTRUCT PREMALG LESION: CPT | Mod: S$GLB,,, | Performed by: DERMATOLOGY

## 2022-05-31 PROCEDURE — 17110 PR DESTRUCTION BENIGN LESIONS UP TO 14: ICD-10-PCS | Mod: 59,S$GLB,, | Performed by: DERMATOLOGY

## 2022-05-31 PROCEDURE — 1126F PR PAIN SEVERITY QUANTIFIED, NO PAIN PRESENT: ICD-10-PCS | Mod: CPTII,S$GLB,, | Performed by: DERMATOLOGY

## 2022-05-31 RX ORDER — HYDROCORTISONE 25 MG/G
CREAM TOPICAL 2 TIMES DAILY
Qty: 28 G | Refills: 3 | Status: ON HOLD | OUTPATIENT
Start: 2022-05-31

## 2022-05-31 NOTE — PROGRESS NOTES
Subjective:       Patient ID:  Adelfo ZHANG Jennifer, PhD is a 81 y.o. 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    Otherwise the patient denies any moles or growths of the skin that are rapidly growing, hurting, itching, bleeding, or changing colors.    Review of Systems   Constitutional: Negative for fever, chills and fatigue.   Skin: Positive for wears hat. Negative for daily sunscreen use and recent sunburn.   Hematologic/Lymphatic: Does not bruise/bleed easily.        Objective:    Physical Exam   Constitutional: He appears well-developed and well-nourished. No distress.   Neurological: He is alert and oriented to person, place, and time. He is not disoriented.   Psychiatric: He has a normal mood and affect.   Skin:   Areas Examined (abnormalities noted in diagram):   Scalp / Hair Palpated and Inspected  Head / Face Inspection Performed  Neck Inspection Performed  Chest / Axilla Inspection Performed  Abdomen Inspection Performed  Back Inspection Performed  RUE Inspected  LUE Inspection Performed                   Diagram Legend     Erythematous scaling macule/papule c/w actinic keratosis       Vascular papule c/w angioma      Pigmented verrucoid papule/plaque c/w seborrheic keratosis      Yellow umbilicated papule c/w sebaceous hyperplasia      Irregularly shaped tan macule c/w lentigo     1-2 mm smooth white papules consistent with Milia      Movable subcutaneous cyst with punctum c/w epidermal inclusion cyst      Subcutaneous  movable cyst c/w pilar cyst      Firm pink to brown papule c/w dermatofibroma      Pedunculated fleshy papule(s) c/w skin tag(s)      Evenly pigmented macule c/w junctional nevus     Mildly variegated pigmented, slightly irregular-bordered macule c/w mildly atypical nevus      Flesh colored to evenly pigmented papule c/w intradermal nevus       Pink pearly papule/plaque c/w basal cell carcinoma      Erythematous hyperkeratotic cursted plaque c/w SCC      Surgical scar with no sign of skin cancer recurrence      Open and closed comedones      Inflammatory papules and pustules      Verrucoid papule consistent consistent with wart     Erythematous eczematous patches and plaques     Dystrophic onycholytic nail with subungual debris c/w onychomycosis     Umbilicated papule    Erythematous-base heme-crusted tan verrucoid plaque consistent with inflamed seborrheic keratosis     Erythematous Silvery Scaling Plaque c/w Psoriasis     See annotation      Assessment / Plan:        Screening exam for skin cancer  Area(s) of previous NMSC evaluated with no signs of recurrence.    Upper body skin examination performed today including at least 6 points as noted in physical examination. No lesions suspicious for malignancy noted.    Recommend daily sun protection/avoidance and use of at least SPF 30, broad spectrum sunscreen (OTC drug).     Seborrheic dermatitis  -     hydrocortisone 2.5 % cream; Apply topically 2 (two) times daily. As needed for severe flares of rash on face and neck  Dispense: 28 g; Refill: 3  Pt using ketocream & keto shampoo    Seborrheic keratoses  These are benign inherited growths without a malignant potential. Reassurance given to patient. No treatment is necessary.     Inflamed seborrheic keratosis  Cryosurgery procedure note:    Verbal consent from the patient is obtained including, but not limited to, risk of hypopigmentation/hyperpigmentation, scar, recurrence of lesion. Liquid nitrogen cryosurgery is  applied to 2 lesions to produce a freeze injury. The patient is aware that blisters may form and is instructed on wound care with gentle cleansing and use of vaseline ointment to keep moist until healed. The patient is supplied a handout on cryosurgery and is instructed to call if lesions do not completely resolve.    Actinic keratosis  Cryosurgery Procedure Note    Verbal consent from the patient is obtained including, but not limited to, risk of hypopigmentation/hyperpigmentation, scar, recurrence of lesion. The patient is aware of the precancerous quality and need for treatment of these lesions. Liquid nitrogen cryosurgery is applied to the 2 actinic keratoses, as detailed in the physical exam, to produce a freeze injury. The patient is aware that blisters may form and is instructed on wound care with gentle cleansing and use of vaseline ointment to keep moist until healed. The patient is supplied a handout on cryosurgery and is instructed to call if lesions do not completely resolve.             Follow up in about 1 year (around 5/31/2023) for for TBSE.

## 2022-06-01 NOTE — PROGRESS NOTES
Kidney sonogram does not show any obstruction to kidneys, there is no kidney stone, changes described in the kidney parenchyma are due to known chronic kidney disease.    Right upper back pain is not related to his kidney disease.    Thanks.    Copy note to Dr. Donaldson

## 2022-06-02 DIAGNOSIS — N18.32 STAGE 3B CHRONIC KIDNEY DISEASE: Primary | ICD-10-CM

## 2022-06-03 ENCOUNTER — OFFICE VISIT (OUTPATIENT)
Dept: INTERNAL MEDICINE | Facility: CLINIC | Age: 82
End: 2022-06-03
Payer: MEDICARE

## 2022-06-03 VITALS
DIASTOLIC BLOOD PRESSURE: 52 MMHG | RESPIRATION RATE: 16 BRPM | WEIGHT: 222.69 LBS | TEMPERATURE: 98 F | HEART RATE: 56 BPM | BODY MASS INDEX: 32.98 KG/M2 | SYSTOLIC BLOOD PRESSURE: 124 MMHG | HEIGHT: 69 IN

## 2022-06-03 DIAGNOSIS — N17.9 AKI (ACUTE KIDNEY INJURY): ICD-10-CM

## 2022-06-03 DIAGNOSIS — J45.20 INTERMITTENT ASTHMA WITHOUT COMPLICATION, UNSPECIFIED ASTHMA SEVERITY: ICD-10-CM

## 2022-06-03 DIAGNOSIS — N18.32 STAGE 3B CHRONIC KIDNEY DISEASE: ICD-10-CM

## 2022-06-03 DIAGNOSIS — R60.0 BILATERAL LEG EDEMA: ICD-10-CM

## 2022-06-03 DIAGNOSIS — M25.511 SUBSCAPULAR PAIN, RIGHT: Primary | ICD-10-CM

## 2022-06-03 PROCEDURE — 1126F PR PAIN SEVERITY QUANTIFIED, NO PAIN PRESENT: ICD-10-PCS | Mod: CPTII,S$GLB,, | Performed by: INTERNAL MEDICINE

## 2022-06-03 PROCEDURE — 3288F PR FALLS RISK ASSESSMENT DOCUMENTED: ICD-10-PCS | Mod: CPTII,S$GLB,, | Performed by: INTERNAL MEDICINE

## 2022-06-03 PROCEDURE — 3078F PR MOST RECENT DIASTOLIC BLOOD PRESSURE < 80 MM HG: ICD-10-PCS | Mod: CPTII,S$GLB,, | Performed by: INTERNAL MEDICINE

## 2022-06-03 PROCEDURE — 1126F AMNT PAIN NOTED NONE PRSNT: CPT | Mod: CPTII,S$GLB,, | Performed by: INTERNAL MEDICINE

## 2022-06-03 PROCEDURE — 3078F DIAST BP <80 MM HG: CPT | Mod: CPTII,S$GLB,, | Performed by: INTERNAL MEDICINE

## 2022-06-03 PROCEDURE — 3288F FALL RISK ASSESSMENT DOCD: CPT | Mod: CPTII,S$GLB,, | Performed by: INTERNAL MEDICINE

## 2022-06-03 PROCEDURE — 99213 OFFICE O/P EST LOW 20 MIN: CPT | Mod: S$GLB,,, | Performed by: INTERNAL MEDICINE

## 2022-06-03 PROCEDURE — 99213 PR OFFICE/OUTPT VISIT, EST, LEVL III, 20-29 MIN: ICD-10-PCS | Mod: S$GLB,,, | Performed by: INTERNAL MEDICINE

## 2022-06-03 PROCEDURE — 1101F PT FALLS ASSESS-DOCD LE1/YR: CPT | Mod: CPTII,S$GLB,, | Performed by: INTERNAL MEDICINE

## 2022-06-03 PROCEDURE — 99999 PR PBB SHADOW E&M-EST. PATIENT-LVL IV: ICD-10-PCS | Mod: PBBFAC,,, | Performed by: INTERNAL MEDICINE

## 2022-06-03 PROCEDURE — 99999 PR PBB SHADOW E&M-EST. PATIENT-LVL IV: CPT | Mod: PBBFAC,,, | Performed by: INTERNAL MEDICINE

## 2022-06-03 PROCEDURE — 3074F SYST BP LT 130 MM HG: CPT | Mod: CPTII,S$GLB,, | Performed by: INTERNAL MEDICINE

## 2022-06-03 PROCEDURE — 1101F PR PT FALLS ASSESS DOC 0-1 FALLS W/OUT INJ PAST YR: ICD-10-PCS | Mod: CPTII,S$GLB,, | Performed by: INTERNAL MEDICINE

## 2022-06-03 PROCEDURE — 3074F PR MOST RECENT SYSTOLIC BLOOD PRESSURE < 130 MM HG: ICD-10-PCS | Mod: CPTII,S$GLB,, | Performed by: INTERNAL MEDICINE

## 2022-06-06 ENCOUNTER — LAB VISIT (OUTPATIENT)
Dept: LAB | Facility: HOSPITAL | Age: 82
End: 2022-06-06
Attending: INTERNAL MEDICINE
Payer: MEDICARE

## 2022-06-06 DIAGNOSIS — N18.32 STAGE 3B CHRONIC KIDNEY DISEASE: ICD-10-CM

## 2022-06-06 LAB
ALBUMIN SERPL BCP-MCNC: 3.7 G/DL (ref 3.5–5.2)
ANION GAP SERPL CALC-SCNC: 11 MMOL/L (ref 8–16)
BUN SERPL-MCNC: 33 MG/DL (ref 8–23)
CALCIUM SERPL-MCNC: 9.1 MG/DL (ref 8.7–10.5)
CHLORIDE SERPL-SCNC: 97 MMOL/L (ref 95–110)
CO2 SERPL-SCNC: 24 MMOL/L (ref 23–29)
CREAT SERPL-MCNC: 1.9 MG/DL (ref 0.5–1.4)
EST. GFR  (AFRICAN AMERICAN): 37.4 ML/MIN/1.73 M^2
EST. GFR  (NON AFRICAN AMERICAN): 32.3 ML/MIN/1.73 M^2
GLUCOSE SERPL-MCNC: 94 MG/DL (ref 70–110)
PHOSPHATE SERPL-MCNC: 3 MG/DL (ref 2.7–4.5)
POTASSIUM SERPL-SCNC: 4.3 MMOL/L (ref 3.5–5.1)
SODIUM SERPL-SCNC: 132 MMOL/L (ref 136–145)

## 2022-06-06 PROCEDURE — 36415 COLL VENOUS BLD VENIPUNCTURE: CPT | Mod: PN | Performed by: INTERNAL MEDICINE

## 2022-06-06 PROCEDURE — 80069 RENAL FUNCTION PANEL: CPT | Performed by: INTERNAL MEDICINE

## 2022-06-07 ENCOUNTER — PATIENT MESSAGE (OUTPATIENT)
Dept: NEPHROLOGY | Facility: CLINIC | Age: 82
End: 2022-06-07
Payer: MEDICARE

## 2022-06-07 NOTE — PROGRESS NOTES
Creatinine is back to baseline.  Hydration is important but given now sodium level is slightly decreased, would limit to 1.5 L of all fluids per day.  Diuretic regimen per PCP.  Repeat renal panel in around 4 weeks for a routine follow up, sooner if any new symptoms.  I will be away from office between June 13th through 29th.    Thanks,  KM

## 2022-06-08 ENCOUNTER — PATIENT MESSAGE (OUTPATIENT)
Dept: SPORTS MEDICINE | Facility: CLINIC | Age: 82
End: 2022-06-08
Payer: MEDICARE

## 2022-06-17 ENCOUNTER — CLINICAL SUPPORT (OUTPATIENT)
Dept: CARDIOLOGY | Facility: HOSPITAL | Age: 82
End: 2022-06-17
Payer: MEDICARE

## 2022-06-17 DIAGNOSIS — Z95.818 PRESENCE OF OTHER CARDIAC IMPLANTS AND GRAFTS: ICD-10-CM

## 2022-06-17 PROCEDURE — 93298 CARDIAC DEVICE CHECK - REMOTE: ICD-10-PCS | Mod: ,,, | Performed by: INTERNAL MEDICINE

## 2022-06-17 PROCEDURE — G2066 INTER DEVC REMOTE 30D: HCPCS | Performed by: INTERNAL MEDICINE

## 2022-06-17 PROCEDURE — 93298 REM INTERROG DEV EVAL SCRMS: CPT | Mod: ,,, | Performed by: INTERNAL MEDICINE

## 2022-06-20 ENCOUNTER — LAB VISIT (OUTPATIENT)
Dept: LAB | Facility: HOSPITAL | Age: 82
End: 2022-06-20
Attending: INTERNAL MEDICINE
Payer: MEDICARE

## 2022-06-20 DIAGNOSIS — M1A.9XX1 CHRONIC TOPHACEOUS GOUT: ICD-10-CM

## 2022-06-20 LAB
ALBUMIN SERPL BCP-MCNC: 3.6 G/DL (ref 3.5–5.2)
ALP SERPL-CCNC: 63 U/L (ref 55–135)
ALT SERPL W/O P-5'-P-CCNC: 28 U/L (ref 10–44)
ANION GAP SERPL CALC-SCNC: 6 MMOL/L (ref 8–16)
AST SERPL-CCNC: 28 U/L (ref 10–40)
BILIRUB SERPL-MCNC: 0.6 MG/DL (ref 0.1–1)
BUN SERPL-MCNC: 28 MG/DL (ref 8–23)
CALCIUM SERPL-MCNC: 9.3 MG/DL (ref 8.7–10.5)
CHLORIDE SERPL-SCNC: 106 MMOL/L (ref 95–110)
CO2 SERPL-SCNC: 26 MMOL/L (ref 23–29)
CREAT SERPL-MCNC: 2.1 MG/DL (ref 0.5–1.4)
EST. GFR  (AFRICAN AMERICAN): 33.1 ML/MIN/1.73 M^2
EST. GFR  (NON AFRICAN AMERICAN): 28.7 ML/MIN/1.73 M^2
GLUCOSE SERPL-MCNC: 94 MG/DL (ref 70–110)
POTASSIUM SERPL-SCNC: 4.7 MMOL/L (ref 3.5–5.1)
PROT SERPL-MCNC: 6.2 G/DL (ref 6–8.4)
SODIUM SERPL-SCNC: 138 MMOL/L (ref 136–145)
URATE SERPL-MCNC: 6.7 MG/DL (ref 3.4–7)

## 2022-06-20 PROCEDURE — 84550 ASSAY OF BLOOD/URIC ACID: CPT | Performed by: INTERNAL MEDICINE

## 2022-06-20 PROCEDURE — 80053 COMPREHEN METABOLIC PANEL: CPT | Performed by: INTERNAL MEDICINE

## 2022-06-21 ENCOUNTER — PATIENT MESSAGE (OUTPATIENT)
Dept: NEPHROLOGY | Facility: CLINIC | Age: 82
End: 2022-06-21
Payer: MEDICARE

## 2022-06-21 ENCOUNTER — PATIENT MESSAGE (OUTPATIENT)
Dept: RHEUMATOLOGY | Facility: CLINIC | Age: 82
End: 2022-06-21
Payer: MEDICARE

## 2022-06-24 ENCOUNTER — TELEPHONE (OUTPATIENT)
Dept: NEPHROLOGY | Facility: CLINIC | Age: 82
End: 2022-06-24
Payer: MEDICARE

## 2022-06-24 NOTE — TELEPHONE ENCOUNTER
----- Message from Adrienne Obrien sent at 6/24/2022  9:42 AM CDT -----  Contact: @ [phone]  Pt is calling to make  appointment for a follow up he is looking to book in September please call and adv @ [phone]

## 2022-07-11 ENCOUNTER — OFFICE VISIT (OUTPATIENT)
Dept: SPORTS MEDICINE | Facility: CLINIC | Age: 82
End: 2022-07-11
Payer: MEDICARE

## 2022-07-11 VITALS — BODY MASS INDEX: 32.88 KG/M2 | TEMPERATURE: 99 F | HEIGHT: 69 IN | WEIGHT: 222 LBS

## 2022-07-11 DIAGNOSIS — M25.511 CHRONIC RIGHT SHOULDER PAIN: ICD-10-CM

## 2022-07-11 DIAGNOSIS — G89.29 CHRONIC RIGHT-SIDED THORACIC BACK PAIN: Primary | ICD-10-CM

## 2022-07-11 DIAGNOSIS — S29.011A INTERCOSTAL MUSCLE STRAIN, INITIAL ENCOUNTER: ICD-10-CM

## 2022-07-11 DIAGNOSIS — G89.29 CHRONIC RIGHT SHOULDER PAIN: ICD-10-CM

## 2022-07-11 DIAGNOSIS — R07.81 RIB PAIN ON RIGHT SIDE: ICD-10-CM

## 2022-07-11 DIAGNOSIS — M54.6 CHRONIC RIGHT-SIDED THORACIC BACK PAIN: Primary | ICD-10-CM

## 2022-07-11 PROCEDURE — 1160F PR REVIEW ALL MEDS BY PRESCRIBER/CLIN PHARMACIST DOCUMENTED: ICD-10-PCS | Mod: CPTII,S$GLB,, | Performed by: FAMILY MEDICINE

## 2022-07-11 PROCEDURE — 1101F PT FALLS ASSESS-DOCD LE1/YR: CPT | Mod: CPTII,S$GLB,, | Performed by: FAMILY MEDICINE

## 2022-07-11 PROCEDURE — 1160F RVW MEDS BY RX/DR IN RCRD: CPT | Mod: CPTII,S$GLB,, | Performed by: FAMILY MEDICINE

## 2022-07-11 PROCEDURE — 99999 PR PBB SHADOW E&M-EST. PATIENT-LVL V: CPT | Mod: PBBFAC,,, | Performed by: FAMILY MEDICINE

## 2022-07-11 PROCEDURE — 1101F PR PT FALLS ASSESS DOC 0-1 FALLS W/OUT INJ PAST YR: ICD-10-PCS | Mod: CPTII,S$GLB,, | Performed by: FAMILY MEDICINE

## 2022-07-11 PROCEDURE — 3288F PR FALLS RISK ASSESSMENT DOCUMENTED: ICD-10-PCS | Mod: CPTII,S$GLB,, | Performed by: FAMILY MEDICINE

## 2022-07-11 PROCEDURE — 1126F PR PAIN SEVERITY QUANTIFIED, NO PAIN PRESENT: ICD-10-PCS | Mod: CPTII,S$GLB,, | Performed by: FAMILY MEDICINE

## 2022-07-11 PROCEDURE — 99999 PR PBB SHADOW E&M-EST. PATIENT-LVL V: ICD-10-PCS | Mod: PBBFAC,,, | Performed by: FAMILY MEDICINE

## 2022-07-11 PROCEDURE — 1126F AMNT PAIN NOTED NONE PRSNT: CPT | Mod: CPTII,S$GLB,, | Performed by: FAMILY MEDICINE

## 2022-07-11 PROCEDURE — 1159F PR MEDICATION LIST DOCUMENTED IN MEDICAL RECORD: ICD-10-PCS | Mod: CPTII,S$GLB,, | Performed by: FAMILY MEDICINE

## 2022-07-11 PROCEDURE — 3288F FALL RISK ASSESSMENT DOCD: CPT | Mod: CPTII,S$GLB,, | Performed by: FAMILY MEDICINE

## 2022-07-11 PROCEDURE — 99214 PR OFFICE/OUTPT VISIT, EST, LEVL IV, 30-39 MIN: ICD-10-PCS | Mod: S$GLB,,, | Performed by: FAMILY MEDICINE

## 2022-07-11 PROCEDURE — 99214 OFFICE O/P EST MOD 30 MIN: CPT | Mod: S$GLB,,, | Performed by: FAMILY MEDICINE

## 2022-07-11 PROCEDURE — 1159F MED LIST DOCD IN RCRD: CPT | Mod: CPTII,S$GLB,, | Performed by: FAMILY MEDICINE

## 2022-07-11 NOTE — PROGRESS NOTES
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     HISTORY OF PRESENT ILLNESS  Hand dominance, right    Location:  Posterior inferior right ribs, just inferior to angle of scapula  Onset:  chronic, insidious x 2-3wks  Palliative:     relative rest   oral analgesics, OTC  Provocative:  unclear  Prior:  none  Progression:  plateau discomfort  Quality:  ache  Radiation: none  Severity:  per nursing documentation  Timing:  Wakes him up at night  Trauma:  none    Review of systems (ROS):  A 10+ review of systems was performed with pertinent positives and negatives noted above in the history of present illness. Other systems were negative unless otherwise specified.    PHYSICAL EXAMINATION  General:  The patient is alert and oriented x 3. Mood is pleasant. Observation of ears, eyes and nose reveal no gross abnormalities. HEENT: NCAT, sclera anicteric. Lungs: Respirations are equal and unlabored.     SHOULDER EXAMINATION     OBSERVATION:     Swelling  none  Deformity  none   Discoloration  none   Scapular winging none   Scars   none  Atrophy  none    TENDERNESS / CREPITUS (T/C):          T/C      T/C   Clavicle   -/-  SUPRAspinatus    -/-     AC Jt.    -/-  INFRAspinatus  -/-    SC Jt.    -/-  Deltoid    -/-      G. Tuberosity  -/-  LH BICEP groove  -/-   Acromion:  -/-  Midline Neck   -/-     Scapular Spine -/-  Trapezium   -/-   SMA Scapula  -/-  GH jt. line - post  -/-     Scapulothoracic  -/-         ROM:     Right shoulder   Left shoulder        AROM (PROM)   AROM (PROM)   FE    170° (175°)     170° (175°)     ER at 0°    60°  (65°)    60°  (65°)   ER at 90° ABD  90°  (90°)    90°  (90°)   IR at 90°  ABD   NA  (40°)     NA  (40°)      IR (spine level)   T10     T10    STRENGTH: (* = with pain) RIGHT SHOULDER  LEFT SHOULDER   SCAPTION   5/5    5/5    IR    5/5    5/5   ER    5/5    5/5   BICEPS   5/5    5/5   Deltoid    5/5    5/5     SIGNS:  Painful  side       NEER   -   OKWESIS        -    ADAMS   -   SPEEDS        -   DROP ARM   -   BELLY PRESS       -    X-Body ADD    -   LIFT-OFF        -   HORNBLOWERS      -              STABILITY TESTING   RIGHT SHOULDER  LEFT SHOULDER     Translation     Anterior up face    up face    Posterior up face   up face    Sulcus  < 10mm   < 10 mm     Signs   Apprehension   neg     neg       Relocation   no change    no change      Jerk test  neg    neg    EXTREMITY NEURO-VASCULAR EXAM    Sensation grossly intact to light touch all dermatomal regions.    DTR 2+ Biceps, Triceps, BR and Negative Akashs sign   Grossly intact motor function at Elbow, Wrist and Hand   Distal pulses radial and ulnar 2+, brisk cap refill, symmetric.      NECK:  Painless FROM and spinous processes non-tender. Negative Spurlings sign.       Other Findings:    ASSESSMENT & PLAN   Assessment  #1 discomfort posterior inferior right ribs, just inferior to angle of scapula   Treat as intercostal mm strain    Imaging studies reviewed:   X-ray shoulder bilat 22.05  X-ray ribs, right 22.05    Plan  Reassuring eval today w/ sig improved sx  Disease course and input from his neph providers continue to suggest discomfort was / is msk in nature (and not renal)    We discussed options including    Watchful waiting / relative rest    Physical therapy x   Injection therapy    Consultation    The patient chooses As above   x = prescribed  CSI = corticosteroid injection  VSI = viscosupplement injection  PRPI = platelet rich plasma injection  ia = intra articular  R = right  L = left  B = bilateral   nfSx = surgical consultation was recommended, but patient is not interested in consultation at this time    Physical Therapy        Formal (fPT), @ [facility] facility    Formal (fPT), @ [facility] facility b re: thoracic spine and shoulder, right       Homegoing (hgPT), per concurrent fPT recommendations    Homegoing (hgPT), per prior fPT recommendations    Homegoing (hgPT),  handout provided        w/  (atPT)    [blank] = not prescribed  x = prescribed  b = prescribed, and begin as indicated  t = continue as indicated  r = prescribed, and restart as indicated  p = completed prior as indicated  hs = prescribed, and with high school   col = prescribed, and with college or university   nfPT = physical therapy was recommended, but patient is not interested in PT at this time    Activity (e.g. sports, work) restrictions    [blank] = as tolerated  pt = per physical therapist  at = per   NWB = non weight bearing on affected lower extremity, with crutches assistance for ambulation    Bracing    [blank] = not prescribed  r = recommended, but not fit with at todays visit  f = prescribed and fit with at todays visit  t = continue as indicated  d = d/c  p = as needed  rare = use on rare, as-needed basis; advised against chronic use    Pain management Heat  Acetaminophen  nsaids per nephrology   [blank] = No prescription necessary. A handout detailing dosing of appropriate   over-the-counter musculoskeletal analgesics was made available to the patient.   m = meloxicam x 14 days  mp = 14 day course of meloxicam prescribed prior    Follow up     [blank] = as needed  [number] = in [number] weeks  CSI = for corticosteroid injection  VSI = for viscosupplement injection or injection series  PRP = for platelet rich plasma injection or injection series  MRI = after MRI imaging  ns = should surgical options be deferred (no surgery)  o = appointment offered, deferred by patient    Should symptoms worsen or fail to resolve, consider    Revisiting the above options and / or CT vs. 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

## 2022-07-17 ENCOUNTER — CLINICAL SUPPORT (OUTPATIENT)
Dept: CARDIOLOGY | Facility: HOSPITAL | Age: 82
End: 2022-07-17
Payer: MEDICARE

## 2022-07-17 DIAGNOSIS — Z95.818 PRESENCE OF OTHER CARDIAC IMPLANTS AND GRAFTS: ICD-10-CM

## 2022-07-17 PROCEDURE — G2066 INTER DEVC REMOTE 30D: HCPCS | Performed by: INTERNAL MEDICINE

## 2022-07-25 ENCOUNTER — PATIENT MESSAGE (OUTPATIENT)
Dept: INTERNAL MEDICINE | Facility: CLINIC | Age: 82
End: 2022-07-25
Payer: MEDICARE

## 2022-07-25 ENCOUNTER — PATIENT MESSAGE (OUTPATIENT)
Dept: NEPHROLOGY | Facility: CLINIC | Age: 82
End: 2022-07-25
Payer: MEDICARE

## 2022-07-26 ENCOUNTER — PATIENT MESSAGE (OUTPATIENT)
Dept: INTERNAL MEDICINE | Facility: CLINIC | Age: 82
End: 2022-07-26
Payer: MEDICARE

## 2022-08-01 ENCOUNTER — LAB VISIT (OUTPATIENT)
Dept: LAB | Facility: HOSPITAL | Age: 82
End: 2022-08-01
Attending: INTERNAL MEDICINE
Payer: MEDICARE

## 2022-08-01 DIAGNOSIS — I12.9 HYPERTENSIVE KIDNEY DISEASE: ICD-10-CM

## 2022-08-01 DIAGNOSIS — N18.32 STAGE 3B CHRONIC KIDNEY DISEASE: ICD-10-CM

## 2022-08-01 DIAGNOSIS — N17.9 AKI (ACUTE KIDNEY INJURY): ICD-10-CM

## 2022-08-01 DIAGNOSIS — E87.1 HYPONATREMIA: ICD-10-CM

## 2022-08-01 LAB
ALBUMIN SERPL BCP-MCNC: 3.9 G/DL (ref 3.5–5.2)
ANION GAP SERPL CALC-SCNC: 10 MMOL/L (ref 8–16)
BUN SERPL-MCNC: 33 MG/DL (ref 8–23)
CALCIUM SERPL-MCNC: 9.5 MG/DL (ref 8.7–10.5)
CHLORIDE SERPL-SCNC: 105 MMOL/L (ref 95–110)
CO2 SERPL-SCNC: 26 MMOL/L (ref 23–29)
CREAT SERPL-MCNC: 1.9 MG/DL (ref 0.5–1.4)
EST. GFR  (NO RACE VARIABLE): 35 ML/MIN/1.73 M^2
GLUCOSE SERPL-MCNC: 82 MG/DL (ref 70–110)
PHOSPHATE SERPL-MCNC: 3.4 MG/DL (ref 2.7–4.5)
POTASSIUM SERPL-SCNC: 4.4 MMOL/L (ref 3.5–5.1)
SODIUM SERPL-SCNC: 141 MMOL/L (ref 136–145)

## 2022-08-01 PROCEDURE — 80069 RENAL FUNCTION PANEL: CPT | Performed by: INTERNAL MEDICINE

## 2022-08-01 PROCEDURE — 36415 COLL VENOUS BLD VENIPUNCTURE: CPT | Mod: PN | Performed by: INTERNAL MEDICINE

## 2022-08-02 ENCOUNTER — TELEPHONE (OUTPATIENT)
Dept: NEPHROLOGY | Facility: CLINIC | Age: 82
End: 2022-08-02
Payer: MEDICARE

## 2022-08-02 NOTE — PROGRESS NOTES
Creatinine at baseline now.  Potassium is normal.  Continue current plan.  Anticipate follow up in nephrology clinic in the next 1-2 months. Please stay connected with nephrology office. Thanks.

## 2022-08-02 NOTE — TELEPHONE ENCOUNTER
----- Message from Lisa Graf MD sent at 8/2/2022  3:38 PM CDT -----  Creatinine at baseline now.  Potassium is normal.  Continue current plan.  Anticipate follow up in nephrology clinic in the next 1-2 months. Please stay connected with nephrology office. Thanks.

## 2022-08-07 PROBLEM — R60.0 BILATERAL LEG EDEMA: Status: ACTIVE | Noted: 2022-08-07

## 2022-08-07 NOTE — PROGRESS NOTES
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    Chief Complaint: Back Pain (Has improved since appt made.  Said meds ck and discuss kidney function )    HPI   The patient presents for evaluation of right subscapular pain.  He is still able to function during the day and exercise without difficulty.  The subscapular pain mainly disrupts his sleep.  He has been evaluated by Sports Medicine.    Patient recently was diagnosed to have acute kidney injury superimposed on chronic kidney disease.  Use of irbesartan was suspended and metolazone was discontinued.  He is currently using furosemide 3 days a week.  The patient monitors his leg circumference the documented edema.  He has not experienced any recent significant edema.  He reports that his blood pressures have been stable.    He has chronic asthma.  He is breathing well.  Peak expiratory flow readings have been in the low 500s on self-monitoring.    He has chronic rhinitis.  His sinus symptoms have been fairly well controlled with use of Claritin as needed.    Review of Systems   Constitutional: Negative for activity change, appetite change, chills, fatigue, fever and unexpected weight change.   HENT: Negative for sinus pressure/congestion and sore throat.    Eyes: Negative for visual disturbance.   Respiratory: Negative for cough, chest tightness, shortness of breath and wheezing.    Cardiovascular: Positive for leg swelling. Negative for chest pain and palpitations.   Gastrointestinal: Negative for abdominal pain, blood in stool, nausea and vomiting.   Genitourinary: Negative for dysuria, hematuria and urgency.   Musculoskeletal: Positive for back pain. Negative for arthralgias, gait problem, joint swelling, myalgias and neck stiffness.   Integumentary:  Negative for color change and rash.   Neurological: Negative for dizziness, syncope, weakness, light-headedness, numbness and headaches.   Psychiatric/Behavioral: Negative for sleep  disturbance.            Physical Exam  Vitals and nursing note reviewed.   Constitutional:       General: He is not in acute distress.     Appearance: He is well-developed.      Comments: The patient's weight has remained stable since 04/26/2022.     HENT:      Head: Normocephalic and atraumatic.   Eyes:      General: No scleral icterus.     Conjunctiva/sclera: Conjunctivae normal.   Neck:      Thyroid: No thyromegaly.      Vascular: No JVD.   Cardiovascular:      Rate and Rhythm: Normal rate and regular rhythm.      Heart sounds: Normal heart sounds. No murmur heard.    No friction rub. No gallop.      Comments: No ankle edema is noted.  Pulmonary:      Effort: Pulmonary effort is normal. No respiratory distress.      Breath sounds: Normal breath sounds. No wheezing or rales.   Chest:      Chest wall: No tenderness.   Abdominal:      General: Bowel sounds are normal.      Palpations: Abdomen is soft. There is no mass.      Tenderness: There is no abdominal tenderness.   Musculoskeletal:         General: No tenderness. Normal range of motion.      Cervical back: Normal range of motion and neck supple.      Comments: Back:  No subscapular tenderness to palpation.  No vertebral percussion tenderness is present.     Lymphadenopathy:      Cervical: No cervical adenopathy.   Skin:     General: Skin is warm and dry.      Findings: No rash.   Neurological:      Mental Status: He is alert and oriented to person, place, and time.      Comments: Gait is normal.           Assessment & Plan:      Adelfo was seen today for back pain.  The patient will follow-up with Sports Medicine regarding subscapular pain on the right side.    The patient will follow-up with Nephrology as recommended regarding his chronic kidney disease and diuretic management.    Diagnoses and all orders for this visit:    Subscapular pain, right    NEFTALI (acute kidney injury)    Stage 3b chronic kidney disease    Bilateral leg edema    Intermittent asthma  without complication, unspecified asthma severity         Follow up in about 4 months (around 10/3/2022).     Romero Vogel MD

## 2022-08-16 ENCOUNTER — CLINICAL SUPPORT (OUTPATIENT)
Dept: CARDIOLOGY | Facility: HOSPITAL | Age: 82
End: 2022-08-16
Payer: MEDICARE

## 2022-08-16 DIAGNOSIS — Z95.818 PRESENCE OF OTHER CARDIAC IMPLANTS AND GRAFTS: ICD-10-CM

## 2022-08-16 PROCEDURE — 93298 CARDIAC DEVICE CHECK - REMOTE: ICD-10-PCS | Mod: ,,, | Performed by: INTERNAL MEDICINE

## 2022-08-16 PROCEDURE — G2066 INTER DEVC REMOTE 30D: HCPCS | Performed by: INTERNAL MEDICINE

## 2022-08-16 PROCEDURE — 93298 REM INTERROG DEV EVAL SCRMS: CPT | Mod: ,,, | Performed by: INTERNAL MEDICINE

## 2022-08-18 ENCOUNTER — LAB VISIT (OUTPATIENT)
Dept: LAB | Facility: HOSPITAL | Age: 82
End: 2022-08-18
Attending: INTERNAL MEDICINE
Payer: MEDICARE

## 2022-08-18 DIAGNOSIS — M1A.9XX1 CHRONIC TOPHACEOUS GOUT: ICD-10-CM

## 2022-08-18 LAB
ALBUMIN SERPL BCP-MCNC: 3.9 G/DL (ref 3.5–5.2)
ALP SERPL-CCNC: 73 U/L (ref 55–135)
ALT SERPL W/O P-5'-P-CCNC: 18 U/L (ref 10–44)
ANION GAP SERPL CALC-SCNC: 7 MMOL/L (ref 8–16)
AST SERPL-CCNC: 21 U/L (ref 10–40)
BILIRUB SERPL-MCNC: 1 MG/DL (ref 0.1–1)
BUN SERPL-MCNC: 29 MG/DL (ref 8–23)
CALCIUM SERPL-MCNC: 9.4 MG/DL (ref 8.7–10.5)
CHLORIDE SERPL-SCNC: 106 MMOL/L (ref 95–110)
CO2 SERPL-SCNC: 26 MMOL/L (ref 23–29)
CREAT SERPL-MCNC: 2 MG/DL (ref 0.5–1.4)
EST. GFR  (NO RACE VARIABLE): 32.9 ML/MIN/1.73 M^2
GLUCOSE SERPL-MCNC: 92 MG/DL (ref 70–110)
POTASSIUM SERPL-SCNC: 4.7 MMOL/L (ref 3.5–5.1)
PROT SERPL-MCNC: 6.4 G/DL (ref 6–8.4)
SODIUM SERPL-SCNC: 139 MMOL/L (ref 136–145)
URATE SERPL-MCNC: 5.8 MG/DL (ref 3.4–7)

## 2022-08-18 PROCEDURE — 36415 COLL VENOUS BLD VENIPUNCTURE: CPT | Mod: PN | Performed by: INTERNAL MEDICINE

## 2022-08-18 PROCEDURE — 84550 ASSAY OF BLOOD/URIC ACID: CPT | Performed by: INTERNAL MEDICINE

## 2022-08-18 PROCEDURE — 80053 COMPREHEN METABOLIC PANEL: CPT | Performed by: INTERNAL MEDICINE
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
## 2022-08-23 ENCOUNTER — OFFICE VISIT (OUTPATIENT)
Dept: RHEUMATOLOGY | Facility: CLINIC | Age: 82
End: 2022-08-23
Payer: MEDICARE

## 2022-08-23 VITALS
WEIGHT: 220 LBS | HEART RATE: 55 BPM | HEIGHT: 70 IN | SYSTOLIC BLOOD PRESSURE: 150 MMHG | DIASTOLIC BLOOD PRESSURE: 69 MMHG | BODY MASS INDEX: 31.5 KG/M2

## 2022-08-23 DIAGNOSIS — M1A.9XX1 CHRONIC TOPHACEOUS GOUT: Primary | ICD-10-CM

## 2022-08-23 PROCEDURE — 3078F DIAST BP <80 MM HG: CPT | Mod: CPTII,S$GLB,, | Performed by: INTERNAL MEDICINE

## 2022-08-23 PROCEDURE — 1101F PR PT FALLS ASSESS DOC 0-1 FALLS W/OUT INJ PAST YR: ICD-10-PCS | Mod: CPTII,S$GLB,, | Performed by: INTERNAL MEDICINE

## 2022-08-23 PROCEDURE — 99213 PR OFFICE/OUTPT VISIT, EST, LEVL III, 20-29 MIN: ICD-10-PCS | Mod: S$GLB,,, | Performed by: INTERNAL MEDICINE

## 2022-08-23 PROCEDURE — 3077F PR MOST RECENT SYSTOLIC BLOOD PRESSURE >= 140 MM HG: ICD-10-PCS | Mod: CPTII,S$GLB,, | Performed by: INTERNAL MEDICINE

## 2022-08-23 PROCEDURE — 1159F MED LIST DOCD IN RCRD: CPT | Mod: CPTII,S$GLB,, | Performed by: INTERNAL MEDICINE

## 2022-08-23 PROCEDURE — 3288F PR FALLS RISK ASSESSMENT DOCUMENTED: ICD-10-PCS | Mod: CPTII,S$GLB,, | Performed by: INTERNAL MEDICINE

## 2022-08-23 PROCEDURE — 99213 OFFICE O/P EST LOW 20 MIN: CPT | Mod: S$GLB,,, | Performed by: INTERNAL MEDICINE

## 2022-08-23 PROCEDURE — 1159F PR MEDICATION LIST DOCUMENTED IN MEDICAL RECORD: ICD-10-PCS | Mod: CPTII,S$GLB,, | Performed by: INTERNAL MEDICINE

## 2022-08-23 PROCEDURE — 3288F FALL RISK ASSESSMENT DOCD: CPT | Mod: CPTII,S$GLB,, | Performed by: INTERNAL MEDICINE

## 2022-08-23 PROCEDURE — 1126F PR PAIN SEVERITY QUANTIFIED, NO PAIN PRESENT: ICD-10-PCS | Mod: CPTII,S$GLB,, | Performed by: INTERNAL MEDICINE

## 2022-08-23 PROCEDURE — 1101F PT FALLS ASSESS-DOCD LE1/YR: CPT | Mod: CPTII,S$GLB,, | Performed by: INTERNAL MEDICINE

## 2022-08-23 PROCEDURE — 99999 PR PBB SHADOW E&M-EST. PATIENT-LVL IV: ICD-10-PCS | Mod: PBBFAC,,, | Performed by: INTERNAL MEDICINE

## 2022-08-23 PROCEDURE — 3077F SYST BP >= 140 MM HG: CPT | Mod: CPTII,S$GLB,, | Performed by: INTERNAL MEDICINE

## 2022-08-23 PROCEDURE — 1126F AMNT PAIN NOTED NONE PRSNT: CPT | Mod: CPTII,S$GLB,, | Performed by: INTERNAL MEDICINE

## 2022-08-23 PROCEDURE — 3078F PR MOST RECENT DIASTOLIC BLOOD PRESSURE < 80 MM HG: ICD-10-PCS | Mod: CPTII,S$GLB,, | Performed by: INTERNAL MEDICINE

## 2022-08-23 PROCEDURE — 99999 PR PBB SHADOW E&M-EST. PATIENT-LVL IV: CPT | Mod: PBBFAC,,, | Performed by: INTERNAL MEDICINE

## 2022-08-23 RX ORDER — IRBESARTAN 150 MG/1
150 TABLET ORAL DAILY
COMMUNITY
End: 2022-10-27 | Stop reason: SDUPTHER

## 2022-08-23 NOTE — PROGRESS NOTES
"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    Chief Complaint: No chief complaint on file.    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 LCV was 2/18/22. No acute gout flares. He feels that it is completely under control. He has not noticed any swelling of his bilateral greater toes.     Patient continues febuxostat 40mg daily. He has not missed any doses.     PCP is managing hypertension and nephrology managing kidney function.      Exercise: elliptical, yard work, biking, housework     Labs: Kidney function is moderately reduced but stable. The liver tests are normal. The uric acid is back to normal, at goal    Review of Systems   Constitutional: Negative for fever and unexpected weight change.   HENT: Negative for mouth sores and trouble swallowing.    Eyes: Negative for redness.   Respiratory: Negative for cough and shortness of breath.    Cardiovascular: Negative for chest pain.   Gastrointestinal: Negative for constipation and diarrhea.   Genitourinary: Negative for genital sores.   Skin: Negative for rash.   Neurological: Negative for headaches.   Hematological: Bruises/bleeds easily.         Objective:   BP (!) 150/69   Pulse (!) 55   Ht 5' 9.6" (1.768 m)   Wt 99.8 kg (220 lb)   BMI 31.93 kg/m²      Physical Exam   Constitutional: He is oriented to person, place, and time. He appears obese.   HENT:   Head: Normocephalic and atraumatic.   Eyes: Conjunctivae are normal.   Cardiovascular: Normal rate, regular rhythm, normal heart sounds and normal pulses.   Pulmonary/Chest: Effort normal and breath sounds normal.   Abdominal: Normal appearance.   Neurological: He is alert and oriented to person, place, and time.   Skin: Skin is warm and dry.   Psychiatric: His behavior is normal. Mood, judgment and thought content normal.           8/23/2022   Tender (PEREZ-28) 0 / 28  "   Swollen (PEREZ-28) 0 / 28    Provider Global 0 mm   Patient Global 0 mm   ESR --   CRP --   PEREZ-28 (ESR) --   PEREZ-28 (CRP) --   CDAI Score 0         Assessment:       1. Chronic tophaceous gout            Plan:       Problem List Items Addressed This Visit        Orthopedic    Chronic tophaceous gout - Primary        Plan:  Continue febuxostat 40 mg daily   Uric acid levels, CMP in 6 months  Return to clinic in 1 year

## 2022-08-23 NOTE — PROGRESS NOTES
Pre chart    Answers for HPI/ROS submitted by the patient on 8/20/2022  fever: No  eye redness: No  mouth sores: No  headaches: No  shortness of breath: No  chest pain: No  trouble swallowing: No  diarrhea: No  constipation: No  unexpected weight change: No  genital sore: No  During the last 3 days, have you had a skin rash?: No  Bruises or bleeds easily: Yes  cough: No

## 2022-08-23 NOTE — PROGRESS NOTES
I have personally taken the history and examined the patient and agree with the resident,s note as stated above        Latest Reference Range & Units 04/19/22 08:37   Cholesterol 120 - 199 mg/dL 165   HDL 40 - 75 mg/dL 69   HDL/Cholesterol Ratio 20.0 - 50.0 % 41.8   LDL Cholesterol External 63.0 - 159.0 mg/dL 79.2   Non-HDL Cholesterol mg/dL 96   Total Cholesterol/HDL Ratio 2.0 - 5.0  2.4   Triglycerides 30 - 150 mg/dL 84      Latest Reference Range & Units 03/18/22 11:05   Iron 45 - 160 ug/dL 91   TIBC 250 - 450 ug/dL 410   Saturated Iron 20 - 50 % 22   Transferrin 200 - 375 mg/dL 277   Ferritin 20.0 - 300.0 ng/mL 59   Vitamin B-12 210 - 950 pg/mL 224           Chronic tophaceous gout SUA 5.8 on febuxostat 40mg daily;  Intercritical, no acute attacks since stopping colchicine 0.3mg [place] 11/11/20  H/o Allopurinol hepatotoxicity  CKD stage 3b, stable creat 2  eGFR 32.9  obesity, lost 4#  Hyperlipidemia, LDL 79.2 4/19/22  on atorvastatin 20mg daily  /69  , metoprolol succinate 25mg daily and irbesartan 150mg daily, furosemide 40mg daily metolazone 2.5mg daily  Mild stable macrocytic anemia    Continue febuxostat 40mg daily  Cont exercise: yard work, walk, elliptical, bike  Mediterranean diet   cbc, cmp, uric acid q 6 months  RTC 12 months  Answers for HPI/ROS submitted by the patient on 8/20/2022  fever: No  eye redness: No  mouth sores: No  headaches: No  shortness of breath: No  chest pain: No  trouble swallowing: No  diarrhea: No  constipation: No  unexpected weight change: No  genital sore: No  During the last 3 days, have you had a skin rash?: No  Bruises or bleeds easily: Yes  cough: No

## 2022-08-29 ENCOUNTER — TELEPHONE (OUTPATIENT)
Dept: NEPHROLOGY | Facility: CLINIC | Age: 82
End: 2022-08-29
Payer: MEDICARE

## 2022-08-29 DIAGNOSIS — N17.9 AKI (ACUTE KIDNEY INJURY): Primary | ICD-10-CM

## 2022-09-06 ENCOUNTER — LAB VISIT (OUTPATIENT)
Dept: LAB | Facility: HOSPITAL | Age: 82
End: 2022-09-06
Attending: INTERNAL MEDICINE
Payer: MEDICARE

## 2022-09-06 DIAGNOSIS — N17.9 AKI (ACUTE KIDNEY INJURY): ICD-10-CM

## 2022-09-06 LAB
ANION GAP SERPL CALC-SCNC: 12 MMOL/L (ref 8–16)
BASOPHILS # BLD AUTO: 0.07 K/UL (ref 0–0.2)
BASOPHILS NFR BLD: 1.2 % (ref 0–1.9)
BUN SERPL-MCNC: 27 MG/DL (ref 8–23)
CALCIUM SERPL-MCNC: 9.3 MG/DL (ref 8.7–10.5)
CHLORIDE SERPL-SCNC: 101 MMOL/L (ref 95–110)
CO2 SERPL-SCNC: 26 MMOL/L (ref 23–29)
CREAT SERPL-MCNC: 1.8 MG/DL (ref 0.5–1.4)
DIFFERENTIAL METHOD: ABNORMAL
EOSINOPHIL # BLD AUTO: 0.3 K/UL (ref 0–0.5)
EOSINOPHIL NFR BLD: 5.7 % (ref 0–8)
ERYTHROCYTE [DISTWIDTH] IN BLOOD BY AUTOMATED COUNT: 12.5 % (ref 11.5–14.5)
EST. GFR  (NO RACE VARIABLE): 37.3 ML/MIN/1.73 M^2
GLUCOSE SERPL-MCNC: 113 MG/DL (ref 70–110)
HCT VFR BLD AUTO: 37.1 % (ref 40–54)
HGB BLD-MCNC: 11.9 G/DL (ref 14–18)
IMM GRANULOCYTES # BLD AUTO: 0.01 K/UL (ref 0–0.04)
IMM GRANULOCYTES NFR BLD AUTO: 0.2 % (ref 0–0.5)
LYMPHOCYTES # BLD AUTO: 1.7 K/UL (ref 1–4.8)
LYMPHOCYTES NFR BLD: 30.7 % (ref 18–48)
MCH RBC QN AUTO: 31.8 PG (ref 27–31)
MCHC RBC AUTO-ENTMCNC: 32.1 G/DL (ref 32–36)
MCV RBC AUTO: 99 FL (ref 82–98)
MONOCYTES # BLD AUTO: 0.7 K/UL (ref 0.3–1)
MONOCYTES NFR BLD: 12.7 % (ref 4–15)
NEUTROPHILS # BLD AUTO: 2.8 K/UL (ref 1.8–7.7)
NEUTROPHILS NFR BLD: 49.5 % (ref 38–73)
NRBC BLD-RTO: 0 /100 WBC
PLATELET # BLD AUTO: 182 K/UL (ref 150–450)
PMV BLD AUTO: 12.1 FL (ref 9.2–12.9)
POTASSIUM SERPL-SCNC: 4.3 MMOL/L (ref 3.5–5.1)
RBC # BLD AUTO: 3.74 M/UL (ref 4.6–6.2)
SODIUM SERPL-SCNC: 139 MMOL/L (ref 136–145)
WBC # BLD AUTO: 5.61 K/UL (ref 3.9–12.7)

## 2022-09-06 PROCEDURE — 36415 COLL VENOUS BLD VENIPUNCTURE: CPT | Mod: PN | Performed by: INTERNAL MEDICINE

## 2022-09-06 PROCEDURE — 80048 BASIC METABOLIC PNL TOTAL CA: CPT | Performed by: INTERNAL MEDICINE

## 2022-09-06 PROCEDURE — 85025 COMPLETE CBC W/AUTO DIFF WBC: CPT | Performed by: INTERNAL MEDICINE

## 2022-09-14 ENCOUNTER — OFFICE VISIT (OUTPATIENT)
Dept: NEPHROLOGY | Facility: CLINIC | Age: 82
End: 2022-09-14
Payer: MEDICARE

## 2022-09-14 VITALS
BODY MASS INDEX: 32.57 KG/M2 | DIASTOLIC BLOOD PRESSURE: 68 MMHG | WEIGHT: 227.5 LBS | HEIGHT: 70 IN | OXYGEN SATURATION: 98 % | HEART RATE: 67 BPM | SYSTOLIC BLOOD PRESSURE: 142 MMHG

## 2022-09-14 DIAGNOSIS — K76.0 NAFLD (NONALCOHOLIC FATTY LIVER DISEASE): ICD-10-CM

## 2022-09-14 DIAGNOSIS — R60.0 BILATERAL LEG EDEMA: ICD-10-CM

## 2022-09-14 DIAGNOSIS — N18.32 STAGE 3B CHRONIC KIDNEY DISEASE: Primary | ICD-10-CM

## 2022-09-14 DIAGNOSIS — I10 ESSENTIAL HYPERTENSION: Chronic | ICD-10-CM

## 2022-09-14 PROCEDURE — 1160F PR REVIEW ALL MEDS BY PRESCRIBER/CLIN PHARMACIST DOCUMENTED: ICD-10-PCS | Mod: CPTII,S$GLB,, | Performed by: INTERNAL MEDICINE

## 2022-09-14 PROCEDURE — 99999 PR PBB SHADOW E&M-EST. PATIENT-LVL III: ICD-10-PCS | Mod: PBBFAC,,, | Performed by: INTERNAL MEDICINE

## 2022-09-14 PROCEDURE — 99214 OFFICE O/P EST MOD 30 MIN: CPT | Mod: S$GLB,,, | Performed by: INTERNAL MEDICINE

## 2022-09-14 PROCEDURE — 1160F RVW MEDS BY RX/DR IN RCRD: CPT | Mod: CPTII,S$GLB,, | Performed by: INTERNAL MEDICINE

## 2022-09-14 PROCEDURE — 1101F PT FALLS ASSESS-DOCD LE1/YR: CPT | Mod: CPTII,S$GLB,, | Performed by: INTERNAL MEDICINE

## 2022-09-14 PROCEDURE — 3077F PR MOST RECENT SYSTOLIC BLOOD PRESSURE >= 140 MM HG: ICD-10-PCS | Mod: CPTII,S$GLB,, | Performed by: INTERNAL MEDICINE

## 2022-09-14 PROCEDURE — 1159F PR MEDICATION LIST DOCUMENTED IN MEDICAL RECORD: ICD-10-PCS | Mod: CPTII,S$GLB,, | Performed by: INTERNAL MEDICINE

## 2022-09-14 PROCEDURE — 99214 PR OFFICE/OUTPT VISIT, EST, LEVL IV, 30-39 MIN: ICD-10-PCS | Mod: S$GLB,,, | Performed by: INTERNAL MEDICINE

## 2022-09-14 PROCEDURE — 1159F MED LIST DOCD IN RCRD: CPT | Mod: CPTII,S$GLB,, | Performed by: INTERNAL MEDICINE

## 2022-09-14 PROCEDURE — 3078F DIAST BP <80 MM HG: CPT | Mod: CPTII,S$GLB,, | Performed by: INTERNAL MEDICINE

## 2022-09-14 PROCEDURE — 3077F SYST BP >= 140 MM HG: CPT | Mod: CPTII,S$GLB,, | Performed by: INTERNAL MEDICINE

## 2022-09-14 PROCEDURE — 1126F AMNT PAIN NOTED NONE PRSNT: CPT | Mod: CPTII,S$GLB,, | Performed by: INTERNAL MEDICINE

## 2022-09-14 PROCEDURE — 1101F PR PT FALLS ASSESS DOC 0-1 FALLS W/OUT INJ PAST YR: ICD-10-PCS | Mod: CPTII,S$GLB,, | Performed by: INTERNAL MEDICINE

## 2022-09-14 PROCEDURE — 99999 PR PBB SHADOW E&M-EST. PATIENT-LVL III: CPT | Mod: PBBFAC,,, | Performed by: INTERNAL MEDICINE

## 2022-09-14 PROCEDURE — 3078F PR MOST RECENT DIASTOLIC BLOOD PRESSURE < 80 MM HG: ICD-10-PCS | Mod: CPTII,S$GLB,, | Performed by: INTERNAL MEDICINE

## 2022-09-14 PROCEDURE — 3288F PR FALLS RISK ASSESSMENT DOCUMENTED: ICD-10-PCS | Mod: CPTII,S$GLB,, | Performed by: INTERNAL MEDICINE

## 2022-09-14 PROCEDURE — 3288F FALL RISK ASSESSMENT DOCD: CPT | Mod: CPTII,S$GLB,, | Performed by: INTERNAL MEDICINE

## 2022-09-14 PROCEDURE — 1126F PR PAIN SEVERITY QUANTIFIED, NO PAIN PRESENT: ICD-10-PCS | Mod: CPTII,S$GLB,, | Performed by: INTERNAL MEDICINE

## 2022-09-14 RX ORDER — HYDROCORTISONE ACETATE PRAMOXINE HCL 1; 1 G/100G; G/100G
CREAM TOPICAL
COMMUNITY
Start: 2022-09-13 | End: 2023-06-14 | Stop reason: SDUPTHER

## 2022-09-14 NOTE — PROGRESS NOTES
Progress Report  Nephrology      Consult Requested By: CKD  Reason for Consult: CKD    History of Present Illness:  Patient is a 82 y.o. male presents for a new consultation for evaluation of elevated serum creatinine and presumed chronic kidney disease. The patient was found to have an elevated serum creatinine during routine laboratory testing, at 1.9 mg/dL.     The patient denies SOB, LE edema, hematuria or foamy urine. He had an NEFTALI episode due to ibuprofen 3 months ago.     The patient denies taking NSAIDs or new antibiotics, recreational drugs, recent episode of dehydration, diarrhea, nausea or vomiting, acute illness, hospitalization or exposure to IV radiocontrast.     Past Medical History:   Diagnosis Date    ALLERGIC RHINITIS     Anemia     Asthma     Cataract     Hyperlipidemia     Hypertension     NAFLD (nonalcoholic fatty liver disease)     ANNABELLE (obstructive sleep apnea)     on CPAP    PVD (posterior vitreous detachment), left eye     Squamous cell cancer of skin of hand          Current Outpatient Medications:     albuterol (PROAIR HFA) 90 mcg/actuation inhaler, Inhale 2 puffs into the lungs every 4 (four) hours as needed for Wheezing., Disp: 1 Inhaler, Rfl: 6    atorvastatin (LIPITOR) 20 MG tablet, Take 1 tablet (20 mg total) by mouth once daily., Disp: 90 tablet, Rfl: 3    azelastine (ASTELIN) 137 mcg (0.1 %) nasal spray, 2 sprays (274 mcg total) by Nasal route 2 (two) times daily as needed., Disp: 30 mL, Rfl: 5    febuxostat (ULORIC) 40 mg Tab, Take 1 tablet (40 mg total) by mouth once daily., Disp: 90 tablet, Rfl: 3    fish oil-omega-3 fatty acids 300-1,000 mg capsule, Take 2 g by mouth once daily., Disp: , Rfl:     fluocinonide (LIDEX) 0.05 % external solution, Apply topically 2 (two) times daily. For severe rashes in scalp and ears only prn, Disp: 60 mL, Rfl: 3    fluticasone propionate (FLONASE) 50 mcg/actuation nasal spray, 2 sprays (100 mcg total) by Each Nostril route once daily., Disp: 48 g,  Rfl: 3    fluticasone-salmeterol diskus inhaler 250-50 mcg, INHALE 1 PUFF BY MOUTH TWICE DAILY. RINSE MOUTH AFTER USE, Disp: 180 each, Rfl: 3    furosemide (LASIX) 40 MG tablet, Take 1 tablet (40 mg total) by mouth once daily. (Patient taking differently: Take 40 mg by mouth once daily. Mon.wed.fri.), Disp: 90 tablet, Rfl: 3    glucosamine & chondroit sul.Na 750-600 mg Tab, Take 750 mg by mouth., Disp: , Rfl:     hydrocortisone 2.5 % cream, Apply topically 2 (two) times daily. As needed for severe flares of rash on face and neck, Disp: 28 g, Rfl: 3    hydrocortisone-pramoxine (ANALPRAM-HC) 2.5-1 % Crea, Place rectally 3 (three) times daily., Disp: 28 g, Rfl: 1    irbesartan (AVAPRO) 150 MG tablet, Take 150 mg by mouth once daily., Disp: , Rfl:     ketoconazole (NIZORAL) 2 % cream, Apply topically 2 (two) times daily. To dry skin PRN, Disp: 60 g, Rfl: 3    ketoconazole (NIZORAL) 2 % shampoo, WASH AFFECTED AREA EVERY OTHER DAY, Disp: 120 mL, Rfl: 2    loratadine (CLARITIN) 10 mg tablet, Take 10 mg by mouth daily as needed., Disp: , Rfl:     metoprolol succinate (TOPROL-XL) 25 MG 24 hr tablet, TAKE 1 TABLET(25 MG) BY MOUTH EVERY DAY, Disp: 90 tablet, Rfl: 3    omeprazole (PRILOSEC) 20 MG capsule, TAKE 1 CAPSULE(20 MG) BY MOUTH EVERY DAY, Disp: 90 capsule, Rfl: 3    pramoxine-hydrocortisone (PROCTOCREAM-HC) 1-1 % rectal cream, Place rectally., Disp: , Rfl:     triamcinolone acetonide 0.1% (KENALOG) 0.1 % cream, Apply topically 2 (two) times daily., Disp: 80 g, Rfl: 2    Current Facility-Administered Medications:     ciprofloxacin HCl tablet 500 mg, 500 mg, Oral, Once, Julee Urias NP  Review of patient's allergies indicates:   Allergen Reactions    Amoxicillin Hives    Allopurinol analogues Other (See Comments)     Abnormal transaminases        Past Surgical History:   Procedure Laterality Date    APPENDECTOMY      CARDIAC CATHETERIZATION  10/31/2018    no stent    CARDIAC CATHETERIZATION  1998    no stent     COLONOSCOPY N/A 6/19/2018    Procedure: COLONOSCOPY;  Surgeon: Wade De La Garza MD;  Location: [facility] ENDO (4TH FLR);  Service: Endoscopy;  Laterality: N/A;    COLONOSCOPY N/A 6/11/2020    Procedure: COLONOSCOPY;  Surgeon: Von Gutierrez MD;  Location: [facility] ENDO (4TH FLR);  Service: Endoscopy;  Laterality: N/A;  3/17/20-pt confirmed appt on 3/20/20 with new arrival time of 0715, and updated visitor/ride instructions-BB  patient to be rescheduled by 6/3/20 per Dr. Gutierrez-JAVIER  patient requests to be rescheduled on a Thursday or Friday-BB  patient requested suprep-BB  loop recorder-BB  C    ENDOSCOPIC ULTRASOUND OF UPPER GASTROINTESTINAL TRACT N/A 10/1/2020    Procedure: ULTRASOUND, UPPER GI TRACT, ENDOSCOPIC;  Surgeon: Niko Lambert MD;  Location: [facility] (2ND FLR);  Service: Endoscopy;  Laterality: N/A;  Covid-19 test 9/28/20 at [facility] - pg  Has Loop Recorder.    ESOPHAGOGASTRODUODENOSCOPY N/A 10/23/2018    Procedure: EGD (ESOPHAGOGASTRODUODENOSCOPY);  Surgeon: Bart Hernandez MD;  Location: [facility] (4TH FLR);  Service: Endoscopy;  Laterality: N/A;    ESOPHAGOGASTRODUODENOSCOPY N/A 6/11/2020    Procedure: EGD (ESOPHAGOGASTRODUODENOSCOPY);  Surgeon: Von Gutierrez MD;  Location: [facility] ENDO (4TH FLR);  Service: Endoscopy;  Laterality: N/A;  3/17/20-pt confirmed appt on 3/20/20 with new arrival time of 0715, and updated visitor/ride instructions-BB  patient to be rescheduled by 6/3/20 per Dr. Santamaria  patient requests to be rescheduled on a Thursday or Friday-BB  patient requested suprep-BB    FOOT SURGERY      INSERTION OF IMPLANTABLE LOOP RECORDER N/A 9/18/2019    Procedure: Insertion, Implantable Loop Recorder;  Surgeon: Gil Wilson MD;  Location: [facility] EP LAB;  Service: Cardiology;  Laterality: N/A;  AT, ILR, MDT, Local, RI, 3 Prep    testicular biopsy       Family History   Problem Relation Age of Onset    Cancer Mother         breast    Glaucoma Mother     Retinal detachment Mother     Heart disease Father          CHF    COPD Father     Retinal detachment Father     Heart disease Brother     Arthritis Brother     Blindness Maternal Grandmother     Cataracts Maternal Grandmother     Cirrhosis Neg Hx     Strabismus Neg Hx     Colon cancer Neg Hx     Stomach cancer Neg Hx     Esophageal cancer Neg Hx     Celiac disease Neg Hx     Crohn's disease Neg Hx     Rectal cancer Neg Hx     Ulcerative colitis Neg Hx      Social History     Tobacco Use    Smoking status: Former     Packs/day: 1.00     Years: 10.00     Pack years: 10.00     Types: Cigarettes     Quit date: 1968     Years since quittin.7    Smokeless tobacco: Former    Tobacco comments:      last smoke   Substance Use Topics    Alcohol use: Yes     Alcohol/week: 2.0 standard drinks     Types: 1 Glasses of wine, 1 Shots of liquor per week     Comment: 5 days per week , 7 -8 drinks a week.    Drug use: No       ROS    Vitals:    22 1412   BP: (!) 142/68   Pulse: 67       PHYSICAL EXAMINATION:  General: no distress, well nourished  Skin: color, texture, turgor normal. No rash or lesions  HEENT: Eyes: reactive pupils, normal conjunctiva. Oral mucosa moist, no ulcers. Throat: no erythema.  Neck: supple, symmetrical, trachea midline, no JVD, no carotid bruit  Lungs: clear to auscultation bilaterally and normal respiratory effort  Cardiovascular: Heart: regular rate and rhythm, S1, S2 normal, no murmur, rub or gallop. Pulses: 2+ and symmetric.  Abdomen: bowel sounds present, no abdominal bruit, soft, non-tender non-distented; no masses, organomegaly or ascites.   Musculoskeletal: no pitting edema in lower extremities, no clubbing or cyanosis  Lymph Nodes: No cervical or supraclavicular adenopathy  Neurologic: AAOx3, normal strength and tone. No focal deficit. No asterixis.       LABORATORY DATA:  Lab Results   Component Value Date    CREATININE 1.8 (H) 2022       Prot/Creat Ratio, Urine   Date Value Ref Range Status   2022 0.06 0.00 - 0.20  Final   05/20/2022 Unable to calculate 0.00 - 0.20 Final   06/12/2018 Unable to calculate 0.00 - 0.20 Final       Lab Results   Component Value Date     09/06/2022    K 4.3 09/06/2022    CO2 26 09/06/2022       Lab Results   Component Value Date    .6 (H) 05/20/2022    CALCIUM 9.3 09/06/2022    PHOS 3.4 08/01/2022       Lab Results   Component Value Date    HGB 11.9 (L) 09/06/2022        No results found for: HGBA1C    Lab Results   Component Value Date    LDLCALC 79.2 04/19/2022         IMAGING STUDIES  Kidney US: Reviewed  Echocardiogram: Reviewed    IMPRESSION / RECOMMENDATIONS:     1. Stage 3b chronic kidney disease  Stable for at least 6 years, recovered from NSAID-NEFTALI bout. 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 Low risk for progression to ESRD. No adjustments needed      2. Bilateral leg edema  Well managed with loop diuretic TIW      3. Essential hypertension  Well controlled on dual regimen      4. NAFLD (nonalcoholic fatty liver disease)  Managed by PCP          SUMMARY OF PLAN:  Continue ARB, loop diuretic  Avoid NSAIDs  3.   RTC in 6 mo

## 2022-09-15 ENCOUNTER — CLINICAL SUPPORT (OUTPATIENT)
Dept: CARDIOLOGY | Facility: HOSPITAL | Age: 82
End: 2022-09-15
Payer: MEDICARE

## 2022-09-15 DIAGNOSIS — Z95.818 PRESENCE OF OTHER CARDIAC IMPLANTS AND GRAFTS: ICD-10-CM

## 2022-09-15 PROCEDURE — G2066 INTER DEVC REMOTE 30D: HCPCS | Performed by: INTERNAL MEDICINE

## 2022-09-26 ENCOUNTER — PATIENT MESSAGE (OUTPATIENT)
Dept: INTERNAL MEDICINE | Facility: CLINIC | Age: 82
End: 2022-09-26
Payer: MEDICARE

## 2022-09-30 ENCOUNTER — TELEPHONE (OUTPATIENT)
Dept: DERMATOLOGY | Facility: CLINIC | Age: 82
End: 2022-09-30
Payer: MEDICARE

## 2022-09-30 NOTE — TELEPHONE ENCOUNTER
Spoke with pt - scheduled appt. Pt thanked me for call    ----- Message from Aida Peng sent at 9/30/2022 10:33 AM CDT -----  Regarding: appt  Contact: @ [phone]  Patient is requesting his annual follow up appt with Dr. Cunningham, no available in Epic. Please call to discuss further.

## 2022-10-15 ENCOUNTER — CLINICAL SUPPORT (OUTPATIENT)
Dept: CARDIOLOGY | Facility: HOSPITAL | Age: 82
End: 2022-10-15
Payer: MEDICARE

## 2022-10-15 DIAGNOSIS — Z95.818 PRESENCE OF OTHER CARDIAC IMPLANTS AND GRAFTS: ICD-10-CM

## 2022-10-15 PROCEDURE — 93298 REM INTERROG DEV EVAL SCRMS: CPT | Mod: ,,, | Performed by: INTERNAL MEDICINE

## 2022-10-15 PROCEDURE — G2066 INTER DEVC REMOTE 30D: HCPCS | Performed by: INTERNAL MEDICINE

## 2022-10-15 PROCEDURE — 93298 CARDIAC DEVICE CHECK - REMOTE: ICD-10-PCS | Mod: ,,, | Performed by: INTERNAL MEDICINE

## 2022-10-19 ENCOUNTER — OFFICE VISIT (OUTPATIENT)
Dept: OPTOMETRY | Facility: CLINIC | Age: 82
End: 2022-10-19
Payer: MEDICARE

## 2022-10-19 DIAGNOSIS — I10 ESSENTIAL HYPERTENSION: ICD-10-CM

## 2022-10-19 DIAGNOSIS — H25.13 NUCLEAR SCLEROSIS, BILATERAL: Primary | ICD-10-CM

## 2022-10-19 DIAGNOSIS — H52.13 MYOPIA WITH ASTIGMATISM AND PRESBYOPIA, BILATERAL: ICD-10-CM

## 2022-10-19 DIAGNOSIS — H52.203 MYOPIA WITH ASTIGMATISM AND PRESBYOPIA, BILATERAL: ICD-10-CM

## 2022-10-19 DIAGNOSIS — H52.4 MYOPIA WITH ASTIGMATISM AND PRESBYOPIA, BILATERAL: ICD-10-CM

## 2022-10-19 DIAGNOSIS — G43.109 OCULAR MIGRAINE: ICD-10-CM

## 2022-10-19 PROCEDURE — 99999 PR PBB SHADOW E&M-EST. PATIENT-LVL III: ICD-10-PCS | Mod: PBBFAC,,, | Performed by: OPTOMETRIST

## 2022-10-19 PROCEDURE — 1101F PR PT FALLS ASSESS DOC 0-1 FALLS W/OUT INJ PAST YR: ICD-10-PCS | Mod: CPTII,S$GLB,, | Performed by: OPTOMETRIST

## 2022-10-19 PROCEDURE — 92014 COMPRE OPH EXAM EST PT 1/>: CPT | Mod: S$GLB,,, | Performed by: OPTOMETRIST

## 2022-10-19 PROCEDURE — 92014 PR EYE EXAM, EST PATIENT,COMPREHESV: ICD-10-PCS | Mod: S$GLB,,, | Performed by: OPTOMETRIST

## 2022-10-19 PROCEDURE — 3288F PR FALLS RISK ASSESSMENT DOCUMENTED: ICD-10-PCS | Mod: CPTII,S$GLB,, | Performed by: OPTOMETRIST

## 2022-10-19 PROCEDURE — 1101F PT FALLS ASSESS-DOCD LE1/YR: CPT | Mod: CPTII,S$GLB,, | Performed by: OPTOMETRIST

## 2022-10-19 PROCEDURE — 1126F PR PAIN SEVERITY QUANTIFIED, NO PAIN PRESENT: ICD-10-PCS | Mod: CPTII,S$GLB,, | Performed by: OPTOMETRIST

## 2022-10-19 PROCEDURE — 3288F FALL RISK ASSESSMENT DOCD: CPT | Mod: CPTII,S$GLB,, | Performed by: OPTOMETRIST

## 2022-10-19 PROCEDURE — 99999 PR PBB SHADOW E&M-EST. PATIENT-LVL III: CPT | Mod: PBBFAC,,, | Performed by: OPTOMETRIST

## 2022-10-19 PROCEDURE — 1159F PR MEDICATION LIST DOCUMENTED IN MEDICAL RECORD: ICD-10-PCS | Mod: CPTII,S$GLB,, | Performed by: OPTOMETRIST

## 2022-10-19 PROCEDURE — 1126F AMNT PAIN NOTED NONE PRSNT: CPT | Mod: CPTII,S$GLB,, | Performed by: OPTOMETRIST

## 2022-10-19 PROCEDURE — 1159F MED LIST DOCD IN RCRD: CPT | Mod: CPTII,S$GLB,, | Performed by: OPTOMETRIST

## 2022-10-19 NOTE — PROGRESS NOTES
"HPI    Last eye exam was 11/17/21 with Dr. Galan.  Patient states OS vision became "pixilated" for short period of time-no   headache afterwards. Has happened a few times before. Vision seems ok with   newest glasses.  Patient denies diplopia, headaches, flashes/floaters, and pain.    Last edited by Linda Potter MA on 10/19/2022  2:27 PM.            Assessment /Plan     For exam results, see Encounter Report.    Nuclear sclerosis, bilateral    Essential hypertension    Ocular migraine    Myopia with astigmatism and presbyopia, bilateral          Educated on cataracts and affects on vision.  Monitor.  No retinopathy--monitor yearly.  BP control.   Educated pt on ocular migraines.  Retina flat and intact OU--no holes, tears, breaks, or RDs.  Eye health normal OU.  Continue w/ current rx.                     "

## 2022-10-20 ENCOUNTER — LAB VISIT (OUTPATIENT)
Dept: LAB | Facility: HOSPITAL | Age: 82
End: 2022-10-20
Attending: INTERNAL MEDICINE
Payer: MEDICARE

## 2022-10-20 DIAGNOSIS — N18.30 STAGE 3 CHRONIC KIDNEY DISEASE, UNSPECIFIED WHETHER STAGE 3A OR 3B CKD: ICD-10-CM

## 2022-10-20 DIAGNOSIS — E78.49 OTHER HYPERLIPIDEMIA: ICD-10-CM

## 2022-10-20 LAB
ALBUMIN SERPL BCP-MCNC: 3.9 G/DL (ref 3.5–5.2)
ALP SERPL-CCNC: 62 U/L (ref 55–135)
ALT SERPL W/O P-5'-P-CCNC: 20 U/L (ref 10–44)
ANION GAP SERPL CALC-SCNC: 11 MMOL/L (ref 8–16)
AST SERPL-CCNC: 21 U/L (ref 10–40)
BASOPHILS # BLD AUTO: 0.07 K/UL (ref 0–0.2)
BASOPHILS NFR BLD: 1.3 % (ref 0–1.9)
BILIRUB SERPL-MCNC: 0.9 MG/DL (ref 0.1–1)
BUN SERPL-MCNC: 25 MG/DL (ref 8–23)
CALCIUM SERPL-MCNC: 9.4 MG/DL (ref 8.7–10.5)
CHLORIDE SERPL-SCNC: 105 MMOL/L (ref 95–110)
CHOLEST SERPL-MCNC: 150 MG/DL (ref 120–199)
CHOLEST/HDLC SERPL: 2.3 {RATIO} (ref 2–5)
CO2 SERPL-SCNC: 22 MMOL/L (ref 23–29)
CREAT SERPL-MCNC: 1.9 MG/DL (ref 0.5–1.4)
DIFFERENTIAL METHOD: ABNORMAL
EOSINOPHIL # BLD AUTO: 0.3 K/UL (ref 0–0.5)
EOSINOPHIL NFR BLD: 6.4 % (ref 0–8)
ERYTHROCYTE [DISTWIDTH] IN BLOOD BY AUTOMATED COUNT: 13.2 % (ref 11.5–14.5)
EST. GFR  (NO RACE VARIABLE): 34.8 ML/MIN/1.73 M^2
GLUCOSE SERPL-MCNC: 96 MG/DL (ref 70–110)
HCT VFR BLD AUTO: 35.9 % (ref 40–54)
HDLC SERPL-MCNC: 64 MG/DL (ref 40–75)
HDLC SERPL: 42.7 % (ref 20–50)
HGB BLD-MCNC: 11.5 G/DL (ref 14–18)
IMM GRANULOCYTES # BLD AUTO: 0.01 K/UL (ref 0–0.04)
IMM GRANULOCYTES NFR BLD AUTO: 0.2 % (ref 0–0.5)
LDLC SERPL CALC-MCNC: 71 MG/DL (ref 63–159)
LYMPHOCYTES # BLD AUTO: 1.8 K/UL (ref 1–4.8)
LYMPHOCYTES NFR BLD: 34.1 % (ref 18–48)
MCH RBC QN AUTO: 31.9 PG (ref 27–31)
MCHC RBC AUTO-ENTMCNC: 32 G/DL (ref 32–36)
MCV RBC AUTO: 99 FL (ref 82–98)
MONOCYTES # BLD AUTO: 0.7 K/UL (ref 0.3–1)
MONOCYTES NFR BLD: 12.7 % (ref 4–15)
NEUTROPHILS # BLD AUTO: 2.4 K/UL (ref 1.8–7.7)
NEUTROPHILS NFR BLD: 45.3 % (ref 38–73)
NONHDLC SERPL-MCNC: 86 MG/DL
NRBC BLD-RTO: 0 /100 WBC
PLATELET # BLD AUTO: 152 K/UL (ref 150–450)
PMV BLD AUTO: 13.4 FL (ref 9.2–12.9)
POTASSIUM SERPL-SCNC: 4.2 MMOL/L (ref 3.5–5.1)
PROT SERPL-MCNC: 6.6 G/DL (ref 6–8.4)
RBC # BLD AUTO: 3.61 M/UL (ref 4.6–6.2)
SODIUM SERPL-SCNC: 138 MMOL/L (ref 136–145)
TRIGL SERPL-MCNC: 75 MG/DL (ref 30–150)
WBC # BLD AUTO: 5.28 K/UL (ref 3.9–12.7)

## 2022-10-20 PROCEDURE — 36415 COLL VENOUS BLD VENIPUNCTURE: CPT | Mod: PN | Performed by: INTERNAL MEDICINE

## 2022-10-20 PROCEDURE — 80061 LIPID PANEL: CPT | Performed by: INTERNAL MEDICINE

## 2022-10-20 PROCEDURE — 85025 COMPLETE CBC W/AUTO DIFF WBC: CPT | Performed by: INTERNAL MEDICINE

## 2022-10-20 PROCEDURE — 80053 COMPREHEN METABOLIC PANEL: CPT | Performed by: INTERNAL MEDICINE

## 2022-10-27 ENCOUNTER — OFFICE VISIT (OUTPATIENT)
Dept: INTERNAL MEDICINE | Facility: CLINIC | Age: 82
End: 2022-10-27
Payer: MEDICARE

## 2022-10-27 VITALS
HEIGHT: 69 IN | SYSTOLIC BLOOD PRESSURE: 138 MMHG | TEMPERATURE: 98 F | DIASTOLIC BLOOD PRESSURE: 56 MMHG | WEIGHT: 224.88 LBS | BODY MASS INDEX: 33.31 KG/M2 | RESPIRATION RATE: 16 BRPM | HEART RATE: 68 BPM

## 2022-10-27 DIAGNOSIS — I10 ESSENTIAL HYPERTENSION: Primary | Chronic | ICD-10-CM

## 2022-10-27 DIAGNOSIS — E78.49 OTHER HYPERLIPIDEMIA: ICD-10-CM

## 2022-10-27 DIAGNOSIS — N18.32 STAGE 3B CHRONIC KIDNEY DISEASE: ICD-10-CM

## 2022-10-27 DIAGNOSIS — J45.20 INTERMITTENT ASTHMA WITHOUT COMPLICATION, UNSPECIFIED ASTHMA SEVERITY: ICD-10-CM

## 2022-10-27 DIAGNOSIS — R60.0 BILATERAL LEG EDEMA: ICD-10-CM

## 2022-10-27 DIAGNOSIS — D50.9 IRON DEFICIENCY ANEMIA, UNSPECIFIED IRON DEFICIENCY ANEMIA TYPE: ICD-10-CM

## 2022-10-27 PROBLEM — I47.19 PAT (PAROXYSMAL ATRIAL TACHYCARDIA): Status: RESOLVED | Noted: 2018-11-19 | Resolved: 2022-10-27

## 2022-10-27 PROCEDURE — 1101F PR PT FALLS ASSESS DOC 0-1 FALLS W/OUT INJ PAST YR: ICD-10-PCS | Mod: CPTII,S$GLB,, | Performed by: INTERNAL MEDICINE

## 2022-10-27 PROCEDURE — 3078F DIAST BP <80 MM HG: CPT | Mod: CPTII,S$GLB,, | Performed by: INTERNAL MEDICINE

## 2022-10-27 PROCEDURE — 1126F PR PAIN SEVERITY QUANTIFIED, NO PAIN PRESENT: ICD-10-PCS | Mod: CPTII,S$GLB,, | Performed by: INTERNAL MEDICINE

## 2022-10-27 PROCEDURE — 99214 OFFICE O/P EST MOD 30 MIN: CPT | Mod: S$GLB,,, | Performed by: INTERNAL MEDICINE

## 2022-10-27 PROCEDURE — 3075F SYST BP GE 130 - 139MM HG: CPT | Mod: CPTII,S$GLB,, | Performed by: INTERNAL MEDICINE

## 2022-10-27 PROCEDURE — 99214 PR OFFICE/OUTPT VISIT, EST, LEVL IV, 30-39 MIN: ICD-10-PCS | Mod: S$GLB,,, | Performed by: INTERNAL MEDICINE

## 2022-10-27 PROCEDURE — 3078F PR MOST RECENT DIASTOLIC BLOOD PRESSURE < 80 MM HG: ICD-10-PCS | Mod: CPTII,S$GLB,, | Performed by: INTERNAL MEDICINE

## 2022-10-27 PROCEDURE — 1101F PT FALLS ASSESS-DOCD LE1/YR: CPT | Mod: CPTII,S$GLB,, | Performed by: INTERNAL MEDICINE

## 2022-10-27 PROCEDURE — 3075F PR MOST RECENT SYSTOLIC BLOOD PRESS GE 130-139MM HG: ICD-10-PCS | Mod: CPTII,S$GLB,, | Performed by: INTERNAL MEDICINE

## 2022-10-27 PROCEDURE — 1126F AMNT PAIN NOTED NONE PRSNT: CPT | Mod: CPTII,S$GLB,, | Performed by: INTERNAL MEDICINE

## 2022-10-27 PROCEDURE — 3288F FALL RISK ASSESSMENT DOCD: CPT | Mod: CPTII,S$GLB,, | Performed by: INTERNAL MEDICINE

## 2022-10-27 PROCEDURE — 99999 PR PBB SHADOW E&M-EST. PATIENT-LVL IV: CPT | Mod: PBBFAC,,, | Performed by: INTERNAL MEDICINE

## 2022-10-27 PROCEDURE — 99999 PR PBB SHADOW E&M-EST. PATIENT-LVL IV: ICD-10-PCS | Mod: PBBFAC,,, | Performed by: INTERNAL MEDICINE

## 2022-10-27 PROCEDURE — 3288F PR FALLS RISK ASSESSMENT DOCUMENTED: ICD-10-PCS | Mod: CPTII,S$GLB,, | Performed by: INTERNAL MEDICINE

## 2022-10-27 RX ORDER — AZELASTINE 1 MG/ML
2 SPRAY, METERED NASAL 2 TIMES DAILY PRN
Qty: 30 ML | Refills: 5 | Status: ON HOLD | OUTPATIENT
Start: 2022-10-27

## 2022-10-27 RX ORDER — ALBUTEROL SULFATE 90 UG/1
2 AEROSOL, METERED RESPIRATORY (INHALATION) EVERY 4 HOURS PRN
Qty: 8 G | Refills: 3 | Status: SHIPPED | OUTPATIENT
Start: 2022-10-27 | End: 2024-02-08 | Stop reason: SDUPTHER

## 2022-10-27 RX ORDER — IRBESARTAN 150 MG/1
150 TABLET ORAL NIGHTLY
Qty: 90 TABLET | Refills: 3 | Status: SHIPPED | OUTPATIENT
Start: 2022-10-27 | End: 2023-09-26

## 2022-11-02 ENCOUNTER — PATIENT MESSAGE (OUTPATIENT)
Dept: NEPHROLOGY | Facility: CLINIC | Age: 82
End: 2022-11-02
Payer: MEDICARE

## 2022-11-14 ENCOUNTER — CLINICAL SUPPORT (OUTPATIENT)
Dept: CARDIOLOGY | Facility: HOSPITAL | Age: 82
End: 2022-11-14
Payer: MEDICARE

## 2022-11-14 DIAGNOSIS — Z95.818 PRESENCE OF OTHER CARDIAC IMPLANTS AND GRAFTS: ICD-10-CM

## 2022-11-14 PROCEDURE — G2066 INTER DEVC REMOTE 30D: HCPCS | Performed by: INTERNAL MEDICINE

## 2022-11-17 ENCOUNTER — TELEPHONE (OUTPATIENT)
Dept: ELECTROPHYSIOLOGY | Facility: CLINIC | Age: 82
End: 2022-11-17
Payer: MEDICARE

## 2022-11-17 DIAGNOSIS — I47.19 PAT (PAROXYSMAL ATRIAL TACHYCARDIA): Primary | ICD-10-CM

## 2022-11-18 ENCOUNTER — TELEPHONE (OUTPATIENT)
Dept: INTERNAL MEDICINE | Facility: CLINIC | Age: 82
End: 2022-11-18
Payer: MEDICARE

## 2022-11-18 NOTE — TELEPHONE ENCOUNTER
----- Message from Mirna Cole MA sent at 11/17/2022  4:20 PM CST -----  Contact: Lyndsay leonard/[facility] [phone]    ----- Message -----  From: Gabriella Espinal  Sent: 11/17/2022   3:18 PM CST  To: Jaspreet PLEITEZ Staff    Lyndsay is calling in regards to pt's blood pressure results from his last office visit. Please call.          Thank you

## 2022-11-18 NOTE — TELEPHONE ENCOUNTER
"Last seen 10/27/22-vitals below:    /56 Important   (BP Location: Right arm, Patient Position: Sitting, BP Method: Large (Manual))  Pulse 68  Temp 97.5 °F (36.4 °C) (Skin)  Resp 16  Ht 5' 9" (1.753 m)  Wt 102 kg (224 lb 13.9 oz)  BMI 33.21 kg/m²      I tried to return call.  They left me on hold over 10 minutes. I had to hang up.  "

## 2022-11-22 ENCOUNTER — HOSPITAL ENCOUNTER (OUTPATIENT)
Dept: CARDIOLOGY | Facility: CLINIC | Age: 82
Discharge: HOME | End: 2022-11-22
Payer: MEDICARE

## 2022-11-22 DIAGNOSIS — I47.19 PAT (PAROXYSMAL ATRIAL TACHYCARDIA): ICD-10-CM

## 2022-11-22 PROCEDURE — 93005 ELECTROCARDIOGRAM TRACING: CPT | Mod: S$GLB,,, | Performed by: INTERNAL MEDICINE

## 2022-11-22 PROCEDURE — 93010 RHYTHM STRIP: ICD-10-PCS | Mod: S$GLB,,, | Performed by: INTERNAL MEDICINE

## 2022-11-22 PROCEDURE — 93010 ELECTROCARDIOGRAM REPORT: CPT | Mod: S$GLB,,, | Performed by: INTERNAL MEDICINE

## 2022-11-22 PROCEDURE — 93005 RHYTHM STRIP: ICD-10-PCS | Mod: S$GLB,,, | Performed by: INTERNAL MEDICINE

## 2022-11-29 ENCOUNTER — OFFICE VISIT (OUTPATIENT)
Dept: ELECTROPHYSIOLOGY | Facility: CLINIC | Age: 82
End: 2022-11-29
Payer: MEDICARE

## 2022-11-29 DIAGNOSIS — N18.32 STAGE 3B CHRONIC KIDNEY DISEASE: ICD-10-CM

## 2022-11-29 DIAGNOSIS — I10 ESSENTIAL HYPERTENSION: Primary | Chronic | ICD-10-CM

## 2022-11-29 DIAGNOSIS — I47.19 PAT (PAROXYSMAL ATRIAL TACHYCARDIA): ICD-10-CM

## 2022-11-29 DIAGNOSIS — I70.0 AORTIC ATHEROSCLEROSIS: ICD-10-CM

## 2022-11-29 DIAGNOSIS — G47.33 OBSTRUCTIVE SLEEP APNEA: ICD-10-CM

## 2022-11-29 PROCEDURE — 1160F PR REVIEW ALL MEDS BY PRESCRIBER/CLIN PHARMACIST DOCUMENTED: ICD-10-PCS | Mod: CPTII,95,, | Performed by: INTERNAL MEDICINE

## 2022-11-29 PROCEDURE — 1159F MED LIST DOCD IN RCRD: CPT | Mod: CPTII,95,, | Performed by: INTERNAL MEDICINE

## 2022-11-29 PROCEDURE — 99214 OFFICE O/P EST MOD 30 MIN: CPT | Mod: 95,,, | Performed by: INTERNAL MEDICINE

## 2022-11-29 PROCEDURE — 99214 PR OFFICE/OUTPT VISIT, EST, LEVL IV, 30-39 MIN: ICD-10-PCS | Mod: 95,,, | Performed by: INTERNAL MEDICINE

## 2022-11-29 PROCEDURE — 1159F PR MEDICATION LIST DOCUMENTED IN MEDICAL RECORD: ICD-10-PCS | Mod: CPTII,95,, | Performed by: INTERNAL MEDICINE

## 2022-11-29 PROCEDURE — 1160F RVW MEDS BY RX/DR IN RCRD: CPT | Mod: CPTII,95,, | Performed by: INTERNAL MEDICINE

## 2022-11-29 NOTE — PROGRESS NOTES
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 who presents for evaluation of Atrial Tachycardia    The patient location is: [place], [place]  The chief complaint leading to consultation is: palpitations    Visit type: audiovisual    Face to Face time with patient: 5 minutes  20 minutes of total time spent on the encounter, which includes face to face time and non-face to face time preparing to see the patient (eg, review of tests), Obtaining and/or reviewing separately obtained history, Documenting clinical information in the electronic or other health record, Independently interpreting results (not separately reported) and communicating results to the patient/family/caregiver, or Care coordination (not separately reported).         Each patient to whom he or she provides medical services by telemedicine is:  (1) informed of the relationship between the physician and patient and the respective role of any other health care provider with respect to management of the patient; and (2) notified that he or she may decline to receive medical services by telemedicine and may withdraw from such care at any time.          Referring Cardiologist: Hiral Morales MD  Primary Care Physician: Romero Vogel MD    HPIPrior Hx:  I had the pleasure of seeing Dr. Rodriguez today in our electrophysiology clinic for his atrial arrhythmias. 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 In 2018 he began having exertional induced palpitations that would last for a few minutes. A 30 day event monitor was ordered. He had 1 such event whose onset was not captured. It was a narrow complex tachycardia at around 150 bpm that could have represented 2:1 atrial flutter. Another event was characterized by a short imelda of nonsustained atrial tachycardia. He was initiated on eliquis and metoprolol 50mg daily. He was intolerant of  "the metoprolol (symptomatic bradycardia in the 50s). He continues to exercise and 1-2 times weekly he has an episode. He exercises with a heart rate monitor and is able to record his work outs. With workouts associated with his symptoms, his heart rate curve rises to a plateau of ~120-130. Then there is a sudden spike in his heart rate to 150-160 that remains for 5-10 minutes then suddenly stops and returns to baseline. He notes palpitations and chest heaviness with these events. He had an abnormal stress echo with preserved LVEF 10/2018. Coronary angiography noted no obstructive CAD.    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 Unstable, nonsustainable left atrial and low lateral right atrial ATs were induced. Sustained sinus node tachycardia was however induced in response to atrial extrastimuli. This was likely sinus node reentry. Due to potential 2:1 tach/AFL noted on his event monitor, a CTI ablation was performed. He returned for follow-up 8/2019. Since his procedure he had 1 instance of sudden tachycardia to 150s during exercise, lasting ~7 minutes. He otherwise felt well. We elected to implant an ILR for long-term rhythm monitoring for possible AF, which was performed 9/2019.    Dr. Rodriguez returned for 3 month post-ILR implant follow-up 12/2019. He felt well. ILR noted infrequent short episodes of atrial tachycardia. Longest episode he noted was 10/3/2019, lasting 2 minutes, however we only have 23 seconds of this as it likely fell just below detection.    12/2021: Dr. Rodriguez returned for follow-up. ILR monitoring over this past year have noted symptomatic activations correspond with sinus rhythm with PVCs or PACs. No NSAT observed. "AF events" were not consistent with AF in my opinion (sinus tach with PVCs). He has some nocturnal bradycardia at times. His main concern is lower extremity edema.    Interim Hx:  Dr. Rodriguez returns for follow-up. Doing physical therapy. No significant " "palpitations.    Episode of nonsustained AT observed on 9/2022 transmission. No definitive AF observed on any transmissions over the past year. 2 episodes of tachycardia labelled as "AF" over the past year are consistent with either sinus tachycardia or AF (PVCs during tach help demonstrate long-RP tach). Symptom activations all correspond to sinus rhythm with rare ectopy.    Recent ECG showed normal sinus rhythm with normal intervals (rate 56 bpm)    Review of Systems   Constitutional: Negative for fever and malaise/fatigue.   HENT:  Negative for congestion and sore throat.    Eyes:  Negative for blurred vision and visual disturbance.   Cardiovascular:  Negative for chest pain, dyspnea on exertion, near-syncope and syncope.   Respiratory:  Negative for cough and shortness of breath.    Hematologic/Lymphatic: Negative for bleeding problem. Does not bruise/bleed easily.   Skin: Negative.    Musculoskeletal:  Positive for arthritis.   Gastrointestinal:  Negative for bloating, abdominal pain, hematochezia and melena.   Neurological:  Negative for dizziness, focal weakness and weakness.      Objective:    Physical Exam  Vitals reviewed.   Constitutional:       General: He is not in acute distress.     Appearance: He is well-developed. He is not diaphoretic.   HENT:      Head: Normocephalic and atraumatic.   Eyes:      General:         Right eye: No discharge.         Left eye: No discharge.      Conjunctiva/sclera: Conjunctivae normal.   Neck:      Vascular: No JVD.   Cardiovascular:      Rate and Rhythm: Normal rate and regular rhythm.      Heart sounds: No murmur heard.    No friction rub. No gallop.   Pulmonary:      Effort: Pulmonary effort is normal. No respiratory distress.      Breath sounds: Normal breath sounds. No wheezing or rales.   Abdominal:      General: Bowel sounds are normal. There is no distension.      Palpations: Abdomen is soft.      Tenderness: There is no abdominal tenderness. There is no " rebound.   Musculoskeletal:      Cervical back: Neck supple.   Skin:     General: Skin is warm and dry.   Neurological:      Mental Status: He is alert and oriented to person, place, and time.   Psychiatric:         Behavior: Behavior normal.         Thought Content: Thought content normal.         Judgment: Judgment normal.         Assessment:       1. Essential hypertension    2. Stage 3b chronic kidney disease    3. PAT (paroxysmal atrial tachycardia)    4. Obstructive sleep apnea    5. 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 He is s/p RFA of the CTI and EP study. The only sustained tachycardia induced was sinus tachycardia. Recommended trial of low dose of metoprolol (12.5mg daily, was intolerant to 50mg daily in the past) and stopping eliquis. He now is s/p ILR insertion for long-term monitoring for potential AF. No AF observed to date. Has infrequent short episodes of AT.    Continue remote monitoring  RTC in 12 months    Thank you for allowing me to participate in the care of this patient. Please do not hesitate to call me with any questions or concerns.    Gil Wilson MD, PhD  Cardiac Electrophysiology

## 2022-12-05 ENCOUNTER — TELEPHONE (OUTPATIENT)
Dept: DERMATOLOGY | Facility: CLINIC | Age: 82
End: 2022-12-05
Payer: MEDICARE

## 2022-12-05 NOTE — TELEPHONE ENCOUNTER
Left another VM regarding appt - callback number    ----- Message from Lisa Ott LPN sent at 11/28/2022  4:52 PM CST -----  Contact: [phone]    ----- Message -----  From: Alejandra Urias MA  Sent: 11/28/2022   2:59 PM CST  To: [facility] Staff    Patient is calling to reschedule appt. Please call and advise.

## 2022-12-14 ENCOUNTER — CLINICAL SUPPORT (OUTPATIENT)
Dept: CARDIOLOGY | Facility: HOSPITAL | Age: 82
End: 2022-12-14
Payer: MEDICARE

## 2022-12-14 DIAGNOSIS — Z95.818 PRESENCE OF OTHER CARDIAC IMPLANTS AND GRAFTS: ICD-10-CM

## 2022-12-14 PROCEDURE — 93298 REM INTERROG DEV EVAL SCRMS: CPT | Mod: ,,, | Performed by: INTERNAL MEDICINE

## 2022-12-14 PROCEDURE — G2066 INTER DEVC REMOTE 30D: HCPCS | Performed by: INTERNAL MEDICINE

## 2022-12-14 PROCEDURE — 93298 CARDIAC DEVICE CHECK - REMOTE: ICD-10-PCS | Mod: ,,, | Performed by: INTERNAL MEDICINE

## 2022-12-26 NOTE — PROGRESS NOTES
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    Chief Complaint: Follow-up (6 mos w/ labs)    HPI  The patient presents for follow-up of medical conditions which include hypertension, asthma chronic kidney disease, anemia.  The patient states blood pressure readings are now in the 130+ range over 60 diastolic range.  He is also followed by Dr. Sweet and Nephrology.  The patient reports his lower extremity edema is controlled with current use of furosemide 3 days a week.  The patient is using irbesartan every night at bedtime.  He feels blood pressures are stable.  He is on a continuous heart rate monitor and is followed by Cardiology.    Right scapular pain has resolved.  He has been treated by Sports Medicine.  Is having physical therapy twice a week.  He is trying to improve his neck and back mobility.    His asthma has been stable.  Peak expiratory flow rates have averaged 550.  Pulse oximetry levels have ranged from 94-96%.    Review of Systems   Constitutional:  Negative for activity change, appetite change, fatigue and unexpected weight change.   HENT:  Negative for sinus pressure/congestion and sore throat.    Eyes:  Negative for visual disturbance.   Respiratory:  Negative for cough, chest tightness, shortness of breath and wheezing.    Cardiovascular:  Positive for leg swelling. Negative for chest pain and palpitations.   Gastrointestinal:  Negative for abdominal pain, blood in stool, nausea and vomiting.   Genitourinary:  Negative for dysuria, hematuria and urgency.   Musculoskeletal:  Negative for arthralgias, back pain, gait problem, joint swelling, myalgias and neck stiffness.   Integumentary:  Negative for color change and rash.   Neurological:  Negative for dizziness, syncope, weakness, light-headedness, numbness and headaches.   Psychiatric/Behavioral:  Negative for sleep disturbance.           Physical Exam  Vitals and nursing note reviewed.   Constitutional:        General: He is not in acute distress.     Appearance: Normal appearance. He is well-developed.      Comments: The patient has gained 2 lb since 06/03/2022.   HENT:      Head: Normocephalic and atraumatic.   Eyes:      General: No scleral icterus.     Extraocular Movements: Extraocular movements intact.      Conjunctiva/sclera: Conjunctivae normal.   Neck:      Thyroid: No thyromegaly.      Vascular: No JVD.   Cardiovascular:      Rate and Rhythm: Normal rate and regular rhythm.      Heart sounds: Normal heart sounds. No murmur heard.    No friction rub. No gallop.   Pulmonary:      Effort: Pulmonary effort is normal. No respiratory distress.      Breath sounds: Normal breath sounds. No wheezing or rales.   Abdominal:      General: Bowel sounds are normal.      Palpations: Abdomen is soft. There is no mass.      Tenderness: There is no abdominal tenderness.   Musculoskeletal:         General: No tenderness. Normal range of motion.      Cervical back: Normal range of motion and neck supple.      Right lower leg: No edema.      Left lower leg: No edema.   Lymphadenopathy:      Cervical: No cervical adenopathy.   Skin:     General: Skin is warm and dry.      Findings: No rash.   Neurological:      Mental Status: He is alert and oriented to person, place, and time.      Comments: Gait is normal.   Psychiatric:         Mood and Affect: Mood normal.         Behavior: Behavior normal.         Lab Visit on 10/20/2022   Component Date Value Ref Range Status    Sodium 10/20/2022 138  136 - 145 mmol/L Final    Potassium 10/20/2022 4.2  3.5 - 5.1 mmol/L Final    Chloride 10/20/2022 105  95 - 110 mmol/L Final    CO2 10/20/2022 22 (L)  23 - 29 mmol/L Final    Glucose 10/20/2022 96  70 - 110 mg/dL Final    BUN 10/20/2022 25 (H)  8 - 23 mg/dL Final    Creatinine 10/20/2022 1.9 (H)  0.5 - 1.4 mg/dL Final    Calcium 10/20/2022 9.4  8.7 - 10.5 mg/dL Final    Total Protein 10/20/2022 6.6  6.0 - 8.4 g/dL Final    Albumin 10/20/2022 3.9   3.5 - 5.2 g/dL Final    Total Bilirubin 10/20/2022 0.9  0.1 - 1.0 mg/dL Final    Comment: For infants and newborns, interpretation of results should be based  on gestational age, weight and in agreement with clinical  observations.    Premature Infant recommended reference ranges:  Up to 24 hours.............<8.0 mg/dL  Up to 48 hours............<12.0 mg/dL  3-5 days..................<15.0 mg/dL  6-29 days.................<15.0 mg/dL      Alkaline Phosphatase 10/20/2022 62  55 - 135 U/L Final    AST 10/20/2022 21  10 - 40 U/L Final    ALT 10/20/2022 20  10 - 44 U/L Final    Anion Gap 10/20/2022 11  8 - 16 mmol/L Final    eGFR 10/20/2022 34.8 (A)  >60 mL/min/1.73 m^2 Final    Cholesterol 10/20/2022 150  120 - 199 mg/dL Final    Comment: The National Cholesterol Education Program (NCEP) has set the  following guidelines (reference ranges) for Cholesterol:  Optimal.....................<200 mg/dL  Borderline High.............200-239 mg/dL  High........................> or = 240 mg/dL      Triglycerides 10/20/2022 75  30 - 150 mg/dL Final    Comment: The National Cholesterol Education Program (NCEP) has set the  following guidelines (reference values) for triglycerides:  Normal......................<150 mg/dL  Borderline High.............150-199 mg/dL  High........................200-499 mg/dL      HDL 10/20/2022 64  40 - 75 mg/dL Final    Comment: The National Cholesterol Education Program (NCEP) has set the  following guidelines (reference values) for HDL Cholesterol:  Low...............<40 mg/dL  Optimal...........>60 mg/dL      LDL Cholesterol 10/20/2022 71.0  63.0 - 159.0 mg/dL Final    Comment: The National Cholesterol Education Program (NCEP) has set the  following guidelines (reference values) for LDL Cholesterol:  Optimal.......................<130 mg/dL  Borderline High...............130-159 mg/dL  High..........................160-189 mg/dL  Very High.....................>190 mg/dL      HDL/Cholesterol Ratio  10/20/2022 42.7  20.0 - 50.0 % Final    Total Cholesterol/HDL Ratio 10/20/2022 2.3  2.0 - 5.0 Final    Non-HDL Cholesterol 10/20/2022 86  mg/dL Final    Comment: Risk category and Non-HDL cholesterol goals:  Coronary heart disease (CHD)or equivalent (10-year risk of CHD >20%):  Non-HDL cholesterol goal     <130 mg/dL  Two or more CHD risk factors and 10-year risk of CHD <= 20%:  Non-HDL cholesterol goal     <160 mg/dL  0 to 1 CHD risk factor:  Non-HDL cholesterol goal     <190 mg/dL      WBC 10/20/2022 5.28  3.90 - 12.70 K/uL Final    RBC 10/20/2022 3.61 (L)  4.60 - 6.20 M/uL Final    Hemoglobin 10/20/2022 11.5 (L)  14.0 - 18.0 g/dL Final    Hematocrit 10/20/2022 35.9 (L)  40.0 - 54.0 % Final    MCV 10/20/2022 99 (H)  82 - 98 fL Final    MCH 10/20/2022 31.9 (H)  27.0 - 31.0 pg Final    MCHC 10/20/2022 32.0  32.0 - 36.0 g/dL Final    RDW 10/20/2022 13.2  11.5 - 14.5 % Final    Platelets 10/20/2022 152  150 - 450 K/uL Final    MPV 10/20/2022 13.4 (H)  9.2 - 12.9 fL Final    Immature Granulocytes 10/20/2022 0.2  0.0 - 0.5 % Final    Gran # (ANC) 10/20/2022 2.4  1.8 - 7.7 K/uL Final    Immature Grans (Abs) 10/20/2022 0.01  0.00 - 0.04 K/uL Final    Comment: Mild elevation in immature granulocytes is non specific and   can be seen in a variety of conditions including stress response,   acute inflammation, trauma and pregnancy. Correlation with other   laboratory and clinical findings is essential.      Lymph # 10/20/2022 1.8  1.0 - 4.8 K/uL Final    Mono # 10/20/2022 0.7  0.3 - 1.0 K/uL Final    Eos # 10/20/2022 0.3  0.0 - 0.5 K/uL Final    Baso # 10/20/2022 0.07  0.00 - 0.20 K/uL Final    nRBC 10/20/2022 0  0 /100 WBC Final    Gran % 10/20/2022 45.3  38.0 - 73.0 % Final    Lymph % 10/20/2022 34.1  18.0 - 48.0 % Final    Mono % 10/20/2022 12.7  4.0 - 15.0 % Final    Eosinophil % 10/20/2022 6.4  0.0 - 8.0 % Final    Basophil % 10/20/2022 1.3  0.0 - 1.9 % Final    Differential Method 10/20/2022 Automated   Final        Assessment & Plan:      Adelfo was seen today for follow-up. Current therapy will be continued.  Blood tests will be obtained in 4 months.    Diagnoses and all orders for this visit:    Essential hypertension  -     Comprehensive Metabolic Panel; Future  -     CBC Auto Differential; Future    Intermittent asthma without complication, unspecified asthma severity    Other hyperlipidemia  -     Comprehensive Metabolic Panel; Future  -     CBC Auto Differential; Future    Stage 3b chronic kidney disease    Bilateral leg edema    Iron deficiency anemia, unspecified iron deficiency anemia type  -     Comprehensive Metabolic Panel; Future  -     CBC Auto Differential; Future    Other orders  -     albuterol (PROAIR HFA) 90 mcg/actuation inhaler; Inhale 2 puffs into the lungs every 4 (four) hours as needed for Wheezing.  -     azelastine (ASTELIN) 137 mcg (0.1 %) nasal spray; 2 sprays (274 mcg total) by Nasal route 2 (two) times daily as needed.  -     irbesartan (AVAPRO) 150 MG tablet; Take 1 tablet (150 mg total) by mouth every evening.         No follow-ups on file.     Romero Vogel MD

## 2023-01-13 ENCOUNTER — CLINICAL SUPPORT (OUTPATIENT)
Dept: CARDIOLOGY | Facility: HOSPITAL | Age: 83
End: 2023-01-13
Payer: MEDICARE

## 2023-01-13 DIAGNOSIS — Z95.818 PRESENCE OF OTHER CARDIAC IMPLANTS AND GRAFTS: ICD-10-CM

## 2023-01-13 PROCEDURE — 93298 CARDIAC DEVICE CHECK - REMOTE: ICD-10-PCS | Mod: ,,, | Performed by: INTERNAL MEDICINE

## 2023-01-13 PROCEDURE — G2066 INTER DEVC REMOTE 30D: HCPCS | Performed by: INTERNAL MEDICINE

## 2023-01-13 PROCEDURE — 93298 REM INTERROG DEV EVAL SCRMS: CPT | Mod: ,,, | Performed by: INTERNAL MEDICINE

## 2023-01-24 ENCOUNTER — PATIENT MESSAGE (OUTPATIENT)
Dept: ENDOSCOPY | Facility: HOSPITAL | Age: 83
End: 2023-01-24
Payer: MEDICARE

## 2023-01-24 ENCOUNTER — TELEPHONE (OUTPATIENT)
Dept: ENDOSCOPY | Facility: HOSPITAL | Age: 83
End: 2023-01-24
Payer: MEDICARE

## 2023-01-31 ENCOUNTER — OFFICE VISIT (OUTPATIENT)
Dept: URGENT CARE | Facility: CLINIC | Age: 83
End: 2023-01-31
Payer: MEDICARE

## 2023-01-31 VITALS
BODY MASS INDEX: 33.18 KG/M2 | RESPIRATION RATE: 19 BRPM | DIASTOLIC BLOOD PRESSURE: 82 MMHG | OXYGEN SATURATION: 98 % | WEIGHT: 224 LBS | HEIGHT: 69 IN | SYSTOLIC BLOOD PRESSURE: 169 MMHG | HEART RATE: 69 BPM | TEMPERATURE: 98 F

## 2023-01-31 DIAGNOSIS — Z51.89 ENCOUNTER FOR WOUND CARE: ICD-10-CM

## 2023-01-31 DIAGNOSIS — S60.212A CONTUSION OF LEFT WRIST, INITIAL ENCOUNTER: ICD-10-CM

## 2023-01-31 DIAGNOSIS — T14.8XXA ABRASION: Primary | ICD-10-CM

## 2023-01-31 DIAGNOSIS — S92.355A CLOSED NONDISPLACED FRACTURE OF FIFTH METATARSAL BONE OF LEFT FOOT, INITIAL ENCOUNTER: ICD-10-CM

## 2023-01-31 DIAGNOSIS — S93.602A SPRAIN OF LEFT FOOT, INITIAL ENCOUNTER: ICD-10-CM

## 2023-01-31 PROCEDURE — 3077F SYST BP >= 140 MM HG: CPT | Mod: CPTII,S$GLB,, | Performed by: FAMILY MEDICINE

## 2023-01-31 PROCEDURE — 1159F MED LIST DOCD IN RCRD: CPT | Mod: CPTII,S$GLB,, | Performed by: FAMILY MEDICINE

## 2023-01-31 PROCEDURE — 3079F DIAST BP 80-89 MM HG: CPT | Mod: CPTII,S$GLB,, | Performed by: FAMILY MEDICINE

## 2023-01-31 PROCEDURE — 73110 XR WRIST COMPLETE 3 VIEWS LEFT: ICD-10-PCS | Mod: LT,S$GLB,, | Performed by: RADIOLOGY

## 2023-01-31 PROCEDURE — 3077F PR MOST RECENT SYSTOLIC BLOOD PRESSURE >= 140 MM HG: ICD-10-PCS | Mod: CPTII,S$GLB,, | Performed by: FAMILY MEDICINE

## 2023-01-31 PROCEDURE — 73630 XR FOOT COMPLETE 3 VIEW LEFT: ICD-10-PCS | Mod: LT,S$GLB,, | Performed by: RADIOLOGY

## 2023-01-31 PROCEDURE — 73110 X-RAY EXAM OF WRIST: CPT | Mod: LT,S$GLB,, | Performed by: RADIOLOGY

## 2023-01-31 PROCEDURE — 1160F RVW MEDS BY RX/DR IN RCRD: CPT | Mod: CPTII,S$GLB,, | Performed by: FAMILY MEDICINE

## 2023-01-31 PROCEDURE — 3079F PR MOST RECENT DIASTOLIC BLOOD PRESSURE 80-89 MM HG: ICD-10-PCS | Mod: CPTII,S$GLB,, | Performed by: FAMILY MEDICINE

## 2023-01-31 PROCEDURE — 73630 X-RAY EXAM OF FOOT: CPT | Mod: LT,S$GLB,, | Performed by: RADIOLOGY

## 2023-01-31 PROCEDURE — 99214 PR OFFICE/OUTPT VISIT, EST, LEVL IV, 30-39 MIN: ICD-10-PCS | Mod: S$GLB,,, | Performed by: FAMILY MEDICINE

## 2023-01-31 PROCEDURE — 1159F PR MEDICATION LIST DOCUMENTED IN MEDICAL RECORD: ICD-10-PCS | Mod: CPTII,S$GLB,, | Performed by: FAMILY MEDICINE

## 2023-01-31 PROCEDURE — 1160F PR REVIEW ALL MEDS BY PRESCRIBER/CLIN PHARMACIST DOCUMENTED: ICD-10-PCS | Mod: CPTII,S$GLB,, | Performed by: FAMILY MEDICINE

## 2023-01-31 PROCEDURE — 99214 OFFICE O/P EST MOD 30 MIN: CPT | Mod: S$GLB,,, | Performed by: FAMILY MEDICINE

## 2023-01-31 RX ORDER — DOXYCYCLINE HYCLATE 100 MG
100 TABLET ORAL DAILY
Qty: 10 TABLET | Refills: 0 | Status: SHIPPED | OUTPATIENT
Start: 2023-01-31 | End: 2023-02-10

## 2023-01-31 NOTE — PROGRESS NOTES
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    Vitals:  height is 5' 9" (1.753 m) and weight is 101.6 kg (224 lb). His temperature is 98 °F (36.7 °C). His blood pressure is 169/82 (abnormal) and his pulse is 69. His respiration is 19 and oxygen saturation is 98%.     Chief Complaint: Ankle Injury (LT ANKLE ) and Abrasion (LT KNEE AND ELBOW )    82 years old male came with a fall.  States he lost his balance after finishing lawn moving and fell over left leg and left arm.  Denies hitting head, LOC, dizziness.  Denies any dizziness before falling.  Blood pressure is moderately elevated.  Patient is compliant with his blood pressure medicines.  Denies headache, chest pain, SOB, weakness.  Tdap in 2015.    Ankle Injury   The incident occurred 3 to 6 hours ago. The incident occurred in the yard. The injury mechanism was a fall. The pain is present in the left ankle. The quality of the pain is described as aching. The pain is at a severity of 8/10. The pain is severe. The pain has been Constant since onset. Pertinent negatives include no inability to bear weight, loss of motion, loss of sensation, muscle weakness, numbness or tingling. The symptoms are aggravated by movement and weight bearing. Treatments tried: PAIN CREAM.     Neurological:  Negative for numbness.     Objective:      Physical Exam   Constitutional: He is oriented to person, place, and time. He appears well-developed. He is cooperative.  Non-toxic appearance. He does not appear ill. No distress.   HENT:   Head: Normocephalic and atraumatic. Head is without abrasion, without contusion and without laceration.   Ears:   Right Ear: Hearing and external ear normal. No hemotympanum.   Left Ear: Hearing and external ear normal. No hemotympanum.   Nose: Nose normal. No mucosal edema, rhinorrhea or nasal deformity. No epistaxis. Right sinus exhibits no maxillary sinus tenderness and no frontal sinus tenderness. Left sinus exhibits no " maxillary sinus tenderness and no frontal sinus tenderness.   Mouth/Throat: Uvula is midline, oropharynx is clear and moist and mucous membranes are normal. No trismus in the jaw. Normal dentition. No uvula swelling. No posterior oropharyngeal erythema.   Eyes: Conjunctivae, EOM and lids are normal. Pupils are equal, round, and reactive to light. Right eye exhibits no discharge. Left eye exhibits no discharge. No scleral icterus.   Neck: Trachea normal and phonation normal. Neck supple. No tracheal deviation present. No neck rigidity present. No spinous process tenderness present. No muscular tenderness present.   Cardiovascular: Normal rate, regular rhythm, normal heart sounds and normal pulses.   No murmur heard.  Pulmonary/Chest: Effort normal and breath sounds normal. No stridor. No respiratory distress. He has no wheezes. He has no rhonchi. He has no rales.   Abdominal: Normal appearance and bowel sounds are normal. He exhibits no distension and no mass. Soft. There is no abdominal tenderness.   Musculoskeletal: Normal range of motion.         General: No deformity. Normal range of motion.      Comments:   Left elbow:  Positive abrasion and epidermis avulsion.  Full ROM at elbow.  Neurovascular intact.    Wound cleaned with saline.  Nonstick dressing applied.    Left wrist:  Positive mild tenderness over radial aspect.  No redness, swelling, abrasion, bruise.    ROM fair but painful.  Neurovascular intact.      Left knee:  Positive superficial abrasion below left knee.    Full ROM at knee.      Left Foot:    No swelling.  +Ve mild bruise and localized TTP around 5th metatarsal area  No abrasion.  Toes Normal ROM.   Neurovascular intact.    Ankle exam normal.      Neurological: He is alert and oriented to person, place, and time. He has normal strength. No cranial nerve deficit or sensory deficit. He exhibits normal muscle tone. He displays no seizure activity. Coordination normal. GCS eye subscore is 4. GCS  verbal subscore is 5. GCS motor subscore is 6.   Skin: Skin is warm, dry, intact, not diaphoretic and not pale. Capillary refill takes less than 2 seconds. No abrasion, No burn, No bruising and No ecchymosis   Psychiatric: His speech is normal and behavior is normal. Judgment and thought content normal.   Nursing note and vitals reviewed.      Assessment:       1. Abrasion    2. Contusion of left wrist, initial encounter    3. Sprain of left foot, initial encounter    4. Closed nondisplaced fracture of fifth metatarsal bone of left foot, initial encounter    5. Encounter for wound care          Plan:     X-ray foot shows nondisplaced fracture of proximal aspect of left 5th metatarsal.  X-ray to wrist is negative for fracture or dislocation.   Wrist pain brace provided.  Walking boot provided for foot.  Patient states he already has 1 at home and would like to use that.  Discussed results/diagnosis/plan with patient in clinic.   Referred to Ortho for follow-up.  Also referred for wound care follow-up.  Advised to f/u with PCP within 2-5 days. ER precautions given if symptoms get any worse. All questions answered. Patient verbally understood and agreed with treatment plan.     Abrasion  -     Ambulatory referral/consult to Wound Clinic    Contusion of left wrist, initial encounter  -     X-Ray Wrist Complete Left; Future; Expected date: 01/31/2023  -     WRIST BRACE FOR HOME USE    Sprain of left foot, initial encounter  -     X-Ray Foot Complete Left; Future; Expected date: 01/31/2023    Closed nondisplaced fracture of fifth metatarsal bone of left foot, initial encounter  -     Ambulatory referral/consult to Orthopedics  -     POST-SURGICAL BOOT/SHOE FOR HOME USE    Encounter for wound care    Other orders  -     doxycycline (VIBRA-TABS) 100 MG tablet; Take 1 tablet (100 mg total) by mouth once daily. for 10 days  Dispense: 10 tablet; Refill: 0

## 2023-02-01 ENCOUNTER — OFFICE VISIT (OUTPATIENT)
Dept: ORTHOPEDICS | Facility: CLINIC | Age: 83
End: 2023-02-01
Payer: MEDICARE

## 2023-02-01 ENCOUNTER — HOSPITAL ENCOUNTER (OUTPATIENT)
Dept: RADIOLOGY | Facility: HOSPITAL | Age: 83
Discharge: HOME | End: 2023-02-01
Attending: PHYSICIAN ASSISTANT
Payer: MEDICARE

## 2023-02-01 VITALS — HEIGHT: 69 IN | WEIGHT: 224 LBS | BODY MASS INDEX: 33.18 KG/M2

## 2023-02-01 DIAGNOSIS — M25.522 LEFT ELBOW PAIN: ICD-10-CM

## 2023-02-01 DIAGNOSIS — S52.125A CLOSED NONDISPLACED FRACTURE OF HEAD OF LEFT RADIUS, INITIAL ENCOUNTER: Primary | ICD-10-CM

## 2023-02-01 PROCEDURE — 1125F AMNT PAIN NOTED PAIN PRSNT: CPT | Mod: CPTII,S$GLB,, | Performed by: PHYSICIAN ASSISTANT

## 2023-02-01 PROCEDURE — 1159F MED LIST DOCD IN RCRD: CPT | Mod: CPTII,S$GLB,, | Performed by: PHYSICIAN ASSISTANT

## 2023-02-01 PROCEDURE — 1159F PR MEDICATION LIST DOCUMENTED IN MEDICAL RECORD: ICD-10-PCS | Mod: CPTII,S$GLB,, | Performed by: PHYSICIAN ASSISTANT

## 2023-02-01 PROCEDURE — 99999 PR PBB SHADOW E&M-EST. PATIENT-LVL IV: ICD-10-PCS | Mod: PBBFAC,,, | Performed by: PHYSICIAN ASSISTANT

## 2023-02-01 PROCEDURE — 99204 OFFICE O/P NEW MOD 45 MIN: CPT | Mod: S$GLB,,, | Performed by: PHYSICIAN ASSISTANT

## 2023-02-01 PROCEDURE — 73080 X-RAY EXAM OF ELBOW: CPT | Mod: TC,LT

## 2023-02-01 PROCEDURE — 1125F PR PAIN SEVERITY QUANTIFIED, PAIN PRESENT: ICD-10-PCS | Mod: CPTII,S$GLB,, | Performed by: PHYSICIAN ASSISTANT

## 2023-02-01 PROCEDURE — 73080 X-RAY EXAM OF ELBOW: CPT | Mod: 26,LT,, | Performed by: RADIOLOGY

## 2023-02-01 PROCEDURE — 73080 XR ELBOW COMPLETE 3 VIEW LEFT: ICD-10-PCS | Mod: 26,LT,, | Performed by: RADIOLOGY

## 2023-02-01 PROCEDURE — 99999 PR PBB SHADOW E&M-EST. PATIENT-LVL IV: CPT | Mod: PBBFAC,,, | Performed by: PHYSICIAN ASSISTANT

## 2023-02-01 PROCEDURE — 99204 PR OFFICE/OUTPT VISIT, NEW, LEVL IV, 45-59 MIN: ICD-10-PCS | Mod: S$GLB,,, | Performed by: PHYSICIAN ASSISTANT

## 2023-02-03 NOTE — PROGRESS NOTES
Subjective:      Patient ID: Adelfo ZHANG Jennifer, PhD is a 82 y.o. male.    Chief Complaint: Pain and Injury of the Left Foot    HPI    Patient is a 82 year old male who presents to clinic for follow up from urgent care for left foot pain after tripping and falling while finishing cutting the grass.   RADS: 5th metatarsal base fracture.   Patient has been treated in a post op shoe. He is doing well. Weight bearing as tolerated. No numbness or tingling./   He also injured his wrist in the fall. He has been treated in a wrist brace. Reports the pain goes to his elbow with movement.   Upon evaluation he has pain in the elbow with end point movement.       Review of Systems   Constitutional: Negative for chills and fever.   Cardiovascular:  Negative for chest pain.   Respiratory:  Negative for cough and shortness of breath.    Skin:  Negative for color change, dry skin, itching, nail changes, poor wound healing and rash.   Musculoskeletal:  Positive for falls.   Neurological:  Negative for dizziness.   Psychiatric/Behavioral:  Negative for altered mental status. The patient is not nervous/anxious.    All other systems reviewed and are negative.      Objective:            General    Constitutional: He is oriented to person, place, and time. He appears well-developed and well-nourished. No distress.   HENT:   Head: Atraumatic.   Eyes: Conjunctivae are normal.   Cardiovascular:  Normal rate.            Pulmonary/Chest: Effort normal.   Neurological: He is alert and oriented to person, place, and time.   Psychiatric: He has a normal mood and affect. His behavior is normal.         Left Ankle/Foot Exam     Tenderness   The patient is tender to palpation of the fifth metatarsal base.    Range of Motion   The patient has normal left ankle ROM.     Other   Sensation: normal  Left Hand/Wrist Exam     Range of Motion     Wrist   Extension:  normal   Flexion:  normal   Pronation:  normal Left wrist pronation: increased  pain.  Supination:  normal Left wrist supination: increased pain.      Left Elbow Exam     Range of Motion   Extension:  10   Flexion:  130           RADS:   FOOT: Fracture involving the proximal aspect of the 5th metatarsal as above.    ELBOW: There is suggestion of a nondisplaced fracture involving the radial neck.  No other fracture or dislocation.  Elevation of the anterior fat pad noted.  No bone destruction.      Assessment:       Encounter Diagnosis   Name Primary?    Closed nondisplaced fracture of head of left radius, initial encounter Yes          Plan:     Dicussed plan with the patient at this time will treat both conservatively.     FOOT: weight bearing as tolerated in post op show. RICE to reduce pain and swelling.     ELBOW: Raevn, BAYB  Return to clinic in 2 weeks at that time x-ray of his elbow and foot is needed.

## 2023-02-12 ENCOUNTER — CLINICAL SUPPORT (OUTPATIENT)
Dept: CARDIOLOGY | Facility: HOSPITAL | Age: 83
End: 2023-02-12
Payer: MEDICARE

## 2023-02-12 DIAGNOSIS — Z95.818 PRESENCE OF OTHER CARDIAC IMPLANTS AND GRAFTS: ICD-10-CM

## 2023-02-12 PROCEDURE — G2066 INTER DEVC REMOTE 30D: HCPCS | Performed by: INTERNAL MEDICINE

## 2023-02-13 ENCOUNTER — HOSPITAL ENCOUNTER (OUTPATIENT)
Dept: WOUND CARE | Facility: HOSPITAL | Age: 83
Discharge: HOME | End: 2023-02-13
Attending: PREVENTIVE MEDICINE
Payer: MEDICARE

## 2023-02-13 VITALS
BODY MASS INDEX: 33.03 KG/M2 | DIASTOLIC BLOOD PRESSURE: 81 MMHG | HEART RATE: 68 BPM | WEIGHT: 223 LBS | TEMPERATURE: 98 F | HEIGHT: 69 IN | SYSTOLIC BLOOD PRESSURE: 189 MMHG

## 2023-02-13 DIAGNOSIS — S80.212A ABRASION OF LEFT KNEE, INITIAL ENCOUNTER: ICD-10-CM

## 2023-02-13 DIAGNOSIS — S81.002A OPEN WOUND OF LEFT KNEE, INITIAL ENCOUNTER: ICD-10-CM

## 2023-02-13 DIAGNOSIS — S41.112S SKIN TEAR OF LEFT UPPER ARM WITHOUT COMPLICATION, SEQUELA: Primary | ICD-10-CM

## 2023-02-13 PROCEDURE — 99203 OFFICE O/P NEW LOW 30 MIN: CPT

## 2023-02-13 NOTE — PROGRESS NOTES
[facility]    Subjective:       Patient ID: Adelfo ZHANG Jennifer, PhD is a 82 y.o. male.    Chief Complaint: Non-healing Wound    Admit to wound care S/p fall 2 weeks ago with wounds to left elbow and knee.  States he wants to make sure wounds are healing correctly.  Recently finishing Doxycyline x 10 days and applying Neosporin to wounds daily with nonstick dressings. No other complaints.   Review of Systems   All other systems reviewed and are negative.      Objective:     Vitals:    02/13/23 1023   BP: (!) 189/81   Pulse: 68   Temp: 98.4 °F (36.9 °C)         Physical Exam       Altered Skin Integrity 02/13/23 1000 Left Knee Traumatic (Active)   02/13/23 1000   Altered Skin Integrity Present on Admission:    Side: Left   Orientation:    Location: Knee   Wound Number:    Is this injury device related?:    Primary Wound Type: Traumatic   Description of Altered Skin Integrity:    Ankle-Brachial Index:    Pulses:    Removal Indication and Assessment:    Wound Outcome:    (Retired) Wound Length (cm):    (Retired) Wound Width (cm):    (Retired) Depth (cm):    Wound Description (Comments):    Removal Indications:    Wound Image   02/13/23 1055   Dressing Appearance Open to air;Dry 02/13/23 1055   Drainage Amount None 02/13/23 1055   Appearance Pratt;Dry 02/13/23 1055   Yellow (%), Wound Tissue Color 100 % 02/13/23 1055   Periwound Area Intact;Dry 02/13/23 1055   Wound Edges Irregular;Jagged;Rolled/closed 02/13/23 1055   Wound Length (cm) 3 cm 02/13/23 1055   Wound Width (cm) 1.5 cm 02/13/23 1055   Wound Depth (cm) 0 cm 02/13/23 1055   Wound Volume (cm^3) 0 cm^3 02/13/23 1055   Wound Surface Area (cm^2) 4.5 cm^2 02/13/23 1055   Care Cleansed with:;Sterile normal saline 02/13/23 1055   Dressing Applied;Island/border 02/13/23 1055   Dressing Change Due 02/14/23 02/13/23 1055            Altered Skin Integrity 02/13/23 1000 Left anterior Elbow Traumatic (Active)   02/13/23 1000    Altered Skin Integrity Present on Admission:    Side: Left   Orientation: anterior   Location: Elbow   Wound Number:    Is this injury device related?:    Primary Wound Type: Traumatic   Description of Altered Skin Integrity:    Ankle-Brachial Index:    Pulses:    Removal Indication and Assessment:    Wound Outcome:    (Retired) Wound Length (cm):    (Retired) Wound Width (cm):    (Retired) Depth (cm):    Wound Description (Comments):    Removal Indications:    Wound Image   02/13/23 1055   Dressing Appearance Open to air;Dry 02/13/23 1055   Appearance Pratt;Dry 02/13/23 1055   Tissue loss description Not applicable 02/13/23 1055   Yellow (%), Wound Tissue Color 100 % 02/13/23 1055   Periwound Area Intact;Dry;Ecchymotic 02/13/23 1055   Wound Edges Irregular 02/13/23 1055   Wound Length (cm) 0.4 cm 02/13/23 1055   Wound Width (cm) 0.3 cm 02/13/23 1055   Wound Depth (cm) 0 cm 02/13/23 1055   Wound Volume (cm^3) 0 cm^3 02/13/23 1055   Wound Surface Area (cm^2) 0.12 cm^2 02/13/23 1055   Care Cleansed with:;Sterile normal saline 02/13/23 1055   Dressing Applied;Island/border;Tubular bandage 02/13/23 1055   Compression Tubular elasticized bandage 02/13/23 1055   Dressing Change Due 02/14/23 02/13/23 1055         Assessment/Plan:         ICD-10-CM ICD-9-CM   1. Skin tear of left upper arm without complication, sequela  S41.112S 906.1   2. Abrasion of left knee, initial encounter  S80.212A 916.0   3. Open wound of left knee, initial encounter  S81.002A 891.0           Tissue pathology and/or culture taken:  [] Yes [x] No   Sharp debridement performed:   [] Yes [x] No   Labs ordered this visit:   [] Yes [x] No   Imaging ordered this visit:   [] Yes [x] No           Orders Placed This Encounter   Procedures    Change dressing     Left Elbow and Knee  Cleanse wound with:Normal Saline  Primary dressing:Mupirocin  Secondary dressing:Island border or large band-aid  Edema control: tubi  E to elbow  Frequency:Daily per  patient  Follow-up:Discharged from wound care return if needed.        Follow up for Discharged for wound care retur if needed..

## 2023-02-14 ENCOUNTER — HOSPITAL ENCOUNTER (OUTPATIENT)
Dept: RADIOLOGY | Facility: HOSPITAL | Age: 83
Discharge: HOME | End: 2023-02-14
Attending: PHYSICIAN ASSISTANT
Payer: MEDICARE

## 2023-02-14 ENCOUNTER — OFFICE VISIT (OUTPATIENT)
Dept: ORTHOPEDICS | Facility: CLINIC | Age: 83
End: 2023-02-14
Payer: MEDICARE

## 2023-02-14 ENCOUNTER — PATIENT MESSAGE (OUTPATIENT)
Dept: NEPHROLOGY | Facility: CLINIC | Age: 83
End: 2023-02-14
Payer: MEDICARE

## 2023-02-14 VITALS — WEIGHT: 223.13 LBS | BODY MASS INDEX: 33.05 KG/M2 | HEIGHT: 69 IN

## 2023-02-14 DIAGNOSIS — M79.672 LEFT FOOT PAIN: ICD-10-CM

## 2023-02-14 DIAGNOSIS — M25.522 LEFT ELBOW PAIN: Primary | ICD-10-CM

## 2023-02-14 DIAGNOSIS — S92.355D NONDISPLACED FRACTURE OF FIFTH METATARSAL BONE, LEFT FOOT, SUBSEQUENT ENCOUNTER FOR FRACTURE WITH ROUTINE HEALING: ICD-10-CM

## 2023-02-14 DIAGNOSIS — N18.32 STAGE 3B CHRONIC KIDNEY DISEASE: Primary | ICD-10-CM

## 2023-02-14 DIAGNOSIS — M25.522 LEFT ELBOW PAIN: ICD-10-CM

## 2023-02-14 DIAGNOSIS — S52.125A CLOSED NONDISPLACED FRACTURE OF HEAD OF LEFT RADIUS, INITIAL ENCOUNTER: Primary | ICD-10-CM

## 2023-02-14 PROCEDURE — 99212 OFFICE O/P EST SF 10 MIN: CPT | Mod: S$GLB,,, | Performed by: PHYSICIAN ASSISTANT

## 2023-02-14 PROCEDURE — 73630 X-RAY EXAM OF FOOT: CPT | Mod: TC,LT

## 2023-02-14 PROCEDURE — 1159F MED LIST DOCD IN RCRD: CPT | Mod: CPTII,S$GLB,, | Performed by: PHYSICIAN ASSISTANT

## 2023-02-14 PROCEDURE — 73630 X-RAY EXAM OF FOOT: CPT | Mod: 26,LT,, | Performed by: RADIOLOGY

## 2023-02-14 PROCEDURE — 1159F PR MEDICATION LIST DOCUMENTED IN MEDICAL RECORD: ICD-10-PCS | Mod: CPTII,S$GLB,, | Performed by: PHYSICIAN ASSISTANT

## 2023-02-14 PROCEDURE — 73080 XR ELBOW COMPLETE 3 VIEW LEFT: ICD-10-PCS | Mod: 26,LT,, | Performed by: RADIOLOGY

## 2023-02-14 PROCEDURE — 99212 PR OFFICE/OUTPT VISIT, EST, LEVL II, 10-19 MIN: ICD-10-PCS | Mod: S$GLB,,, | Performed by: PHYSICIAN ASSISTANT

## 2023-02-14 PROCEDURE — 99999 PR PBB SHADOW E&M-EST. PATIENT-LVL IV: ICD-10-PCS | Mod: PBBFAC,,, | Performed by: PHYSICIAN ASSISTANT

## 2023-02-14 PROCEDURE — 73080 X-RAY EXAM OF ELBOW: CPT | Mod: TC,LT

## 2023-02-14 PROCEDURE — 73630 XR FOOT COMPLETE 3 VIEW LEFT: ICD-10-PCS | Mod: 26,LT,, | Performed by: RADIOLOGY

## 2023-02-14 PROCEDURE — 99999 PR PBB SHADOW E&M-EST. PATIENT-LVL IV: CPT | Mod: PBBFAC,,, | Performed by: PHYSICIAN ASSISTANT

## 2023-02-14 PROCEDURE — 1125F PR PAIN SEVERITY QUANTIFIED, PAIN PRESENT: ICD-10-PCS | Mod: CPTII,S$GLB,, | Performed by: PHYSICIAN ASSISTANT

## 2023-02-14 PROCEDURE — 73080 X-RAY EXAM OF ELBOW: CPT | Mod: 26,LT,, | Performed by: RADIOLOGY

## 2023-02-14 PROCEDURE — 1125F AMNT PAIN NOTED PAIN PRSNT: CPT | Mod: CPTII,S$GLB,, | Performed by: PHYSICIAN ASSISTANT

## 2023-02-14 NOTE — PROGRESS NOTES
Subjective:      Patient ID: Adelfo ZHANG Jennifer, PhD is a 82 y.o. male.    Chief Complaint: No chief complaint on file.    HPI    Patient is a 82 year old male who presents to clinic for follow up from urgent care for left foot pain after tripping and falling while finishing cutting the grass.   RADS: 5th metatarsal base fracture.   Patient has been treated in a post op shoe. He is doing well. Weight bearing as tolerated. No numbness or tingling./   He also injured his wrist in the fall. He has been treated in a wrist brace. Reports the pain goes to his elbow with movement.   Upon evaluation he has pain in the elbow with end point movement.     02/14/23: Over all doing ok. Has increased range of motion int he elbow. Has been compliant with the post op shoe. He is taking tylenol as needed to help the pain.       Review of Systems   Constitutional: Negative for chills and fever.   Cardiovascular:  Negative for chest pain.   Respiratory:  Negative for cough and shortness of breath.    Skin:  Negative for color change, dry skin, itching, nail changes, poor wound healing and rash.   Musculoskeletal:  Positive for falls.   Neurological:  Negative for dizziness.   Psychiatric/Behavioral:  Negative for altered mental status. The patient is not nervous/anxious.    All other systems reviewed and are negative.      Objective:            General    Constitutional: He is oriented to person, place, and time. He appears well-developed and well-nourished. No distress.   HENT:   Head: Atraumatic.   Eyes: Conjunctivae are normal.   Cardiovascular:  Normal rate.            Pulmonary/Chest: Effort normal.   Neurological: He is alert and oriented to person, place, and time.   Psychiatric: He has a normal mood and affect. His behavior is normal.         Left Ankle/Foot Exam     Tenderness   The patient is tender to palpation of the fifth metatarsal base.    Range of Motion   The patient has normal left ankle ROM.     Other   Sensation:  normal  Left Hand/Wrist Exam     Range of Motion     Wrist   Extension:  normal   Flexion:  normal   Pronation:  normal Left wrist pronation: increased pain.  Supination:  normal Left wrist supination: increased pain.      Left Elbow Exam     Range of Motion   Extension:  10   Flexion:  130           RADS:   FOOT: Fracture involving the proximal aspect of the 5th metatarsal as above.    ELBOW: There is suggestion of a nondisplaced fracture involving the radial neck.  No other fracture or dislocation.  Elevation of the anterior fat pad noted.  No bone destruction.      Assessment:       Encounter Diagnoses   Name Primary?    Closed nondisplaced fracture of head of left radius, initial encounter Yes    Nondisplaced fracture of fifth metatarsal bone, left foot, subsequent encounter for fracture with routine healing             Plan:     Dicussed plan with the patient at this time will treat both conservatively.     FOOT: weight bearing as tolerated in post op show. RICE to reduce pain and swelling.     ELBOW: Raven, LAVELL  Return to clinic in 4 weeks at that time x-ray of his elbow and foot is needed.

## 2023-02-22 ENCOUNTER — TELEPHONE (OUTPATIENT)
Dept: INTERNAL MEDICINE | Facility: CLINIC | Age: 83
End: 2023-02-22
Payer: MEDICARE

## 2023-02-22 RX ORDER — AZITHROMYCIN 250 MG/1
TABLET, FILM COATED ORAL
Qty: 6 TABLET | Refills: 0 | Status: SHIPPED | OUTPATIENT
Start: 2023-02-22 | End: 2023-09-12

## 2023-02-22 NOTE — TELEPHONE ENCOUNTER
Called with symtoms: fever/cough/mucous greenish brown  ,  2 days    Did 2 home covid test,  last on yesterday,  BOTH  normal.      Sick 2 days.  Fever 101.7  highest.  This am 99.7.  Mucus white till this am and brown/yellowish color this morning..    Usually does Claritin daily and flonase and albuterol.  Has been on that.    He just got off a round of doxycycline for knee injury. Finished it Thursday (10 day supply)    Rx ok? Marcella sterling.

## 2023-02-22 NOTE — TELEPHONE ENCOUNTER
----- Message from Jennifer Aguayo sent at 2/22/2023 11:39 AM CST -----  Contact: Self/[phone]  [email or web address]     Patient is calling for Medical Advice regarding:fever/cough/mucous greenish brown     How long has patient had these symptoms: 2 days    Pharmacy name and phone#: [id number] - [address] [address]  Phone: [phone] Fax: [phone]          Would like response via [facility]:      Comments:pt stated that he took an at home Covid that came back positive , pt is asking if he can have a Zpak sent into the pharmacy

## 2023-02-22 NOTE — TELEPHONE ENCOUNTER
Comment in the message stated that the patient had a positive COVID test.  In view of the patient's current medications being used Molnupiravir will be ordered instead of Paxlovid as an antiviral to treat the viral infection.  (There is drug interaction between Paxlovid and the Advair or Wixela inhaler as well as the atorvastatin).    Zithromax will also be ordered to cover superimposed bacterial infection in view of the current symptoms.

## 2023-02-22 NOTE — TELEPHONE ENCOUNTER
He does NOT have covid. Telephone staff typo.    I made him aware he only needs the [facility].    He expressed understanding.  He will decline the viral medication at pharmacy.

## 2023-02-23 ENCOUNTER — OFFICE VISIT (OUTPATIENT)
Dept: INTERNAL MEDICINE | Facility: CLINIC | Age: 83
End: 2023-02-23
Payer: MEDICARE

## 2023-02-23 VITALS
SYSTOLIC BLOOD PRESSURE: 160 MMHG | HEART RATE: 77 BPM | WEIGHT: 222.88 LBS | TEMPERATURE: 99 F | OXYGEN SATURATION: 97 % | HEIGHT: 69 IN | BODY MASS INDEX: 33.01 KG/M2 | DIASTOLIC BLOOD PRESSURE: 70 MMHG

## 2023-02-23 DIAGNOSIS — J45.909 ACUTE ASTHMATIC BRONCHITIS: Primary | ICD-10-CM

## 2023-02-23 PROCEDURE — 1160F PR REVIEW ALL MEDS BY PRESCRIBER/CLIN PHARMACIST DOCUMENTED: ICD-10-PCS | Mod: CPTII,S$GLB,, | Performed by: INTERNAL MEDICINE

## 2023-02-23 PROCEDURE — 1101F PT FALLS ASSESS-DOCD LE1/YR: CPT | Mod: CPTII,S$GLB,, | Performed by: INTERNAL MEDICINE

## 2023-02-23 PROCEDURE — 3077F PR MOST RECENT SYSTOLIC BLOOD PRESSURE >= 140 MM HG: ICD-10-PCS | Mod: CPTII,S$GLB,, | Performed by: INTERNAL MEDICINE

## 2023-02-23 PROCEDURE — 1159F MED LIST DOCD IN RCRD: CPT | Mod: CPTII,S$GLB,, | Performed by: INTERNAL MEDICINE

## 2023-02-23 PROCEDURE — 1159F PR MEDICATION LIST DOCUMENTED IN MEDICAL RECORD: ICD-10-PCS | Mod: CPTII,S$GLB,, | Performed by: INTERNAL MEDICINE

## 2023-02-23 PROCEDURE — 1125F PR PAIN SEVERITY QUANTIFIED, PAIN PRESENT: ICD-10-PCS | Mod: CPTII,S$GLB,, | Performed by: INTERNAL MEDICINE

## 2023-02-23 PROCEDURE — 3078F DIAST BP <80 MM HG: CPT | Mod: CPTII,S$GLB,, | Performed by: INTERNAL MEDICINE

## 2023-02-23 PROCEDURE — 3288F PR FALLS RISK ASSESSMENT DOCUMENTED: ICD-10-PCS | Mod: CPTII,S$GLB,, | Performed by: INTERNAL MEDICINE

## 2023-02-23 PROCEDURE — 3077F SYST BP >= 140 MM HG: CPT | Mod: CPTII,S$GLB,, | Performed by: INTERNAL MEDICINE

## 2023-02-23 PROCEDURE — 99214 PR OFFICE/OUTPT VISIT, EST, LEVL IV, 30-39 MIN: ICD-10-PCS | Mod: S$GLB,,, | Performed by: INTERNAL MEDICINE

## 2023-02-23 PROCEDURE — 1125F AMNT PAIN NOTED PAIN PRSNT: CPT | Mod: CPTII,S$GLB,, | Performed by: INTERNAL MEDICINE

## 2023-02-23 PROCEDURE — 1160F RVW MEDS BY RX/DR IN RCRD: CPT | Mod: CPTII,S$GLB,, | Performed by: INTERNAL MEDICINE

## 2023-02-23 PROCEDURE — 1101F PR PT FALLS ASSESS DOC 0-1 FALLS W/OUT INJ PAST YR: ICD-10-PCS | Mod: CPTII,S$GLB,, | Performed by: INTERNAL MEDICINE

## 2023-02-23 PROCEDURE — 99999 PR PBB SHADOW E&M-EST. PATIENT-LVL V: ICD-10-PCS | Mod: PBBFAC,,, | Performed by: INTERNAL MEDICINE

## 2023-02-23 PROCEDURE — 99999 PR PBB SHADOW E&M-EST. PATIENT-LVL V: CPT | Mod: PBBFAC,,, | Performed by: INTERNAL MEDICINE

## 2023-02-23 PROCEDURE — 99214 OFFICE O/P EST MOD 30 MIN: CPT | Mod: S$GLB,,, | Performed by: INTERNAL MEDICINE

## 2023-02-23 PROCEDURE — 3288F FALL RISK ASSESSMENT DOCD: CPT | Mod: CPTII,S$GLB,, | Performed by: INTERNAL MEDICINE

## 2023-02-23 PROCEDURE — 3078F PR MOST RECENT DIASTOLIC BLOOD PRESSURE < 80 MM HG: ICD-10-PCS | Mod: CPTII,S$GLB,, | Performed by: INTERNAL MEDICINE

## 2023-02-23 RX ORDER — PREDNISONE 20 MG/1
20 TABLET ORAL DAILY
Qty: 4 TABLET | Refills: 0 | Status: SHIPPED | OUTPATIENT
Start: 2023-02-23 | End: 2023-02-27

## 2023-02-23 NOTE — PROGRESS NOTES
82-year-old male      4 days ago started noticing fever up to 101 associated with a cough, congested in the chest and coughing up white mucus.  Fever has persisted for couple of days and now he is coughing up green mucus.  He has a history of asthma times he has some coarse breath sounds and wheezing.  Throat is a little bit irritated.  No nasal congestion.    Primary care physician send in Zithromax Z-Gustavo.  Today his temperature is improved compared to before in which it was 98-99.    Medical history  Hypertension  Paroxysmal atrial flutter   Chronic kidney disease stage 3   Chronic asthma     Medication list per med card    Examination   Weight 222   Pulse 76   Pulse ox 97%   Temperature is 97.8°  Blood pressure by me 148/72  Tympanic membranes normal   Nasal mucosa is clear  Oropharynx no abnormal findings   Lungs clear with inspiration, coarse breath sounds with forced expiration   Heart regular rate rhythm     Acute asthmatic bronchitis    Plan continue with the Zithromax Z-Gustavo   Prednisone 20 mg once a day for the next 4 days

## 2023-02-27 ENCOUNTER — PATIENT MESSAGE (OUTPATIENT)
Dept: INTERNAL MEDICINE | Facility: CLINIC | Age: 83
End: 2023-02-27
Payer: MEDICARE

## 2023-03-02 ENCOUNTER — TELEPHONE (OUTPATIENT)
Dept: ELECTROPHYSIOLOGY | Facility: CLINIC | Age: 83
End: 2023-03-02
Payer: MEDICARE

## 2023-03-02 ENCOUNTER — PATIENT MESSAGE (OUTPATIENT)
Dept: ELECTROPHYSIOLOGY | Facility: CLINIC | Age: 83
End: 2023-03-02
Payer: MEDICARE

## 2023-03-07 ENCOUNTER — LAB VISIT (OUTPATIENT)
Dept: LAB | Facility: HOSPITAL | Age: 83
End: 2023-03-07
Attending: INTERNAL MEDICINE
Payer: MEDICARE

## 2023-03-07 DIAGNOSIS — N18.32 STAGE 3B CHRONIC KIDNEY DISEASE: ICD-10-CM

## 2023-03-07 LAB
ANION GAP SERPL CALC-SCNC: 4 MMOL/L (ref 8–16)
BASOPHILS # BLD AUTO: 0.06 K/UL (ref 0–0.2)
BASOPHILS NFR BLD: 1 % (ref 0–1.9)
BUN SERPL-MCNC: 25 MG/DL (ref 8–23)
CALCIUM SERPL-MCNC: 9.3 MG/DL (ref 8.7–10.5)
CHLORIDE SERPL-SCNC: 109 MMOL/L (ref 95–110)
CO2 SERPL-SCNC: 26 MMOL/L (ref 23–29)
CREAT SERPL-MCNC: 1.6 MG/DL (ref 0.5–1.4)
CREAT UR-MCNC: 55 MG/DL (ref 23–375)
DIFFERENTIAL METHOD: ABNORMAL
EOSINOPHIL # BLD AUTO: 0.3 K/UL (ref 0–0.5)
EOSINOPHIL NFR BLD: 5.1 % (ref 0–8)
ERYTHROCYTE [DISTWIDTH] IN BLOOD BY AUTOMATED COUNT: 12.3 % (ref 11.5–14.5)
EST. GFR  (NO RACE VARIABLE): 42.8 ML/MIN/1.73 M^2
GLUCOSE SERPL-MCNC: 82 MG/DL (ref 70–110)
HCT VFR BLD AUTO: 32.1 % (ref 40–54)
HGB BLD-MCNC: 10.4 G/DL (ref 14–18)
IMM GRANULOCYTES # BLD AUTO: 0.03 K/UL (ref 0–0.04)
IMM GRANULOCYTES NFR BLD AUTO: 0.5 % (ref 0–0.5)
LYMPHOCYTES # BLD AUTO: 2 K/UL (ref 1–4.8)
LYMPHOCYTES NFR BLD: 32.3 % (ref 18–48)
MCH RBC QN AUTO: 31.6 PG (ref 27–31)
MCHC RBC AUTO-ENTMCNC: 32.4 G/DL (ref 32–36)
MCV RBC AUTO: 98 FL (ref 82–98)
MONOCYTES # BLD AUTO: 0.8 K/UL (ref 0.3–1)
MONOCYTES NFR BLD: 12.2 % (ref 4–15)
NEUTROPHILS # BLD AUTO: 3.1 K/UL (ref 1.8–7.7)
NEUTROPHILS NFR BLD: 48.9 % (ref 38–73)
NRBC BLD-RTO: 0 /100 WBC
PLATELET # BLD AUTO: 231 K/UL (ref 150–450)
PMV BLD AUTO: 11 FL (ref 9.2–12.9)
POTASSIUM SERPL-SCNC: 4.3 MMOL/L (ref 3.5–5.1)
PROT UR-MCNC: <7 MG/DL (ref 0–15)
PROT/CREAT UR: NORMAL MG/G{CREAT} (ref 0–0.2)
RBC # BLD AUTO: 3.29 M/UL (ref 4.6–6.2)
SODIUM SERPL-SCNC: 139 MMOL/L (ref 136–145)
WBC # BLD AUTO: 6.31 K/UL (ref 3.9–12.7)

## 2023-03-07 PROCEDURE — 85025 COMPLETE CBC W/AUTO DIFF WBC: CPT | Performed by: INTERNAL MEDICINE

## 2023-03-07 PROCEDURE — 84156 ASSAY OF PROTEIN URINE: CPT | Performed by: INTERNAL MEDICINE

## 2023-03-07 PROCEDURE — 36415 COLL VENOUS BLD VENIPUNCTURE: CPT | Mod: PN | Performed by: INTERNAL MEDICINE

## 2023-03-07 PROCEDURE — 80048 BASIC METABOLIC PNL TOTAL CA: CPT | Performed by: INTERNAL MEDICINE

## 2023-03-14 ENCOUNTER — CLINICAL SUPPORT (OUTPATIENT)
Dept: CARDIOLOGY | Facility: HOSPITAL | Age: 83
End: 2023-03-14
Payer: MEDICARE

## 2023-03-14 DIAGNOSIS — Z95.818 PRESENCE OF OTHER CARDIAC IMPLANTS AND GRAFTS: ICD-10-CM

## 2023-03-14 PROCEDURE — 93298 REM INTERROG DEV EVAL SCRMS: CPT | Mod: ,,, | Performed by: INTERNAL MEDICINE

## 2023-03-14 PROCEDURE — 93298 CARDIAC DEVICE CHECK - REMOTE: ICD-10-PCS | Mod: ,,, | Performed by: INTERNAL MEDICINE

## 2023-03-14 PROCEDURE — G2066 INTER DEVC REMOTE 30D: HCPCS | Performed by: INTERNAL MEDICINE

## 2023-03-15 ENCOUNTER — PATIENT OUTREACH (OUTPATIENT)
Dept: ADMINISTRATIVE | Facility: HOSPITAL | Age: 83
End: 2023-03-15
Payer: MEDICARE

## 2023-03-15 ENCOUNTER — PATIENT MESSAGE (OUTPATIENT)
Dept: ADMINISTRATIVE | Facility: HOSPITAL | Age: 83
End: 2023-03-15
Payer: MEDICARE

## 2023-03-16 ENCOUNTER — HOSPITAL ENCOUNTER (OUTPATIENT)
Dept: RADIOLOGY | Facility: HOSPITAL | Age: 83
Discharge: HOME | End: 2023-03-16
Attending: PHYSICIAN ASSISTANT
Payer: MEDICARE

## 2023-03-16 ENCOUNTER — OFFICE VISIT (OUTPATIENT)
Dept: NEPHROLOGY | Facility: CLINIC | Age: 83
End: 2023-03-16
Payer: MEDICARE

## 2023-03-16 ENCOUNTER — OFFICE VISIT (OUTPATIENT)
Dept: ORTHOPEDICS | Facility: CLINIC | Age: 83
End: 2023-03-16
Payer: MEDICARE

## 2023-03-16 ENCOUNTER — TELEPHONE (OUTPATIENT)
Dept: INTERNAL MEDICINE | Facility: CLINIC | Age: 83
End: 2023-03-16
Payer: MEDICARE

## 2023-03-16 ENCOUNTER — PATIENT MESSAGE (OUTPATIENT)
Dept: NEPHROLOGY | Facility: CLINIC | Age: 83
End: 2023-03-16

## 2023-03-16 VITALS — SYSTOLIC BLOOD PRESSURE: 132 MMHG | DIASTOLIC BLOOD PRESSURE: 70 MMHG

## 2023-03-16 VITALS — HEIGHT: 69 IN | BODY MASS INDEX: 33.01 KG/M2 | WEIGHT: 222.88 LBS

## 2023-03-16 VITALS
HEART RATE: 63 BPM | HEIGHT: 69 IN | DIASTOLIC BLOOD PRESSURE: 78 MMHG | WEIGHT: 220.44 LBS | SYSTOLIC BLOOD PRESSURE: 130 MMHG | OXYGEN SATURATION: 97 % | BODY MASS INDEX: 32.65 KG/M2

## 2023-03-16 DIAGNOSIS — M79.672 LEFT FOOT PAIN: ICD-10-CM

## 2023-03-16 DIAGNOSIS — D50.8 OTHER IRON DEFICIENCY ANEMIA: ICD-10-CM

## 2023-03-16 DIAGNOSIS — G47.33 OBSTRUCTIVE SLEEP APNEA: ICD-10-CM

## 2023-03-16 DIAGNOSIS — S52.125D CLOSED NONDISPLACED FRACTURE OF HEAD OF LEFT RADIUS WITH ROUTINE HEALING, SUBSEQUENT ENCOUNTER: Primary | ICD-10-CM

## 2023-03-16 DIAGNOSIS — N18.32 STAGE 3B CHRONIC KIDNEY DISEASE: Primary | ICD-10-CM

## 2023-03-16 DIAGNOSIS — E78.2 MIXED HYPERLIPIDEMIA: ICD-10-CM

## 2023-03-16 DIAGNOSIS — I10 ESSENTIAL HYPERTENSION: Chronic | ICD-10-CM

## 2023-03-16 DIAGNOSIS — S92.355A NONDISP FX OF FIFTH METATARSAL BONE, LEFT FOOT, INIT: ICD-10-CM

## 2023-03-16 DIAGNOSIS — M25.522 LEFT ELBOW PAIN: ICD-10-CM

## 2023-03-16 PROCEDURE — 3288F FALL RISK ASSESSMENT DOCD: CPT | Mod: CPTII,S$GLB,, | Performed by: INTERNAL MEDICINE

## 2023-03-16 PROCEDURE — 3078F DIAST BP <80 MM HG: CPT | Mod: CPTII,S$GLB,, | Performed by: INTERNAL MEDICINE

## 2023-03-16 PROCEDURE — 73630 X-RAY EXAM OF FOOT: CPT | Mod: 26,LT,, | Performed by: RADIOLOGY

## 2023-03-16 PROCEDURE — 3078F PR MOST RECENT DIASTOLIC BLOOD PRESSURE < 80 MM HG: ICD-10-PCS | Mod: CPTII,S$GLB,, | Performed by: INTERNAL MEDICINE

## 2023-03-16 PROCEDURE — 1125F AMNT PAIN NOTED PAIN PRSNT: CPT | Mod: CPTII,S$GLB,, | Performed by: PHYSICIAN ASSISTANT

## 2023-03-16 PROCEDURE — 73080 XR ELBOW COMPLETE 3 VIEW LEFT: ICD-10-PCS | Mod: 26,LT,, | Performed by: RADIOLOGY

## 2023-03-16 PROCEDURE — 73630 XR FOOT COMPLETE 3 VIEW LEFT: ICD-10-PCS | Mod: 26,LT,, | Performed by: RADIOLOGY

## 2023-03-16 PROCEDURE — 73630 X-RAY EXAM OF FOOT: CPT | Mod: TC,LT

## 2023-03-16 PROCEDURE — 1100F PTFALLS ASSESS-DOCD GE2>/YR: CPT | Mod: CPTII,S$GLB,, | Performed by: INTERNAL MEDICINE

## 2023-03-16 PROCEDURE — 3288F PR FALLS RISK ASSESSMENT DOCUMENTED: ICD-10-PCS | Mod: CPTII,S$GLB,, | Performed by: INTERNAL MEDICINE

## 2023-03-16 PROCEDURE — 99213 PR OFFICE/OUTPT VISIT, EST, LEVL III, 20-29 MIN: ICD-10-PCS | Mod: S$GLB,,, | Performed by: PHYSICIAN ASSISTANT

## 2023-03-16 PROCEDURE — 3075F PR MOST RECENT SYSTOLIC BLOOD PRESS GE 130-139MM HG: ICD-10-PCS | Mod: CPTII,S$GLB,, | Performed by: INTERNAL MEDICINE

## 2023-03-16 PROCEDURE — 1126F AMNT PAIN NOTED NONE PRSNT: CPT | Mod: CPTII,S$GLB,, | Performed by: INTERNAL MEDICINE

## 2023-03-16 PROCEDURE — 99999 PR PBB SHADOW E&M-EST. PATIENT-LVL III: CPT | Mod: PBBFAC,,, | Performed by: PHYSICIAN ASSISTANT

## 2023-03-16 PROCEDURE — 99213 PR OFFICE/OUTPT VISIT, EST, LEVL III, 20-29 MIN: ICD-10-PCS | Mod: S$GLB,,, | Performed by: INTERNAL MEDICINE

## 2023-03-16 PROCEDURE — 3075F SYST BP GE 130 - 139MM HG: CPT | Mod: CPTII,S$GLB,, | Performed by: INTERNAL MEDICINE

## 2023-03-16 PROCEDURE — 99999 PR PBB SHADOW E&M-EST. PATIENT-LVL IV: CPT | Mod: PBBFAC,,, | Performed by: INTERNAL MEDICINE

## 2023-03-16 PROCEDURE — 1125F PR PAIN SEVERITY QUANTIFIED, PAIN PRESENT: ICD-10-PCS | Mod: CPTII,S$GLB,, | Performed by: PHYSICIAN ASSISTANT

## 2023-03-16 PROCEDURE — 1126F PR PAIN SEVERITY QUANTIFIED, NO PAIN PRESENT: ICD-10-PCS | Mod: CPTII,S$GLB,, | Performed by: INTERNAL MEDICINE

## 2023-03-16 PROCEDURE — 99213 OFFICE O/P EST LOW 20 MIN: CPT | Mod: S$GLB,,, | Performed by: INTERNAL MEDICINE

## 2023-03-16 PROCEDURE — 73080 X-RAY EXAM OF ELBOW: CPT | Mod: TC,LT

## 2023-03-16 PROCEDURE — 1100F PR PT FALLS ASSESS DOC 2+ FALLS/FALL W/INJURY/YR: ICD-10-PCS | Mod: CPTII,S$GLB,, | Performed by: INTERNAL MEDICINE

## 2023-03-16 PROCEDURE — 99999 PR PBB SHADOW E&M-EST. PATIENT-LVL III: ICD-10-PCS | Mod: PBBFAC,,, | Performed by: PHYSICIAN ASSISTANT

## 2023-03-16 PROCEDURE — 73080 X-RAY EXAM OF ELBOW: CPT | Mod: 26,LT,, | Performed by: RADIOLOGY

## 2023-03-16 PROCEDURE — 99999 PR PBB SHADOW E&M-EST. PATIENT-LVL IV: ICD-10-PCS | Mod: PBBFAC,,, | Performed by: INTERNAL MEDICINE

## 2023-03-16 PROCEDURE — 99213 OFFICE O/P EST LOW 20 MIN: CPT | Mod: S$GLB,,, | Performed by: PHYSICIAN ASSISTANT

## 2023-03-21 RX ORDER — FEBUXOSTAT 40 MG/1
40 TABLET, FILM COATED ORAL DAILY
Qty: 90 TABLET | Refills: 3 | Status: SHIPPED | OUTPATIENT
Start: 2023-03-21 | End: 2024-03-18 | Stop reason: SDUPTHER

## 2023-03-29 ENCOUNTER — RESEARCH ENCOUNTER (OUTPATIENT)
Dept: RESEARCH | Facility: OTHER | Age: 83
End: 2023-03-29
Payer: MEDICARE

## 2023-03-29 NOTE — RESEARCH
Sponsor: [employer]     Study Title/IRB Number: [id number]    Principle Investigator: Dr. Randy Nance    Patient eligibility was checked prior to enrollment in the study. Patient met the following inclusion and exclusion criteria:     INCLUSION CRITERIA  Participant age is 50 years or older at the time of signing the Informed Consent form  Participant is capable of giving signed Informed Consent, which includes compliance with the requirements and restrictions in the informed consent form and the protocol    EXCLUSION CRITERIA  Participant is undergoing or referred for diagnostic evaluation due to clinical suspicion for cancer  Participant has a personal history of invasive or hematologic malignancy, diagnosed within the last 3 years prior to expected enrollment date or diagnosed greater than 3 years prior to expected enrollment date and never treated  Participant has had definitive treatment for invasive or hematologic malignancy within the 3 years prior to expected enrollment date  Participant is not able to comply with protocol procedures  Participant is not a current patient at a participating center  Participant is currently enrolled or was previously enrolled in another [employer]-sponsored study  Participant is current or previous employee/contractor of [employer]  Participant is currently pregnant (by participant's self-report of pregnancy status)    Prior to the Informed Consent (IC) being signed, or any study protocol required data collection, testing, procedure, or intervention being performed, the following was done and/or discussed:  Patient was given a copy of the IC for review   Purpose of the study and qualifications to participate   Study design, Follow up schedule, and tests or procedures done at each visit  Confidentiality and HIPAA Authorization for Release of Medical Records for the research trial/ subject's rights/research related injury  Risk, Benefits, Alternative Treatments, Compensation and  "Costs  Participation in the research trial is voluntary and patient may withdraw at anytime  Contact information for study related questions    Patient verbalizes understanding of the above: Yes  Contact information for CRC and PI given to patient: Yes  Patient able to adequately summarize: the purpose of the study, the risks associated with the study, and all procedures, testing, and follow-ups associated with the study: Yes    Patient signed the informed consent form for the research study with an IRB approval date of 4/25/2022. Each page of the consent form was reviewed with patient and all questions answered satisfactorily.   Patient received a copy of the consent form. The original consent was scanned into electronic medical records.    Anthropometric Data    Participant is a 82 y.o., White, Not  or /a, male  Participant height:   3/29/23            5'9"   Participant weight:   Wt Readings from Last 1 Encounters:   03/29/23 219 lbs       Smoking History and Alcohol use     Please indicate whether the participant smoked at least 100 cigarettes in their lifetime:   Yes  Please indicate whether the participant is a current smoker:  No  Age participant started smoking cigarettes:  17 years old  Age participant stopped smoking cigarettes:   During their time as a smok 27 years older, please indicate if the participant stopped smoking for a month or more:  no  Please indicate how many cigarettes per day did the participant smoke on average for the majority of their time as a smoker: Blank single: 20 cigarettes per day    During the last 12 months, how often did the participant have any kind of drink containing alcohol? Count as one drink a 12 ounce can or glass of beer, a 5 ounce glass of wine, or a drink containing 1 shot of liquor. 6 times a week}  During the last 12 months, how many drinks did the participant have on days when they drank alcohol?2 drinks per day    Blood Draw    Following IC being " signed and prior to blood draw, patient completed all baseline/pretest questionnaires. The following specimens were collected from the pt at the time of this encounter via peripheral blood draw.    Blood draw location: Right Arm  Needle used: 21 gauge butterfly needle  Blood draw amount: 40ml  Blood draw time: 10:28 am 3/29/2023

## 2023-04-03 NOTE — PROGRESS NOTES
Progress Report  Nephrology      Consult Requested By: CKD  Reason for Consult: CKD    History of Present Illness:  Patient is a 82 y.o. male presents for a follow up evaluation of chronic kidney disease. The patient was found to have an elevated serum creatinine during routine laboratory testing, at 1.9 mg/dL.     The patient denies SOB, LE edema, hematuria or foamy urine. He had an NEFTALI episode due to ibuprofen 12 months ago. No longer taking NSAIDs.    The patient denies taking NSAIDs or new antibiotics, recreational drugs, recent episode of dehydration, diarrhea, nausea or vomiting, acute illness, hospitalization or exposure to IV radiocontrast.     Past Medical History:   Diagnosis Date    ALLERGIC RHINITIS     Anemia     Asthma     Cataract     Hyperlipidemia     Hypertension     NAFLD (nonalcoholic fatty liver disease)     ANNABELLE (obstructive sleep apnea)     on CPAP    PVD (posterior vitreous detachment), left eye     Squamous cell cancer of skin of hand          Current Outpatient Medications:     albuterol (PROAIR HFA) 90 mcg/actuation inhaler, Inhale 2 puffs into the lungs every 4 (four) hours as needed for Wheezing., Disp: 8 g, Rfl: 3    atorvastatin (LIPITOR) 20 MG tablet, TAKE 1 TABLET(20 MG) BY MOUTH EVERY DAY, Disp: 90 tablet, Rfl: 2    azelastine (ASTELIN) 137 mcg (0.1 %) nasal spray, 2 sprays (274 mcg total) by Nasal route 2 (two) times daily as needed., Disp: 30 mL, Rfl: 5    azithromycin (ZITHROMAX Z-NICOLASA) 250 MG tablet, Take 2 tabs po on day 1; then 1 po daily until completed., Disp: 6 tablet, Rfl: 0    fish oil-omega-3 fatty acids 300-1,000 mg capsule, Take 2 g by mouth once daily., Disp: , Rfl:     fluocinonide (LIDEX) 0.05 % external solution, Apply topically 2 (two) times daily. For severe rashes in scalp and ears only prn, Disp: 60 mL, Rfl: 3    fluticasone propionate (FLONASE) 50 mcg/actuation nasal spray, 2 sprays (100 mcg total) by Each Nostril route once daily., Disp: 48 g, Rfl: 3     furosemide (LASIX) 40 MG tablet, Take 1 tablet (40 mg total) by mouth once daily. (Patient taking differently: Take 40 mg by mouth once daily. Mon.wed.fri.), Disp: 90 tablet, Rfl: 3    glucosamine & chondroit sul.Na 750-600 mg Tab, Take 750 mg by mouth., Disp: , Rfl:     hydrocortisone 2.5 % cream, Apply topically 2 (two) times daily. As needed for severe flares of rash on face and neck, Disp: 28 g, Rfl: 3    hydrocortisone-pramoxine (ANALPRAM-HC) 2.5-1 % Crea, Place rectally 3 (three) times daily., Disp: 28 g, Rfl: 1    irbesartan (AVAPRO) 150 MG tablet, Take 1 tablet (150 mg total) by mouth every evening., Disp: 90 tablet, Rfl: 3    ketoconazole (NIZORAL) 2 % cream, Apply topically 2 (two) times daily. To dry skin PRN, Disp: 60 g, Rfl: 3    ketoconazole (NIZORAL) 2 % shampoo, WASH AFFECTED AREA EVERY OTHER DAY, Disp: 120 mL, Rfl: 2    loratadine (CLARITIN) 10 mg tablet, Take 10 mg by mouth daily as needed., Disp: , Rfl:     omeprazole (PRILOSEC) 20 MG capsule, TAKE 1 CAPSULE(20 MG) BY MOUTH EVERY DAY, Disp: 90 capsule, Rfl: 3    pramoxine-hydrocortisone (PROCTOCREAM-HC) 1-1 % rectal cream, Place rectally., Disp: , Rfl:     triamcinolone acetonide 0.1% (KENALOG) 0.1 % cream, Apply topically 2 (two) times daily., Disp: 80 g, Rfl: 2    WIXELA INHUB 250-50 mcg/dose diskus inhaler, USE 1 INHALATION BY MOUTH TWICE DAILY- RINSE MOUTH AFTER USE, Disp: 180 each, Rfl: 3    febuxostat (ULORIC) 40 mg Tab, Take 1 tablet (40 mg total) by mouth once daily., Disp: 90 tablet, Rfl: 3    metoprolol succinate (TOPROL-XL) 25 MG 24 hr tablet, TAKE 1 TABLET(25 MG) BY MOUTH EVERY DAY, Disp: 90 tablet, Rfl: 3    Current Facility-Administered Medications:     ciprofloxacin HCl tablet 500 mg, 500 mg, Oral, Once, Julee Urias NP  Review of patient's allergies indicates:   Allergen Reactions    Amoxicillin Hives    Allopurinol analogues Other (See Comments)     Abnormal transaminases        Past Surgical History:   Procedure Laterality  Date    APPENDECTOMY      CARDIAC CATHETERIZATION  10/31/2018    no stent    CARDIAC CATHETERIZATION  1998    no stent    COLONOSCOPY N/A 6/19/2018    Procedure: COLONOSCOPY;  Surgeon: Wade De La Garza MD;  Location: [facility] (4TH FLR);  Service: Endoscopy;  Laterality: N/A;    COLONOSCOPY N/A 6/11/2020    Procedure: COLONOSCOPY;  Surgeon: Von Gutierrez MD;  Location: [facility] ENDO (4TH FLR);  Service: Endoscopy;  Laterality: N/A;  3/17/20-pt confirmed appt on 3/20/20 with new arrival time of 0715, and updated visitor/ride instructions-BB  patient to be rescheduled by 6/3/20 per Dr. Gutierrez-JAVIER  patient requests to be rescheduled on a Thursday or Friday-BB  patient requested suprep-BB  loop recorder-BB  C    ENDOSCOPIC ULTRASOUND OF UPPER GASTROINTESTINAL TRACT N/A 10/1/2020    Procedure: ULTRASOUND, UPPER GI TRACT, ENDOSCOPIC;  Surgeon: Niko Lambert MD;  Location: [facility] (2ND FLR);  Service: Endoscopy;  Laterality: N/A;  Covid-19 test 9/28/20 at [facility] -   Has Loop Recorder.    ESOPHAGOGASTRODUODENOSCOPY N/A 10/23/2018    Procedure: EGD (ESOPHAGOGASTRODUODENOSCOPY);  Surgeon: Bart Hernandez MD;  Location: [facility] (4TH FLR);  Service: Endoscopy;  Laterality: N/A;    ESOPHAGOGASTRODUODENOSCOPY N/A 6/11/2020    Procedure: EGD (ESOPHAGOGASTRODUODENOSCOPY);  Surgeon: Von Gutierrez MD;  Location: [facility] (4TH FLR);  Service: Endoscopy;  Laterality: N/A;  3/17/20-pt confirmed appt on 3/20/20 with new arrival time of 0715, and updated visitor/ride instructions-BB  patient to be rescheduled by 6/3/20 per Dr. Santamaria  patient requests to be rescheduled on a Thursday or Friday-BB  patient requested suprep-BB    FOOT SURGERY      INSERTION OF IMPLANTABLE LOOP RECORDER N/A 9/18/2019    Procedure: Insertion, Implantable Loop Recorder;  Surgeon: Gil Wilson MD;  Location: [facility] EP LAB;  Service: Cardiology;  Laterality: N/A;  AT, [place], [place], [place], [place], 3 Prep    testicular biopsy       Family History   Problem  Relation Age of Onset    Cancer Mother         breast    Glaucoma Mother     Retinal detachment Mother     Heart disease Father         CHF    COPD Father     Retinal detachment Father     Heart disease Brother     Arthritis Brother     Blindness Maternal Grandmother     Cataracts Maternal Grandmother     Cirrhosis Neg Hx     Strabismus Neg Hx     Colon cancer Neg Hx     Stomach cancer Neg Hx     Esophageal cancer Neg Hx     Celiac disease Neg Hx     Crohn's disease Neg Hx     Rectal cancer Neg Hx     Ulcerative colitis Neg Hx      Social History     Tobacco Use    Smoking status: Former     Packs/day: 1.00     Years: 10.00     Pack years: 10.00     Types: Cigarettes     Quit date: 1968     Years since quittin.2    Smokeless tobacco: Former    Tobacco comments:      last smoke   Substance Use Topics    Alcohol use: Yes     Alcohol/week: 2.0 standard drinks     Types: 1 Glasses of wine, 1 Shots of liquor per week     Comment: 5 days per week , 7 -8 drinks a week.    Drug use: No       ROS    Vitals:    23 1154   BP: 130/78   Pulse: 63       PHYSICAL EXAMINATION:  General: no distress, well nourished  Skin: color, texture, turgor normal. No rash or lesions  HEENT: Eyes: reactive pupils, normal conjunctiva. Oral mucosa moist, no ulcers.   Neck: supple, symmetrical, trachea midline, no JVD, no carotid bruit  Lungs: clear to auscultation bilaterally and normal respiratory effort  Cardiovascular: Heart: regular rate and rhythm, S1, S2 normal, no murmur, rub or gallop.  Abdomen: bowel sounds present, no abdominal bruit, soft, non-tender non-distented;  Musculoskeletal: no pitting edema in lower extremities, no clubbing or cyanosis  Lymph Nodes: No cervical or supraclavicular adenopathy  Neurologic: AAOx3, normal strength and tone. No focal deficit. No asterixis.       LABORATORY DATA:  Lab Results   Component Value Date    CREATININE 1.6 (H) 2023       Prot/Creat Ratio, Urine   Date Value Ref Range  Status   03/07/2023 Unable to calculate 0.00 - 0.20 Final   09/06/2022 0.06 0.00 - 0.20 Final   05/20/2022 Unable to calculate 0.00 - 0.20 Final       Lab Results   Component Value Date     03/07/2023    K 4.3 03/07/2023    CO2 26 03/07/2023       Lab Results   Component Value Date    .6 (H) 05/20/2022    CALCIUM 9.3 03/07/2023    PHOS 3.4 08/01/2022       Lab Results   Component Value Date    HGB 10.4 (L) 03/07/2023        No results found for: HGBA1C    Lab Results   Component Value Date    LDLCALC 71.0 10/20/2022         IMAGING STUDIES  Kidney US: Reviewed  Echocardiogram: Reviewed    IMPRESSION / RECOMMENDATIONS:     1. Stage 3b chronic kidney disease  Stable for at least 6.5 years, recovered from NSAID-NEFTALI bout. 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 Low risk for progression to ESRD. No adjustments needed      2. Bilateral leg edema  Well managed with loop diuretic TIW      3. Essential hypertension  Well controlled on dual regimen      4. NAFLD (nonalcoholic fatty liver disease)  Managed by PCP          SUMMARY OF PLAN:  Continue ARB, loop diuretic  Avoid NSAIDs  3.   RTC in 6 mo

## 2023-04-07 ENCOUNTER — TELEPHONE (OUTPATIENT)
Dept: ENDOSCOPY | Facility: HOSPITAL | Age: 83
End: 2023-04-07
Payer: MEDICARE

## 2023-04-07 ENCOUNTER — PATIENT MESSAGE (OUTPATIENT)
Dept: ORTHOPEDICS | Facility: CLINIC | Age: 83
End: 2023-04-07
Payer: MEDICARE

## 2023-04-07 DIAGNOSIS — R93.89 ABNORMAL FINDING ON IMAGING: Primary | ICD-10-CM

## 2023-04-10 ENCOUNTER — RESEARCH ENCOUNTER (OUTPATIENT)
Dept: RESEARCH | Facility: HOSPITAL | Age: 83
End: 2023-04-10
Payer: MEDICARE

## 2023-04-10 NOTE — PROGRESS NOTES
Cayden Pathfinder 2022.003    Cayden ID: [id number]     Results the Cayden Barney Multi Cancer Early Detection test and were reviewed by PI Dr Nance. Patient was called and notified of test results, there was no signal detected.    Patient reminded, this was a screening test, so we still highly encourage them to continue other normal health screenings  and to continue to adhere to guideline-recommended cancer screening.    Patient was asked about completing 30 day questionnaire online and was agreeable.

## 2023-04-12 ENCOUNTER — LAB VISIT (OUTPATIENT)
Dept: LAB | Facility: HOSPITAL | Age: 83
End: 2023-04-12
Attending: INTERNAL MEDICINE
Payer: MEDICARE

## 2023-04-12 DIAGNOSIS — I10 ESSENTIAL HYPERTENSION: Chronic | ICD-10-CM

## 2023-04-12 DIAGNOSIS — D50.9 IRON DEFICIENCY ANEMIA, UNSPECIFIED IRON DEFICIENCY ANEMIA TYPE: ICD-10-CM

## 2023-04-12 DIAGNOSIS — E78.49 OTHER HYPERLIPIDEMIA: ICD-10-CM

## 2023-04-12 LAB
ALBUMIN SERPL BCP-MCNC: 3.7 G/DL (ref 3.5–5.2)
ALP SERPL-CCNC: 77 U/L (ref 55–135)
ALT SERPL W/O P-5'-P-CCNC: 16 U/L (ref 10–44)
ANION GAP SERPL CALC-SCNC: 9 MMOL/L (ref 8–16)
AST SERPL-CCNC: 17 U/L (ref 10–40)
BASOPHILS # BLD AUTO: 0.06 K/UL (ref 0–0.2)
BASOPHILS NFR BLD: 1 % (ref 0–1.9)
BILIRUB SERPL-MCNC: 0.8 MG/DL (ref 0.1–1)
BUN SERPL-MCNC: 31 MG/DL (ref 8–23)
CALCIUM SERPL-MCNC: 9.3 MG/DL (ref 8.7–10.5)
CHLORIDE SERPL-SCNC: 107 MMOL/L (ref 95–110)
CO2 SERPL-SCNC: 24 MMOL/L (ref 23–29)
CREAT SERPL-MCNC: 1.8 MG/DL (ref 0.5–1.4)
DIFFERENTIAL METHOD: ABNORMAL
EOSINOPHIL # BLD AUTO: 0.3 K/UL (ref 0–0.5)
EOSINOPHIL NFR BLD: 4.6 % (ref 0–8)
ERYTHROCYTE [DISTWIDTH] IN BLOOD BY AUTOMATED COUNT: 13.2 % (ref 11.5–14.5)
EST. GFR  (NO RACE VARIABLE): 37.1 ML/MIN/1.73 M^2
GLUCOSE SERPL-MCNC: 90 MG/DL (ref 70–110)
HCT VFR BLD AUTO: 32.8 % (ref 40–54)
HGB BLD-MCNC: 10.4 G/DL (ref 14–18)
IMM GRANULOCYTES # BLD AUTO: 0.02 K/UL (ref 0–0.04)
IMM GRANULOCYTES NFR BLD AUTO: 0.3 % (ref 0–0.5)
LYMPHOCYTES # BLD AUTO: 1.6 K/UL (ref 1–4.8)
LYMPHOCYTES NFR BLD: 26.2 % (ref 18–48)
MCH RBC QN AUTO: 31 PG (ref 27–31)
MCHC RBC AUTO-ENTMCNC: 31.7 G/DL (ref 32–36)
MCV RBC AUTO: 98 FL (ref 82–98)
MONOCYTES # BLD AUTO: 0.8 K/UL (ref 0.3–1)
MONOCYTES NFR BLD: 13 % (ref 4–15)
NEUTROPHILS # BLD AUTO: 3.2 K/UL (ref 1.8–7.7)
NEUTROPHILS NFR BLD: 54.9 % (ref 38–73)
NRBC BLD-RTO: 0 /100 WBC
PLATELET # BLD AUTO: 176 K/UL (ref 150–450)
PMV BLD AUTO: 12.1 FL (ref 9.2–12.9)
POTASSIUM SERPL-SCNC: 4.7 MMOL/L (ref 3.5–5.1)
PROT SERPL-MCNC: 6.4 G/DL (ref 6–8.4)
RBC # BLD AUTO: 3.36 M/UL (ref 4.6–6.2)
SODIUM SERPL-SCNC: 140 MMOL/L (ref 136–145)
WBC # BLD AUTO: 5.91 K/UL (ref 3.9–12.7)

## 2023-04-12 PROCEDURE — 85025 COMPLETE CBC W/AUTO DIFF WBC: CPT | Performed by: INTERNAL MEDICINE

## 2023-04-12 PROCEDURE — 80053 COMPREHEN METABOLIC PANEL: CPT | Performed by: INTERNAL MEDICINE

## 2023-04-12 PROCEDURE — 36415 COLL VENOUS BLD VENIPUNCTURE: CPT | Mod: PN | Performed by: INTERNAL MEDICINE

## 2023-04-13 ENCOUNTER — CLINICAL SUPPORT (OUTPATIENT)
Dept: CARDIOLOGY | Facility: HOSPITAL | Age: 83
End: 2023-04-13
Payer: MEDICARE

## 2023-04-13 ENCOUNTER — PATIENT MESSAGE (OUTPATIENT)
Dept: INTERNAL MEDICINE | Facility: CLINIC | Age: 83
End: 2023-04-13
Payer: MEDICARE

## 2023-04-13 DIAGNOSIS — Z95.818 PRESENCE OF OTHER CARDIAC IMPLANTS AND GRAFTS: ICD-10-CM

## 2023-04-13 PROCEDURE — G2066 INTER DEVC REMOTE 30D: HCPCS | Performed by: INTERNAL MEDICINE

## 2023-04-15 ENCOUNTER — PATIENT MESSAGE (OUTPATIENT)
Dept: ENDOSCOPY | Facility: HOSPITAL | Age: 83
End: 2023-04-15
Payer: MEDICARE

## 2023-04-18 NOTE — PROGRESS NOTES
Subjective:      Patient ID: Adelfo ZHANG Jennifer, PhD is a 82 y.o. male.    Chief Complaint: Follow-up (left foot/elbow)    HPI    Patient is a 82 year old male who presents to clinic for follow up from urgent care for left foot pain after tripping and falling while finishing cutting the grass.   RADS: 5th metatarsal base fracture.   Patient has been treated in a post op shoe. He is doing well. Weight bearing as tolerated. No numbness or tingling./   He also injured his wrist in the fall. He has been treated in a wrist brace. Reports the pain goes to his elbow with movement.   Upon evaluation he has pain in the elbow with end point movement.     02/14/23: Over all doing ok. Has increased range of motion int he elbow. Has been compliant with the post op shoe. He is taking tylenol as needed to help the pain.     03/16/23. Over all doing very well. Issues intermittent but better over all.       Review of Systems   Constitutional: Negative for chills and fever.   Cardiovascular:  Negative for chest pain.   Respiratory:  Negative for cough and shortness of breath.    Skin:  Negative for color change, dry skin, itching, nail changes, poor wound healing and rash.   Musculoskeletal:  Positive for falls.   Neurological:  Negative for dizziness.   Psychiatric/Behavioral:  Negative for altered mental status. The patient is not nervous/anxious.    All other systems reviewed and are negative.      Objective:            General    Constitutional: He is oriented to person, place, and time. He appears well-developed and well-nourished. No distress.   HENT:   Head: Atraumatic.   Eyes: Conjunctivae are normal.   Cardiovascular:  Normal rate.            Pulmonary/Chest: Effort normal.   Neurological: He is alert and oriented to person, place, and time.   Psychiatric: He has a normal mood and affect. His behavior is normal.         Left Ankle/Foot Exam     Tenderness   The patient is tender to palpation of the fifth metatarsal  base.    Range of Motion   The patient has normal left ankle ROM.     Other   Sensation: normal  Left Hand/Wrist Exam     Range of Motion     Wrist   Extension:  normal   Flexion:  normal   Pronation:  normal Left wrist pronation: increased pain.  Supination:  normal Left wrist supination: increased pain.      Left Elbow Exam     Range of Motion   Extension:  10   Flexion:  130           RADS:   FOOT: Healing 5th metatarsal fracture.  DJD..    ELBOW: Probable nondisplaced fracture of radial neck.  Improved joint effusion.on.      Assessment:       Encounter Diagnoses   Name Primary?    Closed nondisplaced fracture of head of left radius with routine healing, subsequent encounter Yes    Nondisp fx of fifth metatarsal bone, left foot, init             Plan:     Dicussed plan with the patient at this time will treat both conservatively.     FOOT: weight bearing as tolerated in advance shoe wear as tolerated. RICE to reduce pain and swelling.     ELBOW: LAVELL Carbajal  Return to clinic if any increase in pain  at that time x-ray of his elbow and foot is needed.

## 2023-04-20 ENCOUNTER — OFFICE VISIT (OUTPATIENT)
Dept: INTERNAL MEDICINE | Facility: CLINIC | Age: 83
End: 2023-04-20
Payer: MEDICARE

## 2023-04-20 ENCOUNTER — PATIENT MESSAGE (OUTPATIENT)
Dept: INTERNAL MEDICINE | Facility: CLINIC | Age: 83
End: 2023-04-20

## 2023-04-20 VITALS
HEART RATE: 63 BPM | SYSTOLIC BLOOD PRESSURE: 136 MMHG | OXYGEN SATURATION: 100 % | TEMPERATURE: 98 F | BODY MASS INDEX: 33.63 KG/M2 | RESPIRATION RATE: 16 BRPM | WEIGHT: 227.06 LBS | HEIGHT: 69 IN | DIASTOLIC BLOOD PRESSURE: 60 MMHG

## 2023-04-20 DIAGNOSIS — K63.5 POLYP OF COLON, UNSPECIFIED PART OF COLON, UNSPECIFIED TYPE: ICD-10-CM

## 2023-04-20 DIAGNOSIS — Z12.11 COLON CANCER SCREENING: ICD-10-CM

## 2023-04-20 DIAGNOSIS — E78.49 OTHER HYPERLIPIDEMIA: ICD-10-CM

## 2023-04-20 DIAGNOSIS — N18.30 STAGE 3 CHRONIC KIDNEY DISEASE, UNSPECIFIED WHETHER STAGE 3A OR 3B CKD: ICD-10-CM

## 2023-04-20 DIAGNOSIS — I10 ESSENTIAL HYPERTENSION: Primary | ICD-10-CM

## 2023-04-20 DIAGNOSIS — D50.9 IRON DEFICIENCY ANEMIA, UNSPECIFIED IRON DEFICIENCY ANEMIA TYPE: ICD-10-CM

## 2023-04-20 DIAGNOSIS — Z12.5 ENCOUNTER FOR SCREENING FOR MALIGNANT NEOPLASM OF PROSTATE: ICD-10-CM

## 2023-04-20 DIAGNOSIS — J45.20 INTERMITTENT ASTHMA WITHOUT COMPLICATION, UNSPECIFIED ASTHMA SEVERITY: ICD-10-CM

## 2023-04-20 PROCEDURE — 3075F PR MOST RECENT SYSTOLIC BLOOD PRESS GE 130-139MM HG: ICD-10-PCS | Mod: CPTII,S$GLB,, | Performed by: INTERNAL MEDICINE

## 2023-04-20 PROCEDURE — 3078F DIAST BP <80 MM HG: CPT | Mod: CPTII,S$GLB,, | Performed by: INTERNAL MEDICINE

## 2023-04-20 PROCEDURE — 99214 PR OFFICE/OUTPT VISIT, EST, LEVL IV, 30-39 MIN: ICD-10-PCS | Mod: S$GLB,,, | Performed by: INTERNAL MEDICINE

## 2023-04-20 PROCEDURE — 3288F FALL RISK ASSESSMENT DOCD: CPT | Mod: CPTII,S$GLB,, | Performed by: INTERNAL MEDICINE

## 2023-04-20 PROCEDURE — 1100F PTFALLS ASSESS-DOCD GE2>/YR: CPT | Mod: CPTII,S$GLB,, | Performed by: INTERNAL MEDICINE

## 2023-04-20 PROCEDURE — 99214 OFFICE O/P EST MOD 30 MIN: CPT | Mod: S$GLB,,, | Performed by: INTERNAL MEDICINE

## 2023-04-20 PROCEDURE — 1125F PR PAIN SEVERITY QUANTIFIED, PAIN PRESENT: ICD-10-PCS | Mod: CPTII,S$GLB,, | Performed by: INTERNAL MEDICINE

## 2023-04-20 PROCEDURE — 1125F AMNT PAIN NOTED PAIN PRSNT: CPT | Mod: CPTII,S$GLB,, | Performed by: INTERNAL MEDICINE

## 2023-04-20 PROCEDURE — 1100F PR PT FALLS ASSESS DOC 2+ FALLS/FALL W/INJURY/YR: ICD-10-PCS | Mod: CPTII,S$GLB,, | Performed by: INTERNAL MEDICINE

## 2023-04-20 PROCEDURE — 3288F PR FALLS RISK ASSESSMENT DOCUMENTED: ICD-10-PCS | Mod: CPTII,S$GLB,, | Performed by: INTERNAL MEDICINE

## 2023-04-20 PROCEDURE — 3075F SYST BP GE 130 - 139MM HG: CPT | Mod: CPTII,S$GLB,, | Performed by: INTERNAL MEDICINE

## 2023-04-20 PROCEDURE — 99999 PR PBB SHADOW E&M-EST. PATIENT-LVL V: CPT | Mod: PBBFAC,,, | Performed by: INTERNAL MEDICINE

## 2023-04-20 PROCEDURE — 99999 PR PBB SHADOW E&M-EST. PATIENT-LVL V: ICD-10-PCS | Mod: PBBFAC,,, | Performed by: INTERNAL MEDICINE

## 2023-04-20 PROCEDURE — 3078F PR MOST RECENT DIASTOLIC BLOOD PRESSURE < 80 MM HG: ICD-10-PCS | Mod: CPTII,S$GLB,, | Performed by: INTERNAL MEDICINE

## 2023-04-26 ENCOUNTER — TELEPHONE (OUTPATIENT)
Dept: ENDOSCOPY | Facility: HOSPITAL | Age: 83
End: 2023-04-26
Payer: MEDICARE

## 2023-04-26 VITALS — WEIGHT: 222 LBS | BODY MASS INDEX: 32.88 KG/M2 | HEIGHT: 69 IN

## 2023-04-26 DIAGNOSIS — Z87.19 HISTORY OF BARRETT'S ESOPHAGUS: ICD-10-CM

## 2023-04-26 DIAGNOSIS — Z86.010 HISTORY OF COLON POLYPS: Primary | ICD-10-CM

## 2023-04-26 DIAGNOSIS — Z12.11 SPECIAL SCREENING FOR MALIGNANT NEOPLASMS, COLON: ICD-10-CM

## 2023-04-26 RX ORDER — SODIUM, POTASSIUM,MAG SULFATES 17.5-3.13G
1 SOLUTION, RECONSTITUTED, ORAL ORAL DAILY
Qty: 1 KIT | Refills: 0 | Status: SHIPPED | OUTPATIENT
Start: 2023-04-26 | End: 2023-04-28

## 2023-04-26 NOTE — TELEPHONE ENCOUNTER
"Von Gutierrez MD  P [facility]  Caller: self  [phone] (3 weeks ago)  Procedure: EGD/Colonoscopy     Diagnosis: Surveillance colonoscopy - Hx of colon polyps and Ramirez's esophagus     Procedure Timin-12 weeks     *If within 4 weeks selected, please jaelyn as high priority*     *If greater than 12 weeks, please select "5-12 weeks" and delay sending until 2 months prior to requested date*     Provider: Myself     Location: No Preference     Additional Scheduling Information: No scheduling concerns     Prep Specifications:Standard prep     Have you attached a patient to this message: yes   "

## 2023-04-26 NOTE — PLAN OF CARE
Endoscopy procedure scheduled on 6/2/23, prep instructions reviewed, Pt verbalized understanding.

## 2023-04-27 ENCOUNTER — HOSPITAL ENCOUNTER (OUTPATIENT)
Dept: RADIOLOGY | Facility: HOSPITAL | Age: 83
Discharge: HOME | End: 2023-04-27
Attending: PHYSICIAN ASSISTANT
Payer: MEDICARE

## 2023-04-27 ENCOUNTER — OFFICE VISIT (OUTPATIENT)
Dept: ORTHOPEDICS | Facility: CLINIC | Age: 83
End: 2023-04-27
Payer: MEDICARE

## 2023-04-27 VITALS — WEIGHT: 222 LBS | HEIGHT: 69 IN | BODY MASS INDEX: 32.88 KG/M2

## 2023-04-27 DIAGNOSIS — S92.355A NONDISP FX OF FIFTH METATARSAL BONE, LEFT FOOT, INIT: ICD-10-CM

## 2023-04-27 DIAGNOSIS — S52.125D CLOSED NONDISPLACED FRACTURE OF HEAD OF LEFT RADIUS WITH ROUTINE HEALING, SUBSEQUENT ENCOUNTER: Primary | ICD-10-CM

## 2023-04-27 DIAGNOSIS — S52.125D CLOSED NONDISPLACED FRACTURE OF HEAD OF LEFT RADIUS WITH ROUTINE HEALING, SUBSEQUENT ENCOUNTER: ICD-10-CM

## 2023-04-27 PROCEDURE — 99999 PR PBB SHADOW E&M-EST. PATIENT-LVL IV: CPT | Mod: PBBFAC,,, | Performed by: PHYSICIAN ASSISTANT

## 2023-04-27 PROCEDURE — 1159F MED LIST DOCD IN RCRD: CPT | Mod: CPTII,S$GLB,, | Performed by: PHYSICIAN ASSISTANT

## 2023-04-27 PROCEDURE — 1126F AMNT PAIN NOTED NONE PRSNT: CPT | Mod: CPTII,S$GLB,, | Performed by: PHYSICIAN ASSISTANT

## 2023-04-27 PROCEDURE — 73080 X-RAY EXAM OF ELBOW: CPT | Mod: 26,LT,, | Performed by: RADIOLOGY

## 2023-04-27 PROCEDURE — 1126F PR PAIN SEVERITY QUANTIFIED, NO PAIN PRESENT: ICD-10-PCS | Mod: CPTII,S$GLB,, | Performed by: PHYSICIAN ASSISTANT

## 2023-04-27 PROCEDURE — 73630 X-RAY EXAM OF FOOT: CPT | Mod: TC,LT

## 2023-04-27 PROCEDURE — 73080 XR ELBOW COMPLETE 3 VIEW LEFT: ICD-10-PCS | Mod: 26,LT,, | Performed by: RADIOLOGY

## 2023-04-27 PROCEDURE — 99213 PR OFFICE/OUTPT VISIT, EST, LEVL III, 20-29 MIN: ICD-10-PCS | Mod: S$GLB,,, | Performed by: PHYSICIAN ASSISTANT

## 2023-04-27 PROCEDURE — 1159F PR MEDICATION LIST DOCUMENTED IN MEDICAL RECORD: ICD-10-PCS | Mod: CPTII,S$GLB,, | Performed by: PHYSICIAN ASSISTANT

## 2023-04-27 PROCEDURE — 73630 XR FOOT COMPLETE 3 VIEW LEFT: ICD-10-PCS | Mod: 26,LT,, | Performed by: RADIOLOGY

## 2023-04-27 PROCEDURE — 99213 OFFICE O/P EST LOW 20 MIN: CPT | Mod: S$GLB,,, | Performed by: PHYSICIAN ASSISTANT

## 2023-04-27 PROCEDURE — 73080 X-RAY EXAM OF ELBOW: CPT | Mod: TC,LT

## 2023-04-27 PROCEDURE — 99999 PR PBB SHADOW E&M-EST. PATIENT-LVL IV: ICD-10-PCS | Mod: PBBFAC,,, | Performed by: PHYSICIAN ASSISTANT

## 2023-04-27 PROCEDURE — 73630 X-RAY EXAM OF FOOT: CPT | Mod: 26,LT,, | Performed by: RADIOLOGY

## 2023-04-28 NOTE — PROGRESS NOTES
Subjective:      Patient ID: Adelfo ZHANG Jennifer, PhD is a 82 y.o. male.    Chief Complaint: Follow-up (left foot/elbow)    HPI    Patient is a 82 year old male who presents to clinic for follow up from urgent care for left foot pain after tripping and falling while finishing cutting the grass.   RADS: 5th metatarsal base fracture.   Patient has been treated in a post op shoe. He is doing well. Weight bearing as tolerated. No numbness or tingling./   He also injured his wrist in the fall. He has been treated in a wrist brace. Reports the pain goes to his elbow with movement.   Upon evaluation he has pain in the elbow with end point movement.     02/14/23: Over all doing ok. Has increased range of motion int he elbow. Has been compliant with the post op shoe. He is taking tylenol as needed to help the pain.     03/16/23. Over all doing very well. Issues intermittent but better over all.     04/27/23: Over all doing well. No issues with  the elbow. Noticed some intermittent pain to foot fracture reports feels achy. In a regular shoe.       Review of Systems   Constitutional: Negative for chills and fever.   Cardiovascular:  Negative for chest pain.   Respiratory:  Negative for cough and shortness of breath.    Skin:  Negative for color change, dry skin, itching, nail changes, poor wound healing and rash.   Musculoskeletal:  Positive for falls.   Neurological:  Negative for dizziness.   Psychiatric/Behavioral:  Negative for altered mental status. The patient is not nervous/anxious.    All other systems reviewed and are negative.      Objective:            General    Constitutional: He is oriented to person, place, and time. He appears well-developed and well-nourished. No distress.   HENT:   Head: Atraumatic.   Eyes: Conjunctivae are normal.   Cardiovascular:  Normal rate.            Pulmonary/Chest: Effort normal.   Neurological: He is alert and oriented to person, place, and time.   Psychiatric: He has a normal mood  and affect. His behavior is normal.         Left Ankle/Foot Exam     Tenderness   The patient is tender to palpation of the fifth metatarsal base.    Range of Motion   The patient has normal left ankle ROM.     Other   Sensation: normal  Left Hand/Wrist Exam     Range of Motion     Wrist   Extension:  normal   Flexion:  normal   Pronation:  normal Left wrist pronation: increased pain.  Supination:  normal Left wrist supination: increased pain.      Left Elbow Exam     Range of Motion   Extension:  10   Flexion:  130           RADS:   FOOT: Healing 5th metatarsal fracture.  DJD..    Assessment:       Encounter Diagnoses   Name Primary?    Closed nondisplaced fracture of head of left radius with routine healing, subsequent encounter Yes    Nondisp fx of fifth metatarsal bone, left foot, init             Plan:     Dicussed plan with the patient at this time will treat both conservatively.     FOOT: weight bearing as tolerated in advance shoe wear as tolerated. RICE prn to reduce pain and swelling.     ELBOW: ROMAt, advance weightbearing as tolerated   Return to clinic if any increase in pain  at that time x-ray of his  foot is needed.

## 2023-05-13 ENCOUNTER — CLINICAL SUPPORT (OUTPATIENT)
Dept: CARDIOLOGY | Facility: HOSPITAL | Age: 83
End: 2023-05-13
Payer: MEDICARE

## 2023-05-13 DIAGNOSIS — Z95.818 PRESENCE OF OTHER CARDIAC IMPLANTS AND GRAFTS: ICD-10-CM

## 2023-05-13 PROCEDURE — G2066 INTER DEVC REMOTE 30D: HCPCS | Performed by: INTERNAL MEDICINE

## 2023-05-13 PROCEDURE — 93298 REM INTERROG DEV EVAL SCRMS: CPT | Mod: ,,, | Performed by: INTERNAL MEDICINE

## 2023-05-13 PROCEDURE — 93298 CARDIAC DEVICE CHECK - REMOTE: ICD-10-PCS | Mod: ,,, | Performed by: INTERNAL MEDICINE

## 2023-05-15 ENCOUNTER — OFFICE VISIT (OUTPATIENT)
Dept: DERMATOLOGY | Facility: CLINIC | Age: 83
End: 2023-05-15
Payer: MEDICARE

## 2023-05-15 DIAGNOSIS — L82.1 SEBORRHEIC KERATOSES: ICD-10-CM

## 2023-05-15 DIAGNOSIS — Z12.83 SCREENING EXAM FOR SKIN CANCER: Primary | ICD-10-CM

## 2023-05-15 DIAGNOSIS — L21.9 SEBORRHEIC DERMATITIS: ICD-10-CM

## 2023-05-15 PROCEDURE — 1159F PR MEDICATION LIST DOCUMENTED IN MEDICAL RECORD: ICD-10-PCS | Mod: CPTII,S$GLB,, | Performed by: DERMATOLOGY

## 2023-05-15 PROCEDURE — 99999 PR PBB SHADOW E&M-EST. PATIENT-LVL III: CPT | Mod: PBBFAC,,, | Performed by: DERMATOLOGY

## 2023-05-15 PROCEDURE — 99214 PR OFFICE/OUTPT VISIT, EST, LEVL IV, 30-39 MIN: ICD-10-PCS | Mod: S$GLB,,, | Performed by: DERMATOLOGY

## 2023-05-15 PROCEDURE — 3288F FALL RISK ASSESSMENT DOCD: CPT | Mod: CPTII,S$GLB,, | Performed by: DERMATOLOGY

## 2023-05-15 PROCEDURE — 99999 PR PBB SHADOW E&M-EST. PATIENT-LVL III: ICD-10-PCS | Mod: PBBFAC,,, | Performed by: DERMATOLOGY

## 2023-05-15 PROCEDURE — 1159F MED LIST DOCD IN RCRD: CPT | Mod: CPTII,S$GLB,, | Performed by: DERMATOLOGY

## 2023-05-15 PROCEDURE — 1101F PR PT FALLS ASSESS DOC 0-1 FALLS W/OUT INJ PAST YR: ICD-10-PCS | Mod: CPTII,S$GLB,, | Performed by: DERMATOLOGY

## 2023-05-15 PROCEDURE — 1126F PR PAIN SEVERITY QUANTIFIED, NO PAIN PRESENT: ICD-10-PCS | Mod: CPTII,S$GLB,, | Performed by: DERMATOLOGY

## 2023-05-15 PROCEDURE — 1160F RVW MEDS BY RX/DR IN RCRD: CPT | Mod: CPTII,S$GLB,, | Performed by: DERMATOLOGY

## 2023-05-15 PROCEDURE — 3288F PR FALLS RISK ASSESSMENT DOCUMENTED: ICD-10-PCS | Mod: CPTII,S$GLB,, | Performed by: DERMATOLOGY

## 2023-05-15 PROCEDURE — 1160F PR REVIEW ALL MEDS BY PRESCRIBER/CLIN PHARMACIST DOCUMENTED: ICD-10-PCS | Mod: CPTII,S$GLB,, | Performed by: DERMATOLOGY

## 2023-05-15 PROCEDURE — 1126F AMNT PAIN NOTED NONE PRSNT: CPT | Mod: CPTII,S$GLB,, | Performed by: DERMATOLOGY

## 2023-05-15 PROCEDURE — 1101F PT FALLS ASSESS-DOCD LE1/YR: CPT | Mod: CPTII,S$GLB,, | Performed by: DERMATOLOGY

## 2023-05-15 PROCEDURE — 99214 OFFICE O/P EST MOD 30 MIN: CPT | Mod: S$GLB,,, | Performed by: DERMATOLOGY

## 2023-05-15 RX ORDER — KETOCONAZOLE 20 MG/ML
SHAMPOO, SUSPENSION TOPICAL
Qty: 120 ML | Refills: 2 | Status: ON HOLD | OUTPATIENT
Start: 2023-05-15

## 2023-05-15 RX ORDER — KETOCONAZOLE 20 MG/G
CREAM TOPICAL 2 TIMES DAILY
Qty: 60 G | Refills: 3 | Status: ON HOLD | OUTPATIENT
Start: 2023-05-15

## 2023-05-15 NOTE — PROGRESS NOTES
Subjective:      Patient ID:  Adelfo ZHANG Jennifer, PhD is a 82 y.o. male who presents for   Chief Complaint   Patient presents with    Skin Check     TBSE     Pt presents for TBSE  Pt has several skin concerns - itching on L foot, splitting on nails, flaky on earlobes and nose    Pt last seen on 05/31/2022 for skin check  H/o nmsc    Review of Systems   Constitutional:  Negative for fever, chills and fatigue.   Skin:  Positive for wears hat. Negative for daily sunscreen use and recent sunburn.   Hematologic/Lymphatic: Does not bruise/bleed easily.     Objective:   Physical Exam   Constitutional: He appears well-developed and well-nourished. No distress.   Neurological: He is alert and oriented to person, place, and time. He is not disoriented.   Psychiatric: He has a normal mood and affect.   Skin:   Areas Examined (abnormalities noted in diagram):   Scalp / Hair Palpated and Inspected  Head / Face Inspection Performed  Neck Inspection Performed  Chest / Axilla Inspection Performed  Abdomen Inspection Performed  Genitals / Buttocks / Groin Inspection Performed  Back Inspection Performed  RUE Inspected  LUE Inspection Performed  RLE Inspected  LLE Inspection Performed  Nails and Digits Inspection Performed               Diagram Legend     Erythematous scaling macule/papule c/w actinic keratosis       Vascular papule c/w angioma      Pigmented verrucoid papule/plaque c/w seborrheic keratosis      Yellow umbilicated papule c/w sebaceous hyperplasia      Irregularly shaped tan macule c/w lentigo     1-2 mm smooth white papules consistent with Milia      Movable subcutaneous cyst with punctum c/w epidermal inclusion cyst      Subcutaneous movable cyst c/w pilar cyst      Firm pink to brown papule c/w dermatofibroma      Pedunculated fleshy papule(s) c/w skin tag(s)      Evenly pigmented macule c/w junctional nevus     Mildly variegated pigmented, slightly irregular-bordered macule c/w mildly atypical nevus      Flesh  colored to evenly pigmented papule c/w intradermal nevus       Pink pearly papule/plaque c/w basal cell carcinoma      Erythematous hyperkeratotic cursted plaque c/w SCC      Surgical scar with no sign of skin cancer recurrence      Open and closed comedones      Inflammatory papules and pustules      Verrucoid papule consistent consistent with wart     Erythematous eczematous patches and plaques     Dystrophic onycholytic nail with subungual debris c/w onychomycosis     Umbilicated papule    Erythematous-base heme-crusted tan verrucoid plaque consistent with inflamed seborrheic keratosis     Erythematous Silvery Scaling Plaque c/w Psoriasis     See annotation      Assessment / Plan:        Screening exam for skin cancer    Seborrheic keratoses    Seborrheic dermatitis    Other orders  -     ketoconazole (NIZORAL) 2 % cream; Apply topically 2 (two) times daily. To dry skin PRN  Dispense: 60 g; Refill: 3  -     ketoconazole (NIZORAL) 2 % shampoo; To scalp and beard area for dry skin PRN  Dispense: 120 mL; Refill: 2         Area of previous NMSC examined. Site well healed with no signs of recurrence.    Total body skin examination performed today including at least 12 points as noted in physical examination. No lesions suspicious for malignancy noted.    Recommend daily sun protection/avoidance, use of at least SPF 30, broad spectrum sunscreen (OTC drug), skin self examinations, and routine physician surveillance to optimize early detection      No follow-ups on file.
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
## 2023-06-01 ENCOUNTER — TELEPHONE (OUTPATIENT)
Dept: ENDOSCOPY | Facility: HOSPITAL | Age: 83
End: 2023-06-01
Payer: MEDICARE

## 2023-06-01 NOTE — TELEPHONE ENCOUNTER
PT contact endo schedulers to discuss what medications can or not be taken EGD scheduled for 6/2. Instructed PT to view General information 4th floor   Tip Sheet   PT verbalize understand and thank me.

## 2023-06-02 ENCOUNTER — HOSPITAL ENCOUNTER (OUTPATIENT)
Facility: HOSPITAL | Age: 83
Discharge: HOME | End: 2023-06-02
Attending: INTERNAL MEDICINE | Admitting: INTERNAL MEDICINE
Payer: MEDICARE

## 2023-06-02 ENCOUNTER — ANESTHESIA (OUTPATIENT)
Dept: ENDOSCOPY | Facility: HOSPITAL | Age: 83
End: 2023-06-02
Payer: MEDICARE

## 2023-06-02 ENCOUNTER — ANESTHESIA EVENT (OUTPATIENT)
Dept: ENDOSCOPY | Facility: HOSPITAL | Age: 83
End: 2023-06-02
Payer: MEDICARE

## 2023-06-02 VITALS
DIASTOLIC BLOOD PRESSURE: 80 MMHG | BODY MASS INDEX: 32.58 KG/M2 | WEIGHT: 220 LBS | OXYGEN SATURATION: 99 % | HEART RATE: 63 BPM | RESPIRATION RATE: 16 BRPM | HEIGHT: 69 IN | TEMPERATURE: 98 F | SYSTOLIC BLOOD PRESSURE: 182 MMHG

## 2023-06-02 DIAGNOSIS — K63.5 COLON POLYPS: ICD-10-CM

## 2023-06-02 DIAGNOSIS — D12.6 ADENOMATOUS POLYP OF COLON, UNSPECIFIED PART OF COLON: Primary | Chronic | ICD-10-CM

## 2023-06-02 PROCEDURE — 37000008 HC ANESTHESIA 1ST 15 MINUTES: Performed by: INTERNAL MEDICINE

## 2023-06-02 PROCEDURE — 88305 TISSUE EXAM BY PATHOLOGIST: ICD-10-PCS | Mod: 26,,, | Performed by: PATHOLOGY

## 2023-06-02 PROCEDURE — 25000003 PHARM REV CODE 250: Performed by: NURSE ANESTHETIST, CERTIFIED REGISTERED

## 2023-06-02 PROCEDURE — 88305 TISSUE EXAM BY PATHOLOGIST: CPT | Mod: 26,,, | Performed by: PATHOLOGY

## 2023-06-02 PROCEDURE — 43239 EGD BIOPSY SINGLE/MULTIPLE: CPT | Performed by: INTERNAL MEDICINE

## 2023-06-02 PROCEDURE — 88305 TISSUE EXAM BY PATHOLOGIST: CPT | Mod: 59 | Performed by: PATHOLOGY

## 2023-06-02 PROCEDURE — 43239 PR EGD, FLEX, W/BIOPSY, SGL/MULTI: ICD-10-PCS | Mod: 51,,, | Performed by: INTERNAL MEDICINE

## 2023-06-02 PROCEDURE — 37000009 HC ANESTHESIA EA ADD 15 MINS: Performed by: INTERNAL MEDICINE

## 2023-06-02 PROCEDURE — E9220 PRA ENDO ANESTHESIA: HCPCS | Mod: PT,,, | Performed by: NURSE ANESTHETIST, CERTIFIED REGISTERED

## 2023-06-02 PROCEDURE — 45385 PR COLONOSCOPY,REMV LESN,SNARE: ICD-10-PCS | Mod: PT,,, | Performed by: INTERNAL MEDICINE

## 2023-06-02 PROCEDURE — 45385 COLONOSCOPY W/LESION REMOVAL: CPT | Mod: PT | Performed by: INTERNAL MEDICINE

## 2023-06-02 PROCEDURE — 25000003 PHARM REV CODE 250: Performed by: INTERNAL MEDICINE

## 2023-06-02 PROCEDURE — 45385 COLONOSCOPY W/LESION REMOVAL: CPT | Mod: PT,,, | Performed by: INTERNAL MEDICINE

## 2023-06-02 PROCEDURE — 63600175 PHARM REV CODE 636 W HCPCS: Performed by: NURSE ANESTHETIST, CERTIFIED REGISTERED

## 2023-06-02 PROCEDURE — 27201012 HC FORCEPS, HOT/COLD, DISP: Performed by: INTERNAL MEDICINE

## 2023-06-02 PROCEDURE — 43239 EGD BIOPSY SINGLE/MULTIPLE: CPT | Mod: 51,,, | Performed by: INTERNAL MEDICINE

## 2023-06-02 PROCEDURE — E9220 PRA ENDO ANESTHESIA: ICD-10-PCS | Mod: PT,,, | Performed by: NURSE ANESTHETIST, CERTIFIED REGISTERED

## 2023-06-02 PROCEDURE — 27201089 HC SNARE, DISP (ANY): Performed by: INTERNAL MEDICINE

## 2023-06-02 RX ORDER — LIDOCAINE HYDROCHLORIDE 20 MG/ML
INJECTION, SOLUTION EPIDURAL; INFILTRATION; INTRACAUDAL; PERINEURAL
Status: DISCONTINUED | OUTPATIENT
Start: 2023-06-02 | End: 2023-06-02

## 2023-06-02 RX ORDER — SODIUM CHLORIDE 9 MG/ML
INJECTION, SOLUTION INTRAVENOUS CONTINUOUS
Status: DISCONTINUED | OUTPATIENT
Start: 2023-06-02 | End: 2023-06-02 | Stop reason: HOSPADM

## 2023-06-02 RX ORDER — PROPOFOL 10 MG/ML
VIAL (ML) INTRAVENOUS
Status: DISCONTINUED | OUTPATIENT
Start: 2023-06-02 | End: 2023-06-02

## 2023-06-02 RX ORDER — PROPOFOL 10 MG/ML
VIAL (ML) INTRAVENOUS CONTINUOUS PRN
Status: DISCONTINUED | OUTPATIENT
Start: 2023-06-02 | End: 2023-06-02

## 2023-06-02 RX ADMIN — Medication 200 MCG/KG/MIN: at 01:06

## 2023-06-02 RX ADMIN — LIDOCAINE HYDROCHLORIDE 100 MG: 20 INJECTION, SOLUTION EPIDURAL; INFILTRATION; INTRACAUDAL; PERINEURAL at 01:06

## 2023-06-02 RX ADMIN — PROPOFOL 100 MG: 10 INJECTION, EMULSION INTRAVENOUS at 01:06

## 2023-06-02 RX ADMIN — SODIUM CHLORIDE: 9 INJECTION, SOLUTION INTRAVENOUS at 12:06

## 2023-06-02 RX ADMIN — SODIUM CHLORIDE: 9 INJECTION, SOLUTION INTRAVENOUS at 01:06

## 2023-06-02 NOTE — ANESTHESIA PREPROCEDURE EVALUATION
06/02/2023  Adelfo ZHANG Jennifer, PhD is a 82 y.o., male.  Pre-operative evaluation for EGD (ESOPHAGOGASTRODUODENOSCOPY) (N/A), COLONOSCOPY (N/A)    Results for orders placed during the hospital encounter of 01/11/22    Echo Saline Bubble? No    Interpretation Summary  · Severe left atrial enlargement.  · The left ventricle is normal in size with normal systolic function.  · The estimated ejection fraction is 60%.  · Normal left ventricular diastolic function.  · Normal right ventricular size with normal right ventricular systolic function.  · Intermediate central venous pressure (8 mmHg).  · The estimated PA systolic pressure is 36 mmHg.      PMH:  PAT (paroxysmal atrial tachycardia) - metoprolol- happens every few weeks   Essential hypertension - irbesartan   ANNABELLE (obstructive sleep apnea)    Intermittent asthma-rare use of inhaler  HLP      Past Surgical History:   Procedure Laterality Date    APPENDECTOMY      CARDIAC CATHETERIZATION  10/31/2018    no stent    CARDIAC CATHETERIZATION  1998    no stent    COLONOSCOPY N/A 6/19/2018    Procedure: COLONOSCOPY;  Surgeon: Wade De La Garza MD;  Location: [facility] (4TH FLR);  Service: Endoscopy;  Laterality: N/A;    COLONOSCOPY N/A 6/11/2020    Procedure: COLONOSCOPY;  Surgeon: Von Gutierrez MD;  Location: [facility] (4TH FLR);  Service: Endoscopy;  Laterality: N/A;  3/17/20-pt confirmed appt on 3/20/20 with new arrival time of 0715, and updated visitor/ride instructions-BB  patient to be rescheduled by 6/3/20 per Dr. Gutierrez-BB  patient requests to be rescheduled on a Thursday or Friday-BB  patient requested suprep-BB  loop recorder-BB  C    ENDOSCOPIC ULTRASOUND OF UPPER GASTROINTESTINAL TRACT N/A 10/1/2020    Procedure: ULTRASOUND, UPPER GI TRACT, ENDOSCOPIC;  Surgeon: Niko Lambert MD;  Location: [facility] (2ND FLR);  Service: Endoscopy;  Laterality: N/A;   Covid-19 test 20 at [facility] - pg  Has Loop Recorder.    ESOPHAGOGASTRODUODENOSCOPY N/A 10/23/2018    Procedure: EGD (ESOPHAGOGASTRODUODENOSCOPY);  Surgeon: Bart Hernandez MD;  Location: [facility] (4TH FLR);  Service: Endoscopy;  Laterality: N/A;    ESOPHAGOGASTRODUODENOSCOPY N/A 2020    Procedure: EGD (ESOPHAGOGASTRODUODENOSCOPY);  Surgeon: Von Gutierrez MD;  Location: [facility] (4TH FLR);  Service: Endoscopy;  Laterality: N/A;  3/17/20-pt confirmed appt on 3/20/20 with new arrival time of 0715, and updated visitor/ride instructions-BB  patient to be rescheduled by 6/3/20 per Dr. Gutierrez-BB  patient requests to be rescheduled on a Thursday or Friday-BB  patient requested suprep-BB    FOOT SURGERY      INSERTION OF IMPLANTABLE LOOP RECORDER N/A 2019    Procedure: Insertion, Implantable Loop Recorder;  Surgeon: Gil Wilson MD;  Location: [facility] EP LAB;  Service: Cardiology;  Laterality: N/A;  AT, [place], [place], [place], [place], 3 Prep    testicular biopsy           Vital Signs Range (Last 24H):  Temp:  [36.6 °C (97.9 °F)]   Pulse:  [66]   Resp:  [18]   BP: (190)/(81)   SpO2:  [97 %]       CBC:   No results for input(s): WBC, RBC, HGB, HCT, PLT, MCV, MCH, MCHC in the last 720 hours.    CMP: No results for input(s): NA, K, CL, CO2, BUN, CREATININE, GLU, MG, PHOS, CALCIUM, ALBUMIN, PROT, ALKPHOS, ALT, AST, BILITOT in the last 720 hours.    INR:  No results for input(s): PT, INR, PROTIME, APTT in the last 720 hours.      Diagnostic Studies:      EKD Echo:  Pre-op Assessment          Review of Systems      Physical Exam  General:  Obesity and Large Beard      Airway/Jaw/Neck:  Airway Findings: Mouth Opening: Normal   Tongue: Normal   General Airway Assessment: Average, Possible difficult intubation, Possible difficult mask airway Mallampati: II  Improves to I with phonation.  TM Distance: Normal, at least 6 cm   Jaw/Neck Findings:  Neck ROM: Normal ROM   Neck Findings:  Girth Increased, Short Neck       Dental:  Dental Findings: In tact     Chest/Lungs:  Chest/Lungs Findings: Normal Respiratory Rate      Heart/Vascular:  Heart Findings: Rate: Normal  Rhythm: Regular Rhythm        Mental Status:  Mental Status Findings:  Cooperative, Alert and Oriented         Anesthesia Plan  Type of Anesthesia, risks & benefits discussed:  Anesthesia Type:  general    Patient's Preference:   Plan Factors:          Intra-op Monitoring Plan:   Intra-op Monitoring Plan Comments:   Post Op Pain Control Plan:   Post Op Pain Control Plan Comments:     Induction:   IV  Beta Blocker:  Patient is not currently on a Beta-Blocker (No further documentation required).       Informed Consent: Informed consent signed with the Patient and all parties understand the risks and agree with anesthesia plan.  All questions answered.  Anesthesia consent signed with patient.  ASA Score: 3     Day of Surgery Review of History & Physical:              Ready For Surgery From Anesthesia Perspective.           Physical Exam  General: Obesity and Large Beard    Airway:  Mallampati: II / I  Mouth Opening: Normal  TM Distance: Normal, at least 6 cm  Tongue: Normal  Neck ROM: Normal ROM  Neck: Girth Increased, Short Neck    Dental:  In tact    Chest/Lungs:  Normal Respiratory Rate    Heart:  Rate: Normal  Rhythm: Regular Rhythm          Anesthesia Plan  Type of Anesthesia, risks & benefits discussed:    Anesthesia Type: general  Induction:  IV  Informed Consent: Informed consent signed with the Patient and all parties understand the risks and agree with anesthesia plan.  All questions answered.   ASA Score: 3    Ready For Surgery From Anesthesia Perspective.       .

## 2023-06-02 NOTE — TRANSFER OF CARE
"Anesthesia Transfer of Care Note    Patient: Adelfo Rodriguez, PhD    Procedure(s) Performed: Procedure(s) (LRB):  EGD (ESOPHAGOGASTRODUODENOSCOPY) (N/A)  COLONOSCOPY (N/A)    Patient location: PACU    Anesthesia Type: general    Transport from OR: Transported from OR on room air with adequate spontaneous ventilation    Post pain: adequate analgesia    Post assessment: no apparent anesthetic complications    Post vital signs: stable    Level of consciousness: awake    Nausea/Vomiting: no nausea/vomiting    Complications: none    Transfer of care protocol was followed      Last vitals:   Visit Vitals  BP  142/65(BP Location: Left arm, Patient Position: Lying)   Pulse 66   Temp 36.6 °C (97.9 °F) (Temporal)   Resp 18   Ht 5' 9" (1.753 m)   Wt 99.8 kg (220 lb)   SpO2 97%   BMI 32.49 kg/m²     "

## 2023-06-02 NOTE — PROVATION PATIENT INSTRUCTIONS
Discharge Summary/Instructions after an Endoscopic Procedure  Patient Name: Adelfo Goldberg  Patient MRN: [id number]  Patient YOB: 1940 Friday, June 2, 2023  Bart Hernandez MD  Dear patient,  As a result of recent federal legislation (The Federal Cures Act), you may   receive lab or pathology results from your procedure in your [facility]   account before your physician is able to contact you. Your physician or   their representative will relay the results to you with their   recommendations at their soonest availability.  Thank you,  RESTRICTIONS:  During your procedure today, you received medications for sedation.  These   medications may affect your judgment, balance and coordination.  Therefore,   for 24 hours, you have the following restrictions:   - DO NOT drive a car, operate machinery, make legal/financial decisions,   sign important papers or drink alcohol.    ACTIVITY:  Today: no heavy lifting, straining or running due to procedural   sedation/anesthesia.  The following day: return to full activity including work.  DIET:  Eat and drink normally unless instructed otherwise.     TREATMENT FOR COMMON SIDE EFFECTS:  - Mild abdominal pain, nausea, belching, bloating or excessive gas:  rest,   eat lightly and use a heating pad.  - Sore Throat: treat with throat lozenges and/or gargle with warm salt   water.  - Because air was used during the procedure, expelling large amounts of air   from your rectum or belching is normal.  - If a bowel prep was taken, you may not have a bowel movement for 1-3 days.    This is normal.  SYMPTOMS TO WATCH FOR AND REPORT TO YOUR PHYSICIAN:  1. Abdominal pain or bloating, other than gas cramps.  2. Chest pain.  3. Back pain.  4. Signs of infection such as: chills or fever occurring within 24 hours   after the procedure.  5. Rectal bleeding, which would show as bright red, maroon, or black stools.   (A tablespoon of blood from the rectum is not serious, especially if    hemorrhoids are present.)  6. Vomiting.  7. Weakness or dizziness.  GO DIRECTLY TO THE NEAREST EMERGENCY ROOM IF YOU HAVE ANY OF THE FOLLOWING:      Difficulty breathing              Chills and/or fever over 101 F   Persistent vomiting and/or vomiting blood   Severe abdominal pain   Severe chest pain   Black, tarry stools   Bleeding- more than one tablespoon   Any other symptom or condition that you feel may need urgent attention  Your doctor recommends these additional instructions:  If any biopsies were taken, your doctors clinic will contact you in 1 to 2   weeks with any results.  - Patient has a contact number available for emergencies.  The signs and   symptoms of potential delayed complications were discussed with the   patient.  Return to normal activities tomorrow.  Written discharge   instructions were provided to the patient.   - Discharge patient to home.   - Await pathology results.   - Repeat colonoscopy in 3 years for surveillance based on pathology results.     - The findings and recommendations were discussed with the designated   responsible adult.  For questions, problems or results please call your physician - Bart Hernandez MD at Work:  [phone].  [facility] [place], EMERGENCY ROOM PHONE NUMBER: [phone]  IF A COMPLICATION OR EMERGENCY SITUATION ARISES AND YOU ARE UNABLE TO REACH   YOUR PHYSICIAN - GO DIRECTLY TO THE EMERGENCY ROOM.  Bart Hernandez MD  6/2/2023 1:49:13 PM  This report has been verified and signed electronically.  Dear patient,  As a result of recent federal legislation (The Federal Cures Act), you may   receive lab or pathology results from your procedure in your [facility]   account before your physician is able to contact you. Your physician or   their representative will relay the results to you with their   recommendations at their soonest availability.  Thank you,  PROVATION

## 2023-06-02 NOTE — H&P
Short Stay Endoscopy History and Physical    PCP - Romero Vogel MD    Procedure - EGD/Colonoscopy  ASA - per anesthesia  Mallampati - per anesthesia  History of Anesthesia problems - no  Family history Anesthesia problems -  no   Plan of anesthesia - MAC    HPI:  This is a 82 y.o. male here for evaluation of :     EGD - barretts  Colon - polyps    ROS:  Constitutional: No fevers, chills, No weight loss  CV: No chest pain  Pulm: No cough, No shortness of breath  Ophtho: No vision changes  GI: see HPI  Derm: No rash    Medical History:  has a past medical history of ALLERGIC RHINITIS, Anemia, Asthma, Cataract, Hyperlipidemia, Hypertension, NAFLD (nonalcoholic fatty liver disease), ANNABELLE (obstructive sleep apnea), PVD (posterior vitreous detachment), left eye, and Squamous cell cancer of skin of hand.    Surgical History:  has a past surgical history that includes Appendectomy; testicular biopsy; Foot surgery; Colonoscopy (N/A, 6/19/2018); Esophagogastroduodenoscopy (N/A, 10/23/2018); Cardiac catheterization (10/31/2018); Cardiac catheterization (1998); Insertion of implantable loop recorder (N/A, 9/18/2019); Esophagogastroduodenoscopy (N/A, 6/11/2020); Colonoscopy (N/A, 6/11/2020); and Endoscopic ultrasound of upper gastrointestinal tract (N/A, 10/1/2020).    Family History: family history includes Arthritis in his brother; Blindness in his maternal grandmother; COPD in his father; Cancer in his mother; Cataracts in his maternal grandmother; Glaucoma in his mother; Heart disease in his brother and father; Retinal detachment in his father and mother.. Otherwise no colon cancer, inflammatory bowel disease, or GI malignancies.    Social History:  reports that he quit smoking about 55 years ago. His smoking use included cigarettes. He has a 10.00 pack-year smoking history. He has quit using smokeless tobacco. He reports current alcohol use of about 2.0 standard drinks per week. He reports that he does not use  drugs.    Review of patient's allergies indicates:   Allergen Reactions    Amoxicillin Hives    Allopurinol analogues Other (See Comments)     Abnormal transaminases       Medications:   Facility-Administered Medications Prior to Admission   Medication Dose Route Frequency Provider Last Rate Last Admin    ciprofloxacin HCl tablet 500 mg  500 mg Oral Once Julee Urias NP         Medications Prior to Admission   Medication Sig Dispense Refill Last Dose    atorvastatin (LIPITOR) 20 MG tablet TAKE 1 TABLET(20 MG) BY MOUTH EVERY DAY 90 tablet 2 6/1/2023    febuxostat (ULORIC) 40 mg Tab Take 1 tablet (40 mg total) by mouth once daily. 90 tablet 3 6/1/2023    furosemide (LASIX) 40 MG tablet Take 1 tablet (40 mg total) by mouth once daily. (Patient taking differently: Take 40 mg by mouth once daily. Mon.wed.fri.) 90 tablet 3 6/1/2023    glucosamine & chondroit sul.Na 750-600 mg Tab Take 750 mg by mouth.   6/1/2023    irbesartan (AVAPRO) 150 MG tablet Take 1 tablet (150 mg total) by mouth every evening. 90 tablet 3 6/1/2023    loratadine (CLARITIN) 10 mg tablet Take 10 mg by mouth daily as needed.   6/1/2023    metoprolol succinate (TOPROL-XL) 25 MG 24 hr tablet TAKE 1 TABLET(25 MG) BY MOUTH EVERY DAY 90 tablet 3 6/2/2023    omeprazole (PRILOSEC) 20 MG capsule TAKE 1 CAPSULE(20 MG) BY MOUTH EVERY DAY 90 capsule 3 6/1/2023    WIXELA INHUB 250-50 mcg/dose diskus inhaler USE 1 INHALATION BY MOUTH TWICE DAILY- RINSE MOUTH AFTER  each 3 6/2/2023    albuterol (PROAIR HFA) 90 mcg/actuation inhaler Inhale 2 puffs into the lungs every 4 (four) hours as needed for Wheezing. 8 g 3 More than a month    azelastine (ASTELIN) 137 mcg (0.1 %) nasal spray 2 sprays (274 mcg total) by Nasal route 2 (two) times daily as needed. 30 mL 5 More than a month    azithromycin (ZITHROMAX Z-NICOLASA) 250 MG tablet Take 2 tabs po on day 1; then 1 po daily until completed. 6 tablet 0 More than a month    fish oil-omega-3 fatty acids 300-1,000 mg  capsule Take 2 g by mouth once daily.       fluocinonide (LIDEX) 0.05 % external solution Apply topically 2 (two) times daily. For severe rashes in scalp and ears only prn 60 mL 3     fluticasone propionate (FLONASE) 50 mcg/actuation nasal spray 2 sprays (100 mcg total) by Each Nostril route once daily. 48 g 3     hydrocortisone 2.5 % cream Apply topically 2 (two) times daily. As needed for severe flares of rash on face and neck 28 g 3     ketoconazole (NIZORAL) 2 % cream Apply topically 2 (two) times daily. To dry skin PRN 60 g 3     ketoconazole (NIZORAL) 2 % shampoo To scalp and beard area for dry skin  mL 2     pramoxine-hydrocortisone (PROCTOCREAM-HC) 1-1 % rectal cream Place rectally.       triamcinolone acetonide 0.1% (KENALOG) 0.1 % cream Apply topically 2 (two) times daily. 80 g 2        Physical Exam:    Vital Signs:   Vitals:    06/02/23 1220   BP: (!) 190/81   Pulse: 66   Resp: 18   Temp: 97.9 °F (36.6 °C)       General Appearance: Well appearing in no acute distress  Eyes:    No scleral icterus  ENT: Neck supple, Lips, mucosa, and tongue normal; teeth and gums normal  Abdomen: Soft, non tender, non distended with normal bowel sounds. No hepatosplenomegaly, ascites, or mass.  Extremities: No edema  Skin: No rash    Labs:  Lab Results   Component Value Date    WBC 5.91 04/12/2023    HGB 10.4 (L) 04/12/2023    HCT 32.8 (L) 04/12/2023     04/12/2023    CHOL 150 10/20/2022    TRIG 75 10/20/2022    HDL 64 10/20/2022    ALT 16 04/12/2023    AST 17 04/12/2023     04/12/2023    K 4.7 04/12/2023     04/12/2023    CREATININE 1.8 (H) 04/12/2023    BUN 31 (H) 04/12/2023    CO2 24 04/12/2023    TSH 3.148 04/19/2022    PSA 2.7 10/19/2020    INR 1.0 06/26/2019       I have explained the risks and benefits of endoscopy procedures to the patient including but not limited to bleeding, perforation, infection, and death.  The patient was asked if they understand and allowed to ask any further  questions to their satisfaction.    Bart Hernandez MD

## 2023-06-02 NOTE — ANESTHESIA POSTPROCEDURE EVALUATION
Anesthesia Post Evaluation    Patient: Adelfo Rodriguez, PhD    Procedure(s) Performed: Procedure(s) (LRB):  EGD (ESOPHAGOGASTRODUODENOSCOPY) (N/A)  COLONOSCOPY (N/A)    Final Anesthesia Type: general      Patient location during evaluation: GI PACU  Patient participation: Yes- Able to Participate  Level of consciousness: awake and alert  Post-procedure vital signs: reviewed and stable  Pain management: adequate  Airway patency: patent    PONV status at discharge: No PONV  Anesthetic complications: no      Cardiovascular status: blood pressure returned to baseline  Respiratory status: spontaneous ventilation  Hydration status: euvolemic  Follow-up not needed.          Vitals Value Taken Time   /80 06/02/23 1423   Temp 36.5 °C (97.7 °F) 06/02/23 1359   Pulse 63 06/02/23 1423   Resp 16 06/02/23 1423   SpO2 99 % 06/02/23 1423         Event Time   Out of Recovery 14:34:33         Pain/Ana Laura Score: Ana Laura Score: 10 (6/2/2023  1:59 PM)

## 2023-06-02 NOTE — PROVATION PATIENT INSTRUCTIONS
Discharge Summary/Instructions after an Endoscopic Procedure  Patient Name: Adelfo Goldberg  Patient MRN: [id number]  Patient YOB: 1940 Friday, June 2, 2023  Bart Hernandez MD  Dear patient,  As a result of recent federal legislation (The Federal Cures Act), you may   receive lab or pathology results from your procedure in your [facility]   account before your physician is able to contact you. Your physician or   their representative will relay the results to you with their   recommendations at their soonest availability.  Thank you,  RESTRICTIONS:  During your procedure today, you received medications for sedation.  These   medications may affect your judgment, balance and coordination.  Therefore,   for 24 hours, you have the following restrictions:   - DO NOT drive a car, operate machinery, make legal/financial decisions,   sign important papers or drink alcohol.    ACTIVITY:  Today: no heavy lifting, straining or running due to procedural   sedation/anesthesia.  The following day: return to full activity including work.  DIET:  Eat and drink normally unless instructed otherwise.     TREATMENT FOR COMMON SIDE EFFECTS:  - Mild abdominal pain, nausea, belching, bloating or excessive gas:  rest,   eat lightly and use a heating pad.  - Sore Throat: treat with throat lozenges and/or gargle with warm salt   water.  - Because air was used during the procedure, expelling large amounts of air   from your rectum or belching is normal.  - If a bowel prep was taken, you may not have a bowel movement for 1-3 days.    This is normal.  SYMPTOMS TO WATCH FOR AND REPORT TO YOUR PHYSICIAN:  1. Abdominal pain or bloating, other than gas cramps.  2. Chest pain.  3. Back pain.  4. Signs of infection such as: chills or fever occurring within 24 hours   after the procedure.  5. Rectal bleeding, which would show as bright red, maroon, or black stools.   (A tablespoon of blood from the rectum is not serious, especially if    hemorrhoids are present.)  6. Vomiting.  7. Weakness or dizziness.  GO DIRECTLY TO THE NEAREST EMERGENCY ROOM IF YOU HAVE ANY OF THE FOLLOWING:      Difficulty breathing              Chills and/or fever over 101 F   Persistent vomiting and/or vomiting blood   Severe abdominal pain   Severe chest pain   Black, tarry stools   Bleeding- more than one tablespoon   Any other symptom or condition that you feel may need urgent attention  Your doctor recommends these additional instructions:  If any biopsies were taken, your doctors clinic will contact you in 1 to 2   weeks with any results.  - Patient has a contact number available for emergencies.  The signs and   symptoms of potential delayed complications were discussed with the   patient.  Return to normal activities tomorrow.  Written discharge   instructions were provided to the patient.   - Discharge patient to home.   - Await pathology results.   - The findings and recommendations were discussed with the designated   responsible adult.  For questions, problems or results please call your physician - Bart Hernandez MD at Work:  [phone].  [facility], EMERGENCY ROOM PHONE NUMBER: [phone]  IF A COMPLICATION OR EMERGENCY SITUATION ARISES AND YOU ARE UNABLE TO REACH   YOUR PHYSICIAN - GO DIRECTLY TO THE EMERGENCY ROOM.  Bart Hernandez MD  6/2/2023 1:31:46 PM  This report has been verified and signed electronically.  Dear patient,  As a result of recent federal legislation (The Federal Cures Act), you may   receive lab or pathology results from your procedure in your [facility]   account before your physician is able to contact you. Your physician or   their representative will relay the results to you with their   recommendations at their soonest availability.  Thank you,  PROVATION

## 2023-06-05 ENCOUNTER — PATIENT MESSAGE (OUTPATIENT)
Dept: RHEUMATOLOGY | Facility: CLINIC | Age: 83
End: 2023-06-05
Payer: MEDICARE

## 2023-06-09 LAB
FINAL PATHOLOGIC DIAGNOSIS: NORMAL
GROSS: NORMAL
Lab: NORMAL

## 2023-06-12 ENCOUNTER — CLINICAL SUPPORT (OUTPATIENT)
Dept: CARDIOLOGY | Facility: HOSPITAL | Age: 83
End: 2023-06-12
Payer: MEDICARE

## 2023-06-12 DIAGNOSIS — Z95.818 PRESENCE OF OTHER CARDIAC IMPLANTS AND GRAFTS: ICD-10-CM

## 2023-06-12 PROCEDURE — G2066 INTER DEVC REMOTE 30D: HCPCS | Performed by: INTERNAL MEDICINE

## 2023-06-13 ENCOUNTER — PATIENT MESSAGE (OUTPATIENT)
Dept: INTERNAL MEDICINE | Facility: CLINIC | Age: 83
End: 2023-06-13
Payer: MEDICARE

## 2023-06-13 RX ORDER — HYDROCORTISONE 25 MG/G
CREAM TOPICAL
Qty: 28 G | Refills: 1 | Status: SHIPPED | OUTPATIENT
Start: 2023-06-13 | End: 2023-09-12

## 2023-06-14 ENCOUNTER — PATIENT MESSAGE (OUTPATIENT)
Dept: INTERNAL MEDICINE | Facility: CLINIC | Age: 83
End: 2023-06-14
Payer: MEDICARE

## 2023-06-14 RX ORDER — HYDROCORTISONE ACETATE PRAMOXINE HCL 1; 1 G/100G; G/100G
CREAM TOPICAL 2 TIMES DAILY
Qty: 30 G | Refills: 1 | Status: ON HOLD | OUTPATIENT
Start: 2023-06-14

## 2023-07-11 ENCOUNTER — OFFICE VISIT (OUTPATIENT)
Dept: OTOLARYNGOLOGY | Facility: CLINIC | Age: 83
End: 2023-07-11
Payer: MEDICARE

## 2023-07-11 DIAGNOSIS — J30.2 SEASONAL ALLERGIES: ICD-10-CM

## 2023-07-11 DIAGNOSIS — H60.8X2 CHRONIC ECZEMATOID OTITIS EXTERNA OF LEFT EAR: ICD-10-CM

## 2023-07-11 DIAGNOSIS — H92.02 OTALGIA OF LEFT EAR: ICD-10-CM

## 2023-07-11 DIAGNOSIS — H60.502 ACUTE NONINFECTIVE OTITIS EXTERNA OF LEFT EAR, UNSPECIFIED TYPE: Primary | ICD-10-CM

## 2023-07-11 DIAGNOSIS — J34.89 NASAL VESTIBULITIS: ICD-10-CM

## 2023-07-11 PROCEDURE — 1160F RVW MEDS BY RX/DR IN RCRD: CPT | Mod: CPTII,S$GLB,,

## 2023-07-11 PROCEDURE — 99999 PR PBB SHADOW E&M-EST. PATIENT-LVL III: CPT | Mod: PBBFAC,,,

## 2023-07-11 PROCEDURE — 3288F FALL RISK ASSESSMENT DOCD: CPT | Mod: CPTII,S$GLB,,

## 2023-07-11 PROCEDURE — 1125F AMNT PAIN NOTED PAIN PRSNT: CPT | Mod: CPTII,S$GLB,,

## 2023-07-11 PROCEDURE — 92504 EAR MICROSCOPY EXAMINATION: CPT | Mod: S$GLB,,,

## 2023-07-11 PROCEDURE — 3288F PR FALLS RISK ASSESSMENT DOCUMENTED: ICD-10-PCS | Mod: CPTII,S$GLB,,

## 2023-07-11 PROCEDURE — 1160F PR REVIEW ALL MEDS BY PRESCRIBER/CLIN PHARMACIST DOCUMENTED: ICD-10-PCS | Mod: CPTII,S$GLB,,

## 2023-07-11 PROCEDURE — 1159F MED LIST DOCD IN RCRD: CPT | Mod: CPTII,S$GLB,,

## 2023-07-11 PROCEDURE — 99999 PR PBB SHADOW E&M-EST. PATIENT-LVL III: ICD-10-PCS | Mod: PBBFAC,,,

## 2023-07-11 PROCEDURE — 1125F PR PAIN SEVERITY QUANTIFIED, PAIN PRESENT: ICD-10-PCS | Mod: CPTII,S$GLB,,

## 2023-07-11 PROCEDURE — 1101F PR PT FALLS ASSESS DOC 0-1 FALLS W/OUT INJ PAST YR: ICD-10-PCS | Mod: CPTII,S$GLB,,

## 2023-07-11 PROCEDURE — 1159F PR MEDICATION LIST DOCUMENTED IN MEDICAL RECORD: ICD-10-PCS | Mod: CPTII,S$GLB,,

## 2023-07-11 PROCEDURE — 99203 OFFICE O/P NEW LOW 30 MIN: CPT | Mod: 25,S$GLB,,

## 2023-07-11 PROCEDURE — 1101F PT FALLS ASSESS-DOCD LE1/YR: CPT | Mod: CPTII,S$GLB,,

## 2023-07-11 PROCEDURE — 99203 PR OFFICE/OUTPT VISIT, NEW, LEVL III, 30-44 MIN: ICD-10-PCS | Mod: 25,S$GLB,,

## 2023-07-11 PROCEDURE — 92504 PR EAR MICROSCOPY EXAMINATION: ICD-10-PCS | Mod: S$GLB,,,

## 2023-07-11 RX ORDER — HYDROCORTISONE AND ACETIC ACID 20.75; 10.375 MG/ML; MG/ML
4 SOLUTION AURICULAR (OTIC) 2 TIMES DAILY
Qty: 10 ML | Refills: 0 | Status: SHIPPED | OUTPATIENT
Start: 2023-07-11 | End: 2023-07-21

## 2023-07-11 NOTE — PROGRESS NOTES
Subjective:       Patient ID: Adelfo Rodriguez, PhD is a 82 y.o. male.    Chief Complaint: Otalgia (Left ear)  Mr. Rodriguez is a 82 year old male here today with left ear otalgia, 4/10, which began 3 days ago. He reports having a previous similar problem (x4) many years ago. He reports irritation/scaling on the  external aspect of his left ear canal. He reports left aural fullness. He denies any hearing loss, drainage, tinnitus or vertigo. He reports using acetic acid/vinegar in his ears. He reports eczema on his ears and back of his neck area. He denies itching.   He reports postnasal drip and sinus problems/allergies. He reports occasional blood on his tissue after he blows. He reports he has asthma and uses a CPAP at night .  Otalgia   Associated symptoms include coughing. Pertinent negatives include no abdominal pain, diarrhea, ear discharge, headaches, hearing loss, neck pain, rash, sore throat or vomiting.     Past Medical History:   Diagnosis Date    ALLERGIC RHINITIS     Anemia     Asthma     Cataract     Hyperlipidemia     Hypertension     NAFLD (nonalcoholic fatty liver disease)     ANNABELLE (obstructive sleep apnea)     on CPAP    PVD (posterior vitreous detachment), left eye     Squamous cell cancer of skin of hand      Past Surgical History:   Procedure Laterality Date    APPENDECTOMY      CARDIAC CATHETERIZATION  10/31/2018    no stent    CARDIAC CATHETERIZATION  1998    no stent    COLONOSCOPY N/A 6/19/2018    Procedure: COLONOSCOPY;  Surgeon: Wade De La Garza MD;  Location: [address];  Service: Endoscopy;  Laterality: N/A;    COLONOSCOPY N/A 6/11/2020    Procedure: COLONOSCOPY;  Surgeon: Von Gutierrez MD;  Location: [address]);  Service: Endoscopy;  Laterality: N/A;  3/17/20-pt confirmed appt on 3/20/20 with new arrival time of 0715, and updated visitor/ride instructions-BB  patient to be rescheduled by 6/3/20 per Dr. Gutierrez-JAVIER  patient requests to be rescheduled on a Thursday or  Friday-BB  patient requested suprep-BB  loop recorder-BB  C    COLONOSCOPY N/A 2023    Procedure: COLONOSCOPY;  Surgeon: Bart Hernandez MD;  Location: [facility] ENDO (4TH FLR);  Service: Endoscopy;  Laterality: N/A;  Procedure Timin-12 weeks   suprep  pt requested Dr. Hernandez   instructions to portal-st  pre call , pt confirmed - mf    ENDOSCOPIC ULTRASOUND OF UPPER GASTROINTESTINAL TRACT N/A 10/1/2020    Procedure: ULTRASOUND, UPPER GI TRACT, ENDOSCOPIC;  Surgeon: Niko Lambert MD;  Location: [facility] ENDO (2ND FLR);  Service: Endoscopy;  Laterality: N/A;  Covid-19 test 20 at [facility] -   Has Loop Recorder.    ESOPHAGOGASTRODUODENOSCOPY N/A 10/23/2018    Procedure: EGD (ESOPHAGOGASTRODUODENOSCOPY);  Surgeon: Bart Hernandez MD;  Location: [facility] ENDO (4TH FLR);  Service: Endoscopy;  Laterality: N/A;    ESOPHAGOGASTRODUODENOSCOPY N/A 2020    Procedure: EGD (ESOPHAGOGASTRODUODENOSCOPY);  Surgeon: Von Gutierrez MD;  Location: [facility] (4TH FLR);  Service: Endoscopy;  Laterality: N/A;  3/17/20-pt confirmed appt on 3/20/20 with new arrival time of 0715, and updated visitor/ride instructions-BB  patient to be rescheduled by 6/3/20 per Dr. Gutierrez-BB  patient requests to be rescheduled on a Thursday or Friday-BB  patient requested suprep-BB    ESOPHAGOGASTRODUODENOSCOPY N/A 2023    Procedure: EGD (ESOPHAGOGASTRODUODENOSCOPY);  Surgeon: Bart Hernandez MD;  Location: [facility] ENDO (4TH FLR);  Service: Endoscopy;  Laterality: N/A;    FOOT SURGERY      INSERTION OF IMPLANTABLE LOOP RECORDER N/A 2019    Procedure: Insertion, Implantable Loop Recorder;  Surgeon: Gil Wilson MD;  Location: [facility] EP LAB;  Service: Cardiology;  Laterality: N/A;  AT, [place], [place], [place], [place], 3 Prep    testicular biopsy       Family History   Problem Relation Age of Onset    Cancer Mother         breast    Glaucoma Mother     Retinal detachment Mother     Heart disease Father         CHF    COPD Father     Retinal detachment Father      Heart disease Brother     Arthritis Brother     Blindness Maternal Grandmother     Cataracts Maternal Grandmother     Cirrhosis Neg Hx     Strabismus Neg Hx     Colon cancer Neg Hx     Stomach cancer Neg Hx     Esophageal cancer Neg Hx     Celiac disease Neg Hx     Crohn's disease Neg Hx     Rectal cancer Neg Hx     Ulcerative colitis Neg Hx      Social History  Social History     Tobacco Use    Smoking status: Former     Packs/day: 1.00     Years: 10.00     Pack years: 10.00     Types: Cigarettes     Quit date: 1968     Years since quittin.5    Smokeless tobacco: Former    Tobacco comments:      last smoke   Substance Use Topics    Alcohol use: Yes     Alcohol/week: 2.0 standard drinks     Types: 1 Glasses of wine, 1 Shots of liquor per week     Comment: 5 days per week , 7 -8 drinks a week.    Drug use: No       Review of Systems     Constitutional: Negative for appetite change, chills, fatigue, fever and unexpected weight loss.      HENT: Positive for ear pain and postnasal drip.  Negative for ear discharge, ear infection, hearing loss, ringing in the ears, runny nose, sinus infection, sinus pressure, sore throat and stuffy nose.      Eyes:  Positive for eye itching.     Respiratory:  Positive for cough.      Cardiovascular:  Negative for chest pain, foot swelling, irregular heartbeat and swollen veins.     Gastrointestinal:  Negative for abdominal pain, acid reflux, constipation, diarrhea, heartburn and vomiting.     Genitourinary: Negative for difficulty urinating, sexual problems and frequent urination.     Musc: Negative for aching joints, aching muscles, back pain and neck pain.     Skin: Negative for rash.     Allergy: Positive for seasonal allergies.     Endocrine: Negative for cold intolerance and heat intolerance.      Neurological: Negative for dizziness, headaches, light-headedness, seizures and tremors.      Hematologic: Positive for bruises/bleeds easily.     Psychiatric: Negative for  decreased concentration, depression, nervous/anxious and sleep disturbance.              Objective:      Physical Exam  Vitals reviewed.   Constitutional:       General: He is not in acute distress.     Appearance: Normal appearance. He is not ill-appearing.   HENT:      Head: Normocephalic and atraumatic.      Jaw: No trismus, tenderness, swelling, pain on movement or malocclusion.      Salivary Glands: Right salivary gland is not diffusely enlarged or tender. Left salivary gland is not diffusely enlarged or tender.      Right Ear: Hearing, tympanic membrane, ear canal and external ear normal. No decreased hearing noted. No laceration, drainage, swelling or tenderness. No middle ear effusion. There is no impacted cerumen. No foreign body. No mastoid tenderness. No PE tube. No hemotympanum. Tympanic membrane is not injected, scarred, perforated, erythematous, retracted or bulging. Tympanic membrane has normal mobility.      Left Ear: Hearing and tympanic membrane normal. No decreased hearing noted. Drainage and swelling present. No laceration or tenderness.  No middle ear effusion. There is no impacted cerumen. No foreign body. No mastoid tenderness. No PE tube. No hemotympanum. Tympanic membrane is not injected, scarred, perforated, erythematous, retracted or bulging. Tympanic membrane has normal mobility.      Ears:      Morton exam findings: Does not lateralize.     Right Rinne: AC > BC.     Left Rinne: AC > BC.     Comments: Bilateral external ears ( lobule of ears ) with scaly dry skin. No skin tears or redness noted (patient has history of eczema)   Left ear canal proximal and distal, with wetness and scant white moist drainage and mild erythema /swelling along perimeter of left EAC. No tragal, preauricular/postauricular pain or redness. Bilateral TMs translucent under microscopy with positive TM movement with pneumatic otoscopy.      Nose: Congestion and rhinorrhea present. No nasal deformity or signs of  injury.        Mouth/Throat:      Lips: Pink.      Mouth: Mucous membranes are moist.      Pharynx: No oropharyngeal exudate or posterior oropharyngeal erythema.   Eyes:      General: Lids are normal.   Pulmonary:      Effort: Pulmonary effort is normal. No respiratory distress.   Neurological:      Mental Status: He is alert and oriented to person, place, and time.   Psychiatric:         Attention and Perception: Attention normal.         Mood and Affect: Mood normal.         Speech: Speech normal.         Behavior: Behavior normal. Behavior is cooperative.       Assessment:       1. Acute noninfective otitis externa of left ear, unspecified type    2. Chronic eczematoid otitis externa of left ear    3. Otalgia of left ear    4. Seasonal allergies    5. Nasal vestibulitis        Plan:       Adelfo was seen today for otalgia.    Diagnoses and all orders for this visit:    Acute noninfective otitis externa of left ear, unspecified type  -     acetic acid-hydrocortisone (VOSOL-HC) otic solution; Place 4 drops into the left ear 2 (two) times daily. for 10 days    -     Left ear exam with drainage and erythema        -     Dry ear precautions discussed/no instrumentation in the ear        -     Follow-up in 2 weeks if needed.   Chronic eczematoid otitis externa of left ear  Otalgia of left ear    Seasonal allergies  Continue nasal sprays.   Nasal vestibulitis  I recommend Aquaphor or petroleum jelly in left nostril  and nasal saline sprays. Avoid fingers in bilateral nares.      RTC as needed or if symptoms persist.  Questions answered.

## 2023-07-12 ENCOUNTER — CLINICAL SUPPORT (OUTPATIENT)
Dept: CARDIOLOGY | Facility: HOSPITAL | Age: 83
End: 2023-07-12
Payer: MEDICARE

## 2023-07-12 DIAGNOSIS — Z95.818 PRESENCE OF OTHER CARDIAC IMPLANTS AND GRAFTS: ICD-10-CM

## 2023-07-12 PROCEDURE — G2066 INTER DEVC REMOTE 30D: HCPCS | Performed by: INTERNAL MEDICINE

## 2023-07-12 PROCEDURE — 93298 REM INTERROG DEV EVAL SCRMS: CPT | Mod: ,,, | Performed by: INTERNAL MEDICINE

## 2023-07-12 PROCEDURE — 93298 CARDIAC DEVICE CHECK - REMOTE: ICD-10-PCS | Mod: ,,, | Performed by: INTERNAL MEDICINE

## 2023-07-18 RX ORDER — ATORVASTATIN CALCIUM 20 MG/1
TABLET, FILM COATED ORAL
Qty: 90 TABLET | Refills: 0 | Status: SHIPPED | OUTPATIENT
Start: 2023-07-18 | End: 2023-10-17

## 2023-07-18 NOTE — TELEPHONE ENCOUNTER
Care Due:                  Date            Visit Type   Department     Provider  --------------------------------------------------------------------------------                                EP -                              PRIMARY      [facility] INTERNAL  Last Visit: 04-      [facility] ([facility])   MEDICINE       Romeroaisha Vogel                              EP -                              PRIMARY      [facility] INTERNAL  Next Visit: 09-      [facility] ([facility])   MEDICINE       Romeroed Vogel                                                            Last  Test          Frequency    Reason                     Performed    Due Date  --------------------------------------------------------------------------------    Lipid Panel.  12 months..  atorvastatin.............  10-   10-    [facility] Embedded Care Due Messages. Reference number: [id number].   7/18/2023 2:55:23 PM CDT

## 2023-07-18 NOTE — TELEPHONE ENCOUNTER
Refill Decision Note   Adelfo Rodriguez  is requesting a refill authorization.  Brief Assessment and Rationale for Refill:  Approve     Medication Therapy Plan:  FLOS 09/11/23    Medication Reconciliation Completed: No   Comments:     No Care Gaps recommended.     Note composed:3:04 PM 07/18/2023

## 2023-07-28 ENCOUNTER — TELEPHONE (OUTPATIENT)
Dept: NEPHROLOGY | Facility: CLINIC | Age: 83
End: 2023-07-28
Payer: MEDICARE

## 2023-07-28 NOTE — TELEPHONE ENCOUNTER
----- Message from Lisbeth Richardson sent at 7/28/2023  7:59 AM CDT -----  Adelfo Rodriguez calling regarding Patient Advice for a missed call from Enrique, call back to assist Please, [phone]

## 2023-09-11 ENCOUNTER — LAB VISIT (OUTPATIENT)
Dept: LAB | Facility: HOSPITAL | Age: 83
End: 2023-09-11
Attending: INTERNAL MEDICINE
Payer: MEDICARE

## 2023-09-11 DIAGNOSIS — I10 ESSENTIAL HYPERTENSION: ICD-10-CM

## 2023-09-11 DIAGNOSIS — N18.30 STAGE 3 CHRONIC KIDNEY DISEASE, UNSPECIFIED WHETHER STAGE 3A OR 3B CKD: ICD-10-CM

## 2023-09-11 DIAGNOSIS — Z12.5 ENCOUNTER FOR SCREENING FOR MALIGNANT NEOPLASM OF PROSTATE: ICD-10-CM

## 2023-09-11 DIAGNOSIS — N18.32 STAGE 3B CHRONIC KIDNEY DISEASE: ICD-10-CM

## 2023-09-11 DIAGNOSIS — D50.9 IRON DEFICIENCY ANEMIA, UNSPECIFIED IRON DEFICIENCY ANEMIA TYPE: ICD-10-CM

## 2023-09-11 DIAGNOSIS — D50.8 OTHER IRON DEFICIENCY ANEMIA: ICD-10-CM

## 2023-09-11 LAB
ALBUMIN SERPL BCP-MCNC: 3.8 G/DL (ref 3.5–5.2)
ALP SERPL-CCNC: 78 U/L (ref 55–135)
ALT SERPL W/O P-5'-P-CCNC: 19 U/L (ref 10–44)
ANION GAP SERPL CALC-SCNC: 11 MMOL/L (ref 8–16)
AST SERPL-CCNC: 21 U/L (ref 10–40)
BASOPHILS # BLD AUTO: 0.07 K/UL (ref 0–0.2)
BASOPHILS NFR BLD: 1.3 % (ref 0–1.9)
BILIRUB SERPL-MCNC: 0.8 MG/DL (ref 0.1–1)
BUN SERPL-MCNC: 33 MG/DL (ref 8–23)
CALCIUM SERPL-MCNC: 9.2 MG/DL (ref 8.7–10.5)
CHLORIDE SERPL-SCNC: 107 MMOL/L (ref 95–110)
CHOLEST SERPL-MCNC: 162 MG/DL (ref 120–199)
CHOLEST/HDLC SERPL: 2.2 {RATIO} (ref 2–5)
CO2 SERPL-SCNC: 21 MMOL/L (ref 23–29)
COMPLEXED PSA SERPL-MCNC: 4.2 NG/ML (ref 0–4)
CREAT SERPL-MCNC: 2 MG/DL (ref 0.5–1.4)
DIFFERENTIAL METHOD: ABNORMAL
EOSINOPHIL # BLD AUTO: 0.4 K/UL (ref 0–0.5)
EOSINOPHIL NFR BLD: 7.1 % (ref 0–8)
ERYTHROCYTE [DISTWIDTH] IN BLOOD BY AUTOMATED COUNT: 13.3 % (ref 11.5–14.5)
EST. GFR  (NO RACE VARIABLE): 32.7 ML/MIN/1.73 M^2
FERRITIN SERPL-MCNC: 26 NG/ML (ref 20–300)
GLUCOSE SERPL-MCNC: 97 MG/DL (ref 70–110)
HCT VFR BLD AUTO: 36 % (ref 40–54)
HDLC SERPL-MCNC: 73 MG/DL (ref 40–75)
HDLC SERPL: 45.1 % (ref 20–50)
HGB BLD-MCNC: 11.5 G/DL (ref 14–18)
IMM GRANULOCYTES # BLD AUTO: 0.02 K/UL (ref 0–0.04)
IMM GRANULOCYTES NFR BLD AUTO: 0.4 % (ref 0–0.5)
IRON SERPL-MCNC: 114 UG/DL (ref 45–160)
LDLC SERPL CALC-MCNC: 77.8 MG/DL (ref 63–159)
LYMPHOCYTES # BLD AUTO: 1.5 K/UL (ref 1–4.8)
LYMPHOCYTES NFR BLD: 26.9 % (ref 18–48)
MCH RBC QN AUTO: 31.4 PG (ref 27–31)
MCHC RBC AUTO-ENTMCNC: 31.9 G/DL (ref 32–36)
MCV RBC AUTO: 98 FL (ref 82–98)
MONOCYTES # BLD AUTO: 0.7 K/UL (ref 0.3–1)
MONOCYTES NFR BLD: 12.7 % (ref 4–15)
NEUTROPHILS # BLD AUTO: 2.9 K/UL (ref 1.8–7.7)
NEUTROPHILS NFR BLD: 51.6 % (ref 38–73)
NONHDLC SERPL-MCNC: 89 MG/DL
NRBC BLD-RTO: 0 /100 WBC
PLATELET # BLD AUTO: 178 K/UL (ref 150–450)
PMV BLD AUTO: 12.7 FL (ref 9.2–12.9)
POTASSIUM SERPL-SCNC: 4.6 MMOL/L (ref 3.5–5.1)
PROT SERPL-MCNC: 6.7 G/DL (ref 6–8.4)
PTH-INTACT SERPL-MCNC: 221.2 PG/ML (ref 9–77)
RBC # BLD AUTO: 3.66 M/UL (ref 4.6–6.2)
SATURATED IRON: 27 % (ref 20–50)
SODIUM SERPL-SCNC: 139 MMOL/L (ref 136–145)
TOTAL IRON BINDING CAPACITY: 426 UG/DL (ref 250–450)
TRANSFERRIN SERPL-MCNC: 288 MG/DL (ref 200–375)
TRIGL SERPL-MCNC: 56 MG/DL (ref 30–150)
TSH SERPL DL<=0.005 MIU/L-ACNC: 3.25 UIU/ML (ref 0.4–4)
URATE SERPL-MCNC: 5.5 MG/DL (ref 3.4–7)
WBC # BLD AUTO: 5.53 K/UL (ref 3.9–12.7)

## 2023-09-11 PROCEDURE — 84153 ASSAY OF PSA TOTAL: CPT | Performed by: INTERNAL MEDICINE

## 2023-09-11 PROCEDURE — 85025 COMPLETE CBC W/AUTO DIFF WBC: CPT | Performed by: INTERNAL MEDICINE

## 2023-09-11 PROCEDURE — 36415 COLL VENOUS BLD VENIPUNCTURE: CPT | Mod: PN | Performed by: INTERNAL MEDICINE

## 2023-09-11 PROCEDURE — 84550 ASSAY OF BLOOD/URIC ACID: CPT | Performed by: INTERNAL MEDICINE

## 2023-09-11 PROCEDURE — 80061 LIPID PANEL: CPT | Performed by: INTERNAL MEDICINE

## 2023-09-11 PROCEDURE — 84443 ASSAY THYROID STIM HORMONE: CPT | Performed by: INTERNAL MEDICINE

## 2023-09-11 PROCEDURE — 80053 COMPREHEN METABOLIC PANEL: CPT | Performed by: INTERNAL MEDICINE

## 2023-09-11 PROCEDURE — 82728 ASSAY OF FERRITIN: CPT | Performed by: INTERNAL MEDICINE

## 2023-09-11 PROCEDURE — 83970 ASSAY OF PARATHORMONE: CPT | Performed by: INTERNAL MEDICINE

## 2023-09-11 PROCEDURE — 83540 ASSAY OF IRON: CPT | Performed by: INTERNAL MEDICINE

## 2023-09-11 PROCEDURE — 84466 ASSAY OF TRANSFERRIN: CPT | Performed by: INTERNAL MEDICINE

## 2023-09-12 ENCOUNTER — OFFICE VISIT (OUTPATIENT)
Dept: RHEUMATOLOGY | Facility: CLINIC | Age: 83
End: 2023-09-12
Payer: MEDICARE

## 2023-09-12 ENCOUNTER — PATIENT MESSAGE (OUTPATIENT)
Dept: RHEUMATOLOGY | Facility: CLINIC | Age: 83
End: 2023-09-12

## 2023-09-12 VITALS
WEIGHT: 231 LBS | BODY MASS INDEX: 34.21 KG/M2 | SYSTOLIC BLOOD PRESSURE: 148 MMHG | HEART RATE: 75 BPM | HEIGHT: 69 IN | DIASTOLIC BLOOD PRESSURE: 61 MMHG

## 2023-09-12 DIAGNOSIS — M1A.9XX0 CHRONIC GOUT WITHOUT TOPHUS, UNSPECIFIED CAUSE, UNSPECIFIED SITE: Primary | ICD-10-CM

## 2023-09-12 DIAGNOSIS — E66.9 OBESITY, UNSPECIFIED CLASSIFICATION, UNSPECIFIED OBESITY TYPE, UNSPECIFIED WHETHER SERIOUS COMORBIDITY PRESENT: ICD-10-CM

## 2023-09-12 DIAGNOSIS — Z79.899 OTHER LONG TERM (CURRENT) DRUG THERAPY: ICD-10-CM

## 2023-09-12 PROCEDURE — 1159F PR MEDICATION LIST DOCUMENTED IN MEDICAL RECORD: ICD-10-PCS | Mod: CPTII,S$GLB,, | Performed by: INTERNAL MEDICINE

## 2023-09-12 PROCEDURE — 3077F SYST BP >= 140 MM HG: CPT | Mod: CPTII,S$GLB,, | Performed by: INTERNAL MEDICINE

## 2023-09-12 PROCEDURE — 3078F DIAST BP <80 MM HG: CPT | Mod: CPTII,S$GLB,, | Performed by: INTERNAL MEDICINE

## 2023-09-12 PROCEDURE — 1101F PR PT FALLS ASSESS DOC 0-1 FALLS W/OUT INJ PAST YR: ICD-10-PCS | Mod: CPTII,S$GLB,, | Performed by: INTERNAL MEDICINE

## 2023-09-12 PROCEDURE — 3077F PR MOST RECENT SYSTOLIC BLOOD PRESSURE >= 140 MM HG: ICD-10-PCS | Mod: CPTII,S$GLB,, | Performed by: INTERNAL MEDICINE

## 2023-09-12 PROCEDURE — 1101F PT FALLS ASSESS-DOCD LE1/YR: CPT | Mod: CPTII,S$GLB,, | Performed by: INTERNAL MEDICINE

## 2023-09-12 PROCEDURE — 99999 PR PBB SHADOW E&M-EST. PATIENT-LVL IV: ICD-10-PCS | Mod: PBBFAC,,, | Performed by: INTERNAL MEDICINE

## 2023-09-12 PROCEDURE — 1159F MED LIST DOCD IN RCRD: CPT | Mod: CPTII,S$GLB,, | Performed by: INTERNAL MEDICINE

## 2023-09-12 PROCEDURE — 99213 PR OFFICE/OUTPT VISIT, EST, LEVL III, 20-29 MIN: ICD-10-PCS | Mod: S$GLB,,, | Performed by: INTERNAL MEDICINE

## 2023-09-12 PROCEDURE — 3078F PR MOST RECENT DIASTOLIC BLOOD PRESSURE < 80 MM HG: ICD-10-PCS | Mod: CPTII,S$GLB,, | Performed by: INTERNAL MEDICINE

## 2023-09-12 PROCEDURE — 99213 OFFICE O/P EST LOW 20 MIN: CPT | Mod: S$GLB,,, | Performed by: INTERNAL MEDICINE

## 2023-09-12 PROCEDURE — 1125F PR PAIN SEVERITY QUANTIFIED, PAIN PRESENT: ICD-10-PCS | Mod: CPTII,S$GLB,, | Performed by: INTERNAL MEDICINE

## 2023-09-12 PROCEDURE — 1125F AMNT PAIN NOTED PAIN PRSNT: CPT | Mod: CPTII,S$GLB,, | Performed by: INTERNAL MEDICINE

## 2023-09-12 PROCEDURE — 99999 PR PBB SHADOW E&M-EST. PATIENT-LVL IV: CPT | Mod: PBBFAC,,, | Performed by: INTERNAL MEDICINE

## 2023-09-12 PROCEDURE — 3288F FALL RISK ASSESSMENT DOCD: CPT | Mod: CPTII,S$GLB,, | Performed by: INTERNAL MEDICINE

## 2023-09-12 PROCEDURE — 3288F PR FALLS RISK ASSESSMENT DOCUMENTED: ICD-10-PCS | Mod: CPTII,S$GLB,, | Performed by: INTERNAL MEDICINE

## 2023-09-12 RX ORDER — METHYLPREDNISOLONE 4 MG/1
TABLET ORAL
Qty: 21 EACH | Refills: 0 | Status: SHIPPED | OUTPATIENT
Start: 2023-09-12 | End: 2023-10-03

## 2023-09-12 RX ORDER — LEVOCETIRIZINE DIHYDROCHLORIDE 5 MG/1
TABLET, FILM COATED ORAL
Status: ON HOLD | COMMUNITY
Start: 2023-03-02

## 2023-09-12 NOTE — PROGRESS NOTES
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 Body mass index is 34.11 kg/m².   Hyperlipidemia, LDL 79.2 4/19/22  on atorvastatin 20mg daily  /61 , metoprolol succinate 25mg daily and irbesartan 150mg daily, furosemide 40mg daily metolazone 2.5mg daily  Mild stable macrocytic anemia       *Medrol Dospak  Continue febuxostat 40mg daily  Cont exercise: yard work, walk, elliptical, bike  Try You Tube: chair yoga, [employer], Pilates  [id number] john Mediterranean diet reviewed again, working on it with wife  RTC 6 months with CBC, CMP, uric acid, HbA1c

## 2023-09-12 NOTE — PROGRESS NOTES
Patient ID:  Adelfo ZHANG Jennifer, PhD    YOB: 1940     MRN:  [id number]     Subjective:     Chief Complaint:  Tophaceous gout     History of Present Illness:  Patient is a 82 y.o. 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 LCV was 8/2023. At that time his gout was well under control.    Today: Today, the patient states that his gout has been under control with no flare ups. His only complaint is redness of the left great toe. He states that it is not painful but more so bothersome, with a pain level of 0.5/10. He otherwise has no complaints. He is scheduled to see his PCP soon and his dermatologist in the spring. Labs show uric acid level of 5.5, GFR 32.3, and creatinine of 2.0.     Denies hair loss, dry eyes, vision changes, dry mouth, oral/nasal ulcers, trouble swallowing, joint swelling, morning stiffness, or GI disturbances.      Review of Systems   Review of Systems   Constitutional:  Negative for fever and unexpected weight change.   HENT:  Negative for mouth sores and trouble swallowing.    Eyes:  Negative for redness.   Respiratory:  Negative for cough and shortness of breath.    Cardiovascular:  Negative for chest pain.   Gastrointestinal:  Negative for constipation and diarrhea.   Genitourinary:  Negative for genital sores.   Skin:  Positive for rash.   Neurological:  Negative for headaches.   Hematological:  Does not bruise/bleed easily.        Current Medications:    Current Outpatient Medications:     albuterol (PROAIR HFA) 90 mcg/actuation inhaler, Inhale 2 puffs into the lungs every 4 (four) hours as needed for Wheezing., Disp: 8 g, Rfl: 3    atorvastatin (LIPITOR) 20 MG tablet, TAKE 1 TABLET(20 MG) BY MOUTH EVERY DAY, Disp: 90 tablet, Rfl: 0    azelastine (ASTELIN) 137 mcg (0.1 %) nasal spray, 2 sprays (274 mcg total) by Nasal route 2 (two) times daily as needed., Disp: 30 mL, Rfl: 5    febuxostat (ULORIC) 40 mg Tab, Take 1 tablet (40 mg total) by mouth once daily., Disp:  90 tablet, Rfl: 3    fish oil-omega-3 fatty acids 300-1,000 mg capsule, Take 2 g by mouth once daily., Disp: , Rfl:     fluocinonide (LIDEX) 0.05 % external solution, Apply topically 2 (two) times daily. For severe rashes in scalp and ears only prn, Disp: 60 mL, Rfl: 3    fluticasone propionate (FLONASE) 50 mcg/actuation nasal spray, 2 sprays (100 mcg total) by Each Nostril route once daily., Disp: 48 g, Rfl: 3    furosemide (LASIX) 40 MG tablet, Take 1 tablet (40 mg total) by mouth once daily. (Patient taking differently: Take 40 mg by mouth once daily. Mon.wed.fri.), Disp: 90 tablet, Rfl: 3    glucosamine & chondroit sul.Na 750-600 mg Tab, Take 750 mg by mouth., Disp: , Rfl:     hydrocortisone 2.5 % cream, Apply topically 2 (two) times daily. As needed for severe flares of rash on face and neck, Disp: 28 g, Rfl: 3    irbesartan (AVAPRO) 150 MG tablet, Take 1 tablet (150 mg total) by mouth every evening., Disp: 90 tablet, Rfl: 3    ketoconazole (NIZORAL) 2 % cream, Apply topically 2 (two) times daily. To dry skin PRN, Disp: 60 g, Rfl: 3    ketoconazole (NIZORAL) 2 % shampoo, To scalp and beard area for dry skin PRN, Disp: 120 mL, Rfl: 2    levocetirizine (XYZAL) 5 MG tablet, , Disp: , Rfl:     loratadine (CLARITIN) 10 mg tablet, Take 10 mg by mouth daily as needed., Disp: , Rfl:     metoprolol succinate (TOPROL-XL) 25 MG 24 hr tablet, TAKE 1 TABLET(25 MG) BY MOUTH EVERY DAY, Disp: 90 tablet, Rfl: 3    omeprazole (PRILOSEC) 20 MG capsule, TAKE 1 CAPSULE(20 MG) BY MOUTH EVERY DAY, Disp: 90 capsule, Rfl: 3    pramoxine-hydrocortisone (PROCTOCREAM-HC) 1-1 % rectal cream, Place rectally 2 (two) times daily., Disp: 30 g, Rfl: 1    WIXELA INHUB 250-50 mcg/dose diskus inhaler, USE 1 INHALATION BY MOUTH TWICE DAILY- RINSE MOUTH AFTER USE, Disp: 180 each, Rfl: 3    methylPREDNISolone (MEDROL DOSEPACK) 4 mg tablet, use as directed, Disp: 21 each, Rfl: 0    triamcinolone acetonide 0.1% (KENALOG) 0.1 % cream, Apply topically 2  (two) times daily., Disp: 80 g, Rfl: 2    Current Facility-Administered Medications:     ciprofloxacin HCl tablet 500 mg, 500 mg, Oral, Once, Julee Urias, MARY ANN     Objective:     Vitals:    09/12/23 1110   BP: (!) 148/61   Pulse: 75      Body mass index is 34.11 kg/m².     Physical Exam   Constitutional: He is oriented to person, place, and time. normal appearance. He appears obese. No distress.   HENT:   Head: Normocephalic and atraumatic.   Eyes: Right eye exhibits no discharge. Left eye exhibits no discharge. No scleral icterus.   Cardiovascular: Normal rate, regular rhythm and normal heart sounds. Exam reveals no gallop and no friction rub.   No murmur heard.  Pulmonary/Chest: Effort normal and breath sounds normal. No respiratory distress. He has no wheezes. He has no rhonchi. He has no rales.   Abdominal: Soft. He exhibits no distension. There is no abdominal tenderness.   Musculoskeletal:         General: Normal range of motion.      Right shoulder: Normal.      Left shoulder: Normal.      Right elbow: Normal.      Left elbow: Normal.      Right wrist: Normal.      Left wrist: Normal.      Cervical back: Normal range of motion.      Right knee: Normal.      Left knee: Normal.   Neurological: He is alert and oriented to person, place, and time.   Reflex Scores:       Patellar reflexes are 2+ on the right side and 2+ on the left side.  Psychiatric: Mood normal.       Right Side Rheumatological Exam     Examination finds the shoulder, elbow, wrist, knee, 1st PIP, 1st MCP, 2nd PIP, 2nd MCP, 3rd PIP, 3rd MCP, 4th PIP, 4th MCP, 5th PIP and 5th MCP normal.    Muscle Strength (0-5 scale):  Deltoid:  5  Biceps: 5/5   Triceps:  5  Iliopsoas: 5  Quadriceps:  5     Left Side Rheumatological Exam     Examination finds the shoulder, elbow, wrist, knee, 1st PIP, 1st MCP, 2nd PIP, 2nd MCP, 3rd PIP, 3rd MCP, 4th PIP, 4th MCP, 5th PIP and 5th MCP normal.    Muscle Strength (0-5 scale):  Deltoid:  5  Biceps: 5/5   Triceps:   5  Iliopsoas: 5  Quadriceps:  5              8/23/2022   Tender (PEREZ-28) 0 / 28    Swollen (PEREZ-28) 0 / 28    Provider Global 0 mm   Patient Global 0 mm   ESR --   CRP --   PEREZ-28 (ESR) --   PEREZ-28 (CRP) --   CDAI Score 0         There is currently no information documented on the homunculus. Go to the Rheumatology activity and complete the homunculus joint exam.      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 Patient very mildly symptomatic  CKD, stage 3b. Cr 2.0, GFR 32.3  3.   Hyperlipidemia, LDL 77.8 ON 9/11/23  4.   /61 , metoprolol succinate 25mg daily and irbesartan 150mg daily, furosemide 40mg daily  5.   Mild stable macrocytic anemia    Plan:      Continue febuxostat 40mg daily  Cont exercise: yard work, walk, elliptical, bike  Mediterranean diet  cbc, cmp, uric acid q 6 months  Hgb A1c ordered  Medrol dose pack for smoldering gout  RTC 6 months    Sj Mcfarland M.D.  PGY-1  [employer] [facility]

## 2023-09-12 NOTE — PROGRESS NOTES
9/10/2023     9:30 PM   Rapid3 Question Responses and Scores   MDHAQ Score 0   Psychologic Score 0   Pain Score 0.5   When you awakened in the morning OVER THE LAST WEEK, did you feel stiff? Yes   If Yes, please indicate the number of hours until you are as limber as you will be for the day 0   Fatigue Score 0   Global Health Score 8   RAPID3 Score 2.83     Answers submitted by the patient for this visit:  Rheumatology Questionnaire (Submitted on 9/10/2023)  fever: No  eye redness: No  mouth sores: No  headaches: No  shortness of breath: No  chest pain: No  trouble swallowing: No  diarrhea: No  constipation: No  unexpected weight change: No  genital sore: No  During the last 3 days, have you had a skin rash?: Yes  Bruises or bleeds easily: No  cough: No

## 2023-09-13 ENCOUNTER — LAB VISIT (OUTPATIENT)
Dept: LAB | Facility: HOSPITAL | Age: 83
End: 2023-09-13
Attending: INTERNAL MEDICINE
Payer: MEDICARE

## 2023-09-13 DIAGNOSIS — E66.9 OBESITY, UNSPECIFIED CLASSIFICATION, UNSPECIFIED OBESITY TYPE, UNSPECIFIED WHETHER SERIOUS COMORBIDITY PRESENT: ICD-10-CM

## 2023-09-13 DIAGNOSIS — Z79.899 OTHER LONG TERM (CURRENT) DRUG THERAPY: ICD-10-CM

## 2023-09-13 LAB
ESTIMATED AVG GLUCOSE: 108 MG/DL (ref 68–131)
HBA1C MFR BLD: 5.4 % (ref 4–5.6)

## 2023-09-13 PROCEDURE — 36415 COLL VENOUS BLD VENIPUNCTURE: CPT | Mod: PN | Performed by: INTERNAL MEDICINE

## 2023-09-13 PROCEDURE — 83036 HEMOGLOBIN GLYCOSYLATED A1C: CPT | Performed by: INTERNAL MEDICINE

## 2023-09-26 ENCOUNTER — PATIENT MESSAGE (OUTPATIENT)
Dept: INTERNAL MEDICINE | Facility: CLINIC | Age: 83
End: 2023-09-26

## 2023-09-26 ENCOUNTER — OFFICE VISIT (OUTPATIENT)
Dept: INTERNAL MEDICINE | Facility: CLINIC | Age: 83
End: 2023-09-26
Payer: MEDICARE

## 2023-09-26 VITALS
HEIGHT: 69 IN | SYSTOLIC BLOOD PRESSURE: 144 MMHG | OXYGEN SATURATION: 93 % | WEIGHT: 229.75 LBS | HEART RATE: 51 BPM | BODY MASS INDEX: 34.03 KG/M2 | RESPIRATION RATE: 18 BRPM | DIASTOLIC BLOOD PRESSURE: 70 MMHG | TEMPERATURE: 97 F

## 2023-09-26 DIAGNOSIS — R97.20 ELEVATED PSA: ICD-10-CM

## 2023-09-26 DIAGNOSIS — I10 ESSENTIAL HYPERTENSION: Primary | ICD-10-CM

## 2023-09-26 DIAGNOSIS — M54.50 CHRONIC BILATERAL LOW BACK PAIN WITHOUT SCIATICA: Chronic | ICD-10-CM

## 2023-09-26 DIAGNOSIS — Z12.5 ENCOUNTER FOR SCREENING FOR MALIGNANT NEOPLASM OF PROSTATE: ICD-10-CM

## 2023-09-26 DIAGNOSIS — J45.20 INTERMITTENT ASTHMA WITHOUT COMPLICATION, UNSPECIFIED ASTHMA SEVERITY: ICD-10-CM

## 2023-09-26 DIAGNOSIS — D50.9 IRON DEFICIENCY ANEMIA, UNSPECIFIED IRON DEFICIENCY ANEMIA TYPE: ICD-10-CM

## 2023-09-26 DIAGNOSIS — N18.30 STAGE 3 CHRONIC KIDNEY DISEASE, UNSPECIFIED WHETHER STAGE 3A OR 3B CKD: ICD-10-CM

## 2023-09-26 DIAGNOSIS — G89.29 CHRONIC BILATERAL LOW BACK PAIN WITHOUT SCIATICA: Chronic | ICD-10-CM

## 2023-09-26 PROCEDURE — 1101F PT FALLS ASSESS-DOCD LE1/YR: CPT | Mod: CPTII,S$GLB,, | Performed by: INTERNAL MEDICINE

## 2023-09-26 PROCEDURE — 3288F PR FALLS RISK ASSESSMENT DOCUMENTED: ICD-10-PCS | Mod: CPTII,S$GLB,, | Performed by: INTERNAL MEDICINE

## 2023-09-26 PROCEDURE — 1126F PR PAIN SEVERITY QUANTIFIED, NO PAIN PRESENT: ICD-10-PCS | Mod: CPTII,S$GLB,, | Performed by: INTERNAL MEDICINE

## 2023-09-26 PROCEDURE — 3077F SYST BP >= 140 MM HG: CPT | Mod: CPTII,S$GLB,, | Performed by: INTERNAL MEDICINE

## 2023-09-26 PROCEDURE — 3288F FALL RISK ASSESSMENT DOCD: CPT | Mod: CPTII,S$GLB,, | Performed by: INTERNAL MEDICINE

## 2023-09-26 PROCEDURE — 1126F AMNT PAIN NOTED NONE PRSNT: CPT | Mod: CPTII,S$GLB,, | Performed by: INTERNAL MEDICINE

## 2023-09-26 PROCEDURE — 3078F DIAST BP <80 MM HG: CPT | Mod: CPTII,S$GLB,, | Performed by: INTERNAL MEDICINE

## 2023-09-26 PROCEDURE — 99999 PR PBB SHADOW E&M-EST. PATIENT-LVL V: ICD-10-PCS | Mod: PBBFAC,,, | Performed by: INTERNAL MEDICINE

## 2023-09-26 PROCEDURE — 1101F PR PT FALLS ASSESS DOC 0-1 FALLS W/OUT INJ PAST YR: ICD-10-PCS | Mod: CPTII,S$GLB,, | Performed by: INTERNAL MEDICINE

## 2023-09-26 PROCEDURE — 99214 OFFICE O/P EST MOD 30 MIN: CPT | Mod: S$GLB,,, | Performed by: INTERNAL MEDICINE

## 2023-09-26 PROCEDURE — 99214 PR OFFICE/OUTPT VISIT, EST, LEVL IV, 30-39 MIN: ICD-10-PCS | Mod: S$GLB,,, | Performed by: INTERNAL MEDICINE

## 2023-09-26 PROCEDURE — 99999 PR PBB SHADOW E&M-EST. PATIENT-LVL V: CPT | Mod: PBBFAC,,, | Performed by: INTERNAL MEDICINE

## 2023-09-26 PROCEDURE — 3077F PR MOST RECENT SYSTOLIC BLOOD PRESSURE >= 140 MM HG: ICD-10-PCS | Mod: CPTII,S$GLB,, | Performed by: INTERNAL MEDICINE

## 2023-09-26 PROCEDURE — 3078F PR MOST RECENT DIASTOLIC BLOOD PRESSURE < 80 MM HG: ICD-10-PCS | Mod: CPTII,S$GLB,, | Performed by: INTERNAL MEDICINE

## 2023-09-26 RX ORDER — IRBESARTAN 300 MG/1
300 TABLET ORAL NIGHTLY
Qty: 90 TABLET | Refills: 3 | Status: ON HOLD | OUTPATIENT
Start: 2023-09-26

## 2023-09-26 RX ORDER — TIZANIDINE 4 MG/1
4 TABLET ORAL EVERY 8 HOURS
Qty: 60 TABLET | Refills: 2 | Status: SHIPPED | OUTPATIENT
Start: 2023-09-26 | End: 2024-02-15

## 2023-10-04 ENCOUNTER — OFFICE VISIT (OUTPATIENT)
Dept: NEPHROLOGY | Facility: CLINIC | Age: 83
End: 2023-10-04
Payer: MEDICARE

## 2023-10-04 VITALS
OXYGEN SATURATION: 98 % | BODY MASS INDEX: 33.57 KG/M2 | SYSTOLIC BLOOD PRESSURE: 164 MMHG | WEIGHT: 227.31 LBS | HEART RATE: 70 BPM | DIASTOLIC BLOOD PRESSURE: 78 MMHG

## 2023-10-04 DIAGNOSIS — M1A.9XX1 CHRONIC TOPHACEOUS GOUT: ICD-10-CM

## 2023-10-04 DIAGNOSIS — I10 ESSENTIAL HYPERTENSION: Chronic | ICD-10-CM

## 2023-10-04 DIAGNOSIS — N18.32 STAGE 3B CHRONIC KIDNEY DISEASE: Primary | ICD-10-CM

## 2023-10-04 PROCEDURE — 1159F MED LIST DOCD IN RCRD: CPT | Mod: CPTII,S$GLB,, | Performed by: INTERNAL MEDICINE

## 2023-10-04 PROCEDURE — 3077F SYST BP >= 140 MM HG: CPT | Mod: CPTII,S$GLB,, | Performed by: INTERNAL MEDICINE

## 2023-10-04 PROCEDURE — 1159F PR MEDICATION LIST DOCUMENTED IN MEDICAL RECORD: ICD-10-PCS | Mod: CPTII,S$GLB,, | Performed by: INTERNAL MEDICINE

## 2023-10-04 PROCEDURE — 1126F AMNT PAIN NOTED NONE PRSNT: CPT | Mod: CPTII,S$GLB,, | Performed by: INTERNAL MEDICINE

## 2023-10-04 PROCEDURE — 99214 OFFICE O/P EST MOD 30 MIN: CPT | Mod: S$GLB,,, | Performed by: INTERNAL MEDICINE

## 2023-10-04 PROCEDURE — 99214 PR OFFICE/OUTPT VISIT, EST, LEVL IV, 30-39 MIN: ICD-10-PCS | Mod: S$GLB,,, | Performed by: INTERNAL MEDICINE

## 2023-10-04 PROCEDURE — 3078F PR MOST RECENT DIASTOLIC BLOOD PRESSURE < 80 MM HG: ICD-10-PCS | Mod: CPTII,S$GLB,, | Performed by: INTERNAL MEDICINE

## 2023-10-04 PROCEDURE — 3288F PR FALLS RISK ASSESSMENT DOCUMENTED: ICD-10-PCS | Mod: CPTII,S$GLB,, | Performed by: INTERNAL MEDICINE

## 2023-10-04 PROCEDURE — 3077F PR MOST RECENT SYSTOLIC BLOOD PRESSURE >= 140 MM HG: ICD-10-PCS | Mod: CPTII,S$GLB,, | Performed by: INTERNAL MEDICINE

## 2023-10-04 PROCEDURE — 1126F PR PAIN SEVERITY QUANTIFIED, NO PAIN PRESENT: ICD-10-PCS | Mod: CPTII,S$GLB,, | Performed by: INTERNAL MEDICINE

## 2023-10-04 PROCEDURE — 3288F FALL RISK ASSESSMENT DOCD: CPT | Mod: CPTII,S$GLB,, | Performed by: INTERNAL MEDICINE

## 2023-10-04 PROCEDURE — 99999 PR PBB SHADOW E&M-EST. PATIENT-LVL IV: ICD-10-PCS | Mod: PBBFAC,,, | Performed by: INTERNAL MEDICINE

## 2023-10-04 PROCEDURE — 99999 PR PBB SHADOW E&M-EST. PATIENT-LVL IV: CPT | Mod: PBBFAC,,, | Performed by: INTERNAL MEDICINE

## 2023-10-04 PROCEDURE — 3078F DIAST BP <80 MM HG: CPT | Mod: CPTII,S$GLB,, | Performed by: INTERNAL MEDICINE

## 2023-10-04 PROCEDURE — 1101F PT FALLS ASSESS-DOCD LE1/YR: CPT | Mod: CPTII,S$GLB,, | Performed by: INTERNAL MEDICINE

## 2023-10-04 PROCEDURE — 1101F PR PT FALLS ASSESS DOC 0-1 FALLS W/OUT INJ PAST YR: ICD-10-PCS | Mod: CPTII,S$GLB,, | Performed by: INTERNAL MEDICINE

## 2023-10-04 NOTE — PROGRESS NOTES
Progress Report  Nephrology      Consult Requested By: CKD  Reason for Consult: CKD    History of Present Illness:  Patient is a 83 y.o. male presents for a follow up evaluation of chronic kidney disease. The patient was found to have an elevated serum creatinine during routine laboratory testing, at 1.9 mg/dL.     The patient denies SOB, LE edema, hematuria or foamy urine. He took Medrol Dosepak for a mild gout flare. No longer taking NSAIDs. Irbesartan dose was increased 1 week ago.     The patient denies taking NSAIDs or new antibiotics, recreational drugs, recent episode of dehydration, diarrhea, nausea or vomiting, acute illness, hospitalization or exposure to IV radiocontrast.     Past Medical History:   Diagnosis Date    ALLERGIC RHINITIS     Anemia     Asthma     Cataract     Hyperlipidemia     Hypertension     NAFLD (nonalcoholic fatty liver disease)     ANNABELLE (obstructive sleep apnea)     on CPAP    PVD (posterior vitreous detachment), left eye     Squamous cell cancer of skin of hand          Current Outpatient Medications:     albuterol (PROAIR HFA) 90 mcg/actuation inhaler, Inhale 2 puffs into the lungs every 4 (four) hours as needed for Wheezing., Disp: 8 g, Rfl: 3    atorvastatin (LIPITOR) 20 MG tablet, TAKE 1 TABLET(20 MG) BY MOUTH EVERY DAY, Disp: 90 tablet, Rfl: 0    azelastine (ASTELIN) 137 mcg (0.1 %) nasal spray, 2 sprays (274 mcg total) by Nasal route 2 (two) times daily as needed., Disp: 30 mL, Rfl: 5    febuxostat (ULORIC) 40 mg Tab, Take 1 tablet (40 mg total) by mouth once daily., Disp: 90 tablet, Rfl: 3    fish oil-omega-3 fatty acids 300-1,000 mg capsule, Take 2 g by mouth once daily., Disp: , Rfl:     fluocinonide (LIDEX) 0.05 % external solution, Apply topically 2 (two) times daily. For severe rashes in scalp and ears only prn, Disp: 60 mL, Rfl: 3    fluticasone propionate (FLONASE) 50 mcg/actuation nasal spray, 2 sprays (100 mcg total) by Each Nostril route once daily., Disp: 48 g, Rfl:  3    furosemide (LASIX) 40 MG tablet, Take 1 tablet (40 mg total) by mouth once daily. (Patient taking differently: Take 40 mg by mouth once daily. Mon.wed.fri.), Disp: 90 tablet, Rfl: 3    glucosamine & chondroit sul.Na 750-600 mg Tab, Take 750 mg by mouth., Disp: , Rfl:     hydrocortisone 2.5 % cream, Apply topically 2 (two) times daily. As needed for severe flares of rash on face and neck, Disp: 28 g, Rfl: 3    irbesartan (AVAPRO) 300 MG tablet, Take 1 tablet (300 mg total) by mouth every evening., Disp: 90 tablet, Rfl: 3    ketoconazole (NIZORAL) 2 % cream, Apply topically 2 (two) times daily. To dry skin PRN, Disp: 60 g, Rfl: 3    ketoconazole (NIZORAL) 2 % shampoo, To scalp and beard area for dry skin PRN, Disp: 120 mL, Rfl: 2    levocetirizine (XYZAL) 5 MG tablet, , Disp: , Rfl:     loratadine (CLARITIN) 10 mg tablet, Take 10 mg by mouth daily as needed., Disp: , Rfl:     metoprolol succinate (TOPROL-XL) 25 MG 24 hr tablet, TAKE 1 TABLET(25 MG) BY MOUTH EVERY DAY, Disp: 90 tablet, Rfl: 3    omeprazole (PRILOSEC) 20 MG capsule, TAKE 1 CAPSULE(20 MG) BY MOUTH EVERY DAY, Disp: 90 capsule, Rfl: 3    pramoxine-hydrocortisone (PROCTOCREAM-HC) 1-1 % rectal cream, Place rectally 2 (two) times daily., Disp: 30 g, Rfl: 1    tiZANidine (ZANAFLEX) 4 MG tablet, Take 1 tablet (4 mg total) by mouth every 8 (eight) hours. For back pain and spasm., Disp: 60 tablet, Rfl: 2    WIXELA INHUB 250-50 mcg/dose diskus inhaler, USE 1 INHALATION BY MOUTH TWICE DAILY- RINSE MOUTH AFTER USE, Disp: 180 each, Rfl: 3    triamcinolone acetonide 0.1% (KENALOG) 0.1 % cream, Apply topically 2 (two) times daily., Disp: 80 g, Rfl: 2    Current Facility-Administered Medications:     ciprofloxacin HCl tablet 500 mg, 500 mg, Oral, Once, Julee Urias, NP  Review of patient's allergies indicates:   Allergen Reactions    Amoxicillin Hives    Allopurinol Other (See Comments)    Allopurinol analogues Other (See Comments)     Abnormal transaminases     Amoxicillin Other (See Comments)        Past Surgical History:   Procedure Laterality Date    APPENDECTOMY      CARDIAC CATHETERIZATION  10/31/2018    no stent    CARDIAC CATHETERIZATION  1998    no stent    COLONOSCOPY N/A 2018    Procedure: COLONOSCOPY;  Surgeon: Wade De La Garza MD;  Location: [facility] ENDO (4TH FLR);  Service: Endoscopy;  Laterality: N/A;    COLONOSCOPY N/A 2020    Procedure: COLONOSCOPY;  Surgeon: Von Gutierrez MD;  Location: [facility] JAIR (4TH FLR);  Service: Endoscopy;  Laterality: N/A;  3/17/20-pt confirmed appt on 3/20/20 with new arrival time of 0715, and updated visitor/ride instructions-BB  patient to be rescheduled by 6/3/20 per Dr. Gutierrez-BB  patient requests to be rescheduled on a Thursday or Friday-BB  patient requested suprep-BB  loop recorder-BB  C    COLONOSCOPY N/A 2023    Procedure: COLONOSCOPY;  Surgeon: Bart Hernandez MD;  Location: [facility] JAIR (4TH FLR);  Service: Endoscopy;  Laterality: N/A;  Procedure Timin-12 weeks   suprep  pt requested Dr. Hernandez   instructions to portal-st  pre call , pt confirmed - mf    ENDOSCOPIC ULTRASOUND OF UPPER GASTROINTESTINAL TRACT N/A 10/1/2020    Procedure: ULTRASOUND, UPPER GI TRACT, ENDOSCOPIC;  Surgeon: Niko Lambert MD;  Location: [facility] (2ND FLR);  Service: Endoscopy;  Laterality: N/A;  Covid-19 test 20 at [facility] - pg  Has Loop Recorder.    ESOPHAGOGASTRODUODENOSCOPY N/A 10/23/2018    Procedure: EGD (ESOPHAGOGASTRODUODENOSCOPY);  Surgeon: Bart Hernandez MD;  Location: [facility] JAIR (4TH FLR);  Service: Endoscopy;  Laterality: N/A;    ESOPHAGOGASTRODUODENOSCOPY N/A 2020    Procedure: EGD (ESOPHAGOGASTRODUODENOSCOPY);  Surgeon: Von Gutierrez MD;  Location: [facility] ENDO (4TH FLR);  Service: Endoscopy;  Laterality: N/A;  3/17/20-pt confirmed appt on 3/20/20 with new arrival time of 0715, and updated visitor/ride instructions-BB  patient to be rescheduled by 6/3/20 per Dr. Santamaria  patient requests to be  rescheduled on a Thursday or Friday-BB  patient requested suprep-BB    ESOPHAGOGASTRODUODENOSCOPY N/A 2023    Procedure: EGD (ESOPHAGOGASTRODUODENOSCOPY);  Surgeon: Bart Hernandez MD;  Location: [facility] ENDO ([address] [place] [address]);  Service: Endoscopy;  Laterality: N/A;    FOOT SURGERY      INSERTION OF IMPLANTABLE LOOP RECORDER N/A 2019    Procedure: Insertion, Implantable Loop Recorder;  Surgeon: Gil Wilson MD;  Location: [facility] EP LAB;  Service: Cardiology;  Laterality: N/A;  AT, ILR, MDT, Local, SD, 3 Prep    testicular biopsy       Family History   Problem Relation Age of Onset    Cancer Mother         breast    Glaucoma Mother     Retinal detachment Mother     Heart disease Father         CHF    COPD Father     Retinal detachment Father     Heart disease Brother     Arthritis Brother     Blindness Maternal Grandmother     Cataracts Maternal Grandmother     Cirrhosis Neg Hx     Strabismus Neg Hx     Colon cancer Neg Hx     Stomach cancer Neg Hx     Esophageal cancer Neg Hx     Celiac disease Neg Hx     Crohn's disease Neg Hx     Rectal cancer Neg Hx     Ulcerative colitis Neg Hx      Social History     Tobacco Use    Smoking status: Former     Current packs/day: 0.00     Average packs/day: 1 pack/day for 10.0 years (10.0 ttl pk-yrs)     Types: Cigarettes     Start date: 1958     Quit date: 1968     Years since quittin.7    Smokeless tobacco: Former    Tobacco comments:      last smoke   Substance Use Topics    Alcohol use: Yes     Alcohol/week: 2.0 standard drinks of alcohol     Types: 1 Glasses of wine, 1 Shots of liquor per week     Comment: 5 days per week , 7 -8 drinks a week.    Drug use: No       ROS    Vitals:    10/04/23 1359   BP: (!) 164/78   Pulse: 70       PHYSICAL EXAMINATION:  General: no distress, well nourished  Skin: color, texture, turgor normal. No rash or lesions  HEENT: Eyes: reactive pupils, normal conjunctiva. Oral mucosa moist, no ulcers.   Neck: supple, symmetrical, trachea  midline, no JVD, no carotid bruit  Lungs: clear to auscultation bilaterally and normal respiratory effort  Cardiovascular: Heart: regular rate and rhythm, S1, S2 normal, no murmur, rub or gallop.  Abdomen: bowel sounds present, no abdominal bruit, soft, non-tender non-distented;  Musculoskeletal: no pitting edema in lower extremities, no clubbing or cyanosis  Lymph Nodes: No cervical or supraclavicular adenopathy  Neurologic: AAOx3, normal strength and tone. No focal deficit. No asterixis.       LABORATORY DATA:  Lab Results   Component Value Date    CREATININE 2.0 (H) 09/11/2023       Prot/Creat Ratio, Urine   Date Value Ref Range Status   03/07/2023 Unable to calculate 0.00 - 0.20 Final   09/06/2022 0.06 0.00 - 0.20 Final   05/20/2022 Unable to calculate 0.00 - 0.20 Final       Lab Results   Component Value Date     09/11/2023    K 4.6 09/11/2023    CO2 21 (L) 09/11/2023       Lab Results   Component Value Date    .2 (H) 09/11/2023    CALCIUM 9.2 09/11/2023    PHOS 3.4 08/01/2022       Lab Results   Component Value Date    HGB 11.5 (L) 09/11/2023        Lab Results   Component Value Date    HGBA1C 5.4 09/13/2023       Lab Results   Component Value Date    LDLCALC 77.8 09/11/2023         IMAGING STUDIES  Kidney US: Reviewed  Echocardiogram: Reviewed    IMPRESSION / RECOMMENDATIONS:     1. Stage 3b chronic kidney disease  Stable for at least 7 years, recovered from NSAID-NEFTALI bout. 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 Low risk for progression to ESRD. No adjustments needed      2. Bilateral leg edema  Well managed with loop diuretic BIW. Stable and controlled.       3. Essential hypertension  Well controlled on dual regimen. Dose of ARB recently adjusted.       4. NAFLD (nonalcoholic fatty liver disease)  Managed by PCP          SUMMARY OF PLAN:  Continue ARB, loop diuretic  Avoid NSAIDs  3.   RTC in 6 mo

## 2023-10-15 NOTE — TELEPHONE ENCOUNTER
No care due was identified.  [facility] Embedded Care Due Messages. Reference number: [id number].   10/15/2023 1:50:28 PM CDT

## 2023-10-17 ENCOUNTER — PATIENT MESSAGE (OUTPATIENT)
Dept: GASTROENTEROLOGY | Facility: CLINIC | Age: 83
End: 2023-10-17
Payer: MEDICARE

## 2023-10-17 DIAGNOSIS — K22.70 BARRETT'S ESOPHAGUS WITHOUT DYSPLASIA: ICD-10-CM

## 2023-10-17 RX ORDER — OMEPRAZOLE 20 MG/1
CAPSULE, DELAYED RELEASE ORAL
Qty: 90 CAPSULE | Refills: 3 | Status: ON HOLD | OUTPATIENT
Start: 2023-10-17

## 2023-10-17 RX ORDER — ATORVASTATIN CALCIUM 20 MG/1
TABLET, FILM COATED ORAL
Qty: 90 TABLET | Refills: 3 | Status: ON HOLD | OUTPATIENT
Start: 2023-10-17

## 2023-10-17 RX ORDER — OMEPRAZOLE 20 MG/1
20 CAPSULE, DELAYED RELEASE ORAL DAILY
Qty: 90 CAPSULE | Refills: 3 | Status: SHIPPED | OUTPATIENT
Start: 2023-10-17 | End: 2024-01-03

## 2023-10-18 NOTE — TELEPHONE ENCOUNTER
Refill Decision Note   Adelfo Jennifer  is requesting a refill authorization.  Brief Assessment and Rationale for Refill:  Approve     Medication Therapy Plan:         Comments:     Note composed:9:48 PM 10/17/2023

## 2023-10-23 ENCOUNTER — PATIENT MESSAGE (OUTPATIENT)
Dept: RHEUMATOLOGY | Facility: CLINIC | Age: 83
End: 2023-10-23
Payer: MEDICARE

## 2023-10-24 ENCOUNTER — PATIENT MESSAGE (OUTPATIENT)
Dept: DERMATOLOGY | Facility: CLINIC | Age: 83
End: 2023-10-24
Payer: MEDICARE

## 2023-10-24 DIAGNOSIS — M10.9 ACUTE GOUT INVOLVING TOE, UNSPECIFIED CAUSE, UNSPECIFIED LATERALITY: Primary | ICD-10-CM

## 2023-10-24 RX ORDER — METHYLPREDNISOLONE 4 MG/1
TABLET ORAL
Qty: 21 EACH | Refills: 0 | Status: SHIPPED | OUTPATIENT
Start: 2023-10-24 | End: 2023-11-14

## 2023-10-31 ENCOUNTER — OFFICE VISIT (OUTPATIENT)
Dept: DERMATOLOGY | Facility: CLINIC | Age: 83
End: 2023-10-31
Payer: MEDICARE

## 2023-10-31 DIAGNOSIS — L82.0 INFLAMED SEBORRHEIC KERATOSIS: ICD-10-CM

## 2023-10-31 DIAGNOSIS — L30.9 DERMATITIS: Primary | ICD-10-CM

## 2023-10-31 PROCEDURE — 1160F RVW MEDS BY RX/DR IN RCRD: CPT | Mod: CPTII,S$GLB,, | Performed by: DERMATOLOGY

## 2023-10-31 PROCEDURE — 1159F MED LIST DOCD IN RCRD: CPT | Mod: CPTII,S$GLB,, | Performed by: DERMATOLOGY

## 2023-10-31 PROCEDURE — 1159F PR MEDICATION LIST DOCUMENTED IN MEDICAL RECORD: ICD-10-PCS | Mod: CPTII,S$GLB,, | Performed by: DERMATOLOGY

## 2023-10-31 PROCEDURE — 99999 PR PBB SHADOW E&M-EST. PATIENT-LVL III: CPT | Mod: PBBFAC,,, | Performed by: DERMATOLOGY

## 2023-10-31 PROCEDURE — 1160F PR REVIEW ALL MEDS BY PRESCRIBER/CLIN PHARMACIST DOCUMENTED: ICD-10-PCS | Mod: CPTII,S$GLB,, | Performed by: DERMATOLOGY

## 2023-10-31 PROCEDURE — 99999 PR PBB SHADOW E&M-EST. PATIENT-LVL III: ICD-10-PCS | Mod: PBBFAC,,, | Performed by: DERMATOLOGY

## 2023-10-31 PROCEDURE — 99212 OFFICE O/P EST SF 10 MIN: CPT | Mod: 25,GC,S$GLB, | Performed by: DERMATOLOGY

## 2023-10-31 PROCEDURE — 17110 DESTRUCTION B9 LES UP TO 14: CPT | Mod: S$GLB,,, | Performed by: DERMATOLOGY

## 2023-10-31 PROCEDURE — 3288F FALL RISK ASSESSMENT DOCD: CPT | Mod: CPTII,S$GLB,, | Performed by: DERMATOLOGY

## 2023-10-31 PROCEDURE — 1101F PT FALLS ASSESS-DOCD LE1/YR: CPT | Mod: CPTII,S$GLB,, | Performed by: DERMATOLOGY

## 2023-10-31 PROCEDURE — 3288F PR FALLS RISK ASSESSMENT DOCUMENTED: ICD-10-PCS | Mod: CPTII,S$GLB,, | Performed by: DERMATOLOGY

## 2023-10-31 PROCEDURE — 99212 PR OFFICE/OUTPT VISIT, EST, LEVL II, 10-19 MIN: ICD-10-PCS | Mod: 25,GC,S$GLB, | Performed by: DERMATOLOGY

## 2023-10-31 PROCEDURE — 1101F PR PT FALLS ASSESS DOC 0-1 FALLS W/OUT INJ PAST YR: ICD-10-PCS | Mod: CPTII,S$GLB,, | Performed by: DERMATOLOGY

## 2023-10-31 PROCEDURE — 17110 PR DESTRUCTION BENIGN LESIONS UP TO 14: ICD-10-PCS | Mod: S$GLB,,, | Performed by: DERMATOLOGY

## 2023-10-31 NOTE — PROGRESS NOTES
Subjective:      Patient ID:  Adelfo ZHANG Jennifer, PhD is a 83 y.o. male who presents for   Chief Complaint   Patient presents with    Rash     L great toe     Adelfo is an 84 yo M with HTN, CKD, NAFLD, and gout who presents today for evaluation above. LOV 5/15/23 with Dr. Cunningham at which time TBSE with no concerning lesions.     Patient reports L great toe with blistering eruption for a few months. Blister recur in the same spot near lateral proximal nail fold. They have clear fluid in them. It initially flared prior to rheum visit 9/12/23 where hew as given Medrol dose pack. This helped with soreness, but it never fully resolved. Blisters again erupted 10/21, and rheum ordered Medrol dose pack again and advised dermatologic evaluation. He finished Medrol dose pack yesterday. Used neosporin only after the blisters opened. No changes to shoes. No new medications.      Objective:   Physical Exam   Constitutional: He appears well-developed and well-nourished. No distress.   Neurological: He is alert and oriented to person, place, and time. He is not disoriented.   Skin:   Areas Examined (abnormalities noted in diagram):   LUE Inspection Performed  RLE Inspected  LLE Inspection Performed                Diagram Legend     Erythematous scaling macule/papule c/w actinic keratosis       Vascular papule c/w angioma      Pigmented verrucoid papule/plaque c/w seborrheic keratosis      Yellow umbilicated papule c/w sebaceous hyperplasia      Irregularly shaped tan macule c/w lentigo     1-2 mm smooth white papules consistent with Milia      Movable subcutaneous cyst with punctum c/w epidermal inclusion cyst      Subcutaneous movable cyst c/w pilar cyst      Firm pink to brown papule c/w dermatofibroma      Pedunculated fleshy papule(s) c/w skin tag(s)      Evenly pigmented macule c/w junctional nevus     Mildly variegated pigmented, slightly irregular-bordered macule c/w mildly atypical nevus      Flesh colored to evenly  pigmented papule c/w intradermal nevus       Pink pearly papule/plaque c/w basal cell carcinoma      Erythematous hyperkeratotic cursted plaque c/w SCC      Surgical scar with no sign of skin cancer recurrence      Open and closed comedones      Inflammatory papules and pustules      Verrucoid papule consistent consistent with wart     Erythematous eczematous patches and plaques     Dystrophic onycholytic nail with subungual debris c/w onychomycosis     Umbilicated papule    Erythematous-base heme-crusted tan verrucoid plaque consistent with inflamed seborrheic keratosis     Erythematous Silvery Scaling Plaque c/w Psoriasis     See annotation          Assessment / Plan:        Dermatitis, L Great Toe  Evaluated today and reviewed pictures. No true vesicles at this time, but there do appear to be vesicles vs. pustules on an erythematous base on L great toe in same area each time. Considered a variety of things including localized herpes infection, bullous tinea, contact dermatitis, and fixed drug. Highest on our differential at this time is localized herpes infection similar to herpetic peter. Would like to see as soon as has vesicles again if recurs to swab for HSV. Could treat with antivirals if positive. No other management at this time unless recur.      Inflamed seborrheic keratosis  Cryosurgery procedure note:  Verbal consent from the patient is obtained including, but not limited to, risk of hypopigmentation/hyperpigmentation, scar, recurrence of lesion. Liquid nitrogen cryosurgery is applied to 1 lesions to produce a freeze injury. The patient is aware that blisters may form and is instructed on wound care with gentle cleansing and use of vaseline ointment to keep moist until healed. The patient is supplied a handout on cryosurgery and is instructed to call if lesions do not completely resolve.           Follow up if symptoms worsen or fail to improve, for call as soon as have blisters.

## 2023-11-01 PROBLEM — R97.20 ELEVATED PSA: Status: ACTIVE | Noted: 2023-11-01

## 2023-11-01 NOTE — PROGRESS NOTES
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    Chief Complaint: Follow-up, Hypertension, Asthma, Low-back Pain, and Chronic Kidney Disease    HPI  The patient presents for follow-up of medical conditions which include hypertension, asthma, chronic kidney disease, and flare of lower back pain.  The patient has noted intermittent elevations in blood pressure.  Blood pressure readings have ranged from 140/70+ up to 165/91 this morning.  He monitors his blood pressures regularly.  He does admit that he was recently on steroid therapy for a flare of gout involving the left great toe.  Those symptoms have resolved.  The patient also has chronic kidney disease.  He is followed by Dr. Sweet in Nephrology.  His kidney function has been relatively stable.  He maintains good water intake for hydration.  He has been avoiding NSAIDs as recommended.    He reports asthma symptoms have been fairly stable over the past 6 weeks.  He has had cough and sinus symptoms flare ups with changes in the weather.  But no asthma exacerbation is noted.    He did experience a flare of his chronic lower back pain 1 week ago.  Pain is in the bilateral lumbar area and is nonradiating.  He has had 2 massage treatments to the area and he has been using Tylenol for pain.  The pain has disrupted his sleep at night.  No bowel or bladder sphincter dysfunction is noted.  No lower extremity weakness is noted.  The patient remains physically active.    Review of Systems   Constitutional:  Positive for fatigue. Negative for activity change, appetite change, fever and unexpected weight change.   HENT:  Negative for sinus pressure/congestion and sore throat.    Eyes:  Negative for visual disturbance.   Respiratory:  Negative for cough, chest tightness, shortness of breath and wheezing.    Cardiovascular:  Positive for leg swelling (Intermittent leg edema is noted.). Negative for chest pain and palpitations.   Gastrointestinal:   Negative for abdominal pain, blood in stool, nausea, vomiting and fecal incontinence.   Genitourinary:  Positive for penile swelling. Negative for bladder incontinence, dysuria, hematuria and urgency.   Musculoskeletal:  Positive for arthralgias and back pain. Negative for gait problem, joint swelling, myalgias and neck stiffness.   Integumentary:  Negative for color change and rash.   Neurological:  Negative for dizziness, syncope, weakness, light-headedness, numbness and headaches.   Psychiatric/Behavioral:  Negative for sleep disturbance.             Physical Exam  Vitals and nursing note reviewed.   Constitutional:       General: He is not in acute distress.     Appearance: Normal appearance. He is well-developed.   HENT:      Head: Normocephalic and atraumatic.   Eyes:      General: No scleral icterus.     Extraocular Movements: Extraocular movements intact.      Conjunctiva/sclera: Conjunctivae normal.   Neck:      Thyroid: No thyromegaly.      Vascular: No JVD.   Cardiovascular:      Rate and Rhythm: Normal rate and regular rhythm.      Heart sounds: Normal heart sounds. No murmur heard.     No friction rub. No gallop.   Pulmonary:      Effort: Pulmonary effort is normal. No respiratory distress.      Breath sounds: Normal breath sounds. No wheezing or rales.   Abdominal:      General: Bowel sounds are normal.      Palpations: Abdomen is soft. There is no mass.      Tenderness: There is no abdominal tenderness.   Musculoskeletal:         General: No tenderness. Normal range of motion.      Cervical back: Normal range of motion and neck supple.      Right lower leg: No edema.      Left lower leg: No edema.      Comments: Back:  Negative straight leg raising test bilaterally.  Lumbar paraspinous muscle tenderness is present bilaterally on palpation.  No vertebral percussion tenderness is noted.    Hips:  Range of motion is intact bilaterally without pain.   Lymphadenopathy:      Cervical: No cervical  adenopathy.   Skin:     General: Skin is warm and dry.      Findings: No rash.   Neurological:      Mental Status: He is alert and oriented to person, place, and time.      Comments: Gait is normal.   Psychiatric:         Mood and Affect: Mood normal.         Behavior: Behavior normal.           Lab Visit on 09/13/2023   Component Date Value Ref Range Status    Hemoglobin A1C 09/13/2023 5.4  4.0 - 5.6 % Final    Comment: ADA Screening Guidelines:  5.7-6.4%  Consistent with prediabetes  >or=6.5%  Consistent with diabetes    High levels of fetal hemoglobin interfere with the HbA1C  assay. Heterozygous hemoglobin variants (HbS, HgC, etc)do  not significantly interfere with this assay.   However, presence of multiple variants may affect accuracy.      Estimated Avg Glucose 09/13/2023 108  68 - 131 mg/dL Final   Lab Visit on 09/11/2023   Component Date Value Ref Range Status    WBC 09/11/2023 5.53  3.90 - 12.70 K/uL Final    RBC 09/11/2023 3.66 (L)  4.60 - 6.20 M/uL Final    Hemoglobin 09/11/2023 11.5 (L)  14.0 - 18.0 g/dL Final    Hematocrit 09/11/2023 36.0 (L)  40.0 - 54.0 % Final    MCV 09/11/2023 98  82 - 98 fL Final    MCH 09/11/2023 31.4 (H)  27.0 - 31.0 pg Final    MCHC 09/11/2023 31.9 (L)  32.0 - 36.0 g/dL Final    RDW 09/11/2023 13.3  11.5 - 14.5 % Final    Platelets 09/11/2023 178  150 - 450 K/uL Final    MPV 09/11/2023 12.7  9.2 - 12.9 fL Final    Immature Granulocytes 09/11/2023 0.4  0.0 - 0.5 % Final    Gran # (ANC) 09/11/2023 2.9  1.8 - 7.7 K/uL Final    Immature Grans (Abs) 09/11/2023 0.02  0.00 - 0.04 K/uL Final    Comment: Mild elevation in immature granulocytes is non specific and   can be seen in a variety of conditions including stress response,   acute inflammation, trauma and pregnancy. Correlation with other   laboratory and clinical findings is essential.      Lymph # 09/11/2023 1.5  1.0 - 4.8 K/uL Final    Mono # 09/11/2023 0.7  0.3 - 1.0 K/uL Final    Eos # 09/11/2023 0.4  0.0 - 0.5 K/uL Final     Baso # 09/11/2023 0.07  0.00 - 0.20 K/uL Final    nRBC 09/11/2023 0  0 /100 WBC Final    Gran % 09/11/2023 51.6  38.0 - 73.0 % Final    Lymph % 09/11/2023 26.9  18.0 - 48.0 % Final    Mono % 09/11/2023 12.7  4.0 - 15.0 % Final    Eosinophil % 09/11/2023 7.1  0.0 - 8.0 % Final    Basophil % 09/11/2023 1.3  0.0 - 1.9 % Final    Differential Method 09/11/2023 Automated   Final    Sodium 09/11/2023 139  136 - 145 mmol/L Final    Potassium 09/11/2023 4.6  3.5 - 5.1 mmol/L Final    Chloride 09/11/2023 107  95 - 110 mmol/L Final    CO2 09/11/2023 21 (L)  23 - 29 mmol/L Final    Glucose 09/11/2023 97  70 - 110 mg/dL Final    BUN 09/11/2023 33 (H)  8 - 23 mg/dL Final    Creatinine 09/11/2023 2.0 (H)  0.5 - 1.4 mg/dL Final    Calcium 09/11/2023 9.2  8.7 - 10.5 mg/dL Final    Total Protein 09/11/2023 6.7  6.0 - 8.4 g/dL Final    Albumin 09/11/2023 3.8  3.5 - 5.2 g/dL Final    Total Bilirubin 09/11/2023 0.8  0.1 - 1.0 mg/dL Final    Comment: For infants and newborns, interpretation of results should be based  on gestational age, weight and in agreement with clinical  observations.    Premature Infant recommended reference ranges:  Up to 24 hours.............<8.0 mg/dL  Up to 48 hours............<12.0 mg/dL  3-5 days..................<15.0 mg/dL  6-29 days.................<15.0 mg/dL      Alkaline Phosphatase 09/11/2023 78  55 - 135 U/L Final    AST 09/11/2023 21  10 - 40 U/L Final    ALT 09/11/2023 19  10 - 44 U/L Final    eGFR 09/11/2023 32.7 (A)  >60 mL/min/1.73 m^2 Final    Anion Gap 09/11/2023 11  8 - 16 mmol/L Final    Iron 09/11/2023 114  45 - 160 ug/dL Final    Transferrin 09/11/2023 288  200 - 375 mg/dL Final    TIBC 09/11/2023 426  250 - 450 ug/dL Final    Saturated Iron 09/11/2023 27  20 - 50 % Final    Ferritin 09/11/2023 26  20.0 - 300.0 ng/mL Final    PTH, Intact 09/11/2023 221.2 (H)  9.0 - 77.0 pg/mL Final    Cholesterol 09/11/2023 162  120 - 199 mg/dL Final    Comment: The National Cholesterol Education  Program (NCEP) has set the  following guidelines (reference ranges) for Cholesterol:  Optimal.....................<200 mg/dL  Borderline High.............200-239 mg/dL  High........................> or = 240 mg/dL      Triglycerides 09/11/2023 56  30 - 150 mg/dL Final    Comment: The National Cholesterol Education Program (NCEP) has set the  following guidelines (reference values) for triglycerides:  Normal......................<150 mg/dL  Borderline High.............150-199 mg/dL  High........................200-499 mg/dL      HDL 09/11/2023 73  40 - 75 mg/dL Final    Comment: The National Cholesterol Education Program (NCEP) has set the  following guidelines (reference values) for HDL Cholesterol:  Low...............<40 mg/dL  Optimal...........>60 mg/dL      LDL Cholesterol 09/11/2023 77.8  63.0 - 159.0 mg/dL Final    Comment: The National Cholesterol Education Program (NCEP) has set the  following guidelines (reference values) for LDL Cholesterol:  Optimal.......................<130 mg/dL  Borderline High...............130-159 mg/dL  High..........................160-189 mg/dL  Very High.....................>190 mg/dL      HDL/Cholesterol Ratio 09/11/2023 45.1  20.0 - 50.0 % Final    Total Cholesterol/HDL Ratio 09/11/2023 2.2  2.0 - 5.0 Final    Non-HDL Cholesterol 09/11/2023 89  mg/dL Final    Comment: Risk category and Non-HDL cholesterol goals:  Coronary heart disease (CHD)or equivalent (10-year risk of CHD >20%):  Non-HDL cholesterol goal     <130 mg/dL  Two or more CHD risk factors and 10-year risk of CHD <= 20%:  Non-HDL cholesterol goal     <160 mg/dL  0 to 1 CHD risk factor:  Non-HDL cholesterol goal     <190 mg/dL      PSA, Screen 09/11/2023 4.2 (H)  0.00 - 4.00 ng/mL Final    Comment: The testing method is a chemiluminescent microparticle immunoassay   manufactured by [employer] and performed on the    or   [employer] system. Values obtained with different assay manufacturers   for    methods may be different and cannot be used interchangeably.  PSA Expected levels:  Hormonal Therapy: <0.05 ng/ml  Prostatectomy: <0.01 ng/ml  Radiation Therapy: <1.00 ng/ml      TSH 09/11/2023 3.245  0.400 - 4.000 uIU/mL Final    Uric Acid 09/11/2023 5.5  3.4 - 7.0 mg/dL Final       Assessment & Plan:      Adelfo was seen today for follow-up, hypertension, asthma, low-back pain and chronic kidney disease.  Tizanidine will be ordered for back muscle spasm.  Physical therapy will be ordered if symptoms of back pain persist.  The patient may continue is routine massage therapy.    The dose of irbesartan will be increased to 300 mg daily for better blood pressure control.  A blood pressure recheck in the office in 4-6 weeks is recommended.    A diagnostic PSA and additional lab studies will be obtained in 4 months.    Diagnoses and all orders for this visit:    Essential hypertension  -     Comprehensive Metabolic Panel; Future  -     CBC Auto Differential; Future  -     Prostate Specific Antigen, Diagnostic; Future    Iron deficiency anemia, unspecified iron deficiency anemia type  -     Comprehensive Metabolic Panel; Future  -     CBC Auto Differential; Future  -     Prostate Specific Antigen, Diagnostic; Future    Elevated PSA  -     Prostate Specific Antigen, Diagnostic; Future    Intermittent asthma without complication, unspecified asthma severity    Stage 3 chronic kidney disease, unspecified whether stage 3a or 3b CKD    Chronic bilateral low back pain without sciatica    Encounter for screening for malignant neoplasm of prostate  -     Comprehensive Metabolic Panel; Future  -     CBC Auto Differential; Future  -     Prostate Specific Antigen, Diagnostic; Future    Other orders  -     tiZANidine (ZANAFLEX) 4 MG tablet; Take 1 tablet (4 mg total) by mouth every 8 (eight) hours. For back pain and spasm.  -     irbesartan (AVAPRO) 300 MG tablet; Take 1 tablet (300 mg total) by mouth every evening.         No  follow-ups on file.     Romero Vogel MD

## 2023-11-06 ENCOUNTER — PATIENT MESSAGE (OUTPATIENT)
Dept: INTERNAL MEDICINE | Facility: CLINIC | Age: 83
End: 2023-11-06
Payer: MEDICARE

## 2023-11-06 NOTE — PROGRESS NOTES
I have seen the patient, reviewed the Resident's progress note. I have personally interviewed and examined the patient at bedside and agree with the findings.     Meli Cunningham MD  [facility]

## 2023-11-15 ENCOUNTER — PATIENT MESSAGE (OUTPATIENT)
Dept: INTERNAL MEDICINE | Facility: CLINIC | Age: 83
End: 2023-11-15
Payer: MEDICARE

## 2023-11-16 RX ORDER — FUROSEMIDE 40 MG/1
40 TABLET ORAL DAILY
Qty: 90 TABLET | Refills: 0 | Status: ON HOLD | OUTPATIENT
Start: 2023-11-16

## 2023-11-16 NOTE — TELEPHONE ENCOUNTER
Refill Routing Note   Medication(s) are not appropriate for processing by [facility] for the following reason(s):        Required vitals abnormal (BP)  No active prescription written by provider  Required labs abnormal (Cr)    [facility] action(s):  Defer               Appointments  past 12m or future 3m with PCP    Date Provider   Last Visit   9/26/2023 Romero Vogel MD   Next Visit   2/8/2024 Romero Vogel MD   ED visits in past 90 days: 0        Note composed:12:52 PM 11/16/2023

## 2023-11-16 NOTE — TELEPHONE ENCOUNTER
No care due was identified.  [facility] Embedded Care Due Messages. Reference number: [id number].   11/16/2023 8:17:08 AM CST

## 2023-11-27 NOTE — TELEPHONE ENCOUNTER
No care due was identified.  [facility] Embedded Care Due Messages. Reference number: [id number].   11/27/2023 5:03:42 PM CST

## 2023-11-28 RX ORDER — FLUTICASONE PROPIONATE 50 MCG
SPRAY, SUSPENSION (ML) NASAL
Qty: 48 G | Refills: 3 | Status: ON HOLD | OUTPATIENT
Start: 2023-11-28

## 2023-11-28 NOTE — TELEPHONE ENCOUNTER
Refill Decision Note   Adelfo Jennifer  is requesting a refill authorization.  Brief Assessment and Rationale for Refill:  Approve     Medication Therapy Plan:         Comments:     Note composed:10:42 AM 11/28/2023

## 2023-12-01 ENCOUNTER — PATIENT MESSAGE (OUTPATIENT)
Dept: RHEUMATOLOGY | Facility: CLINIC | Age: 83
End: 2023-12-01
Payer: MEDICARE

## 2023-12-21 ENCOUNTER — PATIENT MESSAGE (OUTPATIENT)
Dept: RHEUMATOLOGY | Facility: CLINIC | Age: 83
End: 2023-12-21
Payer: MEDICARE

## 2023-12-29 ENCOUNTER — PATIENT MESSAGE (OUTPATIENT)
Dept: ELECTROPHYSIOLOGY | Facility: CLINIC | Age: 83
End: 2023-12-29
Payer: MEDICARE

## 2023-12-29 ENCOUNTER — TELEPHONE (OUTPATIENT)
Dept: ELECTROPHYSIOLOGY | Facility: CLINIC | Age: 83
End: 2023-12-29
Payer: MEDICARE

## 2024-01-03 ENCOUNTER — TELEPHONE (OUTPATIENT)
Dept: DERMATOLOGY | Facility: CLINIC | Age: 84
End: 2024-01-03
Payer: MEDICARE

## 2024-01-03 ENCOUNTER — OFFICE VISIT (OUTPATIENT)
Dept: DERMATOLOGY | Facility: CLINIC | Age: 84
End: 2024-01-03
Payer: MEDICARE

## 2024-01-03 DIAGNOSIS — L30.9 DERMATITIS, UNSPECIFIED: Primary | ICD-10-CM

## 2024-01-03 PROCEDURE — 99212 OFFICE O/P EST SF 10 MIN: CPT | Mod: S$GLB,,, | Performed by: STUDENT IN AN ORGANIZED HEALTH CARE EDUCATION/TRAINING PROGRAM

## 2024-01-03 PROCEDURE — 1159F MED LIST DOCD IN RCRD: CPT | Mod: CPTII,S$GLB,, | Performed by: STUDENT IN AN ORGANIZED HEALTH CARE EDUCATION/TRAINING PROGRAM

## 2024-01-03 PROCEDURE — 1101F PT FALLS ASSESS-DOCD LE1/YR: CPT | Mod: CPTII,S$GLB,, | Performed by: STUDENT IN AN ORGANIZED HEALTH CARE EDUCATION/TRAINING PROGRAM

## 2024-01-03 PROCEDURE — 3288F FALL RISK ASSESSMENT DOCD: CPT | Mod: CPTII,S$GLB,, | Performed by: STUDENT IN AN ORGANIZED HEALTH CARE EDUCATION/TRAINING PROGRAM

## 2024-01-03 PROCEDURE — 99999 PR PBB SHADOW E&M-EST. PATIENT-LVL II: CPT | Mod: PBBFAC,,, | Performed by: STUDENT IN AN ORGANIZED HEALTH CARE EDUCATION/TRAINING PROGRAM

## 2024-01-03 PROCEDURE — 87529 HSV DNA AMP PROBE: CPT | Mod: 59 | Performed by: STUDENT IN AN ORGANIZED HEALTH CARE EDUCATION/TRAINING PROGRAM

## 2024-01-03 PROCEDURE — 1126F AMNT PAIN NOTED NONE PRSNT: CPT | Mod: CPTII,S$GLB,, | Performed by: STUDENT IN AN ORGANIZED HEALTH CARE EDUCATION/TRAINING PROGRAM

## 2024-01-03 PROCEDURE — 1160F RVW MEDS BY RX/DR IN RCRD: CPT | Mod: CPTII,S$GLB,, | Performed by: STUDENT IN AN ORGANIZED HEALTH CARE EDUCATION/TRAINING PROGRAM

## 2024-01-03 NOTE — PROGRESS NOTES
Subjective:      Patient ID:  Adelfo ZHANG Jennifer, PhD is a 83 y.o. 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 Cunningham 10/31/23 initial hx:  Adelfo is an 82 yo M with HTN, CKD, NAFLD, and gout who presents today for evaluation above. LOV 5/15/23 with Dr. Cunningham at which time TBSE with no concerning lesions.      Patient reports L great toe with blistering eruption for a few months. Blister recur in the same spot near lateral proximal nail fold. They have clear fluid in them. It initially flared prior to rheum visit 9/12/23 where hew as given Medrol dose pack. This helped with soreness, but it never fully resolved. Blisters again erupted 10/21, and rheum ordered Medrol dose pack again and advised dermatologic evaluation. He finished Medrol dose pack yesterday. Used neosporin only after the blisters opened. No changes to shoes. No new medications.        Review of Systems    Objective:   Physical Exam     Diagram Legend     Erythematous scaling macule/papule c/w actinic keratosis       Vascular papule c/w angioma      Pigmented verrucoid papule/plaque c/w seborrheic keratosis      Yellow umbilicated papule c/w sebaceous hyperplasia      Irregularly shaped tan macule c/w lentigo     1-2 mm smooth white papules consistent with Milia      Movable subcutaneous cyst with punctum c/w epidermal inclusion cyst      Subcutaneous movable cyst c/w pilar cyst      Firm pink to brown papule c/w dermatofibroma      Pedunculated fleshy papule(s) c/w skin tag(s)      Evenly pigmented macule c/w junctional nevus     Mildly variegated pigmented, slightly irregular-bordered macule c/w mildly atypical nevus      Flesh colored to  evenly pigmented papule c/w intradermal nevus       Pink pearly papule/plaque c/w basal cell carcinoma      Erythematous hyperkeratotic cursted plaque c/w SCC      Surgical scar with no sign of skin cancer recurrence      Open and closed comedones      Inflammatory papules and pustules      Verrucoid papule consistent consistent with wart     Erythematous eczematous patches and plaques     Dystrophic onycholytic nail with subungual debris c/w onychomycosis     Umbilicated papule    Erythematous-base heme-crusted tan verrucoid plaque consistent with inflamed seborrheic keratosis     Erythematous Silvery Scaling Plaque c/w Psoriasis     See annotation        Assessment / Plan:        Dermatitis, unspecified  -     HSV by Rapid PCR, Non-Blood [facility]; Skin (left foot); Future; Expected date: 01/03/2024  There is extremely minimal serous drainage today (quite dry) and no active vesicles/bullae so I discussed swab may be lower yield, however will still perform HSV swab today. 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 Cunningham or myself  Reviewed tx options for HSV including antivirals prn for flares or daily for prophylaxis if he is getting frequent flares    RTC prn

## 2024-01-03 NOTE — TELEPHONE ENCOUNTER
Spoke with patient x 2. Was able to get him in with Dr. Haskins for HSV swab per Dr. Cunningham's last note. Pt voiced understanding and thanked us for seeing him--thanks Dr. Haskins!!    ----- Message from Samantha Bennett MA sent at 1/3/2024 12:30 PM CST -----  Regarding: FW: appt  Contact: [phone]  She said yes - only for swab so she won't be able to go over anything else with him (just want to make sure he knows). Around 2ish if he can.   Thanks!  ----- Message -----  From: Ethel Luna MA  Sent: 1/3/2024  11:40 AM CST  To: Samantha Bennett MA  Subject: FW: per                                           ----- Message -----  From: Ariana Jolly  Sent: 1/3/2024  10:26 AM CST  To: [facility] Staff  Subject: appt                                             Pt stated the provider told him call when he has an episode so a sample can be taken and he is having an episode today. Pt wants to be seen today and provider told pt she would fit him in. Pls call to discuss.

## 2024-01-05 LAB
HSV1 DNA SPEC QL NAA+PROBE: NORMAL
HSV2 DNA SPEC QL NAA+PROBE: NORMAL
SPECIMEN SOURCE: NORMAL

## 2024-01-10 RX ORDER — FLUTICASONE PROPIONATE AND SALMETEROL 250; 50 UG/1; UG/1
1 POWDER RESPIRATORY (INHALATION) 2 TIMES DAILY
Qty: 180 EACH | Refills: 2 | Status: SHIPPED | OUTPATIENT
Start: 2024-01-10

## 2024-01-10 NOTE — TELEPHONE ENCOUNTER
Refill Routing Note   Medication(s) are not appropriate for processing by [facility] for the following reason(s):        No active prescription written by provider    [facility] action(s):  Defer               Appointments  past 12m or future 3m with PCP    Date Provider   Last Visit   9/26/2023 Romero Vogel MD   Next Visit   2/8/2024 Romero Vogel MD   ED visits in past 90 days: 0        Note composed:7:15 AM 01/10/2024

## 2024-01-10 NOTE — TELEPHONE ENCOUNTER
No care due was identified.  [facility] Embedded Care Due Messages. Reference number: [id number].   1/09/2024 10:56:53 PM CST

## 2024-01-12 ENCOUNTER — OFFICE VISIT (OUTPATIENT)
Dept: CARDIOLOGY | Facility: CLINIC | Age: 84
End: 2024-01-12
Payer: MEDICARE

## 2024-01-12 VITALS
WEIGHT: 224 LBS | HEART RATE: 62 BPM | HEIGHT: 69 IN | DIASTOLIC BLOOD PRESSURE: 82 MMHG | SYSTOLIC BLOOD PRESSURE: 150 MMHG | BODY MASS INDEX: 33.18 KG/M2

## 2024-01-12 DIAGNOSIS — I47.19 PAT (PAROXYSMAL ATRIAL TACHYCARDIA): ICD-10-CM

## 2024-01-12 DIAGNOSIS — I10 ESSENTIAL HYPERTENSION: Primary | Chronic | ICD-10-CM

## 2024-01-12 DIAGNOSIS — N18.32 STAGE 3B CHRONIC KIDNEY DISEASE: ICD-10-CM

## 2024-01-12 DIAGNOSIS — I70.0 AORTIC ATHEROSCLEROSIS: ICD-10-CM

## 2024-01-12 DIAGNOSIS — G47.33 OBSTRUCTIVE SLEEP APNEA: ICD-10-CM

## 2024-01-12 DIAGNOSIS — I47.19 ATRIAL TACHYCARDIA: ICD-10-CM

## 2024-01-12 PROCEDURE — 1101F PT FALLS ASSESS-DOCD LE1/YR: CPT | Mod: CPTII,S$GLB,, | Performed by: INTERNAL MEDICINE

## 2024-01-12 PROCEDURE — 3079F DIAST BP 80-89 MM HG: CPT | Mod: CPTII,S$GLB,, | Performed by: INTERNAL MEDICINE

## 2024-01-12 PROCEDURE — 99999 PR PBB SHADOW E&M-EST. PATIENT-LVL IV: CPT | Mod: PBBFAC,,, | Performed by: INTERNAL MEDICINE

## 2024-01-12 PROCEDURE — 1160F RVW MEDS BY RX/DR IN RCRD: CPT | Mod: CPTII,S$GLB,, | Performed by: INTERNAL MEDICINE

## 2024-01-12 PROCEDURE — 99214 OFFICE O/P EST MOD 30 MIN: CPT | Mod: S$GLB,,, | Performed by: INTERNAL MEDICINE

## 2024-01-12 PROCEDURE — 3077F SYST BP >= 140 MM HG: CPT | Mod: CPTII,S$GLB,, | Performed by: INTERNAL MEDICINE

## 2024-01-12 PROCEDURE — 1126F AMNT PAIN NOTED NONE PRSNT: CPT | Mod: CPTII,S$GLB,, | Performed by: INTERNAL MEDICINE

## 2024-01-12 PROCEDURE — 1159F MED LIST DOCD IN RCRD: CPT | Mod: CPTII,S$GLB,, | Performed by: INTERNAL MEDICINE

## 2024-01-12 PROCEDURE — 93010 ELECTROCARDIOGRAM REPORT: CPT | Mod: S$GLB,,, | Performed by: INTERNAL MEDICINE

## 2024-01-12 PROCEDURE — 3288F FALL RISK ASSESSMENT DOCD: CPT | Mod: CPTII,S$GLB,, | Performed by: INTERNAL MEDICINE

## 2024-01-12 PROCEDURE — 93005 ELECTROCARDIOGRAM TRACING: CPT | Mod: S$GLB,,, | Performed by: INTERNAL MEDICINE

## 2024-01-12 RX ORDER — METOPROLOL SUCCINATE 50 MG/1
50 TABLET, EXTENDED RELEASE ORAL DAILY
Qty: 90 TABLET | Refills: 3 | Status: SHIPPED | OUTPATIENT
Start: 2024-01-12

## 2024-01-12 NOTE — PROGRESS NOTES
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    Referring Cardiologist: Hiral Morales MD  Primary Care Physician: Romero Vogel MD    HPI  Prior Hx:  I had the pleasure of seeing Dr. Rodriguez today in our electrophysiology clinic for his atrial arrhythmias. 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 In 2018 he began having exertional induced palpitations that would last for a few minutes. A 30 day event monitor was ordered. He had 1 such event whose onset was not captured. It was a narrow complex tachycardia at around 150 bpm that could have represented 2:1 atrial flutter. Another event was characterized by a short imelda of nonsustained atrial tachycardia. He was initiated on eliquis and metoprolol 50mg daily. He was intolerant of the metoprolol (symptomatic bradycardia in the 50s). He continues to exercise and 1-2 times weekly he has an episode. He exercises with a heart rate monitor and is able to record his work outs. With workouts associated with his symptoms, his heart rate curve rises to a plateau of ~120-130. Then there is a sudden spike in his heart rate to 150-160 that remains for 5-10 minutes then suddenly stops and returns to baseline. He notes palpitations and chest heaviness with these events. He had an abnormal stress echo with preserved LVEF 10/2018. Coronary angiography noted no obstructive CAD.    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 Unstable, nonsustainable left atrial and low lateral right atrial ATs were induced. Sustained sinus node tachycardia was however induced in response to atrial extrastimuli. This was likely sinus node reentry. Due to potential 2:1 tach/AFL noted on his event monitor, a CTI ablation was performed. He returned for follow-up 8/2019. Since his procedure he had 1 instance of sudden  "tachycardia to 150s during exercise, lasting ~7 minutes. He otherwise felt well. We elected to implant an ILR for long-term rhythm monitoring for possible AF, which was performed 9/2019.    Dr. Rodriguez returned for 3 month post-ILR implant follow-up 12/2019. He felt well. ILR noted infrequent short episodes of atrial tachycardia. Longest episode he noted was 10/3/2019, lasting 2 minutes, however we only have 23 seconds of this as it likely fell just below detection.    12/2021: Dr. Rodriguez returned for follow-up. ILR monitoring over this past year have noted symptomatic activations correspond with sinus rhythm with PVCs or PACs. No NSAT observed. "AF events" were not consistent with AF in my opinion (sinus tach with PVCs). He has some nocturnal bradycardia at times. His main concern is lower extremity edema.    11/2022: Dr. Rodriguez returns for follow-up. Doing physical therapy. No significant palpitations.Episode of nonsustained AT observed on 9/2022 transmission. No definitive AF observed on any transmissions over the past year. 2 episodes of tachycardia labelled as "AF" over the past year are consistent with either sinus tachycardia or AF (PVCs during tach help demonstrate long-RP tach). Symptom activations all correspond to sinus rhythm with rare ectopy.    Interim Hx:  Dr. Rodriguez returns for follow-up. ILR no longer operable. Feels well. Monitors his HR and rhythm with his Apple iWatch. Feels well.     My interpretation of today's in-clinic ECG is sinus rhythm with a rate of 63 bpm and normal intervals.    Review of Systems   Constitutional: Negative for fever and malaise/fatigue.   HENT:  Negative for congestion and sore throat.    Eyes:  Negative for blurred vision and visual disturbance.   Cardiovascular:  Negative for chest pain, dyspnea on exertion, near-syncope and syncope.   Respiratory:  Negative for cough and shortness of breath.    Hematologic/Lymphatic: Negative for bleeding problem. Does not " bruise/bleed easily.   Skin: Negative.    Musculoskeletal:  Positive for arthritis.   Gastrointestinal:  Negative for bloating, abdominal pain, hematochezia and melena.   Neurological:  Negative for dizziness, focal weakness and weakness.        Objective:    Physical Exam  Vitals reviewed.   Constitutional:       General: He is not in acute distress.     Appearance: He is well-developed. He is not diaphoretic.   HENT:      Head: Normocephalic and atraumatic.   Eyes:      General:         Right eye: No discharge.         Left eye: No discharge.      Conjunctiva/sclera: Conjunctivae normal.   Neck:      Vascular: No JVD.   Cardiovascular:      Rate and Rhythm: Normal rate and regular rhythm.      Heart sounds: No murmur heard.     No friction rub. No gallop.   Pulmonary:      Effort: Pulmonary effort is normal. No respiratory distress.      Breath sounds: Normal breath sounds. No wheezing or rales.   Abdominal:      General: Bowel sounds are normal. There is no distension.      Palpations: Abdomen is soft.      Tenderness: There is no abdominal tenderness. There is no rebound.   Musculoskeletal:      Cervical back: Neck supple.   Skin:     General: Skin is warm and dry.   Neurological:      Mental Status: He is alert and oriented to person, place, and time.   Psychiatric:         Behavior: Behavior normal.         Thought Content: Thought content normal.         Judgment: Judgment normal.           Assessment:       1. Essential hypertension    2. Aortic atherosclerosis    3. Atrial tachycardia    4. Stage 3b chronic kidney disease    5. 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 He is s/p RFA of the CTI and EP study. The only sustained tachycardia induced was sinus tachycardia. Recommended trial of low dose of metoprolol (12.5mg daily, was intolerant to 50mg daily in the past) and stopping eliquis. He now is s/p ILR insertion for long-term monitoring for potential AF. No AF observed to date. Has infrequent short episodes of AT. Increase metoprolol to 50mg daily for HTN. Will let me know if he gets symptomatic bradycardia.      RTC in 12 months    Thank you for allowing me to participate in the care of this patient. Please do not hesitate to call me with any questions or concerns.    Gil Wilson MD, PhD  Cardiac Electrophysiology

## 2024-01-25 ENCOUNTER — LAB VISIT (OUTPATIENT)
Dept: LAB | Facility: HOSPITAL | Age: 84
End: 2024-01-25
Attending: INTERNAL MEDICINE
Payer: MEDICARE

## 2024-01-25 DIAGNOSIS — R97.20 ELEVATED PSA: ICD-10-CM

## 2024-01-25 DIAGNOSIS — D50.9 IRON DEFICIENCY ANEMIA, UNSPECIFIED IRON DEFICIENCY ANEMIA TYPE: ICD-10-CM

## 2024-01-25 DIAGNOSIS — I10 ESSENTIAL HYPERTENSION: ICD-10-CM

## 2024-01-25 DIAGNOSIS — Z12.5 ENCOUNTER FOR SCREENING FOR MALIGNANT NEOPLASM OF PROSTATE: ICD-10-CM

## 2024-01-25 LAB
ALBUMIN SERPL BCP-MCNC: 3.6 G/DL (ref 3.5–5.2)
ALP SERPL-CCNC: 67 U/L (ref 55–135)
ALT SERPL W/O P-5'-P-CCNC: 17 U/L (ref 10–44)
ANION GAP SERPL CALC-SCNC: 6 MMOL/L (ref 8–16)
AST SERPL-CCNC: 20 U/L (ref 10–40)
BASOPHILS # BLD AUTO: 0.09 K/UL (ref 0–0.2)
BASOPHILS NFR BLD: 1.5 % (ref 0–1.9)
BILIRUB SERPL-MCNC: 0.7 MG/DL (ref 0.1–1)
BUN SERPL-MCNC: 22 MG/DL (ref 8–23)
CALCIUM SERPL-MCNC: 9.2 MG/DL (ref 8.7–10.5)
CHLORIDE SERPL-SCNC: 108 MMOL/L (ref 95–110)
CO2 SERPL-SCNC: 25 MMOL/L (ref 23–29)
COMPLEXED PSA SERPL-MCNC: 4.3 NG/ML (ref 0–4)
CREAT SERPL-MCNC: 1.9 MG/DL (ref 0.5–1.4)
DIFFERENTIAL METHOD BLD: ABNORMAL
EOSINOPHIL # BLD AUTO: 0.5 K/UL (ref 0–0.5)
EOSINOPHIL NFR BLD: 7.9 % (ref 0–8)
ERYTHROCYTE [DISTWIDTH] IN BLOOD BY AUTOMATED COUNT: 12.5 % (ref 11.5–14.5)
EST. GFR  (NO RACE VARIABLE): 34.6 ML/MIN/1.73 M^2
GLUCOSE SERPL-MCNC: 91 MG/DL (ref 70–110)
HCT VFR BLD AUTO: 35.6 % (ref 40–54)
HGB BLD-MCNC: 11.3 G/DL (ref 14–18)
IMM GRANULOCYTES # BLD AUTO: 0.02 K/UL (ref 0–0.04)
IMM GRANULOCYTES NFR BLD AUTO: 0.3 % (ref 0–0.5)
LYMPHOCYTES # BLD AUTO: 2 K/UL (ref 1–4.8)
LYMPHOCYTES NFR BLD: 34.1 % (ref 18–48)
MCH RBC QN AUTO: 32.3 PG (ref 27–31)
MCHC RBC AUTO-ENTMCNC: 31.7 G/DL (ref 32–36)
MCV RBC AUTO: 102 FL (ref 82–98)
MONOCYTES # BLD AUTO: 0.8 K/UL (ref 0.3–1)
MONOCYTES NFR BLD: 12.8 % (ref 4–15)
NEUTROPHILS # BLD AUTO: 2.6 K/UL (ref 1.8–7.7)
NEUTROPHILS NFR BLD: 43.4 % (ref 38–73)
NRBC BLD-RTO: 0 /100 WBC
PLATELET # BLD AUTO: 170 K/UL (ref 150–450)
PMV BLD AUTO: 12.9 FL (ref 9.2–12.9)
POTASSIUM SERPL-SCNC: 4.7 MMOL/L (ref 3.5–5.1)
PROT SERPL-MCNC: 6.3 G/DL (ref 6–8.4)
RBC # BLD AUTO: 3.5 M/UL (ref 4.6–6.2)
SODIUM SERPL-SCNC: 139 MMOL/L (ref 136–145)
WBC # BLD AUTO: 5.96 K/UL (ref 3.9–12.7)

## 2024-01-25 PROCEDURE — 84153 ASSAY OF PSA TOTAL: CPT | Performed by: INTERNAL MEDICINE

## 2024-01-25 PROCEDURE — 85025 COMPLETE CBC W/AUTO DIFF WBC: CPT | Performed by: INTERNAL MEDICINE

## 2024-01-25 PROCEDURE — 80053 COMPREHEN METABOLIC PANEL: CPT | Performed by: INTERNAL MEDICINE

## 2024-01-25 PROCEDURE — 36415 COLL VENOUS BLD VENIPUNCTURE: CPT | Mod: PN | Performed by: INTERNAL MEDICINE

## 2024-02-08 ENCOUNTER — OFFICE VISIT (OUTPATIENT)
Dept: INTERNAL MEDICINE | Facility: CLINIC | Age: 84
End: 2024-02-08
Payer: MEDICARE

## 2024-02-08 VITALS
HEIGHT: 69 IN | TEMPERATURE: 97 F | SYSTOLIC BLOOD PRESSURE: 142 MMHG | WEIGHT: 233.69 LBS | DIASTOLIC BLOOD PRESSURE: 74 MMHG | BODY MASS INDEX: 34.61 KG/M2 | RESPIRATION RATE: 16 BRPM | HEART RATE: 64 BPM

## 2024-02-08 DIAGNOSIS — I10 ESSENTIAL HYPERTENSION: Primary | ICD-10-CM

## 2024-02-08 DIAGNOSIS — N25.81 SECONDARY HYPERPARATHYROIDISM: ICD-10-CM

## 2024-02-08 DIAGNOSIS — R21 RASH OF TOE: ICD-10-CM

## 2024-02-08 DIAGNOSIS — J45.20 INTERMITTENT ASTHMA WITHOUT COMPLICATION, UNSPECIFIED ASTHMA SEVERITY: ICD-10-CM

## 2024-02-08 DIAGNOSIS — N18.30 STAGE 3 CHRONIC KIDNEY DISEASE, UNSPECIFIED WHETHER STAGE 3A OR 3B CKD: ICD-10-CM

## 2024-02-08 DIAGNOSIS — M54.50 CHRONIC BILATERAL LOW BACK PAIN WITHOUT SCIATICA: ICD-10-CM

## 2024-02-08 DIAGNOSIS — G89.29 CHRONIC BILATERAL LOW BACK PAIN WITHOUT SCIATICA: ICD-10-CM

## 2024-02-08 PROCEDURE — 3077F SYST BP >= 140 MM HG: CPT | Mod: CPTII,S$GLB,, | Performed by: INTERNAL MEDICINE

## 2024-02-08 PROCEDURE — 3078F DIAST BP <80 MM HG: CPT | Mod: CPTII,S$GLB,, | Performed by: INTERNAL MEDICINE

## 2024-02-08 PROCEDURE — 1101F PT FALLS ASSESS-DOCD LE1/YR: CPT | Mod: CPTII,S$GLB,, | Performed by: INTERNAL MEDICINE

## 2024-02-08 PROCEDURE — 3288F FALL RISK ASSESSMENT DOCD: CPT | Mod: CPTII,S$GLB,, | Performed by: INTERNAL MEDICINE

## 2024-02-08 PROCEDURE — 1125F AMNT PAIN NOTED PAIN PRSNT: CPT | Mod: CPTII,S$GLB,, | Performed by: INTERNAL MEDICINE

## 2024-02-08 PROCEDURE — 99214 OFFICE O/P EST MOD 30 MIN: CPT | Mod: S$GLB,,, | Performed by: INTERNAL MEDICINE

## 2024-02-08 PROCEDURE — 99999 PR PBB SHADOW E&M-EST. PATIENT-LVL V: CPT | Mod: PBBFAC,,, | Performed by: INTERNAL MEDICINE

## 2024-02-08 RX ORDER — ALBUTEROL SULFATE 90 UG/1
2 AEROSOL, METERED RESPIRATORY (INHALATION) EVERY 4 HOURS PRN
Qty: 8 G | Refills: 3 | Status: SHIPPED | OUTPATIENT
Start: 2024-02-08 | End: 2024-03-18

## 2024-02-08 RX ORDER — HYDRALAZINE HYDROCHLORIDE 25 MG/1
25 TABLET, FILM COATED ORAL EVERY 12 HOURS
Qty: 60 TABLET | Refills: 11 | Status: SHIPPED | OUTPATIENT
Start: 2024-02-08 | End: 2024-03-11

## 2024-02-11 ENCOUNTER — PATIENT MESSAGE (OUTPATIENT)
Dept: CARDIOLOGY | Facility: CLINIC | Age: 84
End: 2024-02-11
Payer: MEDICARE

## 2024-02-11 ENCOUNTER — PATIENT MESSAGE (OUTPATIENT)
Dept: INTERNAL MEDICINE | Facility: CLINIC | Age: 84
End: 2024-02-11
Payer: MEDICARE

## 2024-02-14 DIAGNOSIS — I47.19 PAT (PAROXYSMAL ATRIAL TACHYCARDIA): Primary | ICD-10-CM

## 2024-02-15 ENCOUNTER — PATIENT MESSAGE (OUTPATIENT)
Dept: INTERNAL MEDICINE | Facility: CLINIC | Age: 84
End: 2024-02-15
Payer: MEDICARE

## 2024-02-15 RX ORDER — AZITHROMYCIN 250 MG/1
TABLET, FILM COATED ORAL
Qty: 6 TABLET | Refills: 0 | Status: SHIPPED | OUTPATIENT
Start: 2024-02-15 | End: 2024-03-18

## 2024-02-19 ENCOUNTER — PATIENT MESSAGE (OUTPATIENT)
Dept: INTERNAL MEDICINE | Facility: CLINIC | Age: 84
End: 2024-02-19
Payer: MEDICARE

## 2024-02-19 ENCOUNTER — TELEPHONE (OUTPATIENT)
Dept: DERMATOLOGY | Facility: CLINIC | Age: 84
End: 2024-02-19
Payer: MEDICARE

## 2024-02-19 NOTE — TELEPHONE ENCOUNTER
Called pt, scheduled for skin check. Pt thanked me for call.     ----- Message from Olimpia Taylor sent at 2/19/2024 12:05 PM CST -----  Regarding: Appt Access  Contact: [phone]  Pt received a recall in the mail to schedule an appt w provider for an annual skin check. I don't see any availability on my end for provider. Please give pt a call to get scheduled.

## 2024-02-21 ENCOUNTER — PATIENT MESSAGE (OUTPATIENT)
Dept: INTERNAL MEDICINE | Facility: CLINIC | Age: 84
End: 2024-02-21
Payer: MEDICARE

## 2024-02-22 RX ORDER — BENZONATATE 200 MG/1
200 CAPSULE ORAL 3 TIMES DAILY PRN
Qty: 40 CAPSULE | Refills: 1 | Status: SHIPPED | OUTPATIENT
Start: 2024-02-22 | End: 2024-03-18

## 2024-02-22 RX ORDER — PREDNISONE 20 MG/1
TABLET ORAL
Qty: 30 TABLET | Refills: 0 | Status: SHIPPED | OUTPATIENT
Start: 2024-02-22 | End: 2024-03-18

## 2024-02-23 ENCOUNTER — PATIENT MESSAGE (OUTPATIENT)
Dept: INTERNAL MEDICINE | Facility: CLINIC | Age: 84
End: 2024-02-23
Payer: MEDICARE

## 2024-02-23 ENCOUNTER — OFFICE VISIT (OUTPATIENT)
Dept: OPTOMETRY | Facility: CLINIC | Age: 84
End: 2024-02-23
Payer: MEDICARE

## 2024-02-23 ENCOUNTER — PATIENT MESSAGE (OUTPATIENT)
Dept: OPTOMETRY | Facility: CLINIC | Age: 84
End: 2024-02-23

## 2024-02-23 ENCOUNTER — CLINICAL SUPPORT (OUTPATIENT)
Dept: CARDIOLOGY | Facility: HOSPITAL | Age: 84
End: 2024-02-23
Attending: INTERNAL MEDICINE
Payer: MEDICARE

## 2024-02-23 DIAGNOSIS — H52.203 MYOPIA WITH ASTIGMATISM AND PRESBYOPIA, BILATERAL: ICD-10-CM

## 2024-02-23 DIAGNOSIS — Z13.5 GLAUCOMA SCREENING: ICD-10-CM

## 2024-02-23 DIAGNOSIS — H25.13 NUCLEAR SCLEROSIS, BILATERAL: Primary | ICD-10-CM

## 2024-02-23 DIAGNOSIS — I47.19 PAT (PAROXYSMAL ATRIAL TACHYCARDIA): ICD-10-CM

## 2024-02-23 DIAGNOSIS — H52.13 MYOPIA WITH ASTIGMATISM AND PRESBYOPIA, BILATERAL: ICD-10-CM

## 2024-02-23 DIAGNOSIS — H52.4 MYOPIA WITH ASTIGMATISM AND PRESBYOPIA, BILATERAL: ICD-10-CM

## 2024-02-23 PROCEDURE — 93227 XTRNL ECG REC<48 HR R&I: CPT | Mod: ,,, | Performed by: INTERNAL MEDICINE

## 2024-02-23 PROCEDURE — 93226 XTRNL ECG REC<48 HR SCAN A/R: CPT

## 2024-02-23 PROCEDURE — 1126F AMNT PAIN NOTED NONE PRSNT: CPT | Mod: CPTII,S$GLB,, | Performed by: OPTOMETRIST

## 2024-02-23 PROCEDURE — 3288F FALL RISK ASSESSMENT DOCD: CPT | Mod: CPTII,S$GLB,, | Performed by: OPTOMETRIST

## 2024-02-23 PROCEDURE — 92015 DETERMINE REFRACTIVE STATE: CPT | Mod: S$GLB,,, | Performed by: OPTOMETRIST

## 2024-02-23 PROCEDURE — 99999 PR PBB SHADOW E&M-EST. PATIENT-LVL III: CPT | Mod: PBBFAC,,, | Performed by: OPTOMETRIST

## 2024-02-23 PROCEDURE — 92014 COMPRE OPH EXAM EST PT 1/>: CPT | Mod: S$GLB,,, | Performed by: OPTOMETRIST

## 2024-02-23 PROCEDURE — 1159F MED LIST DOCD IN RCRD: CPT | Mod: CPTII,S$GLB,, | Performed by: OPTOMETRIST

## 2024-02-23 PROCEDURE — 1101F PT FALLS ASSESS-DOCD LE1/YR: CPT | Mod: CPTII,S$GLB,, | Performed by: OPTOMETRIST

## 2024-02-23 PROCEDURE — 1160F RVW MEDS BY RX/DR IN RCRD: CPT | Mod: CPTII,S$GLB,, | Performed by: OPTOMETRIST

## 2024-02-23 NOTE — PROGRESS NOTES
HPI    82 Y/o male is here for routine eye exam with C/o pt states he notices he   needs a lot of light to see clearly when readers   Pt denies pain and discomfort  No f/f    Eye med: no gtt   Last edited by Urszula Sparks MA on 2/23/2024 12:51 PM.            Assessment /Plan     For exam results, see Encounter Report.    Nuclear sclerosis, bilateral    Myopia with astigmatism and presbyopia, bilateral    Glaucoma screening      Cat OU--discussed surgery, but pt wishes to wait    PLAN:    Rtc 1 yr, or will call sooner if wishes cat wilder Cedeno (who operated on his wife)

## 2024-02-27 LAB
OHS CV EVENT MONITOR DAY: 0
OHS CV HOLTER LENGTH DECIMAL HOURS: 48
OHS CV HOLTER LENGTH HOURS: 48
OHS CV HOLTER LENGTH MINUTES: 0
OHS CV HOLTER SINUS AVERAGE HR: 69
OHS CV HOLTER SINUS MAX HR: 122
OHS CV HOLTER SINUS MIN HR: 50

## 2024-02-28 ENCOUNTER — TELEPHONE (OUTPATIENT)
Dept: ELECTROPHYSIOLOGY | Facility: CLINIC | Age: 84
End: 2024-02-28
Payer: MEDICARE

## 2024-03-11 ENCOUNTER — CLINICAL SUPPORT (OUTPATIENT)
Dept: INTERNAL MEDICINE | Facility: CLINIC | Age: 84
End: 2024-03-11
Payer: MEDICARE

## 2024-03-11 ENCOUNTER — TELEPHONE (OUTPATIENT)
Dept: INTERNAL MEDICINE | Facility: CLINIC | Age: 84
End: 2024-03-11

## 2024-03-11 VITALS — HEART RATE: 60 BPM | SYSTOLIC BLOOD PRESSURE: 128 MMHG | DIASTOLIC BLOOD PRESSURE: 58 MMHG

## 2024-03-11 DIAGNOSIS — I10 ESSENTIAL HYPERTENSION: Primary | ICD-10-CM

## 2024-03-11 PROCEDURE — 99999 PR PBB SHADOW E&M-EST. PATIENT-LVL I: CPT | Mod: PBBFAC,,,

## 2024-03-11 RX ORDER — HYDRALAZINE HYDROCHLORIDE 25 MG/1
25 TABLET, FILM COATED ORAL EVERY 8 HOURS
Qty: 90 TABLET | Refills: 11 | Status: SHIPPED | OUTPATIENT
Start: 2024-03-11 | End: 2025-03-11

## 2024-03-11 NOTE — TELEPHONE ENCOUNTER
Seen today for nurse visit.  Doing well on medication.  New rx given 2/8/24 visit hydralazine bid.  Is on other pressure meds  (lasix as need, irbesartan 300mg pm, metoprolol succinate qd)      Bp today at nurse visit 128/58, p60.  Has appt to see you in July.    Brought extensive list of bp readings from home.  Week of 3/3 readings, (approx a month after started hydralazine bid )  are below..       Systolic 140, 144, 139  diastolic 69, 73 68   pulse 53,55,60               146, 136, 127  diastolic  77, 69, 62  pulse 56,60,60                233, 148,129  diastolic  71, 76, 58  pulse 51,60,68               128,125, 140  diastolic 66, 65, 67  pulse 51,62, 92                136                 diastolic 64              pulse 64         Email about allergy sent to dr stark to address to.    Thanks madhav

## 2024-03-11 NOTE — PROGRESS NOTES
Detailed bp readings given from home.  Readings are right after takes am and bp meds.      Week of 3/3 readings, approx a month after started hydralazine bid .                                         Systolic 140, 144, 139  diastolic 69, 73 68   pulse 53,55,60                                                    146, 136, 127  diastolic  77, 69, 62  pulse 56,60,60                                                    133, 148,129  diastolic  71, 76, 58  pulse 51, 60, 68                                                    128,125, 140  diastolic 66, 65, 67  pulse 51,62, 92                                                    136                               64              pulse 64

## 2024-03-11 NOTE — TELEPHONE ENCOUNTER
Current BP medications should be continued but the dosing of hydralazine 25 mg should be changed to every 8 hours (3 times a day).  Hopefully, this will reduce  elevations in the systolic pressures greater than 130. The prescription order will be changed.

## 2024-03-11 NOTE — TELEPHONE ENCOUNTER
At nurse visit today, he is wondering if  Benzonate should be added to his allergy list.  See his msg.     He had eye drops put in and eye started bothering him  Right after ,  He took benzonate when he got him.    Drops caused? Or Benzonate ?

## 2024-03-17 PROBLEM — E21.3 HYPERPARATHYROIDISM: Status: ACTIVE | Noted: 2017-02-01

## 2024-03-17 NOTE — PROGRESS NOTES
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    Chief Complaint: Hypertension (4 mos wlabs ), Hyperlipidemia, and Toe Pain (Saw 2 dr's  no determination cause.  Pain at left big toe at 2   )    HPI  The patient presents for follow-up of medical conditions which include hypertension, chronic asthma chronic lower back pain.  The patient had left great toe pain that has been treated x2 with Medrol Dosepak.  Symptoms finally resolved.    Patient has noted elevated blood pressure and low heart rates recently.  Rates have ranged from 59 to the low 60s.  Blood pressure readings have been greater than or equal to 140/70.  The patient checks his blood pressures daily.  The patient reports he has traveled out of town recently.  Dietary intake had  changed.  The patient's blood pressure diary has been reviewed.    Peak expiratory flow readings have been good.  His asthma has been stable.  The patient exercises twice a week at home.    Chronic lower back pain symptoms have generally been much better lately.  There is no lower extremity weakness.  There is no bowel or bladder sphincter dysfunction.    Chronic kidney disease is present.  The patient sees his nephrologist every 6 months as recommended.    Review of Systems   Constitutional:  Positive for activity change. Negative for appetite change, fatigue and unexpected weight change.   HENT:  Negative for sinus pressure/congestion and sore throat.    Eyes:  Negative for visual disturbance.   Respiratory:  Negative for cough, chest tightness, shortness of breath and wheezing.    Cardiovascular:  Negative for chest pain, palpitations and leg swelling.   Gastrointestinal:  Negative for abdominal pain, blood in stool, nausea and vomiting.   Genitourinary:  Negative for dysuria, hematuria and urgency.   Musculoskeletal:  Positive for arthralgias and back pain. Negative for gait problem, joint swelling, myalgias and neck stiffness.   Integumentary:   Negative for color change and rash.   Neurological:  Negative for dizziness, syncope, weakness, light-headedness, numbness and headaches.   Psychiatric/Behavioral:  Negative for sleep disturbance.             Physical Exam  Vitals and nursing note reviewed.   Constitutional:       General: He is not in acute distress.     Appearance: Normal appearance. He is well-developed.   HENT:      Head: Normocephalic and atraumatic.   Eyes:      General: No scleral icterus.     Extraocular Movements: Extraocular movements intact.      Conjunctiva/sclera: Conjunctivae normal.   Neck:      Thyroid: No thyromegaly.      Vascular: No JVD.   Cardiovascular:      Rate and Rhythm: Normal rate and regular rhythm.      Heart sounds: Normal heart sounds. No murmur heard.     No friction rub. No gallop.   Pulmonary:      Effort: Pulmonary effort is normal. No respiratory distress.      Breath sounds: Normal breath sounds. No wheezing or rales.   Abdominal:      General: Bowel sounds are normal.      Palpations: Abdomen is soft. There is no mass.      Tenderness: There is no abdominal tenderness.   Musculoskeletal:         General: No tenderness. Normal range of motion.      Cervical back: Normal range of motion and neck supple.      Right lower leg: No edema.      Left lower leg: No edema.   Lymphadenopathy:      Cervical: No cervical adenopathy.   Skin:     General: Skin is warm and dry.      Findings: No rash.   Neurological:      Mental Status: He is alert and oriented to person, place, and time.      Comments: Gait is normal.   Psychiatric:         Mood and Affect: Mood normal.         Behavior: Behavior normal.           Lab Visit on 01/25/2024   Component Date Value Ref Range Status    Sodium 01/25/2024 139  136 - 145 mmol/L Final    Potassium 01/25/2024 4.7  3.5 - 5.1 mmol/L Final    Chloride 01/25/2024 108  95 - 110 mmol/L Final    CO2 01/25/2024 25  23 - 29 mmol/L Final    Glucose 01/25/2024 91  70 - 110 mg/dL Final    BUN  01/25/2024 22  8 - 23 mg/dL Final    Creatinine 01/25/2024 1.9 (H)  0.5 - 1.4 mg/dL Final    Calcium 01/25/2024 9.2  8.7 - 10.5 mg/dL Final    Total Protein 01/25/2024 6.3  6.0 - 8.4 g/dL Final    Albumin 01/25/2024 3.6  3.5 - 5.2 g/dL Final    Total Bilirubin 01/25/2024 0.7  0.1 - 1.0 mg/dL Final    Comment: For infants and newborns, interpretation of results should be based  on gestational age, weight and in agreement with clinical  observations.    Premature Infant recommended reference ranges:  Up to 24 hours.............<8.0 mg/dL  Up to 48 hours............<12.0 mg/dL  3-5 days..................<15.0 mg/dL  6-29 days.................<15.0 mg/dL      Alkaline Phosphatase 01/25/2024 67  55 - 135 U/L Final    AST 01/25/2024 20  10 - 40 U/L Final    ALT 01/25/2024 17  10 - 44 U/L Final    eGFR 01/25/2024 34.6 (A)  >60 mL/min/1.73 m^2 Final    Anion Gap 01/25/2024 6 (L)  8 - 16 mmol/L Final    WBC 01/25/2024 5.96  3.90 - 12.70 K/uL Final    RBC 01/25/2024 3.50 (L)  4.60 - 6.20 M/uL Final    Hemoglobin 01/25/2024 11.3 (L)  14.0 - 18.0 g/dL Final    Hematocrit 01/25/2024 35.6 (L)  40.0 - 54.0 % Final    MCV 01/25/2024 102 (H)  82 - 98 fL Final    MCH 01/25/2024 32.3 (H)  27.0 - 31.0 pg Final    MCHC 01/25/2024 31.7 (L)  32.0 - 36.0 g/dL Final    RDW 01/25/2024 12.5  11.5 - 14.5 % Final    Platelets 01/25/2024 170  150 - 450 K/uL Final    MPV 01/25/2024 12.9  9.2 - 12.9 fL Final    Immature Granulocytes 01/25/2024 0.3  0.0 - 0.5 % Final    Gran # (ANC) 01/25/2024 2.6  1.8 - 7.7 K/uL Final    Immature Grans (Abs) 01/25/2024 0.02  0.00 - 0.04 K/uL Final    Comment: Mild elevation in immature granulocytes is non specific and   can be seen in a variety of conditions including stress response,   acute inflammation, trauma and pregnancy. Correlation with other   laboratory and clinical findings is essential.      Lymph # 01/25/2024 2.0  1.0 - 4.8 K/uL Final    Mono # 01/25/2024 0.8  0.3 - 1.0 K/uL Final    Eos # 01/25/2024  0.5  0.0 - 0.5 K/uL Final    Baso # 2024 0.09  0.00 - 0.20 K/uL Final    nRBC 2024 0  0 /100 WBC Final    Gran % 2024 43.4  38.0 - 73.0 % Final    Lymph % 2024 34.1  18.0 - 48.0 % Final    Mono % 2024 12.8  4.0 - 15.0 % Final    Eosinophil % 2024 7.9  0.0 - 8.0 % Final    Basophil % 2024 1.5  0.0 - 1.9 % Final    Differential Method 2024 Automated   Final    PSA Diagnostic 2024 4.3 (H)  0.00 - 4.00 ng/mL Final    Comment: The testing method is a chemiluminescent microparticle immunoassay   manufactured by [employer] and performed on the [employer]   or   [employer] system. Values obtained with different assay manufacturers   for   methods may be different and cannot be used interchangeably.  PSA Expected levels:  Hormonal Therapy: <0.05 ng/ml  Prostatectomy: <0.01 ng/ml  Radiation Therapy: <1.00 ng/ml     Office Visit on 2024   Component Date Value Ref Range Status    HSV PCR Source 2024 Test Not Performed   Corrected    left foot    HSV1, PCR 2024 Test Not Performed   Final    Comment: Herpes Simplex Virus, PCR, Varies was cancelled on   2024 at 22:27;  collection container   submitted.    Test Performed by:  [facility] [place] [address]  : Samson Casey M.D. Ph.D.; CLIA# [id number]      HSV2, PCR 2024 Test Not Performed   Final       Assessment & Plan:      Adelfo was seen today for hypertension, hyperlipidemia and toe pain.  Hydralazine will be ordered for better blood pressure control.  A blood pressure recheck with staff in 1 month is recommended.  The patient should continue self-monitoring of blood pressure.    Diagnoses and all orders for this visit:    Essential hypertension  -     Comprehensive Metabolic Panel; Future  -     CBC Auto Differential; Future    Intermittent asthma without complication, unspecified asthma  severity    Rash of toe    Secondary hyperparathyroidism    Chronic bilateral low back pain without sciatica    Stage 3 chronic kidney disease, unspecified whether stage 3a or 3b CKD    Other orders  -     Discontinue: hydrALAZINE (APRESOLINE) 25 MG tablet; Take 1 tablet (25 mg total) by mouth every 12 (twelve) hours.  -     albuterol (PROAIR HFA) 90 mcg/actuation inhaler; Inhale 2 puffs into the lungs every 4 (four) hours as needed for Wheezing.         Follow up in about 4 months (around 6/8/2024).     Romero Vogel MD

## 2024-03-18 ENCOUNTER — OFFICE VISIT (OUTPATIENT)
Dept: RHEUMATOLOGY | Facility: CLINIC | Age: 84
End: 2024-03-18
Payer: MEDICARE

## 2024-03-18 VITALS
DIASTOLIC BLOOD PRESSURE: 62 MMHG | HEART RATE: 61 BPM | BODY MASS INDEX: 34.93 KG/M2 | SYSTOLIC BLOOD PRESSURE: 140 MMHG | WEIGHT: 236.56 LBS

## 2024-03-18 DIAGNOSIS — M1A.9XX1 TOPHACEOUS GOUT: Primary | ICD-10-CM

## 2024-03-18 PROCEDURE — 3077F SYST BP >= 140 MM HG: CPT | Mod: CPTII,S$GLB,, | Performed by: INTERNAL MEDICINE

## 2024-03-18 PROCEDURE — 99213 OFFICE O/P EST LOW 20 MIN: CPT | Mod: S$GLB,,, | Performed by: INTERNAL MEDICINE

## 2024-03-18 PROCEDURE — 1126F AMNT PAIN NOTED NONE PRSNT: CPT | Mod: CPTII,S$GLB,, | Performed by: INTERNAL MEDICINE

## 2024-03-18 PROCEDURE — 1159F MED LIST DOCD IN RCRD: CPT | Mod: CPTII,S$GLB,, | Performed by: INTERNAL MEDICINE

## 2024-03-18 PROCEDURE — 3078F DIAST BP <80 MM HG: CPT | Mod: CPTII,S$GLB,, | Performed by: INTERNAL MEDICINE

## 2024-03-18 PROCEDURE — 99999 PR PBB SHADOW E&M-EST. PATIENT-LVL IV: CPT | Mod: PBBFAC,,, | Performed by: INTERNAL MEDICINE

## 2024-03-18 RX ORDER — FEBUXOSTAT 40 MG/1
40 TABLET, FILM COATED ORAL DAILY
Qty: 90 TABLET | Refills: 3 | Status: SHIPPED | OUTPATIENT
Start: 2024-03-18

## 2024-03-18 ASSESSMENT — ROUTINE ASSESSMENT OF PATIENT INDEX DATA (RAPID3)
PSYCHOLOGICAL DISTRESS SCORE: 0
WHEN YOU AWAKENED IN THE MORNING OVER THE LAST WEEK, PLEASE INDICATE THE AMOUNT OF TIME IT TAKES UNTIL YOU ARE AS LIMBER AS YOU WILL BE FOR THE DAY: 0.2
TOTAL RAPID3 SCORE: 0.5
PAIN SCORE: 0.5
MDHAQ FUNCTION SCORE: 0
FATIGUE SCORE: 1
PATIENT GLOBAL ASSESSMENT SCORE: 1
AM STIFFNESS SCORE: 1, YES

## 2024-03-18 NOTE — PROGRESS NOTES
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    Hyperlipidemia, LDL 77.8 9/11/23   on atorvastatin 20mg daily  /62  , metoprolol succinate(XL) 50mg daily and irbesartan 300 mg daily, furosemide 40mg daily hydralazine 25mg three times daily  Mild stable macrocytic anemia  Maculopapular rash dorsal left great toe over distal phalanx seeing Dr. Cunningham /Haskins   dermatitis left great toe     Check serum uric acid   Continue febuxostat 40mg daily  Cont exercise: yard work, walk, elliptical, bike  Try You Tube: chair yoga, [employer], Pilates  [id number] john Mediterranean diet reviewed again, working on it with wife  CBC, CMP uric acid q 6 months  RTC 12 months/prn

## 2024-03-18 NOTE — PROGRESS NOTES
3/15/2024     9:29 AM   Rapid3 Question Responses and Scores   MDHAQ Score 0   Psychologic Score 0   Pain Score 0.5   When you awakened in the morning OVER THE LAST WEEK, did you feel stiff? Yes   If Yes, please indicate the number of hours until you are as limber as you will be for the day 0.2   Fatigue Score 1   Global Health Score 1   RAPID3 Score 0.5         Answers submitted by the patient for this visit:  Rheumatology Questionnaire (Submitted on 3/15/2024)  fever: No  eye redness: No  mouth sores: No  headaches: No  shortness of breath: No  chest pain: No  trouble swallowing: No  diarrhea: No  constipation: No  unexpected weight change: No  genital sore: No  During the last 3 days, have you had a skin rash?: Yes  Bruises or bleeds easily: Yes  cough: No

## 2024-03-18 NOTE — PROGRESS NOTES
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 LCV was 9/2023. At that time he had mild redness and pain of the left great toe (smoldering gout) for which he was given a medrol dose pack.     Today: Today, the patient states that his toe is slightly worse than last time. He endorses intermittent mild pain (0.5/10) and swelling and blistering of the the toe. Dermatology suspected herpetic peter but the sample was unable to be run to confirm the diagnosis. He is scheduled to see them again in June with instructions to return sooner if the blisters reoccur for an new sample. He endorses taking his febuxostat as prescribed without side effect or concern. He recently completed 15 day course of prednisone on 3/8/24 that was prescribed for asthma exacerbation. He states this helped his toe symptoms.     Vaccines: UTD on flu, PNA, Tdap, Shingles.   Diet: He states he is making healthier food choices but not eating the Mediterranean diet  Exercise: He endorses he walks, bikes, and does elliptical.     Denies hair loss, dry eyes, vision changes, dry mouth, oral/nasal ulcers, trouble swallowing, joint swelling, morning stiffness, or GI disturbances.      Review of Systems   Review of Systems   Constitutional:  Negative for fever and unexpected weight change.   HENT:  Negative for mouth sores and trouble swallowing.    Eyes:  Negative for redness.   Respiratory:  Negative for cough and shortness of breath.    Cardiovascular:  Negative for chest pain.   Gastrointestinal:  Negative for constipation and diarrhea.   Genitourinary:  Negative for genital sores.   Skin:  Positive for rash.   Neurological:  Negative for headaches.   Hematological:  Does not bruise/bleed easily.        Current Medications:    Current Outpatient  Medications:     atorvastatin (LIPITOR) 20 MG tablet, TAKE 1 TABLET(20 MG) BY MOUTH EVERY DAY, Disp: 90 tablet, Rfl: 3    azelastine (ASTELIN) 137 mcg (0.1 %) nasal spray, 2 sprays (274 mcg total) by Nasal route 2 (two) times daily as needed., Disp: 30 mL, Rfl: 5    febuxostat (ULORIC) 40 mg Tab, Take 1 tablet (40 mg total) by mouth once daily., Disp: 90 tablet, Rfl: 3    fish oil-omega-3 fatty acids 300-1,000 mg capsule, Take 2 g by mouth once daily., Disp: , Rfl:     fluocinonide (LIDEX) 0.05 % external solution, Apply topically 2 (two) times daily. For severe rashes in scalp and ears only prn, Disp: 60 mL, Rfl: 3    fluticasone propionate (FLONASE) 50 mcg/actuation nasal spray, SHAKE LIQUID AND USE 2 SPRAYS(100 MCG) IN EACH NOSTRIL EVERY DAY, Disp: 48 g, Rfl: 3    fluticasone-salmeterol diskus inhaler 250-50 mcg, INHALE 1 PUFF BY MOUTH TWICE DAILY. RINSE MOUTH AFTER USE, Disp: 180 each, Rfl: 2    furosemide (LASIX) 40 MG tablet, Take 1 tablet (40 mg total) by mouth once daily., Disp: 90 tablet, Rfl: 0    glucosamine & chondroit sul.Na 750-600 mg Tab, Take 750 mg by mouth., Disp: , Rfl:     hydrALAZINE (APRESOLINE) 25 MG tablet, Take 1 tablet (25 mg total) by mouth every 8 (eight) hours., Disp: 90 tablet, Rfl: 11    hydrocortisone 2.5 % cream, Apply topically 2 (two) times daily. As needed for severe flares of rash on face and neck, Disp: 28 g, Rfl: 3    irbesartan (AVAPRO) 300 MG tablet, Take 1 tablet (300 mg total) by mouth every evening., Disp: 90 tablet, Rfl: 3    ketoconazole (NIZORAL) 2 % cream, Apply topically 2 (two) times daily. To dry skin PRN, Disp: 60 g, Rfl: 3    ketoconazole (NIZORAL) 2 % shampoo, To scalp and beard area for dry skin PRN, Disp: 120 mL, Rfl: 2    levocetirizine (XYZAL) 5 MG tablet, , Disp: , Rfl:     loratadine (CLARITIN) 10 mg tablet, Take 10 mg by mouth daily as needed., Disp: , Rfl:     metoprolol succinate (TOPROL-XL) 50 MG 24 hr tablet, Take 1 tablet (50 mg total) by mouth once  daily., Disp: 90 tablet, Rfl: 3    omeprazole (PRILOSEC) 20 MG capsule, TAKE 1 CAPSULE(20 MG) BY MOUTH EVERY DAY, Disp: 90 capsule, Rfl: 3    pramoxine-hydrocortisone (PROCTOCREAM-HC) 1-1 % rectal cream, Place rectally 2 (two) times daily., Disp: 30 g, Rfl: 1    albuterol (PROAIR HFA) 90 mcg/actuation inhaler, Inhale 2 puffs into the lungs every 4 (four) hours as needed for Wheezing. (Patient not taking: Reported on 3/18/2024), Disp: 8 g, Rfl: 3    azithromycin (ZITHROMAX Z-NICOLASA) 250 MG tablet, Take 2 tabs po on day 1; then 1 po daily until completed. (Patient not taking: Reported on 3/18/2024), Disp: 6 tablet, Rfl: 0    benzonatate (TESSALON) 200 MG capsule, Take 1 capsule (200 mg total) by mouth 3 (three) times daily as needed for Cough. (Patient not taking: Reported on 3/18/2024), Disp: 40 capsule, Rfl: 1    predniSONE (DELTASONE) 20 MG tablet, Take 3 tabs daily x 5 days; 2 tabs daily x 5 days; then 1 tab daily until completed for asthma flare. (Patient not taking: Reported on 3/18/2024), Disp: 30 tablet, Rfl: 0    triamcinolone acetonide 0.1% (KENALOG) 0.1 % cream, Apply topically 2 (two) times daily., Disp: 80 g, Rfl: 2    Current Facility-Administered Medications:     ciprofloxacin HCl tablet 500 mg, 500 mg, Oral, Once, Julee Urias, NP     Objective:     Vitals:    03/18/24 0940   BP: (!) 140/62   Pulse: 61        Body mass index is 34.93 kg/m².     Physical Exam   Constitutional: He is oriented to person, place, and time. normal appearance. He appears obese. No distress.   HENT:   Head: Normocephalic and atraumatic.   Eyes: Right eye exhibits no discharge. Left eye exhibits no discharge. No scleral icterus.   Cardiovascular: Normal rate, regular rhythm and normal heart sounds. Exam reveals no gallop and no friction rub.   No murmur heard.  Pulmonary/Chest: Effort normal and breath sounds normal. No respiratory distress. He has no wheezes. He has no rhonchi. He has no rales.   Abdominal: Soft. He exhibits  no distension. There is no abdominal tenderness.   Musculoskeletal:         General: Normal range of motion.      Right shoulder: Normal.      Left shoulder: Normal.      Right elbow: Normal.      Left elbow: Normal.      Right wrist: Normal.      Left wrist: Normal.      Cervical back: Normal range of motion.      Right knee: Normal.      Left knee: Normal.   Neurological: He is alert and oriented to person, place, and time.   Reflex Scores:       Patellar reflexes are 2+ on the right side and 2+ on the left side.  Skin: Rash (erythematous streaks and mild flaking of BL distal lower extremity with mild edema) noted.   Erythema of the left big toe   Psychiatric: Mood normal.       Right Side Rheumatological Exam     Examination finds the shoulder, elbow, wrist, knee, 1st PIP, 1st MCP, 2nd PIP, 2nd MCP, 3rd PIP, 3rd MCP, 4th PIP, 4th MCP, 5th PIP and 5th MCP normal.    Muscle Strength (0-5 scale):  Deltoid:  5  Biceps: 5/5   Triceps:  5  Iliopsoas: 5  Quadriceps:  5     Left Side Rheumatological Exam     Examination finds the shoulder, elbow, wrist, knee, 1st PIP, 1st MCP, 2nd PIP, 2nd MCP, 3rd PIP, 3rd MCP, 4th PIP, 4th MCP, 5th PIP and 5th MCP normal.    Muscle Strength (0-5 scale):  Deltoid:  5  Biceps: 5/5   Triceps:  5  Iliopsoas: 5  Quadriceps:  5            8/23/2022 9/12/2023   Tender (PEREZ-28) 0 / 28  0 / 28    Swollen (PEREZ-28) 0 / 28  0 / 28    Provider Global 0 mm --   Patient Global 0 mm 80 mm   ESR -- --   CRP -- --   PEREZ-28 (ESR) -- --   PEREZ-28 (CRP) -- --   CDAI Score 0  --        There is currently no information documented on the homunculus. Go to the Rheumatology activity and complete the homunculus joint exam.      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 Patient very mildly symptomatic  CKD, stage 3b. Cr 2.0, GFR 32.3  3.   Hyperlipidemia, LDL 77.8 ON 9/11/23  4.   /61 , metoprolol succinate 25mg  daily and irbesartan 150mg daily, furosemide 40mg daily  5.   Mild stable macrocytic anemia    Plan:      *Toe symptoms are likely not gout, continue to follow up with dermatology  Uric acid level today  Continue febuxostat 40mg daily  Cont exercise: yard work, walk, elliptical, bike  Mediterranean diet  cbc, cmp, uric acid q 6 months  RTC 1 year    Janiya Rivera, DO  PGY-1  [employer] [facility]

## 2024-03-19 ENCOUNTER — PATIENT MESSAGE (OUTPATIENT)
Dept: RHEUMATOLOGY | Facility: CLINIC | Age: 84
End: 2024-03-19
Payer: MEDICARE

## 2024-03-20 ENCOUNTER — LAB VISIT (OUTPATIENT)
Dept: LAB | Facility: HOSPITAL | Age: 84
End: 2024-03-20
Attending: INTERNAL MEDICINE
Payer: MEDICARE

## 2024-03-20 DIAGNOSIS — M1A.9XX1 TOPHACEOUS GOUT: ICD-10-CM

## 2024-03-20 LAB — URATE SERPL-MCNC: 5.5 MG/DL (ref 3.4–7)

## 2024-03-20 PROCEDURE — 36415 COLL VENOUS BLD VENIPUNCTURE: CPT | Mod: PN | Performed by: INTERNAL MEDICINE

## 2024-03-20 PROCEDURE — 84550 ASSAY OF BLOOD/URIC ACID: CPT | Performed by: INTERNAL MEDICINE

## 2024-03-22 ENCOUNTER — PATIENT MESSAGE (OUTPATIENT)
Dept: INTERNAL MEDICINE | Facility: CLINIC | Age: 84
End: 2024-03-22
Payer: MEDICARE

## 2024-03-22 DIAGNOSIS — R50.9 FEVER, UNSPECIFIED FEVER CAUSE: Primary | ICD-10-CM

## 2024-03-23 ENCOUNTER — OFFICE VISIT (OUTPATIENT)
Dept: URGENT CARE | Facility: CLINIC | Age: 84
End: 2024-03-23
Payer: MEDICARE

## 2024-03-23 VITALS
DIASTOLIC BLOOD PRESSURE: 70 MMHG | SYSTOLIC BLOOD PRESSURE: 130 MMHG | BODY MASS INDEX: 32.91 KG/M2 | OXYGEN SATURATION: 97 % | WEIGHT: 222.19 LBS | TEMPERATURE: 99 F | HEART RATE: 89 BPM | HEIGHT: 69 IN | RESPIRATION RATE: 16 BRPM

## 2024-03-23 DIAGNOSIS — Z11.59 ENCOUNTER FOR SCREENING FOR OTHER VIRAL DISEASES: ICD-10-CM

## 2024-03-23 DIAGNOSIS — R09.81 COUGH WITH CONGESTION OF PARANASAL SINUS: ICD-10-CM

## 2024-03-23 DIAGNOSIS — B34.9 ACUTE VIRAL SYNDROME: Primary | ICD-10-CM

## 2024-03-23 DIAGNOSIS — R05.8 COUGH WITH CONGESTION OF PARANASAL SINUS: ICD-10-CM

## 2024-03-23 DIAGNOSIS — R50.9 FEVER, UNSPECIFIED FEVER CAUSE: ICD-10-CM

## 2024-03-23 LAB
CTP QC/QA: YES
CTP QC/QA: YES
POC MOLECULAR INFLUENZA A AGN: NEGATIVE
POC MOLECULAR INFLUENZA B AGN: NEGATIVE
SARS-COV-2 AG RESP QL IA.RAPID: NEGATIVE

## 2024-03-23 PROCEDURE — 87811 SARS-COV-2 COVID19 W/OPTIC: CPT | Mod: QW,S$GLB,, | Performed by: PHYSICIAN ASSISTANT

## 2024-03-23 PROCEDURE — 99213 OFFICE O/P EST LOW 20 MIN: CPT | Mod: S$GLB,,, | Performed by: PHYSICIAN ASSISTANT

## 2024-03-23 PROCEDURE — 87502 INFLUENZA DNA AMP PROBE: CPT | Mod: QW,S$GLB,, | Performed by: PHYSICIAN ASSISTANT

## 2024-03-23 NOTE — PROGRESS NOTES
"Subjective:      Patient ID: dAelfo ZHANG Jennifer, PhD is a 83 y.o. male.    Vitals:  height is 5' 9" (1.753 m) and weight is 100.8 kg (222 lb 3.2 oz). His oral temperature is 98.7 °F (37.1 °C). His blood pressure is 130/70 and his pulse is 89. His respiration is 16 and oxygen saturation is 97%.     Chief Complaint: Fever (Elevated BP & HR with head congestion, too.. - Entered by patient)    83-year-old male with a history of hypertension, hyperlipidemia, ANNABELLE, allergic rhinitis, asthma, CKD stage 3 and other comorbidities who presents to urgent care clinic for evaluation.  Patient reports symptoms starting 3 days ago with nasal congestion, mild postnasal drip, has mild scratchy throat due to postnasal drip.  mild intermittent dry cough, ear congestion, and frontal headache.  Yesterday developed fever T-max 100.3° and today 101.3.  Did home COVID test on Wednesday which was negative.  Patient requesting repeat COVID testing done.  He takes Claritin every a.m. for allergies and add Xyzal in the evening when symptoms are severe.  Also takes Tylenol for his fever and headache.  Added Mucinex guaifenesin regular every 12 hours to help with his headache and congestion.  No other associated symptoms.  Reports his PCP, cardiologist and electrophysiologist has been adjusting his blood pressure medication.  Feels his blood pressure and heart rate has been elevated last few days since he has not been drinking as much water due to his illness.      Reports PCP prescribed him Tessalon Perles, Z-Gustavo, and steroids February 2024 for bad cough at that time.  This time his symptoms are different.    Fever   This is a new problem. The maximum temperature noted was 101 to 101.9 F. Temperature source: head thermometer. Associated symptoms include congestion, coughing and headaches. Pertinent negatives include no abdominal pain, chest pain, diarrhea, ear pain, muscle aches, nausea, rash, sleepiness, sore throat, urinary pain, vomiting or " wheezing. He has tried acetaminophen (Mucimex,xyzal tylenol) for the symptoms. The treatment provided mild relief.       Constitution: Positive for fever (101.3 at home). Negative for activity change, chills, sweating, fatigue and generalized weakness.   HENT:  Positive for congestion, nosebleeds (today) and postnasal drip. Negative for ear pain, hearing loss, facial swelling, sinus pain, sinus pressure, sore throat, trouble swallowing and voice change.    Neck: Negative for neck pain, neck stiffness and painful lymph nodes.   Cardiovascular:  Negative for chest pain, leg swelling, palpitations, sob on exertion and passing out.   Eyes:  Negative for eye discharge, eye pain, eye redness, photophobia, vision loss, double vision, blurred vision and eyelid swelling.   Respiratory:  Positive for cough. Negative for chest tightness, sputum production, COPD, shortness of breath, wheezing and asthma.    Gastrointestinal:  Negative for abdominal pain, nausea, vomiting, diarrhea, bright red blood in stool, dark colored stools, rectal bleeding, heartburn and bowel incontinence.   Genitourinary:  Negative for dysuria, frequency, urgency, urine decreased, flank pain, bladder incontinence, hematuria and history of kidney stones.   Musculoskeletal:  Negative for trauma, joint pain, joint swelling, abnormal ROM of joint, muscle cramps and muscle ache.   Skin:  Negative for color change, pale, rash and wound.   Allergic/Immunologic: Negative for seasonal allergies, asthma and immunocompromised state.   Neurological:  Positive for headaches. Negative for dizziness, history of vertigo, light-headedness, passing out, facial drooping, speech difficulty, coordination disturbances, loss of balance, disorientation, altered mental status, loss of consciousness, numbness, tingling and seizures.   Hematologic/Lymphatic: Negative for swollen lymph nodes, easy bruising/bleeding and trouble clotting. Does not bruise/bleed easily.    Psychiatric/Behavioral:  Negative for altered mental status and disorientation.       Past Medical History:   Diagnosis Date    ALLERGIC RHINITIS     Anemia     Asthma     Cataract     Hyperlipidemia     Hypertension     NAFLD (nonalcoholic fatty liver disease)     ANNABELLE (obstructive sleep apnea)     on CPAP    PVD (posterior vitreous detachment), left eye     Squamous cell cancer of skin of hand        Objective:     Physical Exam   Constitutional: He is oriented to person, place, and time. He appears well-developed. He is cooperative. He does not appear ill. No distress.   HENT:   Head: Normocephalic.   Ears:   Right Ear: Hearing, tympanic membrane, external ear and ear canal normal. No no drainage, swelling or tenderness. No mastoid tenderness.   Left Ear: Hearing, tympanic membrane, external ear and ear canal normal. No no drainage, swelling or tenderness. No mastoid tenderness.   Nose: Nose normal. No rhinorrhea or congestion. Right sinus exhibits no maxillary sinus tenderness and no frontal sinus tenderness. Left sinus exhibits no maxillary sinus tenderness and no frontal sinus tenderness.   Mouth/Throat: Uvula is midline, oropharynx is clear and moist and mucous membranes are normal. Mucous membranes are moist. No oral lesions. No trismus in the jaw. No uvula swelling. No oropharyngeal exudate, posterior oropharyngeal edema, posterior oropharyngeal erythema or tonsillar abscesses. No tonsillar exudate. Oropharynx is clear.   Eyes: Conjunctivae, EOM and lids are normal. Right eye exhibits no discharge. Left eye exhibits no discharge. Right conjunctiva is not injected. Right conjunctiva has no hemorrhage. Left conjunctiva is not injected. Left conjunctiva has no hemorrhage. Extraocular movement intact vision grossly intact gaze aligned appropriately   Neck: Phonation normal. Neck supple. No neck rigidity present.   Cardiovascular: Normal rate, regular rhythm, normal heart sounds and normal pulses.   No murmur  heard.  Pulmonary/Chest: Effort normal and breath sounds normal. No accessory muscle usage. No respiratory distress. He has no wheezes. He exhibits no tenderness.   Abdominal: Normal appearance. He exhibits no distension. Soft. There is no abdominal tenderness. There is no rebound and no guarding.   Musculoskeletal: Normal range of motion.         General: Normal range of motion.      Comments: Moves all extremities with normal tone, strength, and ROM.  Gait normal.   Lymphadenopathy:     He has cervical adenopathy.        Right cervical: No superficial cervical adenopathy present.       Left cervical: No superficial cervical adenopathy present.   Neurological: no focal deficit. He is alert, oriented to person, place, and time and at baseline. He has normal motor skills and normal sensation. He displays facial symmetry and no dysarthria. He exhibits normal muscle tone. Gait and coordination normal. Coordination normal. GCS eye subscore is 4. GCS verbal subscore is 5. GCS motor subscore is 6.   Skin: Skin is warm, dry and no rash. Capillary refill takes less than 2 seconds.   Psychiatric: He experiences Normal attention. His speech is normal and behavior is normal. Thought content normal.   Nursing note and vitals reviewed.    Results for orders placed or performed in visit on 03/23/24   POCT Influenza A/B MOLECULAR   Result Value Ref Range    POC Molecular Influenza A Ag Negative Negative, Not Reported    POC Molecular Influenza B Ag Negative Negative, Not Reported     Acceptable Yes    SARS Coronavirus 2 Antigen, POCT Manual Read   Result Value Ref Range    SARS Coronavirus 2 Antigen Negative Negative     Acceptable Yes      *Note: Due to a large number of results and/or encounters for the requested time period, some results have not been displayed. A complete set of results can be found in Results Review.         Assessment:     1. Acute viral syndrome    2. Fever, unspecified fever  cause    3. Cough with congestion of paranasal sinus    4. Encounter for screening for other viral diseases      Note dictated with voice recognition software, please excuse any grammatical errors.    Patient presents with clinical exam findings and history consistent with above.  We discussed the differential diagnosis.    On exam, patient is nontoxic appearing and vitals are stable.  Patient is essentially neurovascularly intact on exam.      Diagnostic testing results were independently reviewed and interpreted, which were discussed in depth with patient.   Test ordered in clinic:  COVID and flu negative.    Does not meet criteria for strep testing; it was offered but patient declined.    Offered RSV given his age group but patient declined since he does not have significant cough.  Additionally, previous progress notes/admissions/lab were reviewed and interpreted.  CMP 01/25/2024 and 09/11/2023 reviewed with decreased kidney function and EGFR.  PCP progress note 02/08/2024 reviewed      Plan:   Emphasized importance of prescribed and OTC symptomatic treatment to prevent worsening of condition.    Patient already has Claritin, Xyzal, Flonase, Tylenol, and Mucinex/guaifenesin for his symptoms.  Recommend him to increase oral hydration.    Acute viral syndrome    Fever, unspecified fever cause    Cough with congestion of paranasal sinus    Encounter for screening for other viral diseases  -     POCT Influenza A/B MOLECULAR  -     SARS Coronavirus 2 Antigen, POCT Manual Read      Note dictated with voice recognition software, please excuse any grammatical errors.    We had shared decision making for patient's treatment. We discussed side effects/alternatives/benefits/risk and patient would like to proceed with treatment plan. We also discussed other OTC treatment recommendations.    Patient was counseled, explained with the test results meaning, expected course, and answered all of questions. They can also receive  results via my chart.        Patient was instructed to return for re-evaluation with urgent care or PCP for continued outpatient workup and management if symptoms do not improve/worsening symptoms. Strict ED versus clinic precautions given in depth.   Guideline, discharge and follow-up instructions given verbally/printed; patient will also receive via Mychart. Patient verbalized understanding and agreed with the entirety of plan of care.         Additional MDM:     Heart Failure Score:   COPD = No        There are no Patient Instructions on file for this visit.

## 2024-03-25 ENCOUNTER — HOSPITAL ENCOUNTER (OUTPATIENT)
Dept: RADIOLOGY | Facility: HOSPITAL | Age: 84
Discharge: HOME | End: 2024-03-25
Attending: INTERNAL MEDICINE
Payer: MEDICARE

## 2024-03-25 DIAGNOSIS — R50.9 FEVER, UNSPECIFIED FEVER CAUSE: ICD-10-CM

## 2024-03-25 PROCEDURE — 71046 X-RAY EXAM CHEST 2 VIEWS: CPT | Mod: TC,PN

## 2024-03-25 PROCEDURE — 71046 X-RAY EXAM CHEST 2 VIEWS: CPT | Mod: 26,,, | Performed by: RADIOLOGY

## 2024-03-25 NOTE — TELEPHONE ENCOUNTER
I recommend obtaining a chest x-ray, CBC, and CMP and urinalysis and urine culture in view of the continuing low-grade fever.

## 2024-03-26 ENCOUNTER — PATIENT MESSAGE (OUTPATIENT)
Dept: INTERNAL MEDICINE | Facility: CLINIC | Age: 84
End: 2024-03-26
Payer: MEDICARE

## 2024-03-26 DIAGNOSIS — K86.2 PANCREATIC CYST: Primary | ICD-10-CM

## 2024-03-26 DIAGNOSIS — R79.89 ABNORMAL LFTS (LIVER FUNCTION TESTS): ICD-10-CM

## 2024-03-26 RX ORDER — LEVOFLOXACIN 500 MG/1
500 TABLET, FILM COATED ORAL DAILY
Qty: 10 TABLET | Refills: 0 | Status: ON HOLD | OUTPATIENT
Start: 2024-03-26 | End: 2024-04-04 | Stop reason: HOSPADM

## 2024-03-27 NOTE — TELEPHONE ENCOUNTER
I spoke with the patient this evening.  He reports maximum temperature was 101.3°.  His peak flows were normal.  He has noted a slight headache.  Blood pressure ranged from 158/80 but decreased to 120 systolic.  Heart rate has ranged from the 80s to 121 after walking.  He reports decreased appetite.  Denies having any cough or abdominal pain.    The chest x-ray showed an area of possible atelectasis versus early consolidation in the retrocardiac area.  CBC and CMP profiles were reviewed.  Liver enzyme levels were elevated including alkaline phosphatase.  Hyponatremia is noted.    The patient is allergic to penicillins.  He did have a prescription for azithromycin last month.  Will order Levaquin at this time.    Will obtain noncontrast CT of the abdomen and pelvis in view of abnormal liver enzyme levels including elevated alkaline phosphatase.  The patient does have documented history of pancreatic cyst.

## 2024-03-28 ENCOUNTER — HOSPITAL ENCOUNTER (OUTPATIENT)
Dept: RADIOLOGY | Facility: HOSPITAL | Age: 84
Discharge: HOME | End: 2024-03-28
Attending: INTERNAL MEDICINE
Payer: MEDICARE

## 2024-03-28 DIAGNOSIS — K86.2 PANCREATIC CYST: ICD-10-CM

## 2024-03-28 DIAGNOSIS — R79.89 ABNORMAL LFTS (LIVER FUNCTION TESTS): ICD-10-CM

## 2024-03-28 PROCEDURE — 74176 CT ABD & PELVIS W/O CONTRAST: CPT | Mod: TC

## 2024-03-28 PROCEDURE — 74176 CT ABD & PELVIS W/O CONTRAST: CPT | Mod: 26,,, | Performed by: RADIOLOGY

## 2024-04-01 ENCOUNTER — TELEPHONE (OUTPATIENT)
Dept: ELECTROPHYSIOLOGY | Facility: CLINIC | Age: 84
End: 2024-04-01
Payer: MEDICARE

## 2024-04-01 ENCOUNTER — TELEPHONE (OUTPATIENT)
Dept: CARDIOLOGY | Facility: CLINIC | Age: 84
End: 2024-04-01
Payer: MEDICARE

## 2024-04-01 ENCOUNTER — HOSPITAL ENCOUNTER (OUTPATIENT)
Dept: CARDIOLOGY | Facility: CLINIC | Age: 84
Discharge: HOME | End: 2024-04-01
Payer: MEDICARE

## 2024-04-01 DIAGNOSIS — R00.2 PALPITATIONS: ICD-10-CM

## 2024-04-01 DIAGNOSIS — R00.2 PALPITATIONS: Primary | ICD-10-CM

## 2024-04-01 PROCEDURE — 93000 ELECTROCARDIOGRAM COMPLETE: CPT | Mod: S$GLB,,, | Performed by: INTERNAL MEDICINE

## 2024-04-01 NOTE — TELEPHONE ENCOUNTER
"BMT Clinic Note    ID: Prasanth Maynard is a 32 Message from Maria T Min MA sent at 4/1/2024  1:37 PM CDT -----  Regarding: RE: Appointment Request ASAP- A-FIB Urgent  Patient would like to speak with the nurse. Can you assist?    Thanks  ----- Message -----  From: Car Merida  Sent: 4/1/2024  12:45 PM CDT  To: Steve PLEITEZ Staff  Subject: Appointment Request ASAP- A-FIB Urgent           Good afternoon,     Patient is requesting a callback ASAP this afternoon to discuss condition regarding A-FIB- Urgent. Please give the patient a callback at [phone].      Thank you,     ALINA Merida           Ekaterina Meeks PA-C  [id number]

## 2024-04-02 ENCOUNTER — HOSPITAL ENCOUNTER (INPATIENT)
Facility: HOSPITAL | Age: 84
LOS: 1 days | Discharge: HOME | DRG: 194 | End: 2024-04-04
Attending: STUDENT IN AN ORGANIZED HEALTH CARE EDUCATION/TRAINING PROGRAM | Admitting: STUDENT IN AN ORGANIZED HEALTH CARE EDUCATION/TRAINING PROGRAM
Payer: MEDICARE

## 2024-04-02 DIAGNOSIS — R53.1 WEAKNESS GENERALIZED: ICD-10-CM

## 2024-04-02 DIAGNOSIS — R50.9 FEVER: ICD-10-CM

## 2024-04-02 DIAGNOSIS — R07.9 CHEST PAIN: ICD-10-CM

## 2024-04-02 DIAGNOSIS — J18.9 COMMUNITY ACQUIRED PNEUMONIA, UNSPECIFIED LATERALITY: Primary | ICD-10-CM

## 2024-04-02 DIAGNOSIS — R79.89 ELEVATED BRAIN NATRIURETIC PEPTIDE (BNP) LEVEL: ICD-10-CM

## 2024-04-02 DIAGNOSIS — E87.1 HYPONATREMIA: ICD-10-CM

## 2024-04-02 LAB
ALBUMIN SERPL BCP-MCNC: 3.2 G/DL (ref 3.5–5.2)
ALP SERPL-CCNC: 183 U/L (ref 55–135)
ALT SERPL W/O P-5'-P-CCNC: 39 U/L (ref 10–44)
ANION GAP SERPL CALC-SCNC: 8 MMOL/L (ref 8–16)
AST SERPL-CCNC: 31 U/L (ref 10–40)
BASOPHILS # BLD AUTO: 0.07 K/UL (ref 0–0.2)
BASOPHILS NFR BLD: 1.3 % (ref 0–1.9)
BILIRUB SERPL-MCNC: 0.7 MG/DL (ref 0.1–1)
BNP SERPL-MCNC: 174 PG/ML (ref 0–99)
BUN SERPL-MCNC: 30 MG/DL (ref 8–23)
CALCIUM SERPL-MCNC: 9.7 MG/DL (ref 8.7–10.5)
CHLORIDE SERPL-SCNC: 102 MMOL/L (ref 95–110)
CO2 SERPL-SCNC: 20 MMOL/L (ref 23–29)
CREAT SERPL-MCNC: 1.9 MG/DL (ref 0.5–1.4)
DIFFERENTIAL METHOD BLD: ABNORMAL
EOSINOPHIL # BLD AUTO: 0.3 K/UL (ref 0–0.5)
EOSINOPHIL NFR BLD: 4.9 % (ref 0–8)
ERYTHROCYTE [DISTWIDTH] IN BLOOD BY AUTOMATED COUNT: 12.3 % (ref 11.5–14.5)
EST. GFR  (NO RACE VARIABLE): 34.6 ML/MIN/1.73 M^2
GLUCOSE SERPL-MCNC: 78 MG/DL (ref 70–110)
HCT VFR BLD AUTO: 34.1 % (ref 40–54)
HCV AB SERPL QL IA: NORMAL
HGB BLD-MCNC: 11.3 G/DL (ref 14–18)
HIV 1+2 AB+HIV1 P24 AG SERPL QL IA: NORMAL
IMM GRANULOCYTES # BLD AUTO: 0.06 K/UL (ref 0–0.04)
IMM GRANULOCYTES NFR BLD AUTO: 1.1 % (ref 0–0.5)
LIPASE SERPL-CCNC: 71 U/L (ref 4–60)
LYMPHOCYTES # BLD AUTO: 0.7 K/UL (ref 1–4.8)
LYMPHOCYTES NFR BLD: 12.9 % (ref 18–48)
MCH RBC QN AUTO: 31.3 PG (ref 27–31)
MCHC RBC AUTO-ENTMCNC: 33.1 G/DL (ref 32–36)
MCV RBC AUTO: 95 FL (ref 82–98)
MONOCYTES # BLD AUTO: 0.8 K/UL (ref 0.3–1)
MONOCYTES NFR BLD: 15.1 % (ref 4–15)
NEUTROPHILS # BLD AUTO: 3.5 K/UL (ref 1.8–7.7)
NEUTROPHILS NFR BLD: 64.7 % (ref 38–73)
NRBC BLD-RTO: 0 /100 WBC
OHS QRS DURATION: 92 MS
OHS QTC CALCULATION: 424 MS
PLATELET # BLD AUTO: 289 K/UL (ref 150–450)
PMV BLD AUTO: 11.4 FL (ref 9.2–12.9)
POTASSIUM SERPL-SCNC: 4.4 MMOL/L (ref 3.5–5.1)
PROT SERPL-MCNC: 6.8 G/DL (ref 6–8.4)
RBC # BLD AUTO: 3.61 M/UL (ref 4.6–6.2)
SODIUM SERPL-SCNC: 130 MMOL/L (ref 136–145)
TROPONIN I SERPL DL<=0.01 NG/ML-MCNC: 0.01 NG/ML (ref 0–0.03)
TROPONIN I SERPL DL<=0.01 NG/ML-MCNC: 0.02 NG/ML (ref 0–0.03)
WBC # BLD AUTO: 5.36 K/UL (ref 3.9–12.7)

## 2024-04-02 PROCEDURE — 86803 HEPATITIS C AB TEST: CPT | Performed by: RADIOLOGY

## 2024-04-02 PROCEDURE — 87389 HIV-1 AG W/HIV-1&-2 AB AG IA: CPT | Performed by: RADIOLOGY

## 2024-04-02 PROCEDURE — 84484 ASSAY OF TROPONIN QUANT: CPT | Mod: 91 | Performed by: STUDENT IN AN ORGANIZED HEALTH CARE EDUCATION/TRAINING PROGRAM

## 2024-04-02 PROCEDURE — 84484 ASSAY OF TROPONIN QUANT: CPT

## 2024-04-02 PROCEDURE — 93010 ELECTROCARDIOGRAM REPORT: CPT | Mod: ,,, | Performed by: INTERNAL MEDICINE

## 2024-04-02 PROCEDURE — 63600175 PHARM REV CODE 636 W HCPCS: Performed by: STUDENT IN AN ORGANIZED HEALTH CARE EDUCATION/TRAINING PROGRAM

## 2024-04-02 PROCEDURE — 93005 ELECTROCARDIOGRAM TRACING: CPT

## 2024-04-02 PROCEDURE — 96365 THER/PROPH/DIAG IV INF INIT: CPT

## 2024-04-02 PROCEDURE — 25000003 PHARM REV CODE 250: Performed by: STUDENT IN AN ORGANIZED HEALTH CARE EDUCATION/TRAINING PROGRAM

## 2024-04-02 PROCEDURE — 83880 ASSAY OF NATRIURETIC PEPTIDE: CPT

## 2024-04-02 PROCEDURE — 99285 EMERGENCY DEPT VISIT HI MDM: CPT | Mod: 25

## 2024-04-02 PROCEDURE — 80053 COMPREHEN METABOLIC PANEL: CPT

## 2024-04-02 PROCEDURE — 96367 TX/PROPH/DG ADDL SEQ IV INF: CPT

## 2024-04-02 PROCEDURE — 83690 ASSAY OF LIPASE: CPT | Performed by: STUDENT IN AN ORGANIZED HEALTH CARE EDUCATION/TRAINING PROGRAM

## 2024-04-02 PROCEDURE — 85025 COMPLETE CBC W/AUTO DIFF WBC: CPT

## 2024-04-02 RX ORDER — HYDRALAZINE HYDROCHLORIDE 25 MG/1
25 TABLET, FILM COATED ORAL
Status: COMPLETED | OUTPATIENT
Start: 2024-04-02 | End: 2024-04-02

## 2024-04-02 RX ADMIN — AZITHROMYCIN MONOHYDRATE 500 MG: 500 INJECTION, POWDER, LYOPHILIZED, FOR SOLUTION INTRAVENOUS at 11:04

## 2024-04-02 RX ADMIN — HYDRALAZINE HYDROCHLORIDE 25 MG: 25 TABLET ORAL at 09:04

## 2024-04-02 RX ADMIN — CEFTRIAXONE SODIUM 2 G: 2 INJECTION, POWDER, FOR SOLUTION INTRAMUSCULAR; INTRAVENOUS at 10:04

## 2024-04-02 NOTE — ED PROVIDER NOTES
Encounter Date: 4/2/2024       History     Chief Complaint   Patient presents with    Weakness    Atrial Fibrillation     Onset 2 months prior to arrival     83-year-old male presents to ED with complaint of generalized weakness, atrial fibrillation episode. PMH of HTN, HLD, CKD3. He follows with cardiologist and electrophysiologist who monitor and adjust his blood pressure medications (metoprolol, lipitor, lasix, hydralazine). He has not missed any doses aside from Lasix to which he takes 2 or 3 times a week. He last saw EP 1/12/24 on basis of atrial arrythmias. He was intolerant of the metoprolol (symptomatic bradycardia in the 50s). He in past few weeks had experienced symptoms of lightheadedness, diminished appetite, burning in stomach, recent sinuses, fevers, chills, recurrent night sweats. He has endorsed diminished po intake. Last fever 1 week ago to which he was given levaquin. EKG yesterday showing premature atrial complexes. Cardiologist Dr. Morales ordered yesterday to present to ED.         The history is provided by the patient.     Review of patient's allergies indicates:   Allergen Reactions    Amoxicillin Hives    Allopurinol Other (See Comments)    Allopurinol analogues Other (See Comments)     Abnormal transaminases     Past Medical History:   Diagnosis Date    ALLERGIC RHINITIS     Anemia     Asthma     Cataract     Hyperlipidemia     Hypertension     NAFLD (nonalcoholic fatty liver disease)     ANNABELLE (obstructive sleep apnea)     on CPAP    PVD (posterior vitreous detachment), left eye     Squamous cell cancer of skin of hand      Past Surgical History:   Procedure Laterality Date    APPENDECTOMY      CARDIAC CATHETERIZATION  10/31/2018    no stent    CARDIAC CATHETERIZATION  1998    no stent    COLONOSCOPY N/A 6/19/2018    Procedure: COLONOSCOPY;  Surgeon: Wade De La Garza MD;  Location: [facility] ([address] [place] [address];  Service: Endoscopy;  Laterality: N/A;    COLONOSCOPY N/A 6/11/2020    Procedure:  COLONOSCOPY;  Surgeon: Von Gutierrez MD;  Location: [facility] (4TH FLR);  Service: Endoscopy;  Laterality: N/A;  3/17/20-pt confirmed appt on 3/20/20 with new arrival time of 0715, and updated visitor/ride instructions-BB  patient to be rescheduled by 6/3/20 per Dr. Gutierrez-JAVIER  patient requests to be rescheduled on a Thursday or Friday-BB  patient requested suprep-BB  loop recorder-BB  C    COLONOSCOPY N/A 2023    Procedure: COLONOSCOPY;  Surgeon: Bart Hernandez MD;  Location: [facility] (4TH FLR);  Service: Endoscopy;  Laterality: N/A;  Procedure Timin-12 weeks   suprep  pt requested Dr. Hernandez   instructions to portal-st  pre call , pt confirmed - mf    ENDOSCOPIC ULTRASOUND OF UPPER GASTROINTESTINAL TRACT N/A 10/1/2020    Procedure: ULTRASOUND, UPPER GI TRACT, ENDOSCOPIC;  Surgeon: Niko Lambert MD;  Location: [facility] (2ND FLR);  Service: Endoscopy;  Laterality: N/A;  Covid-19 test 20 at [facility] -   Has Loop Recorder.    ESOPHAGOGASTRODUODENOSCOPY N/A 10/23/2018    Procedure: EGD (ESOPHAGOGASTRODUODENOSCOPY);  Surgeon: Bart Hernandez MD;  Location: [facility] (4TH FLR);  Service: Endoscopy;  Laterality: N/A;    ESOPHAGOGASTRODUODENOSCOPY N/A 2020    Procedure: EGD (ESOPHAGOGASTRODUODENOSCOPY);  Surgeon: Von Gutierrez MD;  Location: [facility] (4TH FLR);  Service: Endoscopy;  Laterality: N/A;  3/17/20-pt confirmed appt on 3/20/20 with new arrival time of 0715, and updated visitor/ride instructions-BB  patient to be rescheduled by 6/3/20 per Dr. Gutierrez-BB  patient requests to be rescheduled on a Thursday or Friday-BB  patient requested suprep-BB    ESOPHAGOGASTRODUODENOSCOPY N/A 2023    Procedure: EGD (ESOPHAGOGASTRODUODENOSCOPY);  Surgeon: Bart Hernandez MD;  Location: [facility] (4TH FLR);  Service: Endoscopy;  Laterality: N/A;    FOOT SURGERY      INSERTION OF IMPLANTABLE LOOP RECORDER N/A 2019    Procedure: Insertion, Implantable Loop Recorder;  Surgeon: Gil Wilson MD;   Location: [facility];  Service: Cardiology;  Laterality: N/A;  AT, ILR, MDT, Local, MS, 3 Prep    testicular biopsy       Family History   Problem Relation Age of Onset    Cancer Mother         breast    Glaucoma Mother     Retinal detachment Mother     Heart disease Father         CHF    COPD Father     Retinal detachment Father     Heart disease Brother     Arthritis Brother     Blindness Maternal Grandmother     Cataracts Maternal Grandmother     Cirrhosis Neg Hx     Strabismus Neg Hx     Colon cancer Neg Hx     Stomach cancer Neg Hx     Esophageal cancer Neg Hx     Celiac disease Neg Hx     Crohn's disease Neg Hx     Rectal cancer Neg Hx     Ulcerative colitis Neg Hx      Social History     Tobacco Use    Smoking status: Former     Current packs/day: 0.00     Average packs/day: 1 pack/day for 10.0 years (10.0 ttl pk-yrs)     Types: Cigarettes     Start date: 1958     Quit date: 1968     Years since quittin.2    Smokeless tobacco: Former    Tobacco comments:      last smoke   Substance Use Topics    Alcohol use: Yes     Alcohol/week: 2.0 standard drinks of alcohol     Types: 1 Glasses of wine, 1 Shots of liquor per week     Comment: 5 days per week , 7 -8 drinks a week.    Drug use: No     Review of Systems   Constitutional:  Positive for activity change, appetite change, chills and fatigue. Negative for fever.   HENT:  Positive for sinus pressure and sinus pain (1 week ago).    Respiratory:  Negative for cough, chest tightness, shortness of breath and wheezing.    Cardiovascular:  Negative for chest pain.   Gastrointestinal:  Negative for constipation, nausea and vomiting.   Musculoskeletal:  Negative for gait problem and joint swelling.   Skin:  Negative for wound.   Neurological:  Positive for weakness.       Physical Exam     Initial Vitals [24 1607]   BP Pulse Resp Temp SpO2   (!) 163/82 84 18 97.9 °F (36.6 °C) 96 %      MAP       --         Physical Exam    HENT:   Head:  Normocephalic.   Eyes: EOM are normal. Pupils are equal, round, and reactive to light.   Neck: Neck supple.   Cardiovascular:  Normal rate and regular rhythm.           Pulmonary/Chest: Breath sounds normal. No respiratory distress.   Abdominal: He exhibits no distension.   Musculoskeletal:      Cervical back: Neck supple.     Psychiatric: He has a normal mood and affect.         ED Course   Procedures  Labs Reviewed   CBC W/ AUTO DIFFERENTIAL - Abnormal; Notable for the following components:       Result Value    RBC 3.61 (*)     Hemoglobin 11.3 (*)     Hematocrit 34.1 (*)     MCH 31.3 (*)     Immature Granulocytes 1.1 (*)     Immature Grans (Abs) 0.06 (*)     Lymph # 0.7 (*)     Lymph % 12.9 (*)     Mono % 15.1 (*)     All other components within normal limits   COMPREHENSIVE METABOLIC PANEL - Abnormal; Notable for the following components:    Sodium 130 (*)     CO2 20 (*)     BUN 30 (*)     Creatinine 1.9 (*)     Albumin 3.2 (*)     Alkaline Phosphatase 183 (*)     eGFR 34.6 (*)     All other components within normal limits   B-TYPE NATRIURETIC PEPTIDE - Abnormal; Notable for the following components:     (*)     All other components within normal limits   LIPASE - Abnormal; Notable for the following components:    Lipase 71 (*)     All other components within normal limits   HIV 1 / 2 ANTIBODY    Narrative:     Release to patient->Immediate   HEPATITIS C ANTIBODY    Narrative:     Release to patient->Immediate   TROPONIN I   TROPONIN I   TROPONIN I   LEGIONELLA ANTIGEN, URINE RANDOM   CULTURE, LEGIONELLA   POCT TROPONIN   POCT TROPONIN          Imaging Results               US Abdomen Limited (Final result)  Result time 04/02/24 22:55:05      Final result by Khai Gaines MD (04/02/24 22:55:05)                   Impression:      Cholelithiasis and gallbladder sludge.  Otherwise, no sonographic evidence of acute cholecystitis.    Cystic lesion at the pancreatic head measuring up to 1.1 cm, likely  correlates with similar finding on 2016 MRI.  Recommend surveillance at 1 year with contrast enhanced MRI with MRCP, or CT.    Mild splenomegaly.    This report was flagged in Epic as abnormal.    Electronically signed by resident: Randy Morales  Date:    04/02/2024  Time:    22:38    Electronically signed by: Khai Gaines  Date:    04/02/2024  Time:    22:55               Narrative:    EXAMINATION:  US ABDOMEN LIMITED    CLINICAL HISTORY:  right upper abdominal pain and fevers;    TECHNIQUE:  Limited ultrasound of the right upper quadrant of the abdomen (including pancreas, liver, gallbladder, common bile duct, and spleen) was performed.    COMPARISON:  CT abdomen pelvis 03/20/2024.  U/S abdomen 01/25/2016.  MRI abdomen 04/04/2016.    FINDINGS:  Pancreas: Anechoic lesion in the pancreatic head measuring up to 1.1 cm.    Liver: Normal in size, measuring 17.1 cm. Homogeneous echotexture. No focal hepatic lesions.    Gallbladder: Mobile gallstone measuring 0.7 cm and gallbladder sludge.  No pericholecystic fluid or wall thickening.  Technologist reports no sonographic Jeter sign.    Biliary system: The common duct is not dilated, measuring 5 mm.  No intrahepatic ductal dilatation.    Spleen: Mildly enlarged measuring 13.1 x 4.5 cm.  Normal echotexture.    IVC: Normal.    Miscellaneous: No upper abdominal ascites.                                        X-Ray Chest PA And Lateral (Final result)  Result time 04/02/24 20:00:10      Final result by Eb Rose MD (04/02/24 20:00:10)                   Impression:      Findings concerning for left lower lobe pneumonia, new from 03/25/2024 study.  Short-term follow-up chest radiography after therapy is recommended to resolution.    This report was flagged in Epic as abnormal.      Electronically signed by: Eb Rose MD  Date:    04/02/2024  Time:    20:00               Narrative:    EXAMINATION:  XR CHEST PA AND LATERAL    CLINICAL HISTORY:  Fever,  unspecified    TECHNIQUE:  PA and lateral views of the chest were performed.    COMPARISON:  Chest radiograph 03/25/2024, CT abdomen and pelvis 03/28/2024    FINDINGS:  No detrimental change.  Left chest loop recorder device noted.  Cardiomediastinal silhouette is midline and prominent with similar calcification and tortuosity of the aorta and enlarged heart, without evidence of failure.  Pulmonary vasculature and hilar contours are within normal limits.  Scattered linear opacities at the lung bases consistent with platelike scarring versus atelectasis more so on the left.  New subtle opacity at the medial left lower lobe.  No consolidation on the right.  No pleural effusion or pneumothorax.  Osseous structures appear grossly stable without acute process seen.                                    X-Rays:   Independently Interpreted Readings:   Other Readings:  CXR obtained showing left lower lobe pneumonia.     Medications   azithromycin (ZITHROMAX) 500 mg in dextrose 5 % (D5W) 250 mL IVPB (Vial-Mate) (500 mg Intravenous New Bag 4/2/24 2334)   hydrALAZINE tablet 25 mg (25 mg Oral Given 4/2/24 2122)   cefTRIAXone (ROCEPHIN) 2 g in dextrose 5 % in water (D5W) 100 mL IVPB (MB+) (0 g Intravenous Stopped 4/2/24 2233)     Medical Decision Making  Patient with recurring episodes of afib, likely exacerbated by recent history of fevers. ECG showing normal sinus rhythm with PACs, vitals stable. My ddx include generalized chest pain, pneumonia, weakness, cholelithiasis, cholecystitis as shown by elevated LFTs, lipase and abdominal ultrasound ordered. Generalized weakness in setting of diminished po intake. CXR showing left lower lobe pneumonia, suggestive of community-acquired pneumonia that has not been successfully treated via outpatient antibiotics. US concerning for cholelithiasis.     Amount and/or Complexity of Data Reviewed  Labs: ordered. Decision-making details documented in ED Course.     Details: Labs concerning for  hyponatremia, elevated lipase. Troponin normal. Alk phos 183, .   Radiology: ordered.     Details: CXR showing left lower lobe pneumonia. Abdominal US showing cholelithiasis and gallbladder sludge, no cholecystitis acute;  Lesion at pancreatic head measuring 1.1cm.     ECG/medicine tests:  Decision-making details documented in ED Course.    Risk  Prescription drug management.  Decision regarding hospitalization.              Attending Attestation:   Physician Attestation Statement for Resident:  As the supervising MD       -: Attending Attestation:   I oversaw the resident's evaluation of the patient and have evaluated the patient myself. The case and management decisions were discussed with the resident.   Please see the resident note for further details regarding the patient's history, physical exam, and pertinent imaging, lab results or consultations.  I have reviewed and agree with the documented findings, interpretation of studies and results, and plan of care. I was present for the key portions of any procedure(s) performed and immediately available for the remainder of the procedure(s).  A summary is noted below.    Patient seen for generalized weakness which is his main concern.  Had lower lobe pneumonia treated outpatient with Levaquin.  Elevated LFTs previously, CT abdomen pelvis that has been obtained showed cholelithiasis.  Has had vague abdominal discomfort.  Occasional fevers and chills are noted.  No fever here.  Vitals within normal limits otherwise, normal sinus rhythm with occasional PACs, no atrial fibrillation.  Not on anticoagulation.  Has loop recorder.  No syncope.    Lipase slightly elevated, as well as alkaline phosphatase, although AST and ALT are notably less than previous.  Hyponatremia is present.  A little bit less than prior.  Maybe due to poor p.o. intake.  Given that he was recently on Levaquin, and there is a left lower lobe pneumonia on chest x-ray, will treat with  ceftriaxone and azithromycin.  Would add Flagyl if concern for cholecystitis after return of ultrasound.  However, ultrasound shows only cholelithiasis.  Doubt acute cholecystitis.  Patient to be admitted for failure of outpatient therapy for community-acquired pneumonia.    Dallin Hardin DO                       ED Course as of 04/03/24 0024   Tue Apr 02, 2024 1914 EKG 12-lead  Reviewed.  Sinus rhythm with PACs. [AC]   2133 Lipase(!): 71 [AC]   2133 BNP(!): 174 [AC]   2133 Sodium(!): 130 [AC]   2133 Creatinine(!): 1.9 [AC]   2133 ALP(!): 183 [AC]   2133 AST: 31 [AC]   2133 ALT: 39 [AC]   2133 Lipase is slightly elevated, hyponatremia is present.  ALP is slightly improved, AST and ALT are improved from prior as well which is interesting. [AC]   2133 He does have pneumonia on his chest x-ray. [AC]   2133 Currently on Levaquin.  Consider failure of outpatient therapy.  Will do ceftriaxone and azithromycin. [AC]      ED Course User Index  [AC] Dallin Hardin DO                             Clinical Impression:  Final diagnoses:  [R53.1] Weakness generalized  [R07.9] Chest pain  [R50.9] Fever  [J18.9] Community acquired pneumonia, unspecified laterality (Primary)  [E87.1] Hyponatremia          ED Disposition Condition    Observation Stable                Dallin Hardin DO  04/03/24 1036

## 2024-04-02 NOTE — PROGRESS NOTES
Decrease in the size of the pancreatic cyst was noted when compared to prior study from 11/09/2021.  Right-sided renal cyst is still noted.  I recommend repeating CT of the abdomen and pelvis in 1 year for follow-up.

## 2024-04-03 PROBLEM — Z71.89 ACP (ADVANCE CARE PLANNING): Status: ACTIVE | Noted: 2018-06-19

## 2024-04-03 PROBLEM — J18.9 LEFT UPPER LOBE PNEUMONIA: Status: ACTIVE | Noted: 2024-04-03

## 2024-04-03 LAB
ALBUMIN SERPL BCP-MCNC: 2.9 G/DL (ref 3.5–5.2)
ALP SERPL-CCNC: 166 U/L (ref 55–135)
ALT SERPL W/O P-5'-P-CCNC: 36 U/L (ref 10–44)
ANION GAP SERPL CALC-SCNC: 13 MMOL/L (ref 8–16)
ASCENDING AORTA: 3.38 CM
AST SERPL-CCNC: 30 U/L (ref 10–40)
AV INDEX (PROSTH): 0.79
AV MEAN GRADIENT: 4 MMHG
AV PEAK GRADIENT: 6 MMHG
AV VALVE AREA BY VELOCITY RATIO: 3.4 CM²
AV VALVE AREA: 3.02 CM²
AV VELOCITY RATIO: 0.89
BASOPHILS # BLD AUTO: 0.11 K/UL (ref 0–0.2)
BASOPHILS NFR BLD: 1.3 % (ref 0–1.9)
BILIRUB SERPL-MCNC: 0.6 MG/DL (ref 0.1–1)
BILIRUB UR QL STRIP: NEGATIVE
BSA FOR ECHO PROCEDURE: 2.22 M2
BUN SERPL-MCNC: 29 MG/DL (ref 8–23)
CALCIUM SERPL-MCNC: 9.1 MG/DL (ref 8.7–10.5)
CHLORIDE SERPL-SCNC: 101 MMOL/L (ref 95–110)
CLARITY UR REFRACT.AUTO: CLEAR
CO2 SERPL-SCNC: 17 MMOL/L (ref 23–29)
COLOR UR AUTO: ABNORMAL
CREAT SERPL-MCNC: 1.7 MG/DL (ref 0.5–1.4)
CV ECHO LV RWT: 0.49 CM
DIFFERENTIAL METHOD BLD: ABNORMAL
DOP CALC AO PEAK VEL: 1.25 M/S
DOP CALC AO VTI: 27.82 CM
DOP CALC LVOT AREA: 3.8 CM2
DOP CALC LVOT DIAMETER: 2.21 CM
DOP CALC LVOT PEAK VEL: 1.11 M/S
DOP CALC LVOT STROKE VOLUME: 84.08 CM3
DOP CALCLVOT PEAK VEL VTI: 21.93 CM
E WAVE DECELERATION TIME: 222.26 MSEC
E/A RATIO: 0.8
E/E' RATIO: 6.21 M/S
ECHO LV POSTERIOR WALL: 1.19 CM (ref 0.6–1.1)
EJECTION FRACTION: 65 %
EOSINOPHIL # BLD AUTO: 0.4 K/UL (ref 0–0.5)
EOSINOPHIL NFR BLD: 4.7 % (ref 0–8)
ERYTHROCYTE [DISTWIDTH] IN BLOOD BY AUTOMATED COUNT: 12.1 % (ref 11.5–14.5)
EST. GFR  (NO RACE VARIABLE): 39.5 ML/MIN/1.73 M^2
GLUCOSE SERPL-MCNC: 88 MG/DL (ref 70–110)
GLUCOSE UR QL STRIP: NEGATIVE
HCT VFR BLD AUTO: 31.8 % (ref 40–54)
HGB BLD-MCNC: 10.8 G/DL (ref 14–18)
HGB UR QL STRIP: ABNORMAL
HYALINE CASTS UR QL AUTO: 1 /LPF
IMM GRANULOCYTES # BLD AUTO: 0.06 K/UL (ref 0–0.04)
IMM GRANULOCYTES NFR BLD AUTO: 0.7 % (ref 0–0.5)
INTERVENTRICULAR SEPTUM: 1.14 CM (ref 0.6–1.1)
KETONES UR QL STRIP: NEGATIVE
LA MAJOR: 5.95 CM
LA MINOR: 6.2 CM
LA WIDTH: 4.82 CM
LEFT ATRIUM SIZE: 4.13 CM
LEFT ATRIUM VOLUME INDEX: 47.6 ML/M2
LEFT ATRIUM VOLUME: 102.75 CM3
LEFT VENTRICLE DIASTOLIC VOLUME INDEX: 51.18 ML/M2
LEFT VENTRICLE DIASTOLIC VOLUME: 110.54 ML
LEFT VENTRICLE MASS INDEX: 99 G/M2
LEFT VENTRICULAR INTERNAL DIMENSION IN DIASTOLE: 4.86 CM (ref 3.5–6)
LEFT VENTRICULAR MASS: 214.34 G
LEUKOCYTE ESTERASE UR QL STRIP: NEGATIVE
LV LATERAL E/E' RATIO: 5.36 M/S
LV SEPTAL E/E' RATIO: 7.38 M/S
LYMPHOCYTES # BLD AUTO: 1.1 K/UL (ref 1–4.8)
LYMPHOCYTES NFR BLD: 13.7 % (ref 18–48)
MAGNESIUM SERPL-MCNC: 1.1 MG/DL (ref 1.6–2.6)
MCH RBC QN AUTO: 31.2 PG (ref 27–31)
MCHC RBC AUTO-ENTMCNC: 34 G/DL (ref 32–36)
MCV RBC AUTO: 92 FL (ref 82–98)
MICROSCOPIC COMMENT: ABNORMAL
MONOCYTES # BLD AUTO: 1.4 K/UL (ref 0.3–1)
MONOCYTES NFR BLD: 17.3 % (ref 4–15)
MV PEAK A VEL: 0.74 M/S
MV PEAK E VEL: 0.59 M/S
MV STENOSIS PRESSURE HALF TIME: 64.46 MS
MV VALVE AREA P 1/2 METHOD: 3.41 CM2
NEUTROPHILS # BLD AUTO: 5.1 K/UL (ref 1.8–7.7)
NEUTROPHILS NFR BLD: 62.3 % (ref 38–73)
NITRITE UR QL STRIP: NEGATIVE
NRBC BLD-RTO: 0 /100 WBC
OHS QRS DURATION: 92 MS
OHS QTC CALCULATION: 448 MS
PH UR STRIP: 5 [PH] (ref 5–8)
PHOSPHATE SERPL-MCNC: 2.9 MG/DL (ref 2.7–4.5)
PLATELET # BLD AUTO: 278 K/UL (ref 150–450)
PMV BLD AUTO: 10.3 FL (ref 9.2–12.9)
POC CARDIAC TROPONIN I: 0.01 NG/ML (ref 0–0.08)
POTASSIUM SERPL-SCNC: 4.2 MMOL/L (ref 3.5–5.1)
PROCALCITONIN SERPL IA-MCNC: 0.33 NG/ML
PROT SERPL-MCNC: 6.2 G/DL (ref 6–8.4)
PROT UR QL STRIP: NEGATIVE
RA MAJOR: 6.51 CM
RA PRESSURE ESTIMATED: 3 MMHG
RA WIDTH: 3.25 CM
RBC # BLD AUTO: 3.46 M/UL (ref 4.6–6.2)
RBC #/AREA URNS AUTO: 17 /HPF (ref 0–4)
RIGHT VENTRICULAR END-DIASTOLIC DIMENSION: 2.79 CM
SAMPLE: NORMAL
SINUS: 3.37 CM
SODIUM SERPL-SCNC: 131 MMOL/L (ref 136–145)
SP GR UR STRIP: 1 (ref 1–1.03)
STJ: 3.02 CM
TDI LATERAL: 0.11 M/S
TDI SEPTAL: 0.08 M/S
TDI: 0.1 M/S
TRICUSPID ANNULAR PLANE SYSTOLIC EXCURSION: 1.33 CM
TROPONIN I SERPL DL<=0.01 NG/ML-MCNC: 0.02 NG/ML (ref 0–0.03)
URN SPEC COLLECT METH UR: ABNORMAL
WBC # BLD AUTO: 8.23 K/UL (ref 3.9–12.7)
Z-SCORE OF LEFT VENTRICULAR DIMENSION IN END DIASTOLE: -3.76

## 2024-04-03 PROCEDURE — 87040 BLOOD CULTURE FOR BACTERIA: CPT | Performed by: HOSPITALIST

## 2024-04-03 PROCEDURE — 25000003 PHARM REV CODE 250: Performed by: STUDENT IN AN ORGANIZED HEALTH CARE EDUCATION/TRAINING PROGRAM

## 2024-04-03 PROCEDURE — 25000242 PHARM REV CODE 250 ALT 637 W/ HCPCS: Performed by: HOSPITALIST

## 2024-04-03 PROCEDURE — 63700000 PHARM REV CODE 250 ALT 637 W/O HCPCS: Performed by: HOSPITALIST

## 2024-04-03 PROCEDURE — 94761 N-INVAS EAR/PLS OXIMETRY MLT: CPT

## 2024-04-03 PROCEDURE — 25000003 PHARM REV CODE 250: Performed by: HOSPITALIST

## 2024-04-03 PROCEDURE — 11000001 HC ACUTE MED/SURG PRIVATE ROOM

## 2024-04-03 PROCEDURE — 87081 CULTURE SCREEN ONLY: CPT | Performed by: STUDENT IN AN ORGANIZED HEALTH CARE EDUCATION/TRAINING PROGRAM

## 2024-04-03 PROCEDURE — 83735 ASSAY OF MAGNESIUM: CPT | Performed by: HOSPITALIST

## 2024-04-03 PROCEDURE — 63600175 PHARM REV CODE 636 W HCPCS: Performed by: HOSPITALIST

## 2024-04-03 PROCEDURE — 63600175 PHARM REV CODE 636 W HCPCS: Performed by: STUDENT IN AN ORGANIZED HEALTH CARE EDUCATION/TRAINING PROGRAM

## 2024-04-03 PROCEDURE — 87449 NOS EACH ORGANISM AG IA: CPT | Performed by: STUDENT IN AN ORGANIZED HEALTH CARE EDUCATION/TRAINING PROGRAM

## 2024-04-03 PROCEDURE — 94640 AIRWAY INHALATION TREATMENT: CPT

## 2024-04-03 PROCEDURE — 27000190 HC CPAP FULL FACE MASK W/VALVE

## 2024-04-03 PROCEDURE — 21400001 HC TELEMETRY ROOM

## 2024-04-03 PROCEDURE — 84100 ASSAY OF PHOSPHORUS: CPT | Performed by: HOSPITALIST

## 2024-04-03 PROCEDURE — 87899 AGENT NOS ASSAY W/OPTIC: CPT | Performed by: STUDENT IN AN ORGANIZED HEALTH CARE EDUCATION/TRAINING PROGRAM

## 2024-04-03 PROCEDURE — 99900035 HC TECH TIME PER 15 MIN (STAT)

## 2024-04-03 PROCEDURE — 94660 CPAP INITIATION&MGMT: CPT

## 2024-04-03 PROCEDURE — 81001 URINALYSIS AUTO W/SCOPE: CPT | Performed by: STUDENT IN AN ORGANIZED HEALTH CARE EDUCATION/TRAINING PROGRAM

## 2024-04-03 PROCEDURE — 80053 COMPREHEN METABOLIC PANEL: CPT | Performed by: HOSPITALIST

## 2024-04-03 PROCEDURE — 85025 COMPLETE CBC W/AUTO DIFF WBC: CPT | Performed by: HOSPITALIST

## 2024-04-03 PROCEDURE — 84145 PROCALCITONIN (PCT): CPT | Performed by: HOSPITALIST

## 2024-04-03 RX ORDER — ACETAMINOPHEN 325 MG/1
650 TABLET ORAL EVERY 4 HOURS PRN
Status: DISCONTINUED | OUTPATIENT
Start: 2024-04-03 | End: 2024-04-03

## 2024-04-03 RX ORDER — IPRATROPIUM BROMIDE AND ALBUTEROL SULFATE 2.5; .5 MG/3ML; MG/3ML
3 SOLUTION RESPIRATORY (INHALATION) EVERY 4 HOURS PRN
Status: DISCONTINUED | OUTPATIENT
Start: 2024-04-03 | End: 2024-04-04 | Stop reason: HOSPADM

## 2024-04-03 RX ORDER — GUAIFENESIN 600 MG/1
600 TABLET, EXTENDED RELEASE ORAL 2 TIMES DAILY
Status: DISCONTINUED | OUTPATIENT
Start: 2024-04-03 | End: 2024-04-04 | Stop reason: HOSPADM

## 2024-04-03 RX ORDER — OMEGA-3/DHA/EPA/FISH OIL 300-1000MG
1 CAPSULE,DELAYED RELEASE (ENTERIC COATED) ORAL DAILY
Status: DISCONTINUED | OUTPATIENT
Start: 2024-04-03 | End: 2024-04-04 | Stop reason: HOSPADM

## 2024-04-03 RX ORDER — IBUPROFEN 200 MG
16 TABLET ORAL
Status: DISCONTINUED | OUTPATIENT
Start: 2024-04-03 | End: 2024-04-04 | Stop reason: HOSPADM

## 2024-04-03 RX ORDER — ONDANSETRON HYDROCHLORIDE 2 MG/ML
4 INJECTION, SOLUTION INTRAVENOUS EVERY 8 HOURS PRN
Status: DISCONTINUED | OUTPATIENT
Start: 2024-04-03 | End: 2024-04-04 | Stop reason: HOSPADM

## 2024-04-03 RX ORDER — FLUTICASONE FUROATE AND VILANTEROL 100; 25 UG/1; UG/1
1 POWDER RESPIRATORY (INHALATION) DAILY
Status: DISCONTINUED | OUTPATIENT
Start: 2024-04-03 | End: 2024-04-04 | Stop reason: HOSPADM

## 2024-04-03 RX ORDER — SUCRALFATE 1 G/10ML
1 SUSPENSION ORAL EVERY 6 HOURS
Status: DISCONTINUED | OUTPATIENT
Start: 2024-04-03 | End: 2024-04-03

## 2024-04-03 RX ORDER — ENOXAPARIN SODIUM 100 MG/ML
40 INJECTION SUBCUTANEOUS EVERY 24 HOURS
Status: DISCONTINUED | OUTPATIENT
Start: 2024-04-03 | End: 2024-04-04 | Stop reason: HOSPADM

## 2024-04-03 RX ORDER — CETIRIZINE HYDROCHLORIDE 5 MG/1
5 TABLET ORAL DAILY
Status: DISCONTINUED | OUTPATIENT
Start: 2024-04-03 | End: 2024-04-04 | Stop reason: HOSPADM

## 2024-04-03 RX ORDER — AZELASTINE 1 MG/ML
2 SPRAY, METERED NASAL 2 TIMES DAILY PRN
Status: DISCONTINUED | OUTPATIENT
Start: 2024-04-03 | End: 2024-04-04 | Stop reason: HOSPADM

## 2024-04-03 RX ORDER — ATORVASTATIN CALCIUM 20 MG/1
20 TABLET, FILM COATED ORAL NIGHTLY
Status: DISCONTINUED | OUTPATIENT
Start: 2024-04-03 | End: 2024-04-04 | Stop reason: HOSPADM

## 2024-04-03 RX ORDER — LOSARTAN POTASSIUM 50 MG/1
100 TABLET ORAL NIGHTLY
Status: DISCONTINUED | OUTPATIENT
Start: 2024-04-03 | End: 2024-04-04 | Stop reason: HOSPADM

## 2024-04-03 RX ORDER — IBUPROFEN 200 MG
24 TABLET ORAL
Status: DISCONTINUED | OUTPATIENT
Start: 2024-04-03 | End: 2024-04-04 | Stop reason: HOSPADM

## 2024-04-03 RX ORDER — ACETAMINOPHEN 325 MG/1
650 TABLET ORAL EVERY 4 HOURS PRN
Status: DISCONTINUED | OUTPATIENT
Start: 2024-04-03 | End: 2024-04-04 | Stop reason: HOSPADM

## 2024-04-03 RX ORDER — AZITHROMYCIN 250 MG/1
250 TABLET, FILM COATED ORAL DAILY
Status: DISCONTINUED | OUTPATIENT
Start: 2024-04-03 | End: 2024-04-04 | Stop reason: HOSPADM

## 2024-04-03 RX ORDER — METOPROLOL SUCCINATE 25 MG/1
50 TABLET, EXTENDED RELEASE ORAL DAILY
Status: DISCONTINUED | OUTPATIENT
Start: 2024-04-03 | End: 2024-04-04 | Stop reason: HOSPADM

## 2024-04-03 RX ORDER — MAGNESIUM SULFATE HEPTAHYDRATE 40 MG/ML
2 INJECTION, SOLUTION INTRAVENOUS ONCE
Status: COMPLETED | OUTPATIENT
Start: 2024-04-03 | End: 2024-04-03

## 2024-04-03 RX ORDER — PANTOPRAZOLE SODIUM 40 MG/1
40 TABLET, DELAYED RELEASE ORAL DAILY
Status: DISCONTINUED | OUTPATIENT
Start: 2024-04-03 | End: 2024-04-04 | Stop reason: HOSPADM

## 2024-04-03 RX ORDER — GUAIFENESIN 100 MG/5ML
200 SOLUTION ORAL EVERY 6 HOURS PRN
Status: DISCONTINUED | OUTPATIENT
Start: 2024-04-03 | End: 2024-04-04 | Stop reason: HOSPADM

## 2024-04-03 RX ORDER — LANOLIN ALCOHOL/MO/W.PET/CERES
400 CREAM (GRAM) TOPICAL ONCE
Status: COMPLETED | OUTPATIENT
Start: 2024-04-03 | End: 2024-04-03

## 2024-04-03 RX ORDER — HYDROCORTISONE ACETATE PRAMOXINE HCL 1; 1 G/100G; G/100G
CREAM TOPICAL 2 TIMES DAILY
Status: DISCONTINUED | OUTPATIENT
Start: 2024-04-03 | End: 2024-04-04 | Stop reason: HOSPADM

## 2024-04-03 RX ORDER — HYDRALAZINE HYDROCHLORIDE 25 MG/1
25 TABLET, FILM COATED ORAL EVERY 8 HOURS
Status: DISCONTINUED | OUTPATIENT
Start: 2024-04-03 | End: 2024-04-04 | Stop reason: HOSPADM

## 2024-04-03 RX ORDER — BENZONATATE 100 MG/1
100 CAPSULE ORAL 3 TIMES DAILY PRN
Status: DISCONTINUED | OUTPATIENT
Start: 2024-04-03 | End: 2024-04-04 | Stop reason: HOSPADM

## 2024-04-03 RX ORDER — TALC
6 POWDER (GRAM) TOPICAL NIGHTLY PRN
Status: DISCONTINUED | OUTPATIENT
Start: 2024-04-03 | End: 2024-04-04 | Stop reason: HOSPADM

## 2024-04-03 RX ORDER — ALUMINUM HYDROXIDE, MAGNESIUM HYDROXIDE, AND SIMETHICONE 1200; 120; 1200 MG/30ML; MG/30ML; MG/30ML
30 SUSPENSION ORAL 4 TIMES DAILY PRN
Status: DISCONTINUED | OUTPATIENT
Start: 2024-04-03 | End: 2024-04-04 | Stop reason: HOSPADM

## 2024-04-03 RX ORDER — ALUMINUM HYDROXIDE, MAGNESIUM HYDROXIDE, AND SIMETHICONE 1200; 120; 1200 MG/30ML; MG/30ML; MG/30ML
30 SUSPENSION ORAL
Status: DISCONTINUED | OUTPATIENT
Start: 2024-04-03 | End: 2024-04-03

## 2024-04-03 RX ORDER — GLUCAGON 1 MG
1 KIT INJECTION
Status: DISCONTINUED | OUTPATIENT
Start: 2024-04-03 | End: 2024-04-04 | Stop reason: HOSPADM

## 2024-04-03 RX ORDER — PROMETHAZINE HYDROCHLORIDE AND CODEINE PHOSPHATE 6.25; 1 MG/5ML; MG/5ML
10 SOLUTION ORAL EVERY 6 HOURS PRN
Status: DISCONTINUED | OUTPATIENT
Start: 2024-04-03 | End: 2024-04-04 | Stop reason: HOSPADM

## 2024-04-03 RX ORDER — NALOXONE HCL 0.4 MG/ML
0.02 VIAL (ML) INJECTION
Status: DISCONTINUED | OUTPATIENT
Start: 2024-04-03 | End: 2024-04-04 | Stop reason: HOSPADM

## 2024-04-03 RX ORDER — SODIUM CHLORIDE 0.9 % (FLUSH) 0.9 %
10 SYRINGE (ML) INJECTION EVERY 12 HOURS PRN
Status: DISCONTINUED | OUTPATIENT
Start: 2024-04-03 | End: 2024-04-04 | Stop reason: HOSPADM

## 2024-04-03 RX ADMIN — MAGNESIUM SULFATE HEPTAHYDRATE 2 G: 40 INJECTION, SOLUTION INTRAVENOUS at 07:04

## 2024-04-03 RX ADMIN — ENOXAPARIN SODIUM 40 MG: 40 INJECTION SUBCUTANEOUS at 05:04

## 2024-04-03 RX ADMIN — Medication 400 MG: at 07:04

## 2024-04-03 RX ADMIN — FLUTICASONE FUROATE AND VILANTEROL TRIFENATATE 1 PUFF: 100; 25 POWDER RESPIRATORY (INHALATION) at 09:04

## 2024-04-03 RX ADMIN — GUAIFENESIN 600 MG: 600 TABLET, EXTENDED RELEASE ORAL at 09:04

## 2024-04-03 RX ADMIN — ONDANSETRON 4 MG: 2 INJECTION INTRAMUSCULAR; INTRAVENOUS at 12:04

## 2024-04-03 RX ADMIN — MAGNESIUM SULFATE HEPTAHYDRATE 2 G: 40 INJECTION, SOLUTION INTRAVENOUS at 12:04

## 2024-04-03 RX ADMIN — ATORVASTATIN CALCIUM 20 MG: 20 TABLET, FILM COATED ORAL at 09:04

## 2024-04-03 RX ADMIN — METOPROLOL SUCCINATE 50 MG: 50 TABLET, EXTENDED RELEASE ORAL at 09:04

## 2024-04-03 RX ADMIN — SODIUM CHLORIDE, POTASSIUM CHLORIDE, SODIUM LACTATE AND CALCIUM CHLORIDE 2000 ML: 600; 310; 30; 20 INJECTION, SOLUTION INTRAVENOUS at 09:04

## 2024-04-03 RX ADMIN — HYDRALAZINE HYDROCHLORIDE 25 MG: 25 TABLET ORAL at 02:04

## 2024-04-03 RX ADMIN — CETIRIZINE HYDROCHLORIDE 5 MG: 5 TABLET, FILM COATED ORAL at 09:04

## 2024-04-03 RX ADMIN — Medication 1 CAPSULE: at 09:04

## 2024-04-03 RX ADMIN — CEFTRIAXONE 1 G: 1 INJECTION, POWDER, FOR SOLUTION INTRAMUSCULAR; INTRAVENOUS at 09:04

## 2024-04-03 RX ADMIN — PANTOPRAZOLE SODIUM 40 MG: 40 TABLET, DELAYED RELEASE ORAL at 09:04

## 2024-04-03 RX ADMIN — HYDRALAZINE HYDROCHLORIDE 25 MG: 25 TABLET ORAL at 06:04

## 2024-04-03 RX ADMIN — LOSARTAN POTASSIUM 100 MG: 50 TABLET, FILM COATED ORAL at 09:04

## 2024-04-03 RX ADMIN — ACETAMINOPHEN 650 MG: 325 TABLET ORAL at 09:04

## 2024-04-03 RX ADMIN — HYDRALAZINE HYDROCHLORIDE 25 MG: 25 TABLET ORAL at 09:04

## 2024-04-03 RX ADMIN — AZITHROMYCIN DIHYDRATE 250 MG: 250 TABLET ORAL at 09:04

## 2024-04-03 NOTE — ASSESSMENT & PLAN NOTE
Advance Care Planning     Date: 04/03/2024    Code Status  In light of the patients advanced and life limiting illness,I engaged the the patient in a voluntary conversation about the patient's preferences for care  at the very end of life. The patient wishes to have a natural, peaceful death.  Along those lines, the patient does not wish to have CPR or other invasive treatments performed when his heart and/or breathing stops. I communicated to the patient that a DNR order would be placed in his medical record to reflect this preference.  I spent a total of 10 minutes engaging the patient in this advance care planning discussion.

## 2024-04-03 NOTE — ED NOTES
Telemetry Verification   Patient placed on Telemetry Box    Box # [id number]   Monitor Tech    Rate 63   Rhythm NSR

## 2024-04-03 NOTE — ASSESSMENT & PLAN NOTE
-intolerance of allopurinol in the past   -will controlled with febuxostat which is non formulary   -patient may take his own   -no signs of gout flare up

## 2024-04-03 NOTE — ASSESSMENT & PLAN NOTE
Chronic, controlled. Latest blood pressure and vitals reviewed-     Temp:  [97.9 °F (36.6 °C)-98.3 °F (36.8 °C)]   Pulse:  [73-87]   Resp:  [11-20]   BP: (115-168)/(53-82)   SpO2:  [95 %-97 %] .   Home meds for hypertension were reviewed and noted below.   Hypertension Medications               furosemide (LASIX) 40 MG tablet Take 1 tablet (40 mg total) by mouth once daily.    hydrALAZINE (APRESOLINE) 25 MG tablet Take 1 tablet (25 mg total) by mouth every 8 (eight) hours.    irbesartan (AVAPRO) 300 MG tablet Take 1 tablet (300 mg total) by mouth every evening.    metoprolol succinate (TOPROL-XL) 50 MG 24 hr tablet Take 1 tablet (50 mg total) by mouth once daily.            While in the hospital, will manage blood pressure as follows; Continue home antihypertensive regimen    Will utilize p.r.n. blood pressure medication only if patient's blood pressure greater than 180/110 and he develops symptoms such as worsening chest pain or shortness of breath.

## 2024-04-03 NOTE — ASSESSMENT & PLAN NOTE
-extreme fatigue with intermittent fever, chills and non productive cough for last 2 weeks   -failed outpatient course of azithromycin + prednisone in early March and currently on day 7 of 10 days course of levofloxacin   -CXR in ED showed AGUSTINA opacity which is new compared to 3/25  -afebrile in ED w/o leukocytosis   -oxygenating well on room air   -continue IV rocephin + azithromycin   -check blood and sputum cultures   -check UA to r/o UTI

## 2024-04-03 NOTE — HPI
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 Patient states he has been feeling extreme fatigue for last 2 weeks with intermittent fever, chills and diaphoresis. He reports temp running mostly 99 to 101, but few days ago it went up to 103.1 once. He has been suffering from URI symptoms since February and completed course of azithromycin >> prednisone prescribed by PCP without relief. Outpatient CXR on 3/25 showed retrocardiac atelectasis vs developing consolidation. PCP prescribed 10 days course of levofloxacin on 3/26 which patient have been taking. He notified his PCP yesterday about continued symptoms (fatigue, fever, non productive cough) who advised patient to come to hospital for further evaluation. Patient reports baseline exertional tachycardia which seems to have improved since January when EP cardiologist Dr. Gil Wilson increased his daily metoprolol from 12.5 mg to 50 mg daily. He also endorses occasional dysuria, but denies hematuria.     ED course: afebrile and vitals stable.  WBC 5, Cr 1.9, , Troponin negative. EKG NSR @ 84 bpm with occasional PACs.  CXR showed AGUSTINA opacity, abdominal ultrasound showed 1.1 cystic lesion at the head of the pancreas similar to prior, cholelithiasis and GB sludge w/o cholecystitis or biliary dilatation. Patient received rocephin + azithromycin, night time dose of hydralazine in ED. During my interview, patient is awake, conversant and not in distress. He is oxygenating well on room air.

## 2024-04-03 NOTE — SUBJECTIVE & OBJECTIVE
Past Medical History:   Diagnosis Date    ALLERGIC RHINITIS     Anemia     Asthma     Cataract     Hyperlipidemia     Hypertension     NAFLD (nonalcoholic fatty liver disease)     ANNABELLE (obstructive sleep apnea)     on CPAP    PVD (posterior vitreous detachment), left eye     Squamous cell cancer of skin of hand        Past Surgical History:   Procedure Laterality Date    APPENDECTOMY      CARDIAC CATHETERIZATION  10/31/2018    no stent    CARDIAC CATHETERIZATION  1998    no stent    COLONOSCOPY N/A 2018    Procedure: COLONOSCOPY;  Surgeon: Wade De La Garza MD;  Location: [facility] ENDO (4TH FLR);  Service: Endoscopy;  Laterality: N/A;    COLONOSCOPY N/A 2020    Procedure: COLONOSCOPY;  Surgeon: Von Gutierrez MD;  Location: [facility] JAIR (4TH FLR);  Service: Endoscopy;  Laterality: N/A;  3/17/20-pt confirmed appt on 3/20/20 with new arrival time of 0715, and updated visitor/ride instructions-BB  patient to be rescheduled by 6/3/20 per Dr. Gutierrez-BB  patient requests to be rescheduled on a Thursday or Friday-BB  patient requested suprep-BB  loop recorder-BB  C    COLONOSCOPY N/A 2023    Procedure: COLONOSCOPY;  Surgeon: Bart Hernandez MD;  Location: [facility] JAIR (4TH FLR);  Service: Endoscopy;  Laterality: N/A;  Procedure Timin-12 weeks   suprep  pt requested Dr. Hernandez   instructions to portal-st  pre call , pt confirmed - mf    ENDOSCOPIC ULTRASOUND OF UPPER GASTROINTESTINAL TRACT N/A 10/1/2020    Procedure: ULTRASOUND, UPPER GI TRACT, ENDOSCOPIC;  Surgeon: Niko Lambert MD;  Location: [facility] ENDO (2ND FLR);  Service: Endoscopy;  Laterality: N/A;  Covid-19 test 20 at [facility] -   Has Loop Recorder.    ESOPHAGOGASTRODUODENOSCOPY N/A 10/23/2018    Procedure: EGD (ESOPHAGOGASTRODUODENOSCOPY);  Surgeon: Bart Hernandez MD;  Location: [facility] ENDO (4TH FLR);  Service: Endoscopy;  Laterality: N/A;    ESOPHAGOGASTRODUODENOSCOPY N/A 2020    Procedure: EGD (ESOPHAGOGASTRODUODENOSCOPY);  Surgeon: Von HEBERT  MD Matt;  Location: [facility] ENDO (4TH FLR);  Service: Endoscopy;  Laterality: N/A;  3/17/20-pt confirmed appt on 3/20/20 with new arrival time of 0715, and updated visitor/ride instructions-BB  patient to be rescheduled by 6/3/20 per Dr. Gutierrez-BB  patient requests to be rescheduled on a Thursday or Friday-BB  patient requested suprep-BB    ESOPHAGOGASTRODUODENOSCOPY N/A 6/2/2023    Procedure: EGD (ESOPHAGOGASTRODUODENOSCOPY);  Surgeon: Bart Hernandez MD;  Location: [facility] ENDO (4TH FLR);  Service: Endoscopy;  Laterality: N/A;    FOOT SURGERY      INSERTION OF IMPLANTABLE LOOP RECORDER N/A 9/18/2019    Procedure: Insertion, Implantable Loop Recorder;  Surgeon: Gil Wilson MD;  Location: [facility] EP LAB;  Service: Cardiology;  Laterality: N/A;  AT, ILR, MDT, Local, NH, 3 Prep    testicular biopsy         Review of patient's allergies indicates:   Allergen Reactions    Amoxicillin Hives    Allopurinol Other (See Comments)    Allopurinol analogues Other (See Comments)     Abnormal transaminases       Current Facility-Administered Medications on File Prior to Encounter   Medication    ciprofloxacin HCl tablet 500 mg     Current Outpatient Medications on File Prior to Encounter   Medication Sig    atorvastatin (LIPITOR) 20 MG tablet TAKE 1 TABLET(20 MG) BY MOUTH EVERY DAY    febuxostat (ULORIC) 40 mg Tab Take 1 tablet (40 mg total) by mouth once daily.    fish oil-omega-3 fatty acids 300-1,000 mg capsule Take 2 g by mouth once daily.    glucosamine & chondroit sul.Na 750-600 mg Tab Take 750 mg by mouth.    hydrALAZINE (APRESOLINE) 25 MG tablet Take 1 tablet (25 mg total) by mouth every 8 (eight) hours.    levoFLOXacin (LEVAQUIN) 500 MG tablet Take 1 tablet (500 mg total) by mouth once daily.    loratadine (CLARITIN) 10 mg tablet Take 10 mg by mouth daily as needed.    metoprolol succinate (TOPROL-XL) 50 MG 24 hr tablet Take 1 tablet (50 mg total) by mouth once daily.    omeprazole (PRILOSEC) 20 MG capsule TAKE 1 CAPSULE(20 MG) BY  MOUTH EVERY DAY    azelastine (ASTELIN) 137 mcg (0.1 %) nasal spray 2 sprays (274 mcg total) by Nasal route 2 (two) times daily as needed.    fluocinonide (LIDEX) 0.05 % external solution Apply topically 2 (two) times daily. For severe rashes in scalp and ears only prn    fluticasone propionate (FLONASE) 50 mcg/actuation nasal spray SHAKE LIQUID AND USE 2 SPRAYS(100 MCG) IN EACH NOSTRIL EVERY DAY    fluticasone-salmeterol diskus inhaler 250-50 mcg INHALE 1 PUFF BY MOUTH TWICE DAILY. RINSE MOUTH AFTER USE    furosemide (LASIX) 40 MG tablet Take 1 tablet (40 mg total) by mouth once daily.    hydrocortisone 2.5 % cream Apply topically 2 (two) times daily. As needed for severe flares of rash on face and neck    irbesartan (AVAPRO) 300 MG tablet Take 1 tablet (300 mg total) by mouth every evening.    ketoconazole (NIZORAL) 2 % cream Apply topically 2 (two) times daily. To dry skin PRN    ketoconazole (NIZORAL) 2 % shampoo To scalp and beard area for dry skin PRN    levocetirizine (XYZAL) 5 MG tablet     pramoxine-hydrocortisone (PROCTOCREAM-HC) 1-1 % rectal cream Place rectally 2 (two) times daily.    triamcinolone acetonide 0.1% (KENALOG) 0.1 % cream Apply topically 2 (two) times daily.     Family History       Problem Relation (Age of Onset)    Arthritis Brother    Blindness Maternal Grandmother    COPD Father    Cancer Mother    Cataracts Maternal Grandmother    Glaucoma Mother    Heart disease Father, Brother    Retinal detachment Mother, Father          Tobacco Use    Smoking status: Former     Current packs/day: 0.00     Average packs/day: 1 pack/day for 10.0 years (10.0 ttl pk-yrs)     Types: Cigarettes     Start date: 1958     Quit date: 1968     Years since quittin.2    Smokeless tobacco: Former    Tobacco comments:      last smoke   Substance and Sexual Activity    Alcohol use: Yes     Alcohol/week: 2.0 standard drinks of alcohol     Types: 1 Glasses of wine, 1 Shots of liquor per week      Comment: 5 days per week , 7 -8 drinks a week.    Drug use: No    Sexual activity: Yes     Review of Systems   Constitutional:  Positive for chills, fatigue and fever. Negative for activity change, appetite change, diaphoresis and unexpected weight change.   HENT:  Positive for congestion. Negative for dental problem, drooling, ear discharge, ear pain, facial swelling, hearing loss, mouth sores, nosebleeds, postnasal drip, rhinorrhea, sinus pressure, sneezing, sore throat, tinnitus, trouble swallowing and voice change.    Eyes:  Negative for photophobia, pain, discharge, redness, itching and visual disturbance.   Respiratory:  Positive for cough. Negative for apnea, choking, chest tightness, shortness of breath, wheezing and stridor.    Cardiovascular:  Negative for chest pain, palpitations and leg swelling.   Gastrointestinal:  Positive for nausea. Negative for abdominal distention, abdominal pain, anal bleeding, blood in stool, constipation, diarrhea, rectal pain and vomiting.   Endocrine: Negative for cold intolerance, heat intolerance, polydipsia, polyphagia and polyuria.   Genitourinary:  Negative for decreased urine volume, difficulty urinating, dysuria, enuresis, flank pain, frequency, genital sores, hematuria, penile discharge, penile pain, penile swelling, scrotal swelling, testicular pain and urgency.   Musculoskeletal:  Negative for arthralgias, back pain, gait problem, joint swelling, myalgias, neck pain and neck stiffness.   Skin:  Negative for color change, pallor, rash and wound.   Allergic/Immunologic: Negative for environmental allergies, food allergies and immunocompromised state.   Neurological:  Positive for headaches. Negative for dizziness, tremors, seizures, syncope, facial asymmetry, speech difficulty, weakness, light-headedness and numbness.        Occasional frontal headache and sinus congestion    Hematological:  Negative for adenopathy. Does not bruise/bleed easily.    Psychiatric/Behavioral:  Negative for agitation, behavioral problems, confusion, decreased concentration, dysphoric mood, hallucinations, self-injury, sleep disturbance and suicidal ideas. The patient is not nervous/anxious and is not hyperactive.      Objective:     Vital Signs (Most Recent):  Temp: 98.2 °F (36.8 °C) (04/03/24 0100)  Pulse: 76 (04/03/24 0100)  Resp: 20 (04/03/24 0100)  BP: (!) 115/56 (04/03/24 0100)  SpO2: 97 % (04/03/24 0100) Vital Signs (24h Range):  Temp:  [97.9 °F (36.6 °C)-98.2 °F (36.8 °C)] 98.2 °F (36.8 °C)  Pulse:  [73-84] 76  Resp:  [11-20] 20  SpO2:  [96 %-97 %] 97 %  BP: (115-168)/(56-82) 115/56     Weight: 101.2 kg (223 lb)  Body mass index is 32.93 kg/m².     Physical Exam  Constitutional:       General: He is not in acute distress.     Appearance: He is well-developed. He is obese. He is not diaphoretic.   HENT:      Head: Normocephalic and atraumatic.      Nose: Nose normal.      Mouth/Throat:      Pharynx: No oropharyngeal exudate.   Eyes:      General: No scleral icterus.     Conjunctiva/sclera: Conjunctivae normal.      Pupils: Pupils are equal, round, and reactive to light.   Neck:      Thyroid: No thyromegaly.      Vascular: No JVD.      Trachea: No tracheal deviation.   Cardiovascular:      Rate and Rhythm: Normal rate and regular rhythm.      Heart sounds: Normal heart sounds. No murmur heard.  Pulmonary:      Effort: Pulmonary effort is normal. No respiratory distress.      Breath sounds: Rhonchi present. No wheezing or rales.   Chest:      Chest wall: No tenderness.   Abdominal:      General: Bowel sounds are normal. There is no distension.      Palpations: Abdomen is soft. There is no mass.      Tenderness: There is no abdominal tenderness. There is no guarding or rebound.   Musculoskeletal:         General: No tenderness. Normal range of motion.      Cervical back: Normal range of motion and neck supple.   Lymphadenopathy:      Cervical: No cervical adenopathy.   Skin:      General: Skin is warm and dry.      Findings: No erythema or rash.   Neurological:      Mental Status: He is alert and oriented to person, place, and time.      Cranial Nerves: No cranial nerve deficit.      Motor: No abnormal muscle tone.      Coordination: Coordination normal.      Deep Tendon Reflexes: Reflexes are normal and symmetric. Reflexes normal.   Psychiatric:         Thought Content: Thought content normal.         Judgment: Judgment normal.              CRANIAL NERVES     CN III, IV, VI   Pupils are equal, round, and reactive to light.       Significant Labs: All pertinent labs within the past 24 hours have been reviewed.  Recent Lab Results  (Last 5 results in the past 24 hours)        04/03/24  0347   04/03/24  0023   04/02/24  2333   04/02/24  1937   04/02/24  1903        Albumin 2.9         3.2                183       ALT 36         39       Anion Gap 13         8       AST 30         31       Baso # 0.11         0.07       Basophil % 1.3         1.3       BILIRUBIN TOTAL 0.6  Comment: For infants and newborns, interpretation of results should be based  on gestational age, weight and in agreement with clinical  observations.    Premature Infant recommended reference ranges:  Up to 24 hours.............<8.0 mg/dL  Up to 48 hours............<12.0 mg/dL  3-5 days..................<15.0 mg/dL  6-29 days.................<15.0 mg/dL           0.7  Comment: For infants and newborns, interpretation of results should be based  on gestational age, weight and in agreement with clinical  observations.    Premature Infant recommended reference ranges:  Up to 24 hours.............<8.0 mg/dL  Up to 48 hours............<12.0 mg/dL  3-5 days..................<15.0 mg/dL  6-29 days.................<15.0 mg/dL         BNP         174  Comment: Values of less than 100 pg/ml are consistent with non-CHF populations.       BUN 29         30       Calcium 9.1         9.7       Chloride 101         102       CO2  17         20       Creatinine 1.7         1.9       Differential Method Automated         Automated       eGFR 39.5         34.6       Eos # 0.4         0.3       Eos % 4.7         4.9       Glucose 88         78       Gran # (ANC) 5.1         3.5       Gran % 62.3         64.7       Hematocrit 31.8         34.1       Hemoglobin 10.8         11.3       Hepatitis C Ab         Non-reactive       HIV 1/2 Ag/Ab         Non-reactive       Immature Grans (Abs) 0.06  Comment: Mild elevation in immature granulocytes is non specific and   can be seen in a variety of conditions including stress response,   acute inflammation, trauma and pregnancy. Correlation with other   laboratory and clinical findings is essential.           0.06  Comment: Mild elevation in immature granulocytes is non specific and   can be seen in a variety of conditions including stress response,   acute inflammation, trauma and pregnancy. Correlation with other   laboratory and clinical findings is essential.         Immature Granulocytes 0.7         1.1       Legionella Culture Source   Sputum, Expectorated             Lipase       71         Lymph # 1.1         0.7       Lymph % 13.7         12.9       Magnesium  1.1               MCH 31.2         31.3       MCHC 34.0         33.1       MCV 92         95       Mono # 1.4         0.8       Mono % 17.3         15.1       MPV 10.3         11.4       nRBC 0         0       Phosphorus Level 2.9               Platelet Count 278         289       Potassium 4.2         4.4       PROTEIN TOTAL 6.2         6.8       RBC 3.46         3.61       RDW 12.1         12.3       Sodium 131         130       Troponin I     0.021  Comment: The reference interval for Troponin I represents the 99th percentile   cutoff   for our facility and is consistent with 3rd generation assay   performance.       0.019  Comment: The reference interval for Troponin I represents the 99th percentile   cutoff   for our facility and is  consistent with 3rd generation assay   performance.                  0.010  Comment: The reference interval for Troponin I represents the 99th percentile   cutoff   for our facility and is consistent with 3rd generation assay   performance.         WBC 8.23         5.36                              Significant Imaging: I have reviewed all pertinent imaging results/findings within the past 24 hours.

## 2024-04-03 NOTE — H&P
[facility] - Emergency Dept  [facility]  History & Physical    Patient Name: Adelfo ZHANG Jennifer, PhD  MRN: [id number]  Patient Class: IP- Inpatient  Admission Date: 4/2/2024  Attending Physician: Ethel Bates MD   Primary Care Provider: Romero Vogel MD         Patient information was obtained from patient and ER records.     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 Patient states he has been feeling extreme fatigue for last 2 weeks with intermittent fever, chills and diaphoresis. He reports temp running mostly 99 to 101, but few days ago it went up to 103.1 once. He has been suffering from URI symptoms since February and completed course of azithromycin >> prednisone prescribed by PCP without relief. Outpatient CXR on 3/25 showed retrocardiac atelectasis vs developing consolidation. PCP prescribed 10 days course of levofloxacin on 3/26 which patient have been taking. He notified his PCP yesterday about continued symptoms (fatigue, fever, non productive cough) who advised patient to come to hospital for further evaluation. Patient reports baseline exertional tachycardia which seems to have improved since January when EP cardiologist Dr. Gil Wilson increased his daily metoprolol from 12.5 mg to 50 mg daily. He also endorses occasional dysuria, but denies hematuria.     ED course: afebrile and vitals stable.  WBC 5, Cr 1.9, , Troponin negative. EKG NSR @ 84 bpm with occasional PACs.  CXR showed AGUSTINA opacity, abdominal ultrasound showed 1.1 cystic lesion at the  head of the pancreas similar to prior, cholelithiasis and GB sludge w/o cholecystitis or biliary dilatation. Patient received rocephin + azithromycin, night time dose of hydralazine in ED. During my interview, patient is awake, conversant and not in distress. He is oxygenating well on room air.     Past Medical History:   Diagnosis Date    ALLERGIC RHINITIS     Anemia     Asthma     Cataract     Hyperlipidemia     Hypertension     NAFLD (nonalcoholic fatty liver disease)     ANNABELLE (obstructive sleep apnea)     on CPAP    PVD (posterior vitreous detachment), left eye     Squamous cell cancer of skin of hand        Past Surgical History:   Procedure Laterality Date    APPENDECTOMY      CARDIAC CATHETERIZATION  10/31/2018    no stent    CARDIAC CATHETERIZATION      no stent    COLONOSCOPY N/A 2018    Procedure: COLONOSCOPY;  Surgeon: Wade De La Garza MD;  Location: [facility] (4TH FLR);  Service: Endoscopy;  Laterality: N/A;    COLONOSCOPY N/A 2020    Procedure: COLONOSCOPY;  Surgeon: Von Gutierrez MD;  Location: [facility] (4TH FLR);  Service: Endoscopy;  Laterality: N/A;  3/17/20-pt confirmed appt on 3/20/20 with new arrival time of 0715, and updated visitor/ride instructions-BB  patient to be rescheduled by 6/3/20 per Dr. Gutierrez-BB  patient requests to be rescheduled on a Thursday or Friday-BB  patient requested suprep-BB  loop recorder-BB  C    COLONOSCOPY N/A 2023    Procedure: COLONOSCOPY;  Surgeon: Bart Hernandez MD;  Location: [facility] (4TH FLR);  Service: Endoscopy;  Laterality: N/A;  Procedure Timin-12 weeks   suprep  pt requested Dr. Hernandez   instructions to portal-st  pre call , pt confirmed - mf    ENDOSCOPIC ULTRASOUND OF UPPER GASTROINTESTINAL TRACT N/A 10/1/2020    Procedure: ULTRASOUND, UPPER GI TRACT, ENDOSCOPIC;  Surgeon: Niko Lambert MD;  Location: [facility] (2ND FLR);  Service: Endoscopy;  Laterality: N/A;  Covid-19 test 20 at [facility] -   Has Loop Recorder.     ESOPHAGOGASTRODUODENOSCOPY N/A 10/23/2018    Procedure: EGD (ESOPHAGOGASTRODUODENOSCOPY);  Surgeon: Bart Hernandez MD;  Location: [facility] (4TH FLR);  Service: Endoscopy;  Laterality: N/A;    ESOPHAGOGASTRODUODENOSCOPY N/A 6/11/2020    Procedure: EGD (ESOPHAGOGASTRODUODENOSCOPY);  Surgeon: Von Gutierrez MD;  Location: [facility] (4TH FLR);  Service: Endoscopy;  Laterality: N/A;  3/17/20-pt confirmed appt on 3/20/20 with new arrival time of 0715, and updated visitor/ride instructions-BB  patient to be rescheduled by 6/3/20 per Dr. Gutierrez-BB  patient requests to be rescheduled on a Thursday or Friday-BB  patient requested suprep-BB    ESOPHAGOGASTRODUODENOSCOPY N/A 6/2/2023    Procedure: EGD (ESOPHAGOGASTRODUODENOSCOPY);  Surgeon: Bart Hernandez MD;  Location: [facility] (4TH FLR);  Service: Endoscopy;  Laterality: N/A;    FOOT SURGERY      INSERTION OF IMPLANTABLE LOOP RECORDER N/A 9/18/2019    Procedure: Insertion, Implantable Loop Recorder;  Surgeon: Gil Wilson MD;  Location: [facility];  Service: Cardiology;  Laterality: N/A;  AT, ILR, MDT, [place], [place], 3 Prep    testicular biopsy         Review of patient's allergies indicates:   Allergen Reactions    Amoxicillin Hives    Allopurinol Other (See Comments)    Allopurinol analogues Other (See Comments)     Abnormal transaminases       Current Facility-Administered Medications on File Prior to Encounter   Medication    ciprofloxacin HCl tablet 500 mg     Current Outpatient Medications on File Prior to Encounter   Medication Sig    atorvastatin (LIPITOR) 20 MG tablet TAKE 1 TABLET(20 MG) BY MOUTH EVERY DAY    febuxostat (ULORIC) 40 mg Tab Take 1 tablet (40 mg total) by mouth once daily.    fish oil-omega-3 fatty acids 300-1,000 mg capsule Take 2 g by mouth once daily.    glucosamine & chondroit sul.Na 750-600 mg Tab Take 750 mg by mouth.    hydrALAZINE (APRESOLINE) 25 MG tablet Take 1 tablet (25 mg total) by mouth every 8 (eight) hours.    levoFLOXacin (LEVAQUIN) 500 MG  tablet Take 1 tablet (500 mg total) by mouth once daily.    loratadine (CLARITIN) 10 mg tablet Take 10 mg by mouth daily as needed.    metoprolol succinate (TOPROL-XL) 50 MG 24 hr tablet Take 1 tablet (50 mg total) by mouth once daily.    omeprazole (PRILOSEC) 20 MG capsule TAKE 1 CAPSULE(20 MG) BY MOUTH EVERY DAY    azelastine (ASTELIN) 137 mcg (0.1 %) nasal spray 2 sprays (274 mcg total) by Nasal route 2 (two) times daily as needed.    fluocinonide (LIDEX) 0.05 % external solution Apply topically 2 (two) times daily. For severe rashes in scalp and ears only prn    fluticasone propionate (FLONASE) 50 mcg/actuation nasal spray SHAKE LIQUID AND USE 2 SPRAYS(100 MCG) IN EACH NOSTRIL EVERY DAY    fluticasone-salmeterol diskus inhaler 250-50 mcg INHALE 1 PUFF BY MOUTH TWICE DAILY. RINSE MOUTH AFTER USE    furosemide (LASIX) 40 MG tablet Take 1 tablet (40 mg total) by mouth once daily.    hydrocortisone 2.5 % cream Apply topically 2 (two) times daily. As needed for severe flares of rash on face and neck    irbesartan (AVAPRO) 300 MG tablet Take 1 tablet (300 mg total) by mouth every evening.    ketoconazole (NIZORAL) 2 % cream Apply topically 2 (two) times daily. To dry skin PRN    ketoconazole (NIZORAL) 2 % shampoo To scalp and beard area for dry skin PRN    levocetirizine (XYZAL) 5 MG tablet     pramoxine-hydrocortisone (PROCTOCREAM-HC) 1-1 % rectal cream Place rectally 2 (two) times daily.    triamcinolone acetonide 0.1% (KENALOG) 0.1 % cream Apply topically 2 (two) times daily.     Family History       Problem Relation (Age of Onset)    Arthritis Brother    Blindness Maternal Grandmother    COPD Father    Cancer Mother    Cataracts Maternal Grandmother    Glaucoma Mother    Heart disease Father, Brother    Retinal detachment Mother, Father          Tobacco Use    Smoking status: Former     Current packs/day: 0.00     Average packs/day: 1 pack/day for 10.0 years (10.0 ttl pk-yrs)     Types: Cigarettes     Start date:  1958     Quit date: 1968     Years since quittin.2    Smokeless tobacco: Former    Tobacco comments:      last smoke   Substance and Sexual Activity    Alcohol use: Yes     Alcohol/week: 2.0 standard drinks of alcohol     Types: 1 Glasses of wine, 1 Shots of liquor per week     Comment: 5 days per week , 7 -8 drinks a week.    Drug use: No    Sexual activity: Yes     Review of Systems   Constitutional:  Positive for chills, fatigue and fever. Negative for activity change, appetite change, diaphoresis and unexpected weight change.   HENT:  Positive for congestion. Negative for dental problem, drooling, ear discharge, ear pain, facial swelling, hearing loss, mouth sores, nosebleeds, postnasal drip, rhinorrhea, sinus pressure, sneezing, sore throat, tinnitus, trouble swallowing and voice change.    Eyes:  Negative for photophobia, pain, discharge, redness, itching and visual disturbance.   Respiratory:  Positive for cough. Negative for apnea, choking, chest tightness, shortness of breath, wheezing and stridor.    Cardiovascular:  Negative for chest pain, palpitations and leg swelling.   Gastrointestinal:  Positive for nausea. Negative for abdominal distention, abdominal pain, anal bleeding, blood in stool, constipation, diarrhea, rectal pain and vomiting.   Endocrine: Negative for cold intolerance, heat intolerance, polydipsia, polyphagia and polyuria.   Genitourinary:  Negative for decreased urine volume, difficulty urinating, dysuria, enuresis, flank pain, frequency, genital sores, hematuria, penile discharge, penile pain, penile swelling, scrotal swelling, testicular pain and urgency.   Musculoskeletal:  Negative for arthralgias, back pain, gait problem, joint swelling, myalgias, neck pain and neck stiffness.   Skin:  Negative for color change, pallor, rash and wound.   Allergic/Immunologic: Negative for environmental allergies, food allergies and immunocompromised state.   Neurological:  Positive  for headaches. Negative for dizziness, tremors, seizures, syncope, facial asymmetry, speech difficulty, weakness, light-headedness and numbness.        Occasional frontal headache and sinus congestion    Hematological:  Negative for adenopathy. Does not bruise/bleed easily.   Psychiatric/Behavioral:  Negative for agitation, behavioral problems, confusion, decreased concentration, dysphoric mood, hallucinations, self-injury, sleep disturbance and suicidal ideas. The patient is not nervous/anxious and is not hyperactive.      Objective:     Vital Signs (Most Recent):  Temp: 98.2 °F (36.8 °C) (04/03/24 0100)  Pulse: 76 (04/03/24 0100)  Resp: 20 (04/03/24 0100)  BP: (!) 115/56 (04/03/24 0100)  SpO2: 97 % (04/03/24 0100) Vital Signs (24h Range):  Temp:  [97.9 °F (36.6 °C)-98.2 °F (36.8 °C)] 98.2 °F (36.8 °C)  Pulse:  [73-84] 76  Resp:  [11-20] 20  SpO2:  [96 %-97 %] 97 %  BP: (115-168)/(56-82) 115/56     Weight: 101.2 kg (223 lb)  Body mass index is 32.93 kg/m².     Physical Exam  Constitutional:       General: He is not in acute distress.     Appearance: He is well-developed. He is obese. He is not diaphoretic.   HENT:      Head: Normocephalic and atraumatic.      Nose: Nose normal.      Mouth/Throat:      Pharynx: No oropharyngeal exudate.   Eyes:      General: No scleral icterus.     Conjunctiva/sclera: Conjunctivae normal.      Pupils: Pupils are equal, round, and reactive to light.   Neck:      Thyroid: No thyromegaly.      Vascular: No JVD.      Trachea: No tracheal deviation.   Cardiovascular:      Rate and Rhythm: Normal rate and regular rhythm.      Heart sounds: Normal heart sounds. No murmur heard.  Pulmonary:      Effort: Pulmonary effort is normal. No respiratory distress.      Breath sounds: Rhonchi present. No wheezing or rales.   Chest:      Chest wall: No tenderness.   Abdominal:      General: Bowel sounds are normal. There is no distension.      Palpations: Abdomen is soft. There is no mass.       Tenderness: There is no abdominal tenderness. There is no guarding or rebound.   Musculoskeletal:         General: No tenderness. Normal range of motion.      Cervical back: Normal range of motion and neck supple.   Lymphadenopathy:      Cervical: No cervical adenopathy.   Skin:     General: Skin is warm and dry.      Findings: No erythema or rash.   Neurological:      Mental Status: He is alert and oriented to person, place, and time.      Cranial Nerves: No cranial nerve deficit.      Motor: No abnormal muscle tone.      Coordination: Coordination normal.      Deep Tendon Reflexes: Reflexes are normal and symmetric. Reflexes normal.   Psychiatric:         Thought Content: Thought content normal.         Judgment: Judgment normal.              CRANIAL NERVES     CN III, IV, VI   Pupils are equal, round, and reactive to light.       Significant Labs: All pertinent labs within the past 24 hours have been reviewed.  Recent Lab Results  (Last 5 results in the past 24 hours)        04/03/24  0347   04/03/24  0023   04/02/24  2333   04/02/24  1937   04/02/24  1903        Albumin 2.9         3.2                183       ALT 36         39       Anion Gap 13         8       AST 30         31       Baso # 0.11         0.07       Basophil % 1.3         1.3       BILIRUBIN TOTAL 0.6  Comment: For infants and newborns, interpretation of results should be based  on gestational age, weight and in agreement with clinical  observations.    Premature Infant recommended reference ranges:  Up to 24 hours.............<8.0 mg/dL  Up to 48 hours............<12.0 mg/dL  3-5 days..................<15.0 mg/dL  6-29 days.................<15.0 mg/dL           0.7  Comment: For infants and newborns, interpretation of results should be based  on gestational age, weight and in agreement with clinical  observations.    Premature Infant recommended reference ranges:  Up to 24 hours.............<8.0 mg/dL  Up to 48 hours............<12.0  mg/dL  3-5 days..................<15.0 mg/dL  6-29 days.................<15.0 mg/dL         BNP         174  Comment: Values of less than 100 pg/ml are consistent with non-CHF populations.       BUN 29         30       Calcium 9.1         9.7       Chloride 101         102       CO2 17         20       Creatinine 1.7         1.9       Differential Method Automated         Automated       eGFR 39.5         34.6       Eos # 0.4         0.3       Eos % 4.7         4.9       Glucose 88         78       Gran # (ANC) 5.1         3.5       Gran % 62.3         64.7       Hematocrit 31.8         34.1       Hemoglobin 10.8         11.3       Hepatitis C Ab         Non-reactive       HIV 1/2 Ag/Ab         Non-reactive       Immature Grans (Abs) 0.06  Comment: Mild elevation in immature granulocytes is non specific and   can be seen in a variety of conditions including stress response,   acute inflammation, trauma and pregnancy. Correlation with other   laboratory and clinical findings is essential.           0.06  Comment: Mild elevation in immature granulocytes is non specific and   can be seen in a variety of conditions including stress response,   acute inflammation, trauma and pregnancy. Correlation with other   laboratory and clinical findings is essential.         Immature Granulocytes 0.7         1.1       Legionella Culture Source   Sputum, Expectorated             Lipase       71         Lymph # 1.1         0.7       Lymph % 13.7         12.9       Magnesium  1.1               MCH 31.2         31.3       MCHC 34.0         33.1       MCV 92         95       Mono # 1.4         0.8       Mono % 17.3         15.1       MPV 10.3         11.4       nRBC 0         0       Phosphorus Level 2.9               Platelet Count 278         289       Potassium 4.2         4.4       PROTEIN TOTAL 6.2         6.8       RBC 3.46         3.61       RDW 12.1         12.3       Sodium 131         130       Troponin I     0.021  Comment:  The reference interval for Troponin I represents the 99th percentile   cutoff   for our facility and is consistent with 3rd generation assay   performance.       0.019  Comment: The reference interval for Troponin I represents the 99th percentile   cutoff   for our facility and is consistent with 3rd generation assay   performance.                  0.010  Comment: The reference interval for Troponin I represents the 99th percentile   cutoff   for our facility and is consistent with 3rd generation assay   performance.         WBC 8.23         5.36                              Significant Imaging: I have reviewed all pertinent imaging results/findings within the past 24 hours.  Assessment/Plan:     Left upper lobe pneumonia  -extreme fatigue with intermittent fever, chills and non productive cough for last 2 weeks   -failed outpatient course of azithromycin + prednisone in early March and currently on day 7 of 10 days course of levofloxacin   -CXR in ED showed AGUSTINA opacity which is new compared to 3/25  -afebrile in ED w/o leukocytosis   -oxygenating well on room air   -continue IV rocephin + azithromycin   -check blood and sputum cultures   -check UA to r/o UTI       Atrial tachycardia  -chronic and initially diagnosed in 2018  -s/p EP study with CTI and ablation   -s/p ILR which is nonfunctional  -he follows closely with EP Dr. Gil Wilson in clinic and during last visit in January metoprolol increased to 50 mg daily with good response   -EKG today NSR with PACs  -telemetry monitoring       Essential hypertension  Chronic, controlled. Latest blood pressure and vitals reviewed-     Temp:  [97.9 °F (36.6 °C)-98.3 °F (36.8 °C)]   Pulse:  [73-87]   Resp:  [11-20]   BP: (115-168)/(53-82)   SpO2:  [95 %-97 %] .   Home meds for hypertension were reviewed and noted below.   Hypertension Medications               furosemide (LASIX) 40 MG tablet Take 1 tablet (40 mg total) by mouth once daily.    hydrALAZINE (APRESOLINE) 25 MG  tablet Take 1 tablet (25 mg total) by mouth every 8 (eight) hours.    irbesartan (AVAPRO) 300 MG tablet Take 1 tablet (300 mg total) by mouth every evening.    metoprolol succinate (TOPROL-XL) 50 MG 24 hr tablet Take 1 tablet (50 mg total) by mouth once daily.            While in the hospital, will manage blood pressure as follows; Continue home antihypertensive regimen    Will utilize p.r.n. blood pressure medication only if patient's blood pressure greater than 180/110 and he develops symptoms such as worsening chest pain or shortness of breath.    Hyperlipidemia  -continue home statin       CKD (chronic kidney disease), stage III  Creatine stable for now. BMP reviewed- noted Estimated Creatinine Clearance: 38.6 mL/min (A) (based on SCr of 1.7 mg/dL (H)). according to latest data. Based on current GFR, CKD stage is stage 3 - GFR 30-59.  Monitor UOP and serial BMP and adjust therapy as needed. Renally dose meds. Avoid nephrotoxic medications and procedures.    Pancreatic cyst  -no changes on recent CT and ultrasound today   -under surveillance with serial imaging by PCP       Asthma, intermittent  -well controlled with inhaler and antihistamine prn   -no signs of flare up  -nebs prn       Chronic rhinitis  -continue home antihistamine and nasal spray       Obstructive sleep apnea  -uses CPAP nightly at home and will order       Obesity (BMI 30-39.9)  Body mass index is 32.93 kg/m². Morbid obesity complicates all aspects of disease management from diagnostic modalities to treatment. Weight loss encouraged and health benefits explained to patient.         GERD (gastroesophageal reflux disease)  -continue home PPI      Bilateral leg edema  -takes lasix twice a week as needed   -last taken yesterday and no evidence of volume overload or edema on exam       Chronic tophaceous gout  -intolerance of allopurinol in the past   -will controlled with febuxostat which is non formulary   -patient may take his own   -no signs of  gout flare up      ACP (advance care planning)  Advance Care Planning    Date: 04/03/2024    Code Status  In light of the patients advanced and life limiting illness,I engaged the the patient in a voluntary conversation about the patient's preferences for care  at the very end of life. The patient wishes to have a natural, peaceful death.  Along those lines, the patient does not wish to have CPR or other invasive treatments performed when his heart and/or breathing stops. I communicated to the patient that a DNR order would be placed in his medical record to reflect this preference.  I spent a total of 10 minutes engaging the patient in this advance care planning discussion.                VTE Risk Mitigation (From admission, onward)           Ordered     enoxaparin injection 40 mg  Daily         04/03/24 0149     IP VTE HIGH RISK PATIENT  Once         04/03/24 0149     Place sequential compression device  Until discontinued         04/03/24 0149                       Jake Feliciano DO  Department of Hospital Medicine  [facility] - Emergency Dept

## 2024-04-03 NOTE — ED NOTES
Patient identifiers verified and correct for Adelfo Rodriguez    APPEARANCE: awake and alert in NAD. PAIN  3/10   SKIN: warm, dry and intact. No breakdown or bruising.  MUSCULOSKELETAL: Patient moving all extremities spontaneously, no obvious swelling or deformities noted. Ambulates independently.  RESPIRATORY: Denies shortness of breath.Respirations unlabored.   CARDIAC: Denies CP, 2+ distal pulses; no peripheral edema  ABDOMEN: Reports indigestion and some nausea, requesting meds. Denies abd pain  : voids spontaneously, denies difficulty  Neurologic: AAO x 4; follows commands equal strength in all extremities; denies numbness/tingling. Denies dizziness

## 2024-04-03 NOTE — CARE UPDATE
Mg resulted low 1.1 likely due to diuretic use. Hypomagnesemia could be a contributing factor to his fatigue. will replace with oral and IV supplement. Follow up with repeat.     Jake Feliciano DO  Staff Physician, Hospital Medicine.

## 2024-04-03 NOTE — NURSING
Nurses Note -- 4 Eyes      4/3/2024   5:23 PM      Skin assessed during: Admit      [x] No Altered Skin Integrity Present    []Prevention Measures Documented      [] Yes- Altered Skin Integrity Present or Discovered   [] LDA Added if Not in Epic (Describe Wound)   [] New Altered Skin Integrity was Present on Admit and Documented in LDA   [] Wound Image Taken    Wound Care Consulted? No    Attending Nurse:  Gregorio Santizo RN/Staff Member:   TERRI Malcolm

## 2024-04-03 NOTE — ASSESSMENT & PLAN NOTE
Creatine stable for now. BMP reviewed- noted Estimated Creatinine Clearance: 38.6 mL/min (A) (based on SCr of 1.7 mg/dL (H)). according to latest data. Based on current GFR, CKD stage is stage 3 - GFR 30-59.  Monitor UOP and serial BMP and adjust therapy as needed. Renally dose meds. Avoid nephrotoxic medications and procedures.

## 2024-04-03 NOTE — ASSESSMENT & PLAN NOTE
-chronic and initially diagnosed in 2018  -s/p EP study with CTI and ablation   -s/p ILR which is nonfunctional  -he follows closely with EP Dr. Gil Wilson in clinic and during last visit in January metoprolol increased to 50 mg daily with good response   -EKG today NSR with PACs  -telemetry monitoring

## 2024-04-03 NOTE — CARE UPDATE
I have reviewed the chart of Mckinley Rodriguez, PhD who is hospitalized for the following:    Active Hospital Problems    Diagnosis    Left upper lobe pneumonia    Bilateral leg edema    Atrial tachycardia    GERD (gastroesophageal reflux disease)    CKD (chronic kidney disease), stage III    Hyponatremia    ACP (advance care planning)    Pancreatic cyst    Obesity (BMI 30-39.9)    Chronic tophaceous gout       Results for MCKINLEY RODRIGUEZ MD (MRN [id number]) as of 4/30/2019 09:47   Ref. Range 1/3/2019 11:38   Sodium Latest Ref Range: 136 - 145 mmol/L 138   Potassium Latest Ref Range: 3.5 - 5.1 mmol/L 4.1   Chloride Latest Ref Range: 95 - 110 mmol/L 107   CO2 Latest Ref Range: 23 - 29 mmol/L 25   Anion Gap Latest Ref Range: 8 - 16 mmol/L 6 (L)   BUN, Bld Latest Ref Range: 8 - 23 mg/dL 32 (H)   Creatinine Latest Ref Range: 0.5 - 1.4 mg/dL 2.0 (H)   eGFR if non African American Latest Ref Range: >60 mL/min/1.73 m^2 31.0 (A)   eGFR if African American Latest Ref Range: >60 mL/min/1.73 m^2 35.9 (A)   Glucose Latest Ref Range: 70 - 110 mg/dL 105   Calcium Latest Ref Range: 8.7 - 10.5 mg/dL 9.2   Alkaline Phosphatase Latest Ref Range: 55 - 135 U/L 87   PROTEIN TOTAL Latest Ref Range: 6.0 - 8.4 g/dL 6.7   Albumin Latest Ref Range: 3.5 - 5.2 g/dL 3.5   Uric Acid Latest Ref Range: 3.4 - 7.0 mg/dL 3.8   BILIRUBIN TOTAL Latest Ref Range: 0.1 - 1.0 mg/dL 0.7   AST Latest Ref Range: 10 - 40 U/L 16   ALT Latest Ref Range: 10 - 44 U/L 20         Asthma, intermittent    Essential hypertension    Hyperlipidemia     Results for MCKINLEY RODRIGUEZ MD (MRN [id number]) as of 10/1/2018 11:29   Ref. Range 9/21/2018 08:21   Cholesterol Latest Ref Range: 120 - 199 mg/dL 170   HDL Latest Ref Range: 40 - 75 mg/dL 73   LDL Cholesterol Latest Ref Range: 63.0 - 159.0 mg/dL 83.6   Total Cholesterol/HDL Ratio Latest Ref Range: 2.0 - 5.0  2.3   Triglycerides Latest Ref Range: 30 - 150 mg/dL 67         Obstructive sleep apnea    Chronic rhinitis         JAMAR NorrisC  Unit Based TRAE

## 2024-04-03 NOTE — ASSESSMENT & PLAN NOTE
-takes lasix twice a week as needed   -last taken yesterday and no evidence of volume overload or edema on exam

## 2024-04-04 VITALS
SYSTOLIC BLOOD PRESSURE: 162 MMHG | BODY MASS INDEX: 33.31 KG/M2 | DIASTOLIC BLOOD PRESSURE: 74 MMHG | RESPIRATION RATE: 18 BRPM | WEIGHT: 224.88 LBS | TEMPERATURE: 99 F | OXYGEN SATURATION: 96 % | HEIGHT: 69 IN | HEART RATE: 72 BPM

## 2024-04-04 LAB
ANION GAP SERPL CALC-SCNC: 9 MMOL/L (ref 8–16)
BUN SERPL-MCNC: 26 MG/DL (ref 8–23)
CALCIUM SERPL-MCNC: 9.3 MG/DL (ref 8.7–10.5)
CHLORIDE SERPL-SCNC: 101 MMOL/L (ref 95–110)
CO2 SERPL-SCNC: 18 MMOL/L (ref 23–29)
CREAT SERPL-MCNC: 1.7 MG/DL (ref 0.5–1.4)
ERYTHROCYTE [DISTWIDTH] IN BLOOD BY AUTOMATED COUNT: 12.2 % (ref 11.5–14.5)
EST. GFR  (NO RACE VARIABLE): 39.5 ML/MIN/1.73 M^2
GLUCOSE SERPL-MCNC: 99 MG/DL (ref 70–110)
HCT VFR BLD AUTO: 32.7 % (ref 40–54)
HGB BLD-MCNC: 11.1 G/DL (ref 14–18)
MAGNESIUM SERPL-MCNC: 1.8 MG/DL (ref 1.6–2.6)
MCH RBC QN AUTO: 31.4 PG (ref 27–31)
MCHC RBC AUTO-ENTMCNC: 33.9 G/DL (ref 32–36)
MCV RBC AUTO: 92 FL (ref 82–98)
PLATELET # BLD AUTO: 263 K/UL (ref 150–450)
PMV BLD AUTO: 11 FL (ref 9.2–12.9)
POTASSIUM SERPL-SCNC: 4.9 MMOL/L (ref 3.5–5.1)
RBC # BLD AUTO: 3.54 M/UL (ref 4.6–6.2)
SODIUM SERPL-SCNC: 128 MMOL/L (ref 136–145)
WBC # BLD AUTO: 7.83 K/UL (ref 3.9–12.7)

## 2024-04-04 PROCEDURE — 25000003 PHARM REV CODE 250: Performed by: HOSPITALIST

## 2024-04-04 PROCEDURE — 94761 N-INVAS EAR/PLS OXIMETRY MLT: CPT

## 2024-04-04 PROCEDURE — 99900035 HC TECH TIME PER 15 MIN (STAT)

## 2024-04-04 PROCEDURE — 25000242 PHARM REV CODE 250 ALT 637 W/ HCPCS: Performed by: HOSPITALIST

## 2024-04-04 PROCEDURE — 85027 COMPLETE CBC AUTOMATED: CPT | Performed by: STUDENT IN AN ORGANIZED HEALTH CARE EDUCATION/TRAINING PROGRAM

## 2024-04-04 PROCEDURE — 94660 CPAP INITIATION&MGMT: CPT

## 2024-04-04 PROCEDURE — 83735 ASSAY OF MAGNESIUM: CPT | Performed by: HOSPITALIST

## 2024-04-04 PROCEDURE — 63700000 PHARM REV CODE 250 ALT 637 W/O HCPCS: Performed by: HOSPITALIST

## 2024-04-04 PROCEDURE — 80048 BASIC METABOLIC PNL TOTAL CA: CPT | Performed by: STUDENT IN AN ORGANIZED HEALTH CARE EDUCATION/TRAINING PROGRAM

## 2024-04-04 PROCEDURE — 25000003 PHARM REV CODE 250: Performed by: STUDENT IN AN ORGANIZED HEALTH CARE EDUCATION/TRAINING PROGRAM

## 2024-04-04 PROCEDURE — 36415 COLL VENOUS BLD VENIPUNCTURE: CPT | Performed by: HOSPITALIST

## 2024-04-04 RX ORDER — CEFPODOXIME PROXETIL 100 MG/1
100 TABLET, FILM COATED ORAL 2 TIMES DAILY
Qty: 8 TABLET | Refills: 0 | Status: SHIPPED | OUTPATIENT
Start: 2024-04-04 | End: 2024-04-08

## 2024-04-04 RX ORDER — AZITHROMYCIN 250 MG/1
250 TABLET, FILM COATED ORAL DAILY
Qty: 4 TABLET | Refills: 0 | Status: SHIPPED | OUTPATIENT
Start: 2024-04-05 | End: 2024-04-09

## 2024-04-04 RX ORDER — GUAIFENESIN 600 MG/1
600 TABLET, EXTENDED RELEASE ORAL 2 TIMES DAILY
Qty: 8 TABLET | Refills: 0 | Status: SHIPPED | OUTPATIENT
Start: 2024-04-04 | End: 2024-04-08

## 2024-04-04 RX ADMIN — Medication 1 CAPSULE: at 08:04

## 2024-04-04 RX ADMIN — HYDRALAZINE HYDROCHLORIDE 25 MG: 25 TABLET ORAL at 06:04

## 2024-04-04 RX ADMIN — METOPROLOL SUCCINATE 50 MG: 50 TABLET, EXTENDED RELEASE ORAL at 08:04

## 2024-04-04 RX ADMIN — GUAIFENESIN 600 MG: 600 TABLET, EXTENDED RELEASE ORAL at 08:04

## 2024-04-04 RX ADMIN — CETIRIZINE HYDROCHLORIDE 5 MG: 5 TABLET, FILM COATED ORAL at 08:04

## 2024-04-04 RX ADMIN — FLUTICASONE FUROATE AND VILANTEROL TRIFENATATE 1 PUFF: 100; 25 POWDER RESPIRATORY (INHALATION) at 08:04

## 2024-04-04 RX ADMIN — AZITHROMYCIN DIHYDRATE 250 MG: 250 TABLET ORAL at 08:04

## 2024-04-04 RX ADMIN — PANTOPRAZOLE SODIUM 40 MG: 40 TABLET, DELAYED RELEASE ORAL at 08:04

## 2024-04-04 NOTE — NURSING
PIV removed per hospital policy.  Pt verbalized understanding of d/c paperwork; no questions or concerns.  Pt left the unit via wheelchair with all personal belongings in hand.  NADN; VSS

## 2024-04-04 NOTE — PLAN OF CARE
Problem: Adult Inpatient Plan of Care  Goal: Plan of Care Review  Outcome: Ongoing, Progressing     Problem: Fluid Imbalance (Pneumonia)  Goal: Fluid Balance  Outcome: Ongoing, Progressing     Problem: Infection (Pneumonia)  Goal: Resolution of Infection Signs and Symptoms  Outcome: Ongoing, Progressing     Problem: Respiratory Compromise (Pneumonia)  Goal: Effective Oxygenation and Ventilation  Outcome: Ongoing, Progressing     Pt AOx4. No acute events noted during shift. Vitals remained stable. Had c/o pain and discomfort, PRN meds given per MD orders. Q1-2hr safety checks completed. Bed remained low, locked, with all personal items in reach.

## 2024-04-04 NOTE — PLAN OF CARE
"[facility] - Med Surg ([facility]-16)  Initial Discharge Assessment       Primary Care Provider: Romero Vogel MD    Admission Diagnosis: Hyponatremia [E87.1]  Weakness generalized [R53.1]  Fever [R50.9]  Elevated brain natriuretic peptide (BNP) level [R79.89]  Chest pain [R07.9]  Community acquired pneumonia, unspecified laterality [J18.9]    Admission Date: 4/2/2024  Expected Discharge Date: 4/4/2024    Transition of Care Barriers: (P) None    Payor: [employer] [facility] MCARE / Plan: [employer] MEDICARE ADVANTAGE / Product Type: Medicare Advantage /     Extended Emergency Contact Information  Primary Emergency Contact: Libia Mitchell  Address: [address] [place] [address]  Home Phone: [phone]  Mobile Phone: [phone]  Relation: Spouse    Discharge Plan A: (P) Home with family  Discharge Plan B: (P) Home      [id number] - [place], [place] - [address]  [address]  [facility] [address]  Phone: [phone] Fax: [phone]    [id number] [place], [place] - [address] AT [facility] [address]  [address]  [facility] [address]  Phone: [phone] Fax: [phone]      Initial Assessment (most recent)       Adult Discharge Assessment - 04/04/24 1103          Discharge Assessment    Assessment Type Discharge Planning Assessment (P)      Confirmed/corrected address, phone number and insurance Yes (P)      Confirmed Demographics Correct on Facesheet (P)      Source of Information patient (P)      Reason For Admission "PCP sent for full workup" (P)      People in Home spouse (P)      Do you expect to return to your current living situation? Yes (P)      Do you have help at home or someone to help you manage your care at home? Yes (P)      Who are your caregiver(s) and their phone number(s)? Libia Mitchell (Spouse) " [phone] (P)      Prior to hospitilization cognitive status: Alert/Oriented (P)      Current cognitive status: Alert/Oriented (P)      Walking or Climbing Stairs Difficulty no (P)      Dressing/Bathing Difficulty no (P)      Home Accessibility wheelchair accessible (P)      Home Layout Able to live on 1st floor (P)      Equipment Currently Used at Home CPAP (P)      Readmission within 30 days? No (P)      Patient currently being followed by outpatient case management? No (P)      Do you currently have service(s) that help you manage your care at home? No (P)      Do you take prescription medications? Yes (P)      Do you have prescription coverage? Yes (P)      Coverage [facility] MEDICARE ADVANTAGE (P)      Do you have any problems affording any of your prescribed medications? No (P)      Is the patient taking medications as prescribed? yes (P)      Who is going to help you get home at discharge? Libia Mitchell (Spouse) [phone] (P)      How do you get to doctors appointments? car, drives self (P)      Are you on dialysis? No (P)      Do you take coumadin? No (P)      Discharge Plan A Home with family (P)      Discharge Plan B Home (P)      DME Needed Upon Discharge  none (P)      Discharge Plan discussed with: Patient (P)      Transition of Care Barriers None (P)         Physical Activity    On average, how many days per week do you engage in moderate to strenuous exercise (like a brisk walk)? 0 days (P)      On average, how many minutes do you engage in exercise at this level? 0 min (P)         Financial Resource Strain    How hard is it for you to pay for the very basics like food, housing, medical care, and heating? Not hard at all (P)         Housing Stability    In the last 12 months, was there a time when you were not able to pay the mortgage or rent on time? No (P)      In the last 12 months, how many places have you lived? 1 (P)      In the last 12  months, was there a time when you did not have a steady place to sleep or slept in a shelter (including now)? No (P)         Transportation Needs    In the past 12 months, has lack of transportation kept you from medical appointments or from getting medications? No (P)      In the past 12 months, has lack of transportation kept you from meetings, work, or from getting things needed for daily living? No (P)         Food Insecurity    Within the past 12 months, you worried that your food would run out before you got the money to buy more. Never true (P)      Within the past 12 months, the food you bought just didn't last and you didn't have money to get more. Never true (P)         Social Connections    In a typical week, how many times do you talk on the phone with family, friends, or neighbors? More than three times a week (P)      How often do you get together with friends or relatives? More than three times a week (P)      How often do you attend Orthodoxy or Confucianism services? Never (P)      Are you , , , , never , or living with a partner?  (P)         Alcohol Use    Q1: How often do you have a drink containing alcohol? 4 or more times a week (P)      Q2: How many drinks containing alcohol do you have on a typical day when you are drinking? 1 or 2 (P)      Q3: How often do you have six or more drinks on one occasion? Never (P)         OTHER    Name(s) of People in Home Libia Mitchell (Spouse) [phone] (P)                  Discharge Plan A and Plan B have been determined by review of patient's clinical status, future medical and therapeutic needs, and coverage/benefits for post-acute care in coordination with multidisciplinary team members.                       OCTAVIO Willams, LMSW  [facility]  Case Management  Ext. [phone]

## 2024-04-05 LAB — L PNEUMO AG UR QL IA: NEGATIVE

## 2024-04-05 NOTE — PLAN OF CARE
[facility] ([facility]16)  Discharge Final Note    Primary Care Provider: Romero Vogel MD    Expected Discharge Date: 4/4/2024    Final Discharge Note (most recent)       Final Note - 04/05/24 0946          Final Note    Assessment Type Final Discharge Note (P)      Anticipated Discharge Disposition Home or Self Care (P)      What phone number can be called within the next 1-3 days to see how you are doing after discharge? [phone] (P)         Post-Acute Status    Post-Acute Authorization Other (P)      Coverage [facility] MEDICARE ADVANTAGE - (P)      Other Status No Post-Acute Service Needs (P)                      Important Message from Medicare           Patient dc home.                        OCTAVIO Willams, LMSW  [facility]  Case Management  Ext. [phone]

## 2024-04-07 LAB — S PNEUM AG UR QL: NOT DETECTED

## 2024-04-08 ENCOUNTER — PATIENT MESSAGE (OUTPATIENT)
Dept: CARDIOLOGY | Facility: CLINIC | Age: 84
End: 2024-04-08
Payer: MEDICARE

## 2024-04-08 PROBLEM — R53.1 WEAKNESS GENERALIZED: Status: ACTIVE | Noted: 2024-04-08

## 2024-04-08 PROBLEM — R79.89 ELEVATED BRAIN NATRIURETIC PEPTIDE (BNP) LEVEL: Status: ACTIVE | Noted: 2024-04-08

## 2024-04-08 LAB
BACTERIA BLD CULT: NORMAL
BACTERIA BLD CULT: NORMAL

## 2024-04-08 NOTE — DISCHARGE SUMMARY
DISCHARGE SUMMARY  Hospital Medicine    Team: [facility] HOSP MED A    Patient Name: Mckinley Rodriguez, PhD  YOB: 1940    Admit Date: 4/2/2024    Discharge Date: 4/4/2024    Discharge Attending Physician: Mary Grace Barrow     Admitting Resident    Diagnoses:  Active Hospital Problems    Diagnosis  POA    *Left upper lobe pneumonia [J18.9]  Yes    Bilateral leg edema [R60.0]  Yes    Atrial tachycardia [I47.19]  Yes    GERD (gastroesophageal reflux disease) [K21.9]  Yes    CKD (chronic kidney disease), stage III [N18.30]  Yes    Hyponatremia [E87.1]  Yes    ACP (advance care planning) [Z71.89]  Not Applicable    Pancreatic cyst [K86.2]  Yes    Obesity (BMI 30-39.9) [E66.9]  Yes    Chronic tophaceous gout [M1A.9XX1]  Yes       Results for MCKINLEY RODRIGUEZ MD (MRN [id number]) as of 4/30/2019 09:47   Ref. Range 1/3/2019 11:38   Sodium Latest Ref Range: 136 - 145 mmol/L 138   Potassium Latest Ref Range: 3.5 - 5.1 mmol/L 4.1   Chloride Latest Ref Range: 95 - 110 mmol/L 107   CO2 Latest Ref Range: 23 - 29 mmol/L 25   Anion Gap Latest Ref Range: 8 - 16 mmol/L 6 (L)   BUN, Bld Latest Ref Range: 8 - 23 mg/dL 32 (H)   Creatinine Latest Ref Range: 0.5 - 1.4 mg/dL 2.0 (H)   eGFR if non African American Latest Ref Range: >60 mL/min/1.73 m^2 31.0 (A)   eGFR if African American Latest Ref Range: >60 mL/min/1.73 m^2 35.9 (A)   Glucose Latest Ref Range: 70 - 110 mg/dL 105   Calcium Latest Ref Range: 8.7 - 10.5 mg/dL 9.2   Alkaline Phosphatase Latest Ref Range: 55 - 135 U/L 87   PROTEIN TOTAL Latest Ref Range: 6.0 - 8.4 g/dL 6.7   Albumin Latest Ref Range: 3.5 - 5.2 g/dL 3.5   Uric Acid Latest Ref Range: 3.4 - 7.0 mg/dL 3.8   BILIRUBIN TOTAL Latest Ref Range: 0.1 - 1.0 mg/dL 0.7   AST Latest Ref Range: 10 - 40 U/L 16   ALT Latest Ref Range: 10 - 44 U/L 20         Asthma, intermittent [J45.20]  Yes    Essential hypertension [I10]  Yes     Chronic    Hyperlipidemia [E78.5]  Yes     Results for MCKINLEY RODRIGUEZ MD (MRN [id number])  as of 10/1/2018 11:29   Ref. Range 9/21/2018 08:21   Cholesterol Latest Ref Range: 120 - 199 mg/dL 170   HDL Latest Ref Range: 40 - 75 mg/dL 73   LDL Cholesterol Latest Ref Range: 63.0 - 159.0 mg/dL 83.6   Total Cholesterol/HDL Ratio Latest Ref Range: 2.0 - 5.0  2.3   Triglycerides Latest Ref Range: 30 - 150 mg/dL 67         Obstructive sleep apnea [G47.33]  Yes    Chronic rhinitis [J31.0]  Yes     Chronic      Resolved Hospital Problems   No resolved problems to display.       Discharged Condition: admit problems have stabilized       Patient seen and examined on date of discharge. 45 min spent on face to face times and medical decision making.   Physical Exam  Constitutional:       General: He is not in acute distress.     Appearance: He is well-developed. He is obese. He is not diaphoretic.   HENT:      Head: Normocephalic and atraumatic.      Nose: Nose normal.      Mouth/Throat:      Pharynx: No oropharyngeal exudate.   Eyes:      General: No scleral icterus.     Conjunctiva/sclera: Conjunctivae normal.      Pupils: Pupils are equal, round, and reactive to light.   Neck:      Thyroid: No thyromegaly.      Vascular: No JVD.      Trachea: No tracheal deviation.   Cardiovascular:      Rate and Rhythm: Normal rate and regular rhythm.      Heart sounds: Normal heart sounds. No murmur heard.  Pulmonary:      Effort: Pulmonary effort is normal. No respiratory distress.      Breath sounds: Rhonchi present. No wheezing or rales.   Chest:      Chest wall: No tenderness.   Abdominal:      General: Bowel sounds are normal. There is no distension.      Palpations: Abdomen is soft. There is no mass.      Tenderness: There is no abdominal tenderness. There is no guarding or rebound.   Musculoskeletal:         General: No tenderness. Normal range of motion.      Cervical back: Normal range of motion and neck supple.   Lymphadenopathy:      Cervical: No cervical adenopathy.   Skin:     General: Skin is warm and dry.       Findings: No erythema or rash.   Neurological:      Mental Status: He is alert and oriented to person, place, and time.      Cranial Nerves: No cranial nerve deficit.      Motor: No abnormal muscle tone.      Coordination: Coordination normal.      Deep Tendon Reflexes: Reflexes are normal and symmetric. Reflexes normal.   Psychiatric:         Thought Content: Thought content normal.         Judgment: Judgment normal.                 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 Patient states he has been feeling extreme fatigue for last 2 weeks with intermittent fever, chills and diaphoresis. He reports temp running mostly 99 to 101, but few days ago it went up to 103.1 once. He has been suffering from URI symptoms since February and completed course of azithromycin >> prednisone prescribed by PCP without relief. Outpatient CXR on 3/25 showed retrocardiac atelectasis vs developing consolidation. PCP prescribed 10 days course of levofloxacin on 3/26 which patient have been taking. He notified his PCP yesterday about continued symptoms (fatigue, fever, non productive cough) who advised patient to come to hospital for further evaluation. Patient reports baseline exertional tachycardia which seems to have improved since January when EP cardiologist Dr. Gil Wilson increased his daily metoprolol from 12.5 mg to 50 mg daily. He also endorses occasional dysuria, but denies hematuria.      ED course: afebrile and vitals stable.  WBC 5, Cr 1.9, , Troponin negative. EKG NSR @ 84 bpm with occasional PACs.  CXR showed AGUSTINA opacity, abdominal ultrasound showed 1.1 cystic lesion at the head of the pancreas similar to prior, cholelithiasis and GB sludge  w/o cholecystitis or biliary dilatation. Patient received rocephin + azithromycin, night time dose of hydralazine in ED. During my interview, patient is awake, conversant and not in distress. He is oxygenating well on room air.        Course of Principle Problem for Admission:    Left upper lobe pneumonia  -extreme fatigue with intermittent fever, chills and non productive cough for last 2 weeks   -failed outpatient course of azithromycin + prednisone in early March and currently on day 7 of 10 days course of levofloxacin   -CXR in ED showed AGUSTINA opacity which is new compared to 3/25  -afebrile in ED w/o leukocytosis   -oxygenating well on room air   -continue IV rocephin + azithromycin  - transitioned to oral augmentin to finish course at home.   - blood cultures negative      CONSULTS: none    Last CBC/BMP/HgbA1c (if applicable):  Recent Results (from the past 336 hour(s))   CBC with Automated Differential    Collection Time: 04/03/24  3:47 AM   Result Value Ref Range    WBC 8.23 3.90 - 12.70 K/uL    Hemoglobin 10.8 (L) 14.0 - 18.0 g/dL    Hematocrit 31.8 (L) 40.0 - 54.0 %    Platelets 278 150 - 450 K/uL   CBC auto differential    Collection Time: 04/02/24  7:03 PM   Result Value Ref Range    WBC 5.36 3.90 - 12.70 K/uL    Hemoglobin 11.3 (L) 14.0 - 18.0 g/dL    Hematocrit 34.1 (L) 40.0 - 54.0 %    Platelets 289 150 - 450 K/uL   CBC Auto Differential    Collection Time: 03/25/24  3:55 PM   Result Value Ref Range    WBC 6.41 3.90 - 12.70 K/uL    Hemoglobin 11.7 (L) 14.0 - 18.0 g/dL    Hematocrit 36.1 (L) 40.0 - 54.0 %    Platelets 227 150 - 450 K/uL     Recent Results (from the past 336 hour(s))   Basic Metabolic Panel    Collection Time: 04/04/24  4:08 AM   Result Value Ref Range    Sodium 128 (L) 136 - 145 mmol/L    Potassium 4.9 3.5 - 5.1 mmol/L    Chloride 101 95 - 110 mmol/L    CO2 18 (L) 23 - 29 mmol/L    BUN 26 (H) 8 - 23 mg/dL    Creatinine 1.7 (H) 0.5 - 1.4 mg/dL    Calcium 9.3 8.7 - 10.5 mg/dL    Anion Gap  9 8 - 16 mmol/L     Lab Results   Component Value Date    HGBA1C 5.4 09/13/2023       Disposition:  Home       Future Scheduled Appointments:  Future Appointments   Date Time Provider Department Center   5/22/2024  1:00 PM Gato Cedeno MD [facility] OPHTHAL [facility]   6/4/2024  1:30 PM Meli Cunningham MD [facility] DERM [address]   7/3/2024  7:30 AM LAB, [facility] LAB [facility]   7/11/2024  2:30 PM Romero Vogel MD [facility] IM [place]       Follow-up Plans from This Hospitalization:  PCP    Discharge Medication List:         Medication List        START taking these medications      azithromycin 250 MG tablet  Commonly known as: Z-NICOLASA  Take 1 tablet (250 mg total) by mouth once daily. for 4 days     cefpodoxime 100 MG tablet  Commonly known as: VANTIN  Take 1 tablet (100 mg total) by mouth 2 (two) times daily. for 4 days     guaiFENesin 600 mg 12 hr tablet  Commonly known as: MUCINEX  Take 1 tablet (600 mg total) by mouth 2 (two) times daily. for 4 days            CONTINUE taking these medications      atorvastatin 20 MG tablet  Commonly known as: LIPITOR  TAKE 1 TABLET(20 MG) BY MOUTH EVERY DAY     azelastine 137 mcg (0.1 %) nasal spray  Commonly known as: ASTELIN  2 sprays (274 mcg total) by Nasal route 2 (two) times daily as needed.     febuxostat 40 mg Tab  Commonly known as: ULORIC  Take 1 tablet (40 mg total) by mouth once daily.     fish oil-omega-3 fatty acids 300-1,000 mg capsule     fluocinonide 0.05 % external solution  Commonly known as: LIDEX  Apply topically 2 (two) times daily. For severe rashes in scalp and ears only prn     fluticasone propionate 50 mcg/actuation nasal spray  Commonly known as: FLONASE  SHAKE LIQUID AND USE 2 SPRAYS(100 MCG) IN EACH NOSTRIL EVERY DAY     fluticasone-salmeterol 250-50 mcg/dose 250-50 mcg/dose diskus inhaler  Commonly known as: ADVAIR  INHALE 1 PUFF BY MOUTH TWICE DAILY. RINSE MOUTH AFTER USE     furosemide 40 MG tablet  Commonly known as: LASIX  Take 1 tablet (40  mg total) by mouth once daily.     glucosamine-chondroitn sulf.Na 750-600 mg Tab     hydrALAZINE 25 MG tablet  Commonly known as: APRESOLINE  Take 1 tablet (25 mg total) by mouth every 8 (eight) hours.     hydrocortisone 2.5 % cream  Apply topically 2 (two) times daily. As needed for severe flares of rash on face and neck     irbesartan 300 MG tablet  Commonly known as: AVAPRO  Take 1 tablet (300 mg total) by mouth every evening.     * ketoconazole 2 % cream  Commonly known as: NIZORAL  Apply topically 2 (two) times daily. To dry skin PRN     * ketoconazole 2 % shampoo  Commonly known as: NIZORAL  To scalp and beard area for dry skin PRN     loratadine 10 mg tablet  Commonly known as: CLARITIN     metoprolol succinate 50 MG 24 hr tablet  Commonly known as: TOPROL-XL  Take 1 tablet (50 mg total) by mouth once daily.     omeprazole 20 MG capsule  Commonly known as: PRILOSEC  TAKE 1 CAPSULE(20 MG) BY MOUTH EVERY DAY     pramoxine-hydrocortisone 1-1 % rectal cream  Commonly known as: PROCTOCREAM-HC  Place rectally 2 (two) times daily.     XYZAL 5 MG tablet  Generic drug: levocetirizine           * This list has 2 medication(s) that are the same as other medications prescribed for you. Read the directions carefully, and ask your doctor or other care provider to review them with you.                STOP taking these medications      levoFLOXacin 500 MG tablet  Commonly known as: LEVAQUIN     triamcinolone acetonide 0.1% 0.1 % cream  Commonly known as: KENALOG               Where to Get Your Medications        These medications were sent to [facility] [address], [place] [address]      Hours: Mon-Fri 7a-7p, Sat-Sun 10a-4p Phone: [phone]   azithromycin 250 MG tablet  cefpodoxime 100 MG tablet  guaiFENesin 600 mg 12 hr tablet         Patient Instructions:  No discharge procedures on file.    Signing Physician:  Mary Grace Barrow MD

## 2024-04-09 ENCOUNTER — PATIENT MESSAGE (OUTPATIENT)
Dept: INTERNAL MEDICINE | Facility: CLINIC | Age: 84
End: 2024-04-09
Payer: MEDICARE

## 2024-04-09 DIAGNOSIS — J18.9 PNEUMONIA OF LEFT LOWER LOBE DUE TO INFECTIOUS ORGANISM: Primary | ICD-10-CM

## 2024-04-11 ENCOUNTER — OFFICE VISIT (OUTPATIENT)
Dept: PULMONOLOGY | Facility: CLINIC | Age: 84
End: 2024-04-11
Payer: MEDICARE

## 2024-04-11 ENCOUNTER — LAB VISIT (OUTPATIENT)
Dept: LAB | Facility: HOSPITAL | Age: 84
End: 2024-04-11
Attending: INTERNAL MEDICINE
Payer: MEDICARE

## 2024-04-11 VITALS
SYSTOLIC BLOOD PRESSURE: 134 MMHG | DIASTOLIC BLOOD PRESSURE: 78 MMHG | BODY MASS INDEX: 32.49 KG/M2 | OXYGEN SATURATION: 97 % | HEART RATE: 68 BPM | WEIGHT: 219.38 LBS | HEIGHT: 69 IN

## 2024-04-11 DIAGNOSIS — J18.9 COMMUNITY ACQUIRED PNEUMONIA, UNSPECIFIED LATERALITY: ICD-10-CM

## 2024-04-11 DIAGNOSIS — G47.33 OBSTRUCTIVE SLEEP APNEA: ICD-10-CM

## 2024-04-11 DIAGNOSIS — J18.9 PNEUMONIA OF LEFT LOWER LOBE DUE TO INFECTIOUS ORGANISM: ICD-10-CM

## 2024-04-11 DIAGNOSIS — J45.22 MILD INTERMITTENT ASTHMA WITH STATUS ASTHMATICUS: ICD-10-CM

## 2024-04-11 DIAGNOSIS — E87.1 HYPONATREMIA: ICD-10-CM

## 2024-04-11 DIAGNOSIS — E87.1 HYPONATREMIA: Primary | ICD-10-CM

## 2024-04-11 LAB
ANION GAP SERPL CALC-SCNC: 8 MMOL/L (ref 8–16)
BUN SERPL-MCNC: 22 MG/DL (ref 8–23)
CALCIUM SERPL-MCNC: 9.4 MG/DL (ref 8.7–10.5)
CHLORIDE SERPL-SCNC: 102 MMOL/L (ref 95–110)
CO2 SERPL-SCNC: 22 MMOL/L (ref 23–29)
CREAT SERPL-MCNC: 1.6 MG/DL (ref 0.5–1.4)
EST. GFR  (NO RACE VARIABLE): 42.5 ML/MIN/1.73 M^2
GLUCOSE SERPL-MCNC: 87 MG/DL (ref 70–110)
POTASSIUM SERPL-SCNC: 4.6 MMOL/L (ref 3.5–5.1)
SODIUM SERPL-SCNC: 132 MMOL/L (ref 136–145)

## 2024-04-11 PROCEDURE — 1101F PT FALLS ASSESS-DOCD LE1/YR: CPT | Mod: CPTII,S$GLB,, | Performed by: INTERNAL MEDICINE

## 2024-04-11 PROCEDURE — 99203 OFFICE O/P NEW LOW 30 MIN: CPT | Mod: S$GLB,,, | Performed by: INTERNAL MEDICINE

## 2024-04-11 PROCEDURE — 99999 PR PBB SHADOW E&M-EST. PATIENT-LVL V: CPT | Mod: PBBFAC,,, | Performed by: INTERNAL MEDICINE

## 2024-04-11 PROCEDURE — 3078F DIAST BP <80 MM HG: CPT | Mod: CPTII,S$GLB,, | Performed by: INTERNAL MEDICINE

## 2024-04-11 PROCEDURE — 1126F AMNT PAIN NOTED NONE PRSNT: CPT | Mod: CPTII,S$GLB,, | Performed by: INTERNAL MEDICINE

## 2024-04-11 PROCEDURE — 36415 COLL VENOUS BLD VENIPUNCTURE: CPT | Performed by: INTERNAL MEDICINE

## 2024-04-11 PROCEDURE — 1111F DSCHRG MED/CURRENT MED MERGE: CPT | Mod: CPTII,S$GLB,, | Performed by: INTERNAL MEDICINE

## 2024-04-11 PROCEDURE — 3288F FALL RISK ASSESSMENT DOCD: CPT | Mod: CPTII,S$GLB,, | Performed by: INTERNAL MEDICINE

## 2024-04-11 PROCEDURE — 3075F SYST BP GE 130 - 139MM HG: CPT | Mod: CPTII,S$GLB,, | Performed by: INTERNAL MEDICINE

## 2024-04-11 PROCEDURE — 80048 BASIC METABOLIC PNL TOTAL CA: CPT | Performed by: INTERNAL MEDICINE

## 2024-04-11 PROCEDURE — 1159F MED LIST DOCD IN RCRD: CPT | Mod: CPTII,S$GLB,, | Performed by: INTERNAL MEDICINE

## 2024-04-11 RX ORDER — MONTELUKAST SODIUM 10 MG/1
10 TABLET ORAL NIGHTLY
Qty: 30 TABLET | Refills: 11 | Status: SHIPPED | OUTPATIENT
Start: 2024-04-11 | End: 2025-04-06

## 2024-04-11 NOTE — PROGRESS NOTES
"  Subjective:     Reason for visit: asthma    Patient ID:  Adelfo ZHANG Jennifer, PhD is a 83 y.o. male    History:   [place]  Is an 83-year-old with atrial flutter, heart failure with preserved ejection fraction, lifelong asthma and recent hospitalization for pneumonia that is here for follow-up and to establish care.  He has had asthma since he was in his early 20s and struggled until he started Advair when it was released.  Since then he has not had any issues with his asthma and has done relatively well until the last couple of months.  He developed community-acquired pneumonia and had a very difficult time getting over it and continues to have extreme fatigue.  He is also noted to have hyponatremia in the hospital and is unsure exactly what caused that.  He does have sleep apnea but is very compliant with his CPAP machine as well.      Additional Pulmonary History:  Childhood Illnesses:  asthma   Occupational:  no exposures  Environmental:  NA  Tobacco/Smoking:  never smoker    Objective:     Vitals:    04/11/24 1425   BP: 134/78   Pulse: 68   SpO2: 97%   Weight: 99.5 kg (219 lb 5.7 oz)   Height: 5' 9" (1.753 m)         Physical Exam  Vitals reviewed.   Constitutional:       General: He is not in acute distress.     Appearance: He is obese. He is not diaphoretic.   HENT:      Head: Normocephalic and atraumatic.      Right Ear: External ear normal.      Left Ear: External ear normal.   Eyes:      Conjunctiva/sclera: Conjunctivae normal.      Pupils: Pupils are equal, round, and reactive to light.   Neck:      Trachea: No tracheal deviation.   Cardiovascular:      Rate and Rhythm: Normal rate and regular rhythm.      Heart sounds: Normal heart sounds. No murmur heard.  Pulmonary:      Effort: Pulmonary effort is normal. No respiratory distress.      Breath sounds: Normal breath sounds. No stridor. No wheezing or rales.   Abdominal:      General: Bowel sounds are normal. There is no distension.      Palpations: " Abdomen is soft.      Tenderness: There is no abdominal tenderness.   Musculoskeletal:         General: Normal range of motion.      Cervical back: Normal range of motion and neck supple.   Skin:     General: Skin is warm and dry.      Findings: No erythema.   Neurological:      Mental Status: He is alert and oriented to person, place, and time.      Gait: Gait is intact.   Psychiatric:         Mood and Affect: Mood and affect normal.         Cognition and Memory: Memory normal.         Judgment: Judgment normal.          Personal Diagnostic Review and Interpretation  Will repeat imaging in 6-8 weeks       Echocardiograms:   Results for orders placed during the hospital encounter of 04/02/24    Echo    Interpretation Summary    Left Ventricle: The left ventricle is normal in size. Mildly increased wall thickness. There is concentric remodeling. Normal wall motion. There is normal systolic function with a visually estimated ejection fraction of 65 - 70%. Ejection fraction by visual approximation is 65%.    Right Ventricle: Normal right ventricular cavity size. Wall thickness is normal. Right ventricle wall motion  is normal. Systolic function is normal.    Left Atrium: Left atrium is moderately dilated.    Right Atrium: Right atrium is moderately dilated.    Aortic Valve: There is mild aortic valve sclerosis. There is mild annular calcification present.    IVC/SVC: Normal venous pressure at 3 mmHg.    Pericardium: There is a trivial posterior effusion.        Assessment & Plan:       Problem List Items Addressed This Visit          Pulmonary    Asthma, intermittent    Current Assessment & Plan     Lifelong asthma that has previously been well controlled up until this last couple of months and his pneumonia.    Continue fluticasone/salmeterol  Continue over-the-counter allergy medications and adding Singulair nightly         Relevant Medications    montelukast (SINGULAIR) 10 mg tablet    Other Relevant Orders     NEBULIZER FOR HOME USE    NEBULIZER KIT (SUPPLIES) FOR HOME USE    X-Ray Chest PA And Lateral    Community acquired pneumonia    Current Assessment & Plan     He completed antibiotics but still having a lot of issues with fatigue and some brain fog.    Had some hyponatremia during his hospitalization so we will recheck labs now plan to repeat imaging in 6-8 weeks            Endocrine    Hyponatremia - Primary    Current Assessment & Plan     Repeating labs today         Relevant Orders    Basic Metabolic Panel       Other    Obstructive sleep apnea    Current Assessment & Plan     Compliant with his CPAP and uses it every night          Other Visit Diagnoses       Pneumonia of left lower lobe due to infectious organism        Relevant Medications    montelukast (SINGULAIR) 10 mg tablet    Other Relevant Orders    X-Ray Chest PA And Lateral             RETURN TO CLINIC IN 2 MONTHS       Portions of the record may have been created with voice-recognition software. Occasional wrong-word or sound-a-like substitutions may have occurred due to the inherent limitations of voice-recognition software. Read the chart carefully and recognize, using context, where substitutions have occurred.    Shirin Almeida M.D.  Pulmonary/Critical Care

## 2024-04-11 NOTE — ASSESSMENT & PLAN NOTE
He completed antibiotics but still having a lot of issues with fatigue and some brain fog.    Had some hyponatremia during his hospitalization so we will recheck labs now plan to repeat imaging in 6-8 weeks

## 2024-04-11 NOTE — ASSESSMENT & PLAN NOTE
Lifelong asthma that has previously been well controlled up until this last couple of months and his pneumonia.    Continue fluticasone/salmeterol  Continue over-the-counter allergy medications and adding Singulair nightly

## 2024-04-23 ENCOUNTER — OFFICE VISIT (OUTPATIENT)
Dept: INTERNAL MEDICINE | Facility: CLINIC | Age: 84
End: 2024-04-23
Payer: MEDICARE

## 2024-04-23 VITALS
WEIGHT: 220.44 LBS | BODY MASS INDEX: 32.65 KG/M2 | SYSTOLIC BLOOD PRESSURE: 118 MMHG | HEIGHT: 69 IN | DIASTOLIC BLOOD PRESSURE: 70 MMHG | RESPIRATION RATE: 18 BRPM | TEMPERATURE: 97 F | HEART RATE: 72 BPM

## 2024-04-23 DIAGNOSIS — I10 ESSENTIAL HYPERTENSION: ICD-10-CM

## 2024-04-23 DIAGNOSIS — G93.39 OTHER POST INFECTION AND RELATED FATIGUE SYNDROMES: ICD-10-CM

## 2024-04-23 DIAGNOSIS — J18.9 PNEUMONIA OF LEFT LOWER LOBE DUE TO INFECTIOUS ORGANISM: Primary | ICD-10-CM

## 2024-04-23 DIAGNOSIS — N18.30 STAGE 3 CHRONIC KIDNEY DISEASE, UNSPECIFIED WHETHER STAGE 3A OR 3B CKD: ICD-10-CM

## 2024-04-23 DIAGNOSIS — R53.1 WEAKNESS GENERALIZED: ICD-10-CM

## 2024-04-23 DIAGNOSIS — J45.20 INTERMITTENT ASTHMA WITHOUT COMPLICATION, UNSPECIFIED ASTHMA SEVERITY: ICD-10-CM

## 2024-04-23 PROCEDURE — 3288F FALL RISK ASSESSMENT DOCD: CPT | Mod: CPTII,S$GLB,, | Performed by: INTERNAL MEDICINE

## 2024-04-23 PROCEDURE — 1160F RVW MEDS BY RX/DR IN RCRD: CPT | Mod: CPTII,S$GLB,, | Performed by: INTERNAL MEDICINE

## 2024-04-23 PROCEDURE — 1111F DSCHRG MED/CURRENT MED MERGE: CPT | Mod: CPTII,S$GLB,, | Performed by: INTERNAL MEDICINE

## 2024-04-23 PROCEDURE — 3074F SYST BP LT 130 MM HG: CPT | Mod: CPTII,S$GLB,, | Performed by: INTERNAL MEDICINE

## 2024-04-23 PROCEDURE — 99999 PR PBB SHADOW E&M-EST. PATIENT-LVL V: CPT | Mod: PBBFAC,,, | Performed by: INTERNAL MEDICINE

## 2024-04-23 PROCEDURE — 99213 OFFICE O/P EST LOW 20 MIN: CPT | Mod: S$GLB,,, | Performed by: INTERNAL MEDICINE

## 2024-04-23 PROCEDURE — 3078F DIAST BP <80 MM HG: CPT | Mod: CPTII,S$GLB,, | Performed by: INTERNAL MEDICINE

## 2024-04-23 PROCEDURE — 1101F PT FALLS ASSESS-DOCD LE1/YR: CPT | Mod: CPTII,S$GLB,, | Performed by: INTERNAL MEDICINE

## 2024-04-23 PROCEDURE — 1159F MED LIST DOCD IN RCRD: CPT | Mod: CPTII,S$GLB,, | Performed by: INTERNAL MEDICINE

## 2024-04-23 PROCEDURE — 1126F AMNT PAIN NOTED NONE PRSNT: CPT | Mod: CPTII,S$GLB,, | Performed by: INTERNAL MEDICINE

## 2024-04-25 NOTE — PROGRESS NOTES
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    Chief Complaint: Hospital Follow Up (Pneumonia.  Saw pulmonary too.  Just fatigue )    HPI  The patient presents for follow-up post hospital discharge.  The patient has noted continued fatigue and weakness since his hospitalization from 04/02/2024 through 04/04/2024 for left lower lobe pneumonia.  The patient was evaluated by pulmonology since discharge.  Singulair was added to his current regimen for treatment of his chronic asthma.  He is currently on Advair Diskus inhaler for maintenance therapy.  His peak flows at home have been good.  The patient denies having any chest pain, PND, or orthopnea.  He has not experienced any significant ankle edema recently.  He has not experienced fever, chills, or night sweats.  He is drinking 45-60 oz of fluid daily.  He has chronic kidney disease and is also followed by Nephrology.  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  Aortic atherosclerosis has been observed on CT scan.    Review of Systems   Constitutional:  Positive for fatigue. Negative for chills, fever and unexpected weight change.   Respiratory:  Negative for shortness of breath and wheezing.    Cardiovascular:  Negative for chest pain.   Gastrointestinal:  Negative for abdominal pain and diarrhea.   Genitourinary:  Negative for dysuria and flank pain.   Musculoskeletal:  Negative for arthralgias and myalgias.   Neurological:  Negative for dizziness and headaches.            Physical Exam  Vitals and nursing note reviewed.   Constitutional:       General: He is not in acute distress.     Appearance: Normal appearance. He is well-developed.   HENT:      Head: Normocephalic and atraumatic.   Eyes:      General: No scleral icterus.      Extraocular Movements: Extraocular movements intact.      Conjunctiva/sclera: Conjunctivae normal.   Neck:      Thyroid: No thyromegaly.      Vascular: No JVD.   Cardiovascular:      Rate and Rhythm: Normal rate and regular rhythm.      Heart sounds: Normal heart sounds. No murmur heard.     No friction rub. No gallop.   Pulmonary:      Effort: Pulmonary effort is normal. No respiratory distress.      Breath sounds: Normal breath sounds. No wheezing or rales.   Abdominal:      General: Bowel sounds are normal.      Palpations: Abdomen is soft. There is no mass.      Tenderness: There is no abdominal tenderness.   Musculoskeletal:         General: No tenderness. Normal range of motion.      Cervical back: Normal range of motion and neck supple.      Right lower leg: No edema.      Left lower leg: No edema.   Lymphadenopathy:      Cervical: No cervical adenopathy.   Skin:     General: Skin is warm.      Findings: No rash.      Comments: Dry skin changes are noted of the lower extremities.   Neurological:      Mental Status: He is alert and oriented to person, place, and time.      Comments: Gait is normal.   Psychiatric:         Mood and Affect: Mood normal.         Behavior: Behavior normal.               Assessment & Plan:      Adelfo was seen today for hospital follow up.  Physical therapy will be consulted to work on muscle strengthening exercises in this patient who has been experiencing fatigue and weakness following his prolonged respiratory infection.  The patient is requesting referral to [facility].  Current medications will be continued for treatment of asthma and hypertension.  The patient will follow-up with his nephrologist as scheduled.    Diagnoses and all orders for this visit:    Pneumonia of left lower lobe due to infectious organism    Intermittent asthma without complication, unspecified asthma severity    Stage 3 chronic kidney disease, unspecified whether stage 3a or 3b  CKD    Essential hypertension    Weakness generalized  -     Ambulatory referral/consult to Physical/Occupational Therapy; Future    Other post infection and related fatigue syndromes  -     Ambulatory referral/consult to Physical/Occupational Therapy; Future         Follow up in about 3 months (around 7/23/2024).     Romero Vogel MD

## 2024-05-03 ENCOUNTER — PATIENT MESSAGE (OUTPATIENT)
Dept: INTERNAL MEDICINE | Facility: CLINIC | Age: 84
End: 2024-05-03
Payer: MEDICARE

## 2024-05-03 RX ORDER — FUROSEMIDE 40 MG/1
40 TABLET ORAL
Qty: 90 TABLET | Refills: 3 | Status: SHIPPED | OUTPATIENT
Start: 2024-05-03

## 2024-05-03 NOTE — TELEPHONE ENCOUNTER
No care due was identified.  [facility] Embedded Care Due Messages. Reference number: [id number].   5/03/2024 11:08:21 AM CDT

## 2024-05-03 NOTE — TELEPHONE ENCOUNTER
Refill Routing Note   Medication(s) are not appropriate for processing by [facility] for the following reason(s):        Required labs abnormal    [facility] action(s):  Defer             Pharmacist review requested: Yes     Appointments  past 12m or future 3m with PCP    Date Provider   Last Visit   4/23/2024 Romero Vogel MD   Next Visit   7/11/2024 Romero Vogel MD   ED visits in past 90 days: 0        Note composed:3:19 PM 05/03/2024

## 2024-05-03 NOTE — TELEPHONE ENCOUNTER
Refill Routing Note   Medication(s) are not appropriate for processing by [facility] for the following reason(s):        Required labs abnormal  Drug-disease interaction    [facility] action(s):  Defer      Medication Therapy Plan: (chronically) low sodium levels    Pharmacist review requested: Yes     Appointments  past 12m or future 3m with PCP    Date Provider   Last Visit   4/23/2024 Romero Vogel MD   Next Visit   7/11/2024 Romero Vogel MD   ED visits in past 90 days: 0        Note composed:4:34 PM 05/03/2024

## 2024-05-21 ENCOUNTER — PATIENT MESSAGE (OUTPATIENT)
Dept: NEPHROLOGY | Facility: CLINIC | Age: 84
End: 2024-05-21
Payer: MEDICARE

## 2024-05-21 DIAGNOSIS — N18.32 STAGE 3B CHRONIC KIDNEY DISEASE: Primary | ICD-10-CM

## 2024-05-22 ENCOUNTER — OFFICE VISIT (OUTPATIENT)
Dept: OPHTHALMOLOGY | Facility: CLINIC | Age: 84
End: 2024-05-22
Attending: OPHTHALMOLOGY
Payer: MEDICARE

## 2024-05-22 DIAGNOSIS — H25.13 NUCLEAR SCLEROSIS, BILATERAL: Primary | ICD-10-CM

## 2024-05-22 PROCEDURE — 92136 OPHTHALMIC BIOMETRY: CPT | Mod: RT,S$GLB,, | Performed by: OPHTHALMOLOGY

## 2024-05-22 PROCEDURE — 1159F MED LIST DOCD IN RCRD: CPT | Mod: CPTII,S$GLB,, | Performed by: OPHTHALMOLOGY

## 2024-05-22 PROCEDURE — 99999 PR PBB SHADOW E&M-EST. PATIENT-LVL III: CPT | Mod: PBBFAC,,, | Performed by: OPHTHALMOLOGY

## 2024-05-22 PROCEDURE — 1101F PT FALLS ASSESS-DOCD LE1/YR: CPT | Mod: CPTII,S$GLB,, | Performed by: OPHTHALMOLOGY

## 2024-05-22 PROCEDURE — 1126F AMNT PAIN NOTED NONE PRSNT: CPT | Mod: CPTII,S$GLB,, | Performed by: OPHTHALMOLOGY

## 2024-05-22 PROCEDURE — 1160F RVW MEDS BY RX/DR IN RCRD: CPT | Mod: CPTII,S$GLB,, | Performed by: OPHTHALMOLOGY

## 2024-05-22 PROCEDURE — 99204 OFFICE O/P NEW MOD 45 MIN: CPT | Mod: S$GLB,,, | Performed by: OPHTHALMOLOGY

## 2024-05-22 PROCEDURE — 3288F FALL RISK ASSESSMENT DOCD: CPT | Mod: CPTII,S$GLB,, | Performed by: OPHTHALMOLOGY

## 2024-05-22 RX ORDER — PREDNISOLONE ACETATE-GATIFLOXACIN-BROMFENAC .75; 5; 1 MG/ML; MG/ML; MG/ML
1 SUSPENSION/ DROPS OPHTHALMIC 3 TIMES DAILY
Qty: 5 ML | Refills: 3 | Status: SHIPPED | OUTPATIENT
Start: 2024-05-22

## 2024-05-22 RX ORDER — MOXIFLOXACIN 5 MG/ML
1 SOLUTION/ DROPS OPHTHALMIC
OUTPATIENT
Start: 2024-05-22

## 2024-05-22 RX ORDER — CYCLOP/TROP/PROPA/PHEN/KET/WAT 1-1-0.1%
1 DROPS (EA) OPHTHALMIC (EYE)
OUTPATIENT
Start: 2024-05-22

## 2024-05-22 RX ORDER — PHENYLEPHRINE HYDROCHLORIDE 100 MG/ML
1 SOLUTION/ DROPS OPHTHALMIC
OUTPATIENT
Start: 2024-05-22

## 2024-05-22 RX ORDER — SODIUM CHLORIDE 0.9 % (FLUSH) 0.9 %
10 SYRINGE (ML) INJECTION
Status: SHIPPED | OUTPATIENT
Start: 2024-05-22

## 2024-05-22 NOTE — PROGRESS NOTES
HPI    Patient is here today for a cataract evaluation. Last seen on 02/23/2024   with Dr. Meneses. States vision with current glasses is blurry when reading   and using a computer. Patient has a history of seeing flashes and   floaters, but nothing recently. Occasional itching and tearing OU.     Eye Meds: None  Past Ocular Sx: None  Last edited by Damaris Fischer on 5/22/2024  1:20 PM.            Assessment /Plan     For exam results, see Encounter Report.    Nuclear sclerosis, bilateral      Visually Significant Cataract: Patient reports decreased vision consistent with the clinical amount of lenticular opacity, which reaches the level of visual significance and affects activities of daily living.     Specifically, this patient describes difficulty with:  - driving safely at night  - reading road signs  - reading small print  - deciphering medicine bottles  - reading the newspaper  - using the phone  - reading texts     Risks, benefits, and alternatives to cataract surgery were discussed and the consent reviewed. IOL options were discussed, including ATIOLs and the associated side effects and additional patient cost associated with them.   IOL Selections:    Pt wishes to have right eye done first.  ATR OU so toric PanOptix vs Odyssey OU    The patient expresses a desire to reduce spectacle dependence. I reviewed various IOL and LASER refractive surgical options and we will attempt to minimize spectacle dependence by managing astigmatism and optimizing IOL selection. Customized Cataract Surgery with Vision Correction (CCVC) was explained to the patient with educational videos and discussion.  The patient voices understanding and wishes to implement this technology during the cataract procedure.  I explained the increased precision of the LASER versus manual techniques, especially as it relates to astigmatism reduction with arcuate incisions.  I emphasized that although our goal is to reduce the need for refractive  correction (glasses or contacts) after surgery, there may still be a need for spectacle correction to achieve optimal visual acuity, and that a reasonable range of functional vision should be the expectation.  No guarantees are made about post operative refraction or visual acuity, as the eye may heal in unpredictable ways, and the standard risks, benefits, and alternatives to cataract surgery were explained.  The patient understands that the refractive portions of this cataract procedure are not covered by insurance, and that there is an out of pocket expense of $2550 per eye. I also explained that even though our pre-operative plan is to utilize advanced refractive technologies during surgery, that I may decide to eliminate part or all of this plan if surgical challenges or complications arise, or I feel that it is not in the patient's best interest. Consent forms and an ABN form were given to the patient to review.    Jt gunn align

## 2024-06-01 ENCOUNTER — PATIENT MESSAGE (OUTPATIENT)
Dept: INTERNAL MEDICINE | Facility: CLINIC | Age: 84
End: 2024-06-01
Payer: MEDICARE

## 2024-06-04 ENCOUNTER — OFFICE VISIT (OUTPATIENT)
Dept: DERMATOLOGY | Facility: CLINIC | Age: 84
End: 2024-06-04
Payer: MEDICARE

## 2024-06-04 DIAGNOSIS — L21.9 SEBORRHEIC DERMATITIS: ICD-10-CM

## 2024-06-04 DIAGNOSIS — L82.1 SEBORRHEIC KERATOSES: ICD-10-CM

## 2024-06-04 DIAGNOSIS — Z12.83 SCREENING EXAM FOR SKIN CANCER: Primary | ICD-10-CM

## 2024-06-04 DIAGNOSIS — L82.0 INFLAMED SEBORRHEIC KERATOSIS: ICD-10-CM

## 2024-06-04 DIAGNOSIS — I83.10 STASIS ECZEMA: ICD-10-CM

## 2024-06-04 PROCEDURE — 3288F FALL RISK ASSESSMENT DOCD: CPT | Mod: CPTII,S$GLB,, | Performed by: DERMATOLOGY

## 2024-06-04 PROCEDURE — 99214 OFFICE O/P EST MOD 30 MIN: CPT | Mod: 25,S$GLB,, | Performed by: DERMATOLOGY

## 2024-06-04 PROCEDURE — 99999 PR PBB SHADOW E&M-EST. PATIENT-LVL III: CPT | Mod: PBBFAC,,, | Performed by: DERMATOLOGY

## 2024-06-04 PROCEDURE — 1160F RVW MEDS BY RX/DR IN RCRD: CPT | Mod: CPTII,S$GLB,, | Performed by: DERMATOLOGY

## 2024-06-04 PROCEDURE — 1159F MED LIST DOCD IN RCRD: CPT | Mod: CPTII,S$GLB,, | Performed by: DERMATOLOGY

## 2024-06-04 PROCEDURE — 17110 DESTRUCTION B9 LES UP TO 14: CPT | Mod: S$GLB,,, | Performed by: DERMATOLOGY

## 2024-06-04 PROCEDURE — 1126F AMNT PAIN NOTED NONE PRSNT: CPT | Mod: CPTII,S$GLB,, | Performed by: DERMATOLOGY

## 2024-06-04 PROCEDURE — 1101F PT FALLS ASSESS-DOCD LE1/YR: CPT | Mod: CPTII,S$GLB,, | Performed by: DERMATOLOGY

## 2024-06-04 RX ORDER — TRIAMCINOLONE ACETONIDE 1 MG/G
CREAM TOPICAL 2 TIMES DAILY
Qty: 80 G | Refills: 2 | Status: SHIPPED | OUTPATIENT
Start: 2024-06-04

## 2024-06-04 RX ORDER — HYDROCORTISONE 25 MG/G
CREAM TOPICAL 2 TIMES DAILY
Qty: 28 G | Refills: 3 | Status: SHIPPED | OUTPATIENT
Start: 2024-06-04

## 2024-06-04 RX ORDER — FLUOCINONIDE TOPICAL SOLUTION USP, 0.05% 0.5 MG/ML
SOLUTION TOPICAL 2 TIMES DAILY
Qty: 60 ML | Refills: 3 | Status: SHIPPED | OUTPATIENT
Start: 2024-06-04

## 2024-06-04 RX ORDER — KETOCONAZOLE 20 MG/G
CREAM TOPICAL 2 TIMES DAILY
Qty: 60 G | Refills: 3 | Status: SHIPPED | OUTPATIENT
Start: 2024-06-04

## 2024-06-04 RX ORDER — KETOCONAZOLE 20 MG/ML
SHAMPOO, SUSPENSION TOPICAL
Qty: 120 ML | Refills: 2 | Status: SHIPPED | OUTPATIENT
Start: 2024-06-04

## 2024-06-04 NOTE — PROGRESS NOTES
Subjective:      Patient ID:  Adelfo ZHANG Jennifer, PhD is a 83 y.o. male who presents for   Chief Complaint   Patient presents with    Skin Check     tbse     History of Present Illness: The patient presents for follow up of skin check.  The patient was last seen on: 1/3/2024 for L great toe.   This is a high risk patient here to check for the development of new lesions.   Other skin complaints:   Patient with new are of concern:   Location: scalp, ear, nose  Previous treatments: none    Patient with new are of concern:   Location: L great toe   Previous treatments: none         Review of Systems   Constitutional:  Negative for fever, chills and fatigue.   Skin:  Positive for wears hat. Negative for daily sunscreen use, activity-related sunscreen use and recent sunburn.   Hematologic/Lymphatic: Bruises/bleeds easily.       Objective:   Physical Exam   Constitutional: He appears well-developed and well-nourished. No distress.   Neurological: He is alert and oriented to person, place, and time. He is not disoriented.   Psychiatric: He has a normal mood and affect.   Skin:   Areas Examined (abnormalities noted in diagram):   Scalp / Hair Palpated and Inspected  Head / Face Inspection Performed  Neck Inspection Performed  Chest / Axilla Inspection Performed  Abdomen Inspection Performed  Genitals / Buttocks / Groin Inspection Performed  Back Inspection Performed  RUE Inspected  LUE Inspection Performed  RLE Inspected  LLE Inspection Performed  Nails and Digits Inspection Performed                 Diagram Legend     Erythematous scaling macule/papule c/w actinic keratosis       Vascular papule c/w angioma      Pigmented verrucoid papule/plaque c/w seborrheic keratosis      Yellow umbilicated papule c/w sebaceous hyperplasia      Irregularly shaped tan macule c/w lentigo     1-2 mm smooth white papules consistent with Milia      Movable subcutaneous cyst with punctum c/w epidermal inclusion cyst      Subcutaneous movable  cyst c/w pilar cyst      Firm pink to brown papule c/w dermatofibroma      Pedunculated fleshy papule(s) c/w skin tag(s)      Evenly pigmented macule c/w junctional nevus     Mildly variegated pigmented, slightly irregular-bordered macule c/w mildly atypical nevus      Flesh colored to evenly pigmented papule c/w intradermal nevus       Pink pearly papule/plaque c/w basal cell carcinoma      Erythematous hyperkeratotic cursted plaque c/w SCC      Surgical scar with no sign of skin cancer recurrence      Open and closed comedones      Inflammatory papules and pustules      Verrucoid papule consistent consistent with wart     Erythematous eczematous patches and plaques     Dystrophic onycholytic nail with subungual debris c/w onychomycosis     Umbilicated papule    Erythematous-base heme-crusted tan verrucoid plaque consistent with inflamed seborrheic keratosis     Erythematous Silvery Scaling Plaque c/w Psoriasis     See annotation      Assessment / Plan:        Screening exam for skin cancer  Total body skin examination performed today including at least 12 points as noted in physical examination. No lesions suspicious for malignancy noted.    Recommend daily sun protection/avoidance, use of at least SPF 30, broad spectrum sunscreen (OTC drug), skin self examinations, and routine physician surveillance to optimize early detection    Seborrheic keratoses  These are benign inherited growths without a malignant potential. Reassurance given to patient. No treatment is necessary.     Inflamed seborrheic keratosis  Cryosurgery procedure note:    Verbal consent from the patient is obtained including, but not limited to, risk of hypopigmentation/hyperpigmentation, scar, recurrence of lesion. Liquid nitrogen cryosurgery is applied to 1 lesions to produce a freeze injury. The patient is aware that blisters may form and is instructed on wound care with gentle cleansing and use of vaseline ointment to keep moist until healed.  The patient is supplied a handout on cryosurgery and is instructed to call if lesions do not completely resolve.    Seborrheic dermatitis  -     hydrocortisone 2.5 % cream; Apply topically 2 (two) times daily. As needed for severe flares of rash on face and neck  Dispense: 28 g; Refill: 3  -     fluocinonide (LIDEX) 0.05 % external solution; Apply topically 2 (two) times daily. For severe rashes in scalp and ears only prn  Dispense: 60 mL; Refill: 3  -     ketoconazole (NIZORAL) 2 % cream; Apply topically 2 (two) times daily. To dry skin PRN  Dispense: 60 g; Refill: 3  -     ketoconazole (NIZORAL) 2 % shampoo; To scalp and beard area for dry skin PRN  Dispense: 120 mL; Refill: 2        Stasis eczema  -     triamcinolone acetonide 0.1% (KENALOG) 0.1 % cream; Apply topically 2 (two) times daily. PRN dry itchy skin on legs and foot  Dispense: 80 g; Refill: 2               Follow up in about 1 year (around 6/4/2025).

## 2024-06-12 ENCOUNTER — HOSPITAL ENCOUNTER (OUTPATIENT)
Dept: RADIOLOGY | Facility: HOSPITAL | Age: 84
Discharge: HOME | End: 2024-06-12
Attending: INTERNAL MEDICINE
Payer: MEDICARE

## 2024-06-12 ENCOUNTER — OFFICE VISIT (OUTPATIENT)
Dept: PULMONOLOGY | Facility: CLINIC | Age: 84
End: 2024-06-12
Payer: MEDICARE

## 2024-06-12 VITALS
BODY MASS INDEX: 32.75 KG/M2 | HEIGHT: 69 IN | DIASTOLIC BLOOD PRESSURE: 68 MMHG | HEART RATE: 65 BPM | SYSTOLIC BLOOD PRESSURE: 132 MMHG | WEIGHT: 221.13 LBS | OXYGEN SATURATION: 97 %

## 2024-06-12 DIAGNOSIS — J18.9 COMMUNITY ACQUIRED PNEUMONIA, UNSPECIFIED LATERALITY: ICD-10-CM

## 2024-06-12 DIAGNOSIS — J45.21 MILD INTERMITTENT ASTHMA WITH ACUTE EXACERBATION: Primary | ICD-10-CM

## 2024-06-12 DIAGNOSIS — J45.22 MILD INTERMITTENT ASTHMA WITH STATUS ASTHMATICUS: ICD-10-CM

## 2024-06-12 DIAGNOSIS — J18.9 PNEUMONIA OF LEFT LOWER LOBE DUE TO INFECTIOUS ORGANISM: ICD-10-CM

## 2024-06-12 PROCEDURE — 3288F FALL RISK ASSESSMENT DOCD: CPT | Mod: CPTII,S$GLB,, | Performed by: INTERNAL MEDICINE

## 2024-06-12 PROCEDURE — 71046 X-RAY EXAM CHEST 2 VIEWS: CPT | Mod: TC,FY

## 2024-06-12 PROCEDURE — 3078F DIAST BP <80 MM HG: CPT | Mod: CPTII,S$GLB,, | Performed by: INTERNAL MEDICINE

## 2024-06-12 PROCEDURE — 99214 OFFICE O/P EST MOD 30 MIN: CPT | Mod: S$GLB,,, | Performed by: INTERNAL MEDICINE

## 2024-06-12 PROCEDURE — 71046 X-RAY EXAM CHEST 2 VIEWS: CPT | Mod: 26,,, | Performed by: RADIOLOGY

## 2024-06-12 PROCEDURE — 99999 PR PBB SHADOW E&M-EST. PATIENT-LVL IV: CPT | Mod: PBBFAC,,, | Performed by: INTERNAL MEDICINE

## 2024-06-12 PROCEDURE — 3075F SYST BP GE 130 - 139MM HG: CPT | Mod: CPTII,S$GLB,, | Performed by: INTERNAL MEDICINE

## 2024-06-12 PROCEDURE — 1101F PT FALLS ASSESS-DOCD LE1/YR: CPT | Mod: CPTII,S$GLB,, | Performed by: INTERNAL MEDICINE

## 2024-06-12 PROCEDURE — 1159F MED LIST DOCD IN RCRD: CPT | Mod: CPTII,S$GLB,, | Performed by: INTERNAL MEDICINE

## 2024-06-12 RX ORDER — PREDNISONE 20 MG/1
40 TABLET ORAL DAILY
Qty: 10 TABLET | Refills: 0 | Status: SHIPPED | OUTPATIENT
Start: 2024-06-12 | End: 2024-06-17

## 2024-06-12 NOTE — PROGRESS NOTES
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  He is back to his daily activities.  He is seeing physical therapy and that has been going well.  He has not required any hospitalizations or urgent care visits for his asthma.  He has had 2 episodes at home that were resolved with his as needed inhaler.    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  He has had asthma since he was in his early 20s and struggled until he started Advair when it was released.  Since then he has not had any issues with his asthma and has done relatively well until the last couple of months.  He developed community-acquired pneumonia and had a very difficult time getting over it and continues to have extreme fatigue.  He is also noted to have hyponatremia in the hospital and is unsure exactly what caused that.  He does have sleep apnea but is very compliant with his CPAP machine as well.      Additional Pulmonary History:  Childhood Illnesses:  asthma   Occupational:  no exposures  Environmental:  NA  Tobacco/Smoking:  never smoker    Objective:     Vitals:    06/12/24 1309   BP: 132/68   BP Location: Left arm   Patient Position: Sitting   Pulse: 65   SpO2: 97%   Weight: 100.3 kg (221 lb 1.9 oz)   Height: 5' 9" (1.753 m)         Physical Exam  Vitals reviewed.   Constitutional:       General: He is not in acute distress.     Appearance: He is obese. He is not diaphoretic.   HENT:      Head: Normocephalic and atraumatic.      Right Ear: External ear normal.      Left Ear: External ear normal.   Eyes:      Conjunctiva/sclera: Conjunctivae normal.      Pupils: Pupils are equal, round, and reactive to light.   Neck:      Trachea: No tracheal " deviation.   Cardiovascular:      Rate and Rhythm: Normal rate and regular rhythm.      Heart sounds: Normal heart sounds. No murmur heard.  Pulmonary:      Effort: Pulmonary effort is normal. No respiratory distress.      Breath sounds: Normal breath sounds. No stridor. No wheezing or rales.   Abdominal:      General: Bowel sounds are normal. There is no distension.      Palpations: Abdomen is soft.      Tenderness: There is no abdominal tenderness.   Musculoskeletal:         General: Normal range of motion.      Cervical back: Normal range of motion and neck supple.   Skin:     General: Skin is warm and dry.      Findings: No erythema.   Neurological:      Mental Status: He is alert and oriented to person, place, and time.      Gait: Gait is intact.   Psychiatric:         Mood and Affect: Mood and affect normal.         Cognition and Memory: Memory normal.         Judgment: Judgment normal.          Personal Diagnostic Review and Interpretation  Will repeat imaging in 6-8 weeks       Echocardiograms:   Results for orders placed during the hospital encounter of 04/02/24    Echo    Interpretation Summary    Left Ventricle: The left ventricle is normal in size. Mildly increased wall thickness. There is concentric remodeling. Normal wall motion. There is normal systolic function with a visually estimated ejection fraction of 65 - 70%. Ejection fraction by visual approximation is 65%.    Right Ventricle: Normal right ventricular cavity size. Wall thickness is normal. Right ventricle wall motion  is normal. Systolic function is normal.    Left Atrium: Left atrium is moderately dilated.    Right Atrium: Right atrium is moderately dilated.    Aortic Valve: There is mild aortic valve sclerosis. There is mild annular calcification present.    IVC/SVC: Normal venous pressure at 3 mmHg.    Pericardium: There is a trivial posterior effusion.        Assessment & Plan:       Problem List Items Addressed This Visit           Pulmonary    Asthma, intermittent - Primary    Current Assessment & Plan     Asthma since his early 20s that has previously been well controlled up until this year.    Doing relatively well since our last visit.  He has had a couple of episodes requiring his albuterol inhaler but they did resolve with that.  Continue fluticasone/salmeterol  Continue over-the-counter allergy medications and Singulair nightly    We will prescribe prednisone 40 mg for 5 days for him to have at home in case he needs it and he will let me or his PCP know if he has to use it.         Community acquired pneumonia    Current Assessment & Plan       Treated in April and he feels completely back to normal now.  Repeat chest x-ray does not show any ongoing concern for infection or pleural fluid.             RETURN TO CLINIC IN 6 MONTHS       Portions of the record may have been created with voice-recognition software. Occasional wrong-word or sound-a-like substitutions may have occurred due to the inherent limitations of voice-recognition software. Read the chart carefully and recognize, using context, where substitutions have occurred.    Shirin Almeida M.D.  Pulmonary/Critical Care

## 2024-06-12 NOTE — ASSESSMENT & PLAN NOTE
Treated in April and he feels completely back to normal now.  Repeat chest x-ray does not show any ongoing concern for infection or pleural fluid.

## 2024-06-12 NOTE — ASSESSMENT & PLAN NOTE
Asthma since his early 20s that has previously been well controlled up until this year.    Doing relatively well since our last visit.  He has had a couple of episodes requiring his albuterol inhaler but they did resolve with that.  Continue fluticasone/salmeterol  Continue over-the-counter allergy medications and Singulair nightly    We will prescribe prednisone 40 mg for 5 days for him to have at home in case he needs it and he will let me or his PCP know if he has to use it.

## 2024-06-14 ENCOUNTER — LAB VISIT (OUTPATIENT)
Dept: LAB | Facility: HOSPITAL | Age: 84
End: 2024-06-14
Attending: INTERNAL MEDICINE
Payer: MEDICARE

## 2024-06-14 DIAGNOSIS — M1A.9XX1 TOPHACEOUS GOUT: ICD-10-CM

## 2024-06-14 DIAGNOSIS — N18.32 STAGE 3B CHRONIC KIDNEY DISEASE: ICD-10-CM

## 2024-06-14 LAB
ALBUMIN SERPL BCP-MCNC: 3.6 G/DL (ref 3.5–5.2)
ALBUMIN SERPL BCP-MCNC: 3.6 G/DL (ref 3.5–5.2)
ALP SERPL-CCNC: 94 U/L (ref 55–135)
ALT SERPL W/O P-5'-P-CCNC: 16 U/L (ref 10–44)
ANION GAP SERPL CALC-SCNC: 6 MMOL/L (ref 8–16)
ANION GAP SERPL CALC-SCNC: 6 MMOL/L (ref 8–16)
AST SERPL-CCNC: 19 U/L (ref 10–40)
BASOPHILS # BLD AUTO: 0.06 K/UL (ref 0–0.2)
BASOPHILS NFR BLD: 1.1 % (ref 0–1.9)
BILIRUB SERPL-MCNC: 0.8 MG/DL (ref 0.1–1)
BUN SERPL-MCNC: 35 MG/DL (ref 8–23)
BUN SERPL-MCNC: 35 MG/DL (ref 8–23)
CALCIUM SERPL-MCNC: 9.1 MG/DL (ref 8.7–10.5)
CALCIUM SERPL-MCNC: 9.1 MG/DL (ref 8.7–10.5)
CHLORIDE SERPL-SCNC: 110 MMOL/L (ref 95–110)
CHLORIDE SERPL-SCNC: 110 MMOL/L (ref 95–110)
CO2 SERPL-SCNC: 23 MMOL/L (ref 23–29)
CO2 SERPL-SCNC: 23 MMOL/L (ref 23–29)
CREAT SERPL-MCNC: 1.9 MG/DL (ref 0.5–1.4)
CREAT SERPL-MCNC: 1.9 MG/DL (ref 0.5–1.4)
DIFFERENTIAL METHOD BLD: ABNORMAL
EOSINOPHIL # BLD AUTO: 0.3 K/UL (ref 0–0.5)
EOSINOPHIL NFR BLD: 5.3 % (ref 0–8)
ERYTHROCYTE [DISTWIDTH] IN BLOOD BY AUTOMATED COUNT: 13.4 % (ref 11.5–14.5)
EST. GFR  (NO RACE VARIABLE): 34.6 ML/MIN/1.73 M^2
EST. GFR  (NO RACE VARIABLE): 34.6 ML/MIN/1.73 M^2
GLUCOSE SERPL-MCNC: 95 MG/DL (ref 70–110)
GLUCOSE SERPL-MCNC: 95 MG/DL (ref 70–110)
HCT VFR BLD AUTO: 34 % (ref 40–54)
HGB BLD-MCNC: 10.7 G/DL (ref 14–18)
IMM GRANULOCYTES # BLD AUTO: 0.02 K/UL (ref 0–0.04)
IMM GRANULOCYTES NFR BLD AUTO: 0.4 % (ref 0–0.5)
LYMPHOCYTES # BLD AUTO: 1.4 K/UL (ref 1–4.8)
LYMPHOCYTES NFR BLD: 25.2 % (ref 18–48)
MCH RBC QN AUTO: 32.6 PG (ref 27–31)
MCHC RBC AUTO-ENTMCNC: 31.5 G/DL (ref 32–36)
MCV RBC AUTO: 104 FL (ref 82–98)
MONOCYTES # BLD AUTO: 0.8 K/UL (ref 0.3–1)
MONOCYTES NFR BLD: 14.6 % (ref 4–15)
NEUTROPHILS # BLD AUTO: 3 K/UL (ref 1.8–7.7)
NEUTROPHILS NFR BLD: 53.4 % (ref 38–73)
NRBC BLD-RTO: 0 /100 WBC
PHOSPHATE SERPL-MCNC: 3.5 MG/DL (ref 2.7–4.5)
PLATELET # BLD AUTO: 167 K/UL (ref 150–450)
PMV BLD AUTO: 12.9 FL (ref 9.2–12.9)
POTASSIUM SERPL-SCNC: 4.6 MMOL/L (ref 3.5–5.1)
POTASSIUM SERPL-SCNC: 4.6 MMOL/L (ref 3.5–5.1)
PROT SERPL-MCNC: 6.3 G/DL (ref 6–8.4)
PTH-INTACT SERPL-MCNC: 257 PG/ML (ref 9–77)
RBC # BLD AUTO: 3.28 M/UL (ref 4.6–6.2)
SODIUM SERPL-SCNC: 139 MMOL/L (ref 136–145)
SODIUM SERPL-SCNC: 139 MMOL/L (ref 136–145)
URATE SERPL-MCNC: 5.7 MG/DL (ref 3.4–7)
WBC # BLD AUTO: 5.63 K/UL (ref 3.9–12.7)

## 2024-06-14 PROCEDURE — 85025 COMPLETE CBC W/AUTO DIFF WBC: CPT | Performed by: INTERNAL MEDICINE

## 2024-06-14 PROCEDURE — 80053 COMPREHEN METABOLIC PANEL: CPT | Performed by: INTERNAL MEDICINE

## 2024-06-14 PROCEDURE — 80069 RENAL FUNCTION PANEL: CPT | Performed by: INTERNAL MEDICINE

## 2024-06-14 PROCEDURE — 36415 COLL VENOUS BLD VENIPUNCTURE: CPT | Mod: PN | Performed by: INTERNAL MEDICINE

## 2024-06-14 PROCEDURE — 84550 ASSAY OF BLOOD/URIC ACID: CPT | Performed by: INTERNAL MEDICINE

## 2024-06-14 PROCEDURE — 83970 ASSAY OF PARATHORMONE: CPT | Performed by: INTERNAL MEDICINE

## 2024-06-20 ENCOUNTER — OFFICE VISIT (OUTPATIENT)
Dept: NEPHROLOGY | Facility: CLINIC | Age: 84
End: 2024-06-20
Payer: MEDICARE

## 2024-06-20 ENCOUNTER — PATIENT MESSAGE (OUTPATIENT)
Dept: OPHTHALMOLOGY | Facility: CLINIC | Age: 84
End: 2024-06-20
Payer: MEDICARE

## 2024-06-20 ENCOUNTER — TELEPHONE (OUTPATIENT)
Dept: OPHTHALMOLOGY | Facility: CLINIC | Age: 84
End: 2024-06-20
Payer: MEDICARE

## 2024-06-20 VITALS
WEIGHT: 221.31 LBS | SYSTOLIC BLOOD PRESSURE: 107 MMHG | BODY MASS INDEX: 32.78 KG/M2 | RESPIRATION RATE: 18 BRPM | HEART RATE: 65 BPM | DIASTOLIC BLOOD PRESSURE: 58 MMHG | OXYGEN SATURATION: 98 % | HEIGHT: 69 IN

## 2024-06-20 DIAGNOSIS — N18.32 STAGE 3B CHRONIC KIDNEY DISEASE: Primary | ICD-10-CM

## 2024-06-20 DIAGNOSIS — N18.32 ANEMIA OF CHRONIC RENAL FAILURE, STAGE 3B: ICD-10-CM

## 2024-06-20 DIAGNOSIS — D63.1 ANEMIA OF CHRONIC RENAL FAILURE, STAGE 3B: ICD-10-CM

## 2024-06-20 DIAGNOSIS — I10 ESSENTIAL HYPERTENSION: Chronic | ICD-10-CM

## 2024-06-20 PROCEDURE — 3288F FALL RISK ASSESSMENT DOCD: CPT | Mod: CPTII,S$GLB,, | Performed by: INTERNAL MEDICINE

## 2024-06-20 PROCEDURE — 3074F SYST BP LT 130 MM HG: CPT | Mod: CPTII,S$GLB,, | Performed by: INTERNAL MEDICINE

## 2024-06-20 PROCEDURE — 1101F PT FALLS ASSESS-DOCD LE1/YR: CPT | Mod: CPTII,S$GLB,, | Performed by: INTERNAL MEDICINE

## 2024-06-20 PROCEDURE — 3078F DIAST BP <80 MM HG: CPT | Mod: CPTII,S$GLB,, | Performed by: INTERNAL MEDICINE

## 2024-06-20 PROCEDURE — 99999 PR PBB SHADOW E&M-EST. PATIENT-LVL IV: CPT | Mod: PBBFAC,,, | Performed by: INTERNAL MEDICINE

## 2024-06-20 PROCEDURE — 1159F MED LIST DOCD IN RCRD: CPT | Mod: CPTII,S$GLB,, | Performed by: INTERNAL MEDICINE

## 2024-06-20 PROCEDURE — 99213 OFFICE O/P EST LOW 20 MIN: CPT | Mod: S$GLB,,, | Performed by: INTERNAL MEDICINE

## 2024-06-20 PROCEDURE — 1126F AMNT PAIN NOTED NONE PRSNT: CPT | Mod: CPTII,S$GLB,, | Performed by: INTERNAL MEDICINE

## 2024-06-20 NOTE — PROGRESS NOTES
Progress Report  Nephrology      Consult Requested By: CKD  Reason for Consult: CKD    History of Present Illness:  Patient is a 83 y.o. male presents for a follow up evaluation of chronic kidney disease. The patient was found to have an elevated serum creatinine during routine laboratory testing, at 1.9 mg/dL.     The patient denies SOB, LE edema, hematuria or foamy urine.     Had pneumonia several weeks ago, recovered.    The patient denies taking NSAIDs or new antibiotics, recreational drugs, recent episode of dehydration, diarrhea, nausea or vomiting, acute illness, hospitalization or exposure to IV radiocontrast.     Past Medical History:   Diagnosis Date    ALLERGIC RHINITIS     Anemia     Asthma     Cataract     Hyperlipidemia     Hypertension     NAFLD (nonalcoholic fatty liver disease)     ANNABELLE (obstructive sleep apnea)     on CPAP    PVD (posterior vitreous detachment), left eye     Squamous cell cancer of skin of hand          Current Outpatient Medications:     atorvastatin (LIPITOR) 20 MG tablet, TAKE 1 TABLET(20 MG) BY MOUTH EVERY DAY, Disp: 90 tablet, Rfl: 3    azelastine (ASTELIN) 137 mcg (0.1 %) nasal spray, 2 sprays (274 mcg total) by Nasal route 2 (two) times daily as needed., Disp: 30 mL, Rfl: 5    febuxostat (ULORIC) 40 mg Tab, Take 1 tablet (40 mg total) by mouth once daily., Disp: 90 tablet, Rfl: 3    fish oil-omega-3 fatty acids 300-1,000 mg capsule, Take 2 g by mouth once daily., Disp: , Rfl:     fluocinonide (LIDEX) 0.05 % external solution, Apply topically 2 (two) times daily. For severe rashes in scalp and ears only prn, Disp: 60 mL, Rfl: 3    fluticasone propionate (FLONASE) 50 mcg/actuation nasal spray, SHAKE LIQUID AND USE 2 SPRAYS(100 MCG) IN EACH NOSTRIL EVERY DAY, Disp: 48 g, Rfl: 3    fluticasone-salmeterol diskus inhaler 250-50 mcg, INHALE 1 PUFF BY MOUTH TWICE DAILY. RINSE MOUTH AFTER USE, Disp: 180 each, Rfl: 2    furosemide (LASIX) 40 MG tablet, TAKE 1 TABLET(40 MG) BY MOUTH  EVERY DAY, Disp: 90 tablet, Rfl: 3    glucosamine & chondroit sul.Na 750-600 mg Tab, Take 750 mg by mouth., Disp: , Rfl:     hydrALAZINE (APRESOLINE) 25 MG tablet, Take 1 tablet (25 mg total) by mouth every 8 (eight) hours., Disp: 90 tablet, Rfl: 11    hydrocortisone 2.5 % cream, Apply topically 2 (two) times daily. As needed for severe flares of rash on face and neck, Disp: 28 g, Rfl: 3    irbesartan (AVAPRO) 300 MG tablet, Take 1 tablet (300 mg total) by mouth every evening., Disp: 90 tablet, Rfl: 3    ketoconazole (NIZORAL) 2 % cream, Apply topically 2 (two) times daily. To dry skin PRN, Disp: 60 g, Rfl: 3    ketoconazole (NIZORAL) 2 % shampoo, To scalp and beard area for dry skin PRN, Disp: 120 mL, Rfl: 2    levocetirizine (XYZAL) 5 MG tablet, , Disp: , Rfl:     loratadine (CLARITIN) 10 mg tablet, Take 10 mg by mouth daily as needed., Disp: , Rfl:     metoprolol succinate (TOPROL-XL) 50 MG 24 hr tablet, Take 1 tablet (50 mg total) by mouth once daily., Disp: 90 tablet, Rfl: 3    montelukast (SINGULAIR) 10 mg tablet, Take 1 tablet (10 mg total) by mouth every evening., Disp: 30 tablet, Rfl: 11    omeprazole (PRILOSEC) 20 MG capsule, TAKE 1 CAPSULE(20 MG) BY MOUTH EVERY DAY, Disp: 90 capsule, Rfl: 3    pramoxine-hydrocortisone (PROCTOCREAM-HC) 1-1 % rectal cream, Place rectally 2 (two) times daily., Disp: 30 g, Rfl: 1    prednisolon/gatiflox/bromfenac (PREDNISOL ACE-GATIFLOX-BROMFEN) 1-0.5-0.075 % DrpS, Apply 1 drop to eye 3 (three) times daily. in operative eye for 1 month after surgery, Disp: 5 mL, Rfl: 3    triamcinolone acetonide 0.1% (KENALOG) 0.1 % cream, Apply topically 2 (two) times daily. PRN dry itchy skin on legs and foot, Disp: 80 g, Rfl: 2    Current Facility-Administered Medications:     sodium chloride 0.9% flush 10 mL, 10 mL, Intravenous, PRN, Gato Cedeno MD  Review of patient's allergies indicates:   Allergen Reactions    Amoxicillin Hives    Allopurinol Other (See Comments)    Allopurinol  analogues Other (See Comments)     Abnormal transaminases        Past Surgical History:   Procedure Laterality Date    APPENDECTOMY      CARDIAC CATHETERIZATION  10/31/2018    no stent    CARDIAC CATHETERIZATION  1998    no stent    COLONOSCOPY N/A 2018    Procedure: COLONOSCOPY;  Surgeon: Wade De La Garza MD;  Location: [facility] (4TH FLR);  Service: Endoscopy;  Laterality: N/A;    COLONOSCOPY N/A 2020    Procedure: COLONOSCOPY;  Surgeon: Von Gutierrez MD;  Location: [facility] (4TH FLR);  Service: Endoscopy;  Laterality: N/A;  3/17/20-pt confirmed appt on 3/20/20 with new arrival time of 0715, and updated visitor/ride instructions-BB  patient to be rescheduled by 6/3/20 per Dr. Gutierrez-BB  patient requests to be rescheduled on a Thursday or Friday-BB  patient requested suprep-BB  loop recorder-BB  C    COLONOSCOPY N/A 2023    Procedure: COLONOSCOPY;  Surgeon: Bart Hernandez MD;  Location: [facility] (4TH FLR);  Service: Endoscopy;  Laterality: N/A;  Procedure Timin-12 weeks   suprep  pt requested Dr. Hernandez   instructions to portal-st  pre call , pt confirmed - mf    ENDOSCOPIC ULTRASOUND OF UPPER GASTROINTESTINAL TRACT N/A 10/1/2020    Procedure: ULTRASOUND, UPPER GI TRACT, ENDOSCOPIC;  Surgeon: Niko Lambert MD;  Location: [facility] (2ND FLR);  Service: Endoscopy;  Laterality: N/A;  Covid-19 test 20 at [facility] -   Has Loop Recorder.    ESOPHAGOGASTRODUODENOSCOPY N/A 10/23/2018    Procedure: EGD (ESOPHAGOGASTRODUODENOSCOPY);  Surgeon: Bart Hernandez MD;  Location: [facility] (4TH FLR);  Service: Endoscopy;  Laterality: N/A;    ESOPHAGOGASTRODUODENOSCOPY N/A 2020    Procedure: EGD (ESOPHAGOGASTRODUODENOSCOPY);  Surgeon: Von Gutierrez MD;  Location: [facility] (4TH FLR);  Service: Endoscopy;  Laterality: N/A;  3/17/20-pt confirmed appt on 3/20/20 with new arrival time of 0715, and updated visitor/ride instructions-BB  patient to be rescheduled by 6/3/20 per Dr. Santamaria  patient  requests to be rescheduled on a Thursday or Friday-BB  patient requested suprep-BB    ESOPHAGOGASTRODUODENOSCOPY N/A 2023    Procedure: EGD (ESOPHAGOGASTRODUODENOSCOPY);  Surgeon: Bart Hernandez MD;  Location: [facility] ENDO ([address] [place] [address]);  Service: Endoscopy;  Laterality: N/A;    FOOT SURGERY      INSERTION OF IMPLANTABLE LOOP RECORDER N/A 2019    Procedure: Insertion, Implantable Loop Recorder;  Surgeon: Gil Wilson MD;  Location: [facility] EP LAB;  Service: Cardiology;  Laterality: N/A;  AT, ILR, MDT, Local, IA, 3 Prep    testicular biopsy       Family History   Problem Relation Name Age of Onset    Cancer Mother          breast    Glaucoma Mother      Retinal detachment Mother      Heart disease Father          CHF    COPD Father      Retinal detachment Father      Heart disease Brother      Arthritis Brother      Blindness Maternal Grandmother      Cataracts Maternal Grandmother      Cirrhosis Neg Hx      Strabismus Neg Hx      Colon cancer Neg Hx      Stomach cancer Neg Hx      Esophageal cancer Neg Hx      Celiac disease Neg Hx      Crohn's disease Neg Hx      Rectal cancer Neg Hx      Ulcerative colitis Neg Hx       Social History     Tobacco Use    Smoking status: Former     Current packs/day: 0.00     Average packs/day: 1 pack/day for 10.0 years (10.0 ttl pk-yrs)     Types: Cigarettes     Start date: 1958     Quit date: 1968     Years since quittin.5    Smokeless tobacco: Former    Tobacco comments:      last smoke   Substance Use Topics    Alcohol use: Yes     Alcohol/week: 2.0 standard drinks of alcohol     Types: 1 Glasses of wine, 1 Shots of liquor per week     Comment: 5 days per week , 7 -8 drinks a week.    Drug use: No       ROS    Vitals:    24 1045   BP: (!) 107/58   Pulse: 65   Resp: 18       PHYSICAL EXAMINATION:  General: no distress, well nourished  Skin: color, texture, turgor normal. No rash or lesions  HEENT: Eyes: reactive pupils, normal conjunctiva. Oral mucosa moist, no  ulcers.   Neck: supple, symmetrical, trachea midline, no JVD, no carotid bruit  Lungs: clear to auscultation bilaterally and normal respiratory effort  Cardiovascular: Heart: regular rate and rhythm, S1, S2 normal, no murmur, rub or gallop.  Abdomen: bowel sounds present, no abdominal bruit, soft, non-tender non-distented;  Musculoskeletal: no pitting edema in lower extremities, no clubbing or cyanosis  Lymph Nodes: No cervical or supraclavicular adenopathy  Neurologic: AAOx3, normal strength and tone. No focal deficit. No asterixis.       LABORATORY DATA:  Lab Results   Component Value Date    CREATININE 1.9 (H) 06/14/2024    CREATININE 1.9 (H) 06/14/2024       Prot/Creat Ratio, Urine   Date Value Ref Range Status   06/14/2024 Unable to calculate 0.00 - 0.20 Final   03/07/2023 Unable to calculate 0.00 - 0.20 Final   09/06/2022 0.06 0.00 - 0.20 Final       Lab Results   Component Value Date     06/14/2024     06/14/2024    K 4.6 06/14/2024    K 4.6 06/14/2024    CO2 23 06/14/2024    CO2 23 06/14/2024       Lab Results   Component Value Date    .0 (H) 06/14/2024    CALCIUM 9.1 06/14/2024    CALCIUM 9.1 06/14/2024    PHOS 3.5 06/14/2024       Lab Results   Component Value Date    HGB 10.7 (L) 06/14/2024        Lab Results   Component Value Date    HGBA1C 5.4 09/13/2023       Lab Results   Component Value Date    LDLCALC 77.8 09/11/2023         IMAGING STUDIES  Kidney US: Reviewed  Echocardiogram: Reviewed    IMPRESSION / RECOMMENDATIONS:     1. Stage 3b chronic kidney disease  Stable for at least 7-8 years, recovered from NSAID-NEFTALI bout. 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 Low risk for progression to ESRD. No adjustments needed      2. Bilateral leg edema  Well managed with loop diuretic BIW. Stable and controlled.       3. Essential hypertension  Well controlled on 4-drug regimen. Will continue ARB furosemide, hydralazine, metoprolol      4. NAFLD (nonalcoholic  fatty liver disease)  Managed by PCP          SUMMARY OF PLAN:  Continue ARB, loop diuretic, hydralazine, metoprolol  Avoid NSAIDs  3.   RTC in 6 mo

## 2024-06-21 ENCOUNTER — TELEPHONE (OUTPATIENT)
Dept: OPHTHALMOLOGY | Facility: CLINIC | Age: 84
End: 2024-06-21
Payer: MEDICARE

## 2024-06-21 ENCOUNTER — E-VISIT (OUTPATIENT)
Dept: FAMILY MEDICINE | Facility: CLINIC | Age: 84
End: 2024-06-21
Payer: MEDICARE

## 2024-06-21 ENCOUNTER — PATIENT MESSAGE (OUTPATIENT)
Dept: PULMONOLOGY | Facility: CLINIC | Age: 84
End: 2024-06-21
Payer: MEDICARE

## 2024-06-21 ENCOUNTER — PATIENT MESSAGE (OUTPATIENT)
Dept: INTERNAL MEDICINE | Facility: CLINIC | Age: 84
End: 2024-06-21
Payer: MEDICARE

## 2024-06-21 DIAGNOSIS — H25.11 NUCLEAR SCLEROTIC CATARACT OF RIGHT EYE: Primary | ICD-10-CM

## 2024-06-21 DIAGNOSIS — U07.1 COVID-19: Primary | ICD-10-CM

## 2024-06-21 NOTE — PROGRESS NOTES
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    Chief Complaint: URI (Entered automatically based on patient selection in Patient Portal.)    The patient initiated a request through [facility] on 6/21/2024 for evaluation and management with a chief complaint of URI (Entered automatically based on patient selection in Patient Portal.)     I evaluated the questionnaire submission on 6/21/24.    [facility] Peq E-Visit Covid    6/21/2024 11:29 AM CDT - Filed by Patient   Do you agree to participate in an E-Visit? Yes   If you have any of the following symptoms, go to your local emergency room or call 911: I acknowledge   What is the main issue you would like addressed today? Headache, congested sinuses, slight cough   Do you think you might have COVID or the Flu? Yes COVID   Have you tested positive for COVID or Flu? Yes COVID   What symptoms do you have? Cough;  Headache;  Nasal congestion;  Muscle or body aches   When did your symptoms first appear? 6/15/2024   List what you have done or taken to help your symptoms. Tylenol, Mucinex, Xyzal   Provide any additional information you feel is important. Saturday night I had a running nose and elevated temperature. On Sunday & Monday morning my temp marivel to 101.3. I took Xyzal, Mucinex, & Tylenol. I felt much improved until yesterday evening when my head became congested, and I had a mild cough.   Please attach any relevant images or files    Are you able to take your vital signs? Yes   Systolic Blood Pressure: 121   Diastolic Blood Pressure: 65   Weight: 216   Height: 69   Pulse: 62   Temperature: 97.9   Respiration rate: 20   Pulse Oxygen: 94         Encounter Diagnosis   Name Primary?    COVID-19 Yes        No orders of the defined types were placed in this encounter.     Medications Ordered This Encounter   Medications    molnupiravir 200 mg capsule (EUA)     Sig: Take 4 capsules (800 mg total) by mouth every 12 (twelve) hours. for 5 days     Dispense:  40 capsule      Refill:  0     Order Specific Question:   I have informed patients with partners of childbearing potential to use a reliable method of contraception during therapy and for 3 months after the last dose of molnupiravir. I have informed them molnupiravir may cause fetal harm.     Answer:   Acknowledge        No follow-ups on file.      E-Visit Time Trackin mins

## 2024-06-26 ENCOUNTER — TELEPHONE (OUTPATIENT)
Dept: OPHTHALMOLOGY | Facility: CLINIC | Age: 84
End: 2024-06-26
Payer: MEDICARE

## 2024-06-26 DIAGNOSIS — H25.12 NUCLEAR SCLEROTIC CATARACT OF LEFT EYE: Primary | ICD-10-CM

## 2024-07-01 ENCOUNTER — PATIENT MESSAGE (OUTPATIENT)
Dept: PULMONOLOGY | Facility: CLINIC | Age: 84
End: 2024-07-01
Payer: MEDICARE

## 2024-07-01 ENCOUNTER — TELEPHONE (OUTPATIENT)
Dept: OPHTHALMOLOGY | Facility: CLINIC | Age: 84
End: 2024-07-01
Payer: MEDICARE

## 2024-07-01 ENCOUNTER — PATIENT MESSAGE (OUTPATIENT)
Dept: INTERNAL MEDICINE | Facility: CLINIC | Age: 84
End: 2024-07-01
Payer: MEDICARE

## 2024-07-01 RX ORDER — BENZONATATE 200 MG/1
200 CAPSULE ORAL 3 TIMES DAILY PRN
Qty: 90 CAPSULE | Refills: 0 | Status: SHIPPED | OUTPATIENT
Start: 2024-07-01 | End: 2024-07-31

## 2024-07-01 NOTE — TELEPHONE ENCOUNTER
A post viral cough is common and it may last for several weeks following a COVID-19 infection.  If wheezing or chest tightness is present I recommend a longer course of prednisone.

## 2024-07-02 NOTE — TELEPHONE ENCOUNTER
Refill Routing Note   Medication(s) are not appropriate for processing by [facility] for the following reason(s):        Due for refill >6 months ago: Six month supply last written 20 months ago    [facility] action(s):  Defer     Requires labs : Yes    Medication Therapy Plan:  Lipid panel due 9.5.2024      Appointments  past 12m or future 3m with PCP    Date Provider   Last Visit   4/23/2024 Romero Vogel MD   Next Visit   7/11/2024 Romero Vogel MD   ED visits in past 90 days: 0        Note composed:1:53 PM 07/02/2024

## 2024-07-02 NOTE — TELEPHONE ENCOUNTER
Care Due:                  Date            Visit Type   Department     Provider  --------------------------------------------------------------------------------                                HOSPITAL     [facility] INTERNAL  Last Visit: 04-      FOLLOW UP    MEDICINE       Romero Vogel  Next Visit: None Scheduled  None         None Found                                                            Last  Test          Frequency    Reason                     Performed    Due Date  --------------------------------------------------------------------------------    Lipid Panel.  12 months..  atorvastatin.............  09- 09-    [facility] Embedded Care Due Messages. Reference number: [id number].   7/02/2024 1:16:17 PM CDT

## 2024-07-05 RX ORDER — AZELASTINE 1 MG/ML
2 SPRAY, METERED NASAL 2 TIMES DAILY PRN
Qty: 90 ML | Refills: 3 | Status: SHIPPED | OUTPATIENT
Start: 2024-07-05

## 2024-07-08 PROBLEM — J18.9 COMMUNITY ACQUIRED PNEUMONIA: Status: RESOLVED | Noted: 2024-04-03 | Resolved: 2024-07-08

## 2024-07-11 ENCOUNTER — LAB VISIT (OUTPATIENT)
Dept: LAB | Facility: HOSPITAL | Age: 84
End: 2024-07-11
Attending: INTERNAL MEDICINE
Payer: MEDICARE

## 2024-07-11 ENCOUNTER — OFFICE VISIT (OUTPATIENT)
Dept: INTERNAL MEDICINE | Facility: CLINIC | Age: 84
End: 2024-07-11
Payer: MEDICARE

## 2024-07-11 VITALS
SYSTOLIC BLOOD PRESSURE: 128 MMHG | HEART RATE: 64 BPM | BODY MASS INDEX: 32.98 KG/M2 | DIASTOLIC BLOOD PRESSURE: 64 MMHG | WEIGHT: 222.69 LBS | RESPIRATION RATE: 16 BRPM | TEMPERATURE: 98 F | HEIGHT: 69 IN

## 2024-07-11 DIAGNOSIS — J45.20 INTERMITTENT ASTHMA WITHOUT COMPLICATION, UNSPECIFIED ASTHMA SEVERITY: ICD-10-CM

## 2024-07-11 DIAGNOSIS — I10 ESSENTIAL HYPERTENSION: Primary | ICD-10-CM

## 2024-07-11 DIAGNOSIS — T14.8XXA ABRASION OF SKIN: ICD-10-CM

## 2024-07-11 DIAGNOSIS — E78.49 OTHER HYPERLIPIDEMIA: ICD-10-CM

## 2024-07-11 DIAGNOSIS — R97.20 ELEVATED PSA: ICD-10-CM

## 2024-07-11 DIAGNOSIS — N18.30 STAGE 3 CHRONIC KIDNEY DISEASE, UNSPECIFIED WHETHER STAGE 3A OR 3B CKD: ICD-10-CM

## 2024-07-11 LAB — COMPLEXED PSA SERPL-MCNC: 3.9 NG/ML (ref 0–4)

## 2024-07-11 PROCEDURE — 1101F PT FALLS ASSESS-DOCD LE1/YR: CPT | Mod: CPTII,S$GLB,, | Performed by: INTERNAL MEDICINE

## 2024-07-11 PROCEDURE — 84153 ASSAY OF PSA TOTAL: CPT | Performed by: INTERNAL MEDICINE

## 2024-07-11 PROCEDURE — 3078F DIAST BP <80 MM HG: CPT | Mod: CPTII,S$GLB,, | Performed by: INTERNAL MEDICINE

## 2024-07-11 PROCEDURE — 99214 OFFICE O/P EST MOD 30 MIN: CPT | Mod: S$GLB,,, | Performed by: INTERNAL MEDICINE

## 2024-07-11 PROCEDURE — 36415 COLL VENOUS BLD VENIPUNCTURE: CPT | Mod: PO | Performed by: INTERNAL MEDICINE

## 2024-07-11 PROCEDURE — 1125F AMNT PAIN NOTED PAIN PRSNT: CPT | Mod: CPTII,S$GLB,, | Performed by: INTERNAL MEDICINE

## 2024-07-11 PROCEDURE — 99999 PR PBB SHADOW E&M-EST. PATIENT-LVL V: CPT | Mod: PBBFAC,,, | Performed by: INTERNAL MEDICINE

## 2024-07-11 PROCEDURE — 3288F FALL RISK ASSESSMENT DOCD: CPT | Mod: CPTII,S$GLB,, | Performed by: INTERNAL MEDICINE

## 2024-07-11 PROCEDURE — 3074F SYST BP LT 130 MM HG: CPT | Mod: CPTII,S$GLB,, | Performed by: INTERNAL MEDICINE

## 2024-07-11 NOTE — PROGRESS NOTES
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    Chief Complaint: Hypertension (3 mosf ol up .  Improving slowly since pneumonia/ covid. ) and skin injury (Hit Right arm Monday/  Used antibiotic neosporin )    HPI  The patient presents for follow-up of medical conditions.  He is status post treatment for pneumonia.  He is currently using Mucinex and Xyzal.  He is also using Tessalon Perles as needed.  Occasional cough is nonproductive.  Did note transient wheezing for awhile.  Pulse ox readings have been within the 90s according to fit bit monitoring.    Active medical conditions include hypertension, chronic kidney disease, asthma, and elevated PSA level.  Patient's blood pressure diary was reviewed.  Pressure readings have generally been within acceptable range.  The patient is also followed by Nephrology.  The patient states he did lose 10 lb during his pneumonia episode.  His appetite has improved.  He denies having shortness of breath.    Review of Systems   Constitutional:  Negative for activity change, appetite change, fatigue and unexpected weight change.   HENT:  Negative for sinus pressure/congestion and sore throat.    Eyes:  Negative for visual disturbance.   Respiratory:  Positive for cough. Negative for chest tightness, shortness of breath and wheezing.    Cardiovascular:  Negative for chest pain, palpitations and leg swelling.   Gastrointestinal:  Negative for abdominal pain, blood in stool, nausea and vomiting.   Genitourinary:  Negative for dysuria, hematuria and urgency.   Musculoskeletal:  Negative for arthralgias, back pain, gait problem, joint swelling, myalgias and neck stiffness.   Integumentary:  Negative for color change and rash.   Neurological:  Negative for dizziness, syncope, weakness, light-headedness, numbness and headaches.   Psychiatric/Behavioral:  Negative for sleep disturbance.             Physical Exam  Vitals and nursing note reviewed.    Constitutional:       General: He is not in acute distress.     Appearance: Normal appearance. He is well-developed.   HENT:      Head: Normocephalic and atraumatic.   Eyes:      General: No scleral icterus.     Extraocular Movements: Extraocular movements intact.      Conjunctiva/sclera: Conjunctivae normal.   Neck:      Thyroid: No thyromegaly.      Vascular: No JVD.   Cardiovascular:      Rate and Rhythm: Normal rate and regular rhythm.      Heart sounds: Normal heart sounds. No murmur heard.     No friction rub. No gallop.   Pulmonary:      Effort: Pulmonary effort is normal. No respiratory distress.      Breath sounds: Normal breath sounds. No wheezing or rales.   Abdominal:      General: Bowel sounds are normal.      Palpations: Abdomen is soft. There is no mass.      Tenderness: There is no abdominal tenderness.   Musculoskeletal:         General: No tenderness. Normal range of motion.      Cervical back: Normal range of motion and neck supple.      Right lower leg: No edema.      Left lower leg: No edema.   Lymphadenopathy:      Cervical: No cervical adenopathy.   Skin:     General: Skin is warm and dry.      Findings: No rash.   Neurological:      Mental Status: He is alert and oriented to person, place, and time.      Comments: Gait is normal.   Psychiatric:         Mood and Affect: Mood normal.         Behavior: Behavior normal.           Lab Visit on 06/14/2024   Component Date Value Ref Range Status    Sodium 06/14/2024 139  136 - 145 mmol/L Final    Potassium 06/14/2024 4.6  3.5 - 5.1 mmol/L Final    Chloride 06/14/2024 110  95 - 110 mmol/L Final    CO2 06/14/2024 23  23 - 29 mmol/L Final    Glucose 06/14/2024 95  70 - 110 mg/dL Final    BUN 06/14/2024 35 (H)  8 - 23 mg/dL Final    Creatinine 06/14/2024 1.9 (H)  0.5 - 1.4 mg/dL Final    Calcium 06/14/2024 9.1  8.7 - 10.5 mg/dL Final    Total Protein 06/14/2024 6.3  6.0 - 8.4 g/dL Final    Albumin 06/14/2024 3.6  3.5 - 5.2 g/dL Final    Total  Bilirubin 06/14/2024 0.8  0.1 - 1.0 mg/dL Final    Comment: For infants and newborns, interpretation of results should be based  on gestational age, weight and in agreement with clinical  observations.    Premature Infant recommended reference ranges:  Up to 24 hours.............<8.0 mg/dL  Up to 48 hours............<12.0 mg/dL  3-5 days..................<15.0 mg/dL  6-29 days.................<15.0 mg/dL      Alkaline Phosphatase 06/14/2024 94  55 - 135 U/L Final    AST 06/14/2024 19  10 - 40 U/L Final    ALT 06/14/2024 16  10 - 44 U/L Final    eGFR 06/14/2024 34.6 (A)  >60 mL/min/1.73 m^2 Final    Anion Gap 06/14/2024 6 (L)  8 - 16 mmol/L Final    Uric Acid 06/14/2024 5.7  3.4 - 7.0 mg/dL Final    WBC 06/14/2024 5.63  3.90 - 12.70 K/uL Final    RBC 06/14/2024 3.28 (L)  4.60 - 6.20 M/uL Final    Hemoglobin 06/14/2024 10.7 (L)  14.0 - 18.0 g/dL Final    Hematocrit 06/14/2024 34.0 (L)  40.0 - 54.0 % Final    MCV 06/14/2024 104 (H)  82 - 98 fL Final    MCH 06/14/2024 32.6 (H)  27.0 - 31.0 pg Final    MCHC 06/14/2024 31.5 (L)  32.0 - 36.0 g/dL Final    RDW 06/14/2024 13.4  11.5 - 14.5 % Final    Platelets 06/14/2024 167  150 - 450 K/uL Final    MPV 06/14/2024 12.9  9.2 - 12.9 fL Final    Immature Granulocytes 06/14/2024 0.4  0.0 - 0.5 % Final    Gran # (ANC) 06/14/2024 3.0  1.8 - 7.7 K/uL Final    Immature Grans (Abs) 06/14/2024 0.02  0.00 - 0.04 K/uL Final    Comment: Mild elevation in immature granulocytes is non specific and   can be seen in a variety of conditions including stress response,   acute inflammation, trauma and pregnancy. Correlation with other   laboratory and clinical findings is essential.      Lymph # 06/14/2024 1.4  1.0 - 4.8 K/uL Final    Mono # 06/14/2024 0.8  0.3 - 1.0 K/uL Final    Eos # 06/14/2024 0.3  0.0 - 0.5 K/uL Final    Baso # 06/14/2024 0.06  0.00 - 0.20 K/uL Final    nRBC 06/14/2024 0  0 /100 WBC Final    Gran % 06/14/2024 53.4  38.0 - 73.0 % Final    Lymph % 06/14/2024 25.2  18.0 -  48.0 % Final    Mono % 06/14/2024 14.6  4.0 - 15.0 % Final    Eosinophil % 06/14/2024 5.3  0.0 - 8.0 % Final    Basophil % 06/14/2024 1.1  0.0 - 1.9 % Final    Differential Method 06/14/2024 Automated   Final    Glucose 06/14/2024 95  70 - 110 mg/dL Final    Sodium 06/14/2024 139  136 - 145 mmol/L Final    Potassium 06/14/2024 4.6  3.5 - 5.1 mmol/L Final    Chloride 06/14/2024 110  95 - 110 mmol/L Final    CO2 06/14/2024 23  23 - 29 mmol/L Final    BUN 06/14/2024 35 (H)  8 - 23 mg/dL Final    Calcium 06/14/2024 9.1  8.7 - 10.5 mg/dL Final    Creatinine 06/14/2024 1.9 (H)  0.5 - 1.4 mg/dL Final    Albumin 06/14/2024 3.6  3.5 - 5.2 g/dL Final    Phosphorus 06/14/2024 3.5  2.7 - 4.5 mg/dL Final    eGFR 06/14/2024 34.6 (A)  >60 mL/min/1.73 m^2 Final    Anion Gap 06/14/2024 6 (L)  8 - 16 mmol/L Final    PTH, Intact 06/14/2024 257.0 (H)  9.0 - 77.0 pg/mL Final   Lab Visit on 06/14/2024   Component Date Value Ref Range Status    Specimen UA 06/14/2024 Urine, Clean Catch   Final    Color, UA 06/14/2024 Yellow  Yellow, Straw, Madelaine Final    Appearance, UA 06/14/2024 Clear  Clear Final    pH, UA 06/14/2024 6.0  5.0 - 8.0 Final    Specific Gravity, UA 06/14/2024 1.015  1.005 - 1.030 Final    Protein, UA 06/14/2024 Negative  Negative Final    Comment: Recommend a 24 hour urine protein or a urine   protein/creatinine ratio if globulin induced proteinuria is  clinically suspected.      Glucose, UA 06/14/2024 Negative  Negative Final    Ketones, UA 06/14/2024 Negative  Negative Final    Bilirubin (UA) 06/14/2024 Negative  Negative Final    Occult Blood UA 06/14/2024 Negative  Negative Final    Nitrite, UA 06/14/2024 Negative  Negative Final    Leukocytes, UA 06/14/2024 Negative  Negative Final    Protein, Urine Random 06/14/2024 <7  0 - 15 mg/dL Final    Creatinine, Urine 06/14/2024 90.0  23.0 - 375.0 mg/dL Final    Prot/Creat Ratio, Urine 06/14/2024 Unable to calculate  0.00 - 0.20 Final   Lab Visit on 04/11/2024   Component Date  Value Ref Range Status    Sodium 04/11/2024 132 (L)  136 - 145 mmol/L Final    Potassium 04/11/2024 4.6  3.5 - 5.1 mmol/L Final    Chloride 04/11/2024 102  95 - 110 mmol/L Final    CO2 04/11/2024 22 (L)  23 - 29 mmol/L Final    Glucose 04/11/2024 87  70 - 110 mg/dL Final    BUN 04/11/2024 22  8 - 23 mg/dL Final    Creatinine 04/11/2024 1.6 (H)  0.5 - 1.4 mg/dL Final    Calcium 04/11/2024 9.4  8.7 - 10.5 mg/dL Final    Anion Gap 04/11/2024 8  8 - 16 mmol/L Final    eGFR 04/11/2024 42.5 (A)  >60 mL/min/1.73 m^2 Final       Assessment & Plan:      Adelfo was seen today for hypertension and skin injury.  The patient has right forearm abrasion.  Topical antibiotic ointment such as Neosporin or other triple antibiotic may be used.  The patient is up-to-date on tetanus prophylaxis.    The patient will continue physical therapy for core strengthening and balance.    Current medications will be continued for management of hypertension and asthma.     A diagnostic PSA level will be obtained today.    Updated labs will be obtained in 6 months.     Diagnoses and all orders for this visit:    Essential hypertension  -     Comprehensive Metabolic Panel; Future  -     CBC Auto Differential; Future  -     Lipid Panel; Future  -     T4, Free; Future    Stage 3 chronic kidney disease, unspecified whether stage 3a or 3b CKD  -     Comprehensive Metabolic Panel; Future  -     CBC Auto Differential; Future  -     Lipid Panel; Future  -     T4, Free; Future    Intermittent asthma without complication, unspecified asthma severity    Other hyperlipidemia  -     Comprehensive Metabolic Panel; Future  -     CBC Auto Differential; Future  -     Lipid Panel; Future  -     T4, Free; Future    Elevated PSA  -     Prostate Specific Antigen, Diagnostic; Future  -     Prostate Specific Antigen, Diagnostic; Future    Abrasion of skin         Follow up in about 6 months (around 1/11/2025).     Romero Vogel MD     Wartpeel Counseling:  I discussed with the patient the risks of Wartpeel including but not limited to erythema, scaling, itching, weeping, crusting, and pain.

## 2024-07-14 ENCOUNTER — PATIENT MESSAGE (OUTPATIENT)
Dept: OPHTHALMOLOGY | Facility: CLINIC | Age: 84
End: 2024-07-14
Payer: MEDICARE

## 2024-07-19 ENCOUNTER — OFFICE VISIT (OUTPATIENT)
Dept: OPHTHALMOLOGY | Facility: CLINIC | Age: 84
End: 2024-07-19
Payer: MEDICARE

## 2024-07-19 DIAGNOSIS — H25.13 NUCLEAR SCLEROSIS, BILATERAL: Primary | ICD-10-CM

## 2024-07-19 PROCEDURE — 3288F FALL RISK ASSESSMENT DOCD: CPT | Mod: CPTII,S$GLB,, | Performed by: OPHTHALMOLOGY

## 2024-07-19 PROCEDURE — 99024 POSTOP FOLLOW-UP VISIT: CPT | Mod: S$GLB,,, | Performed by: OPHTHALMOLOGY

## 2024-07-19 PROCEDURE — 1101F PT FALLS ASSESS-DOCD LE1/YR: CPT | Mod: CPTII,S$GLB,, | Performed by: OPHTHALMOLOGY

## 2024-07-19 PROCEDURE — 99999 PR PBB SHADOW E&M-EST. PATIENT-LVL IV: CPT | Mod: PBBFAC,,, | Performed by: OPHTHALMOLOGY

## 2024-07-19 NOTE — PROGRESS NOTES
HPI     PRE OP TESTING   DONE 05/2024            Comments: CATARACT  EVALUATION  OU   OCT AND PENTACAM DONE  5/2024  Ns ou   Blurry ou   Surgery already scheduled    OD  08/01/24  2nd eye scheduled for  08/15/24   OS        Pt [facility]  [facility]   drugs  for OD to pre  start gtts   Blurred va    ou                Comments    Patient is here today for a cataract evaluation. Last seen on 02/23/2024   with Dr. Meneses. States vision with current glasses is blurry when reading   and using a computer. Patient has a history of seeing flashes and   floaters, but nothing recently. Occasional itching and tearing OU.   Dls  05/22/24  Eye Meds: None  Past Ocular Sx: None          Last edited by Katherine Davila on 7/19/2024 10:38 AM.            Assessment /Plan     For exam results, see Encounter Report.    Nuclear sclerosis, bilateral        Visually Significant Cataract: Patient reports decreased vision consistent with the clinical amount of lenticular opacity, which reaches the level of visual significance and affects activities of daily living.     Specifically, this patient describes difficulty with:  - driving safely at night  - reading road signs  - reading small print  - deciphering medicine bottles  - reading the newspaper  - using the phone  - reading texts     Risks, benefits, and alternatives to cataract surgery were discussed and the consent reviewed. IOL options were discussed, including ATIOLs and the associated side effects and additional patient cost associated with them.   IOL Selections:    Pt wishes to have right eye done first.    Right eye: [id number] 21.5 AT 10    LEFT EYE: [id number] 21.5       The patient expresses a desire to reduce spectacle dependence. I reviewed various IOL and LASER refractive surgical options and we will attempt to minimize spectacle dependence by managing astigmatism and optimizing IOL selection. Customized Cataract Surgery with Vision Correction (CCVC) was explained to the  patient with educational videos and discussion.  The patient voices understanding and wishes to implement this technology during the cataract procedure.  I explained the increased precision of the LASER versus manual techniques, especially as it relates to astigmatism reduction with arcuate incisions.  I emphasized that although our goal is to reduce the need for refractive correction (glasses or contacts) after surgery, there may still be a need for spectacle correction to achieve optimal visual acuity, and that a reasonable range of functional vision should be the expectation.  No guarantees are made about post operative refraction or visual acuity, as the eye may heal in unpredictable ways, and the standard risks, benefits, and alternatives to cataract surgery were explained.  The patient understands that the refractive portions of this cataract procedure are not covered by insurance, and that there is an out of pocket expense of $2550 per eye. I also explained that even though our pre-operative plan is to utilize advanced refractive technologies during surgery, that I may decide to eliminate part or all of this plan if surgical challenges or complications arise, or I feel that it is not in the patient's best interest. Consent forms and an ABN form were given to the patient to review.    Jt gunn align

## 2024-07-19 NOTE — H&P (VIEW-ONLY)
HPI     PRE OP TESTING   DONE 05/2024            Comments: CATARACT  EVALUATION  OU   OCT AND PENTACAM DONE  5/2024  Ns ou   Blurry ou   Surgery already scheduled    OD  08/01/24  2nd eye scheduled for  08/15/24   OS        Pt has  [facility]  [facility]   drugs  for OD to pre  start gtts   Blurred va    ou                Comments    Patient is here today for a cataract evaluation. Last seen on 02/23/2024   with Dr. Meneses. States vision with current glasses is blurry when reading   and using a computer. Patient has a history of seeing flashes and   floaters, but nothing recently. Occasional itching and tearing OU.   Dls  05/22/24  Eye Meds: None  Past Ocular Sx: None          Last edited by Katherine Davila on 7/19/2024 10:38 AM.            Assessment /Plan     For exam results, see Encounter Report.    Nuclear sclerosis, bilateral        Visually Significant Cataract: Patient reports decreased vision consistent with the clinical amount of lenticular opacity, which reaches the level of visual significance and affects activities of daily living.     Specifically, this patient describes difficulty with:  - driving safely at night  - reading road signs  - reading small print  - deciphering medicine bottles  - reading the newspaper  - using the phone  - reading texts     Risks, benefits, and alternatives to cataract surgery were discussed and the consent reviewed. IOL options were discussed, including ATIOLs and the associated side effects and additional patient cost associated with them.   IOL Selections:    Pt wishes to have right eye done first.    Right eye: [id number] 21.5 AT 10    LEFT EYE: [id number] 21.5       The patient expresses a desire to reduce spectacle dependence. I reviewed various IOL and LASER refractive surgical options and we will attempt to minimize spectacle dependence by managing astigmatism and optimizing IOL selection. Customized Cataract Surgery with Vision Correction (CCVC) was explained to the  patient with educational videos and discussion.  The patient voices understanding and wishes to implement this technology during the cataract procedure.  I explained the increased precision of the LASER versus manual techniques, especially as it relates to astigmatism reduction with arcuate incisions.  I emphasized that although our goal is to reduce the need for refractive correction (glasses or contacts) after surgery, there may still be a need for spectacle correction to achieve optimal visual acuity, and that a reasonable range of functional vision should be the expectation.  No guarantees are made about post operative refraction or visual acuity, as the eye may heal in unpredictable ways, and the standard risks, benefits, and alternatives to cataract surgery were explained.  The patient understands that the refractive portions of this cataract procedure are not covered by insurance, and that there is an out of pocket expense of $2550 per eye. I also explained that even though our pre-operative plan is to utilize advanced refractive technologies during surgery, that I may decide to eliminate part or all of this plan if surgical challenges or complications arise, or I feel that it is not in the patient's best interest. Consent forms and an ABN form were given to the patient to review.    Jt gunn align

## 2024-07-24 ENCOUNTER — TELEPHONE (OUTPATIENT)
Dept: INTERNAL MEDICINE | Facility: CLINIC | Age: 84
End: 2024-07-24
Payer: MEDICARE

## 2024-07-24 NOTE — TELEPHONE ENCOUNTER
I spoke to pt, he wants to make sure his next visit with   Dr. Vogel is coded as an annual visit.he will be due to come in 10/2024.

## 2024-07-24 NOTE — TELEPHONE ENCOUNTER
----- Message from Terra Norris sent at 7/24/2024 12:00 PM CDT -----  Contact: [phone]@[email or web address]  Good afternoon patient ins would like for the office to schedule a annual apt for the patient. Please call patient to advise  [phone]

## 2024-07-26 ENCOUNTER — PATIENT MESSAGE (OUTPATIENT)
Dept: OPHTHALMOLOGY | Facility: CLINIC | Age: 84
End: 2024-07-26
Payer: MEDICARE

## 2024-07-26 ENCOUNTER — TELEPHONE (OUTPATIENT)
Dept: OPHTHALMOLOGY | Facility: CLINIC | Age: 84
End: 2024-07-26
Payer: MEDICARE

## 2024-07-31 NOTE — PRE-PROCEDURE INSTRUCTIONS
Patient reviewed on 7/31/2024.  Okay to proceed at [facility]. The following pre-procedure instructions and arrival time have been sent to patient portal for review.  Patient replied to portal message.      Deasixto Emanuel     Below you will find basic pre-procedure instructions in preparation for your procedure on 8/1/24 with Dr. Cedeno     You should have received your arrival time already from Dr's office.     - Nothing to eat or drink after midnight the night before your procedure until after your procedure, except AM meds with small sips of water.     - HOLD all oral Diabetic medications night before and morning of procedure  - HOLD all Insulin morning of procedure  - HOLD all Fluid pills morning of procedure  - HOLD all non-insulin shots until after surgery (Ozempic, Mounjaro, Trulicity, Victoza, Byetta, Wegovy and Adlyxin) (7 days prior)  - HOLD all vitamins, minerals and herbal supplements morning of procedure   - TAKE all B/P meds, EXCEPT those that contain a fluid pill (ex. Lasix, Hydroclorothiazide/HCTZ, Spirnolactone)  - USE inhalers as needed and bring AM of surgery  - USE EYE DROPS as directed  -TAKE blood thinner meds AM of surgery unless otherwise instructed by your provider to not take     - Shower and wash face with antibacterial soap (ex. Dial) for 3 mins PM prior and AM of surgery  - No powder, lotions, creams, oils, gels, ointments, makeup,  or jewelry    - Wear comfortable clothing (button up shirt)     (Patient is required to have a responsible ride to transport home, ride may not leave while patient is in surgery)     -- [facility], 2nd [facility], located   @ [address] [place] [address]  2nd Floor Registration        If you have any questions or concerns please feel free to contact your surgeon's office.           Please reply to this message as receipt of delivery.     CHARLEY Schulte  [facility]  Pre-Admit - Anesthesia Dept

## 2024-08-01 ENCOUNTER — HOSPITAL ENCOUNTER (OUTPATIENT)
Facility: HOSPITAL | Age: 84
Discharge: HOME | End: 2024-08-01
Attending: OPHTHALMOLOGY | Admitting: OPHTHALMOLOGY
Payer: MEDICARE

## 2024-08-01 VITALS
RESPIRATION RATE: 19 BRPM | TEMPERATURE: 99 F | OXYGEN SATURATION: 99 % | DIASTOLIC BLOOD PRESSURE: 57 MMHG | SYSTOLIC BLOOD PRESSURE: 139 MMHG | HEART RATE: 58 BPM

## 2024-08-01 DIAGNOSIS — H25.13 NUCLEAR SCLEROSIS, BILATERAL: ICD-10-CM

## 2024-08-01 PROCEDURE — 99900035 HC TECH TIME PER 15 MIN (STAT)

## 2024-08-01 PROCEDURE — V2788 PRESBYOPIA-CORRECT FUNCTION: HCPCS | Performed by: OPHTHALMOLOGY

## 2024-08-01 PROCEDURE — 25000003 PHARM REV CODE 250: Performed by: OPHTHALMOLOGY

## 2024-08-01 PROCEDURE — 71000015 HC POSTOP RECOV 1ST HR: Performed by: OPHTHALMOLOGY

## 2024-08-01 PROCEDURE — 36000707: Performed by: OPHTHALMOLOGY

## 2024-08-01 PROCEDURE — 63600175 PHARM REV CODE 636 W HCPCS: Performed by: OPHTHALMOLOGY

## 2024-08-01 PROCEDURE — 99152 MOD SED SAME PHYS/QHP 5/>YRS: CPT | Performed by: OPHTHALMOLOGY

## 2024-08-01 PROCEDURE — 94761 N-INVAS EAR/PLS OXIMETRY MLT: CPT

## 2024-08-01 PROCEDURE — 36000706: Performed by: OPHTHALMOLOGY

## 2024-08-01 DEVICE — IMPLANTABLE DEVICE: Type: IMPLANTABLE DEVICE | Site: EYE | Status: FUNCTIONAL

## 2024-08-01 RX ORDER — LIDOCAINE HYDROCHLORIDE 40 MG/ML
INJECTION, SOLUTION RETROBULBAR
Status: DISCONTINUED | OUTPATIENT
Start: 2024-08-01 | End: 2024-08-01 | Stop reason: HOSPADM

## 2024-08-01 RX ORDER — ACETAMINOPHEN 325 MG/1
650 TABLET ORAL EVERY 4 HOURS PRN
Status: DISCONTINUED | OUTPATIENT
Start: 2024-08-01 | End: 2024-08-01 | Stop reason: HOSPADM

## 2024-08-01 RX ORDER — PHENYLEPHRINE HYDROCHLORIDE 25 MG/ML
1 SOLUTION/ DROPS OPHTHALMIC EVERY 5 MIN PRN
Status: COMPLETED | OUTPATIENT
Start: 2024-08-01 | End: 2024-08-01

## 2024-08-01 RX ORDER — TROPICAMIDE 10 MG/ML
1 SOLUTION/ DROPS OPHTHALMIC
OUTPATIENT
Start: 2024-08-01

## 2024-08-01 RX ORDER — PHENYLEPHRINE HYDROCHLORIDE 100 MG/ML
1 SOLUTION/ DROPS OPHTHALMIC
Status: DISCONTINUED | OUTPATIENT
Start: 2024-08-01 | End: 2024-08-01 | Stop reason: HOSPADM

## 2024-08-01 RX ORDER — MOXIFLOXACIN 5 MG/ML
1 SOLUTION/ DROPS OPHTHALMIC
Status: COMPLETED | OUTPATIENT
Start: 2024-08-01 | End: 2024-08-01

## 2024-08-01 RX ORDER — MIDAZOLAM HYDROCHLORIDE 1 MG/ML
1 INJECTION, SOLUTION INTRAMUSCULAR; INTRAVENOUS
Status: DISCONTINUED | OUTPATIENT
Start: 2024-08-01 | End: 2024-08-01 | Stop reason: HOSPADM

## 2024-08-01 RX ORDER — TETRACAINE HYDROCHLORIDE 5 MG/ML
SOLUTION OPHTHALMIC
Status: DISCONTINUED | OUTPATIENT
Start: 2024-08-01 | End: 2024-08-01 | Stop reason: HOSPADM

## 2024-08-01 RX ORDER — TROPICAMIDE 10 MG/ML
1 SOLUTION/ DROPS OPHTHALMIC EVERY 5 MIN PRN
Status: COMPLETED | OUTPATIENT
Start: 2024-08-01 | End: 2024-08-01

## 2024-08-01 RX ORDER — PROPARACAINE HYDROCHLORIDE 5 MG/ML
1 SOLUTION/ DROPS OPHTHALMIC
Status: DISCONTINUED | OUTPATIENT
Start: 2024-08-01 | End: 2024-08-01 | Stop reason: HOSPADM

## 2024-08-01 RX ORDER — TETRACAINE HYDROCHLORIDE 5 MG/ML
1 SOLUTION OPHTHALMIC EVERY 5 MIN PRN
Status: COMPLETED | OUTPATIENT
Start: 2024-08-01 | End: 2024-08-01

## 2024-08-01 RX ORDER — CYCLOP/TROP/PROPA/PHEN/KET/WAT 1-1-0.1%
1 DROPS (EA) OPHTHALMIC (EYE)
Status: DISCONTINUED | OUTPATIENT
Start: 2024-08-01 | End: 2024-08-01

## 2024-08-01 RX ORDER — TETRACAINE HYDROCHLORIDE 5 MG/ML
1 SOLUTION OPHTHALMIC
OUTPATIENT
Start: 2024-08-01

## 2024-08-01 RX ORDER — MOXIFLOXACIN 5 MG/ML
SOLUTION/ DROPS OPHTHALMIC
Status: DISCONTINUED | OUTPATIENT
Start: 2024-08-01 | End: 2024-08-01 | Stop reason: HOSPADM

## 2024-08-01 RX ORDER — PHENYLEPHRINE HYDROCHLORIDE 25 MG/ML
1 SOLUTION/ DROPS OPHTHALMIC
OUTPATIENT
Start: 2024-08-01

## 2024-08-01 RX ADMIN — TROPICAMIDE 1 DROP: 10 SOLUTION/ DROPS OPHTHALMIC at 09:08

## 2024-08-01 RX ADMIN — PHENYLEPHRINE HYDROCHLORIDE 1 DROP: 25 SOLUTION/ DROPS OPHTHALMIC at 09:08

## 2024-08-01 RX ADMIN — MOXIFLOXACIN 1 DROP: 5 SOLUTION/ DROPS OPHTHALMIC at 10:08

## 2024-08-01 RX ADMIN — MIDAZOLAM HYDROCHLORIDE 2 MG: 1 INJECTION, SOLUTION INTRAMUSCULAR; INTRAVENOUS at 10:08

## 2024-08-01 RX ADMIN — MOXIFLOXACIN OPHTHALMIC 1 DROP: 5 SOLUTION/ DROPS OPHTHALMIC at 09:08

## 2024-08-01 RX ADMIN — TETRACAINE HYDROCHLORIDE 1 DROP: 5 SOLUTION OPHTHALMIC at 09:08

## 2024-08-01 RX ADMIN — MIDAZOLAM HYDROCHLORIDE 1 MG: 1 INJECTION, SOLUTION INTRAMUSCULAR; INTRAVENOUS at 10:08

## 2024-08-01 NOTE — DISCHARGE INSTRUCTIONS
CATARACT SURGERY    POST-OPERATIVE INSTRUCTIONS    · Apply drops THREE times a day into operative eye for 30 days.    · DO NOT rub your eye    · Wear protective sunglasses during the day    · Resume moderate activity    · Bathe/shower/wash face normally    · DO NOT apply makeup around the operative eye for 1 week.         You should expect    - Blurry vision and halos for 24-48 hours    - Dilated pupil for 24-48 hours    - Scratchy feeling in the eye for 1-2 days    - Curved shadow in your peripheral vision for 2-3 weeks    - Occasional flickering of lights for up to 1 week    -If you experience severe pain or nausea, please call Dr Cedeno or the on-call doctor at [phone]    - Plan to see Dr Cedeno tomorrow .      [facility]    [address] [facility] [address]    ** Most patients can drive the next day, but if you do not feel comfortable driving, please arrange for transportation.

## 2024-08-01 NOTE — OP NOTE
SURGEON:  Gato Cedeno M.D.    PREOPERATIVE DIAGNOSIS:    Nuclear Sclerotic Cataract Right Eye    POSTOPERATIVE DIAGNOSIS:    Nuclear Sclerotic Cataract Right Eye    PROCEDURES:    Phacoemulsification with  intraocular lens, Right eye (58869)  With ORA  LASER assist  With moderate sedation >10min (05088)    DATE OF SURGERY: 08/01/2024    IMPLANT: [id number]  21.5 AT 10    ANESTHESIA:  Under my direct supervision, intravenous moderate sedation was administered during the course of this procedure, with continuous monitoring of hemodynamic parameters. Total time of sedation and amount of sedatives are documented in the nursing logs.    COMPLICATIONS:  None    ESTIMATED BLOOD LOSS: None    SPECIMENS: None    INDICATIONS:    The patient has a history of painless progressive visual loss and difficulty with activities of daily living, which specifically include difficult driving at night due to glare and difficulty reading small print, secondary to cataract formation.  After a thorough discussion of the risks, benefits, and alternatives to cataract surgery, including, but not limited to, the rare risks of infection, retinal detachment, hemorrhage, need for additional surgery, loss of vision, and even loss of the eye, the patient voices understanding and desires to proceed.    DESCRIPTION OF PROCEDURE:      The patients IOL calculations were reviewed, and the lens selection confirmed.   After verification and marking of the proper eye in the preop holding area, the patient was brought to the operating room in supine position where the eye was prepped and draped in standard sterile fashion with 5% Betadine and a lid speculum placed in the eye.   Topical 4% Lidocaine was used in addition to the preoperative anesthesia and the procedure was begun by the creation of a paracentesis incision through which viscoelastic was used to fill the anterior chamber.  Next, a keratome blade was used to create a triplanar temporal clear corneal  incision and a cystotome and Utrata forceps used to fashion a continuous curvilinear capsulorrhexis.  Hydrodissection was carried out using the Moore hydrodissection cannula and the nucleus was found to be mobile.  Phacoemulsification of the nucleus was carried out using a quick chop technique, and all remaining epinuclear and cortical material was removed.  The eye was then reformed with Viscoelastic and ORA was used to verify the proper IOL power. The intraocular lens was then implanted into the capsular bag.  All remaining viscoelastics were removed from the eye and at the end of the case the pupil was round, the lens was well-centered within the capsular bag and all wounds were found to be water tight.  Drops of Vigamox and Pred Forte were instilled and a shield was placed over the eye. The patient will follow up with Dr. Cedeno in the morning.

## 2024-08-01 NOTE — DISCHARGE SUMMARY
Outcome: Successful outpatient ophthalmic surgical procedure  Preprinted Instructions given to patient.  Regular diet.  Activity: No restrictions  Meds: see Med Rec  Condition: stable  Follow up: 1 day with Dr Cedeno  Disposition: Home  Diagnosis: s/p eye surgery  Date of discharge: 08/01/2024

## 2024-08-02 ENCOUNTER — PATIENT MESSAGE (OUTPATIENT)
Dept: INTERNAL MEDICINE | Facility: CLINIC | Age: 84
End: 2024-08-02
Payer: MEDICARE

## 2024-08-02 ENCOUNTER — OFFICE VISIT (OUTPATIENT)
Dept: OPHTHALMOLOGY | Facility: CLINIC | Age: 84
End: 2024-08-02
Payer: MEDICARE

## 2024-08-02 DIAGNOSIS — Z98.890 POST-OPERATIVE STATE: ICD-10-CM

## 2024-08-02 DIAGNOSIS — H25.13 NUCLEAR SCLEROSIS, BILATERAL: Primary | ICD-10-CM

## 2024-08-02 PROCEDURE — 99999 PR PBB SHADOW E&M-EST. PATIENT-LVL III: CPT | Mod: PBBFAC,,, | Performed by: OPHTHALMOLOGY

## 2024-08-02 NOTE — PROGRESS NOTES
HPI    PROCEDURES:    Phacoemulsification with  intraocular lens, Right eye (14669)  With ORA  LASER assist  With moderate sedation >10min (36794)     DATE OF SURGERY: 08/01/2024     IMPLANT: [id number]  21.5 AT 10    Gtt's:  PMB TID OD    Patient is here for 1 day post op OD.  Pt. States vision is doing great.  Pt. Denies pain or discomfort.  Last edited by Libia Portillo on 8/2/2024  9:13 AM.            Assessment /Plan     For exam results, see Encounter Report.    Nuclear sclerosis, bilateral    Post-operative state      Slit lamp exam:  L/L: nl  K: clear, wound sealed  AC: 1+ cell  Lens: IOL centered and stable    POD1 s/p Phaco/IOL  Appropriate precautions and post op medications reviewed.  Patient instructed to call or come in if symptoms of redness, decreased vision, or pain are experienced.

## 2024-08-06 ENCOUNTER — OFFICE VISIT (OUTPATIENT)
Dept: OPHTHALMOLOGY | Facility: CLINIC | Age: 84
End: 2024-08-06
Payer: MEDICARE

## 2024-08-06 DIAGNOSIS — H25.13 NUCLEAR SCLEROSIS, BILATERAL: Primary | ICD-10-CM

## 2024-08-06 DIAGNOSIS — Z98.890 POST-OPERATIVE STATE: ICD-10-CM

## 2024-08-06 PROCEDURE — 3288F FALL RISK ASSESSMENT DOCD: CPT | Mod: CPTII,S$GLB,, | Performed by: OPHTHALMOLOGY

## 2024-08-06 PROCEDURE — 99024 POSTOP FOLLOW-UP VISIT: CPT | Mod: S$GLB,,, | Performed by: OPHTHALMOLOGY

## 2024-08-06 PROCEDURE — 1101F PT FALLS ASSESS-DOCD LE1/YR: CPT | Mod: CPTII,S$GLB,, | Performed by: OPHTHALMOLOGY

## 2024-08-06 PROCEDURE — 1126F AMNT PAIN NOTED NONE PRSNT: CPT | Mod: CPTII,S$GLB,, | Performed by: OPHTHALMOLOGY

## 2024-08-06 PROCEDURE — 1160F RVW MEDS BY RX/DR IN RCRD: CPT | Mod: CPTII,S$GLB,, | Performed by: OPHTHALMOLOGY

## 2024-08-06 PROCEDURE — 99999 PR PBB SHADOW E&M-EST. PATIENT-LVL IV: CPT | Mod: PBBFAC,,, | Performed by: OPHTHALMOLOGY

## 2024-08-06 PROCEDURE — 1159F MED LIST DOCD IN RCRD: CPT | Mod: CPTII,S$GLB,, | Performed by: OPHTHALMOLOGY

## 2024-08-06 RX ORDER — MOXIFLOXACIN 5 MG/ML
1 SOLUTION/ DROPS OPHTHALMIC
OUTPATIENT
Start: 2024-08-06

## 2024-08-06 RX ORDER — PHENYLEPHRINE HYDROCHLORIDE 100 MG/ML
1 SOLUTION/ DROPS OPHTHALMIC
OUTPATIENT
Start: 2024-08-06

## 2024-08-06 RX ORDER — CYCLOP/TROP/PROPA/PHEN/KET/WAT 1-1-0.1%
1 DROPS (EA) OPHTHALMIC (EYE)
OUTPATIENT
Start: 2024-08-06

## 2024-08-06 RX ORDER — SODIUM CHLORIDE 0.9 % (FLUSH) 0.9 %
10 SYRINGE (ML) INJECTION
OUTPATIENT
Start: 2024-08-06

## 2024-08-12 ENCOUNTER — TELEPHONE (OUTPATIENT)
Dept: OPHTHALMOLOGY | Facility: CLINIC | Age: 84
End: 2024-08-12
Payer: MEDICARE

## 2024-08-12 ENCOUNTER — PATIENT MESSAGE (OUTPATIENT)
Dept: OPHTHALMOLOGY | Facility: CLINIC | Age: 84
End: 2024-08-12
Payer: MEDICARE

## 2024-08-12 NOTE — TELEPHONE ENCOUNTER
Patient given arrival time of 8:30 am on Thursday August 15 . Nothing to eat or drink after 11:59 pm. Start drops into the operative eye Tuesday . [address]

## 2024-08-14 NOTE — PRE-PROCEDURE INSTRUCTIONS
Patient reviewed on 8/15/2024.  Okay to proceed at [facility]. The following pre-procedure instructions and arrival time have been reviewed with patient via phone and sent to patient portal for review.  Patient verbalized an understanding.  Pt to be accompanied by wife-Libia day of procedure as responsible .      Dear Adelfo     Below you will find basic pre-procedure instructions in preparation for your procedure on 8/15/24 with Dr. Cedeno        You should have received your arrival time already from Dr's office.     - Nothing to eat or drink after midnight the night before your procedure until after your procedure, except AM meds with small sips of water.     - HOLD all oral Diabetic medications night before and morning of procedure  - HOLD all Insulin morning of procedure  - HOLD all Fluid pills morning of procedure  - HOLD all non-insulin shots until after surgery (Ozempic, Mounjaro, Trulicity, Victoza, Byetta, Wegovy and Adlyxin) (7 days prior)  - HOLD all vitamins, minerals and herbal supplements morning of procedure   - TAKE all B/P meds, EXCEPT those that contain a fluid pill (ex. Lasix, Hydroclorothiazide/HCTZ, Spirnolactone)  - USE inhalers as needed and bring AM of surgery  - USE EYE DROPS as directed  -TAKE blood thinner meds AM of surgery unless otherwise instructed by your provider to not take     - Shower and wash face with antibacterial soap (ex. Dial) for 3 mins PM prior and AM of surgery  - No powder, lotions, creams, oils, gels, ointments, makeup,  or jewelry    - Wear comfortable clothing (button up shirt)     (Patient is required to have a responsible ride to transport home, ride may not leave while patient is in surgery)     -- [facility], 2nd floor Surgery Center, located   @ [address] [place] [id number]  2nd Floor Registration        If you have any questions or concerns please feel free to contact your surgeon's office.           Please reply to this  message as receipt of delivery.     [place], LPN [facility]  Pre-Admit - Anesthesia Dept

## 2024-08-15 ENCOUNTER — HOSPITAL ENCOUNTER (OUTPATIENT)
Facility: HOSPITAL | Age: 84
Discharge: HOME | End: 2024-08-15
Attending: OPHTHALMOLOGY | Admitting: OPHTHALMOLOGY
Payer: MEDICARE

## 2024-08-15 VITALS
OXYGEN SATURATION: 98 % | BODY MASS INDEX: 32.73 KG/M2 | TEMPERATURE: 98 F | WEIGHT: 221 LBS | SYSTOLIC BLOOD PRESSURE: 168 MMHG | RESPIRATION RATE: 20 BRPM | DIASTOLIC BLOOD PRESSURE: 81 MMHG | HEART RATE: 53 BPM | HEIGHT: 69 IN

## 2024-08-15 DIAGNOSIS — H25.13 NUCLEAR SCLEROSIS, BILATERAL: ICD-10-CM

## 2024-08-15 DIAGNOSIS — H25.12 NUCLEAR SCLEROTIC CATARACT OF LEFT EYE: Primary | ICD-10-CM

## 2024-08-15 PROCEDURE — 99152 MOD SED SAME PHYS/QHP 5/>YRS: CPT | Performed by: OPHTHALMOLOGY

## 2024-08-15 PROCEDURE — 99900035 HC TECH TIME PER 15 MIN (STAT)

## 2024-08-15 PROCEDURE — 36000706: Performed by: OPHTHALMOLOGY

## 2024-08-15 PROCEDURE — 63600175 PHARM REV CODE 636 W HCPCS

## 2024-08-15 PROCEDURE — 66984 XCAPSL CTRC RMVL W/O ECP: CPT | Mod: 79,LT,, | Performed by: OPHTHALMOLOGY

## 2024-08-15 PROCEDURE — 25000003 PHARM REV CODE 250: Performed by: OPHTHALMOLOGY

## 2024-08-15 PROCEDURE — 66999 UNLISTED PX ANT SEGMENT EYE: CPT | Mod: CSM,,, | Performed by: OPHTHALMOLOGY

## 2024-08-15 PROCEDURE — 71000015 HC POSTOP RECOV 1ST HR: Performed by: OPHTHALMOLOGY

## 2024-08-15 PROCEDURE — V2788 PRESBYOPIA-CORRECT FUNCTION: HCPCS | Mod: WS | Performed by: OPHTHALMOLOGY

## 2024-08-15 PROCEDURE — 94761 N-INVAS EAR/PLS OXIMETRY MLT: CPT

## 2024-08-15 PROCEDURE — 36000707: Performed by: OPHTHALMOLOGY

## 2024-08-15 PROCEDURE — 99152 MOD SED SAME PHYS/QHP 5/>YRS: CPT | Mod: ,,, | Performed by: OPHTHALMOLOGY

## 2024-08-15 DEVICE — IMPLANTABLE DEVICE: Type: IMPLANTABLE DEVICE | Site: EYE | Status: FUNCTIONAL

## 2024-08-15 RX ORDER — MOXIFLOXACIN 5 MG/ML
1 SOLUTION/ DROPS OPHTHALMIC
Status: COMPLETED | OUTPATIENT
Start: 2024-08-15 | End: 2024-08-15

## 2024-08-15 RX ORDER — SODIUM CHLORIDE 0.9 % (FLUSH) 0.9 %
10 SYRINGE (ML) INJECTION
Status: DISCONTINUED | OUTPATIENT
Start: 2024-08-15 | End: 2024-08-15 | Stop reason: HOSPADM

## 2024-08-15 RX ORDER — TROPICAMIDE 10 MG/ML
1 SOLUTION/ DROPS OPHTHALMIC
Status: DISCONTINUED | OUTPATIENT
Start: 2024-08-15 | End: 2024-08-15

## 2024-08-15 RX ORDER — CYCLOP/TROP/PROPA/PHEN/KET/WAT 1-1-0.1%
1 DROPS (EA) OPHTHALMIC (EYE)
Status: DISCONTINUED | OUTPATIENT
Start: 2024-08-15 | End: 2024-08-15

## 2024-08-15 RX ORDER — MOXIFLOXACIN 5 MG/ML
SOLUTION/ DROPS OPHTHALMIC
Status: DISCONTINUED | OUTPATIENT
Start: 2024-08-15 | End: 2024-08-15 | Stop reason: HOSPADM

## 2024-08-15 RX ORDER — MIDAZOLAM HYDROCHLORIDE 1 MG/ML
1 INJECTION, SOLUTION INTRAMUSCULAR; INTRAVENOUS
Status: DISCONTINUED | OUTPATIENT
Start: 2024-08-15 | End: 2024-08-15 | Stop reason: HOSPADM

## 2024-08-15 RX ORDER — TETRACAINE HYDROCHLORIDE 5 MG/ML
1 SOLUTION OPHTHALMIC
Status: COMPLETED | OUTPATIENT
Start: 2024-08-15 | End: 2024-08-15

## 2024-08-15 RX ORDER — PHENYLEPHRINE HYDROCHLORIDE 100 MG/ML
1 SOLUTION/ DROPS OPHTHALMIC
Status: DISCONTINUED | OUTPATIENT
Start: 2024-08-15 | End: 2024-08-15 | Stop reason: HOSPADM

## 2024-08-15 RX ORDER — PHENYLEPHRINE HYDROCHLORIDE 25 MG/ML
1 SOLUTION/ DROPS OPHTHALMIC
Status: DISCONTINUED | OUTPATIENT
Start: 2024-08-15 | End: 2024-08-15

## 2024-08-15 RX ORDER — LIDOCAINE HYDROCHLORIDE 40 MG/ML
INJECTION, SOLUTION RETROBULBAR
Status: DISCONTINUED | OUTPATIENT
Start: 2024-08-15 | End: 2024-08-15 | Stop reason: HOSPADM

## 2024-08-15 RX ORDER — PHENYLEPHRINE HYDROCHLORIDE 25 MG/ML
1 SOLUTION/ DROPS OPHTHALMIC
Status: COMPLETED | OUTPATIENT
Start: 2024-08-15 | End: 2024-08-15

## 2024-08-15 RX ORDER — TROPICAMIDE 10 MG/ML
1 SOLUTION/ DROPS OPHTHALMIC
Status: COMPLETED | OUTPATIENT
Start: 2024-08-15 | End: 2024-08-15

## 2024-08-15 RX ORDER — ACETAMINOPHEN 325 MG/1
650 TABLET ORAL EVERY 4 HOURS PRN
Status: DISCONTINUED | OUTPATIENT
Start: 2024-08-15 | End: 2024-08-15 | Stop reason: HOSPADM

## 2024-08-15 RX ORDER — PROPARACAINE HYDROCHLORIDE 5 MG/ML
SOLUTION/ DROPS OPHTHALMIC
Status: DISCONTINUED | OUTPATIENT
Start: 2024-08-15 | End: 2024-08-15 | Stop reason: HOSPADM

## 2024-08-15 RX ORDER — PROPARACAINE HYDROCHLORIDE 5 MG/ML
1 SOLUTION/ DROPS OPHTHALMIC
Status: DISCONTINUED | OUTPATIENT
Start: 2024-08-15 | End: 2024-08-15 | Stop reason: HOSPADM

## 2024-08-15 RX ORDER — TETRACAINE HYDROCHLORIDE 5 MG/ML
1 SOLUTION OPHTHALMIC
Status: DISCONTINUED | OUTPATIENT
Start: 2024-08-15 | End: 2024-08-15

## 2024-08-15 RX ADMIN — TROPICAMIDE 1 DROP: 10 SOLUTION/ DROPS OPHTHALMIC at 09:08

## 2024-08-15 RX ADMIN — MOXIFLOXACIN 1 DROP: 5 SOLUTION/ DROPS OPHTHALMIC at 10:08

## 2024-08-15 RX ADMIN — PHENYLEPHRINE HYDROCHLORIDE 1 DROP: 25 SOLUTION/ DROPS OPHTHALMIC at 09:08

## 2024-08-15 RX ADMIN — TETRACAINE HYDROCHLORIDE 1 DROP: 5 SOLUTION OPHTHALMIC at 09:08

## 2024-08-15 RX ADMIN — MOXIFLOXACIN OPHTHALMIC 1 DROP: 5 SOLUTION/ DROPS OPHTHALMIC at 09:08

## 2024-08-15 RX ADMIN — MOXIFLOXACIN 1 DROP: 5 SOLUTION/ DROPS OPHTHALMIC at 11:08

## 2024-08-15 RX ADMIN — MIDAZOLAM HYDROCHLORIDE 2 MG: 1 INJECTION, SOLUTION INTRAMUSCULAR; INTRAVENOUS at 10:08

## 2024-08-15 NOTE — DISCHARGE SUMMARY
Outcome: Successful outpatient ophthalmic surgical procedure  Preprinted Instructions given to patient.  Regular diet.  Activity: No restrictions  Meds: see Med Rec  Condition: stable  Follow up: 1 day with Dr Cedeno  Disposition: Home  Diagnosis: s/p eye surgery  Date of discharge: 08/15/2024

## 2024-08-15 NOTE — OP NOTE
SURGEON:  Gato Cedeno M.D.    PREOPERATIVE DIAGNOSIS:    Nuclear Sclerotic Cataract Left Eye    POSTOPERATIVE DIAGNOSIS:    Nuclear Sclerotic Cataract Left  Eye    PROCEDURES:    Phacoemulsification with  intraocular lens, Left eye (60656)  With ORA LASER assist  With moderate sedation >10min (66018)    DATE OF SURGERY: 08/15/2024    IMPLANT: DRNOOV 21.0    ANESTHESIA:  Under my direct supervision, intravenous moderate sedation was administered during the course of this procedure, with continuous monitoring of hemodynamic parameters. Total time of sedation and amount of sedatives are documented in the nursing logs.    COMPLICATIONS:  None    ESTIMATED BLOOD LOSS: None    SPECIMENS: None    INDICATIONS:    The patient has a history of painless progressive visual loss and difficulty with activities of daily living, which specifically include difficult driving at night due to glare and difficulty reading small print, secondary to cataract formation.  After a thorough discussion of the risks, benefits, and alternatives to cataract surgery, including, but not limited to, the rare risks of infection, retinal detachment, hemorrhage, need for additional surgery, loss of vision, and even loss of the eye, the patient voices understanding and desires to proceed.    DESCRIPTION OF PROCEDURE:      The patients IOL calculations were reviewed, and the lens selection confirmed.   After verification and marking of the proper eye in the preop holding area, the patient was brought to the operating room in supine position where the eye was prepped and draped in standard sterile fashion with 5% Betadine and a lid speculum placed in the eye.   Topical 4% Lidocaine was used in addition to the preoperative anesthesia and the procedure was begun by the creation of a paracentesis incision through which viscoelastic was used to fill the anterior chamber.  Next, a keratome blade was used to create a triplanar temporal clear corneal incision  and a cystotome and Utrata forceps used to fashion a continuous curvilinear capsulorrhexis.  Hydrodissection was carried out using the Moore hydrodissection cannula and the nucleus was found to be mobile.  Phacoemulsification of the nucleus was carried out using a quick chop technique, and all remaining epinuclear and cortical material was removed.  The eye was then reformed with Viscoelastic and ORA was used to verify the proper IOL power. The intraocular lens was then implanted into the capsular bag.  All remaining viscoelastics were removed from the eye and at the end of the case the pupil was round, the lens was well-centered within the capsular bag and all wounds were found to be water tight.  Drops of Vigamox and Pred Forte were instilled and a shield was placed over the eye. The patient will follow up with Dr. Cedeno in the morning.

## 2024-08-15 NOTE — DISCHARGE INSTRUCTIONS
CATARACT SURGERY    POST-OPERATIVE INSTRUCTIONS    · Apply drops THREE times a day into operative eye for 30 days.    · DO NOT rub your eye    · Wear protective sunglasses during the day    · Resume moderate activity    · Bathe/shower/wash face normally    · DO NOT apply makeup around the operative eye for 1 week.         You should expect    - Blurry vision and halos for 24-48 hours    - Dilated pupil for 24-48 hours    - Scratchy feeling in the eye for 1-2 days    - Curved shadow in your peripheral vision for 2-3 weeks    - Occasional flickering of lights for up to 1 week    -If you experience severe pain or nausea, please call Dr Cedeno or the on-call doctor at [phone]    - Plan to see Dr Cedeno tomorrow .      [facility]    [address] [facility] [id number]    ** Most patients can drive the next day, but if you do not feel comfortable driving, please arrange for transportation.

## 2024-08-16 ENCOUNTER — OFFICE VISIT (OUTPATIENT)
Dept: OPHTHALMOLOGY | Facility: CLINIC | Age: 84
End: 2024-08-16
Payer: MEDICARE

## 2024-08-16 DIAGNOSIS — H25.12 NUCLEAR SCLEROSIS OF LEFT EYE: ICD-10-CM

## 2024-08-16 DIAGNOSIS — Z98.890 POST-OPERATIVE STATE: Primary | ICD-10-CM

## 2024-08-16 PROCEDURE — 99999 PR PBB SHADOW E&M-EST. PATIENT-LVL IV: CPT | Mod: PBBFAC,,, | Performed by: OPHTHALMOLOGY

## 2024-08-16 NOTE — PROGRESS NOTES
HPI    1 day po phaco/IOL OS  Vision is good and no pain    DATE OF SURGERY: 08/15/2024     IMPLANT: DRNOOV 21.0    Last edited by Phuong Morales on 8/16/2024  9:07 AM.            Assessment /Plan     For exam results, see Encounter Report.    Post-operative state    Nuclear sclerosis of left eye      Slit lamp exam:  L/L: nl  K: clear, wound sealed  AC: 1+ cell  Lens: IOL centered and stable    POD1 s/p Phaco/IOL  Appropriate precautions and post op medications reviewed.  Patient instructed to call or come in if symptoms of redness, decreased vision, or pain are experienced.  Good Odyssey result, recheck MRx OS

## 2024-08-23 ENCOUNTER — OFFICE VISIT (OUTPATIENT)
Dept: OPHTHALMOLOGY | Facility: CLINIC | Age: 84
End: 2024-08-23
Payer: MEDICARE

## 2024-08-23 DIAGNOSIS — Z98.890 POST-OPERATIVE STATE: Primary | ICD-10-CM

## 2024-08-23 DIAGNOSIS — H25.12 NUCLEAR SCLEROSIS OF LEFT EYE: ICD-10-CM

## 2024-08-23 PROCEDURE — 99999 PR PBB SHADOW E&M-EST. PATIENT-LVL III: CPT | Mod: PBBFAC,,, | Performed by: OPHTHALMOLOGY

## 2024-08-23 NOTE — PROGRESS NOTES
HPI    08/15/2024 IMPLANT: DRNOOV 21.0 OS  08/01/2024 IMPLANT: [id number]  21.5 AT 10 OD    Patient is here today for 1 week phaco OS. Patient states eyes feel   scratchy. Occasional floaters in his vision since having cataract surgery   in OS.    PMB TID OU  Last edited by Damaris Fischer on 8/23/2024 10:40 AM.            Assessment /Plan     For exam results, see Encounter Report.    Post-operative state    Nuclear sclerosis of left eye      Slit lamp exam:  L/L: nl  K: clear, wound sealed  AC: trace cell  Iris/Lens: IOL centered and stable    POW1 s/p phaco: Surgery healing well with no signs of infection or abnormal inflammation.   Excellent [employer] result

## 2024-09-24 ENCOUNTER — OFFICE VISIT (OUTPATIENT)
Dept: OPTOMETRY | Facility: CLINIC | Age: 84
End: 2024-09-24
Payer: MEDICARE

## 2024-09-24 DIAGNOSIS — Z96.1 STATUS POST CATARACT EXTRACTION OF BOTH EYES WITH INSERTION OF INTRAOCULAR LENS: Primary | ICD-10-CM

## 2024-09-24 DIAGNOSIS — Z98.41 STATUS POST CATARACT EXTRACTION OF BOTH EYES WITH INSERTION OF INTRAOCULAR LENS: Primary | ICD-10-CM

## 2024-09-24 DIAGNOSIS — Z98.42 STATUS POST CATARACT EXTRACTION OF BOTH EYES WITH INSERTION OF INTRAOCULAR LENS: Primary | ICD-10-CM

## 2024-09-24 DIAGNOSIS — H01.009 BLEPHARITIS OF BOTH UPPER AND LOWER EYELID: ICD-10-CM

## 2024-09-24 PROCEDURE — 1101F PT FALLS ASSESS-DOCD LE1/YR: CPT | Mod: CPTII,S$GLB,, | Performed by: OPTOMETRIST

## 2024-09-24 PROCEDURE — 99024 POSTOP FOLLOW-UP VISIT: CPT | Mod: S$GLB,,, | Performed by: OPTOMETRIST

## 2024-09-24 PROCEDURE — 3288F FALL RISK ASSESSMENT DOCD: CPT | Mod: CPTII,S$GLB,, | Performed by: OPTOMETRIST

## 2024-09-24 PROCEDURE — 1126F AMNT PAIN NOTED NONE PRSNT: CPT | Mod: CPTII,S$GLB,, | Performed by: OPTOMETRIST

## 2024-09-24 PROCEDURE — 99999 PR PBB SHADOW E&M-EST. PATIENT-LVL III: CPT | Mod: PBBFAC,,, | Performed by: OPTOMETRIST

## 2024-09-24 PROCEDURE — 1159F MED LIST DOCD IN RCRD: CPT | Mod: CPTII,S$GLB,, | Performed by: OPTOMETRIST

## 2024-09-24 NOTE — PROGRESS NOTES
HPI    Pt is here today for post op. Denies pain/discomfort. Occasional   scratchiness OU.  DLS: 8/23/2024 Dr. Cedeno   (-)Flashes   (+)Floaters   (-)Diplopia   (-)Headaches   (-)Itching   (-)Tearing  (-)Burning  (-)Dryness   (-)Photophobia  (-)Glare   (-)Blurred VA  Past Eye Sx: Cataract with IOL OU   Eye Meds: (-)   Last edited by Damaris Vu, OD on 9/24/2024  9:26 AM.            Assessment /Plan     For exam results, see Encounter Report.    Status post cataract extraction of both eyes with insertion of intraocular lens    Blepharitis of both upper and lower eyelid      1. Educated on today's WNL findings OU. Eyes well healed from cataract surgery OU. Pt OK to use OTC readers IF NEEDED however GREAT visual outcome with MFIOLs OU.    2. Educated pt on findings. Recommend Ocusoft lid scrubs along eyelid margin Qday.  If symptoms worsen or don't improve, RTC. Monitor.      RTC in 1 year for annual eye exam unless changes noted sooner.

## 2024-10-01 NOTE — TELEPHONE ENCOUNTER
Care Due:                  Date            Visit Type   Department     Provider  --------------------------------------------------------------------------------                                EP -                              PRIMARY      MET INTERNAL  Last Visit: 07-      [facility] ([facility])   MEDICINE       Romeroaisha Vogel                              EP -                              PRIMARY      [facility] INTERNAL  Next Visit: 12-      [facility] ([facility])   MEDICINE       Romeroed Vogel                                                            Last  Test          Frequency    Reason                     Performed    Due Date  --------------------------------------------------------------------------------    Lipid Panel.  12 months..  atorvastatin.............  09- 09-    Health [facility] Embedded Care Due Messages. Reference number: [id number].   10/01/2024 3:28:04 PM CDT

## 2024-10-02 RX ORDER — ATORVASTATIN CALCIUM 20 MG/1
TABLET, FILM COATED ORAL
Qty: 90 TABLET | Refills: 3 | Status: SHIPPED | OUTPATIENT
Start: 2024-10-02

## 2024-10-02 RX ORDER — IRBESARTAN 300 MG/1
300 TABLET ORAL NIGHTLY
Qty: 90 TABLET | Refills: 3 | Status: SHIPPED | OUTPATIENT
Start: 2024-10-02

## 2024-10-02 NOTE — TELEPHONE ENCOUNTER
Refill Routing Note   Medication(s) are not appropriate for processing by [facility] for the following reason(s):        Required labs outdated  Required vitals abnormal    ORC action(s):  Defer        Medication Therapy Plan: FOVS; FLOS      Appointments  past 12m or future 3m with PCP    Date Provider   Last Visit   7/11/2024 Romero Vogel MD   Next Visit   12/16/2024 Romero Vogel MD   ED visits in past 90 days: 0        Note composed:5:53 AM 10/02/2024

## 2024-10-03 NOTE — TELEPHONE ENCOUNTER
No care due was identified.  Health [facility] Embedded Care Due Messages. Reference number: [id number].   10/03/2024 4:28:25 PM CDT

## 2024-10-04 RX ORDER — FLUTICASONE PROPIONATE AND SALMETEROL 250; 50 UG/1; UG/1
1 POWDER RESPIRATORY (INHALATION) 2 TIMES DAILY
Qty: 180 EACH | Refills: 3 | Status: SHIPPED | OUTPATIENT
Start: 2024-10-04

## 2024-10-04 NOTE — TELEPHONE ENCOUNTER
Refill Decision Note   Adelfo Jennifer  is requesting a refill authorization.  Brief Assessment and Rationale for Refill:  Approve     Medication Therapy Plan:         Comments:     Note composed:6:38 AM 10/04/2024

## 2024-10-19 DIAGNOSIS — K22.70 BARRETT'S ESOPHAGUS WITHOUT DYSPLASIA: ICD-10-CM

## 2024-10-21 RX ORDER — OMEPRAZOLE 20 MG/1
CAPSULE, DELAYED RELEASE ORAL
Qty: 90 CAPSULE | Refills: 3 | Status: SHIPPED | OUTPATIENT
Start: 2024-10-21

## 2024-12-01 NOTE — TELEPHONE ENCOUNTER
Care Due:                  Date            Visit Type   Department     Provider  --------------------------------------------------------------------------------                                EP -                              PRIMARY      MET INTERNAL  Last Visit: 07-      [facility] ([facility]   MEDICINE       Romeroaisha Vogel                              EP -                              PRIMARY      [facility] INTERNAL  Next Visit: 12-      [facility]   MEDICINE       Romeroed Vogel                                                            Last  Test          Frequency    Reason                     Performed    Due Date  --------------------------------------------------------------------------------    Lipid Panel.  12 months..  atorvastatin.............  09- 09-    Health [facility] Embedded Care Due Messages. Reference number: [id number].   12/01/2024 12:34:48 PM CST

## 2024-12-02 ENCOUNTER — TELEPHONE (OUTPATIENT)
Dept: ELECTROPHYSIOLOGY | Facility: CLINIC | Age: 84
End: 2024-12-02
Payer: MEDICARE

## 2024-12-02 DIAGNOSIS — I47.19 PAT (PAROXYSMAL ATRIAL TACHYCARDIA): Primary | ICD-10-CM

## 2024-12-02 RX ORDER — FLUTICASONE PROPIONATE 50 MCG
SPRAY, SUSPENSION (ML) NASAL
Qty: 48 G | Refills: 2 | Status: SHIPPED | OUTPATIENT
Start: 2024-12-02

## 2024-12-02 NOTE — TELEPHONE ENCOUNTER
Adelfo Rodriguez  is requesting a refill authorization.  Brief Assessment and Rationale for Refill:  Approve     Medication Therapy Plan:  FLOS      Comments:     Note composed:5:37 AM 12/02/2024

## 2024-12-03 ENCOUNTER — HOSPITAL ENCOUNTER (OUTPATIENT)
Dept: CARDIOLOGY | Facility: CLINIC | Age: 84
Discharge: HOME | End: 2024-12-03
Payer: MEDICARE

## 2024-12-03 ENCOUNTER — OFFICE VISIT (OUTPATIENT)
Dept: ELECTROPHYSIOLOGY | Facility: CLINIC | Age: 84
End: 2024-12-03
Payer: MEDICARE

## 2024-12-03 VITALS
BODY MASS INDEX: 32.73 KG/M2 | HEART RATE: 57 BPM | DIASTOLIC BLOOD PRESSURE: 80 MMHG | SYSTOLIC BLOOD PRESSURE: 136 MMHG | WEIGHT: 221 LBS | HEIGHT: 69 IN

## 2024-12-03 DIAGNOSIS — I70.0 AORTIC ATHEROSCLEROSIS: ICD-10-CM

## 2024-12-03 DIAGNOSIS — I47.19 PAT (PAROXYSMAL ATRIAL TACHYCARDIA): ICD-10-CM

## 2024-12-03 DIAGNOSIS — G47.33 OBSTRUCTIVE SLEEP APNEA: ICD-10-CM

## 2024-12-03 DIAGNOSIS — I47.19 ATRIAL TACHYCARDIA: Primary | ICD-10-CM

## 2024-12-03 DIAGNOSIS — N18.32 STAGE 3B CHRONIC KIDNEY DISEASE: ICD-10-CM

## 2024-12-03 DIAGNOSIS — I10 ESSENTIAL HYPERTENSION: Chronic | ICD-10-CM

## 2024-12-03 LAB
OHS QRS DURATION: 96 MS
OHS QTC CALCULATION: 408 MS

## 2024-12-03 PROCEDURE — 93010 ELECTROCARDIOGRAM REPORT: CPT | Mod: S$GLB,,, | Performed by: INTERNAL MEDICINE

## 2024-12-03 PROCEDURE — 1160F RVW MEDS BY RX/DR IN RCRD: CPT | Mod: CPTII,S$GLB,, | Performed by: INTERNAL MEDICINE

## 2024-12-03 PROCEDURE — 99999 PR PBB SHADOW E&M-EST. PATIENT-LVL IV: CPT | Mod: PBBFAC,,, | Performed by: INTERNAL MEDICINE

## 2024-12-03 PROCEDURE — 3079F DIAST BP 80-89 MM HG: CPT | Mod: CPTII,S$GLB,, | Performed by: INTERNAL MEDICINE

## 2024-12-03 PROCEDURE — 3075F SYST BP GE 130 - 139MM HG: CPT | Mod: CPTII,S$GLB,, | Performed by: INTERNAL MEDICINE

## 2024-12-03 PROCEDURE — 99214 OFFICE O/P EST MOD 30 MIN: CPT | Mod: S$GLB,,, | Performed by: INTERNAL MEDICINE

## 2024-12-03 PROCEDURE — 1159F MED LIST DOCD IN RCRD: CPT | Mod: CPTII,S$GLB,, | Performed by: INTERNAL MEDICINE

## 2024-12-03 PROCEDURE — 3288F FALL RISK ASSESSMENT DOCD: CPT | Mod: CPTII,S$GLB,, | Performed by: INTERNAL MEDICINE

## 2024-12-03 PROCEDURE — 1126F AMNT PAIN NOTED NONE PRSNT: CPT | Mod: CPTII,S$GLB,, | Performed by: INTERNAL MEDICINE

## 2024-12-03 PROCEDURE — 93005 ELECTROCARDIOGRAM TRACING: CPT | Mod: S$GLB,,, | Performed by: INTERNAL MEDICINE

## 2024-12-03 PROCEDURE — 1101F PT FALLS ASSESS-DOCD LE1/YR: CPT | Mod: CPTII,S$GLB,, | Performed by: INTERNAL MEDICINE

## 2024-12-03 NOTE — PROGRESS NOTES
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 Rodriguez today in our electrophysiology clinic for his atrial arrhythmias. 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 In 2018 he began having exertional induced palpitations that would last for a few minutes. A 30 day event monitor was ordered. He had 1 such event whose onset was not captured. It was a narrow complex tachycardia at around 150 bpm that could have represented 2:1 atrial flutter. Another event was characterized by a short imelda of nonsustained atrial tachycardia. He was initiated on eliquis and metoprolol 50mg daily. He was intolerant of the metoprolol (symptomatic bradycardia in the 50s). He continues to exercise and 1-2 times weekly he has an episode. He exercises with a heart rate monitor and is able to record his work outs. With workouts associated with his symptoms, his heart rate curve rises to a plateau of ~120-130. Then there is a sudden spike in his heart rate to 150-160 that remains for 5-10 minutes then suddenly stops and returns to baseline. He notes palpitations and chest heaviness with these events. He had an abnormal stress echo with preserved LVEF 10/2018. Coronary angiography noted no obstructive CAD.     [place] underwent EPS on 7/5/2019. Unstable, nonsustainable left atrial and low lateral right atrial ATs were induced. Sustained sinus node tachycardia was however induced in response to atrial extrastimuli. This was likely sinus node reentry. Due to potential 2:1 tach/AFL noted on his event monitor, a CTI ablation was performed. He returned for follow-up 8/2019. Since his procedure he had 1 instance of sudden tachycardia to  "150s during exercise, lasting ~7 minutes. He otherwise felt well. We elected to implant an ILR for long-term rhythm monitoring for possible AF, which was performed 9/2019.    Dr. Rodriguez returned for 3 month post-ILR implant follow-up 12/2019. He felt well. ILR noted infrequent short episodes of atrial tachycardia. Longest episode he noted was 10/3/2019, lasting 2 minutes, however we only have 23 seconds of this as it likely fell just below detection.    12/2021: Dr. Rodriguez returned for follow-up. ILR monitoring over this past year have noted symptomatic activations correspond with sinus rhythm with PVCs or PACs. No NSAT observed. "AF events" were not consistent with AF in my opinion (sinus tach with PVCs). He has some nocturnal bradycardia at times. His main concern is lower extremity edema.    11/2022: Dr. Rodriguez returns for follow-up. Doing physical therapy. No significant palpitations.Episode of nonsustained AT observed on 9/2022 transmission. No definitive AF observed on any transmissions over the past year. 2 episodes of tachycardia labelled as "AF" over the past year are consistent with either sinus tachycardia or AF (PVCs during tach help demonstrate long-RP tach). Symptom activations all correspond to sinus rhythm with rare ectopy.    1/2024: Dr. Rodriguez returns for follow-up. ILR no longer operable. Feels well. Monitors his HR and rhythm with his FitBit. Feels well.     2/2024: Dr. Rodriguez informed us he was noticing HR spikes on his FitBit. Holter noted 1% PAC burden with 23 salvos of nonsustained AT up to 16 beats in duration. Was dealing with pneumonia.    Interim Hx:  Dr. Rodriguez returns for follow-up. No recent HR spikes on his watch. FitBit will get ECGs.    My interpretation of today's in-clinic ECG is sinus rhythm with a rate of 57 bpm and normal intervals.    Review of Systems   Constitutional: Negative for fever and malaise/fatigue.   HENT:  Negative for congestion and sore throat.  "   Eyes:  Negative for blurred vision and visual disturbance.   Cardiovascular:  Negative for chest pain, dyspnea on exertion, near-syncope and syncope.   Respiratory:  Negative for cough and shortness of breath.    Hematologic/Lymphatic: Negative for bleeding problem. Does not bruise/bleed easily.   Skin: Negative.    Musculoskeletal:  Positive for arthritis.   Gastrointestinal:  Negative for bloating, abdominal pain, hematochezia and melena.   Neurological:  Negative for dizziness, focal weakness and weakness.        Objective:    Physical Exam  Vitals reviewed.   Constitutional:       General: He is not in acute distress.     Appearance: He is well-developed. He is not diaphoretic.   HENT:      Head: Normocephalic and atraumatic.   Eyes:      General:         Right eye: No discharge.         Left eye: No discharge.      Conjunctiva/sclera: Conjunctivae normal.   Neck:      Vascular: No JVD.   Cardiovascular:      Rate and Rhythm: Normal rate and regular rhythm.      Heart sounds: No murmur heard.     No friction rub. No gallop.   Pulmonary:      Effort: Pulmonary effort is normal. No respiratory distress.      Breath sounds: Normal breath sounds. No wheezing or rales.   Abdominal:      General: Bowel sounds are normal. There is no distension.      Palpations: Abdomen is soft.      Tenderness: There is no abdominal tenderness. There is no rebound.   Musculoskeletal:      Cervical back: Neck supple.   Skin:     General: Skin is warm and dry.   Neurological:      Mental Status: He is alert and oriented to person, place, and time.   Psychiatric:         Behavior: Behavior normal.         Thought Content: Thought content normal.         Judgment: Judgment normal.           Assessment:       1. Atrial tachycardia    2. Essential hypertension    3. Aortic atherosclerosis observed on CT scan in 2021    4. Stage 3b chronic kidney disease    5. 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 He is s/p RFA of the CTI and EP study. No AF observed on an ILR that is now at EOS. He monitors with a FitBit. No AF on 50mg metoprolol daily.      RTC in 12 months    Thank you for allowing me to participate in the care of this patient. Please do not hesitate to call me with any questions or concerns.    Gil Wilson MD, PhD  Cardiac Electrophysiology

## 2024-12-09 ENCOUNTER — LAB VISIT (OUTPATIENT)
Dept: LAB | Facility: HOSPITAL | Age: 84
End: 2024-12-09
Attending: INTERNAL MEDICINE
Payer: MEDICARE

## 2024-12-09 DIAGNOSIS — N18.30 STAGE 3 CHRONIC KIDNEY DISEASE, UNSPECIFIED WHETHER STAGE 3A OR 3B CKD: ICD-10-CM

## 2024-12-09 DIAGNOSIS — I10 ESSENTIAL HYPERTENSION: ICD-10-CM

## 2024-12-09 DIAGNOSIS — E78.49 OTHER HYPERLIPIDEMIA: ICD-10-CM

## 2024-12-09 DIAGNOSIS — R97.20 ELEVATED PSA: ICD-10-CM

## 2024-12-09 LAB
ALBUMIN SERPL BCP-MCNC: 3.7 G/DL (ref 3.5–5.2)
ALP SERPL-CCNC: 78 U/L (ref 40–150)
ALT SERPL W/O P-5'-P-CCNC: 15 U/L (ref 10–44)
ANION GAP SERPL CALC-SCNC: 9 MMOL/L (ref 8–16)
AST SERPL-CCNC: 18 U/L (ref 10–40)
BASOPHILS # BLD AUTO: 0.07 K/UL (ref 0–0.2)
BASOPHILS NFR BLD: 1.3 % (ref 0–1.9)
BILIRUB SERPL-MCNC: 0.6 MG/DL (ref 0.1–1)
BUN SERPL-MCNC: 30 MG/DL (ref 8–23)
CALCIUM SERPL-MCNC: 9 MG/DL (ref 8.7–10.5)
CHLORIDE SERPL-SCNC: 109 MMOL/L (ref 95–110)
CHOLEST SERPL-MCNC: 147 MG/DL (ref 120–199)
CHOLEST/HDLC SERPL: 2.2 {RATIO} (ref 2–5)
CO2 SERPL-SCNC: 23 MMOL/L (ref 23–29)
COMPLEXED PSA SERPL-MCNC: 4.2 NG/ML (ref 0–4)
CREAT SERPL-MCNC: 2 MG/DL (ref 0.5–1.4)
DIFFERENTIAL METHOD BLD: ABNORMAL
EOSINOPHIL # BLD AUTO: 0.3 K/UL (ref 0–0.5)
EOSINOPHIL NFR BLD: 6 % (ref 0–8)
ERYTHROCYTE [DISTWIDTH] IN BLOOD BY AUTOMATED COUNT: 12.8 % (ref 11.5–14.5)
EST. GFR  (NO RACE VARIABLE): 32.3 ML/MIN/1.73 M^2
GLUCOSE SERPL-MCNC: 91 MG/DL (ref 70–110)
HCT VFR BLD AUTO: 34 % (ref 40–54)
HDLC SERPL-MCNC: 67 MG/DL (ref 40–75)
HDLC SERPL: 45.6 % (ref 20–50)
HGB BLD-MCNC: 10.9 G/DL (ref 14–18)
IMM GRANULOCYTES # BLD AUTO: 0.02 K/UL (ref 0–0.04)
IMM GRANULOCYTES NFR BLD AUTO: 0.4 % (ref 0–0.5)
LDLC SERPL CALC-MCNC: 63.2 MG/DL (ref 63–159)
LYMPHOCYTES # BLD AUTO: 1.9 K/UL (ref 1–4.8)
LYMPHOCYTES NFR BLD: 35.5 % (ref 18–48)
MCH RBC QN AUTO: 32.4 PG (ref 27–31)
MCHC RBC AUTO-ENTMCNC: 32.1 G/DL (ref 32–36)
MCV RBC AUTO: 101 FL (ref 82–98)
MONOCYTES # BLD AUTO: 0.6 K/UL (ref 0.3–1)
MONOCYTES NFR BLD: 11.7 % (ref 4–15)
NEUTROPHILS # BLD AUTO: 2.4 K/UL (ref 1.8–7.7)
NEUTROPHILS NFR BLD: 45.1 % (ref 38–73)
NONHDLC SERPL-MCNC: 80 MG/DL
NRBC BLD-RTO: 0 /100 WBC
PLATELET # BLD AUTO: 160 K/UL (ref 150–450)
PMV BLD AUTO: 12.4 FL (ref 9.2–12.9)
POTASSIUM SERPL-SCNC: 4.4 MMOL/L (ref 3.5–5.1)
PROT SERPL-MCNC: 6.3 G/DL (ref 6–8.4)
RBC # BLD AUTO: 3.36 M/UL (ref 4.6–6.2)
SODIUM SERPL-SCNC: 141 MMOL/L (ref 136–145)
T4 FREE SERPL-MCNC: 0.88 NG/DL (ref 0.71–1.51)
TRIGL SERPL-MCNC: 84 MG/DL (ref 30–150)
WBC # BLD AUTO: 5.29 K/UL (ref 3.9–12.7)

## 2024-12-09 PROCEDURE — 80061 LIPID PANEL: CPT | Performed by: INTERNAL MEDICINE

## 2024-12-09 PROCEDURE — 84439 ASSAY OF FREE THYROXINE: CPT | Performed by: INTERNAL MEDICINE

## 2024-12-09 PROCEDURE — 84153 ASSAY OF PSA TOTAL: CPT | Performed by: INTERNAL MEDICINE

## 2024-12-09 PROCEDURE — 80053 COMPREHEN METABOLIC PANEL: CPT | Performed by: INTERNAL MEDICINE

## 2024-12-09 PROCEDURE — 85025 COMPLETE CBC W/AUTO DIFF WBC: CPT | Performed by: INTERNAL MEDICINE

## 2024-12-09 PROCEDURE — 36415 COLL VENOUS BLD VENIPUNCTURE: CPT | Mod: PN | Performed by: INTERNAL MEDICINE

## 2024-12-12 ENCOUNTER — OFFICE VISIT (OUTPATIENT)
Dept: PULMONOLOGY | Facility: CLINIC | Age: 84
End: 2024-12-12
Payer: MEDICARE

## 2024-12-12 VITALS
OXYGEN SATURATION: 97 % | HEIGHT: 69 IN | SYSTOLIC BLOOD PRESSURE: 140 MMHG | BODY MASS INDEX: 33.67 KG/M2 | WEIGHT: 227.31 LBS | HEART RATE: 69 BPM | DIASTOLIC BLOOD PRESSURE: 82 MMHG

## 2024-12-12 DIAGNOSIS — J45.21 MILD INTERMITTENT ASTHMA WITH ACUTE EXACERBATION: Primary | ICD-10-CM

## 2024-12-12 PROCEDURE — 1125F AMNT PAIN NOTED PAIN PRSNT: CPT | Mod: CPTII,S$GLB,, | Performed by: INTERNAL MEDICINE

## 2024-12-12 PROCEDURE — 3288F FALL RISK ASSESSMENT DOCD: CPT | Mod: CPTII,S$GLB,, | Performed by: INTERNAL MEDICINE

## 2024-12-12 PROCEDURE — 1159F MED LIST DOCD IN RCRD: CPT | Mod: CPTII,S$GLB,, | Performed by: INTERNAL MEDICINE

## 2024-12-12 PROCEDURE — 3077F SYST BP >= 140 MM HG: CPT | Mod: CPTII,S$GLB,, | Performed by: INTERNAL MEDICINE

## 2024-12-12 PROCEDURE — 99213 OFFICE O/P EST LOW 20 MIN: CPT | Mod: S$GLB,,, | Performed by: INTERNAL MEDICINE

## 2024-12-12 PROCEDURE — 3079F DIAST BP 80-89 MM HG: CPT | Mod: CPTII,S$GLB,, | Performed by: INTERNAL MEDICINE

## 2024-12-12 PROCEDURE — 99999 PR PBB SHADOW E&M-EST. PATIENT-LVL IV: CPT | Mod: PBBFAC,,, | Performed by: INTERNAL MEDICINE

## 2024-12-12 PROCEDURE — 1101F PT FALLS ASSESS-DOCD LE1/YR: CPT | Mod: CPTII,S$GLB,, | Performed by: INTERNAL MEDICINE

## 2024-12-12 NOTE — ASSESSMENT & PLAN NOTE
Asthma- currently well controlled  Continue fluticasone/salmeterol  Continue over-the-counter allergy medications and Singulair nightly    We will prescribe prednisone 40 mg for 5 days for him to have at home in case he needs it and he will let me or his PCP know if he has to use it.

## 2024-12-12 NOTE — PROGRESS NOTES
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  No exacerbations no need for steroids or urgent care visits.  He is using his medications all as prescribed and has no concerns or complaints.  His only issue currently is some back pain that he is seeing physical therapy for.    Interval History     Since our last visit he has been feeling pretty well.  He is back to his daily activities.  He is seeing physical therapy and that has been going well.  He has not required any hospitalizations or urgent care visits for his asthma.  He has had 2 episodes at home that were resolved with his as needed inhaler.    History:   Jennifer  Is an 83-year-old with atrial flutter, heart failure with preserved ejection fraction, lifelong asthma and recent hospitalization for pneumonia that is here for follow-up and to establish care.  He has had asthma since he was in his early 20s and struggled until he started Advair when it was released.  Since then he has not had any issues with his asthma and has done relatively well until the last couple of months.  He developed community-acquired pneumonia and had a very difficult time getting over it and continues to have extreme fatigue.  He is also noted to have hyponatremia in the hospital and is unsure exactly what caused that.  He does have sleep apnea but is very compliant with his CPAP machine as well.      Additional Pulmonary History:  Childhood Illnesses:  asthma   Occupational:  no exposures  Environmental:  NA  Tobacco/Smoking:  never smoker    Objective:     Vitals:    12/12/24 1440   BP: (!) 140/82   BP Location: Left arm   Patient Position: Sitting   Pulse: 69   SpO2: 97%   Weight: 103.1 kg (227 lb 4.7 oz)   Height: 5' 9" (1.753 m)         Physical Exam  Vitals reviewed.   Constitutional:       General: He is not in acute distress.     " Appearance: He is obese. He is not diaphoretic.   HENT:      Head: Normocephalic and atraumatic.      Right Ear: External ear normal.      Left Ear: External ear normal.   Eyes:      Conjunctiva/sclera: Conjunctivae normal.      Pupils: Pupils are equal, round, and reactive to light.   Neck:      Trachea: No tracheal deviation.   Cardiovascular:      Rate and Rhythm: Normal rate and regular rhythm.      Heart sounds: Normal heart sounds. No murmur heard.  Pulmonary:      Effort: Pulmonary effort is normal. No respiratory distress.      Breath sounds: Normal breath sounds. No stridor. No wheezing or rales.   Abdominal:      General: Bowel sounds are normal. There is no distension.      Palpations: Abdomen is soft.      Tenderness: There is no abdominal tenderness.   Musculoskeletal:         General: Normal range of motion.      Cervical back: Normal range of motion and neck supple.   Skin:     General: Skin is warm and dry.      Findings: No erythema.   Neurological:      Mental Status: He is alert and oriented to person, place, and time.      Gait: Gait is intact.   Psychiatric:         Mood and Affect: Mood and affect normal.         Cognition and Memory: Memory normal.         Judgment: Judgment normal.          Personal Diagnostic Review and Interpretation  Will repeat imaging in 6-8 weeks       Echocardiograms:   Results for orders placed during the hospital encounter of 04/02/24    Echo    Interpretation Summary    Left Ventricle: The left ventricle is normal in size. Mildly increased wall thickness. There is concentric remodeling. Normal wall motion. There is normal systolic function with a visually estimated ejection fraction of 65 - 70%. Ejection fraction by visual approximation is 65%.    Right Ventricle: Normal right ventricular cavity size. Wall thickness is normal. Right ventricle wall motion  is normal. Systolic function is normal.    Left Atrium: Left atrium is moderately dilated.    Right Atrium:  Right atrium is moderately dilated.    Aortic Valve: There is mild aortic valve sclerosis. There is mild annular calcification present.    IVC/SVC: Normal venous pressure at 3 mmHg.    Pericardium: There is a trivial posterior effusion.        Assessment & Plan:       Problem List Items Addressed This Visit          Pulmonary    Asthma, intermittent - Primary    Current Assessment & Plan     Asthma- currently well controlled  Continue fluticasone/salmeterol  Continue over-the-counter allergy medications and Singulair nightly    We will prescribe prednisone 40 mg for 5 days for him to have at home in case he needs it and he will let me or his PCP know if he has to use it.             RETURN TO CLINIC IN 12 MONTHS       Portions of the record may have been created with voice-recognition software. Occasional wrong-word or sound-a-like substitutions may have occurred due to the inherent limitations of voice-recognition software. Read the chart carefully and recognize, using context, where substitutions have occurred.    Shirin Almeida M.D.  Pulmonary/Critical Care

## 2024-12-16 ENCOUNTER — OFFICE VISIT (OUTPATIENT)
Dept: INTERNAL MEDICINE | Facility: CLINIC | Age: 84
End: 2024-12-16
Payer: MEDICARE

## 2024-12-16 VITALS
RESPIRATION RATE: 16 BRPM | BODY MASS INDEX: 33.62 KG/M2 | WEIGHT: 227 LBS | DIASTOLIC BLOOD PRESSURE: 68 MMHG | OXYGEN SATURATION: 98 % | SYSTOLIC BLOOD PRESSURE: 138 MMHG | TEMPERATURE: 97 F | HEIGHT: 69 IN | HEART RATE: 64 BPM

## 2024-12-16 DIAGNOSIS — E78.49 OTHER HYPERLIPIDEMIA: ICD-10-CM

## 2024-12-16 DIAGNOSIS — N18.30 STAGE 3 CHRONIC KIDNEY DISEASE, UNSPECIFIED WHETHER STAGE 3A OR 3B CKD: ICD-10-CM

## 2024-12-16 DIAGNOSIS — J45.20 INTERMITTENT ASTHMA WITHOUT COMPLICATION, UNSPECIFIED ASTHMA SEVERITY: ICD-10-CM

## 2024-12-16 DIAGNOSIS — I10 ESSENTIAL HYPERTENSION: Primary | ICD-10-CM

## 2024-12-16 PROCEDURE — 1101F PT FALLS ASSESS-DOCD LE1/YR: CPT | Mod: CPTII,S$GLB,, | Performed by: INTERNAL MEDICINE

## 2024-12-16 PROCEDURE — 3078F DIAST BP <80 MM HG: CPT | Mod: CPTII,S$GLB,, | Performed by: INTERNAL MEDICINE

## 2024-12-16 PROCEDURE — 1160F RVW MEDS BY RX/DR IN RCRD: CPT | Mod: CPTII,S$GLB,, | Performed by: INTERNAL MEDICINE

## 2024-12-16 PROCEDURE — 3288F FALL RISK ASSESSMENT DOCD: CPT | Mod: CPTII,S$GLB,, | Performed by: INTERNAL MEDICINE

## 2024-12-16 PROCEDURE — 99214 OFFICE O/P EST MOD 30 MIN: CPT | Mod: S$GLB,,, | Performed by: INTERNAL MEDICINE

## 2024-12-16 PROCEDURE — 99999 PR PBB SHADOW E&M-EST. PATIENT-LVL V: CPT | Mod: PBBFAC,,, | Performed by: INTERNAL MEDICINE

## 2024-12-16 PROCEDURE — 3075F SYST BP GE 130 - 139MM HG: CPT | Mod: CPTII,S$GLB,, | Performed by: INTERNAL MEDICINE

## 2024-12-16 PROCEDURE — 1125F AMNT PAIN NOTED PAIN PRSNT: CPT | Mod: CPTII,S$GLB,, | Performed by: INTERNAL MEDICINE

## 2024-12-16 PROCEDURE — 1159F MED LIST DOCD IN RCRD: CPT | Mod: CPTII,S$GLB,, | Performed by: INTERNAL MEDICINE

## 2024-12-16 RX ORDER — HYDRALAZINE HYDROCHLORIDE 50 MG/1
50 TABLET, FILM COATED ORAL EVERY 8 HOURS
Qty: 270 TABLET | Refills: 3 | Status: SHIPPED | OUTPATIENT
Start: 2024-12-16

## 2024-12-16 NOTE — PROGRESS NOTES
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 male.    Chief Complaint: Annual Exam (See 12/9 labs - wants this visit coded as wellness)    History of Present Illness    CHIEF COMPLAINT:  Adelfo presents today for follow-up of medical conditions which include hypertension, chronic asthma chronic kidney disease the patient reports he is doing well overall.  He is followed by Cardiology and pulmonology and Nephrology.  He reports his heart rate is running from 50-60 +range.  His peak flow levels have been greater than 450 on self-monitoring.  Oxygen saturation levels have ranged from 92-98%.  He is using CPAP therapy every night.  He feels rested.  He averages 7-1/2 hours of sleep.  Home blood pressure readings have ranged from 134-165 systolic on self-monitoring.  We discussed increasing the dose of hydralazine better blood pressure control.    HYPERTENSION:  He reports elevated blood pressure readings over the past 4 weeks, particularly systolic values, which he attributes to recent stressors. He takes hydralazine 3 times daily, irbesartan, and metoprolol for management. He reports good tolerance to hydralazine without side effects but occasionally misses doses.    LABS:  eGFR has remained stable in low 30s for approximately 15 years. Creatinine levels have fluctuated between 1.6-2.1. Total cholesterol is 147 with LDL of 63. Liver function tests remain stable. Hemoglobin and hematocrit levels show fluctuations but maintain overall stability. White blood cell and platelet counts are within normal range. Free T4 is within normal range. PSA level remains stable around 4.0, with recent values of 4.2, down from 4.3 in January, and 3.9 in July.    SKIN:  He presents with blisters on bilateral great toes, which are currently dry but occasionally bothersome. He manages symptoms with OTC itch cream and intermittent steroid cream application. Previous dermatology evaluations have been  inconclusive,.    MUSCULOSKELETAL:  He continues physical therapy for core strength, neck, shoulders, and lower back. His back condition significantly improved with massage therapy after an acute exacerbation.    SLEEP:  He generally sleeps well, obtaining approximately eight hours per night. He uses CPAP therapy and reports good compliance and effectiveness with the device.    GASTROINTESTINAL:  He has been diagnosed with Ramirez's esophagus but denies experiencing any reflux symptoms. No changes in bowel habits noted.    WEIGHT:  He reports gaining five lbs since last visit, with weight fluctuating between 220 to 223 lbs.      ROS:  Constitutional: +weight gain  Gastrointestinal: +change in bowel habits  Integumentary: +dry skin  Psychiatric: -sleep difficulty              Physical Exam  Vitals and nursing note reviewed.   Constitutional:       General: He is not in acute distress.     Appearance: Normal appearance. He is well-developed.      Comments: The patient has gained 5 lb since 7/11/2024.   HENT:      Head: Normocephalic and atraumatic.   Eyes:      General: No scleral icterus.     Extraocular Movements: Extraocular movements intact.      Conjunctiva/sclera: Conjunctivae normal.   Neck:      Thyroid: No thyromegaly.      Vascular: No JVD.   Cardiovascular:      Rate and Rhythm: Normal rate and regular rhythm.      Heart sounds: Normal heart sounds. No murmur heard.     No friction rub. No gallop.   Pulmonary:      Effort: Pulmonary effort is normal. No respiratory distress.      Breath sounds: Normal breath sounds. No wheezing or rales.   Abdominal:      General: Bowel sounds are normal.      Palpations: Abdomen is soft. There is no mass.      Tenderness: There is no abdominal tenderness.   Musculoskeletal:         General: No tenderness. Normal range of motion.      Cervical back: Normal range of motion and neck supple.      Right lower leg: No edema.      Left lower leg: No edema.   Lymphadenopathy:       Cervical: No cervical adenopathy.   Skin:     General: Skin is warm and dry.      Findings: No rash.   Neurological:      Mental Status: He is alert and oriented to person, place, and time.      Comments: Gait is normal.   Psychiatric:         Mood and Affect: Mood normal.         Behavior: Behavior normal.           Lab Visit on 12/09/2024   Component Date Value Ref Range Status    Sodium 12/09/2024 141  136 - 145 mmol/L Final    Potassium 12/09/2024 4.4  3.5 - 5.1 mmol/L Final    Chloride 12/09/2024 109  95 - 110 mmol/L Final    CO2 12/09/2024 23  23 - 29 mmol/L Final    Glucose 12/09/2024 91  70 - 110 mg/dL Final    BUN 12/09/2024 30 (H)  8 - 23 mg/dL Final    Creatinine 12/09/2024 2.0 (H)  0.5 - 1.4 mg/dL Final    Calcium 12/09/2024 9.0  8.7 - 10.5 mg/dL Final    Total Protein 12/09/2024 6.3  6.0 - 8.4 g/dL Final    Albumin 12/09/2024 3.7  3.5 - 5.2 g/dL Final    Total Bilirubin 12/09/2024 0.6  0.1 - 1.0 mg/dL Final    Comment: For infants and newborns, interpretation of results should be based  on gestational age, weight and in agreement with clinical  observations.    Premature Infant recommended reference ranges:  Up to 24 hours.............<8.0 mg/dL  Up to 48 hours............<12.0 mg/dL  3-5 days..................<15.0 mg/dL  6-29 days.................<15.0 mg/dL      Alkaline Phosphatase 12/09/2024 78  40 - 150 U/L Final    AST 12/09/2024 18  10 - 40 U/L Final    ALT 12/09/2024 15  10 - 44 U/L Final    eGFR 12/09/2024 32.3 (A)  >60 mL/min/1.73 m^2 Final    Anion Gap 12/09/2024 9  8 - 16 mmol/L Final    WBC 12/09/2024 5.29  3.90 - 12.70 K/uL Final    RBC 12/09/2024 3.36 (L)  4.60 - 6.20 M/uL Final    Hemoglobin 12/09/2024 10.9 (L)  14.0 - 18.0 g/dL Final    Hematocrit 12/09/2024 34.0 (L)  40.0 - 54.0 % Final    MCV 12/09/2024 101 (H)  82 - 98 fL Final    MCH 12/09/2024 32.4 (H)  27.0 - 31.0 pg Final    MCHC 12/09/2024 32.1  32.0 - 36.0 g/dL Final    RDW 12/09/2024 12.8  11.5 - 14.5 % Final    Platelets  12/09/2024 160  150 - 450 K/uL Final    MPV 12/09/2024 12.4  9.2 - 12.9 fL Final    Immature Granulocytes 12/09/2024 0.4  0.0 - 0.5 % Final    Gran # (ANC) 12/09/2024 2.4  1.8 - 7.7 K/uL Final    Immature Grans (Abs) 12/09/2024 0.02  0.00 - 0.04 K/uL Final    Comment: Mild elevation in immature granulocytes is non specific and   can be seen in a variety of conditions including stress response,   acute inflammation, trauma and pregnancy. Correlation with other   laboratory and clinical findings is essential.      Lymph # 12/09/2024 1.9  1.0 - 4.8 K/uL Final    Mono # 12/09/2024 0.6  0.3 - 1.0 K/uL Final    Eos # 12/09/2024 0.3  0.0 - 0.5 K/uL Final    Baso # 12/09/2024 0.07  0.00 - 0.20 K/uL Final    nRBC 12/09/2024 0  0 /100 WBC Final    Gran % 12/09/2024 45.1  38.0 - 73.0 % Final    Lymph % 12/09/2024 35.5  18.0 - 48.0 % Final    Mono % 12/09/2024 11.7  4.0 - 15.0 % Final    Eosinophil % 12/09/2024 6.0  0.0 - 8.0 % Final    Basophil % 12/09/2024 1.3  0.0 - 1.9 % Final    Differential Method 12/09/2024 Automated   Final    Cholesterol 12/09/2024 147  120 - 199 mg/dL Final    Comment: The National Cholesterol Education Program (NCEP) has set the  following guidelines (reference ranges) for Cholesterol:  Optimal.....................<200 mg/dL  Borderline High.............200-239 mg/dL  High........................> or = 240 mg/dL      Triglycerides 12/09/2024 84  30 - 150 mg/dL Final    Comment: The National Cholesterol Education Program (NCEP) has set the  following guidelines (reference values) for triglycerides:  Normal......................<150 mg/dL  Borderline High.............150-199 mg/dL  High........................200-499 mg/dL      HDL 12/09/2024 67  40 - 75 mg/dL Final    Comment: The National Cholesterol Education Program (NCEP) has set the  following guidelines (reference values) for HDL Cholesterol:  Low...............<40 mg/dL  Optimal...........>60 mg/dL      LDL Cholesterol 12/09/2024 63.2  63.0 -  159.0 mg/dL Final    Comment: The National Cholesterol Education Program (NCEP) has set the  following guidelines (reference values) for LDL Cholesterol:  Optimal.......................<130 mg/dL  Borderline High...............130-159 mg/dL  High..........................160-189 mg/dL  Very High.....................>190 mg/dL      HDL/Cholesterol Ratio 12/09/2024 45.6  20.0 - 50.0 % Final    Total Cholesterol/HDL Ratio 12/09/2024 2.2  2.0 - 5.0 Final    Non-HDL Cholesterol 12/09/2024 80  mg/dL Final    Comment: Risk category and Non-HDL cholesterol goals:  Coronary heart disease (CHD)or equivalent (10-year risk of CHD >20%):  Non-HDL cholesterol goal     <130 mg/dL  Two or more CHD risk factors and 10-year risk of CHD <= 20%:  Non-HDL cholesterol goal     <160 mg/dL  0 to 1 CHD risk factor:  Non-HDL cholesterol goal     <190 mg/dL      Free T4 12/09/2024 0.88  0.71 - 1.51 ng/dL Final    PSA Diagnostic 12/09/2024 4.2 (H)  0.00 - 4.00 ng/mL Final    Comment: The testing method is a chemiluminescent microparticle immunoassay   manufactured by [employer] and performed on the [employer]   or   [employer] system. Values obtained with different assay manufacturers   for   methods may be different and cannot be used interchangeably.  PSA Expected levels:  Hormonal Therapy: <0.05 ng/ml  Prostatectomy: <0.01 ng/ml  Radiation Therapy: <1.00 ng/ml     Hospital Outpatient Visit on 12/03/2024   Component Date Value Ref Range Status    QRS Duration 12/03/2024 96  ms Final    [facility] QTC Calculation 12/03/2024 408  ms Final       Assessment & Plan:     Assessment & Plan     Assessed blood pressure control; systolic readings consistently above goal of 130   Evaluated kidney function; creatinine and eGFR stable over time, consistent with chronic kidney disease   Reviewed PSA levels; stable around 4.0-4.3, deemed acceptable for patient's age   Considered dermatological issues on toes; possibly eczema, previous attempts at  diagnosis inconclusive   Assessed overall health status as good for age, noting mental function preservation    ESSENTIAL HYPERTENSION:   Discussed importance of maintaining systolic blood pressure closer to 130 mmHg based on current guidelines.   Explained potential side effects of increased hydralazine dose, including palpitations and headache, noting these are typically balanced by concurrent beta-blocker use.   Adelfo to continue careful sodium intake management.   Increased hydralazine to 50 mg every 8 hours.   Continued irbesartan 300 mg daily.   Continued metoprolol at current dose.   Continue monitoring blood pressure closely and inform office of any significant changes.   Contact office if any unusual issues with blood pressure occur.    GENERAL SAFETY:   Adelfo to avoid getting on roof due to safety concerns.    GOUT MANAGEMENT:   Continued allopurinol at current dose.    FOLLOW-UP:   Follow up in 4 months for regular visit.         No follow-ups on file.     Romero Vogel MD

## 2024-12-23 ENCOUNTER — E-VISIT (OUTPATIENT)
Dept: INTERNAL MEDICINE | Facility: CLINIC | Age: 84
End: 2024-12-23
Payer: MEDICARE

## 2024-12-23 ENCOUNTER — ON-DEMAND VIRTUAL (OUTPATIENT)
Dept: URGENT CARE | Facility: CLINIC | Age: 84
End: 2024-12-23
Payer: MEDICARE

## 2024-12-23 VITALS — TEMPERATURE: 101 F

## 2024-12-23 DIAGNOSIS — J11.1 INFLUENZA: Primary | ICD-10-CM

## 2024-12-23 DIAGNOSIS — R09.89 FEVER WITH RESPIRATORY SYMPTOMS: Primary | ICD-10-CM

## 2024-12-23 DIAGNOSIS — R50.9 FEVER WITH RESPIRATORY SYMPTOMS: Primary | ICD-10-CM

## 2024-12-23 RX ORDER — OSELTAMIVIR PHOSPHATE 75 MG/1
75 CAPSULE ORAL 2 TIMES DAILY
Qty: 10 CAPSULE | Refills: 0 | Status: SHIPPED | OUTPATIENT
Start: 2024-12-23 | End: 2024-12-28

## 2024-12-24 NOTE — PROGRESS NOTES
Patient ID: Adelfo VICENTE Rodriguez, PhD is a 84 y.o. male.    Chief Complaint: Cough, cold, and sore throat of acute onset.    The patient initiated a request through [employer] on 12/23/2024 for evaluation and management with a chief complaint of General Illness (Entered automatically based on patient selection in [employer].)     I evaluated the questionnaire submission on 12/24/2024.at 9:05 am..    [facility]-Cough And Cold    12/23/2024  9:02 PM CST - Filed by Patient   What do you need help with? Cough, Cold, Sore Throat   Do you agree to participate in an E-Visit? Yes   If you have any of the following symptoms, go to your local emergency room or call 911: I acknowledge   What is the main issue you would like addressed today? Fever 101.4, runny nose, headache, fatigue, ache   Do you think you might have COVID or the Flu? Yes Flu   Have you tested positive for COVID or Flu? No   What symptoms do you currently have?  Fatigue;  Headache;  Nasal Congestion;  Muscle or body aches;  Runny nose   Have you ever smoked? I smoked in the past   Have you had a fever? Yes   What has been the range of your fever? 100.4 or greater   When did your symptoms first appear? 12/23/2024   In the last two weeks, have you been in close contact with someone who has COVID-19 or the Flu? No   List what you have done or taken to help your symptoms. Tylenol, nap   How severe are your symptoms? Moderate   Have your symptoms gotten better or worse since they started?  Worse   Do you have transportation to get testing if it is needed and ordered for you at an [facility] location? Yes   Provide any additional information you feel is important.    Please attach any relevant images or files    Are you able to take your vital signs? Yes   Systolic Blood Pressure: 156   Diastolic Blood Pressure: 71   Weight: 222   Height: 69   Pulse: 69   Temperature: 101.4   Respiration rate: 15   Pulse Oxygen: 96     Symptom questionnaire reviewed.  Symptoms  can be consistent with influenza or COVID-19.  Specific antiviral medications are available for each condition.  Further rapid testing for COVID-19 and influenza is recommended.  The patient will need to be seen at an urgent care clinic today for rapid testing if home COVID-flu testing is not available.    Other active medical conditions include chronic asthma, hypertension, chronic kidney disease.    Encounter Diagnosis   Name Primary?    Fever with respiratory symptoms Yes        Recommendation:  The patient should be tested for COVID-19 and influenza.  Rapid testing is available at 1 of our urgent care clinics.  Specific antiviral therapy could then be ordered based on the results.  The patient will be advised to go to 1 of our urgent care clinics for further testing for COVID-19 and influenza.           Follow up if symptoms worsen or fail to improve.      E-Visit Time Tracking:  Encounter completed at 9:25 a.m..

## 2024-12-24 NOTE — PROGRESS NOTES
Subjective:      Patient ID: Adelfo Rodriguez, PhD is a 84 y.o. male.    Vitals:  temperature is 101.2 °F (38.4 °C) (abnormal).     Chief Complaint: Fever (Sudden onset fever, body aches, headache, negative covid test)      Visit Type: TELE AUDIOVISUAL    Present with the patient at the time of consultation: TELEMED PRESENT WITH PATIENT: None    Past Medical History:   Diagnosis Date    ALLERGIC RHINITIS     Anemia     Asthma     Cataract     Hyperlipidemia     Hypertension     NAFLD (nonalcoholic fatty liver disease)     ANNABELLE (obstructive sleep apnea)     on CPAP    PVD (posterior vitreous detachment), left eye     Squamous cell cancer of skin of hand      Past Surgical History:   Procedure Laterality Date    APPENDECTOMY      CARDIAC CATHETERIZATION  10/31/2018    no stent    CARDIAC CATHETERIZATION  1998    no stent    CATARACT EXTRACTION W/  INTRAOCULAR LENS IMPLANT Right 8/1/2024    Procedure: EXTRACTION, CATARACT, WITH IOL INSERTION;  Surgeon: Gato Cedeno MD;  Location: [facility] OR;  Service: Ophthalmology;  Laterality: Right;  Ora and callisto for align    CATARACT EXTRACTION W/  INTRAOCULAR LENS IMPLANT Left 8/15/2024    Procedure: EXTRACTION, CATARACT, WITH IOL INSERTION;  Surgeon: Gato Cedeno MD;  Location: [facility] OR;  Service: Ophthalmology;  Laterality: Left;  Ora and callisto for align    COLONOSCOPY N/A 6/19/2018    Procedure: COLONOSCOPY;  Surgeon: Wade De La Garza MD;  [facility]  Service: Endoscopy;  Laterality: N/A;    COLONOSCOPY N/A 6/11/2020    Procedure: COLONOSCOPY;  Surgeon: Von Gutierrez MD;  [facility]  Service: Endoscopy;  Laterality: N/A;  3/17/20-pt confirmed appt on 3/20/20 with new arrival time of 0715, and updated visitor/ride instructions-BB  patient to be rescheduled by 6/3/20 per Dr. Gutierrez-BB  patient requests to be rescheduled on a Thursday or Friday-BB  patient requested suprep-BB  loop recorder-BB  C    COLONOSCOPY N/A 6/2/2023     Procedure: COLONOSCOPY;  Surgeon: Bart Hernandez MD;  Location: [facility] (4TH FLR);  Service: Endoscopy;  Laterality: N/A;  Procedure Timin-12 weeks   suprep  pt requested Dr. Hernandez   instructions to portal-st  pre call , pt confirmed - mf    ENDOSCOPIC ULTRASOUND OF UPPER GASTROINTESTINAL TRACT N/A 10/1/2020    Procedure: ULTRASOUND, UPPER GI TRACT, ENDOSCOPIC;  Surgeon: Niko Lambert MD;  Location: [facility] (2ND FLR);  Service: Endoscopy;  Laterality: N/A;  Covid-19 test 20 at [facility] -   Has Loop Recorder.    ESOPHAGOGASTRODUODENOSCOPY N/A 10/23/2018    Procedure: EGD (ESOPHAGOGASTRODUODENOSCOPY);  Surgeon: Bart Hernandez MD;  Location: [facility] (4TH FLR);  Service: Endoscopy;  Laterality: N/A;    ESOPHAGOGASTRODUODENOSCOPY N/A 2020    Procedure: EGD (ESOPHAGOGASTRODUODENOSCOPY);  Surgeon: Von Gutierrez MD;  Location: [facility] (4TH FLR);  Service: Endoscopy;  Laterality: N/A;  3/17/20-pt confirmed appt on 3/20/20 with new arrival time of 0715, and updated visitor/ride instructions-BB  patient to be rescheduled by 6/3/20 per Dr. Gutierrez-BB  patient requests to be rescheduled on a Thursday or Friday-BB  patient requested suprep-BB    ESOPHAGOGASTRODUODENOSCOPY N/A 2023    Procedure: EGD (ESOPHAGOGASTRODUODENOSCOPY);  Surgeon: Bart Hernandez MD;  Location: [facility] ENDO (4TH FLR);  Service: Endoscopy;  Laterality: N/A;    FOOT SURGERY      INSERTION OF IMPLANTABLE LOOP RECORDER N/A 2019    Procedure: Insertion, Implantable Loop Recorder;  Surgeon: Gil Wilson MD;  Location: [facility] EP LAB;  Service: Cardiology;  Laterality: N/A;  AT, [place], [place], [place], [place], 3 Prep    testicular biopsy       Review of patient's allergies indicates:   Allergen Reactions    Amoxicillin Hives    Allopurinol Other (See Comments)    Allopurinol analogues Other (See Comments)     Abnormal transaminases     Current Outpatient Medications on File Prior to Visit   Medication Sig Dispense Refill    atorvastatin  (LIPITOR) 20 MG tablet TAKE 1 TABLET(20 MG) BY MOUTH EVERY DAY 90 tablet 3    azelastine (ASTELIN) 137 mcg (0.1 %) nasal spray 2 sprays (274 mcg total) by Nasal route 2 (two) times daily as needed. 90 mL 3    febuxostat (ULORIC) 40 mg Tab Take 1 tablet (40 mg total) by mouth once daily. 90 tablet 3    fish oil-omega-3 fatty acids 300-1,000 mg capsule Take 2 g by mouth once daily.      fluocinonide (LIDEX) 0.05 % external solution Apply topically 2 (two) times daily. For severe rashes in scalp and ears only prn 60 mL 3    fluticasone propionate (FLONASE) 50 mcg/actuation nasal spray SHAKE LIQUID AND USE 2 SPRAYS(100 MCG) IN EACH NOSTRIL EVERY DAY 48 g 2    fluticasone-salmeterol diskus inhaler 250-50 mcg INHALE 1 PUFF BY MOUTH TWICE DAILY. RINSE MOUTH AFTER  each 3    furosemide (LASIX) 40 MG tablet TAKE 1 TABLET(40 MG) BY MOUTH EVERY DAY 90 tablet 3    glucosamine & chondroit sul.Na 750-600 mg Tab Take 750 mg by mouth.      hydrALAZINE (APRESOLINE) 50 MG tablet Take 1 tablet (50 mg total) by mouth every 8 (eight) hours. 270 tablet 3    hydrocortisone 2.5 % cream Apply topically 2 (two) times daily. As needed for severe flares of rash on face and neck 28 g 3    irbesartan (AVAPRO) 300 MG tablet TAKE 1 TABLET(300 MG) BY MOUTH EVERY EVENING 90 tablet 3    ketoconazole (NIZORAL) 2 % cream Apply topically 2 (two) times daily. To dry skin PRN 60 g 3    ketoconazole (NIZORAL) 2 % shampoo To scalp and beard area for dry skin  mL 2    levocetirizine (XYZAL) 5 MG tablet       loratadine (CLARITIN) 10 mg tablet Take 10 mg by mouth daily as needed.      metoprolol succinate (TOPROL-XL) 50 MG 24 hr tablet Take 1 tablet (50 mg total) by mouth once daily. 90 tablet 3    montelukast (SINGULAIR) 10 mg tablet Take 1 tablet (10 mg total) by mouth every evening. 30 tablet 11    omeprazole (PRILOSEC) 20 MG capsule TAKE 1 CAPSULE(20 MG) BY MOUTH EVERY DAY 90 capsule 3    pramoxine-hydrocortisone (PROCTOCREAM-HC) 1-1 %  rectal cream Place rectally 2 (two) times daily. 30 g 1    triamcinolone acetonide 0.1% (KENALOG) 0.1 % cream Apply topically 2 (two) times daily. PRN dry itchy skin on legs and foot 80 g 2     Current Facility-Administered Medications on File Prior to Visit   Medication Dose Route Frequency Provider Last Rate Last Admin    sodium chloride 0.9% flush 10 mL  10 mL Intravenous PRN Gato Cedeno MD         Family History   Problem Relation Name Age of Onset    Cancer Mother          breast    Glaucoma Mother      Retinal detachment Mother      Heart disease Father          CHF    COPD Father      Retinal detachment Father      Heart disease Brother      Arthritis Brother      Blindness Maternal Grandmother      Cataracts Maternal Grandmother      Cirrhosis Neg Hx      Strabismus Neg Hx      Colon cancer Neg Hx      Stomach cancer Neg Hx      Esophageal cancer Neg Hx      Celiac disease Neg Hx      Crohn's disease Neg Hx      Rectal cancer Neg Hx      Ulcerative colitis Neg Hx         Medications Ordered                [id number] - [place], [place] - [address] [place] [address] AT [facility]   [address] [facility] [address]    Telephone: [phone]   Fax: [phone]   Hours: Not open 24 hours                         E-Prescribed (1 of 1)              oseltamivir (TAMIFLU) 75 MG capsule    Sig: Take 1 capsule (75 mg total) by mouth 2 (two) times daily. for 5 days       Start: 12/23/24     Quantity: 10 capsule Refills: 0                           Ohs Peq Odvv Intake    12/23/2024  8:12 PM CST - Filed by Patient   What is your current physical address in the event of a medical emergency? [address], [place] [address]   Are you able to take your vital signs? Yes   Systolic Blood Pressure: 156   Diastolic Blood Pressure: 73   Weight: 222   Height: 69   Pulse: 69   Temperature: 101.4   Respiration rate: 15   Pulse Oxygen: 96   Please attach any relevant images or files     Is your employer contracted with [facility]? No         HPI     Fever     Additional comments: Sudden onset fever, body aches, headache, negative covid test  Two patient identifiers were used-name was repeated verbally as well as date of birth.  The patient is located in his home in [place]          Constitution: Positive for fatigue and fever.   HENT:  Positive for congestion, postnasal drip and sinus pressure.    Respiratory:  Positive for cough.    Neurological:  Positive for headaches.        Objective:   The physical exam was conducted virtually.  Physical Exam   Constitutional: He is oriented to person, place, and time. No distress.   HENT:   Head: Normocephalic and atraumatic.   Neck: Neck supple.   Pulmonary/Chest: Effort normal. No respiratory distress.   Abdominal: Normal appearance.   Musculoskeletal: Normal range of motion.         General: Normal range of motion.   Neurological: no focal deficit. He is alert and oriented to person, place, and time.   Skin: Skin is not pale.   Psychiatric: His behavior is normal. Mood, judgment and thought content normal.       Assessment:     1. Influenza        Plan:       Influenza-suspected      Other orders  -     oseltamivir (TAMIFLU) 75 MG capsule; Take 1 capsule (75 mg total) by mouth 2 (two) times daily. for 5 days  Dispense: 10 capsule; Refill: 0    Renally dosed tamiflu  Push fluids; tylenol as needed for fever or discomfort.  F/u with PCP; to ER for worsening of symptoms.

## 2024-12-27 DIAGNOSIS — I47.19 PAT (PAROXYSMAL ATRIAL TACHYCARDIA): ICD-10-CM

## 2024-12-31 RX ORDER — METOPROLOL SUCCINATE 50 MG/1
50 TABLET, EXTENDED RELEASE ORAL
Qty: 90 TABLET | Refills: 3 | Status: SHIPPED | OUTPATIENT
Start: 2024-12-31

## 2025-02-12 ENCOUNTER — TELEPHONE (OUTPATIENT)
Dept: INTERNAL MEDICINE | Facility: CLINIC | Age: 85
End: 2025-02-12
Payer: MEDICARE

## 2025-02-12 NOTE — TELEPHONE ENCOUNTER
I faxed the order 2/6 & got fax confirming.    I just refaxed and also got confirmation it went thru.    I reached mekhi and told her coming over now  For 2nd time.

## 2025-02-12 NOTE — TELEPHONE ENCOUNTER
----- Message from Mike sent at 2/12/2025 11:09 AM CST -----  Contact: [phone]([facility]  .[email or web address]     Patient is calling for Medical Advice regarding:Physical trying to see if you guys have received it and would like for you to sign it and send back over to them as soon as possible    How long has patient had these symptoms:    Pharmacy name and phone#:    Patient wants a call back or thru [facility]:call back     Comments:    Please advise patient replies from provider may take up to 48 hours.

## 2025-03-17 ENCOUNTER — PATIENT MESSAGE (OUTPATIENT)
Dept: PULMONOLOGY | Facility: CLINIC | Age: 85
End: 2025-03-17
Payer: MEDICARE

## 2025-03-17 DIAGNOSIS — J45.22 MILD INTERMITTENT ASTHMA WITH STATUS ASTHMATICUS: ICD-10-CM

## 2025-03-17 DIAGNOSIS — J18.9 PNEUMONIA OF LEFT LOWER LOBE DUE TO INFECTIOUS ORGANISM: ICD-10-CM

## 2025-03-17 RX ORDER — PREDNISONE 20 MG/1
40 TABLET ORAL DAILY
Qty: 10 TABLET | Refills: 0 | Status: SHIPPED | OUTPATIENT
Start: 2025-03-17 | End: 2025-03-22

## 2025-03-17 RX ORDER — MONTELUKAST SODIUM 10 MG/1
10 TABLET ORAL NIGHTLY
Qty: 30 TABLET | Refills: 11 | Status: SHIPPED | OUTPATIENT
Start: 2025-03-17 | End: 2026-03-12

## 2025-03-25 ENCOUNTER — TELEPHONE (OUTPATIENT)
Dept: DERMATOLOGY | Facility: CLINIC | Age: 85
End: 2025-03-25
Payer: MEDICARE

## 2025-03-25 NOTE — TELEPHONE ENCOUNTER
----- Message from Nurse Burks sent at 3/24/2025  4:53 PM CDT -----  Regarding: FW: appt access    ----- Message -----  From: Ranulfo Bolivar  Sent: 3/24/2025   2:29 PM CDT  To: Octavio Garrison Staff  Subject: appt access                                      PATIENT CALLPt called to schedule TBSE, concerning lesions to the hand. Please call back at [phone], Dr Rodriguez prefers time slots after 10am

## 2025-03-25 NOTE — TELEPHONE ENCOUNTER
----- Message from Ranulfo Bolivar sent at 3/25/2025  2:12 PM CDT -----  Regarding: return call  PATIENT RETURNING CALLPt daniel Geo's call regarding appt scheduling. Please call pt at [phone]

## 2025-03-26 ENCOUNTER — OFFICE VISIT (OUTPATIENT)
Dept: DERMATOLOGY | Facility: CLINIC | Age: 85
End: 2025-03-26
Payer: MEDICARE

## 2025-03-26 DIAGNOSIS — L30.9 DERMATITIS, UNSPECIFIED: Primary | ICD-10-CM

## 2025-03-26 PROCEDURE — 99214 OFFICE O/P EST MOD 30 MIN: CPT | Mod: S$GLB,,, | Performed by: DERMATOLOGY

## 2025-03-26 PROCEDURE — 99999 PR PBB SHADOW E&M-EST. PATIENT-LVL II: CPT | Mod: PBBFAC,,, | Performed by: DERMATOLOGY

## 2025-03-26 RX ORDER — CLOBETASOL PROPIONATE 0.5 MG/G
OINTMENT TOPICAL 2 TIMES DAILY
Qty: 60 G | Refills: 1 | Status: SHIPPED | OUTPATIENT
Start: 2025-03-26

## 2025-03-26 NOTE — PROGRESS NOTES
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    HPI  Patient is here for a few concerns:  He reports continued rash to left great toe that has now spread to his right great toe. Left toe eruption has been evaluated in the past (10/2023). Reports initially he would get recurrent blisters that would rupture and was treated with Medrol dose pack. 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 He reports he no longer gets blisters but the area remains red and scaly. States the rash is now on his right great toe and notices some red patches on his right dorsal hand. He is not currently using anything to the area.    Review of Systems   Skin:  Positive for itching and rash.       Objective:   Physical Exam   Skin:   Areas Examined (abnormalities noted in diagram):   Neck Inspection Performed  RUE Inspected  LUE Inspection Performed  RLE Inspected  LLE Inspection Performed            Diagram Legend     Erythematous scaling macule/papule c/w actinic keratosis       Vascular papule c/w angioma      Pigmented verrucoid papule/plaque c/w seborrheic keratosis      Yellow umbilicated papule c/w sebaceous hyperplasia      Irregularly shaped tan macule c/w lentigo     1-2 mm smooth white papules consistent with Milia      Movable subcutaneous cyst with punctum c/w epidermal inclusion cyst      Subcutaneous movable cyst c/w pilar cyst      Firm pink to brown papule c/w dermatofibroma      Pedunculated fleshy papule(s) c/w skin tag(s)      Evenly pigmented macule c/w junctional nevus     Mildly variegated pigmented, slightly irregular-bordered macule c/w mildly atypical nevus      Flesh colored to evenly pigmented papule c/w intradermal nevus       Pink pearly papule/plaque c/w basal cell carcinoma      Erythematous hyperkeratotic cursted plaque c/w SCC      Surgical scar with no sign of  skin cancer recurrence      Open and closed comedones      Inflammatory papules and pustules      Verrucoid papule consistent consistent with wart     Erythematous eczematous patches and plaques     Dystrophic onycholytic nail with subungual debris c/w onychomycosis     Umbilicated papule    Erythematous-base heme-crusted tan verrucoid plaque consistent with inflamed seborrheic keratosis     Erythematous Silvery Scaling Plaque c/w Psoriasis     See annotation      Assessment / Plan:        Dermatitis, unspecified  - Previously seen for suspected recurrent HSV given history of recurrent blisters to left great toe. Today clinically appears eczematous in nature. Suspect allergic contact dermatitis possibly from shoe vs other.   - Will trial course of clobetasol 0.05% BID to the affected areas on his toes and left hand for 2-4 weeks then take 1 week break before resuming   - Patient has TBSE in June, will re-evaluate these areas for improvement at that time  -     clobetasol 0.05% (TEMOVATE) 0.05 % Oint; Apply topically 2 (two) times daily. Apply twice daily to the rash on toes and left hand for 2-4 weeks then take 1 week break before resuming  Dispense: 60 g; Refill: 1             No follow-ups on file.

## 2025-04-01 RX ORDER — FEBUXOSTAT 40 MG/1
40 TABLET, FILM COATED ORAL DAILY
Qty: 90 TABLET | Refills: 3 | Status: SHIPPED | OUTPATIENT
Start: 2025-04-01

## 2025-05-05 ENCOUNTER — LAB VISIT (OUTPATIENT)
Dept: LAB | Facility: HOSPITAL | Age: 85
End: 2025-05-05
Attending: INTERNAL MEDICINE
Payer: MEDICARE

## 2025-05-05 ENCOUNTER — RESULTS FOLLOW-UP (OUTPATIENT)
Dept: RHEUMATOLOGY | Facility: CLINIC | Age: 85
End: 2025-05-05

## 2025-05-05 DIAGNOSIS — M1A.9XX1 TOPHACEOUS GOUT: ICD-10-CM

## 2025-05-05 LAB
ABSOLUTE EOSINOPHIL (OHS): 0.38 K/UL
ABSOLUTE MONOCYTE (OHS): 0.82 K/UL (ref 0.3–1)
ABSOLUTE NEUTROPHIL COUNT (OHS): 2.46 K/UL (ref 1.8–7.7)
ALBUMIN SERPL BCP-MCNC: 3.8 G/DL (ref 3.5–5.2)
ALP SERPL-CCNC: 86 UNIT/L (ref 40–150)
ALT SERPL W/O P-5'-P-CCNC: 19 UNIT/L (ref 10–44)
ANION GAP (OHS): 11 MMOL/L (ref 8–16)
AST SERPL-CCNC: 20 UNIT/L (ref 11–45)
BASOPHILS # BLD AUTO: 0.08 K/UL
BASOPHILS NFR BLD AUTO: 1.5 %
BILIRUB SERPL-MCNC: 0.7 MG/DL (ref 0.1–1)
BUN SERPL-MCNC: 31 MG/DL (ref 8–23)
CALCIUM SERPL-MCNC: 9.2 MG/DL (ref 8.7–10.5)
CHLORIDE SERPL-SCNC: 110 MMOL/L (ref 95–110)
CO2 SERPL-SCNC: 19 MMOL/L (ref 23–29)
CREAT SERPL-MCNC: 1.7 MG/DL (ref 0.5–1.4)
ERYTHROCYTE [DISTWIDTH] IN BLOOD BY AUTOMATED COUNT: 12.8 % (ref 11.5–14.5)
GFR SERPLBLD CREATININE-BSD FMLA CKD-EPI: 39 ML/MIN/1.73/M2
GLUCOSE SERPL-MCNC: 99 MG/DL (ref 70–110)
HCT VFR BLD AUTO: 32.9 % (ref 40–54)
HGB BLD-MCNC: 10.1 GM/DL (ref 14–18)
IMM GRANULOCYTES # BLD AUTO: 0.02 K/UL (ref 0–0.04)
IMM GRANULOCYTES NFR BLD AUTO: 0.4 % (ref 0–0.5)
LYMPHOCYTES # BLD AUTO: 1.46 K/UL (ref 1–4.8)
MCH RBC QN AUTO: 31 PG (ref 27–31)
MCHC RBC AUTO-ENTMCNC: 30.7 G/DL (ref 32–36)
MCV RBC AUTO: 101 FL (ref 82–98)
NUCLEATED RBC (/100WBC) (OHS): 0 /100 WBC
PLATELET # BLD AUTO: 169 K/UL (ref 150–450)
PMV BLD AUTO: 12.4 FL (ref 9.2–12.9)
POTASSIUM SERPL-SCNC: 5 MMOL/L (ref 3.5–5.1)
PROT SERPL-MCNC: 6.5 GM/DL (ref 6–8.4)
RBC # BLD AUTO: 3.26 M/UL (ref 4.6–6.2)
RELATIVE EOSINOPHIL (OHS): 7.3 %
RELATIVE LYMPHOCYTE (OHS): 28 % (ref 18–48)
RELATIVE MONOCYTE (OHS): 15.7 % (ref 4–15)
RELATIVE NEUTROPHIL (OHS): 47.1 % (ref 38–73)
SODIUM SERPL-SCNC: 140 MMOL/L (ref 136–145)
URATE SERPL-MCNC: 5.6 MG/DL (ref 3.4–7)
WBC # BLD AUTO: 5.22 K/UL (ref 3.9–12.7)

## 2025-05-05 PROCEDURE — 84550 ASSAY OF BLOOD/URIC ACID: CPT

## 2025-05-05 PROCEDURE — 80053 COMPREHEN METABOLIC PANEL: CPT

## 2025-05-05 PROCEDURE — 85025 COMPLETE CBC W/AUTO DIFF WBC: CPT

## 2025-05-05 PROCEDURE — 36415 COLL VENOUS BLD VENIPUNCTURE: CPT | Mod: PN

## 2025-05-12 ENCOUNTER — LAB VISIT (OUTPATIENT)
Dept: LAB | Facility: HOSPITAL | Age: 85
End: 2025-05-12
Attending: INTERNAL MEDICINE
Payer: MEDICARE

## 2025-05-12 ENCOUNTER — OFFICE VISIT (OUTPATIENT)
Dept: INTERNAL MEDICINE | Facility: CLINIC | Age: 85
End: 2025-05-12
Payer: MEDICARE

## 2025-05-12 VITALS
OXYGEN SATURATION: 97 % | WEIGHT: 227.94 LBS | SYSTOLIC BLOOD PRESSURE: 138 MMHG | HEIGHT: 69 IN | RESPIRATION RATE: 16 BRPM | BODY MASS INDEX: 33.76 KG/M2 | DIASTOLIC BLOOD PRESSURE: 70 MMHG | TEMPERATURE: 98 F | HEART RATE: 64 BPM

## 2025-05-12 DIAGNOSIS — E55.9 VITAMIN D DEFICIENCY: ICD-10-CM

## 2025-05-12 DIAGNOSIS — J45.20 INTERMITTENT ASTHMA WITHOUT COMPLICATION, UNSPECIFIED ASTHMA SEVERITY: ICD-10-CM

## 2025-05-12 DIAGNOSIS — D53.9 MACROCYTIC ANEMIA: ICD-10-CM

## 2025-05-12 DIAGNOSIS — N18.32 STAGE 3B CHRONIC KIDNEY DISEASE: ICD-10-CM

## 2025-05-12 DIAGNOSIS — E78.49 OTHER HYPERLIPIDEMIA: ICD-10-CM

## 2025-05-12 DIAGNOSIS — I10 ESSENTIAL HYPERTENSION: Primary | ICD-10-CM

## 2025-05-12 LAB — RETICS/RBC NFR AUTO: 1.4 % (ref 0.4–2)

## 2025-05-12 PROCEDURE — 99999 PR PBB SHADOW E&M-EST. PATIENT-LVL V: CPT | Mod: PBBFAC,,, | Performed by: INTERNAL MEDICINE

## 2025-05-12 PROCEDURE — 1126F AMNT PAIN NOTED NONE PRSNT: CPT | Mod: CPTII,S$GLB,, | Performed by: INTERNAL MEDICINE

## 2025-05-12 PROCEDURE — 82306 VITAMIN D 25 HYDROXY: CPT

## 2025-05-12 PROCEDURE — 82746 ASSAY OF FOLIC ACID SERUM: CPT

## 2025-05-12 PROCEDURE — 1101F PT FALLS ASSESS-DOCD LE1/YR: CPT | Mod: CPTII,S$GLB,, | Performed by: INTERNAL MEDICINE

## 2025-05-12 PROCEDURE — 3075F SYST BP GE 130 - 139MM HG: CPT | Mod: CPTII,S$GLB,, | Performed by: INTERNAL MEDICINE

## 2025-05-12 PROCEDURE — 82607 VITAMIN B-12: CPT

## 2025-05-12 PROCEDURE — 99214 OFFICE O/P EST MOD 30 MIN: CPT | Mod: S$GLB,,, | Performed by: INTERNAL MEDICINE

## 2025-05-12 PROCEDURE — 3288F FALL RISK ASSESSMENT DOCD: CPT | Mod: CPTII,S$GLB,, | Performed by: INTERNAL MEDICINE

## 2025-05-12 PROCEDURE — 85045 AUTOMATED RETICULOCYTE COUNT: CPT

## 2025-05-12 PROCEDURE — 82728 ASSAY OF FERRITIN: CPT

## 2025-05-12 PROCEDURE — 3078F DIAST BP <80 MM HG: CPT | Mod: CPTII,S$GLB,, | Performed by: INTERNAL MEDICINE

## 2025-05-12 PROCEDURE — 36415 COLL VENOUS BLD VENIPUNCTURE: CPT | Mod: PO

## 2025-05-12 NOTE — PROGRESS NOTES
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 male.    Chief Complaint: Follow-up    History of Present Illness    CHIEF COMPLAINT:  Adelfo presents today for follow up of blood pressure concerns.  In addition to hypertension other active medical conditions include chronic kidney disease, asthma, and macrocytic anemia.    BLOOD PRESSURE:  He monitors blood pressure at home with daily averages, flagging readings of 145 or higher as unusually high. This is his primary health concern.    KIDNEY FUNCTION:  His eGFR has remained stable between 32-42 for the past 10 years, as monitored by nephrology. He maintains a daily water intake of 40-50 ounces as previously recommended by nephrology.    MEDICATIONS:  He takes Lasix 2-3 times per week based on schedule. During high pollen season, he takes Claritin in the morning and Zyrtec at night for nasal congestion. He uses Afrin at night when congested to facilitate CPAP use, though he has not required allergy medications in recent weeks. His supplements include fish oil.    SLEEP:  He gets approximately 7.5 hours of sleep with CPAP, though mask comes off once or twice per night. He reports increased fatigue compared to when younger, with tendency to fall asleep easily when sitting in chair at night.    GENITOURINARY:  He reports occasional morning dribbling urination which occurs episodically only with first urination of the day. He denies slow urinary stream at other times and has not observed any pattern related to Lasix use. He denies nocturia and empties bladder before bed.    MUSCULOSKELETAL:  He experiences tingling in left hand attributed to shoulder stress, particularly with prolonged computer use. He denies tingling during sleep.    DERMATOLOGY:  He reports a sore spot on his skin that has been intermittently painful for about a week, though currently asymptomatic. He denies recent injury or known cause. He has history of basal cell carcinoma on  hand and receives annual dermatology body skin checks with next appointment scheduled for early June.      ROS:  Constitutional: +fatigue  Eyes: -eye irritation  ENT: -nasal congestion, +itchy nose, +post nasal drip  Respiratory: -cough, -wheezing  Cardiovascular: -chest pain, +lower extremity edema  Gastrointestinal: +change in bowel habits  Genitourinary: -nocturia  Male Genitourinary: +post-urination dribbling  Integumentary: +skin lesion, +sores  Neurological: +tingling              Physical Exam  Vitals and nursing note reviewed.   Constitutional:       General: He is not in acute distress.     Appearance: Normal appearance. He is well-developed.   HENT:      Head: Normocephalic and atraumatic.   Eyes:      General: No scleral icterus.     Extraocular Movements: Extraocular movements intact.      Conjunctiva/sclera: Conjunctivae normal.   Neck:      Thyroid: No thyromegaly.      Vascular: No JVD.   Cardiovascular:      Rate and Rhythm: Normal rate and regular rhythm.      Heart sounds: Normal heart sounds. No murmur heard.     No friction rub. No gallop.   Pulmonary:      Effort: Pulmonary effort is normal. No respiratory distress.      Breath sounds: Normal breath sounds. No wheezing or rales.   Abdominal:      General: Bowel sounds are normal.      Palpations: Abdomen is soft. There is no mass.      Tenderness: There is no abdominal tenderness.   Musculoskeletal:         General: No tenderness. Normal range of motion.      Cervical back: Normal range of motion and neck supple.      Right lower leg: No edema.      Left lower leg: No edema.   Lymphadenopathy:      Cervical: No cervical adenopathy.   Skin:     General: Skin is warm and dry.      Findings: No rash.   Neurological:      Mental Status: He is alert and oriented to person, place, and time.      Comments: Gait is normal.   Psychiatric:         Mood and Affect: Mood normal.         Behavior: Behavior normal.           Lab Visit on 05/05/2025    Component Date Value Ref Range Status    Sodium 05/05/2025 140  136 - 145 mmol/L Final    Potassium 05/05/2025 5.0  3.5 - 5.1 mmol/L Final    Chloride 05/05/2025 110  95 - 110 mmol/L Final    CO2 05/05/2025 19 (L)  23 - 29 mmol/L Final    Glucose 05/05/2025 99  70 - 110 mg/dL Final    BUN 05/05/2025 31 (H)  8 - 23 mg/dL Final    Creatinine 05/05/2025 1.7 (H)  0.5 - 1.4 mg/dL Final    Calcium 05/05/2025 9.2  8.7 - 10.5 mg/dL Final    Protein Total 05/05/2025 6.5  6.0 - 8.4 gm/dL Final    Albumin 05/05/2025 3.8  3.5 - 5.2 g/dL Final    Bilirubin Total 05/05/2025 0.7  0.1 - 1.0 mg/dL Final    For infants and newborns, interpretation of results should be based   on gestational age, weight and in agreement with clinical   observations.    Premature Infant recommended reference ranges:   0-24 hours:  <8.0 mg/dL   24-48 hours: <12.0 mg/dL   3-5 days:    <15.0 mg/dL   6-29 days:   <15.0 mg/dL    ALP 05/05/2025 86  40 - 150 unit/L Final    AST 05/05/2025 20  11 - 45 unit/L Final    ALT 05/05/2025 19  10 - 44 unit/L Final    Anion Gap 05/05/2025 11  8 - 16 mmol/L Final    eGFR 05/05/2025 39 (L)  >60 mL/min/1.73/m2 Final    Estimated GFR calculated using the CKD-EPI creatinine (2021) equation.    Uric Acid 05/05/2025 5.6  3.4 - 7.0 mg/dL Final    WBC 05/05/2025 5.22  3.90 - 12.70 K/uL Final    RBC 05/05/2025 3.26 (L)  4.60 - 6.20 M/uL Final    HGB 05/05/2025 10.1 (L)  14.0 - 18.0 gm/dL Final    HCT 05/05/2025 32.9 (L)  40.0 - 54.0 % Final    MCV 05/05/2025 101 (H)  82 - 98 fL Final    MCH 05/05/2025 31.0  27.0 - 31.0 pg Final    MCHC 05/05/2025 30.7 (L)  32.0 - 36.0 g/dL Final    RDW 05/05/2025 12.8  11.5 - 14.5 % Final    Platelet Count 05/05/2025 169  150 - 450 K/uL Final    MPV 05/05/2025 12.4  9.2 - 12.9 fL Final    Nucleated RBC 05/05/2025 0  <=0 /100 WBC Final    Neut % 05/05/2025 47.1  38 - 73 % Final    Lymph % 05/05/2025 28.0  18 - 48 % Final    Mono % 05/05/2025 15.7 (H)  4 - 15 % Final    Eos % 05/05/2025 7.3  <=8  % Final    Basophil % 05/05/2025 1.5  <=1.9 % Final    Imm Grans % 05/05/2025 0.4  0.0 - 0.5 % Final    Neut # 05/05/2025 2.46  1.8 - 7.7 K/uL Final    Lymph # 05/05/2025 1.46  1 - 4.8 K/uL Final    Mono # 05/05/2025 0.82  0.3 - 1 K/uL Final    Eos # 05/05/2025 0.38  <=0.5 K/uL Final    Baso # 05/05/2025 0.08  <=0.2 K/uL Final    Imm Grans # 05/05/2025 0.02  0.00 - 0.04 K/uL Final    Mild elevation in immature granulocytes is non specific and can be seen in a variety of conditions including stress response, acute inflammation, trauma and pregnancy. Correlation with other laboratory and clinical findings is essential.       Assessment & Plan:     Assessment & Plan     Elevated systolic BP (138/70).   Stable eGFR levels over past 10 years (range 32-42).   Considered potential medication for benign prostate enlargement if symptoms persist.   Deferred dermatological concerns to upcoming dermatology appointment.    CHRONIC ASTHMA:   Adelfo's peak flows are good (475-525), indicating stable lung function.   No persistent cough or wheezing noted, with clear breath sounds on exam.   Adelfo reports nasal drip and occasional cough which will be monitored.   Recommend Astelin to manage nasal drip and throat tickle symptoms.    STAGE 3B CHRONIC KIDNEY DISEASE:   Kidney function remains stable with eGFR fluctuating between 32-42 (currently 39), showing no significant changes over the past 10 years per nephrologist Dr. Chávez.   Current labs show CO2 of 19 and BUN of 31.   The kidney disease is contributing to high blood pressure.   Adelfo appropriately monitors water intake at 40-50 ounces daily as advised by nephrologist.   Discussed the importance of maintaining stable fluid intake, recommending drinking when thirsty rather than forcing fluid consumption.    HYPERTENSION:   Blood pressure today is 138/70, with daily averages flagged in red when 145 or higher.   Weight is stable at 227 lbs.   Advised patient to maintain blood  pressure less than 130/80 and to track BP readings to bring log to next visit.    OBSTRUCTIVE SLEEP APNEA:   CPAP usage shows patient sleeping for 7.5 hours nightly.   Adelfo reports feeling rested after 8 hours of sleep.   Has been using Afrin at night to open nasal passages for CPAP use, but has not needed it for the last few weeks.    ALLERGIC RHINITIS:   Adelfo experiences right eye irritation and nasal stuffiness during high pollen season, especially with oak pollen.   Takes Claritin every morning and Zyrtec at night during high pollen season.   Uses Afrin at night for nasal congestion when necessary.    NUTRITIONAL ANEMIA:   Evaluated patient's macrocytic anemia.   Ordered B12 and folate levels as it has been a while since B12 level was checked.   Will consider B12 supplementation if levels are found to be low.    MONONEUROPATHY OF LEFT UPPER LIMB:   Adelfo reports tingling in the left hand, which sometimes occurs during sleep or when at the computer.    LOCAL SKIN INFECTION:   Monitored sore spot on the finger, which was initially sore but is now encapsulated and not tender.    URINARY FREQUENCY:   Adelfo occasionally wakes up with the need to urinate, experiencing dribbling, but not frequently.   Considered medications such as alfuzosin or tamsulosin for benign prostate enlargement to improve flow and reduce frequency.   Advised follow up if prostate symptoms persist or worsen.    HISTORY OF SKIN CANCER:   Adelfo has a history of basal cell carcinoma on the hand and sees dermatology annually for body scans.   Follow-up with dermatology in June for annual body scan.    ADDITIONAL RECOMMENDATIONS:   Discussed options for bowel regularity management including a fiber supplement like Metamucil and/or Miralax for constipation if needed.         Follow up in about 6 months (around 11/12/2025).     Romero Vogel MD

## 2025-05-13 LAB
25(OH)D3+25(OH)D2 SERPL-MCNC: 18 NG/ML (ref 30–96)
FERRITIN SERPL-MCNC: 35 NG/ML (ref 20–300)
FOLATE SERPL-MCNC: 7.3 NG/ML (ref 4–24)
VIT B12 SERPL-MCNC: 243 PG/ML (ref 210–950)

## 2025-06-02 ENCOUNTER — PATIENT MESSAGE (OUTPATIENT)
Dept: PULMONOLOGY | Facility: CLINIC | Age: 85
End: 2025-06-02
Payer: MEDICARE

## 2025-06-02 ENCOUNTER — PATIENT MESSAGE (OUTPATIENT)
Dept: INTERNAL MEDICINE | Facility: CLINIC | Age: 85
End: 2025-06-02
Payer: MEDICARE

## 2025-06-02 DIAGNOSIS — R05.9 COUGH, UNSPECIFIED TYPE: Primary | ICD-10-CM

## 2025-06-02 DIAGNOSIS — R09.82 POSTNASAL DRIP: ICD-10-CM

## 2025-06-03 ENCOUNTER — OFFICE VISIT (OUTPATIENT)
Dept: DERMATOLOGY | Facility: CLINIC | Age: 85
End: 2025-06-03
Payer: MEDICARE

## 2025-06-03 DIAGNOSIS — L57.0 ACTINIC KERATOSIS: ICD-10-CM

## 2025-06-03 DIAGNOSIS — Z12.83 ENCOUNTER FOR SCREENING FOR MALIGNANT NEOPLASM OF SKIN: Primary | ICD-10-CM

## 2025-06-03 DIAGNOSIS — L21.9 SEBORRHEIC DERMATITIS: ICD-10-CM

## 2025-06-03 DIAGNOSIS — L40.9 PSORIASIS: ICD-10-CM

## 2025-06-03 DIAGNOSIS — I78.8 CAPILLARITIS: ICD-10-CM

## 2025-06-03 DIAGNOSIS — L82.1 SEBORRHEIC KERATOSES: ICD-10-CM

## 2025-06-03 DIAGNOSIS — L85.3 XEROSIS CUTIS: ICD-10-CM

## 2025-06-03 PROCEDURE — 17000 DESTRUCT PREMALG LESION: CPT | Mod: S$GLB,,, | Performed by: DERMATOLOGY

## 2025-06-03 PROCEDURE — 1101F PT FALLS ASSESS-DOCD LE1/YR: CPT | Mod: CPTII,S$GLB,, | Performed by: DERMATOLOGY

## 2025-06-03 PROCEDURE — 99214 OFFICE O/P EST MOD 30 MIN: CPT | Mod: 25,S$GLB,, | Performed by: DERMATOLOGY

## 2025-06-03 PROCEDURE — 3288F FALL RISK ASSESSMENT DOCD: CPT | Mod: CPTII,S$GLB,, | Performed by: DERMATOLOGY

## 2025-06-03 PROCEDURE — 1126F AMNT PAIN NOTED NONE PRSNT: CPT | Mod: CPTII,S$GLB,, | Performed by: DERMATOLOGY

## 2025-06-03 PROCEDURE — 99999 PR PBB SHADOW E&M-EST. PATIENT-LVL III: CPT | Mod: PBBFAC,,, | Performed by: DERMATOLOGY

## 2025-06-03 PROCEDURE — 17003 DESTRUCT PREMALG LES 2-14: CPT | Mod: 59,S$GLB,, | Performed by: DERMATOLOGY

## 2025-06-03 PROCEDURE — 1159F MED LIST DOCD IN RCRD: CPT | Mod: CPTII,S$GLB,, | Performed by: DERMATOLOGY

## 2025-06-03 RX ORDER — FLUOCINONIDE TOPICAL SOLUTION USP, 0.05% 0.5 MG/ML
SOLUTION TOPICAL 2 TIMES DAILY
Qty: 60 ML | Refills: 3 | Status: SHIPPED | OUTPATIENT
Start: 2025-06-03

## 2025-06-03 NOTE — PROGRESS NOTES
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 male who presents for   Chief Complaint   Patient presents with    Skin Check     TBSE     HPI  History of Present Illness: The patient presents for TBSE.  Patient with self-reported history of BCC (L) dorsal hand (unable to find this pathology), with no evidence of recurrence.      The patient was last seen on: 3/26/25 for rash on hands and feet. Pt used clobetasol for 2-3 wks twice a day which made rash better rapidly; likely component of an ACD.    Pt made a list of lesions he wants evaluated. States some of them are itchy. One on his nose started bleeding after manipulation.   - Patient endorses difficulty keeping track of his topical medications, he would appreciate further guidance on what topicals to apply when, and where.   - Notes a few itchy spots on the trunk.   - Dry, itchy spots on the lower extremities.   - There are a few thick, scaly spots on the bilateral hand dorsum, intermittently itchy, non-healing.   - Otherwise, doing well.    Review of Systems   Skin:  Positive for wears hat. Negative for daily sunscreen use (Pt keeps covered with long sleeves), activity-related sunscreen use and recent sunburn.   Hematologic/Lymphatic: Bruises/bleeds easily (bruises).     Objective:   Physical Exam   Constitutional: He appears well-developed and well-nourished. No distress.   Neurological: He is alert and oriented to person, place, and time. He is not disoriented.   Psychiatric: He has a normal mood and affect.   Skin:   Areas Examined (abnormalities noted in diagram):   Scalp / Hair Palpated and Inspected  Head / Face Inspection Performed  Neck Inspection Performed  Chest / Axilla Inspection Performed  Abdomen Inspection Performed  Genitals / Buttocks / Groin Inspection Performed  Back Inspection Performed  RUE Inspected  LUE Inspection Performed  RLE Inspected  LLE Inspection Performed  Nails and Digits Inspection Performed             Diagram Legend     Erythematous scaling macule/papule c/w actinic keratosis       Vascular papule c/w angioma      Pigmented verrucoid papule/plaque c/w seborrheic keratosis      Yellow umbilicated papule c/w sebaceous hyperplasia      Irregularly shaped tan macule c/w lentigo     1-2 mm smooth white papules consistent with Milia      Movable subcutaneous cyst with punctum c/w epidermal inclusion cyst      Subcutaneous movable cyst c/w pilar cyst      Firm pink to brown papule c/w dermatofibroma      Pedunculated fleshy papule(s) c/w skin tag(s)      Evenly pigmented macule c/w junctional nevus     Mildly variegated pigmented, slightly irregular-bordered macule c/w mildly atypical nevus      Flesh colored to evenly pigmented papule c/w intradermal nevus       Pink pearly papule/plaque c/w basal cell carcinoma      Erythematous hyperkeratotic cursted plaque c/w SCC      Surgical scar with no sign of skin cancer recurrence      Open and closed comedones      Inflammatory papules and pustules      Verrucoid papule consistent consistent with wart     Erythematous eczematous patches and plaques     Dystrophic onycholytic nail with subungual debris c/w onychomycosis     Umbilicated papule    Erythematous-base heme-crusted tan verrucoid plaque consistent with inflamed seborrheic keratosis     Erythematous Silvery Scaling Plaque c/w Psoriasis     See annotation      Assessment / Plan:        Encounter for screening for malignant neoplasm of skin  - Patient with self-reported history of BCC of the (L) hand dorsum s/p excision, unable to find such record of removal.   - Serial examinations without change in scar, c/f recurrence of malignancy.   - Overall benign examination today, few lesions c/w AK treated as below.   - Patient instructed in importance in daily sun protection of at least spf 30. Sun avoidance and topical protection discussed.   - Patient encouraged to wear hat for all outdoor exposure. May consider  additional gloves for hand dorsum while outside/riding bike.   - Also discussed sun protective clothing.    Psoriasis  Seborrheic dermatitis  Likely overlap w/ seborrheic dermatitis. Continue topical CS for flares BID until resolution as follows.   - CONTINUE fluocinonide (LIDEX) 0.05 % external solution; Apply topically 2 (two) times daily. For severe rashes in scalp and ears only prn  Dispense: 60 mL; Refill: 3  - CONTINUE triamcinolone 0.1% cream BID for areas of involvement on the upper and lower extremities and the trunk.   - CONTINUE hydrocortisone 2.5% cream BID for areas of involvement of the face.    Capillaritis  - Provided reassurance and education.   - Continue compression stockings, leg elevation, diuresis per primary.   - May use triamcinolone 0.1% cream BID for associated itching.     Xerosis cutis  - Provided recommendations for moisturizers, use as frequently as needed for dryness and itch.     Actinic keratosis  - Cryotherapy to 6 lesions as above.  Cryosurgery Procedure Note    Verbal consent from the patient is obtained including, but not limited to, risk of hypopigmentation/hyperpigmentation, scar, recurrence of lesion. The patient is aware of the precancerous quality and need for treatment of these lesions. Liquid nitrogen cryosurgery is applied to the 6 actinic keratoses, as detailed in the physical exam, to produce a freeze injury. The patient is aware that blisters may form and is instructed on wound care with gentle cleansing and use of vaseline ointment to keep moist until healed.     Seborrheic keratoses  Reassurance given to patient. No treatment is necessary.   Treatment of benign, asymptomatic lesions may be considered cosmetic.       Resident: I, Dusty Hernandez PGY-2, obtained the history, examined this patient, and reviewed the pertinent labs, tests, imaging, and other relevant data and recorded my findings in this progress note. I discussed the case with the attending staff  physician, and their attestation is below.      Follow up in about 1 year (around 6/3/2026).

## 2025-06-03 NOTE — PATIENT INSTRUCTIONS
Scalp Care:    For Scaling: Use Ketoconazole 2% shampoo three times a day (TID) or as tolerated to help manage scalp scaling.  For Irritation: Apply Clobetasol (or Lidex) solution up to twice daily on the scalp until the irritation is resolved.    For the face:     For Irritation: Apply Hydrocortisone 2.5% cream to the face twice daily (BID) until the irritation improves.  For Scaling: Use Ketoconazole 2% cream on the face twice daily for maintenance.    Body Care (arms, trunk, legs):    For Redness, Itching, and Flaking: Apply Triamcinolone cream 0.1% twice daily to the affected areas on the trunk and extremities until the symptoms resolve. You can also apply this cream twice daily on the legs.

## 2025-06-09 ENCOUNTER — OFFICE VISIT (OUTPATIENT)
Dept: OTOLARYNGOLOGY | Facility: CLINIC | Age: 85
End: 2025-06-09
Payer: MEDICARE

## 2025-06-09 DIAGNOSIS — G47.33 OBSTRUCTIVE SLEEP APNEA: ICD-10-CM

## 2025-06-09 DIAGNOSIS — K21.9 LARYNGOPHARYNGEAL REFLUX (LPR): ICD-10-CM

## 2025-06-09 DIAGNOSIS — R05.2 SUBACUTE COUGH: Primary | ICD-10-CM

## 2025-06-09 DIAGNOSIS — J31.0 CHRONIC RHINITIS: Chronic | ICD-10-CM

## 2025-06-09 PROCEDURE — 31575 DIAGNOSTIC LARYNGOSCOPY: CPT | Mod: S$GLB,,, | Performed by: PHYSICIAN ASSISTANT

## 2025-06-09 PROCEDURE — 3288F FALL RISK ASSESSMENT DOCD: CPT | Mod: CPTII,S$GLB,, | Performed by: PHYSICIAN ASSISTANT

## 2025-06-09 PROCEDURE — 99999 PR PBB SHADOW E&M-EST. PATIENT-LVL II: CPT | Mod: PBBFAC,,, | Performed by: PHYSICIAN ASSISTANT

## 2025-06-09 PROCEDURE — 99214 OFFICE O/P EST MOD 30 MIN: CPT | Mod: 25,S$GLB,, | Performed by: PHYSICIAN ASSISTANT

## 2025-06-09 PROCEDURE — 1101F PT FALLS ASSESS-DOCD LE1/YR: CPT | Mod: CPTII,S$GLB,, | Performed by: PHYSICIAN ASSISTANT

## 2025-06-09 RX ORDER — FAMOTIDINE 20 MG/1
20 TABLET, FILM COATED ORAL
Qty: 42 TABLET | Refills: 0 | Status: SHIPPED | OUTPATIENT
Start: 2025-06-09 | End: 2025-06-10

## 2025-06-09 RX ORDER — FAMOTIDINE 20 MG/1
20 TABLET, FILM COATED ORAL
Qty: 42 TABLET | Refills: 0 | Status: SHIPPED | OUTPATIENT
Start: 2025-06-09 | End: 2025-06-09

## 2025-06-09 NOTE — PROGRESS NOTES
Subjective:     HPI: Adelfo ZHANG Jennifer, PhD is a 84 y.o. male with h/o Ramirez's esophagus, asthma, atrial flutter who was referred to me by Dr. Romero Vogel in consultation for post-nasal drip.    He reports a dry cough since early May, occurring randomly throughout the day without discernible patterns. He experienced wheezing last night resulting in poor sleep. Peak flows range from 450 to 490. He reports feeling of fullness in head with occasional headaches, which is unusual for him. He notes runny nose with post-nasal drip and neck and shoulder tightness attributed to prolonged computer use. He denies fever.  Reports similar symptoms last year that lasted for about 3 months until treated for pneumonia.    MEDICATIONS:  He takes fluticasone nasal spray, Astellin nasal spray morning and night, omeprazole daily, and recently started Mucinex.    SOCIAL HISTORY:  He typically eats dinner at 6 PM with a cocktail before or wine during dinner. His usual bedtime is 1 AM.    Triggers for the cough include the following:   - voice use:  no  - breathing heavily:  no  - laughing:  no  - eating:  no  - drinking cold liquids:  no  - strong odors:  no    - post-prandial:  no  - lying down:  yes    Taking an ARB/ACEI: yes    Prior workup includes the following:  - pulmonary:  yes, CXR 6/12/24 WNL.   - GI:  no, but prior EGD with evidence of Ramirez's and patient on PPI daily  - allergy:  no  - ENT:  no    Current sinonasal medications as above.  The last course of antibiotics was a long time ago.    He does not recall previously having allergy testing.  He relates a history of asthma.  He relates a history of reflux symptoms which is currently managed with omeprazole 20 mg PO daily.    He relates a diagnosis of obstructive sleep apnea with regular CPAP use.   He does not recall a prior history of nasal trauma.  He has not had sinonasal surgery.       Past Medical/Past Surgical History  Past Medical History:   Diagnosis Date     ALLERGIC RHINITIS     Anemia     Asthma     Cataract     Hyperlipidemia     Hypertension     NAFLD (nonalcoholic fatty liver disease)     ANNABELLE (obstructive sleep apnea)     on CPAP    PVD (posterior vitreous detachment), left eye     Squamous cell cancer of skin of hand      He has a past surgical history that includes Appendectomy; testicular biopsy; Foot surgery; Colonoscopy (N/A, 6/19/2018); Esophagogastroduodenoscopy (N/A, 10/23/2018); Cardiac catheterization (10/31/2018); Cardiac catheterization (1998); Insertion of implantable loop recorder (N/A, 9/18/2019); Esophagogastroduodenoscopy (N/A, 6/11/2020); Colonoscopy (N/A, 6/11/2020); Endoscopic ultrasound of upper gastrointestinal tract (N/A, 10/1/2020); Esophagogastroduodenoscopy (N/A, 6/2/2023); Colonoscopy (N/A, 6/2/2023); Cataract extraction w/  intraocular lens implant (Right, 8/1/2024); and Cataract extraction w/  intraocular lens implant (Left, 8/15/2024).    Family History/Social History  His family history includes Arthritis in his brother; Blindness in his maternal grandmother; COPD in his father; Cancer in his mother; Cataracts in his maternal grandmother; Glaucoma in his mother; Heart disease in his brother and father; Retinal detachment in his father and mother.  He reports that he quit smoking about 57 years ago. His smoking use included cigarettes. He started smoking about 67 years ago. He has a 10 pack-year smoking history. He has quit using smokeless tobacco. He reports current alcohol use of about 2.0 standard drinks of alcohol per week. He reports that he does not use drugs.    Allergies/Immunizations  He is allergic to amoxicillin, allopurinol, and allopurinol analogues.    Medications   atorvastatin  azelastine  clobetasol 0.05% Oint  febuxostat Tab  fish oil-omega-3 fatty acids  fluocinonide  fluticasone propionate  fluticasone-salmeterol 250-50 mcg/dose  furosemide  glucosamine-chondroitn sulf.Na  Tab  hydrALAZINE  hydrocortisone  irbesartan  ketoconazole  levocetirizine  loratadine  metoprolol succinate  montelukast  omeprazole  pramoxine-hydrocortisone  triamcinolone acetonide 0.1%     Review of Systems     Constitutional: Positive for fatigue.      HENT: Positive for postnasal drip.      Eyes:  Negative for change in eyesight, eye drainage, eye itching and photophobia.     Respiratory:  Positive for cough and sleep apnea.      Cardiovascular:  Positive for foot swelling.     Gastrointestinal:  Negative for abdominal pain, acid reflux, constipation, diarrhea, heartburn and vomiting.     Musc: Positive for aching muscles, back pain and neck pain.     Skin: Positive for rash.     Allergy: Positive for seasonal allergies.     Endocrine: Negative for cold intolerance and heat intolerance.      Neurological: Negative for dizziness, headaches, light-headedness, seizures and tremors.      Hematologic: Positive for bruises/bleeds easily.     Psychiatric: Negative for decreased concentration, depression, nervous/anxious and sleep disturbance.            Objective:     There were no vitals taken for this visit.       Constitutional:   He appears well-developed and well-nourished. Normal speech.      Head:  Normocephalic and atraumatic. Facial strength is normal.      Nose:  Septal deviation (left) present. No mucosal edema, rhinorrhea or polyps.  No foreign bodies. Turbinate hypertrophy.  Turbinates normal and no turbinate masses.  Right sinus exhibits no maxillary sinus tenderness and no frontal sinus tenderness. Left sinus exhibits no maxillary sinus tenderness and no frontal sinus tenderness.     Mouth/Throat  Oropharynx clear and moist without lesions or asymmetry, normal uvula midline and lips, teeth, and gums normal. No trismus or mucous membrane lesions. No oropharyngeal exudate, posterior oropharyngeal edema or posterior oropharyngeal erythema.     Neck:  Phonation normal. No thyroid mass and no thyromegaly  "present.     He has no cervical adenopathy.     Pulmonary/Chest:   Effort normal.     Psychiatric:   He has a normal mood and affect. His speech is normal and behavior is normal.       Procedure    Flexible laryngoscopy performed.  See procedure note.                                      Data Reviewed  I personally reviewed the chart, including any outside records, and pertinent data below:    I reviewed the following notes Pulmonology, Gastroenterology, and ENT     WBC (K/uL)   Date Value   05/05/2025 5.22     Eosinophil % (%)   Date Value   12/09/2024 6.0     Eos # (K/uL)   Date Value   05/05/2025 0.38   12/09/2024 0.3     Eos % (%)   Date Value   05/05/2025 7.3     Platelet Count (K/uL)   Date Value   05/05/2025 169     Platelets (K/uL)   Date Value   12/09/2024 160     Glucose (mg/dL)   Date Value   05/05/2025 99   12/09/2024 91     No results found for: "IGE"    EGD 6/2/2023:  Impression:             - Esophageal mucosal changes classified as Ramirez's stage C0-M2 per [place] criteria. Biopsied.   - Normal stomach.   - Normal examined duodenum.     No sinus imaging available.    Assessment & Plan:     1. Subacute cough  3. Laryngopharyngeal reflux (LPR)  -     Unclear etiology but most likely LPR as etiology  - Laryngoscopy with mild LPR changes  -  START pepcid wtih dinner x6 weeks  -  Discussed cutting back on alcohol  - advised f/u with pulm    2. Chronic rhinitis  -     START saline rinse  -  Continue nasal sprays    4. Obstructive sleep apnea  - continue CPAP    He will f/u as needed  I had a discussion with the patient regarding his condition and the further workup and management options.    All questions were answered, and the patient is in agreement with the above.     Disclaimer:  This note may have been prepared utilizing voice recognition software which may result in occasional typographical errors in the text such as sound alike words.   If further clarification is needed, please contact the ENT " department of [facility].    Abbreviations:  AR= allergic rhinitis IT= inferior turbinate   CRS=chronic rhinosinusitis MT= middle turbinate   CRSwNP= CRS with nasal polyposis ET= eustachian tube   AFRS= allergic fungal rhinosinusitis PC= posterior choana   AERD= ASA- exacerbated respiratory dz PND= postnasal drip   NV= nasal valve Abx= antibiotics   DNS= deviated nasal septum NOS= nasal obstruction   CB= merlin bullosa BITR= bilateral inferior turbinate reduction   FFL= Flexible laryngoscopy SMR= submucosal resection (of septum)

## 2025-06-09 NOTE — PROCEDURES
Laryngoscopy    Date/Time: 6/9/2025 11:30 AM    Performed by: Sedrick Haney PA-C  Authorized by: Sedrick Haney PA-C    Consent Done?:  Yes (Verbal)  Anesthesia:     Local anesthetic:  Phenylephrine spray    Patient tolerance:  Patient tolerated the procedure well with no immediate complications  Laryngoscopy:     Areas examined:  Nasal cavities, nasopharynx, oropharynx, hypopharynx, larynx and vocal cords    Laryngoscope size:  4 mm  Nose Intranasal:      Mucosa no polyps     Mucosa ulcers not present     No mucosa lesions present     Septum gross deformity (mild left deviation)     Enlarged turbinates  Nasopharynx:      No mucosa lesions     Adenoids present     Posterior choanae patent     Eustachian tube patent  Larynx/hypopharynx:      No epiglottis lesions     No epiglottis edema     No AE folds lesions     No vocal cord polyps     Equal and normal bilateral     No hypopharynx lesions     No piriform sinus pooling     No piriform sinus lesions     Post cricoid edema     No post cricoid erythema     Mild edema to R TVF  R MT mildly edematous with dry mucus  Uvula touching epiglottis without cough reflex

## 2025-06-10 RX ORDER — FAMOTIDINE 20 MG/1
20 TABLET, FILM COATED ORAL
Qty: 42 TABLET | Refills: 0 | Status: SHIPPED | OUTPATIENT
Start: 2025-06-10

## 2025-06-15 ENCOUNTER — RESULTS FOLLOW-UP (OUTPATIENT)
Dept: INTERNAL MEDICINE | Facility: CLINIC | Age: 85
End: 2025-06-15

## 2025-06-15 ENCOUNTER — PATIENT MESSAGE (OUTPATIENT)
Dept: INTERNAL MEDICINE | Facility: CLINIC | Age: 85
End: 2025-06-15
Payer: MEDICARE

## 2025-06-23 ENCOUNTER — OFFICE VISIT (OUTPATIENT)
Dept: RHEUMATOLOGY | Facility: CLINIC | Age: 85
End: 2025-06-23
Payer: MEDICARE

## 2025-06-23 ENCOUNTER — PATIENT MESSAGE (OUTPATIENT)
Dept: INTERNAL MEDICINE | Facility: CLINIC | Age: 85
End: 2025-06-23
Payer: MEDICARE

## 2025-06-23 VITALS
HEART RATE: 68 BPM | BODY MASS INDEX: 33.24 KG/M2 | DIASTOLIC BLOOD PRESSURE: 62 MMHG | SYSTOLIC BLOOD PRESSURE: 125 MMHG | WEIGHT: 225.06 LBS

## 2025-06-23 DIAGNOSIS — E53.8 B12 DEFICIENCY: ICD-10-CM

## 2025-06-23 DIAGNOSIS — R51.9 NONINTRACTABLE HEADACHE, UNSPECIFIED CHRONICITY PATTERN, UNSPECIFIED HEADACHE TYPE: ICD-10-CM

## 2025-06-23 DIAGNOSIS — D64.9 ANEMIA, UNSPECIFIED TYPE: Primary | ICD-10-CM

## 2025-06-23 DIAGNOSIS — E55.9 VITAMIN D DEFICIENCY: ICD-10-CM

## 2025-06-23 DIAGNOSIS — R79.0 LOW FERRITIN: ICD-10-CM

## 2025-06-23 PROCEDURE — 99213 OFFICE O/P EST LOW 20 MIN: CPT | Mod: S$GLB,,, | Performed by: INTERNAL MEDICINE

## 2025-06-23 PROCEDURE — 3078F DIAST BP <80 MM HG: CPT | Mod: CPTII,S$GLB,, | Performed by: INTERNAL MEDICINE

## 2025-06-23 PROCEDURE — 1159F MED LIST DOCD IN RCRD: CPT | Mod: CPTII,S$GLB,, | Performed by: INTERNAL MEDICINE

## 2025-06-23 PROCEDURE — 3288F FALL RISK ASSESSMENT DOCD: CPT | Mod: CPTII,S$GLB,, | Performed by: INTERNAL MEDICINE

## 2025-06-23 PROCEDURE — 99999 PR PBB SHADOW E&M-EST. PATIENT-LVL IV: CPT | Mod: PBBFAC,,, | Performed by: INTERNAL MEDICINE

## 2025-06-23 PROCEDURE — 1126F AMNT PAIN NOTED NONE PRSNT: CPT | Mod: CPTII,S$GLB,, | Performed by: INTERNAL MEDICINE

## 2025-06-23 PROCEDURE — 3074F SYST BP LT 130 MM HG: CPT | Mod: CPTII,S$GLB,, | Performed by: INTERNAL MEDICINE

## 2025-06-23 PROCEDURE — 1101F PT FALLS ASSESS-DOCD LE1/YR: CPT | Mod: CPTII,S$GLB,, | Performed by: INTERNAL MEDICINE

## 2025-06-23 RX ORDER — PNV NO.95/FERROUS FUM/FOLIC AC 28MG-0.8MG
100 TABLET ORAL DAILY
COMMUNITY

## 2025-06-23 RX ORDER — FEBUXOSTAT 40 MG/1
40 TABLET, FILM COATED ORAL DAILY
Qty: 90 TABLET | Refills: 3 | Status: SHIPPED | OUTPATIENT
Start: 2025-06-23

## 2025-06-23 RX ORDER — ACETAMINOPHEN 500 MG
TABLET ORAL DAILY
COMMUNITY

## 2025-06-23 NOTE — PROGRESS NOTES
I have personally taken the history and examined the patient and agree with the resident,s note as stated above     No acute gout  Dr. Vogel prescribed vitamin D3 2000 IU for vitamin D insufficiency (18)  Also vitamin B12 100 mcg(?) for  B12(243)  Had headaches with URI last month from coughing, resolved without recurrence       Answers submitted by the patient for this visit:  Rheumatology Questionnaire (Submitted on 6/16/2025)  fever: No  eye redness: No  mouth sores: No  headaches: Yes  shortness of breath: No  chest pain: No  trouble swallowing: No  diarrhea: No  constipation: No  unexpected weight change: No  genital sore: No  During the last 3 days, have you had a skin rash?: Yes  Bruises or bleeds easily: Yes  cough: Yes   Latest Reference Range & Units 05/05/25 09:40 05/12/25 16:35   WBC 3.90 - 12.70 K/uL 5.22    RBC 4.60 - 6.20 M/uL 3.26 (L)    Hemoglobin 14.0 - 18.0 gm/dL 10.1 (L)    Hematocrit 40.0 - 54.0 % 32.9 (L)    MCV 82 - 98 fL 101 (H)    MCH 27.0 - 31.0 pg 31.0    MCHC 32.0 - 36.0 g/dL 30.7 (L)    RDW 11.5 - 14.5 % 12.8    Platelet Count 150 - 450 K/uL 169    MPV 9.2 - 12.9 fL 12.4    Neut % 38 - 73 % 47.1    Lymph % 18 - 48 % 28.0    Mono % 4 - 15 % 15.7 (H)    Eos % <=8 % 7.3    Basophil % <=1.9 % 1.5    Immature Granulocytes 0.0 - 0.5 % 0.4    Gran # (ANC) 1.8 - 7.7 K/uL 2.46    Lymph # 1 - 4.8 K/uL 1.46    Mono # 0.3 - 1 K/uL 0.82    Eos # <=0.5 K/uL 0.38    Baso # <=0.2 K/uL 0.08    Immature Grans (Abs) 0.00 - 0.04 K/uL 0.02    nRBC <=0 /100 WBC 0    Ferritin 20.0 - 300.0 ng/mL  35.0   Folate 4.0 - 24.0 ng/mL  7.3   Vitamin B12 210 - 950 pg/mL  243   Retic 0.4 - 2.0 %  1.4   Sodium 136 - 145 mmol/L 140    Potassium 3.5 - 5.1 mmol/L 5.0    Chloride 95 - 110 mmol/L 110    CO2 23 - 29 mmol/L 19 (L)    Anion Gap 8 - 16 mmol/L 11    BUN 8 - 23 mg/dL 31 (H)    Creatinine 0.5 - 1.4 mg/dL 1.7 (H)    eGFR >60 mL/min/1.73/m2 39 (L)    Glucose 70 - 110 mg/dL 99    Calcium 8.7 - 10.5 mg/dL 9.2    ALP 40 -  150 unit/L 86    PROTEIN TOTAL 6.0 - 8.4 gm/dL 6.5    Albumin 3.5 - 5.2 g/dL 3.8    Uric Acid 3.4 - 7.0 mg/dL 5.6    BILIRUBIN TOTAL 0.1 - 1.0 mg/dL 0.7    AST 11 - 45 unit/L 20    ALT 10 - 44 unit/L 19    Vitamin D 30 - 96 ng/mL  18 (L)   (L): Data is abnormally low  (H): Data is abnormally high    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  Hyperlipidemia, LDL 63.2  12/9/24   on atorvastatin 20mg daily  /62  , metoprolol succinate(XL) 50mg daily and irbesartan 300 mg daily, furosemide 40mg daily hydralazine 25mg three times daily  Mild stable macrocytic anemia but low ferritin  Maculopapular rash dorsal left great toe over distal phalanx seeing Dr. Cunningham /Haskins   dermatitis left great toe     Fe/TIBC sTR [facility]  Continue febuxostat 40mg daily  Agree with vitamin D3 2000  IU daily  Check with Dr. Vogel about B12: 100mcg daily in MedList but should be 1000mcg.  Cont exercise: yard work, walk, elliptical, bike  Try You Tube: chair yoga, [employer], Pilates  [id number] john Mediterranean diet reviewed again, working on it with wife  *Covid booster  CBC, CMP uric acid q 6 months  RTC 12 months/prn

## 2025-06-23 NOTE — PROGRESS NOTES
Patient ID:  Adelfo ZHANG Jennfier, PhD    YOB: 1940     MRN:  [id number]     Subjective:     Chief Complaint:  Disease Management     History of Present Illness:  Pt is a 84 y.o. male with Tophaceous gout  who presents today for f/u. LCV was 03/18/24. At that time he was having slight toe pain.    Today: The patient is doing well today. Has not had any gout attacks.  He started vitamin B12 and D per his PCP. For exercise he uses an E-bike, does yard work but would like to start walking more. Follows the med diet as best as he can.        Denies hair loss, dry eyes, vision changes, dry mouth, oral/nasal ulcers, trouble swallowing, new rashes, joint swelling, morning stiffness, or GI disturbances.      Review of Systems   Review of Systems   Constitutional:  Negative for fever and unexpected weight change.   HENT:  Negative for mouth sores and trouble swallowing.    Eyes:  Negative for redness.   Respiratory:  Negative for cough and shortness of breath.    Cardiovascular:  Negative for chest pain.   Gastrointestinal:  Negative for constipation and diarrhea.   Genitourinary:  Negative for genital sores.   Skin:  Positive for rash.   Neurological:  Negative for headaches.   Hematological:  Bruises/bleeds easily.        Current Medications:  Current Medications[1]     Objective:     Vitals:    06/23/25 1302   BP: 125/62   Pulse: 68      Body mass index is 33.24 kg/m².     Physical Exam   Constitutional: He is oriented to person, place, and time. normal appearance.   HENT:   Head: Normocephalic and atraumatic.   Mouth/Throat: Mucous membranes are moist. Oropharynx is clear.   Eyes: Pupils are equal, round, and reactive to light.   Cardiovascular: Normal rate, regular rhythm, normal heart sounds and normal pulses. Exam reveals no gallop.   No murmur heard.  Pulmonary/Chest: Effort normal and breath sounds normal. No stridor. He has no wheezes. He has no rales.   Abdominal: Bowel sounds are normal. He exhibits no  distension. There is no abdominal tenderness.   Musculoskeletal:         General: Tenderness present.      Right shoulder: Normal.      Left shoulder: Normal.      Right elbow: Normal.      Left elbow: Normal.      Right wrist: Normal.      Left wrist: Normal.      Right knee: Normal.      Left knee: Normal.      Comments: Pain in left first MCP joint. Grind Test +   Neurological: He is alert and oriented to person, place, and time. He displays no weakness.   Skin: Bruising noted.       Right Side Rheumatological Exam     Examination finds the shoulder, elbow, wrist, knee, 1st PIP, 1st MCP, 2nd PIP, 2nd MCP, 3rd PIP, 3rd MCP, 4th PIP, 4th MCP, 5th PIP and 5th MCP normal.    Muscle Strength (0-5 scale):  Deltoid:  5  Biceps: 5/5   Triceps:  5  : 5/5   Iliopsoas: 5  Quadriceps:  5   Distal Lower Extremity: 5    Left Side Rheumatological Exam     Examination finds the shoulder, elbow, wrist, knee, 1st PIP, 2nd PIP, 2nd MCP, 3rd PIP, 3rd MCP, 4th PIP, 4th MCP, 5th PIP and 5th MCP normal.    The patient is tender to palpation of the 1st MCP.    Muscle Strength (0-5 scale):  Deltoid:  5  Biceps: 5/5   Triceps:  5  :  5/5   Iliopsoas: 5  Quadriceps:  5   Distal Lower Extremity: 5             8/23/2022 9/12/2023   Tender (PEREZ-28) 0 / 28  0 / 28    Swollen (PEREZ-28) 0 / 28  0 / 28    Provider Global 0 / 100 --   Patient Global 0 / 100 80 / 100   ESR -- --   CRP -- --   PEREZ-28 (ESR) -- --   PEREZ-28 (CRP) -- --   CDAI Score 0  --        There is currently no information documented on the homunculus. Go to the Rheumatology activity and complete the homunculus joint exam.      Assessment:     1. Anemia, unspecified type    2. 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 Cunningham /Haskins   dermatitis left great toe  Plan:      Problem List Items Addressed This Visit    None  Visit Diagnoses         Anemia, unspecified type    -  Primary    Relevant Orders    Iron and TIBC    Soluble Transferrin Receptor      Nonintractable headache, unspecified chronicity pattern, unspecified headache type        Relevant Orders    Sedimentation rate    C-Reactive Protein              Fe/TIBC sTR MMA  Continue febuxostat 40mg daily  Cont exercise: yard work, walk, elliptical, bike  Try You Tube: chair yoga, [employer], Pilates  [id number] john Mediterranean diet reviewed again, working on it with wife  *Covid booster  CBC, CMP uric acid q 6 months  RTC 12 months/prn     Kiley Mchugh, DO  PGY-1  [employer] [facility]          [1]   Current Outpatient Medications:     atorvastatin (LIPITOR) 20 MG tablet, TAKE 1 TABLET(20 MG) BY MOUTH EVERY DAY, Disp: 90 tablet, Rfl: 3    azelastine (ASTELIN) 137 mcg (0.1 %) nasal spray, 2 sprays (274 mcg total) by Nasal route 2 (two) times daily as needed., Disp: 90 mL, Rfl: 3    cholecalciferol, vitamin D3, (VITAMIN D3) 50 mcg (2,000 unit) Cap capsule, Take by mouth once daily., Disp: , Rfl:     clobetasol 0.05% (TEMOVATE) 0.05 % Oint, Apply topically 2 (two) times daily. Apply twice daily to the rash on toes and left hand for 2-4 weeks then take 1 week break before resuming, Disp: 60 g, Rfl: 1    cyanocobalamin (VITAMIN B-12) 100 MCG tablet, Take 100 mcg by mouth once daily., Disp: , Rfl:     famotidine (PEPCID) 20 MG tablet, TAKE 1 TABLET BY MOUTH DAILY WITH DINNER OR EVENING MEAL, Disp: 42 tablet, Rfl: 0    fish oil-omega-3 fatty acids 300-1,000 mg capsule, Take 2 g by  mouth once daily., Disp: , Rfl:     fluocinonide (LIDEX) 0.05 % external solution, Apply topically 2 (two) times daily. For severe rashes in scalp and ears only prn, Disp: 60 mL, Rfl: 3    fluticasone propionate (FLONASE) 50 mcg/actuation nasal spray, SHAKE LIQUID AND USE 2 SPRAYS(100 MCG) IN EACH NOSTRIL EVERY DAY, Disp: 48 g, Rfl: 2    fluticasone-salmeterol diskus inhaler 250-50 mcg, INHALE 1 PUFF BY MOUTH TWICE DAILY. RINSE MOUTH AFTER USE, Disp: 180 each, Rfl: 3    furosemide (LASIX) 40 MG tablet, TAKE 1 TABLET(40 MG) BY MOUTH EVERY DAY, Disp: 90 tablet, Rfl: 3    glucosamine & chondroit sul.Na 750-600 mg Tab, Take 750 mg by mouth., Disp: , Rfl:     hydrALAZINE (APRESOLINE) 50 MG tablet, Take 1 tablet (50 mg total) by mouth every 8 (eight) hours., Disp: 270 tablet, Rfl: 3    hydrocortisone 2.5 % cream, Apply topically 2 (two) times daily. As needed for severe flares of rash on face and neck, Disp: 28 g, Rfl: 3    irbesartan (AVAPRO) 300 MG tablet, TAKE 1 TABLET(300 MG) BY MOUTH EVERY EVENING, Disp: 90 tablet, Rfl: 3    ketoconazole (NIZORAL) 2 % cream, Apply topically 2 (two) times daily. To dry skin PRN, Disp: 60 g, Rfl: 3    ketoconazole (NIZORAL) 2 % shampoo, To scalp and beard area for dry skin PRN, Disp: 120 mL, Rfl: 2    levocetirizine (XYZAL) 5 MG tablet, , Disp: , Rfl:     loratadine (CLARITIN) 10 mg tablet, Take 10 mg by mouth daily as needed., Disp: , Rfl:     metoprolol succinate (TOPROL-XL) 50 MG 24 hr tablet, TAKE 1 TABLET(50 MG) BY MOUTH EVERY DAY, Disp: 90 tablet, Rfl: 3    montelukast (SINGULAIR) 10 mg tablet, Take 1 tablet (10 mg total) by mouth every evening., Disp: 30 tablet, Rfl: 11    omeprazole (PRILOSEC) 20 MG capsule, TAKE 1 CAPSULE(20 MG) BY MOUTH EVERY DAY, Disp: 90 capsule, Rfl: 3    pramoxine-hydrocortisone (PROCTOCREAM-HC) 1-1 % rectal cream, Place rectally 2 (two) times daily., Disp: 30 g, Rfl: 1    triamcinolone acetonide 0.1% (KENALOG) 0.1 % cream, Apply topically 2 (two) times  daily. PRN dry itchy skin on legs and foot, Disp: 80 g, Rfl: 2    febuxostat (ULORIC) 40 mg Tab, Take 1 tablet (40 mg total) by mouth once daily., Disp: 90 tablet, Rfl: 3

## 2025-06-25 ENCOUNTER — LAB VISIT (OUTPATIENT)
Dept: LAB | Facility: HOSPITAL | Age: 85
End: 2025-06-25
Payer: MEDICARE

## 2025-06-25 ENCOUNTER — RESULTS FOLLOW-UP (OUTPATIENT)
Dept: RHEUMATOLOGY | Facility: CLINIC | Age: 85
End: 2025-06-25

## 2025-06-25 DIAGNOSIS — D64.9 ANEMIA, UNSPECIFIED TYPE: ICD-10-CM

## 2025-06-25 DIAGNOSIS — R51.9 NONINTRACTABLE HEADACHE, UNSPECIFIED CHRONICITY PATTERN, UNSPECIFIED HEADACHE TYPE: ICD-10-CM

## 2025-06-25 DIAGNOSIS — E53.8 B12 DEFICIENCY: ICD-10-CM

## 2025-06-25 LAB
CRP SERPL-MCNC: 0.9 MG/L
ERYTHROCYTE [SEDIMENTATION RATE] IN BLOOD BY PHOTOMETRIC METHOD: 34 MM/HR
IRON SATN MFR SERPL: 22 % (ref 20–50)
IRON SERPL-MCNC: 92 UG/DL (ref 45–160)
TIBC SERPL-MCNC: 419 UG/DL (ref 250–450)
TRANSFERRIN SERPL-MCNC: 283 MG/DL (ref 200–375)

## 2025-06-25 PROCEDURE — 83921 ORGANIC ACID SINGLE QUANT: CPT

## 2025-06-25 PROCEDURE — 36415 COLL VENOUS BLD VENIPUNCTURE: CPT | Mod: PN

## 2025-06-25 PROCEDURE — 84238 ASSAY NONENDOCRINE RECEPTOR: CPT

## 2025-06-25 PROCEDURE — 83540 ASSAY OF IRON: CPT

## 2025-06-25 PROCEDURE — 86140 C-REACTIVE PROTEIN: CPT

## 2025-06-25 PROCEDURE — 85652 RBC SED RATE AUTOMATED: CPT

## 2025-06-26 ENCOUNTER — TELEPHONE (OUTPATIENT)
Dept: RHEUMATOLOGY | Facility: CLINIC | Age: 85
End: 2025-06-26
Payer: MEDICARE

## 2025-06-26 DIAGNOSIS — R70.0 ELEVATED SED RATE: Primary | ICD-10-CM

## 2025-06-26 LAB — STFR SERPL-MCNC: 3.1 MG/L (ref 1.8–4.6)

## 2025-06-30 LAB — W METHYLMALONIC ACID: 0.43 UMOL/L

## 2025-07-01 DIAGNOSIS — L30.9 DERMATITIS, UNSPECIFIED: Primary | ICD-10-CM

## 2025-07-02 NOTE — TELEPHONE ENCOUNTER
No care due was identified.  [facility] Embedded Care Due Messages. Reference number: [id number].   7/02/2025 4:39:50 PM CDT

## 2025-07-03 RX ORDER — FUROSEMIDE 40 MG/1
40 TABLET ORAL DAILY
Qty: 90 TABLET | Refills: 3 | Status: SHIPPED | OUTPATIENT
Start: 2025-07-03

## 2025-07-03 NOTE — TELEPHONE ENCOUNTER
Refill Routing Note   Medication(s) are not appropriate for processing by [facility] for the following reason(s):        Required labs abnormal    [facility] action(s):  Defer           Pharmacist review requested: Yes     Appointments  past 12m or future 3m with PCP    Date Provider   Last Visit   5/12/2025 Romero Vogel MD   Next Visit   11/17/2025 Romero Vogel MD   ED visits in past 90 days: 0        Note composed:12:22 AM 07/03/2025

## 2025-07-07 RX ORDER — KETOCONAZOLE 20 MG/ML
SHAMPOO, SUSPENSION TOPICAL
Qty: 120 ML | Refills: 2 | Status: SHIPPED | OUTPATIENT
Start: 2025-07-07

## 2025-07-25 ENCOUNTER — LAB VISIT (OUTPATIENT)
Dept: LAB | Facility: HOSPITAL | Age: 85
End: 2025-07-25
Attending: INTERNAL MEDICINE
Payer: MEDICARE

## 2025-07-25 DIAGNOSIS — R70.0 ELEVATED SED RATE: ICD-10-CM

## 2025-07-25 LAB
CRP SERPL-MCNC: 0.8 MG/L
ERYTHROCYTE [SEDIMENTATION RATE] IN BLOOD BY PHOTOMETRIC METHOD: 39 MM/HR

## 2025-07-25 PROCEDURE — 36415 COLL VENOUS BLD VENIPUNCTURE: CPT | Mod: PN

## 2025-07-25 PROCEDURE — 86140 C-REACTIVE PROTEIN: CPT

## 2025-07-25 PROCEDURE — 85652 RBC SED RATE AUTOMATED: CPT

## 2025-07-29 ENCOUNTER — PATIENT MESSAGE (OUTPATIENT)
Dept: INTERNAL MEDICINE | Facility: CLINIC | Age: 85
End: 2025-07-29
Payer: MEDICARE

## 2025-07-31 ENCOUNTER — PATIENT MESSAGE (OUTPATIENT)
Facility: CLINIC | Age: 85
End: 2025-07-31
Payer: MEDICARE

## 2025-08-16 ENCOUNTER — ON-DEMAND VIRTUAL (OUTPATIENT)
Dept: URGENT CARE | Facility: CLINIC | Age: 85
End: 2025-08-16
Payer: MEDICARE

## 2025-08-16 DIAGNOSIS — U07.1 COVID-19: Primary | ICD-10-CM

## 2025-08-16 PROCEDURE — 98004 SYNCH AUDIO-VIDEO EST SF 10: CPT | Mod: 95,,, | Performed by: NURSE PRACTITIONER

## 2025-08-18 ENCOUNTER — TELEPHONE (OUTPATIENT)
Dept: OTOLARYNGOLOGY | Facility: CLINIC | Age: 85
End: 2025-08-18
Payer: MEDICARE

## 2025-08-18 ENCOUNTER — PATIENT MESSAGE (OUTPATIENT)
Dept: INTERNAL MEDICINE | Facility: CLINIC | Age: 85
End: 2025-08-18
Payer: MEDICARE

## 2025-08-18 RX ORDER — PROMETHAZINE HYDROCHLORIDE AND DEXTROMETHORPHAN HYDROBROMIDE 6.25; 15 MG/5ML; MG/5ML
5 SYRUP ORAL EVERY 4 HOURS PRN
Qty: 180 ML | Refills: 0 | Status: SHIPPED | OUTPATIENT
Start: 2025-08-18 | End: 2025-08-28

## 2025-08-21 RX ORDER — FLUTICASONE PROPIONATE 50 MCG
SPRAY, SUSPENSION (ML) NASAL
Qty: 48 G | Refills: 2 | Status: SHIPPED | OUTPATIENT
Start: 2025-08-21

## 2025-08-24 ENCOUNTER — PATIENT MESSAGE (OUTPATIENT)
Dept: INTERNAL MEDICINE | Facility: CLINIC | Age: 85
End: 2025-08-24
Payer: MEDICARE

## (undated) DEVICE — SOL POVIDONE SCRUB IODINE 4 OZ

## (undated) DEVICE — INTRO 8.5FR 63CM SRO

## (undated) DEVICE — CATH TRICUSPID HALO XP 7FRX110

## (undated) DEVICE — GLOVE SENSICARE PI SURG 7.5

## (undated) DEVICE — Device

## (undated) DEVICE — SOL BETADINE 5%

## (undated) DEVICE — INTRODUCER AVANTI 6.5F .038X11

## (undated) DEVICE — INTRODUCER AVANTI 8.5F .038X11

## (undated) DEVICE — ADHESIVE DERMABOND ADVANCED

## (undated) DEVICE — CATH RESPONSE QPLR JSN 6F 120

## (undated) DEVICE — SHEATH INTRODUCER 9FR 11CM

## (undated) DEVICE — INTRODUCER AVANTI 7.5F .038X11

## (undated) DEVICE — KIT PROBE COVER WITH GEL

## (undated) DEVICE — CATH BLAZER PRIME XP SC 7F 8MM

## (undated) DEVICE — PAD DEFIB CADENCE ADULT R2

## (undated) DEVICE — DRESSING AQUACEL FOAM 3 X 3

## (undated) DEVICE — PATCH ENSITE PRECISION NAVX SE

## (undated) DEVICE — PACK EP DRAPE

## (undated) DEVICE — CATH BIDIRECTIONAL DF CRV 7FR

## (undated) DEVICE — ELECTRODE POLYHESIVEPRE-ATTACH

## (undated) DEVICE — DRAPE OPTIMA MAJOR PEDIATRIC